# Patient Record
Sex: FEMALE | Race: WHITE | NOT HISPANIC OR LATINO | Employment: OTHER | URBAN - NONMETROPOLITAN AREA
[De-identification: names, ages, dates, MRNs, and addresses within clinical notes are randomized per-mention and may not be internally consistent; named-entity substitution may affect disease eponyms.]

---

## 2017-02-17 ENCOUNTER — LAB (OUTPATIENT)
Dept: LAB | Facility: OTHER | Age: 67
End: 2017-02-17

## 2017-02-17 ENCOUNTER — TRANSCRIBE ORDERS (OUTPATIENT)
Dept: LAB | Facility: OTHER | Age: 67
End: 2017-02-17

## 2017-02-17 DIAGNOSIS — R60.9 PERIPHERAL EDEMA: ICD-10-CM

## 2017-02-17 DIAGNOSIS — E11.9 DIABETES MELLITUS WITHOUT COMPLICATION (HCC): ICD-10-CM

## 2017-02-17 DIAGNOSIS — I10 ESSENTIAL HYPERTENSION, MALIGNANT: ICD-10-CM

## 2017-02-17 DIAGNOSIS — I10 ESSENTIAL HYPERTENSION, MALIGNANT: Primary | ICD-10-CM

## 2017-02-17 DIAGNOSIS — Z00.00 GENERAL MEDICAL EXAM: ICD-10-CM

## 2017-02-17 DIAGNOSIS — Z00.00 GENERAL MEDICAL EXAM: Primary | ICD-10-CM

## 2017-02-17 LAB
25(OH)D3 SERPL-MCNC: 17 NG/ML (ref 30–100)
ALBUMIN SERPL-MCNC: 4 G/DL (ref 3.2–5.5)
ALBUMIN/GLOB SERPL: 1 G/DL (ref 1–3)
ALP SERPL-CCNC: 79 U/L (ref 15–121)
ALT SERPL W P-5'-P-CCNC: 11 U/L (ref 10–60)
ANION GAP SERPL CALCULATED.3IONS-SCNC: 10 MMOL/L (ref 5–15)
AST SERPL-CCNC: 23 U/L (ref 10–60)
BILIRUB SERPL-MCNC: 1.3 MG/DL (ref 0.2–1)
BUN BLD-MCNC: 43 MG/DL (ref 8–25)
BUN/CREAT SERPL: 25.3 (ref 7–25)
CALCIUM SPEC-SCNC: 9.4 MG/DL (ref 8.4–10.8)
CHLORIDE SERPL-SCNC: 94 MMOL/L (ref 100–112)
CO2 SERPL-SCNC: 30 MMOL/L (ref 20–32)
CREAT BLD-MCNC: 1.7 MG/DL (ref 0.4–1.3)
FERRITIN SERPL-MCNC: 59.5 NG/ML (ref 11.1–264)
FOLATE SERPL-MCNC: 9.39 NG/ML (ref 2.76–21)
GFR SERPL CREATININE-BSD FRML MDRD: 30 ML/MIN/1.73 (ref 45–104)
GLOBULIN UR ELPH-MCNC: 3.9 GM/DL (ref 2.5–4.6)
GLUCOSE BLD-MCNC: 158 MG/DL (ref 70–100)
HCT VFR BLD AUTO: 32.2 % (ref 35–45)
HGB BLD-MCNC: 10.6 G/DL (ref 12–15.5)
IRON 24H UR-MRATE: 71 MCG/DL (ref 37–170)
IRON SATN MFR SERPL: 18 % (ref 15–50)
MAGNESIUM SERPL-MCNC: 2.1 MG/DL (ref 1.8–2.5)
POTASSIUM BLD-SCNC: 4.8 MMOL/L (ref 3.4–5.4)
PROT SERPL-MCNC: 7.9 G/DL (ref 6.7–8.2)
SODIUM BLD-SCNC: 134 MMOL/L (ref 134–146)
TIBC SERPL-MCNC: 394 MCG/DL (ref 265–497)
VIT B12 BLD-MCNC: 314 PG/ML (ref 239–931)

## 2017-02-17 PROCEDURE — 84156 ASSAY OF PROTEIN URINE: CPT | Performed by: INTERNAL MEDICINE

## 2017-02-17 PROCEDURE — 85014 HEMATOCRIT: CPT | Performed by: INTERNAL MEDICINE

## 2017-02-17 PROCEDURE — 83540 ASSAY OF IRON: CPT | Performed by: INTERNAL MEDICINE

## 2017-02-17 PROCEDURE — 82570 ASSAY OF URINE CREATININE: CPT | Performed by: INTERNAL MEDICINE

## 2017-02-17 PROCEDURE — 83735 ASSAY OF MAGNESIUM: CPT | Performed by: INTERNAL MEDICINE

## 2017-02-17 PROCEDURE — 83970 ASSAY OF PARATHORMONE: CPT | Performed by: INTERNAL MEDICINE

## 2017-02-17 PROCEDURE — 82607 VITAMIN B-12: CPT | Performed by: INTERNAL MEDICINE

## 2017-02-17 PROCEDURE — 36415 COLL VENOUS BLD VENIPUNCTURE: CPT | Performed by: INTERNAL MEDICINE

## 2017-02-17 PROCEDURE — 83550 IRON BINDING TEST: CPT | Performed by: INTERNAL MEDICINE

## 2017-02-17 PROCEDURE — 82306 VITAMIN D 25 HYDROXY: CPT | Performed by: INTERNAL MEDICINE

## 2017-02-17 PROCEDURE — 82728 ASSAY OF FERRITIN: CPT | Performed by: INTERNAL MEDICINE

## 2017-02-17 PROCEDURE — 85018 HEMOGLOBIN: CPT | Performed by: INTERNAL MEDICINE

## 2017-02-17 PROCEDURE — 80053 COMPREHEN METABOLIC PANEL: CPT | Performed by: INTERNAL MEDICINE

## 2017-02-17 PROCEDURE — 82746 ASSAY OF FOLIC ACID SERUM: CPT | Performed by: INTERNAL MEDICINE

## 2017-02-19 LAB
CREAT 24H UR-MCNC: 38.1 MG/DL
PROT UR-MCNC: 40 MG/DL
PROT/CREAT UR: 1050 MG/G CREAT (ref 0–200)

## 2017-02-20 ENCOUNTER — OFFICE VISIT (OUTPATIENT)
Dept: FAMILY MEDICINE CLINIC | Facility: CLINIC | Age: 67
End: 2017-02-20

## 2017-02-20 VITALS
WEIGHT: 253.2 LBS | HEART RATE: 51 BPM | HEIGHT: 64 IN | OXYGEN SATURATION: 96 % | DIASTOLIC BLOOD PRESSURE: 74 MMHG | SYSTOLIC BLOOD PRESSURE: 128 MMHG | BODY MASS INDEX: 43.23 KG/M2

## 2017-02-20 DIAGNOSIS — E11.9 TYPE 2 DIABETES MELLITUS WITHOUT COMPLICATION, WITHOUT LONG-TERM CURRENT USE OF INSULIN (HCC): Primary | ICD-10-CM

## 2017-02-20 DIAGNOSIS — I10 ESSENTIAL HYPERTENSION: Chronic | ICD-10-CM

## 2017-02-20 DIAGNOSIS — R06.00 DYSPNEA, UNSPECIFIED TYPE: ICD-10-CM

## 2017-02-20 DIAGNOSIS — J44.9 CHRONIC OBSTRUCTIVE PULMONARY DISEASE, UNSPECIFIED COPD TYPE (HCC): Chronic | ICD-10-CM

## 2017-02-20 DIAGNOSIS — E78.5 HYPERLIPIDEMIA, UNSPECIFIED HYPERLIPIDEMIA TYPE: Chronic | ICD-10-CM

## 2017-02-20 LAB — PTH-INTACT SERPL-MCNC: 70 PG/ML (ref 15–65)

## 2017-02-20 PROCEDURE — 99213 OFFICE O/P EST LOW 20 MIN: CPT | Performed by: INTERNAL MEDICINE

## 2017-02-20 RX ORDER — CLOPIDOGREL BISULFATE 75 MG/1
75 TABLET ORAL DAILY
Qty: 90 TABLET | Refills: 1 | Status: SHIPPED | OUTPATIENT
Start: 2017-02-20 | End: 2017-05-09 | Stop reason: SDUPTHER

## 2017-02-20 RX ORDER — TORSEMIDE 10 MG/1
20 TABLET ORAL 3 TIMES DAILY
Qty: 90 TABLET | Refills: 5 | Status: SHIPPED | OUTPATIENT
Start: 2017-02-20 | End: 2017-05-09 | Stop reason: SDUPTHER

## 2017-02-20 RX ORDER — LISINOPRIL 10 MG/1
10 TABLET ORAL DAILY
Qty: 90 TABLET | Refills: 1 | Status: SHIPPED | OUTPATIENT
Start: 2017-02-20 | End: 2017-02-27 | Stop reason: DRUGHIGH

## 2017-02-20 RX ORDER — NITROGLYCERIN 400 UG/1
1 SPRAY ORAL
Qty: 4.9 G | Refills: 5 | Status: SHIPPED | OUTPATIENT
Start: 2017-02-20 | End: 2017-07-07 | Stop reason: SDUPTHER

## 2017-02-20 RX ORDER — SPIRONOLACTONE 25 MG/1
12.5 TABLET ORAL DAILY
Qty: 90 TABLET | Refills: 1 | Status: SHIPPED | OUTPATIENT
Start: 2017-02-20 | End: 2017-05-09 | Stop reason: SDUPTHER

## 2017-02-20 RX ORDER — ISOSORBIDE MONONITRATE 30 MG/1
30 TABLET, EXTENDED RELEASE ORAL DAILY
Qty: 90 TABLET | Refills: 1 | Status: SHIPPED | OUTPATIENT
Start: 2017-02-20 | End: 2017-05-09 | Stop reason: SDUPTHER

## 2017-02-20 RX ORDER — ERGOCALCIFEROL 1.25 MG/1
50000 CAPSULE ORAL WEEKLY
Qty: 4 CAPSULE | Refills: 6 | Status: SHIPPED | OUTPATIENT
Start: 2017-02-20 | End: 2017-05-09 | Stop reason: SDUPTHER

## 2017-02-20 RX ORDER — MAGNESIUM OXIDE 400 MG/1
400 TABLET ORAL DAILY
Qty: 180 TABLET | Refills: 1 | Status: SHIPPED | OUTPATIENT
Start: 2017-02-20 | End: 2017-05-09 | Stop reason: SDUPTHER

## 2017-02-20 RX ORDER — RANITIDINE 300 MG/1
300 TABLET ORAL NIGHTLY
Qty: 90 TABLET | Refills: 1 | Status: SHIPPED | OUTPATIENT
Start: 2017-02-20 | End: 2017-05-09 | Stop reason: SDUPTHER

## 2017-02-20 RX ORDER — ROSUVASTATIN CALCIUM 10 MG/1
10 TABLET, COATED ORAL DAILY
Qty: 90 TABLET | Refills: 1 | Status: SHIPPED | OUTPATIENT
Start: 2017-02-20 | End: 2017-05-09 | Stop reason: SDUPTHER

## 2017-02-20 RX ORDER — ZOLPIDEM TARTRATE 10 MG/1
10 TABLET ORAL NIGHTLY PRN
Qty: 30 TABLET | Refills: 5 | Status: SHIPPED | OUTPATIENT
Start: 2017-02-20 | End: 2017-08-25 | Stop reason: SDUPTHER

## 2017-02-20 RX ORDER — AMLODIPINE BESYLATE 5 MG/1
5 TABLET ORAL DAILY
Qty: 90 TABLET | Refills: 1 | Status: SHIPPED | OUTPATIENT
Start: 2017-02-20 | End: 2017-05-09 | Stop reason: SINTOL

## 2017-02-20 RX ORDER — ATENOLOL 25 MG/1
25 TABLET ORAL DAILY
Qty: 90 TABLET | Refills: 1 | Status: SHIPPED | OUTPATIENT
Start: 2017-02-20 | End: 2017-05-09 | Stop reason: SDUPTHER

## 2017-02-27 RX ORDER — LISINOPRIL 40 MG/1
40 TABLET ORAL DAILY
Qty: 90 TABLET | Refills: 1 | Status: SHIPPED | OUTPATIENT
Start: 2017-02-27 | End: 2017-05-09 | Stop reason: SDUPTHER

## 2017-03-30 ENCOUNTER — LAB (OUTPATIENT)
Dept: LAB | Facility: OTHER | Age: 67
End: 2017-03-30

## 2017-03-30 DIAGNOSIS — E11.9 TYPE 2 DIABETES MELLITUS WITHOUT COMPLICATION, WITHOUT LONG-TERM CURRENT USE OF INSULIN (HCC): ICD-10-CM

## 2017-03-30 LAB
ANION GAP SERPL CALCULATED.3IONS-SCNC: 9 MMOL/L (ref 5–15)
BUN BLD-MCNC: 45 MG/DL (ref 8–25)
BUN/CREAT SERPL: 28.1 (ref 7–25)
CALCIUM SPEC-SCNC: 9.4 MG/DL (ref 8.4–10.8)
CHLORIDE SERPL-SCNC: 96 MMOL/L (ref 100–112)
CO2 SERPL-SCNC: 29 MMOL/L (ref 20–32)
CREAT BLD-MCNC: 1.6 MG/DL (ref 0.4–1.3)
GFR SERPL CREATININE-BSD FRML MDRD: 32 ML/MIN/1.73 (ref 45–104)
GLUCOSE BLD-MCNC: 132 MG/DL (ref 70–100)
POTASSIUM BLD-SCNC: 4.8 MMOL/L (ref 3.4–5.4)
SODIUM BLD-SCNC: 134 MMOL/L (ref 134–146)

## 2017-03-30 PROCEDURE — 80048 BASIC METABOLIC PNL TOTAL CA: CPT | Performed by: INTERNAL MEDICINE

## 2017-03-30 PROCEDURE — 83036 HEMOGLOBIN GLYCOSYLATED A1C: CPT | Performed by: INTERNAL MEDICINE

## 2017-03-30 PROCEDURE — 36415 COLL VENOUS BLD VENIPUNCTURE: CPT | Performed by: INTERNAL MEDICINE

## 2017-03-31 LAB — HBA1C MFR BLD: 5.84 % (ref 4–5.6)

## 2017-05-09 ENCOUNTER — OFFICE VISIT (OUTPATIENT)
Dept: FAMILY MEDICINE CLINIC | Facility: CLINIC | Age: 67
End: 2017-05-09

## 2017-05-09 VITALS
HEIGHT: 64 IN | DIASTOLIC BLOOD PRESSURE: 54 MMHG | HEART RATE: 58 BPM | WEIGHT: 255.8 LBS | OXYGEN SATURATION: 95 % | SYSTOLIC BLOOD PRESSURE: 122 MMHG | BODY MASS INDEX: 43.67 KG/M2

## 2017-05-09 DIAGNOSIS — R06.02 SOB (SHORTNESS OF BREATH): ICD-10-CM

## 2017-05-09 DIAGNOSIS — M15.9 OSTEOARTHRITIS OF MULTIPLE JOINTS, UNSPECIFIED OSTEOARTHRITIS TYPE: Chronic | ICD-10-CM

## 2017-05-09 DIAGNOSIS — I10 ESSENTIAL HYPERTENSION: ICD-10-CM

## 2017-05-09 DIAGNOSIS — N19 RENAL FAILURE: Chronic | ICD-10-CM

## 2017-05-09 DIAGNOSIS — E11.9 TYPE 2 DIABETES MELLITUS WITHOUT COMPLICATION, WITHOUT LONG-TERM CURRENT USE OF INSULIN (HCC): Chronic | ICD-10-CM

## 2017-05-09 DIAGNOSIS — J44.9 CHRONIC OBSTRUCTIVE PULMONARY DISEASE, UNSPECIFIED COPD TYPE (HCC): Primary | Chronic | ICD-10-CM

## 2017-05-09 DIAGNOSIS — R60.9 EDEMA, UNSPECIFIED TYPE: ICD-10-CM

## 2017-05-09 DIAGNOSIS — I50.9 CONGESTIVE HEART FAILURE, UNSPECIFIED CONGESTIVE HEART FAILURE CHRONICITY, UNSPECIFIED CONGESTIVE HEART FAILURE TYPE: Chronic | ICD-10-CM

## 2017-05-09 PROCEDURE — 99214 OFFICE O/P EST MOD 30 MIN: CPT | Performed by: INTERNAL MEDICINE

## 2017-05-09 RX ORDER — TORSEMIDE 10 MG/1
20 TABLET ORAL 3 TIMES DAILY
Qty: 270 TABLET | Refills: 1 | Status: SHIPPED | OUTPATIENT
Start: 2017-05-09 | End: 2017-09-15

## 2017-05-09 RX ORDER — ROSUVASTATIN CALCIUM 10 MG/1
10 TABLET, COATED ORAL DAILY
Qty: 90 TABLET | Refills: 1 | Status: SHIPPED | OUTPATIENT
Start: 2017-05-09 | End: 2017-09-05 | Stop reason: SDUPTHER

## 2017-05-09 RX ORDER — SPIRONOLACTONE 25 MG/1
12.5 TABLET ORAL DAILY
Qty: 90 TABLET | Refills: 1 | Status: ON HOLD | OUTPATIENT
Start: 2017-05-09 | End: 2019-01-01

## 2017-05-09 RX ORDER — RANITIDINE 300 MG/1
300 TABLET ORAL NIGHTLY
Qty: 90 TABLET | Refills: 1 | Status: SHIPPED | OUTPATIENT
Start: 2017-05-09 | End: 2017-12-09 | Stop reason: SDUPTHER

## 2017-05-09 RX ORDER — LISINOPRIL 40 MG/1
40 TABLET ORAL DAILY
Qty: 90 TABLET | Refills: 1 | Status: SHIPPED | OUTPATIENT
Start: 2017-05-09 | End: 2017-09-05 | Stop reason: SDUPTHER

## 2017-05-09 RX ORDER — ERGOCALCIFEROL 1.25 MG/1
50000 CAPSULE ORAL WEEKLY
Qty: 4 CAPSULE | Refills: 6 | Status: SHIPPED | OUTPATIENT
Start: 2017-05-09

## 2017-05-09 RX ORDER — OXYCODONE AND ACETAMINOPHEN 10; 325 MG/1; MG/1
1 TABLET ORAL EVERY 4 HOURS PRN
Status: ON HOLD | COMMUNITY
End: 2019-01-01

## 2017-05-09 RX ORDER — CLOPIDOGREL BISULFATE 75 MG/1
75 TABLET ORAL DAILY
Qty: 90 TABLET | Refills: 1 | Status: SHIPPED | OUTPATIENT
Start: 2017-05-09 | End: 2017-09-05 | Stop reason: SDUPTHER

## 2017-05-09 RX ORDER — ISOSORBIDE MONONITRATE 30 MG/1
30 TABLET, EXTENDED RELEASE ORAL DAILY
Qty: 90 TABLET | Refills: 1 | Status: SHIPPED | OUTPATIENT
Start: 2017-05-09 | End: 2017-09-05 | Stop reason: SDUPTHER

## 2017-05-09 RX ORDER — MAGNESIUM OXIDE 400 MG/1
400 TABLET ORAL DAILY
Qty: 90 TABLET | Refills: 1 | Status: ON HOLD | OUTPATIENT
Start: 2017-05-09 | End: 2019-01-01

## 2017-05-09 RX ORDER — ATENOLOL 25 MG/1
25 TABLET ORAL DAILY
Qty: 90 TABLET | Refills: 1 | Status: SHIPPED | OUTPATIENT
Start: 2017-05-09 | End: 2017-09-15

## 2017-05-17 ENCOUNTER — LAB (OUTPATIENT)
Dept: LAB | Facility: OTHER | Age: 67
End: 2017-05-17

## 2017-05-17 ENCOUNTER — TRANSCRIBE ORDERS (OUTPATIENT)
Dept: LAB | Facility: OTHER | Age: 67
End: 2017-05-17

## 2017-05-17 DIAGNOSIS — R60.9 EDEMA, UNSPECIFIED TYPE: ICD-10-CM

## 2017-05-17 DIAGNOSIS — N18.30 CHRONIC RENAL DISEASE, STAGE III (HCC): Primary | ICD-10-CM

## 2017-05-17 DIAGNOSIS — I10 ESSENTIAL HYPERTENSION, BENIGN: ICD-10-CM

## 2017-05-17 DIAGNOSIS — E11.9 DIABETES MELLITUS WITHOUT COMPLICATION (HCC): ICD-10-CM

## 2017-05-17 DIAGNOSIS — E11.9 TYPE 2 DIABETES MELLITUS WITHOUT COMPLICATION, WITHOUT LONG-TERM CURRENT USE OF INSULIN (HCC): Chronic | ICD-10-CM

## 2017-05-17 DIAGNOSIS — N18.30 CHRONIC RENAL DISEASE, STAGE III (HCC): ICD-10-CM

## 2017-05-17 DIAGNOSIS — I10 ESSENTIAL HYPERTENSION: ICD-10-CM

## 2017-05-17 LAB
ALBUMIN SERPL-MCNC: 3.8 G/DL (ref 3.2–5.5)
ALBUMIN/GLOB SERPL: 1.2 G/DL (ref 1–3)
ALP SERPL-CCNC: 87 U/L (ref 15–121)
ALT SERPL W P-5'-P-CCNC: 14 U/L (ref 10–60)
ANION GAP SERPL CALCULATED.3IONS-SCNC: 7 MMOL/L (ref 5–15)
AST SERPL-CCNC: 25 U/L (ref 10–60)
BASOPHILS # BLD AUTO: 0.04 10*3/MM3 (ref 0–0.2)
BASOPHILS NFR BLD AUTO: 0.5 % (ref 0–2)
BILIRUB SERPL-MCNC: 0.7 MG/DL (ref 0.2–1)
BUN BLD-MCNC: 40 MG/DL (ref 8–25)
BUN/CREAT SERPL: 28.6 (ref 7–25)
CALCIUM SPEC-SCNC: 9 MG/DL (ref 8.4–10.8)
CHLORIDE SERPL-SCNC: 95 MMOL/L (ref 100–112)
CHOLEST SERPL-MCNC: 118 MG/DL (ref 150–200)
CO2 SERPL-SCNC: 29 MMOL/L (ref 20–32)
CREAT BLD-MCNC: 1.4 MG/DL (ref 0.4–1.3)
DEPRECATED RDW RBC AUTO: 43 FL (ref 36.4–46.3)
EOSINOPHIL # BLD AUTO: 0.17 10*3/MM3 (ref 0–0.7)
EOSINOPHIL NFR BLD AUTO: 2.3 % (ref 0–7)
ERYTHROCYTE [DISTWIDTH] IN BLOOD BY AUTOMATED COUNT: 14.1 % (ref 11.5–14.5)
GFR SERPL CREATININE-BSD FRML MDRD: 37 ML/MIN/1.73 (ref 45–104)
GLOBULIN UR ELPH-MCNC: 3.3 GM/DL (ref 2.5–4.6)
GLUCOSE BLD-MCNC: 116 MG/DL (ref 70–100)
HCT VFR BLD AUTO: 31.1 % (ref 35–45)
HDLC SERPL-MCNC: 32 MG/DL (ref 35–100)
HGB BLD-MCNC: 10.5 G/DL (ref 12–15.5)
LDLC SERPL CALC-MCNC: 71 MG/DL
LDLC/HDLC SERPL: 2.23 {RATIO}
LYMPHOCYTES # BLD AUTO: 0.59 10*3/MM3 (ref 0.6–4.2)
LYMPHOCYTES NFR BLD AUTO: 7.9 % (ref 10–50)
MCH RBC QN AUTO: 29.6 PG (ref 26.5–34)
MCHC RBC AUTO-ENTMCNC: 33.8 G/DL (ref 31.4–36)
MCV RBC AUTO: 87.6 FL (ref 80–98)
MONOCYTES # BLD AUTO: 0.48 10*3/MM3 (ref 0–0.9)
MONOCYTES NFR BLD AUTO: 6.4 % (ref 0–12)
NEUTROPHILS # BLD AUTO: 6.18 10*3/MM3 (ref 2–8.6)
NEUTROPHILS NFR BLD AUTO: 82.9 % (ref 37–80)
PLATELET # BLD AUTO: 180 10*3/MM3 (ref 150–450)
PMV BLD AUTO: 9.7 FL (ref 8–12)
POTASSIUM BLD-SCNC: 4.9 MMOL/L (ref 3.4–5.4)
PROT SERPL-MCNC: 7.1 G/DL (ref 6.7–8.2)
RBC # BLD AUTO: 3.55 10*6/MM3 (ref 3.77–5.16)
SODIUM BLD-SCNC: 131 MMOL/L (ref 134–146)
TRIGL SERPL-MCNC: 73 MG/DL (ref 35–160)
VLDLC SERPL-MCNC: 14.6 MG/DL
WBC NRBC COR # BLD: 7.46 10*3/MM3 (ref 3.2–9.8)

## 2017-05-17 PROCEDURE — 36415 COLL VENOUS BLD VENIPUNCTURE: CPT | Performed by: INTERNAL MEDICINE

## 2017-05-17 PROCEDURE — 80053 COMPREHEN METABOLIC PANEL: CPT | Performed by: INTERNAL MEDICINE

## 2017-05-17 PROCEDURE — 83036 HEMOGLOBIN GLYCOSYLATED A1C: CPT | Performed by: INTERNAL MEDICINE

## 2017-05-17 PROCEDURE — 84443 ASSAY THYROID STIM HORMONE: CPT | Performed by: INTERNAL MEDICINE

## 2017-05-17 PROCEDURE — 84436 ASSAY OF TOTAL THYROXINE: CPT | Performed by: INTERNAL MEDICINE

## 2017-05-17 PROCEDURE — 85025 COMPLETE CBC W/AUTO DIFF WBC: CPT | Performed by: INTERNAL MEDICINE

## 2017-05-17 PROCEDURE — 80061 LIPID PANEL: CPT | Performed by: INTERNAL MEDICINE

## 2017-05-17 PROCEDURE — 82043 UR ALBUMIN QUANTITATIVE: CPT | Performed by: INTERNAL MEDICINE

## 2017-05-17 PROCEDURE — 84156 ASSAY OF PROTEIN URINE: CPT | Performed by: INTERNAL MEDICINE

## 2017-05-18 LAB
ALBUMIN UR-MCNC: 19 MG/L
HBA1C MFR BLD: 6.13 % (ref 4–5.6)
PROT UR-MCNC: 80.4 MG/DL
T4 SERPL-MCNC: 9.49 MCG/DL (ref 5.5–11)
TSH SERPL DL<=0.05 MIU/L-ACNC: 3.55 MIU/ML (ref 0.46–4.68)

## 2017-07-07 ENCOUNTER — LAB (OUTPATIENT)
Dept: LAB | Facility: OTHER | Age: 67
End: 2017-07-07

## 2017-07-07 ENCOUNTER — OFFICE VISIT (OUTPATIENT)
Dept: FAMILY MEDICINE CLINIC | Facility: CLINIC | Age: 67
End: 2017-07-07

## 2017-07-07 VITALS
HEIGHT: 64 IN | DIASTOLIC BLOOD PRESSURE: 74 MMHG | OXYGEN SATURATION: 100 % | BODY MASS INDEX: 43.54 KG/M2 | HEART RATE: 54 BPM | SYSTOLIC BLOOD PRESSURE: 130 MMHG | WEIGHT: 255 LBS

## 2017-07-07 DIAGNOSIS — Z79.899 LONG TERM USE OF DRUG: ICD-10-CM

## 2017-07-07 DIAGNOSIS — R53.83 FATIGUE, UNSPECIFIED TYPE: ICD-10-CM

## 2017-07-07 DIAGNOSIS — N19 RENAL FAILURE: Chronic | ICD-10-CM

## 2017-07-07 DIAGNOSIS — E11.9 TYPE 2 DIABETES MELLITUS WITHOUT COMPLICATION, WITHOUT LONG-TERM CURRENT USE OF INSULIN (HCC): Chronic | ICD-10-CM

## 2017-07-07 DIAGNOSIS — I50.9 CONGESTIVE HEART FAILURE, UNSPECIFIED CONGESTIVE HEART FAILURE CHRONICITY, UNSPECIFIED CONGESTIVE HEART FAILURE TYPE: Chronic | ICD-10-CM

## 2017-07-07 DIAGNOSIS — J44.9 CHRONIC OBSTRUCTIVE PULMONARY DISEASE, UNSPECIFIED COPD TYPE (HCC): Chronic | ICD-10-CM

## 2017-07-07 DIAGNOSIS — F32.A DEPRESSION, UNSPECIFIED DEPRESSION TYPE: Primary | Chronic | ICD-10-CM

## 2017-07-07 DIAGNOSIS — M15.9 OSTEOARTHRITIS OF MULTIPLE JOINTS, UNSPECIFIED OSTEOARTHRITIS TYPE: Chronic | ICD-10-CM

## 2017-07-07 PROCEDURE — G0480 DRUG TEST DEF 1-7 CLASSES: HCPCS | Performed by: INTERNAL MEDICINE

## 2017-07-07 PROCEDURE — 86618 LYME DISEASE ANTIBODY: CPT | Performed by: INTERNAL MEDICINE

## 2017-07-07 PROCEDURE — 36415 COLL VENOUS BLD VENIPUNCTURE: CPT | Performed by: INTERNAL MEDICINE

## 2017-07-07 PROCEDURE — 99214 OFFICE O/P EST MOD 30 MIN: CPT | Performed by: INTERNAL MEDICINE

## 2017-07-07 RX ORDER — NITROGLYCERIN 400 UG/1
1 SPRAY ORAL
Qty: 4.9 G | Refills: 5 | Status: SHIPPED | OUTPATIENT
Start: 2017-07-07

## 2017-07-07 NOTE — PROGRESS NOTES
Subjective   Nicolasa Rojas is a 67 y.o. female.     Chief Complaint   Patient presents with   • Follow-up     wants colonoscopy and upper scope        History of Present Illness     Pt in f/u on depression, COPD, DM, DJD, CHF, renal failure, and insomnia.    Pt says she shouldn't hurt from her feet all the way up.  She says she wants a colonoscopy done by Dr. Cox and says she wants a blood test to get to Lyme's and everything,.  She says there has to be a reason for all her pain.  Pt says she went to a lung specialist and was told that her lungs wasn't very bad.  She states when she gets up and tries to walk that is when her oxygen goes down.  She says there has to be something wrong with her.     The following portions of the patient's history were reviewed and updated as appropriate: allergies, current medications, past family history, past medical history, past social history, past surgical history and problem list.    Review of Systems   Constitutional: Positive for fatigue. Negative for activity change, appetite change and unexpected weight change.   HENT: Negative for ear pain, facial swelling and hearing loss.    Respiratory: Positive for shortness of breath. Negative for cough, chest tightness and wheezing.    Cardiovascular: Negative for chest pain, palpitations and leg swelling.   Gastrointestinal: Negative for abdominal pain.   Genitourinary: Negative for difficulty urinating, dysuria, flank pain, frequency and urgency.   Musculoskeletal: Positive for arthralgias.   Skin: Negative for color change and rash.   Allergic/Immunologic: Negative for environmental allergies and food allergies.   Neurological: Negative for dizziness, syncope, weakness, numbness and headaches.   Hematological: Negative for adenopathy.   Psychiatric/Behavioral: Positive for sleep disturbance. Negative for confusion, decreased concentration, dysphoric mood and suicidal ideas. The patient is nervous/anxious.    All other  systems reviewed and are negative.      Objective   Physical Exam   Constitutional: She is oriented to person, place, and time. Vital signs are normal. She appears well-developed and well-nourished.   HENT:   Head: Normocephalic.   Right Ear: External ear normal.   Left Ear: External ear normal.   Nose: Nose normal.   Mouth/Throat: Oropharynx is clear and moist.   Eyes: Conjunctivae and EOM are normal. Pupils are equal, round, and reactive to light.   Neck: Normal range of motion. Neck supple. No JVD present. No thyromegaly present.   Cardiovascular: Normal rate, regular rhythm, normal heart sounds and intact distal pulses.    No murmur heard.  Pulmonary/Chest: Effort normal and breath sounds normal. No respiratory distress. She has no wheezes. She has no rales. She exhibits no tenderness.   Abdominal: Soft. Bowel sounds are normal. She exhibits no distension and no mass. There is no tenderness. There is no rebound and no guarding. No hernia.   Musculoskeletal: Normal range of motion. She exhibits tenderness. She exhibits no edema or deformity.   Lymphadenopathy:     She has no cervical adenopathy.   Neurological: She is alert and oriented to person, place, and time. No cranial nerve deficit. Coordination normal.   Skin: Skin is warm and dry. No rash noted. No pallor.   Psychiatric: Her behavior is normal. Judgment and thought content normal. Her mood appears anxious. She does not exhibit a depressed mood. She expresses no homicidal and no suicidal ideation. She expresses no suicidal plans and no homicidal plans.   Nursing note and vitals reviewed.      Assessment/Plan   Nicolasa was seen today for follow-up.    Diagnoses and all orders for this visit:    Depression, unspecified depression type    Chronic obstructive pulmonary disease, unspecified COPD type    Type 2 diabetes mellitus without complication, without long-term current use of insulin    Osteoarthritis of multiple joints, unspecified osteoarthritis  type    Congestive heart failure, unspecified congestive heart failure chronicity, unspecified congestive heart failure type    Renal failure    Fatigue, unspecified type  Comments:  Ongoing        Schedule Dr. Cox for Colonoscopy and EDG    Pt wants lyme's disease blood work due to her fatigue    UDS/Ambien level if we prescribe this medication.    Scribed for Dr. Montes by [facility] 07/07/17

## 2017-07-10 DIAGNOSIS — Z12.11 SCREENING FOR COLON CANCER: Primary | ICD-10-CM

## 2017-07-10 LAB — B BURGDOR IGG+IGM SER-ACNC: <0.91 ISR (ref 0–0.9)

## 2017-07-15 LAB — ZOLPIDEM: 74.8 NG/ML

## 2017-07-31 ENCOUNTER — CONSULT (OUTPATIENT)
Dept: SURGERY | Facility: CLINIC | Age: 67
End: 2017-07-31

## 2017-07-31 VITALS
DIASTOLIC BLOOD PRESSURE: 70 MMHG | WEIGHT: 273 LBS | HEIGHT: 64 IN | BODY MASS INDEX: 46.61 KG/M2 | SYSTOLIC BLOOD PRESSURE: 112 MMHG

## 2017-07-31 DIAGNOSIS — R14.0 ABDOMINAL BLOATING: Primary | ICD-10-CM

## 2017-07-31 PROCEDURE — 99203 OFFICE O/P NEW LOW 30 MIN: CPT | Performed by: SURGERY

## 2017-08-02 ENCOUNTER — TELEPHONE (OUTPATIENT)
Dept: SURGERY | Facility: CLINIC | Age: 67
End: 2017-08-02

## 2017-08-02 RX ORDER — METOCLOPRAMIDE 5 MG/1
5 TABLET ORAL 4 TIMES DAILY
Qty: 360 TABLET | Refills: 3 | Status: SHIPPED | OUTPATIENT
Start: 2017-08-02 | End: 2018-01-19

## 2017-08-02 NOTE — TELEPHONE ENCOUNTER
PT WAS SEEN IN ENDOSCOPY 7-31-17   PT THOUGHT DR COLLINS WAS GOING TO CALL IN [facility] TO [facility] FOR HER   [facility] PH # [phone]

## 2017-08-09 ENCOUNTER — LAB (OUTPATIENT)
Dept: LAB | Facility: OTHER | Age: 67
End: 2017-08-09

## 2017-08-09 DIAGNOSIS — N19 RENAL FAILURE: Chronic | ICD-10-CM

## 2017-08-09 LAB
ANION GAP SERPL CALCULATED.3IONS-SCNC: 11 MMOL/L (ref 5–15)
BUN BLD-MCNC: 38 MG/DL (ref 8–25)
BUN/CREAT SERPL: 23.8 (ref 7–25)
CALCIUM SPEC-SCNC: 8.9 MG/DL (ref 8.4–10.8)
CHLORIDE SERPL-SCNC: 94 MMOL/L (ref 100–112)
CO2 SERPL-SCNC: 29 MMOL/L (ref 20–32)
CREAT BLD-MCNC: 1.6 MG/DL (ref 0.4–1.3)
GFR SERPL CREATININE-BSD FRML MDRD: 32 ML/MIN/1.73 (ref 45–104)
GLUCOSE BLD-MCNC: 133 MG/DL (ref 70–100)
POTASSIUM BLD-SCNC: 4.7 MMOL/L (ref 3.4–5.4)
SODIUM BLD-SCNC: 134 MMOL/L (ref 134–146)

## 2017-08-09 PROCEDURE — 36415 COLL VENOUS BLD VENIPUNCTURE: CPT | Performed by: INTERNAL MEDICINE

## 2017-08-09 PROCEDURE — 80048 BASIC METABOLIC PNL TOTAL CA: CPT | Performed by: INTERNAL MEDICINE

## 2017-08-25 RX ORDER — ZOLPIDEM TARTRATE 10 MG/1
10 TABLET ORAL NIGHTLY PRN
Qty: 30 TABLET | Refills: 5 | Status: SHIPPED | OUTPATIENT
Start: 2017-08-25 | End: 2017-09-29 | Stop reason: SDUPTHER

## 2017-08-29 ENCOUNTER — TRANSCRIBE ORDERS (OUTPATIENT)
Dept: LAB | Facility: OTHER | Age: 67
End: 2017-08-29

## 2017-08-29 DIAGNOSIS — N18.30 CHRONIC RENAL DISEASE, STAGE III (HCC): Primary | ICD-10-CM

## 2017-08-29 DIAGNOSIS — E11.9 DIABETES MELLITUS, STABLE (HCC): ICD-10-CM

## 2017-08-29 DIAGNOSIS — I10 ESSENTIAL HYPERTENSION, BENIGN: ICD-10-CM

## 2017-08-29 DIAGNOSIS — R60.9 EDEMA, UNSPECIFIED TYPE: ICD-10-CM

## 2017-08-30 NOTE — PROGRESS NOTES
CHIEF COMPLAINT:    Abdominal bloating.  Possible need for endoscopies    HISTORY OF PRESENT ILLNESS:    Nicolasa Rojas is a 67 y.o. female who is referred for abdominal bloating and possible need for upper and lower endoscopies.  She discussed this with her primary care physician.  She has pain at multiple points in her body including her abdomen.  She noted a few dark stools over a month ago.  But hasn't had no blood in her stool and no other dark stools.  She has regular bowel movements.    She notices abdominal bloating and feeling full quickly during mealtime.  On further discussion, she has recently been diagnosed with gastroparesis.    She had upper and lower endoscopies in 2012. Both were normal per her recollection.   She is currently on 2 liters nasal cannula oxygen.    Past Medical History:   Diagnosis Date   • Acute bronchitis    • Acute congestive heart failure     resolving   • Acute kidney failure, unspecified    • Acute kidney failure, unspecified    • Acute posthemorrhagic anemia    • Asthenia    • Chest pain    • Chronic gastritis    • Chronic pharyngitis    • Chronic renal impairment    • Congenital renal failure    • Congestive heart failure    • Contact dermatitis due to poison ivy    • Coronary arteriosclerosis    • D-dimer, elevated     D-dimer above reference range    • Degenerative joint disease involving multiple joints     back   • Depressive disorder    • Dysphagia    • Dyspnea    • Edema of lower extremity    • Encounter for immunization    • Encounter for long-term (current) use of medications    • Epigastric pain    • Esophagitis    • Essential hypertension    • Gastroesophageal reflux disease    • Gastroparesis     Gastroparesis syndrome    • Generalized abdominal pain    • H/O bone density study 01/09/2015   • H/O mammogram 01/15/2015   • H/O screening mammography 11/12/2013   • Headache    • Hyperlipidemia    • Hypokalemia    • Hypomagnesemia    • Injury of tendon of rotator  cuff     Injury of tendon of the rotator cuff of shoulder      • Insomnia    • Insomnia, unspecified    • Knee pain    • Nausea and vomiting    • Neck pain    • Need for immunization against influenza    • Need for prophylactic vaccination and inoculation against influenza    • Need for prophylactic vaccination and inoculation against viral disease    • 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   • Orthopnea    • Osteoarthritis    • Pain of breast     right   • Shoulder pain    • Sleep disorder    • Stricture of esophagus    • Symptomatic menopausal or female climacteric states    • Type 2 diabetes mellitus        Past Surgical History:   Procedure Laterality Date   • CHOLECYSTECTOMY     • COLONOSCOPY  03/29/2012    Diverticulosis. Performed at [facility].   • ENDOSCOPY  02/09/2015    ESOPHAGEAL STRICTURE PRESENT, GASTRITIS FOUND IN THE STOMACH, NORMAL DUODENUM   • ENDOSCOPY W/ PEG TUBE PLACEMENT  12/19/2012    Esophagitis seen. Biopsy taken. Esophageal stricture present. Dilatation performed. Gastritis in stomach. Biopsy taken. Food was in stomach. Normal duodenum.   • HERNIA REPAIR     • HYSTERECTOMY     • NECK SURGERY           Current Outpatient Prescriptions:   •  aspirin 81 MG chewable tablet, Chew 81 mg Daily., Disp: , Rfl:   •  atenolol (TENORMIN) 25 MG tablet, Take 1 tablet by mouth Daily., Disp: 90 tablet, Rfl: 1  •  clopidogrel (PLAVIX) 75 MG tablet, Take 1 tablet by mouth Daily., Disp: 90 tablet, Rfl: 1  •  glucose blood test strip, Test 9 times daily with use of insulin pump as directed. DX: E11.9, Disp: 900 each, Rfl: 3  •  Insulin Lispro 100 UNIT/ML solution cartridge, Inject 150 Units under the skin Daily. DX: E11, Disp: 5 cartridge, Rfl: 11  •  isosorbide mononitrate (IMDUR) 30 MG 24 hr tablet, Take 1 tablet by mouth Daily., Disp: 90 tablet, Rfl: 1  •  lidocaine (LIDODERM) 5 %, Place 3 patches on the skin Daily. Remove & Discard patch within 12 hours or as directed by  MD, Disp: , Rfl:   •  lisinopril (PRINIVIL,ZESTRIL) 40 MG tablet, Take 1 tablet by mouth Daily., Disp: 90 tablet, Rfl: 1  •  magnesium oxide (MAG-OX) 400 MG tablet, Take 1 tablet by mouth Daily., Disp: 90 tablet, Rfl: 1  •  nitroglycerin (NITROLINGUAL) 0.4 MG/SPRAY spray, Place 1 spray under the tongue Every 5 (Five) Minutes As Needed for Chest Pain., Disp: 4.9 g, Rfl: 5  •  oxyCODONE-acetaminophen (PERCOCET)  MG per tablet, Take 1 tablet by mouth Every 4 (Four) Hours As Needed for Moderate Pain (4-6)., Disp: , Rfl:   •  raNITIdine (ZANTAC) 300 MG tablet, Take 1 tablet by mouth Every Night., Disp: 90 tablet, Rfl: 1  •  rosuvastatin (CRESTOR) 10 MG tablet, Take 1 tablet by mouth Daily., Disp: 90 tablet, Rfl: 1  •  sertraline (ZOLOFT) 50 MG tablet, Take 1 tablet by mouth Daily., Disp: 90 tablet, Rfl: 1  •  spironolactone (ALDACTONE) 25 MG tablet, Take 0.5 tablets by mouth Daily., Disp: 90 tablet, Rfl: 1  •  torsemide (DEMADEX) 10 MG tablet, Take 2 tablets by mouth 3 (Three) Times a Day., Disp: 270 tablet, Rfl: 1  •  vitamin D (ERGOCALCIFEROL) 62650 UNITS capsule capsule, Take 1 capsule by mouth 1 (One) Time Per Week., Disp: 4 capsule, Rfl: 6  •  metoclopramide (REGLAN) 5 MG tablet, Take 1 tablet by mouth 4 (Four) Times a Day., Disp: 360 tablet, Rfl: 3  •  zolpidem (AMBIEN) 10 MG tablet, Take 1 tablet by mouth At Night As Needed for Sleep., Disp: 30 tablet, Rfl: 5    Allergies   Allergen Reactions   • Iodides      Iodine and iodide containing products     • Iodine      Iodine and iodide containing products   • Other      steroids   • Stadol [Butorphanol]        Family History   Problem Relation Age of Onset   • Cancer Mother    • Diabetes Mother    • Stroke Father    • Heart disease Sister    • Colon cancer Other    • Hypertension Other        Social History     Social History   • Marital status:      Spouse name: N/A   • Number of children: N/A   • Years of education: N/A     Occupational History   • Not  "on file.     Social History Main Topics   • Smoking status: Former Smoker   • Smokeless tobacco: Never Used   • Alcohol use No   • Drug use: Not on file   • Sexual activity: Not on file     Other Topics Concern   • Not on file     Social History Narrative       Review of Systems   Constitutional: Negative for appetite change, chills, fever and unexpected weight change.        Weight gain   HENT: Positive for trouble swallowing. Negative for hearing loss and nosebleeds.    Eyes: Negative for visual disturbance.   Respiratory: Positive for cough, shortness of breath and wheezing. Negative for apnea, choking, chest tightness and stridor.    Cardiovascular: Positive for leg swelling. Negative for chest pain and palpitations.   Gastrointestinal: Positive for nausea. Negative for abdominal distention, abdominal pain, blood in stool, constipation, diarrhea and vomiting.   Endocrine: Positive for cold intolerance and heat intolerance. Negative for polydipsia, polyphagia and polyuria.   Genitourinary: Positive for difficulty urinating. Negative for dysuria, frequency, hematuria and urgency.   Musculoskeletal: Positive for arthralgias, back pain and myalgias. Negative for neck pain.   Skin: Negative for color change, pallor and rash.   Allergic/Immunologic: Negative for immunocompromised state.   Neurological: Negative for dizziness, seizures, syncope, light-headedness, numbness and headaches.   Hematological: Negative for adenopathy.   Psychiatric/Behavioral: Negative for suicidal ideas. The patient is not nervous/anxious.        Objective     /70  Ht 64\" (162.6 cm)  Wt 273 lb (124 kg)  BMI 46.86 kg/m2    Physical Exam   Constitutional: She is oriented to person, place, and time. She appears well-nourished. She is cooperative. No distress.   Obese, in wheelchair, on oxygen   HENT:   Head: Normocephalic and atraumatic.   Eyes: Conjunctivae and EOM are normal. Pupils are equal, round, and reactive to light. Right eye " exhibits no discharge. Left eye exhibits no discharge.   Neck: Normal range of motion. Neck supple. No JVD present. No tracheal deviation present. No thyromegaly present.   Cardiovascular: Normal rate, regular rhythm and normal heart sounds.  Exam reveals no gallop and no friction rub.    No murmur heard.  Pulmonary/Chest: Effort normal and breath sounds normal. No respiratory distress. She has no wheezes. She has no rales. She exhibits no tenderness.   Abdominal: Soft. She exhibits no distension and no mass. There is tenderness. There is no rebound and no guarding. No hernia.   Musculoskeletal: Normal range of motion. She exhibits no edema, tenderness or deformity.   Neurological: She is alert and oriented to person, place, and time. No cranial nerve deficit.   Skin: Skin is warm and dry. No rash noted. She is not diaphoretic. No erythema. No pallor.   Psychiatric: She has a normal mood and affect. Her behavior is normal. Judgment and thought content normal.       DIAGNOSTIC DATA:  Gastric emptying study from 2013 shows significant gastroparesis with a half emptying time of 131 minutes    Barium esophagram from 2013 was normal    ASSESSMENT:    Gastroparesis    PLAN:    Currently, I don't believe that she needs upper and lower endoscopies as per her recollection her most recent endoscopies were normal.  Based on our discussion today she does seem to have fairly significant gastroparesis which was seen on study in 2013.  Currently the patient's on no medications for gastroparesis.  We will try her on Reglan to see if there is improvement in her symptoms.  I'll see her back in a few weeks to recheck.          This document has been electronically signed by Felipe Cox MD on August 30, 2017 3:01 PM

## 2017-08-31 ENCOUNTER — TRANSCRIBE ORDERS (OUTPATIENT)
Dept: GENERAL RADIOLOGY | Facility: CLINIC | Age: 67
End: 2017-08-31

## 2017-08-31 ENCOUNTER — LAB (OUTPATIENT)
Dept: LAB | Facility: OTHER | Age: 67
End: 2017-08-31

## 2017-08-31 DIAGNOSIS — N18.30 CHRONIC KIDNEY DISEASE, STAGE III (MODERATE) (HCC): ICD-10-CM

## 2017-08-31 DIAGNOSIS — E11.9 DIABETES MELLITUS WITHOUT COMPLICATION (HCC): ICD-10-CM

## 2017-08-31 DIAGNOSIS — N18.30 CHRONIC KIDNEY DISEASE, STAGE III (MODERATE) (HCC): Primary | ICD-10-CM

## 2017-08-31 DIAGNOSIS — I10 ESSENTIAL HYPERTENSION, MALIGNANT: ICD-10-CM

## 2017-08-31 DIAGNOSIS — R60.9 EDEMA, UNSPECIFIED TYPE: ICD-10-CM

## 2017-08-31 DIAGNOSIS — E61.1 LOW IRON: Primary | ICD-10-CM

## 2017-08-31 DIAGNOSIS — E61.1 LOW IRON: ICD-10-CM

## 2017-08-31 LAB
ALBUMIN SERPL-MCNC: 4.2 G/DL (ref 3.2–5.5)
ANION GAP SERPL CALCULATED.3IONS-SCNC: 13 MMOL/L (ref 5–15)
BUN BLD-MCNC: 76 MG/DL (ref 8–25)
BUN/CREAT SERPL: 33 (ref 7–25)
CALCIUM SPEC-SCNC: 9.3 MG/DL (ref 8.4–10.8)
CHLORIDE SERPL-SCNC: 86 MMOL/L (ref 100–112)
CO2 SERPL-SCNC: 30 MMOL/L (ref 20–32)
CREAT BLD-MCNC: 2.3 MG/DL (ref 0.4–1.3)
GFR SERPL CREATININE-BSD FRML MDRD: 21 ML/MIN/1.73 (ref 45–104)
GLUCOSE BLD-MCNC: 160 MG/DL (ref 70–100)
PHOSPHATE SERPL-MCNC: 4.5 MG/DL (ref 2.5–4.6)
POTASSIUM BLD-SCNC: 4.6 MMOL/L (ref 3.4–5.4)
SODIUM BLD-SCNC: 129 MMOL/L (ref 134–146)

## 2017-08-31 PROCEDURE — 84156 ASSAY OF PROTEIN URINE: CPT | Performed by: INTERNAL MEDICINE

## 2017-08-31 PROCEDURE — 80069 RENAL FUNCTION PANEL: CPT | Performed by: INTERNAL MEDICINE

## 2017-08-31 PROCEDURE — 83540 ASSAY OF IRON: CPT | Performed by: INTERNAL MEDICINE

## 2017-08-31 PROCEDURE — 82570 ASSAY OF URINE CREATININE: CPT | Performed by: INTERNAL MEDICINE

## 2017-08-31 PROCEDURE — 36415 COLL VENOUS BLD VENIPUNCTURE: CPT | Performed by: INTERNAL MEDICINE

## 2017-09-01 LAB
CREAT UR-MCNC: 54.6 MG/DL
IRON 24H UR-MRATE: 77 MCG/DL (ref 37–170)
PROT UR-MCNC: 26 MG/DL
PROT/CREAT UR: 476.2 MG/G CREA (ref 0–200)

## 2017-09-05 RX ORDER — ISOSORBIDE MONONITRATE 30 MG/1
30 TABLET, EXTENDED RELEASE ORAL DAILY
Qty: 90 TABLET | Refills: 1 | Status: SHIPPED | OUTPATIENT
Start: 2017-09-05 | End: 2017-12-02 | Stop reason: SDUPTHER

## 2017-09-05 RX ORDER — LISINOPRIL 40 MG/1
40 TABLET ORAL DAILY
Qty: 90 TABLET | Refills: 1 | Status: SHIPPED | OUTPATIENT
Start: 2017-09-05 | End: 2017-12-02 | Stop reason: SDUPTHER

## 2017-09-05 RX ORDER — ROSUVASTATIN CALCIUM 10 MG/1
10 TABLET, COATED ORAL DAILY
Qty: 90 TABLET | Refills: 1 | Status: SHIPPED | OUTPATIENT
Start: 2017-09-05 | End: 2017-12-09 | Stop reason: SDUPTHER

## 2017-09-05 RX ORDER — CLOPIDOGREL BISULFATE 75 MG/1
75 TABLET ORAL DAILY
Qty: 90 TABLET | Refills: 1 | Status: SHIPPED | OUTPATIENT
Start: 2017-09-05 | End: 2017-12-02 | Stop reason: SDUPTHER

## 2017-09-15 ENCOUNTER — OFFICE VISIT (OUTPATIENT)
Dept: FAMILY MEDICINE CLINIC | Facility: CLINIC | Age: 67
End: 2017-09-15

## 2017-09-15 ENCOUNTER — LAB (OUTPATIENT)
Dept: LAB | Facility: OTHER | Age: 67
End: 2017-09-15

## 2017-09-15 VITALS
SYSTOLIC BLOOD PRESSURE: 148 MMHG | HEIGHT: 64 IN | HEART RATE: 69 BPM | DIASTOLIC BLOOD PRESSURE: 70 MMHG | OXYGEN SATURATION: 99 % | WEIGHT: 273 LBS | BODY MASS INDEX: 46.61 KG/M2

## 2017-09-15 DIAGNOSIS — K31.84 GASTROPARESIS: Chronic | ICD-10-CM

## 2017-09-15 DIAGNOSIS — N19 RENAL FAILURE: Chronic | ICD-10-CM

## 2017-09-15 DIAGNOSIS — F32.A DEPRESSION, UNSPECIFIED DEPRESSION TYPE: Chronic | ICD-10-CM

## 2017-09-15 DIAGNOSIS — E11.9 TYPE 2 DIABETES MELLITUS WITHOUT COMPLICATION, WITHOUT LONG-TERM CURRENT USE OF INSULIN (HCC): Chronic | ICD-10-CM

## 2017-09-15 DIAGNOSIS — G47.00 INSOMNIA, UNSPECIFIED TYPE: Chronic | ICD-10-CM

## 2017-09-15 DIAGNOSIS — I50.9 CONGESTIVE HEART FAILURE, UNSPECIFIED CONGESTIVE HEART FAILURE CHRONICITY, UNSPECIFIED CONGESTIVE HEART FAILURE TYPE: Primary | Chronic | ICD-10-CM

## 2017-09-15 DIAGNOSIS — I25.10 CAD IN NATIVE ARTERY: Chronic | ICD-10-CM

## 2017-09-15 DIAGNOSIS — R60.9 EDEMA, UNSPECIFIED TYPE: Chronic | ICD-10-CM

## 2017-09-15 DIAGNOSIS — I10 ESSENTIAL HYPERTENSION: Chronic | ICD-10-CM

## 2017-09-15 DIAGNOSIS — E78.5 HYPERLIPIDEMIA, UNSPECIFIED HYPERLIPIDEMIA TYPE: Chronic | ICD-10-CM

## 2017-09-15 DIAGNOSIS — I10 ESSENTIAL HYPERTENSION: ICD-10-CM

## 2017-09-15 LAB
ANION GAP SERPL CALCULATED.3IONS-SCNC: 12 MMOL/L (ref 5–15)
BASOPHILS # BLD AUTO: 0.04 10*3/MM3 (ref 0–0.2)
BASOPHILS NFR BLD AUTO: 0.5 % (ref 0–2)
BUN BLD-MCNC: 38 MG/DL (ref 8–25)
BUN/CREAT SERPL: 25.3 (ref 7–25)
CALCIUM SPEC-SCNC: 9.3 MG/DL (ref 8.4–10.8)
CHLORIDE SERPL-SCNC: 85 MMOL/L (ref 100–112)
CO2 SERPL-SCNC: 29 MMOL/L (ref 20–32)
CREAT BLD-MCNC: 1.5 MG/DL (ref 0.4–1.3)
DEPRECATED RDW RBC AUTO: 44.4 FL (ref 36.4–46.3)
EOSINOPHIL # BLD AUTO: 0.16 10*3/MM3 (ref 0–0.7)
EOSINOPHIL NFR BLD AUTO: 2 % (ref 0–7)
ERYTHROCYTE [DISTWIDTH] IN BLOOD BY AUTOMATED COUNT: 14.7 % (ref 11.5–14.5)
GFR SERPL CREATININE-BSD FRML MDRD: 35 ML/MIN/1.73 (ref 45–104)
GLUCOSE BLD-MCNC: 154 MG/DL (ref 70–100)
HCT VFR BLD AUTO: 32.3 % (ref 35–45)
HGB BLD-MCNC: 11.1 G/DL (ref 12–15.5)
LYMPHOCYTES # BLD AUTO: 0.68 10*3/MM3 (ref 0.6–4.2)
LYMPHOCYTES NFR BLD AUTO: 8.3 % (ref 10–50)
MAGNESIUM SERPL-MCNC: 2.1 MG/DL (ref 1.8–2.5)
MCH RBC QN AUTO: 29.4 PG (ref 26.5–34)
MCHC RBC AUTO-ENTMCNC: 34.4 G/DL (ref 31.4–36)
MCV RBC AUTO: 85.7 FL (ref 80–98)
MONOCYTES # BLD AUTO: 0.69 10*3/MM3 (ref 0–0.9)
MONOCYTES NFR BLD AUTO: 8.4 % (ref 0–12)
NEUTROPHILS # BLD AUTO: 6.62 10*3/MM3 (ref 2–8.6)
NEUTROPHILS NFR BLD AUTO: 80.8 % (ref 37–80)
PLATELET # BLD AUTO: 208 10*3/MM3 (ref 150–450)
PMV BLD AUTO: 9.5 FL (ref 8–12)
POTASSIUM BLD-SCNC: 4.7 MMOL/L (ref 3.4–5.4)
RBC # BLD AUTO: 3.77 10*6/MM3 (ref 3.77–5.16)
SODIUM BLD-SCNC: 126 MMOL/L (ref 134–146)
WBC NRBC COR # BLD: 8.19 10*3/MM3 (ref 3.2–9.8)

## 2017-09-15 PROCEDURE — 80048 BASIC METABOLIC PNL TOTAL CA: CPT | Performed by: INTERNAL MEDICINE

## 2017-09-15 PROCEDURE — 85025 COMPLETE CBC W/AUTO DIFF WBC: CPT | Performed by: INTERNAL MEDICINE

## 2017-09-15 PROCEDURE — 36415 COLL VENOUS BLD VENIPUNCTURE: CPT | Performed by: INTERNAL MEDICINE

## 2017-09-15 PROCEDURE — 99214 OFFICE O/P EST MOD 30 MIN: CPT | Performed by: INTERNAL MEDICINE

## 2017-09-15 PROCEDURE — 83735 ASSAY OF MAGNESIUM: CPT | Performed by: INTERNAL MEDICINE

## 2017-09-15 RX ORDER — HYDRALAZINE HYDROCHLORIDE 50 MG/1
50 TABLET, FILM COATED ORAL 2 TIMES DAILY
Qty: 60 TABLET | Refills: 5 | Status: SHIPPED | OUTPATIENT
Start: 2017-09-15 | End: 2018-01-01 | Stop reason: SDUPTHER

## 2017-09-15 RX ORDER — AMLODIPINE BESYLATE 10 MG/1
10 TABLET ORAL DAILY
Qty: 30 TABLET | Refills: 5 | Status: SHIPPED | OUTPATIENT
Start: 2017-09-15 | End: 2019-01-01 | Stop reason: HOSPADM

## 2017-09-15 RX ORDER — HYDRALAZINE HYDROCHLORIDE 50 MG/1
50 TABLET, FILM COATED ORAL 2 TIMES DAILY
COMMUNITY
Start: 2017-08-25 | End: 2017-09-15 | Stop reason: SDUPTHER

## 2017-09-15 RX ORDER — AMLODIPINE BESYLATE 10 MG/1
TABLET ORAL
COMMUNITY
Start: 2017-08-25 | End: 2017-09-15 | Stop reason: SDUPTHER

## 2017-09-15 RX ORDER — BUMETANIDE 2 MG/1
2 TABLET ORAL DAILY
Qty: 30 TABLET | Refills: 5 | Status: SHIPPED | OUTPATIENT
Start: 2017-09-15 | End: 2018-01-02 | Stop reason: SDUPTHER

## 2017-09-15 RX ORDER — BUMETANIDE 2 MG/1
2 TABLET ORAL DAILY
COMMUNITY
Start: 2017-08-25 | End: 2017-09-15 | Stop reason: SDUPTHER

## 2017-09-15 NOTE — PROGRESS NOTES
Subjective   Nicolasa Rojas is a 67 y.o. female.     Chief Complaint   Patient presents with   • Follow-up     eliceo f/u        History of Present Illness     Pt in f/u from hospital for CHF, DM, renal failure, and CAD.     Pt says she was in the hospital for 5 days for fluid.  She says they got 9 liters off of her.  She denies a lot of salt intake.  Pts  says that her fluid pills were doubled and tripled in the hospital, and then when she went home she was decreased to one a day. The fluid pills being increased in the hospital, hurt her kidneys again.      She went into the hospital on Sunday and patient says she saw her cardiologist one time and then never saw him again.  She states she doesn't like how her cardiologist has done her.  She was told that she needed a new cardiologist closer to home and needed to be monitored more often.    Pt says she has been having some neck pains and wonders if it is weakness from being in the hospital or if it is something like low electrolytes or something like that.   She says she was taken off of a medication due to it was lowing her electrolytes.      Pt is tearful in the office because she says the stress is getting to her because she has been so sick for the last year or two.  She says she has been treated badly by the [facility] and them not wanting to call her Cardiologist to     The following portions of the patient's history were reviewed and updated as appropriate: allergies, current medications, past family history, past medical history, past social history, past surgical history and problem list.    Review of Systems   Constitutional: Positive for fatigue. Negative for activity change, appetite change and unexpected weight change.   HENT: Negative for ear pain, facial swelling and hearing loss.    Respiratory: Negative for cough, chest tightness, shortness of breath and wheezing.    Cardiovascular: Negative for chest pain, palpitations and leg  swelling.   Gastrointestinal: Negative for abdominal pain.   Genitourinary: Negative for difficulty urinating, dysuria, flank pain, frequency and urgency.   Musculoskeletal: Positive for arthralgias and neck pain.   Skin: Negative for color change and rash.   Allergic/Immunologic: Negative for environmental allergies and food allergies.   Neurological: Positive for weakness. Negative for dizziness, syncope, numbness and headaches.   Hematological: Negative for adenopathy.   Psychiatric/Behavioral: Positive for dysphoric mood. Negative for confusion, decreased concentration, sleep disturbance and suicidal ideas. The patient is nervous/anxious.    All other systems reviewed and are negative.      Objective   Physical Exam   Constitutional: She is oriented to person, place, and time. Vital signs are normal. She appears well-developed and well-nourished.   HENT:   Head: Normocephalic.   Right Ear: External ear normal.   Left Ear: External ear normal.   Nose: Nose normal.   Mouth/Throat: Oropharynx is clear and moist.   Eyes: Conjunctivae and EOM are normal. Pupils are equal, round, and reactive to light.   Neck: Normal range of motion. Neck supple. No JVD present. No thyromegaly present.   Cardiovascular: Normal rate, regular rhythm, normal heart sounds and intact distal pulses.    No murmur heard.  Pulmonary/Chest: Effort normal and breath sounds normal. No respiratory distress. She has no wheezes. She has no rales. She exhibits no tenderness.   Abdominal: Soft. Bowel sounds are normal. She exhibits no distension and no mass. There is no tenderness. There is no rebound and no guarding. No hernia.   Musculoskeletal: Normal range of motion. She exhibits tenderness. She exhibits no edema or deformity.   Lymphadenopathy:     She has no cervical adenopathy.   Neurological: She is alert and oriented to person, place, and time. No cranial nerve deficit. Coordination normal.   Skin: Skin is warm and dry. No rash noted. No  pallor.   Psychiatric: Her behavior is normal. Judgment and thought content normal. Her mood appears anxious. She exhibits a depressed mood. She expresses no homicidal and no suicidal ideation. She expresses no suicidal plans and no homicidal plans.   Nursing note and vitals reviewed.      Assessment/Plan   Nicolasa was seen today for follow-up.    Diagnoses and all orders for this visit:    Congestive heart failure, unspecified congestive heart failure chronicity, unspecified congestive heart failure type  -     Ambulatory Referral to Cardiology    Essential hypertension    Type 2 diabetes mellitus without complication, without long-term current use of insulin    Edema, unspecified type    Gastroparesis    Depression, unspecified depression type    Insomnia, unspecified type    Renal failure    Hyperlipidemia, unspecified hyperlipidemia type    CAD in native artery    Other orders  -     bumetanide (BUMEX) 2 MG tablet; Take 1 tablet by mouth Daily.  -     hydrALAZINE (APRESOLINE) 50 MG tablet; Take 1 tablet by mouth 2 (Two) Times a Day.  -     amLODIPine (NORVASC) 10 MG tablet; Take 1 tablet by mouth Daily.        Refill medications if due     BMP Mag on the way out    BMP Mag  in 1 week    CBC BMP Mag in 2 weeks     Schedule Cardiology with Yan[facility] for CHF    Patient sees [place] [facility]     Scribed for Dr. Montes by [facility]rington [facility] 09/15/17

## 2017-09-29 ENCOUNTER — OFFICE VISIT (OUTPATIENT)
Dept: FAMILY MEDICINE CLINIC | Facility: CLINIC | Age: 67
End: 2017-09-29

## 2017-09-29 ENCOUNTER — TRANSCRIBE ORDERS (OUTPATIENT)
Dept: GENERAL RADIOLOGY | Facility: CLINIC | Age: 67
End: 2017-09-29

## 2017-09-29 ENCOUNTER — LAB (OUTPATIENT)
Dept: LAB | Facility: OTHER | Age: 67
End: 2017-09-29

## 2017-09-29 VITALS
DIASTOLIC BLOOD PRESSURE: 78 MMHG | HEIGHT: 64 IN | WEIGHT: 273 LBS | BODY MASS INDEX: 46.61 KG/M2 | HEART RATE: 71 BPM | OXYGEN SATURATION: 98 % | SYSTOLIC BLOOD PRESSURE: 150 MMHG

## 2017-09-29 DIAGNOSIS — Z12.31 SCREENING MAMMOGRAM, ENCOUNTER FOR: ICD-10-CM

## 2017-09-29 DIAGNOSIS — I25.10 CAD IN NATIVE ARTERY: Chronic | ICD-10-CM

## 2017-09-29 DIAGNOSIS — E11.9 DIABETES MELLITUS WITHOUT COMPLICATION (HCC): ICD-10-CM

## 2017-09-29 DIAGNOSIS — N18.30 CHRONIC KIDNEY DISEASE, STAGE III (MODERATE) (HCC): Primary | ICD-10-CM

## 2017-09-29 DIAGNOSIS — N19 RENAL FAILURE: Chronic | ICD-10-CM

## 2017-09-29 DIAGNOSIS — R60.9 EDEMA, PITTING: ICD-10-CM

## 2017-09-29 DIAGNOSIS — F32.A DEPRESSION, UNSPECIFIED DEPRESSION TYPE: Chronic | ICD-10-CM

## 2017-09-29 DIAGNOSIS — N18.30 CHRONIC KIDNEY DISEASE, STAGE III (MODERATE) (HCC): ICD-10-CM

## 2017-09-29 DIAGNOSIS — I10 ESSENTIAL HYPERTENSION, MALIGNANT: ICD-10-CM

## 2017-09-29 DIAGNOSIS — G47.00 INSOMNIA, UNSPECIFIED TYPE: Chronic | ICD-10-CM

## 2017-09-29 DIAGNOSIS — I50.9 CONGESTIVE HEART FAILURE, UNSPECIFIED CONGESTIVE HEART FAILURE CHRONICITY, UNSPECIFIED CONGESTIVE HEART FAILURE TYPE: Primary | Chronic | ICD-10-CM

## 2017-09-29 DIAGNOSIS — K31.84 GASTROPARESIS: Chronic | ICD-10-CM

## 2017-09-29 DIAGNOSIS — R60.9 EDEMA, UNSPECIFIED TYPE: ICD-10-CM

## 2017-09-29 DIAGNOSIS — E11.9 TYPE 2 DIABETES MELLITUS WITHOUT COMPLICATION, WITHOUT LONG-TERM CURRENT USE OF INSULIN (HCC): Chronic | ICD-10-CM

## 2017-09-29 DIAGNOSIS — I10 ESSENTIAL HYPERTENSION: Chronic | ICD-10-CM

## 2017-09-29 DIAGNOSIS — E78.5 HYPERLIPIDEMIA, UNSPECIFIED HYPERLIPIDEMIA TYPE: Chronic | ICD-10-CM

## 2017-09-29 LAB
ANION GAP SERPL CALCULATED.3IONS-SCNC: 12 MMOL/L (ref 5–15)
BUN BLD-MCNC: 35 MG/DL (ref 8–25)
BUN/CREAT SERPL: 23.3 (ref 7–25)
CALCIUM SPEC-SCNC: 9.2 MG/DL (ref 8.4–10.8)
CHLORIDE SERPL-SCNC: 93 MMOL/L (ref 100–112)
CO2 SERPL-SCNC: 30 MMOL/L (ref 20–32)
CREAT BLD-MCNC: 1.5 MG/DL (ref 0.4–1.3)
GFR SERPL CREATININE-BSD FRML MDRD: 35 ML/MIN/1.73 (ref 45–104)
GLUCOSE BLD-MCNC: 136 MG/DL (ref 70–100)
MAGNESIUM SERPL-MCNC: 2.1 MG/DL (ref 1.8–2.5)
POTASSIUM BLD-SCNC: 4.8 MMOL/L (ref 3.4–5.4)
SODIUM BLD-SCNC: 135 MMOL/L (ref 134–146)

## 2017-09-29 PROCEDURE — 90662 IIV NO PRSV INCREASED AG IM: CPT | Performed by: INTERNAL MEDICINE

## 2017-09-29 PROCEDURE — 99214 OFFICE O/P EST MOD 30 MIN: CPT | Performed by: INTERNAL MEDICINE

## 2017-09-29 PROCEDURE — 83735 ASSAY OF MAGNESIUM: CPT | Performed by: INTERNAL MEDICINE

## 2017-09-29 PROCEDURE — G0008 ADMIN INFLUENZA VIRUS VAC: HCPCS | Performed by: INTERNAL MEDICINE

## 2017-09-29 PROCEDURE — 80048 BASIC METABOLIC PNL TOTAL CA: CPT | Performed by: INTERNAL MEDICINE

## 2017-09-29 PROCEDURE — 36415 COLL VENOUS BLD VENIPUNCTURE: CPT | Performed by: INTERNAL MEDICINE

## 2017-09-29 RX ORDER — OMEPRAZOLE 40 MG/1
40 CAPSULE, DELAYED RELEASE ORAL DAILY
Qty: 30 CAPSULE | Refills: 5 | Status: SHIPPED | OUTPATIENT
Start: 2017-09-29 | End: 2018-01-01 | Stop reason: SDUPTHER

## 2017-09-29 RX ORDER — ZOLPIDEM TARTRATE 10 MG/1
10 TABLET ORAL NIGHTLY PRN
Qty: 30 TABLET | Refills: 5 | Status: SHIPPED | OUTPATIENT
Start: 2017-09-29 | End: 2018-01-01 | Stop reason: SDUPTHER

## 2017-12-05 RX ORDER — LISINOPRIL 40 MG/1
TABLET ORAL
Qty: 90 TABLET | Refills: 1 | Status: SHIPPED | OUTPATIENT
Start: 2017-12-05 | End: 2018-03-05 | Stop reason: SDUPTHER

## 2017-12-05 RX ORDER — CLOPIDOGREL BISULFATE 75 MG/1
TABLET ORAL
Qty: 90 TABLET | Refills: 1 | Status: SHIPPED | OUTPATIENT
Start: 2017-12-05 | End: 2018-03-05 | Stop reason: SDUPTHER

## 2017-12-05 RX ORDER — ISOSORBIDE MONONITRATE 30 MG/1
TABLET, EXTENDED RELEASE ORAL
Qty: 90 TABLET | Refills: 1 | Status: SHIPPED | OUTPATIENT
Start: 2017-12-05 | End: 2018-03-05 | Stop reason: SDUPTHER

## 2017-12-11 RX ORDER — RANITIDINE 300 MG/1
TABLET ORAL
Qty: 90 TABLET | Refills: 1 | Status: SHIPPED | OUTPATIENT
Start: 2017-12-11

## 2017-12-11 RX ORDER — ROSUVASTATIN CALCIUM 10 MG/1
TABLET, COATED ORAL
Qty: 90 TABLET | Refills: 1 | Status: SHIPPED | OUTPATIENT
Start: 2017-12-11 | End: 2018-03-05 | Stop reason: SDUPTHER

## 2017-12-29 ENCOUNTER — LAB (OUTPATIENT)
Dept: LAB | Facility: OTHER | Age: 67
End: 2017-12-29

## 2017-12-29 ENCOUNTER — TRANSCRIBE ORDERS (OUTPATIENT)
Dept: GENERAL RADIOLOGY | Facility: CLINIC | Age: 67
End: 2017-12-29

## 2017-12-29 DIAGNOSIS — R60.9 PERIPHERAL EDEMA: ICD-10-CM

## 2017-12-29 DIAGNOSIS — E11.9 DIABETES MELLITUS WITHOUT COMPLICATION (HCC): ICD-10-CM

## 2017-12-29 DIAGNOSIS — N18.30 CHRONIC KIDNEY DISEASE, STAGE III (MODERATE) (HCC): Primary | ICD-10-CM

## 2017-12-29 DIAGNOSIS — N18.30 CHRONIC KIDNEY DISEASE, STAGE III (MODERATE) (HCC): ICD-10-CM

## 2017-12-29 DIAGNOSIS — I10 ESSENTIAL HYPERTENSION, MALIGNANT: ICD-10-CM

## 2017-12-29 LAB
ALBUMIN SERPL-MCNC: 3.8 G/DL (ref 3.2–5.5)
ANION GAP SERPL CALCULATED.3IONS-SCNC: 8 MMOL/L (ref 5–15)
BUN BLD-MCNC: 36 MG/DL (ref 8–25)
BUN/CREAT SERPL: 22.5 (ref 7–25)
CALCIUM SPEC-SCNC: 8.7 MG/DL (ref 8.4–10.8)
CHLORIDE SERPL-SCNC: 97 MMOL/L (ref 100–112)
CO2 SERPL-SCNC: 31 MMOL/L (ref 20–32)
CREAT BLD-MCNC: 1.6 MG/DL (ref 0.4–1.3)
GFR SERPL CREATININE-BSD FRML MDRD: 32 ML/MIN/1.73 (ref 45–104)
GLUCOSE BLD-MCNC: 277 MG/DL (ref 70–100)
HCT VFR BLD AUTO: 30.3 % (ref 35–45)
HGB BLD-MCNC: 9.9 G/DL (ref 12–15.5)
PHOSPHATE SERPL-MCNC: 3.8 MG/DL (ref 2.5–4.6)
POTASSIUM BLD-SCNC: 4.8 MMOL/L (ref 3.4–5.4)
SODIUM BLD-SCNC: 136 MMOL/L (ref 134–146)

## 2017-12-29 PROCEDURE — 85018 HEMOGLOBIN: CPT | Performed by: INTERNAL MEDICINE

## 2017-12-29 PROCEDURE — 85014 HEMATOCRIT: CPT | Performed by: INTERNAL MEDICINE

## 2017-12-29 PROCEDURE — 80069 RENAL FUNCTION PANEL: CPT | Performed by: INTERNAL MEDICINE

## 2017-12-29 PROCEDURE — 36415 COLL VENOUS BLD VENIPUNCTURE: CPT | Performed by: INTERNAL MEDICINE

## 2018-01-01 ENCOUNTER — TRANSCRIBE ORDERS (OUTPATIENT)
Dept: LAB | Facility: OTHER | Age: 68
End: 2018-01-01

## 2018-01-01 ENCOUNTER — EPISODE CHANGES (OUTPATIENT)
Dept: CASE MANAGEMENT | Facility: OTHER | Age: 68
End: 2018-01-01

## 2018-01-01 ENCOUNTER — PATIENT OUTREACH (OUTPATIENT)
Dept: CASE MANAGEMENT | Facility: OTHER | Age: 68
End: 2018-01-01

## 2018-01-01 ENCOUNTER — TRANSCRIBE ORDERS (OUTPATIENT)
Dept: GENERAL RADIOLOGY | Facility: CLINIC | Age: 68
End: 2018-01-01

## 2018-01-01 ENCOUNTER — DOCUMENTATION (OUTPATIENT)
Dept: CARDIAC REHAB | Facility: HOSPITAL | Age: 68
End: 2018-01-01

## 2018-01-01 ENCOUNTER — TELEPHONE (OUTPATIENT)
Dept: FAMILY MEDICINE CLINIC | Facility: CLINIC | Age: 68
End: 2018-01-01

## 2018-01-01 ENCOUNTER — LAB REQUISITION (OUTPATIENT)
Dept: LAB | Facility: HOSPITAL | Age: 68
End: 2018-01-01

## 2018-01-01 ENCOUNTER — LAB (OUTPATIENT)
Dept: LAB | Facility: OTHER | Age: 68
End: 2018-01-01

## 2018-01-01 ENCOUNTER — TRANSCRIBE ORDERS (OUTPATIENT)
Dept: CT IMAGING | Facility: CLINIC | Age: 68
End: 2018-01-01

## 2018-01-01 DIAGNOSIS — R80.9 PROTEINURIA, UNSPECIFIED TYPE: ICD-10-CM

## 2018-01-01 DIAGNOSIS — I10 ESSENTIAL HYPERTENSION, MALIGNANT: ICD-10-CM

## 2018-01-01 DIAGNOSIS — R06.02 SOB (SHORTNESS OF BREATH): Primary | ICD-10-CM

## 2018-01-01 DIAGNOSIS — R60.9 PERIPHERAL EDEMA: ICD-10-CM

## 2018-01-01 DIAGNOSIS — R60.9 EDEMA, UNSPECIFIED TYPE: ICD-10-CM

## 2018-01-01 DIAGNOSIS — E11.9 DIABETES MELLITUS WITHOUT COMPLICATION (HCC): ICD-10-CM

## 2018-01-01 DIAGNOSIS — N18.30 CHRONIC KIDNEY DISEASE, STAGE III (MODERATE) (HCC): ICD-10-CM

## 2018-01-01 DIAGNOSIS — N18.30 CHRONIC KIDNEY DISEASE, STAGE III (MODERATE) (HCC): Primary | ICD-10-CM

## 2018-01-01 DIAGNOSIS — E11.51 TYPE 2 DIABETES MELLITUS WITH DIABETIC PERIPHERAL ANGIOPATHY WITHOUT GANGRENE (HCC): ICD-10-CM

## 2018-01-01 LAB
25(OH)D3 SERPL-MCNC: 37.9 NG/ML (ref 30–100)
ALBUMIN SERPL-MCNC: 3.9 G/DL (ref 3.5–5)
ALBUMIN/GLOB SERPL: 1.1 G/DL (ref 1.1–1.8)
ALP SERPL-CCNC: 70 U/L (ref 38–126)
ALT SERPL W P-5'-P-CCNC: 18 U/L
ANION GAP SERPL CALCULATED.3IONS-SCNC: 8 MMOL/L (ref 5–15)
AST SERPL-CCNC: 35 U/L (ref 14–36)
BILIRUB SERPL-MCNC: 0.9 MG/DL (ref 0.2–1.3)
BUN BLD-MCNC: 35 MG/DL (ref 7–17)
BUN/CREAT SERPL: 23.2 (ref 7–25)
CALCIUM SPEC-SCNC: 9.3 MG/DL (ref 8.4–10.2)
CHLORIDE SERPL-SCNC: 103 MMOL/L (ref 98–107)
CO2 SERPL-SCNC: 29 MMOL/L (ref 22–30)
CREAT BLD-MCNC: 1.51 MG/DL (ref 0.52–1.04)
GFR SERPL CREATININE-BSD FRML MDRD: 34 ML/MIN/1.73 (ref 45–104)
GLOBULIN UR ELPH-MCNC: 3.7 GM/DL (ref 2.3–3.5)
GLUCOSE BLD-MCNC: 63 MG/DL (ref 74–99)
HBA1C MFR BLD: 4 % (ref 4–5.6)
HCT VFR BLD AUTO: 32.6 % (ref 35–45)
HGB BLD-MCNC: 10.6 G/DL (ref 12–15.5)
MAGNESIUM SERPL-MCNC: 2.1 MG/DL (ref 1.6–2.3)
PHOSPHATE SERPL-MCNC: 4.1 MG/DL (ref 2.5–4.5)
POTASSIUM BLD-SCNC: 4 MMOL/L (ref 3.4–5)
PROT SERPL-MCNC: 7.6 G/DL (ref 6.3–8.2)
SODIUM BLD-SCNC: 140 MMOL/L (ref 137–145)

## 2018-01-01 PROCEDURE — 84100 ASSAY OF PHOSPHORUS: CPT | Performed by: INTERNAL MEDICINE

## 2018-01-01 PROCEDURE — 80053 COMPREHEN METABOLIC PANEL: CPT | Performed by: INTERNAL MEDICINE

## 2018-01-01 PROCEDURE — 83036 HEMOGLOBIN GLYCOSYLATED A1C: CPT | Performed by: INTERNAL MEDICINE

## 2018-01-01 PROCEDURE — 36415 COLL VENOUS BLD VENIPUNCTURE: CPT | Performed by: INTERNAL MEDICINE

## 2018-01-01 PROCEDURE — 83735 ASSAY OF MAGNESIUM: CPT | Performed by: INTERNAL MEDICINE

## 2018-01-01 PROCEDURE — 85018 HEMOGLOBIN: CPT | Performed by: INTERNAL MEDICINE

## 2018-01-01 PROCEDURE — 85014 HEMATOCRIT: CPT | Performed by: INTERNAL MEDICINE

## 2018-01-01 PROCEDURE — 82306 VITAMIN D 25 HYDROXY: CPT | Performed by: INTERNAL MEDICINE

## 2018-01-01 RX ORDER — HYDRALAZINE HYDROCHLORIDE 50 MG/1
50 TABLET, FILM COATED ORAL 2 TIMES DAILY
Qty: 180 TABLET | Refills: 1 | Status: SHIPPED | OUTPATIENT
Start: 2018-01-01 | End: 2019-01-01

## 2018-01-01 RX ORDER — BUMETANIDE 2 MG/1
2 TABLET ORAL DAILY
Qty: 90 TABLET | Refills: 1 | Status: SHIPPED | OUTPATIENT
Start: 2018-01-01 | End: 2019-01-01 | Stop reason: HOSPADM

## 2018-01-01 RX ORDER — OMEPRAZOLE 40 MG/1
40 CAPSULE, DELAYED RELEASE ORAL DAILY
Qty: 90 CAPSULE | Refills: 1 | Status: ON HOLD | OUTPATIENT
Start: 2018-01-01 | End: 2019-01-01

## 2018-01-01 RX ORDER — HYDRALAZINE HYDROCHLORIDE 50 MG/1
50 TABLET, FILM COATED ORAL
Qty: 180 TABLET | Refills: 1 | OUTPATIENT
Start: 2018-01-01

## 2018-01-01 RX ORDER — ZOLPIDEM TARTRATE 10 MG/1
10 TABLET ORAL NIGHTLY PRN
Qty: 30 TABLET | Refills: 5 | Status: SHIPPED | OUTPATIENT
Start: 2018-01-01

## 2018-01-02 RX ORDER — BUMETANIDE 2 MG/1
TABLET ORAL
Qty: 90 TABLET | Refills: 1 | Status: SHIPPED | OUTPATIENT
Start: 2018-01-02 | End: 2018-01-01 | Stop reason: SDUPTHER

## 2018-01-04 ENCOUNTER — OFFICE VISIT (OUTPATIENT)
Dept: FAMILY MEDICINE CLINIC | Facility: CLINIC | Age: 68
End: 2018-01-04

## 2018-01-04 VITALS
WEIGHT: 251 LBS | HEIGHT: 64 IN | HEART RATE: 61 BPM | DIASTOLIC BLOOD PRESSURE: 68 MMHG | OXYGEN SATURATION: 100 % | BODY MASS INDEX: 42.85 KG/M2 | SYSTOLIC BLOOD PRESSURE: 134 MMHG

## 2018-01-04 DIAGNOSIS — E11.9 TYPE 2 DIABETES MELLITUS WITHOUT COMPLICATION, WITHOUT LONG-TERM CURRENT USE OF INSULIN (HCC): Primary | Chronic | ICD-10-CM

## 2018-01-04 DIAGNOSIS — J40 BRONCHITIS: ICD-10-CM

## 2018-01-04 DIAGNOSIS — Z20.828 EXPOSURE TO THE FLU: ICD-10-CM

## 2018-01-04 DIAGNOSIS — I10 ESSENTIAL HYPERTENSION: ICD-10-CM

## 2018-01-04 DIAGNOSIS — N18.9 ACUTE RENAL FAILURE SUPERIMPOSED ON CHRONIC KIDNEY DISEASE, UNSPECIFIED CKD STAGE, UNSPECIFIED ACUTE RENAL FAILURE TYPE (HCC): Chronic | ICD-10-CM

## 2018-01-04 DIAGNOSIS — N17.9 ACUTE RENAL FAILURE SUPERIMPOSED ON CHRONIC KIDNEY DISEASE, UNSPECIFIED CKD STAGE, UNSPECIFIED ACUTE RENAL FAILURE TYPE (HCC): Chronic | ICD-10-CM

## 2018-01-04 PROCEDURE — 99214 OFFICE O/P EST MOD 30 MIN: CPT | Performed by: INTERNAL MEDICINE

## 2018-01-04 RX ORDER — AZITHROMYCIN 250 MG/1
TABLET, FILM COATED ORAL
Qty: 6 TABLET | Refills: 0 | Status: SHIPPED | OUTPATIENT
Start: 2018-01-04 | End: 2018-01-19

## 2018-01-04 NOTE — PROGRESS NOTES
Subjective   Nicolasa Rojas is a 67 y.o. female.     Chief Complaint   Patient presents with   • Follow-up     urgent care f/u        History of Present Illness     Pt in f/u from urgent care for flu exposure.    Pt was experiencing fever, chills, and coughing and just feeling bad when she went to the urgent care.  She had been around her  that was having sinus problems and says she was exposed to the flu from her daughter having it in the household.  She was treated for the Flu and says today she is much better.  She is still coughing but denies any fever or chills at this time.      The following portions of the patient's history were reviewed and updated as appropriate: allergies, current medications, past family history, past medical history, past social history, past surgical history and problem list.    Review of Systems   Constitutional: Positive for fatigue. Negative for activity change, appetite change, chills, fever and unexpected weight change.   HENT: Negative for ear pain, facial swelling and hearing loss.    Eyes: Negative for discharge and visual disturbance.   Respiratory: Positive for cough. Negative for chest tightness, shortness of breath and wheezing.    Cardiovascular: Negative for chest pain, palpitations and leg swelling.   Gastrointestinal: Negative for abdominal pain.   Genitourinary: Negative for difficulty urinating, dysuria, flank pain, frequency, hematuria and urgency.   Musculoskeletal: Negative for joint swelling and myalgias.   Skin: Negative for color change and rash.   Allergic/Immunologic: Negative for food allergies.   Neurological: Negative for dizziness, syncope, weakness, numbness and headaches.   Hematological: Negative for adenopathy.   Psychiatric/Behavioral: Negative for confusion, decreased concentration, dysphoric mood, hallucinations, self-injury, sleep disturbance and suicidal ideas. The patient is not nervous/anxious.    All other systems reviewed and are  negative.      Objective   Physical Exam   Constitutional: She is oriented to person, place, and time. Vital signs are normal. She appears well-developed and well-nourished.   HENT:   Head: Normocephalic.   Right Ear: External ear normal.   Left Ear: External ear normal.   Nose: Nose normal.   Mouth/Throat: Oropharynx is clear and moist.   Eyes: Conjunctivae and EOM are normal. Pupils are equal, round, and reactive to light.   Neck: Normal range of motion. Neck supple. No JVD present. No thyromegaly present.   Cardiovascular: Normal rate, regular rhythm, normal heart sounds and intact distal pulses.    No murmur heard.  Pulmonary/Chest: Effort normal and breath sounds normal. No respiratory distress. She has no wheezes. She has no rales. She exhibits no tenderness.   Abdominal: Soft. Bowel sounds are normal. She exhibits no distension and no mass. There is no tenderness. There is no rebound and no guarding. No hernia.   Musculoskeletal: Normal range of motion. She exhibits no edema or deformity.   Lymphadenopathy:     She has no cervical adenopathy.   Neurological: She is alert and oriented to person, place, and time. No cranial nerve deficit. Coordination normal.   Skin: Skin is warm and dry. No rash noted. No pallor.   Psychiatric: She has a normal mood and affect. Her behavior is normal. Judgment and thought content normal. Her mood appears not anxious. She is not agitated and not aggressive. Thought content is not paranoid and not delusional. Cognition and memory are normal. She does not exhibit a depressed mood. She expresses no homicidal and no suicidal ideation. She expresses no suicidal plans and no homicidal plans.   Nursing note and vitals reviewed.      Assessment/Plan   Nicolasa was seen today for follow-up.    Diagnoses and all orders for this visit:    Type 2 diabetes mellitus without complication, without long-term current use of insulin    Acute renal failure superimposed on chronic kidney disease,  unspecified CKD stage, unspecified acute renal failure type    Exposure to the flu  Comments:  Resolved    Bronchitis  Comments:  New/Ongoing        DM labs due: CBC CMP Lipids Thyroids A1C Urine Protein and Micro Albumin  RTC 2 weeks after the labs to review them  (For male patient get PSA if over the age of 50 and not over age of 80.)     It is recommended to this patient that they get a Diabetic eye exam and a Diabetic foot exam every year.      Add Zpak for bronchitis       Scribed for Dr. Montes by Darlene LundScpablo 01/04/18  Physician voiced contents of this note to Scribe in order for this note to be finished.

## 2018-01-19 ENCOUNTER — OFFICE VISIT (OUTPATIENT)
Dept: FAMILY MEDICINE CLINIC | Facility: CLINIC | Age: 68
End: 2018-01-19

## 2018-01-19 VITALS
OXYGEN SATURATION: 99 % | DIASTOLIC BLOOD PRESSURE: 82 MMHG | HEIGHT: 64 IN | WEIGHT: 251 LBS | BODY MASS INDEX: 42.85 KG/M2 | SYSTOLIC BLOOD PRESSURE: 142 MMHG | HEART RATE: 70 BPM

## 2018-01-19 DIAGNOSIS — J45.41 MODERATE PERSISTENT ASTHMA WITH ACUTE EXACERBATION: Primary | ICD-10-CM

## 2018-01-19 PROCEDURE — 96372 THER/PROPH/DIAG INJ SC/IM: CPT | Performed by: INTERNAL MEDICINE

## 2018-01-19 PROCEDURE — 99213 OFFICE O/P EST LOW 20 MIN: CPT | Performed by: INTERNAL MEDICINE

## 2018-01-19 RX ORDER — ALBUTEROL SULFATE 0.63 MG/3ML
1 SOLUTION RESPIRATORY (INHALATION) EVERY 6 HOURS PRN
Qty: 520 ML | Refills: 12 | Status: ON HOLD | OUTPATIENT
Start: 2018-01-19 | End: 2019-01-01

## 2018-01-19 RX ORDER — TRIAMCINOLONE ACETONIDE 40 MG/ML
40 INJECTION, SUSPENSION INTRA-ARTICULAR; INTRAMUSCULAR ONCE
Status: COMPLETED | OUTPATIENT
Start: 2018-01-19 | End: 2018-01-19

## 2018-01-19 RX ORDER — BLOOD SUGAR DIAGNOSTIC
STRIP MISCELLANEOUS
Qty: 50 EACH | Refills: 1 | Status: SHIPPED | OUTPATIENT
Start: 2018-01-19

## 2018-01-19 RX ORDER — BENZONATATE 200 MG/1
200 CAPSULE ORAL 3 TIMES DAILY PRN
Qty: 30 CAPSULE | Refills: 1 | Status: ON HOLD | OUTPATIENT
Start: 2018-01-19 | End: 2019-01-01

## 2018-01-19 RX ADMIN — TRIAMCINOLONE ACETONIDE 40 MG: 40 INJECTION, SUSPENSION INTRA-ARTICULAR; INTRAMUSCULAR at 16:04

## 2018-01-30 DIAGNOSIS — J45.41 MODERATE PERSISTENT ASTHMA WITH ACUTE EXACERBATION IN ADULT: Primary | ICD-10-CM

## 2018-01-30 DIAGNOSIS — N18.9 CHRONIC KIDNEY DISEASE, UNSPECIFIED CKD STAGE: ICD-10-CM

## 2018-01-31 ENCOUNTER — LAB (OUTPATIENT)
Dept: LAB | Facility: OTHER | Age: 68
End: 2018-01-31

## 2018-01-31 DIAGNOSIS — N18.30 CHRONIC RENAL DISEASE, STAGE III (HCC): ICD-10-CM

## 2018-01-31 DIAGNOSIS — R60.9 EDEMA, UNSPECIFIED TYPE: ICD-10-CM

## 2018-01-31 DIAGNOSIS — I10 ESSENTIAL HYPERTENSION: ICD-10-CM

## 2018-01-31 DIAGNOSIS — N18.9 CHRONIC KIDNEY DISEASE, UNSPECIFIED CKD STAGE: ICD-10-CM

## 2018-01-31 DIAGNOSIS — E11.9 DIABETES MELLITUS, STABLE (HCC): ICD-10-CM

## 2018-01-31 DIAGNOSIS — E11.9 TYPE 2 DIABETES MELLITUS WITHOUT COMPLICATION, WITHOUT LONG-TERM CURRENT USE OF INSULIN (HCC): ICD-10-CM

## 2018-01-31 DIAGNOSIS — I10 ESSENTIAL HYPERTENSION, BENIGN: ICD-10-CM

## 2018-01-31 LAB
ALBUMIN SERPL-MCNC: 4 G/DL (ref 3.2–5.5)
ALBUMIN UR-MCNC: 11 MG/L
ALBUMIN/GLOB SERPL: 1 G/DL (ref 1–3)
ALP SERPL-CCNC: 75 U/L (ref 15–121)
ALT SERPL W P-5'-P-CCNC: 16 U/L (ref 10–60)
ANION GAP SERPL CALCULATED.3IONS-SCNC: 10 MMOL/L (ref 5–15)
AST SERPL-CCNC: 27 U/L (ref 10–60)
BASOPHILS # BLD AUTO: 0.03 10*3/MM3 (ref 0–0.2)
BASOPHILS NFR BLD AUTO: 0.4 % (ref 0–2)
BILIRUB CONJ SERPL-MCNC: 0.1 MG/DL (ref 0–0.1)
BILIRUB SERPL-MCNC: 0.7 MG/DL (ref 0.2–1)
BNP SERPL-MCNC: 184 PG/ML (ref 0–100)
BUN BLD-MCNC: 61 MG/DL (ref 8–25)
BUN/CREAT SERPL: 38.1 (ref 7–25)
CALCIUM SPEC-SCNC: 8.8 MG/DL (ref 8.4–10.8)
CHLORIDE SERPL-SCNC: 93 MMOL/L (ref 100–112)
CHOLEST SERPL-MCNC: 135 MG/DL (ref 150–200)
CO2 SERPL-SCNC: 30 MMOL/L (ref 20–32)
CREAT BLD-MCNC: 1.6 MG/DL (ref 0.4–1.3)
CREAT UR-MCNC: 40.1 MG/DL
DEPRECATED RDW RBC AUTO: 43.5 FL (ref 36.4–46.3)
EOSINOPHIL # BLD AUTO: 0.13 10*3/MM3 (ref 0–0.7)
EOSINOPHIL NFR BLD AUTO: 1.7 % (ref 0–7)
ERYTHROCYTE [DISTWIDTH] IN BLOOD BY AUTOMATED COUNT: 13.6 % (ref 11.5–14.5)
GFR SERPL CREATININE-BSD FRML MDRD: 32 ML/MIN/1.73 (ref 45–104)
GLOBULIN UR ELPH-MCNC: 3.9 GM/DL (ref 2.5–4.6)
GLUCOSE BLD-MCNC: 127 MG/DL (ref 70–100)
HCT VFR BLD AUTO: 32.9 % (ref 35–45)
HDLC SERPL-MCNC: 46 MG/DL (ref 35–100)
HGB BLD-MCNC: 10.9 G/DL (ref 12–15.5)
LDLC SERPL CALC-MCNC: 75 MG/DL
LDLC/HDLC SERPL: 1.62 {RATIO}
LYMPHOCYTES # BLD AUTO: 0.77 10*3/MM3 (ref 0.6–4.2)
LYMPHOCYTES NFR BLD AUTO: 10.3 % (ref 10–50)
MCH RBC QN AUTO: 29.7 PG (ref 26.5–34)
MCHC RBC AUTO-ENTMCNC: 33.1 G/DL (ref 31.4–36)
MCV RBC AUTO: 89.6 FL (ref 80–98)
MONOCYTES # BLD AUTO: 0.67 10*3/MM3 (ref 0–0.9)
MONOCYTES NFR BLD AUTO: 9 % (ref 0–12)
NEUTROPHILS # BLD AUTO: 5.86 10*3/MM3 (ref 2–8.6)
NEUTROPHILS NFR BLD AUTO: 78.6 % (ref 37–80)
PHOSPHATE SERPL-MCNC: 4.9 MG/DL (ref 2.5–4.6)
PLATELET # BLD AUTO: 196 10*3/MM3 (ref 150–450)
PMV BLD AUTO: 9.5 FL (ref 8–12)
POTASSIUM BLD-SCNC: 4.4 MMOL/L (ref 3.4–5.4)
PROT SERPL-MCNC: 7.9 G/DL (ref 6.7–8.2)
PROT UR-MCNC: 27.8 MG/DL
PROT/CREAT UR: 693.3 MG/G CREA (ref 0–200)
RBC # BLD AUTO: 3.67 10*6/MM3 (ref 3.77–5.16)
SODIUM BLD-SCNC: 133 MMOL/L (ref 134–146)
TRIGL SERPL-MCNC: 72 MG/DL (ref 35–160)
VLDLC SERPL-MCNC: 14.4 MG/DL
WBC NRBC COR # BLD: 7.46 10*3/MM3 (ref 3.2–9.8)

## 2018-01-31 PROCEDURE — 84156 ASSAY OF PROTEIN URINE: CPT | Performed by: INTERNAL MEDICINE

## 2018-01-31 PROCEDURE — 84443 ASSAY THYROID STIM HORMONE: CPT | Performed by: INTERNAL MEDICINE

## 2018-01-31 PROCEDURE — 85025 COMPLETE CBC W/AUTO DIFF WBC: CPT | Performed by: INTERNAL MEDICINE

## 2018-01-31 PROCEDURE — 82043 UR ALBUMIN QUANTITATIVE: CPT | Performed by: INTERNAL MEDICINE

## 2018-01-31 PROCEDURE — 36415 COLL VENOUS BLD VENIPUNCTURE: CPT | Performed by: INTERNAL MEDICINE

## 2018-01-31 PROCEDURE — 84100 ASSAY OF PHOSPHORUS: CPT | Performed by: INTERNAL MEDICINE

## 2018-01-31 PROCEDURE — 82248 BILIRUBIN DIRECT: CPT | Performed by: INTERNAL MEDICINE

## 2018-01-31 PROCEDURE — 80061 LIPID PANEL: CPT | Performed by: INTERNAL MEDICINE

## 2018-01-31 PROCEDURE — 80053 COMPREHEN METABOLIC PANEL: CPT | Performed by: INTERNAL MEDICINE

## 2018-01-31 PROCEDURE — 82570 ASSAY OF URINE CREATININE: CPT | Performed by: INTERNAL MEDICINE

## 2018-01-31 PROCEDURE — 83880 ASSAY OF NATRIURETIC PEPTIDE: CPT | Performed by: INTERNAL MEDICINE

## 2018-01-31 PROCEDURE — 84436 ASSAY OF TOTAL THYROXINE: CPT | Performed by: INTERNAL MEDICINE

## 2018-01-31 PROCEDURE — 83036 HEMOGLOBIN GLYCOSYLATED A1C: CPT | Performed by: INTERNAL MEDICINE

## 2018-02-01 LAB
HBA1C MFR BLD: 6.4 % (ref 4–5.6)
T4 SERPL-MCNC: 8.48 MCG/DL (ref 5.5–11)
TSH SERPL DL<=0.05 MIU/L-ACNC: 2.74 MIU/ML (ref 0.46–4.68)

## 2018-03-05 ENCOUNTER — TELEPHONE (OUTPATIENT)
Dept: FAMILY MEDICINE CLINIC | Facility: CLINIC | Age: 68
End: 2018-03-05

## 2018-03-05 RX ORDER — ISOSORBIDE MONONITRATE 30 MG/1
30 TABLET, EXTENDED RELEASE ORAL DAILY
Qty: 90 TABLET | Refills: 1 | Status: SHIPPED | OUTPATIENT
Start: 2018-03-05 | End: 2019-01-01 | Stop reason: HOSPADM

## 2018-03-05 RX ORDER — ROSUVASTATIN CALCIUM 10 MG/1
10 TABLET, COATED ORAL DAILY
Qty: 90 TABLET | Refills: 1 | Status: ON HOLD | OUTPATIENT
Start: 2018-03-05 | End: 2019-01-01

## 2018-03-05 RX ORDER — LISINOPRIL 40 MG/1
40 TABLET ORAL DAILY
Qty: 90 TABLET | Refills: 1 | Status: ON HOLD | OUTPATIENT
Start: 2018-03-05 | End: 2019-01-01

## 2018-03-05 RX ORDER — CLOPIDOGREL BISULFATE 75 MG/1
75 TABLET ORAL DAILY
Qty: 90 TABLET | Refills: 1 | Status: SHIPPED | OUTPATIENT
Start: 2018-03-05

## 2018-03-05 NOTE — TELEPHONE ENCOUNTER
REFILLS ON SERTRALINE HCL, ISOSORBIDE MONONITRATE, LISINOPRIL, CLOPIDOGREL AND ROSUVASTATIN SENT TO [facility].

## 2018-08-24 ENCOUNTER — APPOINTMENT (OUTPATIENT)
Dept: GENERAL RADIOLOGY | Age: 68
End: 2018-08-24
Payer: MEDICARE

## 2018-08-24 ENCOUNTER — HOSPITAL ENCOUNTER (EMERGENCY)
Age: 68
Discharge: ANOTHER HOSPITAL | End: 2018-08-25
Attending: EMERGENCY MEDICINE
Payer: MEDICARE

## 2018-08-24 VITALS
OXYGEN SATURATION: 95 % | RESPIRATION RATE: 15 BRPM | WEIGHT: 242 LBS | DIASTOLIC BLOOD PRESSURE: 50 MMHG | BODY MASS INDEX: 41.32 KG/M2 | TEMPERATURE: 98.7 F | HEIGHT: 64 IN | SYSTOLIC BLOOD PRESSURE: 154 MMHG | HEART RATE: 72 BPM

## 2018-08-24 DIAGNOSIS — E11.69 TYPE 2 DIABETES MELLITUS WITH OTHER SPECIFIED COMPLICATION, WITH LONG-TERM CURRENT USE OF INSULIN (HCC): ICD-10-CM

## 2018-08-24 DIAGNOSIS — Z79.4 TYPE 2 DIABETES MELLITUS WITH OTHER SPECIFIED COMPLICATION, WITH LONG-TERM CURRENT USE OF INSULIN (HCC): ICD-10-CM

## 2018-08-24 DIAGNOSIS — I24.9 ACUTE CORONARY SYNDROME (HCC): Primary | ICD-10-CM

## 2018-08-24 DIAGNOSIS — N28.9 RENAL INSUFFICIENCY: ICD-10-CM

## 2018-08-24 LAB
ALBUMIN SERPL-MCNC: 4.1 G/DL (ref 3.5–5.2)
ALP BLD-CCNC: 78 U/L (ref 35–104)
ALT SERPL-CCNC: 17 U/L (ref 5–33)
ANION GAP SERPL CALCULATED.3IONS-SCNC: 14 MMOL/L (ref 7–19)
APTT: 27.2 SEC (ref 26–36.2)
AST SERPL-CCNC: 28 U/L (ref 5–32)
BASOPHILS ABSOLUTE: 0 K/UL (ref 0–0.2)
BASOPHILS RELATIVE PERCENT: 0.3 % (ref 0–1)
BILIRUB SERPL-MCNC: 0.6 MG/DL (ref 0.2–1.2)
BUN BLDV-MCNC: 55 MG/DL (ref 8–23)
CALCIUM SERPL-MCNC: 9.6 MG/DL (ref 8.8–10.2)
CHLORIDE BLD-SCNC: 95 MMOL/L (ref 98–111)
CO2: 28 MMOL/L (ref 22–29)
CREAT SERPL-MCNC: 1.4 MG/DL (ref 0.5–0.9)
EOSINOPHILS ABSOLUTE: 0.2 K/UL (ref 0–0.6)
EOSINOPHILS RELATIVE PERCENT: 1.7 % (ref 0–5)
GFR NON-AFRICAN AMERICAN: 37
GLUCOSE BLD-MCNC: 206 MG/DL (ref 74–109)
HCT VFR BLD CALC: 33.1 % (ref 37–47)
HEMOGLOBIN: 10.8 G/DL (ref 12–16)
INR BLD: 0.99 (ref 0.88–1.18)
LYMPHOCYTES ABSOLUTE: 0.6 K/UL (ref 1.1–4.5)
LYMPHOCYTES RELATIVE PERCENT: 6.5 % (ref 20–40)
MCH RBC QN AUTO: 30.4 PG (ref 27–31)
MCHC RBC AUTO-ENTMCNC: 32.6 G/DL (ref 33–37)
MCV RBC AUTO: 93.2 FL (ref 81–99)
MONOCYTES ABSOLUTE: 0.5 K/UL (ref 0–0.9)
MONOCYTES RELATIVE PERCENT: 5.6 % (ref 0–10)
NEUTROPHILS ABSOLUTE: 8.1 K/UL (ref 1.5–7.5)
NEUTROPHILS RELATIVE PERCENT: 84.3 % (ref 50–65)
PDW BLD-RTO: 13.1 % (ref 11.5–14.5)
PERFORMED ON: ABNORMAL
PLATELET # BLD: 161 K/UL (ref 130–400)
PMV BLD AUTO: 9.8 FL (ref 9.4–12.3)
POC TROPONIN I: 0.25 NG/ML (ref 0–0.08)
POTASSIUM SERPL-SCNC: 4.7 MMOL/L (ref 3.5–5)
PROTHROMBIN TIME: 13 SEC (ref 12–14.6)
RBC # BLD: 3.55 M/UL (ref 4.2–5.4)
SODIUM BLD-SCNC: 137 MMOL/L (ref 136–145)
TOTAL PROTEIN: 7.8 G/DL (ref 6.6–8.7)
TROPONIN: 0.21 NG/ML (ref 0–0.03)
TROPONIN: 0.29 NG/ML (ref 0–0.03)
WBC # BLD: 9.7 K/UL (ref 4.8–10.8)

## 2018-08-24 PROCEDURE — 99285 EMERGENCY DEPT VISIT HI MDM: CPT | Performed by: EMERGENCY MEDICINE

## 2018-08-24 PROCEDURE — 85610 PROTHROMBIN TIME: CPT

## 2018-08-24 PROCEDURE — 85730 THROMBOPLASTIN TIME PARTIAL: CPT

## 2018-08-24 PROCEDURE — 80053 COMPREHEN METABOLIC PANEL: CPT

## 2018-08-24 PROCEDURE — 93005 ELECTROCARDIOGRAM TRACING: CPT

## 2018-08-24 PROCEDURE — 71045 X-RAY EXAM CHEST 1 VIEW: CPT

## 2018-08-24 PROCEDURE — 84484 ASSAY OF TROPONIN QUANT: CPT

## 2018-08-24 PROCEDURE — 99285 EMERGENCY DEPT VISIT HI MDM: CPT

## 2018-08-24 PROCEDURE — 6370000000 HC RX 637 (ALT 250 FOR IP): Performed by: EMERGENCY MEDICINE

## 2018-08-24 PROCEDURE — 36415 COLL VENOUS BLD VENIPUNCTURE: CPT

## 2018-08-24 PROCEDURE — 85025 COMPLETE CBC W/AUTO DIFF WBC: CPT

## 2018-08-24 RX ORDER — CLOPIDOGREL BISULFATE 75 MG/1
75 TABLET ORAL DAILY
COMMUNITY

## 2018-08-24 RX ORDER — OXYCODONE HCL 10 MG/1
10 TABLET, FILM COATED, EXTENDED RELEASE ORAL 4 TIMES DAILY
COMMUNITY

## 2018-08-24 RX ORDER — ASPIRIN 325 MG
325 TABLET ORAL ONCE
Status: COMPLETED | OUTPATIENT
Start: 2018-08-24 | End: 2018-08-24

## 2018-08-24 RX ORDER — NITROGLYCERIN 0.4 MG/1
0.4 TABLET SUBLINGUAL EVERY 5 MIN PRN
Status: DISCONTINUED | OUTPATIENT
Start: 2018-08-24 | End: 2018-08-25 | Stop reason: HOSPADM

## 2018-08-24 RX ADMIN — LIDOCAINE HYDROCHLORIDE: 20 SOLUTION ORAL; TOPICAL at 21:08

## 2018-08-24 RX ADMIN — ASPIRIN 325 MG ORAL TABLET 325 MG: 325 PILL ORAL at 23:57

## 2018-08-24 RX ADMIN — NITROGLYCERIN 0.5 INCH: 20 OINTMENT TOPICAL at 23:59

## 2018-08-24 ASSESSMENT — ENCOUNTER SYMPTOMS
CONSTIPATION: 0
SINUS PRESSURE: 0
BLOOD IN STOOL: 0
SORE THROAT: 0
SHORTNESS OF BREATH: 0
EYE DISCHARGE: 0
NAUSEA: 0
APNEA: 0
FACIAL SWELLING: 0
VOICE CHANGE: 0
DIARRHEA: 0
CHOKING: 0

## 2018-08-24 ASSESSMENT — PAIN DESCRIPTION - LOCATION: LOCATION: HIP;BACK

## 2018-08-25 PROCEDURE — 96375 TX/PRO/DX INJ NEW DRUG ADDON: CPT

## 2018-08-25 PROCEDURE — 96374 THER/PROPH/DIAG INJ IV PUSH: CPT

## 2018-08-25 PROCEDURE — 6360000002 HC RX W HCPCS: Performed by: EMERGENCY MEDICINE

## 2018-08-25 RX ORDER — SPIRONOLACTONE 25 MG/1
25 TABLET ORAL DAILY
COMMUNITY

## 2018-08-25 RX ORDER — MORPHINE SULFATE 1 MG/ML
2 INJECTION, SOLUTION EPIDURAL; INTRATHECAL; INTRAVENOUS
Status: DISCONTINUED | OUTPATIENT
Start: 2018-08-25 | End: 2018-08-25 | Stop reason: HOSPADM

## 2018-08-25 RX ORDER — ONDANSETRON 2 MG/ML
4 INJECTION INTRAMUSCULAR; INTRAVENOUS EVERY 30 MIN PRN
Status: DISCONTINUED | OUTPATIENT
Start: 2018-08-25 | End: 2018-08-25 | Stop reason: HOSPADM

## 2018-08-25 RX ORDER — BUMETANIDE 2 MG/1
2 TABLET ORAL DAILY
COMMUNITY

## 2018-08-25 RX ORDER — ISOSORBIDE MONONITRATE 30 MG/1
30 TABLET, EXTENDED RELEASE ORAL DAILY
COMMUNITY

## 2018-08-25 RX ORDER — RANITIDINE 150 MG/1
300 TABLET ORAL NIGHTLY
COMMUNITY

## 2018-08-25 RX ORDER — PINDOLOL 10 MG/1
10 TABLET ORAL 3 TIMES DAILY
COMMUNITY

## 2018-08-25 RX ORDER — ZOLPIDEM TARTRATE 10 MG/1
TABLET ORAL NIGHTLY PRN
COMMUNITY

## 2018-08-25 RX ORDER — HYDRALAZINE HYDROCHLORIDE 50 MG/1
50 TABLET, FILM COATED ORAL 2 TIMES DAILY
COMMUNITY

## 2018-08-25 RX ORDER — OMEPRAZOLE 40 MG/1
40 CAPSULE, DELAYED RELEASE ORAL DAILY
COMMUNITY

## 2018-08-25 RX ORDER — NITROGLYCERIN 0.4 MG/1
0.4 TABLET SUBLINGUAL EVERY 5 MIN PRN
COMMUNITY

## 2018-08-25 RX ORDER — ROSUVASTATIN CALCIUM 10 MG/1
10 TABLET, COATED ORAL DAILY
COMMUNITY

## 2018-08-25 RX ORDER — LISINOPRIL 40 MG/1
40 TABLET ORAL DAILY
COMMUNITY

## 2018-08-25 RX ADMIN — Medication 2 MG: at 00:22

## 2018-08-25 RX ADMIN — ONDANSETRON 4 MG: 2 INJECTION INTRAMUSCULAR; INTRAVENOUS at 00:21

## 2018-08-25 ASSESSMENT — PAIN DESCRIPTION - LOCATION: LOCATION: CHEST

## 2018-08-25 ASSESSMENT — PAIN SCALES - GENERAL
PAINLEVEL_OUTOF10: 1
PAINLEVEL_OUTOF10: 10

## 2018-08-25 NOTE — ED PROVIDER NOTES
Negative for leg swelling. States nitroglycerin did not help what it normally does. Gastrointestinal: Negative for blood in stool, constipation, diarrhea and nausea. Patient tells me they wanted to put a pacemaker in her stomach. I asked her if she had gastroparesis she wasn't sure   Genitourinary: Negative for dysuria and enuresis. Musculoskeletal: Negative for joint swelling. Skin: Negative for rash and wound. Neurological: Negative for seizures and syncope. Psychiatric/Behavioral: Negative for behavioral problems, hallucinations and suicidal ideas. All other systems reviewed and are negative. A complete review of systems was performed and is negative except as noted above in the HPI. PAST MEDICAL HISTORY     Past Medical History:   Diagnosis Date    [facility] cancer ([facility]     Chronic kidney failure, stage 3 (moderate)     [facility]     COPD (chronic obstructive pulmonary disease) (HCC)     Diabetes (HCC)     GERD (gastroesophageal reflux disease)     MI (myocardial infarction) ([facility]     X 4          SURGICAL HISTORY       Past Surgical History:   Procedure Laterality Date    BREAST BIOPSY      CHOLECYSTECTOMY      EYE SURGERY      Left Eye Retina wrinkle      HERNIA REPAIR      NECK SURGERY      PALATE SURGERY      X 29    SINUS SURGERY           CURRENT MEDICATIONS       Previous Medications    ASPIRIN 81 MG TABLET    Take 81 mg by mouth daily    CLOPIDOGREL (PLAVIX) 75 MG TABLET    Take 75 mg by mouth daily    OXYCODONE (OXYCONTIN) 10 MG EXTENDED RELEASE TABLET    Take 10 mg by mouth 4 times daily. .       ALLERGIES     Iv [iodides] and Stadol [butorphanol]    FAMILY HISTORY     No family history on file.        SOCIAL HISTORY       Social History     Social History    Marital status:      Spouse name: N/A    Number of children: N/A    Years of education: N/A     Social History Main Topics    Smoking status: Former Smoker    Smokeless tobacco: Not on file    Alcohol use No    Drug use: No    Sexual activity: Not on file     Other Topics Concern    Not on file     Social History Narrative    No narrative on file       SCREENINGS    [place] Coma Scale  Eye Opening: Spontaneous  Best Verbal Response: Oriented  Best Motor Response: Obeys commands  [place] Coma Scale Score: 15        PHYSICAL EXAM    (up to 7 for level 4, 8 or more for level 5)     ED Triage Vitals [08/24/18 2020]   BP Temp Temp Source Pulse Resp SpO2 Height Weight   (!) 136/108 99.5 °F (37.5 °C) Oral 83 14 94 % 5' 4\" (1.626 m) 242 lb (109.8 kg)       Physical Exam   Constitutional: She is oriented to person, place, and time. She appears well-developed and well-nourished. No distress. HENT:   Head: Normocephalic and atraumatic. Right Ear: External ear normal.   Left Ear: External ear normal.   Nose: Nose normal.   Mouth/Throat: Oropharynx is clear and moist.   Eyes: Pupils are equal, round, and reactive to light. EOM are normal. No scleral icterus. The left eye has a subconjunctival hemorrhage in the pupil is dilated from her surgery this week   Neck: Normal range of motion. Neck supple. No JVD present. Cardiovascular: Normal rate, regular rhythm, normal heart sounds and intact distal pulses. No murmur heard. Pulmonary/Chest: Effort normal and breath sounds normal. No respiratory distress. Abdominal: Soft. Bowel sounds are normal. She exhibits no distension. There is no rebound. Musculoskeletal: Normal range of motion. She exhibits no edema. Neurological: She is alert and oriented to person, place, and time. No cranial nerve deficit. Skin: Skin is warm and dry. She is not diaphoretic. Psychiatric: She has a normal mood and affect. Her behavior is normal. Thought content normal.   Nursing note and vitals reviewed.       DIAGNOSTIC RESULTS     EKG: All EKG's are interpreted by the Emergency Department Physician who either signs or Co-signs this chart in the absence of a cardiologist.    Left bundle-branch block rate 87 MT interval  265 . No old EKG to compare. RADIOLOGY:   Non-plain film images such as CT, Ultrasound and MRI are read by the radiologist. Plain radiographic images are visualized and preliminarily interpreted by the emergency physician with the below findings:    I reviewed the images and results. Interpretation per the Radiologist below, if available at the time of this note:    XR CHEST PORTABLE   Final Result   1. No acute disease.    Signed by Dr Lexis Dougherty on 8/24/2018 8:52 PM            ED BEDSIDE ULTRASOUND:   Performed by ED Physician - none    LABS:  Labs Reviewed   COMPREHENSIVE METABOLIC PANEL - Abnormal; Notable for the following:        Result Value    Chloride 95 (*)     Glucose 206 (*)     BUN 55 (*)     CREATININE 1.4 (*)     GFR Non- 37 (*)     All other components within normal limits   CBC WITH AUTO DIFFERENTIAL - Abnormal; Notable for the following:     RBC 3.55 (*)     Hemoglobin 10.8 (*)     Hematocrit 33.1 (*)     MCHC 32.6 (*)     Neutrophils % 84.3 (*)     Lymphocytes % 6.5 (*)     Neutrophils # 8.1 (*)     Lymphocytes # 0.6 (*)     All other components within normal limits   TROPONIN - Abnormal; Notable for the following:     Troponin 0.21 (*)     All other components within normal limits    Narrative:     CALL  [facility] tel. ,  Chemistry results called to and read back by DELMY STARKS RN/ER, 08/24/2018  21:32, by SHANTELLE   TROPONIN - Abnormal; Notable for the following:     Troponin 0.29 (*)     All other components within normal limits    Narrative:     CALL  [facility] tel. ,  Chemistry results called to and read back by DELMY HAMEED RN/ER, 08/24/2018  22:30, by SHANTELLE   POCT VENOUS - Abnormal; Notable for the following:     POC Troponin I 0.25 (*)     All other components within normal limits   PROTIME-INR   APTT   TROPONIN   POCT TROPONIN   POCT TROPONIN       All other labs were

## 2018-08-29 LAB
EKG P AXIS: -31 DEGREES
EKG P AXIS: 56 DEGREES
EKG P-R INTERVAL: 224 MS
EKG P-R INTERVAL: 228 MS
EKG Q-T INTERVAL: 376 MS
EKG Q-T INTERVAL: 416 MS
EKG QRS DURATION: 134 MS
EKG QRS DURATION: 136 MS
EKG QTC CALCULATION (BAZETT): 423 MS
EKG QTC CALCULATION (BAZETT): 444 MS
EKG T AXIS: 122 DEGREES
EKG T AXIS: 131 DEGREES

## 2018-09-19 NOTE — TELEPHONE ENCOUNTER
----- Message from Martha Dubon sent at 9/19/2018 10:47 AM CDT -----  Tried to call once, will you call when you get a chance. Thank you.   ----- Message -----  From: Shireen Ross MD  Sent: 9/19/2018   7:54 AM  To: Martha ZACARIAS Callaway    Can see the patient but cannot write for any controlled medications  She can schedule with our  at the next available establish care appointment    ----- Message -----  From: CallawayMartha paulino  Sent: 9/18/2018   2:20 PM  To: Shireen Ross MD    Patient want's to Establish Care- Prev seen Simon. Her  came up here wanting to get her scheduled. She was going to see Hope but she needs an MD has several health issues- DM, recent Heart attack. Is only on one controlled- Ambien, and some other maintenance meds. She will be out of insulin soon.

## 2018-09-19 NOTE — TELEPHONE ENCOUNTER
Called patient and she said that she has an appointment on 09/26/18 with another provider and if it does not work out she will call back and schedule with Dr. Ross.

## 2018-10-10 NOTE — OUTREACH NOTE
Care Management Plan 10/10/2018   Lifestyle Goals Medication management   Lifestyle Goals Patient stated she is taking all of her medications as prescribed   Barriers Disease education   Self Management Use oxygen   Self Management Patient reports she uses her oxygen, and it  does help with breathijng difficulties   Annual Wellness Visit:  Patient Will Schedule   Annual Wellness Visit:  Now sees Dr. Muniz per patient and spouse   Specific Disease Process Teaching COPD   Advanced Directives: Patient Has   Medication Adherence Medications understood     The main concerns and/or symptoms the patient would like to address are: Patient stated she has some knots on her stomach area, will discuss with PCP. Encouraged her to make appointment with PCP soon to do so.    Education/instruction provided by Care Coordinator: Discussion of checking blood pressure at home, stated they check every day. Discussion of notifying PCP of elevations as necessary, patient verbalized understanding.    Follow Up Outreach Due: November    Cheryl Almanzar RN

## 2019-01-01 ENCOUNTER — OFFICE VISIT (OUTPATIENT)
Dept: CARDIOLOGY | Facility: CLINIC | Age: 69
End: 2019-01-01

## 2019-01-01 ENCOUNTER — APPOINTMENT (OUTPATIENT)
Dept: CARDIOLOGY | Facility: HOSPITAL | Age: 69
End: 2019-01-01

## 2019-01-01 ENCOUNTER — APPOINTMENT (OUTPATIENT)
Dept: CARDIOLOGY | Facility: HOSPITAL | Age: 69
End: 2019-01-01
Attending: INTERNAL MEDICINE

## 2019-01-01 ENCOUNTER — READMISSION MANAGEMENT (OUTPATIENT)
Dept: CALL CENTER | Facility: HOSPITAL | Age: 69
End: 2019-01-01

## 2019-01-01 ENCOUNTER — TRANSCRIBE ORDERS (OUTPATIENT)
Dept: GENERAL RADIOLOGY | Facility: CLINIC | Age: 69
End: 2019-01-01

## 2019-01-01 ENCOUNTER — APPOINTMENT (OUTPATIENT)
Dept: GENERAL RADIOLOGY | Facility: HOSPITAL | Age: 69
End: 2019-01-01

## 2019-01-01 ENCOUNTER — TRANSCRIBE ORDERS (OUTPATIENT)
Dept: ULTRASOUND IMAGING | Facility: CLINIC | Age: 69
End: 2019-01-01

## 2019-01-01 ENCOUNTER — HOSPITAL ENCOUNTER (INPATIENT)
Facility: HOSPITAL | Age: 69
LOS: 6 days | Discharge: HOME HEALTH | End: 2019-02-12
Attending: HOSPITALIST | Admitting: HOSPITALIST

## 2019-01-01 ENCOUNTER — LAB REQUISITION (OUTPATIENT)
Dept: LAB | Facility: HOSPITAL | Age: 69
End: 2019-01-01

## 2019-01-01 ENCOUNTER — LAB (OUTPATIENT)
Dept: LAB | Facility: OTHER | Age: 69
End: 2019-01-01

## 2019-01-01 ENCOUNTER — HOSPITAL ENCOUNTER (INPATIENT)
Facility: HOSPITAL | Age: 69
LOS: 2 days | Discharge: ANOTHER HOSPITAL | End: 2019-03-12
Attending: INTERNAL MEDICINE | Admitting: INTERNAL MEDICINE

## 2019-01-01 ENCOUNTER — APPOINTMENT (OUTPATIENT)
Dept: CT IMAGING | Facility: HOSPITAL | Age: 69
End: 2019-01-01

## 2019-01-01 ENCOUNTER — HOSPITAL ENCOUNTER (INPATIENT)
Facility: HOSPITAL | Age: 69
LOS: 7 days | Discharge: HOME HEALTH | End: 2019-01-16
Attending: EMERGENCY MEDICINE | Admitting: INTERNAL MEDICINE

## 2019-01-01 ENCOUNTER — APPOINTMENT (OUTPATIENT)
Dept: PULMONOLOGY | Facility: HOSPITAL | Age: 69
End: 2019-01-01
Attending: INTERNAL MEDICINE

## 2019-01-01 ENCOUNTER — APPOINTMENT (OUTPATIENT)
Dept: NUCLEAR MEDICINE | Facility: HOSPITAL | Age: 69
End: 2019-01-01

## 2019-01-01 ENCOUNTER — APPOINTMENT (OUTPATIENT)
Dept: ULTRASOUND IMAGING | Facility: HOSPITAL | Age: 69
End: 2019-01-01

## 2019-01-01 ENCOUNTER — HOSPITAL ENCOUNTER (OUTPATIENT)
Facility: HOSPITAL | Age: 69
Setting detail: OBSERVATION
Discharge: HOME | End: 2019-01-07
Attending: FAMILY MEDICINE | Admitting: INTERNAL MEDICINE

## 2019-01-01 VITALS
WEIGHT: 241.6 LBS | BODY MASS INDEX: 41.25 KG/M2 | HEART RATE: 79 BPM | HEIGHT: 64 IN | TEMPERATURE: 96.8 F | RESPIRATION RATE: 20 BRPM | DIASTOLIC BLOOD PRESSURE: 58 MMHG | SYSTOLIC BLOOD PRESSURE: 142 MMHG | OXYGEN SATURATION: 94 %

## 2019-01-01 VITALS
RESPIRATION RATE: 18 BRPM | WEIGHT: 219.06 LBS | DIASTOLIC BLOOD PRESSURE: 65 MMHG | OXYGEN SATURATION: 96 % | HEART RATE: 94 BPM | HEIGHT: 63 IN | SYSTOLIC BLOOD PRESSURE: 136 MMHG | TEMPERATURE: 97.8 F | BODY MASS INDEX: 38.81 KG/M2

## 2019-01-01 VITALS
HEART RATE: 59 BPM | RESPIRATION RATE: 18 BRPM | SYSTOLIC BLOOD PRESSURE: 130 MMHG | WEIGHT: 229.4 LBS | TEMPERATURE: 97.6 F | OXYGEN SATURATION: 98 % | HEIGHT: 64 IN | DIASTOLIC BLOOD PRESSURE: 54 MMHG | BODY MASS INDEX: 39.16 KG/M2

## 2019-01-01 VITALS
SYSTOLIC BLOOD PRESSURE: 130 MMHG | HEIGHT: 64 IN | BODY MASS INDEX: 39.27 KG/M2 | WEIGHT: 230 LBS | DIASTOLIC BLOOD PRESSURE: 70 MMHG

## 2019-01-01 VITALS
HEART RATE: 64 BPM | WEIGHT: 231.7 LBS | HEIGHT: 65 IN | DIASTOLIC BLOOD PRESSURE: 66 MMHG | TEMPERATURE: 98.4 F | BODY MASS INDEX: 38.6 KG/M2 | SYSTOLIC BLOOD PRESSURE: 136 MMHG | OXYGEN SATURATION: 94 % | RESPIRATION RATE: 16 BRPM

## 2019-01-01 DIAGNOSIS — R80.9 PROTEINURIA, UNSPECIFIED TYPE: ICD-10-CM

## 2019-01-01 DIAGNOSIS — R77.8 ELEVATED TROPONIN: ICD-10-CM

## 2019-01-01 DIAGNOSIS — E11.8 TYPE 2 DIABETES MELLITUS WITH COMPLICATION, UNSPECIFIED WHETHER LONG TERM INSULIN USE: ICD-10-CM

## 2019-01-01 DIAGNOSIS — E11.9 DIABETES MELLITUS WITHOUT COMPLICATION (HCC): Primary | ICD-10-CM

## 2019-01-01 DIAGNOSIS — I50.33 ACUTE ON CHRONIC DIASTOLIC CONGESTIVE HEART FAILURE (HCC): ICD-10-CM

## 2019-01-01 DIAGNOSIS — I25.119 CORONARY ARTERY DISEASE INVOLVING NATIVE CORONARY ARTERY OF NATIVE HEART WITH ANGINA PECTORIS (HCC): Chronic | ICD-10-CM

## 2019-01-01 DIAGNOSIS — N18.30 CHRONIC KIDNEY DISEASE, STAGE III (MODERATE) (HCC): ICD-10-CM

## 2019-01-01 DIAGNOSIS — I50.9 ACUTE ON CHRONIC HEART FAILURE, UNSPECIFIED HEART FAILURE TYPE (HCC): Primary | ICD-10-CM

## 2019-01-01 DIAGNOSIS — E87.1 HYPONATREMIA: Primary | ICD-10-CM

## 2019-01-01 DIAGNOSIS — R60.9 PERIPHERAL EDEMA: ICD-10-CM

## 2019-01-01 DIAGNOSIS — Z74.09 IMPAIRED PHYSICAL MOBILITY: ICD-10-CM

## 2019-01-01 DIAGNOSIS — D64.9 ANEMIA, UNSPECIFIED TYPE: ICD-10-CM

## 2019-01-01 DIAGNOSIS — I50.33 ACUTE ON CHRONIC DIASTOLIC CHF (CONGESTIVE HEART FAILURE) (HCC): ICD-10-CM

## 2019-01-01 DIAGNOSIS — I50.33 ACUTE ON CHRONIC DIASTOLIC CHF (CONGESTIVE HEART FAILURE) (HCC): Primary | ICD-10-CM

## 2019-01-01 DIAGNOSIS — Z78.9 IMPAIRED MOBILITY AND ACTIVITIES OF DAILY LIVING: ICD-10-CM

## 2019-01-01 DIAGNOSIS — I10 HYPERTENSION, ESSENTIAL: ICD-10-CM

## 2019-01-01 DIAGNOSIS — I21.4 NSTEMI (NON-ST ELEVATED MYOCARDIAL INFARCTION) (HCC): Primary | ICD-10-CM

## 2019-01-01 DIAGNOSIS — Z74.09 IMPAIRED MOBILITY AND ADLS: ICD-10-CM

## 2019-01-01 DIAGNOSIS — N18.30 CHRONIC KIDNEY DISEASE, STAGE III (MODERATE) (HCC): Primary | ICD-10-CM

## 2019-01-01 DIAGNOSIS — E87.1 HYPONATREMIA: ICD-10-CM

## 2019-01-01 DIAGNOSIS — I13.0 HYPERTENSIVE HEART AND CHRONIC KIDNEY DISEASE WITH HEART FAILURE AND STAGE 1 THROUGH STAGE 4 CHRONIC KIDNEY DISEASE, OR CHRONIC KIDNEY DISEASE (HCC): ICD-10-CM

## 2019-01-01 DIAGNOSIS — J41.8 MIXED SIMPLE AND MUCOPURULENT CHRONIC BRONCHITIS (HCC): Primary | ICD-10-CM

## 2019-01-01 DIAGNOSIS — R06.02 SHORTNESS OF BREATH: ICD-10-CM

## 2019-01-01 DIAGNOSIS — N39.0 ACUTE LOWER UTI: ICD-10-CM

## 2019-01-01 DIAGNOSIS — I25.10 ATHEROSCLEROTIC HEART DISEASE OF NATIVE CORONARY ARTERY WITHOUT ANGINA PECTORIS: ICD-10-CM

## 2019-01-01 DIAGNOSIS — E11.22 TYPE 2 DIABETES MELLITUS WITH DIABETIC CHRONIC KIDNEY DISEASE (HCC): ICD-10-CM

## 2019-01-01 DIAGNOSIS — E11.9 DIABETES MELLITUS WITHOUT COMPLICATION (HCC): ICD-10-CM

## 2019-01-01 DIAGNOSIS — Z78.9 IMPAIRED MOBILITY AND ADLS: ICD-10-CM

## 2019-01-01 DIAGNOSIS — Z74.09 IMPAIRED MOBILITY AND ACTIVITIES OF DAILY LIVING: ICD-10-CM

## 2019-01-01 DIAGNOSIS — N18.30 CHRONIC RENAL IMPAIRMENT, STAGE 3 (MODERATE) (HCC): Chronic | ICD-10-CM

## 2019-01-01 DIAGNOSIS — E87.6 HYPOKALEMIA: ICD-10-CM

## 2019-01-01 LAB
25(OH)D3 SERPL-MCNC: 36.7 NG/ML (ref 30–100)
ABO + RH BLD: NORMAL
ABO GROUP BLD: NORMAL
ALBUMIN SERPL-MCNC: 3.1 G/DL (ref 3.4–4.8)
ALBUMIN SERPL-MCNC: 3.5 G/DL (ref 3.4–4.8)
ALBUMIN SERPL-MCNC: 3.5 G/DL (ref 3.4–4.8)
ALBUMIN SERPL-MCNC: 3.6 G/DL (ref 3.4–4.8)
ALBUMIN SERPL-MCNC: 3.7 G/DL (ref 3.4–4.8)
ALBUMIN SERPL-MCNC: 3.8 G/DL (ref 3.4–4.8)
ALBUMIN SERPL-MCNC: 3.8 G/DL (ref 3.5–5)
ALBUMIN/GLOB SERPL: 1 G/DL (ref 1.1–1.8)
ALBUMIN/GLOB SERPL: 1 G/DL (ref 1.1–1.8)
ALBUMIN/GLOB SERPL: 1.1 G/DL (ref 1.1–1.8)
ALBUMIN/GLOB SERPL: 1.3 G/DL (ref 1.1–1.8)
ALP SERPL-CCNC: 115 U/L (ref 38–126)
ALP SERPL-CCNC: 81 U/L (ref 38–126)
ALP SERPL-CCNC: 84 U/L (ref 38–126)
ALP SERPL-CCNC: 86 U/L (ref 38–126)
ALP SERPL-CCNC: 87 U/L (ref 38–126)
ALP SERPL-CCNC: 87 U/L (ref 38–126)
ALP SERPL-CCNC: 91 U/L (ref 38–126)
ALP SERPL-CCNC: 97 U/L (ref 38–126)
ALT SERPL W P-5'-P-CCNC: 16 U/L (ref 9–52)
ALT SERPL W P-5'-P-CCNC: 17 U/L (ref 9–52)
ALT SERPL W P-5'-P-CCNC: 18 U/L (ref 9–52)
ALT SERPL W P-5'-P-CCNC: 18 U/L (ref 9–52)
ALT SERPL W P-5'-P-CCNC: 19 U/L (ref 9–52)
ALT SERPL W P-5'-P-CCNC: 23 U/L (ref 9–52)
ALT SERPL W P-5'-P-CCNC: 35 U/L
ALT SERPL W P-5'-P-CCNC: 42 U/L (ref 9–52)
ANION GAP SERPL CALCULATED.3IONS-SCNC: 10 MMOL/L (ref 5–15)
ANION GAP SERPL CALCULATED.3IONS-SCNC: 11 MMOL/L (ref 5–15)
ANION GAP SERPL CALCULATED.3IONS-SCNC: 12 MMOL/L (ref 5–15)
ANION GAP SERPL CALCULATED.3IONS-SCNC: 13 MMOL/L (ref 5–15)
ANION GAP SERPL CALCULATED.3IONS-SCNC: 15 MMOL/L (ref 5–15)
ANION GAP SERPL CALCULATED.3IONS-SCNC: 3 MMOL/L (ref 5–15)
ANION GAP SERPL CALCULATED.3IONS-SCNC: 6 MMOL/L (ref 5–15)
ANION GAP SERPL CALCULATED.3IONS-SCNC: 7 MMOL/L (ref 5–15)
ANION GAP SERPL CALCULATED.3IONS-SCNC: 7 MMOL/L (ref 5–15)
ANION GAP SERPL CALCULATED.3IONS-SCNC: 8 MMOL/L (ref 5–15)
ANION GAP SERPL CALCULATED.3IONS-SCNC: 9 MMOL/L (ref 5–15)
ANISOCYTOSIS BLD QL: ABNORMAL
APTT PPP: 31.1 SECONDS (ref 20–40.3)
AST SERPL-CCNC: 31 U/L (ref 14–36)
AST SERPL-CCNC: 32 U/L (ref 14–36)
AST SERPL-CCNC: 36 U/L (ref 14–36)
AST SERPL-CCNC: 43 U/L (ref 14–36)
AST SERPL-CCNC: 45 U/L (ref 14–36)
AST SERPL-CCNC: 47 U/L (ref 14–36)
AST SERPL-CCNC: 70 U/L (ref 14–36)
AST SERPL-CCNC: 81 U/L (ref 14–36)
B PERT DNA SPEC QL NAA+PROBE: NOT DETECTED
BACTERIA UR QL AUTO: ABNORMAL /HPF
BACTERIA UR QL AUTO: ABNORMAL /HPF
BASOPHILS # BLD AUTO: 0.01 10*3/MM3 (ref 0–0.2)
BASOPHILS # BLD AUTO: 0.02 10*3/MM3 (ref 0–0.2)
BASOPHILS # BLD AUTO: 0.03 10*3/MM3 (ref 0–0.2)
BASOPHILS # BLD AUTO: 0.03 10*3/MM3 (ref 0–0.2)
BASOPHILS # BLD AUTO: 0.04 10*3/MM3 (ref 0–0.2)
BASOPHILS # BLD AUTO: 0.06 10*3/MM3 (ref 0–0.2)
BASOPHILS # BLD AUTO: ABNORMAL 10*3/MM3 (ref 0–0.2)
BASOPHILS # BLD MANUAL: 0.04 10*3/MM3 (ref 0–0.2)
BASOPHILS # BLD MANUAL: 0.05 10*3/MM3 (ref 0–0.2)
BASOPHILS NFR BLD AUTO: 0.1 % (ref 0–2)
BASOPHILS NFR BLD AUTO: 0.2 % (ref 0–2)
BASOPHILS NFR BLD AUTO: 0.3 % (ref 0–1.5)
BASOPHILS NFR BLD AUTO: 0.3 % (ref 0–2)
BASOPHILS NFR BLD AUTO: 0.4 % (ref 0–1.5)
BASOPHILS NFR BLD AUTO: 0.4 % (ref 0–1.5)
BASOPHILS NFR BLD AUTO: 0.4 % (ref 0–2)
BASOPHILS NFR BLD AUTO: 0.5 % (ref 0–2)
BASOPHILS NFR BLD AUTO: 0.6 % (ref 0–1.5)
BASOPHILS NFR BLD AUTO: 0.6 % (ref 0–2)
BASOPHILS NFR BLD AUTO: 0.9 % (ref 0–2)
BASOPHILS NFR BLD AUTO: 1 % (ref 0–1.5)
BASOPHILS NFR BLD AUTO: 1 % (ref 0–2)
BASOPHILS NFR BLD AUTO: 1 % (ref 0–2)
BASOPHILS NFR BLD AUTO: ABNORMAL % (ref 0–2)
BH BB BLOOD EXPIRATION DATE: NORMAL
BH BB BLOOD TYPE BARCODE: 5100
BH BB DISPENSE STATUS: NORMAL
BH BB PRODUCT CODE: NORMAL
BH BB UNIT NUMBER: NORMAL
BH CV ECHO MEAS - ACS: 1.4 CM
BH CV ECHO MEAS - ACS: 1.8 CM
BH CV ECHO MEAS - AO ISTHMUS: 2 CM
BH CV ECHO MEAS - AO MAX PG (FULL): -0.02 MMHG
BH CV ECHO MEAS - AO MAX PG (FULL): 5.1 MMHG
BH CV ECHO MEAS - AO MAX PG: 6.8 MMHG
BH CV ECHO MEAS - AO MAX PG: 8.9 MMHG
BH CV ECHO MEAS - AO MEAN PG (FULL): 2.5 MMHG
BH CV ECHO MEAS - AO MEAN PG: 4.5 MMHG
BH CV ECHO MEAS - AO ROOT AREA (BSA CORRECTED): 1.1
BH CV ECHO MEAS - AO ROOT AREA (BSA CORRECTED): 1.3
BH CV ECHO MEAS - AO ROOT AREA: 4.5 CM^2
BH CV ECHO MEAS - AO ROOT AREA: 5.7 CM^2
BH CV ECHO MEAS - AO ROOT DIAM: 2.4 CM
BH CV ECHO MEAS - AO ROOT DIAM: 2.7 CM
BH CV ECHO MEAS - AO V2 MAX: 130.9 CM/SEC
BH CV ECHO MEAS - AO V2 MAX: 149 CM/SEC
BH CV ECHO MEAS - AO V2 MEAN: 102 CM/SEC
BH CV ECHO MEAS - AO V2 VTI: 26.6 CM
BH CV ECHO MEAS - ASC AORTA: 3.3 CM
BH CV ECHO MEAS - AVA(I,A): 1.9 CM^2
BH CV ECHO MEAS - AVA(I,D): 1.9 CM^2
BH CV ECHO MEAS - AVA(V,A): 1.7 CM^2
BH CV ECHO MEAS - AVA(V,A): 3.1 CM^2
BH CV ECHO MEAS - AVA(V,D): 1.7 CM^2
BH CV ECHO MEAS - AVA(V,D): 3.1 CM^2
BH CV ECHO MEAS - BSA(HAYCOCK): 2.2 M^2
BH CV ECHO MEAS - BSA(HAYCOCK): 2.3 M^2
BH CV ECHO MEAS - BSA: 2 M^2
BH CV ECHO MEAS - BSA: 2.1 M^2
BH CV ECHO MEAS - BZI_BMI: 39.5 KILOGRAMS/M^2
BH CV ECHO MEAS - BZI_BMI: 41 KILOGRAMS/M^2
BH CV ECHO MEAS - BZI_METRIC_HEIGHT: 160 CM
BH CV ECHO MEAS - BZI_METRIC_HEIGHT: 163 CM
BH CV ECHO MEAS - BZI_METRIC_WEIGHT: 101 KG
BH CV ECHO MEAS - BZI_METRIC_WEIGHT: 109 KG
BH CV ECHO MEAS - EDV(CUBED): 117.2 ML
BH CV ECHO MEAS - EDV(CUBED): 126.1 ML
BH CV ECHO MEAS - EDV(TEICH): 112.5 ML
BH CV ECHO MEAS - EDV(TEICH): 119.1 ML
BH CV ECHO MEAS - EF(CUBED): 66.8 %
BH CV ECHO MEAS - EF(CUBED): 78.4 %
BH CV ECHO MEAS - EF(TEICH): 58.2 %
BH CV ECHO MEAS - EF(TEICH): 70.4 %
BH CV ECHO MEAS - ESV(CUBED): 27.2 ML
BH CV ECHO MEAS - ESV(CUBED): 38.9 ML
BH CV ECHO MEAS - ESV(TEICH): 35.2 ML
BH CV ECHO MEAS - ESV(TEICH): 47.1 ML
BH CV ECHO MEAS - FS: 30.7 %
BH CV ECHO MEAS - FS: 40 %
BH CV ECHO MEAS - IVS/LVPW: 0.97
BH CV ECHO MEAS - IVS/LVPW: 1
BH CV ECHO MEAS - IVSD: 1.1 CM
BH CV ECHO MEAS - IVSD: 1.5 CM
BH CV ECHO MEAS - LA DIMENSION: 4.6 CM
BH CV ECHO MEAS - LA DIMENSION: 4.7 CM
BH CV ECHO MEAS - LA/AO: 1.7
BH CV ECHO MEAS - LA/AO: 1.9
BH CV ECHO MEAS - LV MASS(C)D: 213.1 GRAMS
BH CV ECHO MEAS - LV MASS(C)D: 314.2 GRAMS
BH CV ECHO MEAS - LV MASS(C)DI: 100.6 GRAMS/M^2
BH CV ECHO MEAS - LV MASS(C)DI: 155.2 GRAMS/M^2
BH CV ECHO MEAS - LV MAX PG: 3.8 MMHG
BH CV ECHO MEAS - LV MAX PG: 6.9 MMHG
BH CV ECHO MEAS - LV MEAN PG: 1.9 MMHG
BH CV ECHO MEAS - LV V1 MAX: 131 CM/SEC
BH CV ECHO MEAS - LV V1 MAX: 97.8 CM/SEC
BH CV ECHO MEAS - LV V1 MEAN: 65.1 CM/SEC
BH CV ECHO MEAS - LV V1 VTI: 20.2 CM
BH CV ECHO MEAS - LVIDD: 4.9 CM
BH CV ECHO MEAS - LVIDD: 5 CM
BH CV ECHO MEAS - LVIDS: 3 CM
BH CV ECHO MEAS - LVIDS: 3.4 CM
BH CV ECHO MEAS - LVOT AREA: 2.5 CM^2
BH CV ECHO MEAS - LVOT AREA: 3.1 CM^2
BH CV ECHO MEAS - LVOT DIAM: 1.8 CM
BH CV ECHO MEAS - LVOT DIAM: 2 CM
BH CV ECHO MEAS - LVPWD: 1.2 CM
BH CV ECHO MEAS - LVPWD: 1.4 CM
BH CV ECHO MEAS - MR MAX PG: 99.3 MMHG
BH CV ECHO MEAS - MR MAX VEL: 498.3 CM/SEC
BH CV ECHO MEAS - MV A MAX VEL: 85.1 CM/SEC
BH CV ECHO MEAS - MV A MAX VEL: 91.3 CM/SEC
BH CV ECHO MEAS - MV E MAX VEL: 100 CM/SEC
BH CV ECHO MEAS - MV E MAX VEL: 138.5 CM/SEC
BH CV ECHO MEAS - MV E/A: 1.1
BH CV ECHO MEAS - MV E/A: 1.6
BH CV ECHO MEAS - MV MAX PG: 6.3 MMHG
BH CV ECHO MEAS - MV MAX PG: 8.9 MMHG
BH CV ECHO MEAS - MV MEAN PG: 2.5 MMHG
BH CV ECHO MEAS - MV MEAN PG: 3 MMHG
BH CV ECHO MEAS - MV P1/2T MAX VEL: 122 CM/SEC
BH CV ECHO MEAS - MV V2 MAX: 125 CM/SEC
BH CV ECHO MEAS - MV V2 MAX: 149 CM/SEC
BH CV ECHO MEAS - MV V2 MEAN: 71.9 CM/SEC
BH CV ECHO MEAS - MV V2 MEAN: 88.6 CM/SEC
BH CV ECHO MEAS - MV V2 VTI: 37.1 CM
BH CV ECHO MEAS - MV V2 VTI: 38.8 CM
BH CV ECHO MEAS - MVA P1/2T LCG: 1.8 CM^2
BH CV ECHO MEAS - MVA(VTI): 1.3 CM^2
BH CV ECHO MEAS - PA MAX PG: 6.8 MMHG
BH CV ECHO MEAS - PA MAX PG: 9.7 MMHG
BH CV ECHO MEAS - PA V2 MAX: 130.3 CM/SEC
BH CV ECHO MEAS - PA V2 MAX: 155.7 CM/SEC
BH CV ECHO MEAS - PI END-D VEL: 109.9 CM/SEC
BH CV ECHO MEAS - PI END-D VEL: 158.6 CM/SEC
BH CV ECHO MEAS - RAP SYSTOLE: 10 MMHG
BH CV ECHO MEAS - RVDD: 2.3 CM
BH CV ECHO MEAS - RVDD: 3 CM
BH CV ECHO MEAS - RVSP: 71.5 MMHG
BH CV ECHO MEAS - SI(AO): 74.8 ML/M^2
BH CV ECHO MEAS - SI(CUBED): 36.9 ML/M^2
BH CV ECHO MEAS - SI(CUBED): 48.9 ML/M^2
BH CV ECHO MEAS - SI(LVOT): 25.3 ML/M^2
BH CV ECHO MEAS - SI(TEICH): 30.9 ML/M^2
BH CV ECHO MEAS - SI(TEICH): 41.4 ML/M^2
BH CV ECHO MEAS - SV(AO): 151.5 ML
BH CV ECHO MEAS - SV(CUBED): 78.3 ML
BH CV ECHO MEAS - SV(CUBED): 98.9 ML
BH CV ECHO MEAS - SV(LVOT): 51.3 ML
BH CV ECHO MEAS - SV(TEICH): 65.4 ML
BH CV ECHO MEAS - SV(TEICH): 83.8 ML
BH CV ECHO MEAS - TR MAX VEL: 392 CM/SEC
BH CV ECHO MEAS - TR MAX VEL: 410.9 CM/SEC
BILIRUB SERPL-MCNC: 0.8 MG/DL (ref 0.2–1.3)
BILIRUB SERPL-MCNC: 0.9 MG/DL (ref 0.2–1.3)
BILIRUB SERPL-MCNC: 1.2 MG/DL (ref 0.2–1.3)
BILIRUB SERPL-MCNC: 1.4 MG/DL (ref 0.2–1.3)
BILIRUB SERPL-MCNC: 1.5 MG/DL (ref 0.2–1.3)
BILIRUB SERPL-MCNC: 1.6 MG/DL (ref 0.2–1.3)
BILIRUB SERPL-MCNC: 1.8 MG/DL (ref 0.2–1.3)
BILIRUB SERPL-MCNC: 2.1 MG/DL (ref 0.2–1.3)
BILIRUB UR QL STRIP: ABNORMAL
BILIRUB UR QL STRIP: NEGATIVE
BLD GP AB SCN SERPL QL: NEGATIVE
BUN BLD-MCNC: 33 MG/DL (ref 7–21)
BUN BLD-MCNC: 33 MG/DL (ref 7–21)
BUN BLD-MCNC: 39 MG/DL (ref 7–21)
BUN BLD-MCNC: 41 MG/DL (ref 7–21)
BUN BLD-MCNC: 44 MG/DL (ref 7–17)
BUN BLD-MCNC: 45 MG/DL (ref 7–21)
BUN BLD-MCNC: 46 MG/DL (ref 7–21)
BUN BLD-MCNC: 47 MG/DL (ref 7–21)
BUN BLD-MCNC: 48 MG/DL (ref 7–21)
BUN BLD-MCNC: 48 MG/DL (ref 7–21)
BUN BLD-MCNC: 49 MG/DL (ref 7–21)
BUN BLD-MCNC: 49 MG/DL (ref 7–21)
BUN BLD-MCNC: 51 MG/DL (ref 7–21)
BUN BLD-MCNC: 51 MG/DL (ref 7–21)
BUN BLD-MCNC: 53 MG/DL (ref 7–21)
BUN BLD-MCNC: 53 MG/DL (ref 7–21)
BUN BLD-MCNC: 54 MG/DL (ref 7–21)
BUN BLD-MCNC: 55 MG/DL (ref 7–21)
BUN BLD-MCNC: 57 MG/DL (ref 7–21)
BUN BLD-MCNC: 57 MG/DL (ref 7–21)
BUN BLD-MCNC: 58 MG/DL (ref 7–21)
BUN BLD-MCNC: 60 MG/DL (ref 7–21)
BUN BLD-MCNC: 62 MG/DL (ref 7–21)
BUN BLD-MCNC: 68 MG/DL (ref 7–17)
BUN BLD-MCNC: 74 MG/DL (ref 7–21)
BUN BLD-MCNC: 80 MG/DL (ref 7–21)
BUN BLD-MCNC: 83 MG/DL (ref 7–21)
BUN BLD-MCNC: 84 MG/DL (ref 7–21)
BUN BLD-MCNC: 85 MG/DL (ref 7–21)
BUN BLD-MCNC: 86 MG/DL (ref 7–21)
BUN BLD-MCNC: 91 MG/DL (ref 7–21)
BUN/CREAT SERPL: 23.1 (ref 7–25)
BUN/CREAT SERPL: 23.1 (ref 7–25)
BUN/CREAT SERPL: 25.7 (ref 7–25)
BUN/CREAT SERPL: 26.1 (ref 7–25)
BUN/CREAT SERPL: 27.7 (ref 7–25)
BUN/CREAT SERPL: 27.8 (ref 7–25)
BUN/CREAT SERPL: 28 (ref 7–25)
BUN/CREAT SERPL: 28.5 (ref 7–25)
BUN/CREAT SERPL: 29.1 (ref 7–25)
BUN/CREAT SERPL: 31.2 (ref 7–25)
BUN/CREAT SERPL: 31.4 (ref 7–25)
BUN/CREAT SERPL: 32.6 (ref 7–25)
BUN/CREAT SERPL: 32.7 (ref 7–25)
BUN/CREAT SERPL: 33.8 (ref 7–25)
BUN/CREAT SERPL: 35.4 (ref 7–25)
BUN/CREAT SERPL: 35.5 (ref 7–25)
BUN/CREAT SERPL: 36.6 (ref 7–25)
BUN/CREAT SERPL: 38.4 (ref 7–25)
BUN/CREAT SERPL: 38.6 (ref 7–25)
BUN/CREAT SERPL: 38.8 (ref 7–25)
BUN/CREAT SERPL: 38.8 (ref 7–25)
BUN/CREAT SERPL: 39.2 (ref 7–25)
BUN/CREAT SERPL: 39.3 (ref 7–25)
BUN/CREAT SERPL: 39.7 (ref 7–25)
BUN/CREAT SERPL: 39.8 (ref 7–25)
BUN/CREAT SERPL: 42.3 (ref 7–25)
BUN/CREAT SERPL: 42.4 (ref 7–25)
BUN/CREAT SERPL: 45 (ref 7–25)
BUN/CREAT SERPL: 45.7 (ref 7–25)
BUN/CREAT SERPL: 46 (ref 7–25)
BUN/CREAT SERPL: 53.3 (ref 7–25)
C PNEUM DNA NPH QL NAA+NON-PROBE: NOT DETECTED
CALCIUM SPEC-SCNC: 8.2 MG/DL (ref 8.4–10.2)
CALCIUM SPEC-SCNC: 8.3 MG/DL (ref 8.4–10.2)
CALCIUM SPEC-SCNC: 8.3 MG/DL (ref 8.4–10.2)
CALCIUM SPEC-SCNC: 8.4 MG/DL (ref 8.4–10.2)
CALCIUM SPEC-SCNC: 8.5 MG/DL (ref 8.4–10.2)
CALCIUM SPEC-SCNC: 8.6 MG/DL (ref 8.4–10.2)
CALCIUM SPEC-SCNC: 8.7 MG/DL (ref 8.4–10.2)
CALCIUM SPEC-SCNC: 8.9 MG/DL (ref 8.4–10.2)
CALCIUM SPEC-SCNC: 9 MG/DL (ref 8.4–10.2)
CALCIUM SPEC-SCNC: 9 MG/DL (ref 8.4–10.2)
CALCIUM SPEC-SCNC: 9.1 MG/DL (ref 8.4–10.2)
CALCIUM SPEC-SCNC: 9.3 MG/DL (ref 8.4–10.2)
CALCIUM SPEC-SCNC: 9.5 MG/DL (ref 8.4–10.2)
CHLORIDE 24H UR-SCNC: <20 MMOL/L
CHLORIDE SERPL-SCNC: 100 MMOL/L (ref 95–110)
CHLORIDE SERPL-SCNC: 100 MMOL/L (ref 95–110)
CHLORIDE SERPL-SCNC: 101 MMOL/L (ref 95–110)
CHLORIDE SERPL-SCNC: 101 MMOL/L (ref 95–110)
CHLORIDE SERPL-SCNC: 102 MMOL/L (ref 95–110)
CHLORIDE SERPL-SCNC: 76 MMOL/L (ref 95–110)
CHLORIDE SERPL-SCNC: 77 MMOL/L (ref 95–110)
CHLORIDE SERPL-SCNC: 78 MMOL/L (ref 95–110)
CHLORIDE SERPL-SCNC: 81 MMOL/L (ref 95–110)
CHLORIDE SERPL-SCNC: 81 MMOL/L (ref 95–110)
CHLORIDE SERPL-SCNC: 83 MMOL/L (ref 95–110)
CHLORIDE SERPL-SCNC: 84 MMOL/L (ref 95–110)
CHLORIDE SERPL-SCNC: 86 MMOL/L (ref 95–110)
CHLORIDE SERPL-SCNC: 86 MMOL/L (ref 95–110)
CHLORIDE SERPL-SCNC: 88 MMOL/L (ref 95–110)
CHLORIDE SERPL-SCNC: 88 MMOL/L (ref 95–110)
CHLORIDE SERPL-SCNC: 89 MMOL/L (ref 95–110)
CHLORIDE SERPL-SCNC: 91 MMOL/L (ref 95–110)
CHLORIDE SERPL-SCNC: 92 MMOL/L (ref 95–110)
CHLORIDE SERPL-SCNC: 92 MMOL/L (ref 95–110)
CHLORIDE SERPL-SCNC: 93 MMOL/L (ref 98–107)
CHLORIDE SERPL-SCNC: 94 MMOL/L (ref 95–110)
CHLORIDE SERPL-SCNC: 95 MMOL/L (ref 95–110)
CHLORIDE SERPL-SCNC: 95 MMOL/L (ref 95–110)
CHLORIDE SERPL-SCNC: 96 MMOL/L (ref 95–110)
CHLORIDE SERPL-SCNC: 96 MMOL/L (ref 95–110)
CHLORIDE SERPL-SCNC: 96 MMOL/L (ref 98–107)
CHLORIDE SERPL-SCNC: 97 MMOL/L (ref 95–110)
CHLORIDE SERPL-SCNC: 98 MMOL/L (ref 95–110)
CHLORIDE SERPL-SCNC: 99 MMOL/L (ref 95–110)
CHLORIDE SERPL-SCNC: 99 MMOL/L (ref 95–110)
CLARITY UR: CLEAR
CLARITY UR: CLEAR
CO2 SERPL-SCNC: 26 MMOL/L (ref 22–31)
CO2 SERPL-SCNC: 27 MMOL/L (ref 22–31)
CO2 SERPL-SCNC: 28 MMOL/L (ref 22–31)
CO2 SERPL-SCNC: 29 MMOL/L (ref 22–31)
CO2 SERPL-SCNC: 30 MMOL/L (ref 22–30)
CO2 SERPL-SCNC: 30 MMOL/L (ref 22–31)
CO2 SERPL-SCNC: 31 MMOL/L (ref 22–30)
CO2 SERPL-SCNC: 32 MMOL/L (ref 22–31)
CO2 SERPL-SCNC: 33 MMOL/L (ref 22–31)
COLOR UR: ABNORMAL
COLOR UR: YELLOW
CORTIS SERPL-MCNC: 50.9 MCG/DL (ref 4.46–22.7)
CREAT BLD-MCNC: 1.13 MG/DL (ref 0.5–1)
CREAT BLD-MCNC: 1.21 MG/DL (ref 0.5–1)
CREAT BLD-MCNC: 1.3 MG/DL (ref 0.5–1)
CREAT BLD-MCNC: 1.3 MG/DL (ref 0.5–1)
CREAT BLD-MCNC: 1.38 MG/DL (ref 0.5–1)
CREAT BLD-MCNC: 1.38 MG/DL (ref 0.5–1)
CREAT BLD-MCNC: 1.42 MG/DL (ref 0.5–1)
CREAT BLD-MCNC: 1.43 MG/DL (ref 0.5–1)
CREAT BLD-MCNC: 1.43 MG/DL (ref 0.5–1)
CREAT BLD-MCNC: 1.44 MG/DL (ref 0.5–1)
CREAT BLD-MCNC: 1.45 MG/DL (ref 0.5–1)
CREAT BLD-MCNC: 1.47 MG/DL (ref 0.5–1)
CREAT BLD-MCNC: 1.47 MG/DL (ref 0.5–1)
CREAT BLD-MCNC: 1.5 MG/DL (ref 0.5–1)
CREAT BLD-MCNC: 1.51 MG/DL (ref 0.52–1.04)
CREAT BLD-MCNC: 1.52 MG/DL (ref 0.5–1)
CREAT BLD-MCNC: 1.56 MG/DL (ref 0.5–1)
CREAT BLD-MCNC: 1.56 MG/DL (ref 0.5–1)
CREAT BLD-MCNC: 1.61 MG/DL (ref 0.5–1)
CREAT BLD-MCNC: 1.64 MG/DL (ref 0.5–1)
CREAT BLD-MCNC: 1.76 MG/DL (ref 0.5–1)
CREAT BLD-MCNC: 1.84 MG/DL (ref 0.5–1)
CREAT BLD-MCNC: 1.84 MG/DL (ref 0.5–1)
CREAT BLD-MCNC: 1.89 MG/DL (ref 0.5–1)
CREAT BLD-MCNC: 1.93 MG/DL (ref 0.5–1)
CREAT BLD-MCNC: 1.98 MG/DL (ref 0.5–1)
CREAT BLD-MCNC: 1.99 MG/DL (ref 0.5–1)
CREAT BLD-MCNC: 2.05 MG/DL (ref 0.5–1)
CREAT BLD-MCNC: 2.11 MG/DL (ref 0.5–1)
CREAT BLD-MCNC: 2.18 MG/DL (ref 0.52–1.04)
CREAT BLD-MCNC: 2.23 MG/DL (ref 0.5–1)
CREAT UR-MCNC: 79.6 MG/DL
D-DIMER, QUANTITATIVE (MAD,POW, STR): 1025 NG/ML (FEU) (ref 0–470)
DEPRECATED RDW RBC AUTO: 42.2 FL (ref 36.4–46.3)
DEPRECATED RDW RBC AUTO: 43.7 FL (ref 36.4–46.3)
DEPRECATED RDW RBC AUTO: 44.9 FL (ref 36.4–46.3)
DEPRECATED RDW RBC AUTO: 45.2 FL (ref 37–54)
DEPRECATED RDW RBC AUTO: 45.4 FL (ref 36.4–46.3)
DEPRECATED RDW RBC AUTO: 46 FL (ref 36.4–46.3)
DEPRECATED RDW RBC AUTO: 48 FL (ref 36.4–46.3)
DEPRECATED RDW RBC AUTO: 51.2 FL (ref 37–54)
DEPRECATED RDW RBC AUTO: 52 FL (ref 37–54)
DEPRECATED RDW RBC AUTO: 54.4 FL (ref 37–54)
DEPRECATED RDW RBC AUTO: 55.4 FL (ref 36.4–46.3)
DEPRECATED RDW RBC AUTO: 55.7 FL (ref 36.4–46.3)
DEPRECATED RDW RBC AUTO: 56.9 FL (ref 36.4–46.3)
DEPRECATED RDW RBC AUTO: 57.6 FL (ref 36.4–46.3)
DEPRECATED RDW RBC AUTO: 58.1 FL (ref 36.4–46.3)
DEPRECATED RDW RBC AUTO: 58.5 FL (ref 36.4–46.3)
DEPRECATED RDW RBC AUTO: 58.6 FL (ref 36.4–46.3)
DEPRECATED RDW RBC AUTO: 59.7 FL (ref 37–54)
DEPRECATED RDW RBC AUTO: 60.9 FL (ref 36.4–46.3)
DEPRECATED RDW RBC AUTO: 61.9 FL (ref 36.4–46.3)
DEPRECATED RDW RBC AUTO: 62.1 FL (ref 36.4–46.3)
DEPRECATED RDW RBC AUTO: 63 FL (ref 36.4–46.3)
DEPRECATED RDW RBC AUTO: 64 FL (ref 36.4–46.3)
DEPRECATED RDW RBC AUTO: 64.3 FL (ref 36.4–46.3)
DEPRECATED RDW RBC AUTO: 68 FL (ref 36.4–46.3)
DEPRECATED RDW RBC AUTO: 68.6 FL (ref 36.4–46.3)
EOSINOPHIL # BLD AUTO: 0.02 10*3/MM3 (ref 0–0.4)
EOSINOPHIL # BLD AUTO: 0.02 10*3/MM3 (ref 0–0.4)
EOSINOPHIL # BLD AUTO: 0.04 10*3/MM3 (ref 0–0.7)
EOSINOPHIL # BLD AUTO: 0.05 10*3/MM3 (ref 0–0.7)
EOSINOPHIL # BLD AUTO: 0.06 10*3/MM3 (ref 0–0.7)
EOSINOPHIL # BLD AUTO: 0.07 10*3/MM3 (ref 0–0.4)
EOSINOPHIL # BLD AUTO: 0.07 10*3/MM3 (ref 0–0.4)
EOSINOPHIL # BLD AUTO: 0.07 10*3/MM3 (ref 0–0.7)
EOSINOPHIL # BLD AUTO: 0.08 10*3/MM3 (ref 0–0.7)
EOSINOPHIL # BLD AUTO: 0.08 10*3/MM3 (ref 0–0.7)
EOSINOPHIL # BLD AUTO: 0.09 10*3/MM3 (ref 0–0.7)
EOSINOPHIL # BLD AUTO: 0.11 10*3/MM3 (ref 0–0.7)
EOSINOPHIL # BLD AUTO: 0.12 10*3/MM3 (ref 0–0.7)
EOSINOPHIL # BLD AUTO: 0.12 10*3/MM3 (ref 0–0.7)
EOSINOPHIL # BLD AUTO: 0.13 10*3/MM3 (ref 0–0.7)
EOSINOPHIL # BLD AUTO: 0.13 10*3/MM3 (ref 0–0.7)
EOSINOPHIL # BLD AUTO: 0.15 10*3/MM3 (ref 0–0.7)
EOSINOPHIL # BLD AUTO: 0.16 10*3/MM3 (ref 0–0.7)
EOSINOPHIL # BLD AUTO: 0.19 10*3/MM3 (ref 0–0.4)
EOSINOPHIL # BLD AUTO: 0.26 10*3/MM3 (ref 0–0.7)
EOSINOPHIL # BLD AUTO: 0.29 10*3/MM3 (ref 0–0.7)
EOSINOPHIL # BLD AUTO: ABNORMAL 10*3/MM3 (ref 0–0.7)
EOSINOPHIL # BLD AUTO: ABNORMAL 10*3/MM3 (ref 0–0.7)
EOSINOPHIL # BLD MANUAL: 0.04 10*3/MM3 (ref 0–0.7)
EOSINOPHIL # BLD MANUAL: 0.21 10*3/MM3 (ref 0–0.7)
EOSINOPHIL NFR BLD AUTO: 0.2 % (ref 0.3–6.2)
EOSINOPHIL NFR BLD AUTO: 0.3 % (ref 0.3–6.2)
EOSINOPHIL NFR BLD AUTO: 0.6 % (ref 0–7)
EOSINOPHIL NFR BLD AUTO: 0.9 % (ref 0–7)
EOSINOPHIL NFR BLD AUTO: 1 % (ref 0.3–6.2)
EOSINOPHIL NFR BLD AUTO: 1.2 % (ref 0–7)
EOSINOPHIL NFR BLD AUTO: 1.4 % (ref 0.3–6.2)
EOSINOPHIL NFR BLD AUTO: 1.5 % (ref 0–7)
EOSINOPHIL NFR BLD AUTO: 1.6 % (ref 0–7)
EOSINOPHIL NFR BLD AUTO: 1.7 % (ref 0–7)
EOSINOPHIL NFR BLD AUTO: 2 % (ref 0–7)
EOSINOPHIL NFR BLD AUTO: 2 % (ref 0–7)
EOSINOPHIL NFR BLD AUTO: 2.2 % (ref 0–7)
EOSINOPHIL NFR BLD AUTO: 2.2 % (ref 0–7)
EOSINOPHIL NFR BLD AUTO: 2.6 % (ref 0–7)
EOSINOPHIL NFR BLD AUTO: 2.6 % (ref 0–7)
EOSINOPHIL NFR BLD AUTO: 2.9 % (ref 0–7)
EOSINOPHIL NFR BLD AUTO: 3.1 % (ref 0–7)
EOSINOPHIL NFR BLD AUTO: 3.6 % (ref 0.3–6.2)
EOSINOPHIL NFR BLD AUTO: 3.6 % (ref 0–7)
EOSINOPHIL NFR BLD AUTO: 3.8 % (ref 0–7)
EOSINOPHIL NFR BLD AUTO: 4.1 % (ref 0–7)
EOSINOPHIL NFR BLD AUTO: 4.6 % (ref 0–7)
EOSINOPHIL NFR BLD AUTO: ABNORMAL % (ref 0–7)
EOSINOPHIL NFR BLD AUTO: ABNORMAL % (ref 0–7)
EOSINOPHIL NFR BLD MANUAL: 1 % (ref 0–7)
EOSINOPHIL NFR BLD MANUAL: 4 % (ref 0–7)
ERYTHROCYTE [DISTWIDTH] IN BLOOD BY AUTOMATED COUNT: 12.9 % (ref 11.5–14.5)
ERYTHROCYTE [DISTWIDTH] IN BLOOD BY AUTOMATED COUNT: 13.2 % (ref 11.5–14.5)
ERYTHROCYTE [DISTWIDTH] IN BLOOD BY AUTOMATED COUNT: 13.2 % (ref 12.3–15.4)
ERYTHROCYTE [DISTWIDTH] IN BLOOD BY AUTOMATED COUNT: 13.4 % (ref 11.5–14.5)
ERYTHROCYTE [DISTWIDTH] IN BLOOD BY AUTOMATED COUNT: 13.4 % (ref 11.5–14.5)
ERYTHROCYTE [DISTWIDTH] IN BLOOD BY AUTOMATED COUNT: 13.5 % (ref 11.5–14.5)
ERYTHROCYTE [DISTWIDTH] IN BLOOD BY AUTOMATED COUNT: 14 % (ref 11.5–14.5)
ERYTHROCYTE [DISTWIDTH] IN BLOOD BY AUTOMATED COUNT: 15.6 % (ref 12.3–15.4)
ERYTHROCYTE [DISTWIDTH] IN BLOOD BY AUTOMATED COUNT: 15.6 % (ref 12.3–15.4)
ERYTHROCYTE [DISTWIDTH] IN BLOOD BY AUTOMATED COUNT: 15.8 % (ref 12.3–15.4)
ERYTHROCYTE [DISTWIDTH] IN BLOOD BY AUTOMATED COUNT: 16 % (ref 11.5–14.5)
ERYTHROCYTE [DISTWIDTH] IN BLOOD BY AUTOMATED COUNT: 16.1 % (ref 11.5–14.5)
ERYTHROCYTE [DISTWIDTH] IN BLOOD BY AUTOMATED COUNT: 16.2 % (ref 11.5–14.5)
ERYTHROCYTE [DISTWIDTH] IN BLOOD BY AUTOMATED COUNT: 16.3 % (ref 11.5–14.5)
ERYTHROCYTE [DISTWIDTH] IN BLOOD BY AUTOMATED COUNT: 16.4 % (ref 11.5–14.5)
ERYTHROCYTE [DISTWIDTH] IN BLOOD BY AUTOMATED COUNT: 16.6 % (ref 11.5–14.5)
ERYTHROCYTE [DISTWIDTH] IN BLOOD BY AUTOMATED COUNT: 16.8 % (ref 11.5–14.5)
ERYTHROCYTE [DISTWIDTH] IN BLOOD BY AUTOMATED COUNT: 16.8 % (ref 12.3–15.4)
ERYTHROCYTE [DISTWIDTH] IN BLOOD BY AUTOMATED COUNT: 16.9 % (ref 11.5–14.5)
ERYTHROCYTE [DISTWIDTH] IN BLOOD BY AUTOMATED COUNT: 17.6 % (ref 11.5–14.5)
ERYTHROCYTE [DISTWIDTH] IN BLOOD BY AUTOMATED COUNT: 17.6 % (ref 11.5–14.5)
ERYTHROCYTE [DISTWIDTH] IN BLOOD BY AUTOMATED COUNT: 17.7 % (ref 11.5–14.5)
ERYTHROCYTE [DISTWIDTH] IN BLOOD BY AUTOMATED COUNT: 17.8 % (ref 11.5–14.5)
ERYTHROCYTE [DISTWIDTH] IN BLOOD BY AUTOMATED COUNT: 17.8 % (ref 11.5–14.5)
ERYTHROCYTE [DISTWIDTH] IN BLOOD BY AUTOMATED COUNT: 18.2 % (ref 11.5–14.5)
ERYTHROCYTE [DISTWIDTH] IN BLOOD BY AUTOMATED COUNT: 18.2 % (ref 11.5–14.5)
FLUAV H1 2009 PAND RNA NPH QL NAA+PROBE: NOT DETECTED
FLUAV H1 HA GENE NPH QL NAA+PROBE: NOT DETECTED
FLUAV H3 RNA NPH QL NAA+PROBE: NOT DETECTED
FLUAV SUBTYP SPEC NAA+PROBE: NOT DETECTED
FLUBV RNA ISLT QL NAA+PROBE: NOT DETECTED
FOLATE SERPL-MCNC: >20 NG/ML (ref 2.76–21)
GFR SERPL CREATININE-BSD FRML MDRD: 22 ML/MIN/1.73 (ref 45–104)
GFR SERPL CREATININE-BSD FRML MDRD: 22 ML/MIN/1.73 (ref 45–104)
GFR SERPL CREATININE-BSD FRML MDRD: 23 ML/MIN/1.73 (ref 45–104)
GFR SERPL CREATININE-BSD FRML MDRD: 24 ML/MIN/1.73 (ref 45–104)
GFR SERPL CREATININE-BSD FRML MDRD: 25 ML/MIN/1.73 (ref 45–104)
GFR SERPL CREATININE-BSD FRML MDRD: 25 ML/MIN/1.73 (ref 45–104)
GFR SERPL CREATININE-BSD FRML MDRD: 26 ML/MIN/1.73 (ref 45–104)
GFR SERPL CREATININE-BSD FRML MDRD: 26 ML/MIN/1.73 (ref 45–104)
GFR SERPL CREATININE-BSD FRML MDRD: 27 ML/MIN/1.73 (ref 45–104)
GFR SERPL CREATININE-BSD FRML MDRD: 27 ML/MIN/1.73 (ref 45–104)
GFR SERPL CREATININE-BSD FRML MDRD: 29 ML/MIN/1.73 (ref 45–104)
GFR SERPL CREATININE-BSD FRML MDRD: 31 ML/MIN/1.73 (ref 45–104)
GFR SERPL CREATININE-BSD FRML MDRD: 32 ML/MIN/1.73 (ref 45–104)
GFR SERPL CREATININE-BSD FRML MDRD: 33 ML/MIN/1.73 (ref 45–104)
GFR SERPL CREATININE-BSD FRML MDRD: 33 ML/MIN/1.73 (ref 45–104)
GFR SERPL CREATININE-BSD FRML MDRD: 34 ML/MIN/1.73 (ref 45–104)
GFR SERPL CREATININE-BSD FRML MDRD: 35 ML/MIN/1.73 (ref 45–104)
GFR SERPL CREATININE-BSD FRML MDRD: 35 ML/MIN/1.73 (ref 45–104)
GFR SERPL CREATININE-BSD FRML MDRD: 36 ML/MIN/1.73 (ref 45–104)
GFR SERPL CREATININE-BSD FRML MDRD: 37 ML/MIN/1.73 (ref 45–104)
GFR SERPL CREATININE-BSD FRML MDRD: 38 ML/MIN/1.73 (ref 45–104)
GFR SERPL CREATININE-BSD FRML MDRD: 38 ML/MIN/1.73 (ref 45–104)
GFR SERPL CREATININE-BSD FRML MDRD: 41 ML/MIN/1.73 (ref 45–104)
GFR SERPL CREATININE-BSD FRML MDRD: 41 ML/MIN/1.73 (ref 45–104)
GFR SERPL CREATININE-BSD FRML MDRD: 44 ML/MIN/1.73 (ref 45–104)
GFR SERPL CREATININE-BSD FRML MDRD: 48 ML/MIN/1.73 (ref 45–104)
GLOBULIN UR ELPH-MCNC: 2.7 GM/DL (ref 2.3–3.5)
GLOBULIN UR ELPH-MCNC: 2.8 GM/DL (ref 2.3–3.5)
GLOBULIN UR ELPH-MCNC: 3.2 GM/DL (ref 2.3–3.5)
GLOBULIN UR ELPH-MCNC: 3.3 GM/DL (ref 2.3–3.5)
GLOBULIN UR ELPH-MCNC: 3.3 GM/DL (ref 2.3–3.5)
GLOBULIN UR ELPH-MCNC: 3.5 GM/DL (ref 2.3–3.5)
GLOBULIN UR ELPH-MCNC: 3.6 GM/DL (ref 2.3–3.5)
GLOBULIN UR ELPH-MCNC: 3.6 GM/DL (ref 2.3–3.5)
GLUCOSE BLD-MCNC: 103 MG/DL (ref 60–100)
GLUCOSE BLD-MCNC: 104 MG/DL (ref 60–100)
GLUCOSE BLD-MCNC: 106 MG/DL (ref 60–100)
GLUCOSE BLD-MCNC: 106 MG/DL (ref 60–100)
GLUCOSE BLD-MCNC: 107 MG/DL (ref 60–100)
GLUCOSE BLD-MCNC: 107 MG/DL (ref 74–99)
GLUCOSE BLD-MCNC: 114 MG/DL (ref 60–100)
GLUCOSE BLD-MCNC: 117 MG/DL (ref 60–100)
GLUCOSE BLD-MCNC: 122 MG/DL (ref 60–100)
GLUCOSE BLD-MCNC: 126 MG/DL (ref 60–100)
GLUCOSE BLD-MCNC: 127 MG/DL (ref 60–100)
GLUCOSE BLD-MCNC: 129 MG/DL (ref 60–100)
GLUCOSE BLD-MCNC: 133 MG/DL (ref 60–100)
GLUCOSE BLD-MCNC: 136 MG/DL (ref 60–100)
GLUCOSE BLD-MCNC: 142 MG/DL (ref 60–100)
GLUCOSE BLD-MCNC: 164 MG/DL (ref 60–100)
GLUCOSE BLD-MCNC: 200 MG/DL (ref 60–100)
GLUCOSE BLD-MCNC: 200 MG/DL (ref 74–99)
GLUCOSE BLD-MCNC: 62 MG/DL (ref 60–100)
GLUCOSE BLD-MCNC: 68 MG/DL (ref 60–100)
GLUCOSE BLD-MCNC: 76 MG/DL (ref 60–100)
GLUCOSE BLD-MCNC: 77 MG/DL (ref 60–100)
GLUCOSE BLD-MCNC: 78 MG/DL (ref 60–100)
GLUCOSE BLD-MCNC: 84 MG/DL (ref 60–100)
GLUCOSE BLD-MCNC: 85 MG/DL (ref 60–100)
GLUCOSE BLD-MCNC: 88 MG/DL (ref 60–100)
GLUCOSE BLD-MCNC: 89 MG/DL (ref 60–100)
GLUCOSE BLD-MCNC: 89 MG/DL (ref 60–100)
GLUCOSE BLD-MCNC: 91 MG/DL (ref 60–100)
GLUCOSE BLD-MCNC: 95 MG/DL (ref 60–100)
GLUCOSE BLD-MCNC: 99 MG/DL (ref 60–100)
GLUCOSE BLDC GLUCOMTR-MCNC: 100 MG/DL (ref 70–130)
GLUCOSE BLDC GLUCOMTR-MCNC: 101 MG/DL (ref 70–130)
GLUCOSE BLDC GLUCOMTR-MCNC: 105 MG/DL (ref 70–130)
GLUCOSE BLDC GLUCOMTR-MCNC: 110 MG/DL (ref 70–130)
GLUCOSE BLDC GLUCOMTR-MCNC: 111 MG/DL (ref 70–130)
GLUCOSE BLDC GLUCOMTR-MCNC: 111 MG/DL (ref 70–130)
GLUCOSE BLDC GLUCOMTR-MCNC: 112 MG/DL (ref 70–130)
GLUCOSE BLDC GLUCOMTR-MCNC: 114 MG/DL (ref 70–130)
GLUCOSE BLDC GLUCOMTR-MCNC: 115 MG/DL (ref 70–130)
GLUCOSE BLDC GLUCOMTR-MCNC: 117 MG/DL (ref 70–130)
GLUCOSE BLDC GLUCOMTR-MCNC: 117 MG/DL (ref 70–130)
GLUCOSE BLDC GLUCOMTR-MCNC: 119 MG/DL (ref 70–130)
GLUCOSE BLDC GLUCOMTR-MCNC: 122 MG/DL (ref 70–130)
GLUCOSE BLDC GLUCOMTR-MCNC: 126 MG/DL (ref 70–130)
GLUCOSE BLDC GLUCOMTR-MCNC: 131 MG/DL (ref 70–130)
GLUCOSE BLDC GLUCOMTR-MCNC: 133 MG/DL (ref 70–130)
GLUCOSE BLDC GLUCOMTR-MCNC: 134 MG/DL (ref 70–130)
GLUCOSE BLDC GLUCOMTR-MCNC: 135 MG/DL (ref 70–130)
GLUCOSE BLDC GLUCOMTR-MCNC: 139 MG/DL (ref 70–130)
GLUCOSE BLDC GLUCOMTR-MCNC: 140 MG/DL (ref 70–130)
GLUCOSE BLDC GLUCOMTR-MCNC: 144 MG/DL (ref 70–130)
GLUCOSE BLDC GLUCOMTR-MCNC: 147 MG/DL (ref 70–130)
GLUCOSE BLDC GLUCOMTR-MCNC: 149 MG/DL (ref 70–130)
GLUCOSE BLDC GLUCOMTR-MCNC: 152 MG/DL (ref 70–130)
GLUCOSE BLDC GLUCOMTR-MCNC: 154 MG/DL (ref 70–130)
GLUCOSE BLDC GLUCOMTR-MCNC: 154 MG/DL (ref 70–130)
GLUCOSE BLDC GLUCOMTR-MCNC: 155 MG/DL (ref 70–130)
GLUCOSE BLDC GLUCOMTR-MCNC: 159 MG/DL (ref 70–130)
GLUCOSE BLDC GLUCOMTR-MCNC: 166 MG/DL (ref 70–130)
GLUCOSE BLDC GLUCOMTR-MCNC: 169 MG/DL (ref 70–130)
GLUCOSE BLDC GLUCOMTR-MCNC: 169 MG/DL (ref 70–130)
GLUCOSE BLDC GLUCOMTR-MCNC: 170 MG/DL (ref 70–130)
GLUCOSE BLDC GLUCOMTR-MCNC: 170 MG/DL (ref 70–130)
GLUCOSE BLDC GLUCOMTR-MCNC: 171 MG/DL (ref 70–130)
GLUCOSE BLDC GLUCOMTR-MCNC: 173 MG/DL (ref 70–130)
GLUCOSE BLDC GLUCOMTR-MCNC: 174 MG/DL (ref 70–130)
GLUCOSE BLDC GLUCOMTR-MCNC: 175 MG/DL (ref 70–130)
GLUCOSE BLDC GLUCOMTR-MCNC: 180 MG/DL (ref 70–130)
GLUCOSE BLDC GLUCOMTR-MCNC: 189 MG/DL (ref 70–130)
GLUCOSE BLDC GLUCOMTR-MCNC: 197 MG/DL (ref 70–130)
GLUCOSE BLDC GLUCOMTR-MCNC: 200 MG/DL (ref 70–130)
GLUCOSE BLDC GLUCOMTR-MCNC: 201 MG/DL (ref 70–130)
GLUCOSE BLDC GLUCOMTR-MCNC: 228 MG/DL (ref 70–130)
GLUCOSE BLDC GLUCOMTR-MCNC: 232 MG/DL (ref 70–130)
GLUCOSE BLDC GLUCOMTR-MCNC: 233 MG/DL (ref 70–130)
GLUCOSE BLDC GLUCOMTR-MCNC: 245 MG/DL (ref 70–130)
GLUCOSE BLDC GLUCOMTR-MCNC: 249 MG/DL (ref 70–130)
GLUCOSE BLDC GLUCOMTR-MCNC: 270 MG/DL (ref 70–130)
GLUCOSE BLDC GLUCOMTR-MCNC: 56 MG/DL (ref 70–130)
GLUCOSE BLDC GLUCOMTR-MCNC: 60 MG/DL (ref 70–130)
GLUCOSE BLDC GLUCOMTR-MCNC: 63 MG/DL (ref 70–130)
GLUCOSE BLDC GLUCOMTR-MCNC: 63 MG/DL (ref 70–130)
GLUCOSE BLDC GLUCOMTR-MCNC: 66 MG/DL (ref 70–130)
GLUCOSE BLDC GLUCOMTR-MCNC: 67 MG/DL (ref 70–130)
GLUCOSE BLDC GLUCOMTR-MCNC: 68 MG/DL (ref 70–130)
GLUCOSE BLDC GLUCOMTR-MCNC: 79 MG/DL (ref 70–130)
GLUCOSE BLDC GLUCOMTR-MCNC: 90 MG/DL (ref 70–130)
GLUCOSE BLDC GLUCOMTR-MCNC: 92 MG/DL (ref 70–130)
GLUCOSE BLDC GLUCOMTR-MCNC: 95 MG/DL (ref 70–130)
GLUCOSE BLDC GLUCOMTR-MCNC: 98 MG/DL (ref 70–130)
GLUCOSE UR STRIP-MCNC: NEGATIVE MG/DL
GLUCOSE UR STRIP-MCNC: NEGATIVE MG/DL
HADV DNA SPEC NAA+PROBE: NOT DETECTED
HAV IGM SERPL QL IA: NEGATIVE
HBA1C MFR BLD: 4.5 % (ref 4–5.6)
HBV CORE IGM SERPL QL IA: NEGATIVE
HBV SURFACE AG SERPL QL IA: NEGATIVE
HCOV 229E RNA SPEC QL NAA+PROBE: NOT DETECTED
HCOV HKU1 RNA SPEC QL NAA+PROBE: NOT DETECTED
HCOV NL63 RNA SPEC QL NAA+PROBE: NOT DETECTED
HCOV OC43 RNA SPEC QL NAA+PROBE: NOT DETECTED
HCT VFR BLD AUTO: 22.3 % (ref 35–45)
HCT VFR BLD AUTO: 22.5 % (ref 34–46.6)
HCT VFR BLD AUTO: 22.5 % (ref 35–45)
HCT VFR BLD AUTO: 22.8 % (ref 35–45)
HCT VFR BLD AUTO: 23.4 % (ref 35–45)
HCT VFR BLD AUTO: 23.7 % (ref 35–45)
HCT VFR BLD AUTO: 24.2 % (ref 35–45)
HCT VFR BLD AUTO: 24.5 % (ref 35–45)
HCT VFR BLD AUTO: 24.5 % (ref 35–45)
HCT VFR BLD AUTO: 24.6 % (ref 35–45)
HCT VFR BLD AUTO: 24.7 % (ref 35–45)
HCT VFR BLD AUTO: 24.8 % (ref 35–45)
HCT VFR BLD AUTO: 25.1 % (ref 35–45)
HCT VFR BLD AUTO: 25.3 % (ref 35–45)
HCT VFR BLD AUTO: 25.3 % (ref 35–45)
HCT VFR BLD AUTO: 25.5 % (ref 35–45)
HCT VFR BLD AUTO: 26.4 % (ref 35–45)
HCT VFR BLD AUTO: 26.4 % (ref 35–45)
HCT VFR BLD AUTO: 26.8 % (ref 35–45)
HCT VFR BLD AUTO: 27.5 % (ref 35–45)
HCT VFR BLD AUTO: 27.9 % (ref 34–46.6)
HCT VFR BLD AUTO: 28.2 % (ref 35–45)
HCT VFR BLD AUTO: 28.5 % (ref 34–46.6)
HCT VFR BLD AUTO: 29.5 % (ref 34–46.6)
HCT VFR BLD AUTO: 29.8 % (ref 34–46.6)
HCT VFR BLD AUTO: 30.5 % (ref 34–46.6)
HCV AB SER DONR QL: NEGATIVE
HEMOCCULT STL QL: NEGATIVE
HGB BLD-MCNC: 10 G/DL (ref 12–15.9)
HGB BLD-MCNC: 10.1 G/DL (ref 12–15.9)
HGB BLD-MCNC: 10.3 G/DL (ref 12–15.9)
HGB BLD-MCNC: 7.3 G/DL (ref 12–15.5)
HGB BLD-MCNC: 7.6 G/DL (ref 12–15.5)
HGB BLD-MCNC: 7.6 G/DL (ref 12–15.9)
HGB BLD-MCNC: 7.7 G/DL (ref 12–15.5)
HGB BLD-MCNC: 7.9 G/DL (ref 12–15.5)
HGB BLD-MCNC: 8 G/DL (ref 12–15.5)
HGB BLD-MCNC: 8 G/DL (ref 12–15.5)
HGB BLD-MCNC: 8.2 G/DL (ref 12–15.5)
HGB BLD-MCNC: 8.3 G/DL (ref 12–15.5)
HGB BLD-MCNC: 8.4 G/DL (ref 12–15.5)
HGB BLD-MCNC: 8.4 G/DL (ref 12–15.5)
HGB BLD-MCNC: 8.6 G/DL (ref 12–15.5)
HGB BLD-MCNC: 8.6 G/DL (ref 12–15.5)
HGB BLD-MCNC: 8.7 G/DL (ref 12–15.5)
HGB BLD-MCNC: 9 G/DL (ref 12–15.5)
HGB BLD-MCNC: 9.1 G/DL (ref 12–15.5)
HGB BLD-MCNC: 9.2 G/DL (ref 12–15.5)
HGB BLD-MCNC: 9.3 G/DL (ref 12–15.9)
HGB BLD-MCNC: 9.7 G/DL (ref 12–15.9)
HGB UR QL STRIP.AUTO: ABNORMAL
HGB UR QL STRIP.AUTO: ABNORMAL
HMPV RNA NPH QL NAA+NON-PROBE: NOT DETECTED
HOLD SPECIMEN: NORMAL
HPIV1 RNA SPEC QL NAA+PROBE: NOT DETECTED
HPIV2 RNA SPEC QL NAA+PROBE: NOT DETECTED
HPIV3 RNA NPH QL NAA+PROBE: NOT DETECTED
HPIV4 P GENE NPH QL NAA+PROBE: NOT DETECTED
HYALINE CASTS UR QL AUTO: ABNORMAL /LPF
HYALINE CASTS UR QL AUTO: ABNORMAL /LPF
HYPOCHROMIA BLD QL: ABNORMAL
IMM GRANULOCYTES # BLD AUTO: 0.01 10*3/MM3 (ref 0–0.02)
IMM GRANULOCYTES # BLD AUTO: 0.01 10*3/MM3 (ref 0–0.02)
IMM GRANULOCYTES # BLD AUTO: 0.02 10*3/MM3 (ref 0–0.02)
IMM GRANULOCYTES # BLD AUTO: 0.03 10*3/MM3 (ref 0–0.02)
IMM GRANULOCYTES # BLD AUTO: 0.03 10*3/MM3 (ref 0–0.05)
IMM GRANULOCYTES # BLD AUTO: 0.04 10*3/MM3 (ref 0–0.02)
IMM GRANULOCYTES # BLD AUTO: 0.05 10*3/MM3 (ref 0–0.02)
IMM GRANULOCYTES # BLD AUTO: 0.05 10*3/MM3 (ref 0–0.02)
IMM GRANULOCYTES # BLD AUTO: 0.06 10*3/MM3 (ref 0–0.02)
IMM GRANULOCYTES # BLD AUTO: 0.06 10*3/MM3 (ref 0–0.05)
IMM GRANULOCYTES # BLD AUTO: 0.06 10*3/MM3 (ref 0–0.05)
IMM GRANULOCYTES # BLD AUTO: 0.07 10*3/MM3 (ref 0–0.05)
IMM GRANULOCYTES # BLD AUTO: 0.08 10*3/MM3 (ref 0–0.02)
IMM GRANULOCYTES # BLD AUTO: 0.1 10*3/MM3 (ref 0–0.05)
IMM GRANULOCYTES # BLD AUTO: ABNORMAL 10*3/MM3 (ref 0–0.02)
IMM GRANULOCYTES NFR BLD AUTO: 0.2 % (ref 0–0.5)
IMM GRANULOCYTES NFR BLD AUTO: 0.3 % (ref 0–0.5)
IMM GRANULOCYTES NFR BLD AUTO: 0.4 % (ref 0–0.5)
IMM GRANULOCYTES NFR BLD AUTO: 0.5 % (ref 0–0.5)
IMM GRANULOCYTES NFR BLD AUTO: 0.6 % (ref 0–0.5)
IMM GRANULOCYTES NFR BLD AUTO: 0.7 % (ref 0–0.5)
IMM GRANULOCYTES NFR BLD AUTO: 0.8 % (ref 0–0.5)
IMM GRANULOCYTES NFR BLD AUTO: 0.9 % (ref 0–0.5)
IMM GRANULOCYTES NFR BLD AUTO: 1.1 % (ref 0–0.5)
IMM GRANULOCYTES NFR BLD AUTO: 1.2 % (ref 0–0.5)
IMM GRANULOCYTES NFR BLD AUTO: ABNORMAL % (ref 0–0.5)
INR PPP: 1.12 (ref 0.8–1.2)
IRON 24H UR-MRATE: 59 MCG/DL (ref 37–170)
IRON SATN MFR SERPL: 15 % (ref 15–50)
KETONES UR QL STRIP: NEGATIVE
KETONES UR QL STRIP: NEGATIVE
LEUKOCYTE ESTERASE UR QL STRIP.AUTO: ABNORMAL
LEUKOCYTE ESTERASE UR QL STRIP.AUTO: NEGATIVE
LV EF 2D ECHO EST: 57 %
LV EF 2D ECHO EST: 57 %
LYMPHOCYTES # BLD AUTO: 0.42 10*3/MM3 (ref 0.7–3.1)
LYMPHOCYTES # BLD AUTO: 0.43 10*3/MM3 (ref 0.6–4.2)
LYMPHOCYTES # BLD AUTO: 0.44 10*3/MM3 (ref 0.6–4.2)
LYMPHOCYTES # BLD AUTO: 0.46 10*3/MM3 (ref 0.6–4.2)
LYMPHOCYTES # BLD AUTO: 0.47 10*3/MM3 (ref 0.6–4.2)
LYMPHOCYTES # BLD AUTO: 0.49 10*3/MM3 (ref 0.6–4.2)
LYMPHOCYTES # BLD AUTO: 0.51 10*3/MM3 (ref 0.7–3.1)
LYMPHOCYTES # BLD AUTO: 0.53 10*3/MM3 (ref 0.7–3.1)
LYMPHOCYTES # BLD AUTO: 0.54 10*3/MM3 (ref 0.6–4.2)
LYMPHOCYTES # BLD AUTO: 0.54 10*3/MM3 (ref 0.7–3.1)
LYMPHOCYTES # BLD AUTO: 0.57 10*3/MM3 (ref 0.6–4.2)
LYMPHOCYTES # BLD AUTO: 0.57 10*3/MM3 (ref 0.6–4.2)
LYMPHOCYTES # BLD AUTO: 0.58 10*3/MM3 (ref 0.6–4.2)
LYMPHOCYTES # BLD AUTO: 0.59 10*3/MM3 (ref 0.6–4.2)
LYMPHOCYTES # BLD AUTO: 0.64 10*3/MM3 (ref 0.6–4.2)
LYMPHOCYTES # BLD AUTO: 0.65 10*3/MM3 (ref 0.6–4.2)
LYMPHOCYTES # BLD AUTO: 0.7 10*3/MM3 (ref 0.6–4.2)
LYMPHOCYTES # BLD AUTO: 0.7 10*3/MM3 (ref 0.6–4.2)
LYMPHOCYTES # BLD AUTO: 0.71 10*3/MM3 (ref 0.6–4.2)
LYMPHOCYTES # BLD AUTO: 0.71 10*3/MM3 (ref 0.6–4.2)
LYMPHOCYTES # BLD AUTO: 0.78 10*3/MM3 (ref 0.7–3.1)
LYMPHOCYTES # BLD AUTO: ABNORMAL 10*3/MM3 (ref 0.6–4.2)
LYMPHOCYTES # BLD AUTO: ABNORMAL 10*3/MM3 (ref 0.6–4.2)
LYMPHOCYTES # BLD MANUAL: 0.43 10*3/MM3 (ref 0.6–4.2)
LYMPHOCYTES # BLD MANUAL: 0.48 10*3/MM3 (ref 0.6–4.2)
LYMPHOCYTES # BLD MANUAL: 0.57 10*3/MM3 (ref 0.6–4.2)
LYMPHOCYTES NFR BLD AUTO: 10.3 % (ref 19.6–45.3)
LYMPHOCYTES NFR BLD AUTO: 10.6 % (ref 10–50)
LYMPHOCYTES NFR BLD AUTO: 10.7 % (ref 10–50)
LYMPHOCYTES NFR BLD AUTO: 10.8 % (ref 19.6–45.3)
LYMPHOCYTES NFR BLD AUTO: 11.3 % (ref 10–50)
LYMPHOCYTES NFR BLD AUTO: 11.5 % (ref 10–50)
LYMPHOCYTES NFR BLD AUTO: 11.6 % (ref 10–50)
LYMPHOCYTES NFR BLD AUTO: 12.4 % (ref 19.6–45.3)
LYMPHOCYTES NFR BLD AUTO: 12.6 % (ref 10–50)
LYMPHOCYTES NFR BLD AUTO: 12.9 % (ref 10–50)
LYMPHOCYTES NFR BLD AUTO: 13.7 % (ref 10–50)
LYMPHOCYTES NFR BLD AUTO: 14 % (ref 10–50)
LYMPHOCYTES NFR BLD AUTO: 16.1 % (ref 10–50)
LYMPHOCYTES NFR BLD AUTO: 19.4 % (ref 10–50)
LYMPHOCYTES NFR BLD AUTO: 20.3 % (ref 10–50)
LYMPHOCYTES NFR BLD AUTO: 4.6 % (ref 19.6–45.3)
LYMPHOCYTES NFR BLD AUTO: 6.5 % (ref 10–50)
LYMPHOCYTES NFR BLD AUTO: 6.7 % (ref 10–50)
LYMPHOCYTES NFR BLD AUTO: 7 % (ref 10–50)
LYMPHOCYTES NFR BLD AUTO: 7.2 % (ref 19.6–45.3)
LYMPHOCYTES NFR BLD AUTO: 7.8 % (ref 10–50)
LYMPHOCYTES NFR BLD AUTO: 8.5 % (ref 10–50)
LYMPHOCYTES NFR BLD AUTO: 9.5 % (ref 10–50)
LYMPHOCYTES NFR BLD AUTO: ABNORMAL % (ref 10–50)
LYMPHOCYTES NFR BLD AUTO: ABNORMAL % (ref 10–50)
LYMPHOCYTES NFR BLD MANUAL: 10 % (ref 10–50)
LYMPHOCYTES NFR BLD MANUAL: 10 % (ref 10–50)
LYMPHOCYTES NFR BLD MANUAL: 11 % (ref 10–50)
LYMPHOCYTES NFR BLD MANUAL: 4 % (ref 0–12)
LYMPHOCYTES NFR BLD MANUAL: 4 % (ref 0–12)
LYMPHOCYTES NFR BLD MANUAL: 5 % (ref 0–12)
Lab: NORMAL
M PNEUMO IGG SER IA-ACNC: NOT DETECTED
MAGNESIUM SERPL-MCNC: 1.6 MG/DL (ref 1.6–2.3)
MAGNESIUM SERPL-MCNC: 1.7 MG/DL (ref 1.6–2.3)
MAGNESIUM SERPL-MCNC: 1.9 MG/DL (ref 1.6–2.3)
MAGNESIUM SERPL-MCNC: 2 MG/DL (ref 1.6–2.3)
MAGNESIUM SERPL-MCNC: 2.2 MG/DL (ref 1.6–2.3)
MAXIMAL PREDICTED HEART RATE: 151 BPM
MAXIMAL PREDICTED HEART RATE: 151 BPM
MCH RBC QN AUTO: 31.4 PG (ref 26.5–34)
MCH RBC QN AUTO: 31.4 PG (ref 26.6–33)
MCH RBC QN AUTO: 31.5 PG (ref 26.5–34)
MCH RBC QN AUTO: 31.8 PG (ref 26.5–34)
MCH RBC QN AUTO: 31.8 PG (ref 26.6–33)
MCH RBC QN AUTO: 32 PG (ref 26.5–34)
MCH RBC QN AUTO: 32.1 PG (ref 26.5–34)
MCH RBC QN AUTO: 32.1 PG (ref 26.6–33)
MCH RBC QN AUTO: 32.2 PG (ref 26.5–34)
MCH RBC QN AUTO: 32.3 PG (ref 26.5–34)
MCH RBC QN AUTO: 32.3 PG (ref 26.5–34)
MCH RBC QN AUTO: 32.3 PG (ref 26.6–33)
MCH RBC QN AUTO: 32.4 PG (ref 26.5–34)
MCH RBC QN AUTO: 32.8 PG (ref 26.5–34)
MCH RBC QN AUTO: 32.9 PG (ref 26.5–34)
MCH RBC QN AUTO: 32.9 PG (ref 26.6–33)
MCH RBC QN AUTO: 33.5 PG (ref 26.5–34)
MCH RBC QN AUTO: 33.6 PG (ref 26.5–34)
MCHC RBC AUTO-ENTMCNC: 31.1 G/DL (ref 31.4–36)
MCHC RBC AUTO-ENTMCNC: 31.2 G/DL (ref 31.4–36)
MCHC RBC AUTO-ENTMCNC: 31.5 G/DL (ref 31.5–35.7)
MCHC RBC AUTO-ENTMCNC: 32.3 G/DL (ref 31.4–36)
MCHC RBC AUTO-ENTMCNC: 32.3 G/DL (ref 31.4–36)
MCHC RBC AUTO-ENTMCNC: 32.4 G/DL (ref 31.4–36)
MCHC RBC AUTO-ENTMCNC: 32.6 G/DL (ref 31.4–36)
MCHC RBC AUTO-ENTMCNC: 32.7 G/DL (ref 31.4–36)
MCHC RBC AUTO-ENTMCNC: 32.7 G/DL (ref 31.4–36)
MCHC RBC AUTO-ENTMCNC: 32.8 G/DL (ref 31.4–36)
MCHC RBC AUTO-ENTMCNC: 32.9 G/DL (ref 31.4–36)
MCHC RBC AUTO-ENTMCNC: 33.1 G/DL (ref 31.4–36)
MCHC RBC AUTO-ENTMCNC: 33.2 G/DL (ref 31.4–36)
MCHC RBC AUTO-ENTMCNC: 33.3 G/DL (ref 31.4–36)
MCHC RBC AUTO-ENTMCNC: 33.8 G/DL (ref 31.5–35.7)
MCHC RBC AUTO-ENTMCNC: 33.8 G/DL (ref 31.5–35.7)
MCHC RBC AUTO-ENTMCNC: 33.9 G/DL (ref 31.4–36)
MCHC RBC AUTO-ENTMCNC: 34.3 G/DL (ref 31.4–36)
MCHC RBC AUTO-ENTMCNC: 34.7 G/DL (ref 31.4–36)
MCHC RBC AUTO-ENTMCNC: 35.4 G/DL (ref 31.4–36)
MCHC RBC AUTO-ENTMCNC: 35.4 G/DL (ref 31.5–35.7)
MCHC RBC AUTO-ENTMCNC: 35.8 G/DL (ref 31.5–35.7)
MCHC RBC AUTO-ENTMCNC: 36.3 G/DL (ref 31.4–36)
MCHC RBC AUTO-ENTMCNC: 37.7 G/DL (ref 31.4–36)
MCV RBC AUTO: 100.4 FL (ref 80–98)
MCV RBC AUTO: 100.4 FL (ref 80–98)
MCV RBC AUTO: 103.5 FL (ref 80–98)
MCV RBC AUTO: 103.7 FL (ref 80–98)
MCV RBC AUTO: 89.2 FL (ref 80–98)
MCV RBC AUTO: 90.5 FL (ref 79–97)
MCV RBC AUTO: 91.4 FL (ref 80–98)
MCV RBC AUTO: 91.8 FL (ref 79–97)
MCV RBC AUTO: 92.2 FL (ref 80–98)
MCV RBC AUTO: 93.4 FL (ref 80–98)
MCV RBC AUTO: 94.1 FL (ref 79–97)
MCV RBC AUTO: 94.4 FL (ref 80–98)
MCV RBC AUTO: 94.6 FL (ref 80–98)
MCV RBC AUTO: 95.7 FL (ref 79–97)
MCV RBC AUTO: 95.7 FL (ref 80–98)
MCV RBC AUTO: 95.7 FL (ref 80–98)
MCV RBC AUTO: 96.4 FL (ref 80–98)
MCV RBC AUTO: 96.5 FL (ref 80–98)
MCV RBC AUTO: 97.2 FL (ref 80–98)
MCV RBC AUTO: 97.7 FL (ref 80–98)
MCV RBC AUTO: 98.3 FL (ref 80–98)
MCV RBC AUTO: 98.9 FL (ref 80–98)
MCV RBC AUTO: 99.1 FL (ref 80–98)
MCV RBC AUTO: 99.3 FL (ref 80–98)
MCV RBC AUTO: 99.6 FL (ref 80–98)
MCV RBC AUTO: 99.7 FL (ref 79–97)
MONOCYTES # BLD AUTO: 0.17 10*3/MM3 (ref 0–0.9)
MONOCYTES # BLD AUTO: 0.19 10*3/MM3 (ref 0–0.9)
MONOCYTES # BLD AUTO: 0.26 10*3/MM3 (ref 0–0.9)
MONOCYTES # BLD AUTO: 0.26 10*3/MM3 (ref 0–0.9)
MONOCYTES # BLD AUTO: 0.29 10*3/MM3 (ref 0–0.9)
MONOCYTES # BLD AUTO: 0.3 10*3/MM3 (ref 0–0.9)
MONOCYTES # BLD AUTO: 0.31 10*3/MM3 (ref 0–0.9)
MONOCYTES # BLD AUTO: 0.31 10*3/MM3 (ref 0–0.9)
MONOCYTES # BLD AUTO: 0.33 10*3/MM3 (ref 0–0.9)
MONOCYTES # BLD AUTO: 0.33 10*3/MM3 (ref 0–0.9)
MONOCYTES # BLD AUTO: 0.34 10*3/MM3 (ref 0–0.9)
MONOCYTES # BLD AUTO: 0.35 10*3/MM3 (ref 0–0.9)
MONOCYTES # BLD AUTO: 0.35 10*3/MM3 (ref 0–0.9)
MONOCYTES # BLD AUTO: 0.36 10*3/MM3 (ref 0.1–0.9)
MONOCYTES # BLD AUTO: 0.42 10*3/MM3 (ref 0–0.9)
MONOCYTES # BLD AUTO: 0.43 10*3/MM3 (ref 0–0.9)
MONOCYTES # BLD AUTO: 0.52 10*3/MM3 (ref 0–0.9)
MONOCYTES # BLD AUTO: 0.52 10*3/MM3 (ref 0–0.9)
MONOCYTES # BLD AUTO: 0.55 10*3/MM3 (ref 0–0.9)
MONOCYTES # BLD AUTO: 0.61 10*3/MM3 (ref 0–0.9)
MONOCYTES # BLD AUTO: 0.62 10*3/MM3 (ref 0.1–0.9)
MONOCYTES # BLD AUTO: 0.62 10*3/MM3 (ref 0–0.9)
MONOCYTES # BLD AUTO: 0.63 10*3/MM3 (ref 0.1–0.9)
MONOCYTES # BLD AUTO: 0.64 10*3/MM3 (ref 0–0.9)
MONOCYTES # BLD AUTO: 0.69 10*3/MM3 (ref 0.1–0.9)
MONOCYTES # BLD AUTO: 0.73 10*3/MM3 (ref 0.1–0.9)
MONOCYTES # BLD AUTO: ABNORMAL 10*3/MM3 (ref 0–0.9)
MONOCYTES # BLD AUTO: ABNORMAL 10*3/MM3 (ref 0–0.9)
MONOCYTES NFR BLD AUTO: 10 % (ref 5–12)
MONOCYTES NFR BLD AUTO: 12.8 % (ref 0–12)
MONOCYTES NFR BLD AUTO: 13.9 % (ref 5–12)
MONOCYTES NFR BLD AUTO: 5.1 % (ref 0–12)
MONOCYTES NFR BLD AUTO: 6 % (ref 0–12)
MONOCYTES NFR BLD AUTO: 6.8 % (ref 0–12)
MONOCYTES NFR BLD AUTO: 6.8 % (ref 5–12)
MONOCYTES NFR BLD AUTO: 7.2 % (ref 0–12)
MONOCYTES NFR BLD AUTO: 7.3 % (ref 0–12)
MONOCYTES NFR BLD AUTO: 7.3 % (ref 5–12)
MONOCYTES NFR BLD AUTO: 7.5 % (ref 0–12)
MONOCYTES NFR BLD AUTO: 7.6 % (ref 0–12)
MONOCYTES NFR BLD AUTO: 7.7 % (ref 0–12)
MONOCYTES NFR BLD AUTO: 7.8 % (ref 0–12)
MONOCYTES NFR BLD AUTO: 8 % (ref 0–12)
MONOCYTES NFR BLD AUTO: 8.5 % (ref 0–12)
MONOCYTES NFR BLD AUTO: 8.5 % (ref 0–12)
MONOCYTES NFR BLD AUTO: 8.7 % (ref 0–12)
MONOCYTES NFR BLD AUTO: 8.7 % (ref 0–12)
MONOCYTES NFR BLD AUTO: 9.3 % (ref 0–12)
MONOCYTES NFR BLD AUTO: 9.7 % (ref 0–12)
MONOCYTES NFR BLD AUTO: 9.8 % (ref 0–12)
MONOCYTES NFR BLD AUTO: 9.8 % (ref 5–12)
MONOCYTES NFR BLD AUTO: ABNORMAL % (ref 0–12)
MONOCYTES NFR BLD AUTO: ABNORMAL % (ref 0–12)
MUCOUS THREADS URNS QL MICRO: ABNORMAL /HPF
NEUTROPHILS # BLD AUTO: 2.27 10*3/MM3 (ref 2–8.6)
NEUTROPHILS # BLD AUTO: 2.54 10*3/MM3 (ref 2–8.6)
NEUTROPHILS # BLD AUTO: 3.02 10*3/MM3 (ref 2–8.6)
NEUTROPHILS # BLD AUTO: 3.09 10*3/MM3 (ref 2–8.6)
NEUTROPHILS # BLD AUTO: 3.12 10*3/MM3 (ref 2–8.6)
NEUTROPHILS # BLD AUTO: 3.15 10*3/MM3 (ref 2–8.6)
NEUTROPHILS # BLD AUTO: 3.16 10*3/MM3 (ref 2–8.6)
NEUTROPHILS # BLD AUTO: 3.31 10*3/MM3 (ref 2–8.6)
NEUTROPHILS # BLD AUTO: 3.58 10*3/MM3 (ref 2–8.6)
NEUTROPHILS # BLD AUTO: 3.63 10*3/MM3 (ref 2–8.6)
NEUTROPHILS # BLD AUTO: 3.69 10*3/MM3 (ref 1.4–7)
NEUTROPHILS # BLD AUTO: 3.79 10*3/MM3 (ref 2–8.6)
NEUTROPHILS # BLD AUTO: 3.91 10*3/MM3 (ref 1.4–7)
NEUTROPHILS # BLD AUTO: 4.05 10*3/MM3 (ref 2–8.6)
NEUTROPHILS # BLD AUTO: 4.13 10*3/MM3 (ref 2–8.6)
NEUTROPHILS # BLD AUTO: 4.25 10*3/MM3 (ref 2–8.6)
NEUTROPHILS # BLD AUTO: 4.26 10*3/MM3 (ref 2–8.6)
NEUTROPHILS # BLD AUTO: 4.61 10*3/MM3 (ref 2–8.6)
NEUTROPHILS # BLD AUTO: 4.63 10*3/MM3 (ref 2–8.6)
NEUTROPHILS # BLD AUTO: 4.74 10*3/MM3 (ref 1.4–7)
NEUTROPHILS # BLD AUTO: 4.93 10*3/MM3 (ref 2–8.6)
NEUTROPHILS # BLD AUTO: 5.4 10*3/MM3 (ref 2–8.6)
NEUTROPHILS # BLD AUTO: 5.69 10*3/MM3 (ref 1.4–7)
NEUTROPHILS # BLD AUTO: 5.9 10*3/MM3 (ref 2–8.6)
NEUTROPHILS # BLD AUTO: 6.24 10*3/MM3 (ref 2–8.6)
NEUTROPHILS # BLD AUTO: 7.96 10*3/MM3 (ref 1.4–7)
NEUTROPHILS # BLD AUTO: ABNORMAL 10*3/MM3 (ref 2–8.6)
NEUTROPHILS # BLD AUTO: ABNORMAL 10*3/MM3 (ref 2–8.6)
NEUTROPHILS NFR BLD AUTO: 65.7 % (ref 37–80)
NEUTROPHILS NFR BLD AUTO: 70 % (ref 37–80)
NEUTROPHILS NFR BLD AUTO: 70.3 % (ref 37–80)
NEUTROPHILS NFR BLD AUTO: 70.5 % (ref 42.7–76)
NEUTROPHILS NFR BLD AUTO: 71.6 % (ref 37–80)
NEUTROPHILS NFR BLD AUTO: 73.3 % (ref 37–80)
NEUTROPHILS NFR BLD AUTO: 74.7 % (ref 37–80)
NEUTROPHILS NFR BLD AUTO: 75.2 % (ref 42.7–76)
NEUTROPHILS NFR BLD AUTO: 76 % (ref 37–80)
NEUTROPHILS NFR BLD AUTO: 76.2 % (ref 37–80)
NEUTROPHILS NFR BLD AUTO: 77 % (ref 37–80)
NEUTROPHILS NFR BLD AUTO: 77.3 % (ref 37–80)
NEUTROPHILS NFR BLD AUTO: 77.9 % (ref 37–80)
NEUTROPHILS NFR BLD AUTO: 78.1 % (ref 37–80)
NEUTROPHILS NFR BLD AUTO: 78.9 % (ref 37–80)
NEUTROPHILS NFR BLD AUTO: 79.5 % (ref 42.7–76)
NEUTROPHILS NFR BLD AUTO: 80.3 % (ref 37–80)
NEUTROPHILS NFR BLD AUTO: 80.8 % (ref 37–80)
NEUTROPHILS NFR BLD AUTO: 80.8 % (ref 42.7–76)
NEUTROPHILS NFR BLD AUTO: 82.3 % (ref 37–80)
NEUTROPHILS NFR BLD AUTO: 84.2 % (ref 37–80)
NEUTROPHILS NFR BLD AUTO: 86.3 % (ref 37–80)
NEUTROPHILS NFR BLD AUTO: 86.9 % (ref 42.7–76)
NEUTROPHILS NFR BLD AUTO: ABNORMAL % (ref 37–80)
NEUTROPHILS NFR BLD AUTO: ABNORMAL % (ref 37–80)
NEUTROPHILS NFR BLD MANUAL: 79 % (ref 37–80)
NEUTROPHILS NFR BLD MANUAL: 80 % (ref 37–80)
NEUTROPHILS NFR BLD MANUAL: 81 % (ref 37–80)
NEUTS BAND NFR BLD MANUAL: 4 % (ref 0–5)
NEUTS BAND NFR BLD MANUAL: 5 % (ref 0–5)
NEUTS HYPERSEG # BLD: ABNORMAL 10*3/UL
NITRITE UR QL STRIP: NEGATIVE
NITRITE UR QL STRIP: NEGATIVE
NRBC BLD AUTO-RTO: 0 /100 WBC (ref 0–0)
NT-PROBNP SERPL-MCNC: 8060 PG/ML (ref 0–900)
NT-PROBNP SERPL-MCNC: ABNORMAL PG/ML (ref 0–900)
NT-PROBNP SERPL-MCNC: ABNORMAL PG/ML (ref 0–900)
OSMOLALITY UR: 270 MOSM/KG (ref 38–1400)
OSMOLALITY UR: 320 MOSM/KG (ref 38–1400)
OSMOLALITY UR: 325 MOSM/KG (ref 38–1400)
PH UR STRIP.AUTO: 6 [PH] (ref 5–9)
PH UR STRIP.AUTO: <=5 [PH] (ref 5–9)
PHOSPHATE SERPL-MCNC: 4.1 MG/DL (ref 2.4–4.4)
PLATELET # BLD AUTO: 105 10*3/MM3 (ref 150–450)
PLATELET # BLD AUTO: 108 10*3/MM3 (ref 150–450)
PLATELET # BLD AUTO: 109 10*3/MM3 (ref 150–450)
PLATELET # BLD AUTO: 111 10*3/MM3 (ref 150–450)
PLATELET # BLD AUTO: 116 10*3/MM3 (ref 150–450)
PLATELET # BLD AUTO: 117 10*3/MM3 (ref 150–450)
PLATELET # BLD AUTO: 118 10*3/MM3 (ref 150–450)
PLATELET # BLD AUTO: 127 10*3/MM3 (ref 150–450)
PLATELET # BLD AUTO: 130 10*3/MM3 (ref 150–450)
PLATELET # BLD AUTO: 130 10*3/MM3 (ref 150–450)
PLATELET # BLD AUTO: 137 10*3/MM3 (ref 150–450)
PLATELET # BLD AUTO: 146 10*3/MM3 (ref 140–450)
PLATELET # BLD AUTO: 149 10*3/MM3 (ref 150–450)
PLATELET # BLD AUTO: 149 10*3/MM3 (ref 150–450)
PLATELET # BLD AUTO: 157 10*3/MM3 (ref 140–450)
PLATELET # BLD AUTO: 161 10*3/MM3 (ref 150–450)
PLATELET # BLD AUTO: 162 10*3/MM3 (ref 140–450)
PLATELET # BLD AUTO: 166 10*3/MM3 (ref 140–450)
PLATELET # BLD AUTO: 170 10*3/MM3 (ref 140–450)
PLATELET # BLD AUTO: 175 10*3/MM3 (ref 150–450)
PLATELET # BLD AUTO: 201 10*3/MM3 (ref 150–450)
PLATELET # BLD AUTO: 210 10*3/MM3 (ref 150–450)
PLATELET # BLD AUTO: 96 10*3/MM3 (ref 150–450)
PMV BLD AUTO: 10 FL (ref 8–12)
PMV BLD AUTO: 10 FL (ref 8–12)
PMV BLD AUTO: 10.2 FL (ref 8–12)
PMV BLD AUTO: 10.3 FL (ref 6–12)
PMV BLD AUTO: 10.4 FL (ref 6–12)
PMV BLD AUTO: 10.7 FL (ref 6–12)
PMV BLD AUTO: 10.7 FL (ref 6–12)
PMV BLD AUTO: 8.8 FL (ref 8–12)
PMV BLD AUTO: 8.9 FL (ref 8–12)
PMV BLD AUTO: 9 FL (ref 8–12)
PMV BLD AUTO: 9.2 FL (ref 8–12)
PMV BLD AUTO: 9.4 FL (ref 8–12)
PMV BLD AUTO: 9.4 FL (ref 8–12)
PMV BLD AUTO: 9.5 FL (ref 8–12)
PMV BLD AUTO: 9.6 FL (ref 8–12)
PMV BLD AUTO: 9.6 FL (ref 8–12)
PMV BLD AUTO: 9.7 FL (ref 8–12)
PMV BLD AUTO: 9.7 FL (ref 8–12)
PMV BLD AUTO: 9.9 FL (ref 6–12)
PMV BLD AUTO: 9.9 FL (ref 8–12)
POLYCHROMASIA BLD QL SMEAR: ABNORMAL
POLYCHROMASIA BLD QL SMEAR: ABNORMAL
POTASSIUM BLD-SCNC: 2.7 MMOL/L (ref 3.5–5.1)
POTASSIUM BLD-SCNC: 2.8 MMOL/L (ref 3.5–5.1)
POTASSIUM BLD-SCNC: 2.9 MMOL/L (ref 3.5–5.1)
POTASSIUM BLD-SCNC: 3 MMOL/L (ref 3.5–5.1)
POTASSIUM BLD-SCNC: 3.2 MMOL/L (ref 3.5–5.1)
POTASSIUM BLD-SCNC: 3.2 MMOL/L (ref 3.5–5.1)
POTASSIUM BLD-SCNC: 3.3 MMOL/L (ref 3.5–5.1)
POTASSIUM BLD-SCNC: 3.5 MMOL/L (ref 3.5–5.1)
POTASSIUM BLD-SCNC: 3.5 MMOL/L (ref 3.5–5.1)
POTASSIUM BLD-SCNC: 3.6 MMOL/L (ref 3.5–5.1)
POTASSIUM BLD-SCNC: 3.7 MMOL/L (ref 3.5–5.1)
POTASSIUM BLD-SCNC: 3.8 MMOL/L (ref 3.5–5.1)
POTASSIUM BLD-SCNC: 3.9 MMOL/L (ref 3.5–5.1)
POTASSIUM BLD-SCNC: 4 MMOL/L (ref 3.5–5.1)
POTASSIUM BLD-SCNC: 4 MMOL/L (ref 3.5–5.1)
POTASSIUM BLD-SCNC: 4.1 MMOL/L (ref 3.5–5.1)
POTASSIUM BLD-SCNC: 4.1 MMOL/L (ref 3.5–5.1)
POTASSIUM BLD-SCNC: 4.2 MMOL/L (ref 3.5–5.1)
POTASSIUM BLD-SCNC: 4.3 MMOL/L (ref 3.4–5)
POTASSIUM BLD-SCNC: 4.3 MMOL/L (ref 3.5–5.1)
POTASSIUM BLD-SCNC: 4.3 MMOL/L (ref 3.5–5.1)
POTASSIUM BLD-SCNC: 4.4 MMOL/L (ref 3.5–5.1)
POTASSIUM BLD-SCNC: 4.6 MMOL/L (ref 3.5–5.1)
POTASSIUM BLD-SCNC: 5.5 MMOL/L (ref 3.4–5)
PROT SERPL-MCNC: 5.8 G/DL (ref 6.3–8.6)
PROT SERPL-MCNC: 6.3 G/DL (ref 6.3–8.6)
PROT SERPL-MCNC: 6.5 G/DL (ref 6.3–8.6)
PROT SERPL-MCNC: 6.8 G/DL (ref 6.3–8.6)
PROT SERPL-MCNC: 6.9 G/DL (ref 6.3–8.6)
PROT SERPL-MCNC: 7.1 G/DL (ref 6.3–8.6)
PROT SERPL-MCNC: 7.3 G/DL (ref 6.3–8.2)
PROT SERPL-MCNC: 7.4 G/DL (ref 6.3–8.6)
PROT UR QL STRIP: ABNORMAL
PROT UR QL STRIP: ABNORMAL
PROT UR-MCNC: 757.8 MG/DL
PROTHROMBIN TIME: 14.2 SECONDS (ref 11.1–15.3)
PTH-INTACT SERPL-MCNC: 92.8 PG/ML (ref 10–65)
RBC # BLD AUTO: 2.27 10*6/MM3 (ref 3.77–5.16)
RBC # BLD AUTO: 2.35 10*6/MM3 (ref 3.77–5.28)
RBC # BLD AUTO: 2.38 10*6/MM3 (ref 3.77–5.16)
RBC # BLD AUTO: 2.41 10*6/MM3 (ref 3.77–5.16)
RBC # BLD AUTO: 2.44 10*6/MM3 (ref 3.77–5.16)
RBC # BLD AUTO: 2.44 10*6/MM3 (ref 3.77–5.16)
RBC # BLD AUTO: 2.49 10*6/MM3 (ref 3.77–5.16)
RBC # BLD AUTO: 2.5 10*6/MM3 (ref 3.77–5.16)
RBC # BLD AUTO: 2.52 10*6/MM3 (ref 3.77–5.16)
RBC # BLD AUTO: 2.53 10*6/MM3 (ref 3.77–5.16)
RBC # BLD AUTO: 2.55 10*6/MM3 (ref 3.77–5.16)
RBC # BLD AUTO: 2.55 10*6/MM3 (ref 3.77–5.16)
RBC # BLD AUTO: 2.56 10*6/MM3 (ref 3.77–5.16)
RBC # BLD AUTO: 2.57 10*6/MM3 (ref 3.77–5.16)
RBC # BLD AUTO: 2.57 10*6/MM3 (ref 3.77–5.16)
RBC # BLD AUTO: 2.58 10*6/MM3 (ref 3.77–5.16)
RBC # BLD AUTO: 2.59 10*6/MM3 (ref 3.77–5.16)
RBC # BLD AUTO: 2.69 10*6/MM3 (ref 3.77–5.16)
RBC # BLD AUTO: 2.74 10*6/MM3 (ref 3.77–5.16)
RBC # BLD AUTO: 2.78 10*6/MM3 (ref 3.77–5.16)
RBC # BLD AUTO: 2.84 10*6/MM3 (ref 3.77–5.16)
RBC # BLD AUTO: 2.84 10*6/MM3 (ref 3.77–5.16)
RBC # BLD AUTO: 2.96 10*6/MM3 (ref 3.77–5.28)
RBC # BLD AUTO: 3.04 10*6/MM3 (ref 3.77–5.28)
RBC # BLD AUTO: 3.15 10*6/MM3 (ref 3.77–5.28)
RBC # BLD AUTO: 3.24 10*6/MM3 (ref 3.77–5.28)
RBC # UR: ABNORMAL /HPF
RBC # UR: ABNORMAL /HPF
REF LAB TEST METHOD: ABNORMAL
REF LAB TEST METHOD: ABNORMAL
RH BLD: POSITIVE
RHINOVIRUS RNA SPEC NAA+PROBE: NOT DETECTED
RSV RNA NPH QL NAA+NON-PROBE: NOT DETECTED
SMALL PLATELETS BLD QL SMEAR: ABNORMAL
SMALL PLATELETS BLD QL SMEAR: ABNORMAL
SMALL PLATELETS BLD QL SMEAR: ADEQUATE
SODIUM BLD-SCNC: 116 MMOL/L (ref 137–145)
SODIUM BLD-SCNC: 116 MMOL/L (ref 137–145)
SODIUM BLD-SCNC: 117 MMOL/L (ref 137–145)
SODIUM BLD-SCNC: 118 MMOL/L (ref 137–145)
SODIUM BLD-SCNC: 119 MMOL/L (ref 137–145)
SODIUM BLD-SCNC: 119 MMOL/L (ref 137–145)
SODIUM BLD-SCNC: 121 MMOL/L (ref 137–145)
SODIUM BLD-SCNC: 121 MMOL/L (ref 137–145)
SODIUM BLD-SCNC: 122 MMOL/L (ref 137–145)
SODIUM BLD-SCNC: 123 MMOL/L (ref 137–145)
SODIUM BLD-SCNC: 125 MMOL/L (ref 137–145)
SODIUM BLD-SCNC: 127 MMOL/L (ref 137–145)
SODIUM BLD-SCNC: 127 MMOL/L (ref 137–145)
SODIUM BLD-SCNC: 129 MMOL/L (ref 137–145)
SODIUM BLD-SCNC: 130 MMOL/L (ref 137–145)
SODIUM BLD-SCNC: 132 MMOL/L (ref 137–145)
SODIUM BLD-SCNC: 132 MMOL/L (ref 137–145)
SODIUM BLD-SCNC: 133 MMOL/L (ref 137–145)
SODIUM BLD-SCNC: 133 MMOL/L (ref 137–145)
SODIUM BLD-SCNC: 134 MMOL/L (ref 137–145)
SODIUM BLD-SCNC: 135 MMOL/L (ref 137–145)
SODIUM BLD-SCNC: 136 MMOL/L (ref 137–145)
SODIUM BLD-SCNC: 137 MMOL/L (ref 137–145)
SODIUM BLD-SCNC: 138 MMOL/L (ref 137–145)
SODIUM BLD-SCNC: 138 MMOL/L (ref 137–145)
SODIUM BLD-SCNC: 139 MMOL/L (ref 137–145)
SODIUM UR-SCNC: 27 MMOL/L (ref 30–90)
SODIUM UR-SCNC: <5 MMOL/L (ref 30–90)
SP GR UR STRIP: 1.01 (ref 1–1.03)
SP GR UR STRIP: 1.02 (ref 1–1.03)
SQUAMOUS #/AREA URNS HPF: ABNORMAL /HPF
SQUAMOUS #/AREA URNS HPF: ABNORMAL /HPF
STRESS TARGET HR: 128 BPM
STRESS TARGET HR: 128 BPM
T&S EXPIRATION DATE: NORMAL
TARGETS BLD QL SMEAR: ABNORMAL
TIBC SERPL-MCNC: 389 MCG/DL (ref 265–497)
TROPONIN I SERPL-MCNC: 0.02 NG/ML
TROPONIN I SERPL-MCNC: 0.03 NG/ML
TROPONIN I SERPL-MCNC: 0.07 NG/ML
TROPONIN I SERPL-MCNC: 0.12 NG/ML
TROPONIN I SERPL-MCNC: 0.31 NG/ML
TROPONIN I SERPL-MCNC: 0.52 NG/ML
TROPONIN I SERPL-MCNC: 1.19 NG/ML
TROPONIN I SERPL-MCNC: 1.69 NG/ML
TROPONIN I SERPL-MCNC: 2.2 NG/ML
TROPONIN I SERPL-MCNC: 2.23 NG/ML
TROPONIN I SERPL-MCNC: 3.07 NG/ML
TROPONIN I SERPL-MCNC: 3.26 NG/ML
TROPONIN I SERPL-MCNC: 4.12 NG/ML
TROPONIN I SERPL-MCNC: 6.56 NG/ML
TSH SERPL DL<=0.05 MIU/L-ACNC: 6.69 MIU/ML (ref 0.46–4.68)
UNIT  ABO: NORMAL
UNIT  RH: NORMAL
URATE SERPL-MCNC: 10.7 MG/DL (ref 2.5–8.5)
UROBILINOGEN UR QL STRIP: ABNORMAL
UROBILINOGEN UR QL STRIP: ABNORMAL
UUN 24H UR-MCNC: 380 MG/DL
VIT B12 BLD-MCNC: 460 PG/ML (ref 239–931)
WBC MORPH BLD: NORMAL
WBC MORPH BLD: NORMAL
WBC NRBC COR # BLD: 3.45 10*3/MM3 (ref 3.2–9.8)
WBC NRBC COR # BLD: 3.61 10*3/MM3 (ref 3.2–9.8)
WBC NRBC COR # BLD: 4.01 10*3/MM3 (ref 3.2–9.8)
WBC NRBC COR # BLD: 4.09 10*3/MM3 (ref 3.2–9.8)
WBC NRBC COR # BLD: 4.14 10*3/MM3 (ref 3.2–9.8)
WBC NRBC COR # BLD: 4.26 10*3/MM3 (ref 3.2–9.8)
WBC NRBC COR # BLD: 4.28 10*3/MM3 (ref 3.2–9.8)
WBC NRBC COR # BLD: 4.31 10*3/MM3 (ref 3.2–9.8)
WBC NRBC COR # BLD: 4.41 10*3/MM3 (ref 3.2–9.8)
WBC NRBC COR # BLD: 4.52 10*3/MM3 (ref 3.2–9.8)
WBC NRBC COR # BLD: 4.77 10*3/MM3 (ref 3.2–9.8)
WBC NRBC COR # BLD: 4.92 10*3/MM3 (ref 3.4–10.8)
WBC NRBC COR # BLD: 4.97 10*3/MM3 (ref 3.2–9.8)
WBC NRBC COR # BLD: 5.16 10*3/MM3 (ref 3.2–9.8)
WBC NRBC COR # BLD: 5.24 10*3/MM3 (ref 3.4–10.8)
WBC NRBC COR # BLD: 5.38 10*3/MM3 (ref 3.2–9.8)
WBC NRBC COR # BLD: 5.5 10*3/MM3 (ref 3.2–9.8)
WBC NRBC COR # BLD: 5.6 10*3/MM3 (ref 3.2–9.8)
WBC NRBC COR # BLD: 6.29 10*3/MM3 (ref 3.2–9.8)
WBC NRBC COR # BLD: 6.3 10*3/MM3 (ref 3.4–10.8)
WBC NRBC COR # BLD: 6.31 10*3/MM3 (ref 3.2–9.8)
WBC NRBC COR # BLD: 6.69 10*3/MM3 (ref 3.2–9.8)
WBC NRBC COR # BLD: 6.84 10*3/MM3 (ref 3.2–9.8)
WBC NRBC COR # BLD: 7.04 10*3/MM3 (ref 3.4–10.8)
WBC NRBC COR # BLD: 7.57 10*3/MM3 (ref 3.2–9.8)
WBC NRBC COR # BLD: 9.16 10*3/MM3 (ref 3.4–10.8)
WBC UR QL AUTO: ABNORMAL /HPF
WBC UR QL AUTO: ABNORMAL /HPF
WHOLE BLOOD HOLD SPECIMEN: NORMAL

## 2019-01-01 PROCEDURE — 82962 GLUCOSE BLOOD TEST: CPT

## 2019-01-01 PROCEDURE — 80053 COMPREHEN METABOLIC PANEL: CPT | Performed by: INTERNAL MEDICINE

## 2019-01-01 PROCEDURE — A9540 TC99M MAA: HCPCS | Performed by: FAMILY MEDICINE

## 2019-01-01 PROCEDURE — 93010 ELECTROCARDIOGRAM REPORT: CPT | Performed by: INTERNAL MEDICINE

## 2019-01-01 PROCEDURE — 83970 ASSAY OF PARATHORMONE: CPT | Performed by: INTERNAL MEDICINE

## 2019-01-01 PROCEDURE — P9016 RBC LEUKOCYTES REDUCED: HCPCS

## 2019-01-01 PROCEDURE — 94760 N-INVAS EAR/PLS OXIMETRY 1: CPT

## 2019-01-01 PROCEDURE — 94799 UNLISTED PULMONARY SVC/PX: CPT

## 2019-01-01 PROCEDURE — 94727 GAS DIL/WSHOT DETER LNG VOL: CPT | Performed by: INTERNAL MEDICINE

## 2019-01-01 PROCEDURE — 99232 SBSQ HOSP IP/OBS MODERATE 35: CPT | Performed by: INTERNAL MEDICINE

## 2019-01-01 PROCEDURE — 82746 ASSAY OF FOLIC ACID SERUM: CPT | Performed by: INTERNAL MEDICINE

## 2019-01-01 PROCEDURE — 97530 THERAPEUTIC ACTIVITIES: CPT

## 2019-01-01 PROCEDURE — 25010000002 ENOXAPARIN PER 10 MG: Performed by: INTERNAL MEDICINE

## 2019-01-01 PROCEDURE — 86923 COMPATIBILITY TEST ELECTRIC: CPT

## 2019-01-01 PROCEDURE — 84484 ASSAY OF TROPONIN QUANT: CPT | Performed by: HOSPITALIST

## 2019-01-01 PROCEDURE — 83540 ASSAY OF IRON: CPT | Performed by: INTERNAL MEDICINE

## 2019-01-01 PROCEDURE — 97116 GAIT TRAINING THERAPY: CPT

## 2019-01-01 PROCEDURE — 97110 THERAPEUTIC EXERCISES: CPT

## 2019-01-01 PROCEDURE — 99213 OFFICE O/P EST LOW 20 MIN: CPT | Performed by: INTERNAL MEDICINE

## 2019-01-01 PROCEDURE — 85018 HEMOGLOBIN: CPT | Performed by: INTERNAL MEDICINE

## 2019-01-01 PROCEDURE — 85025 COMPLETE CBC W/AUTO DIFF WBC: CPT | Performed by: HOSPITALIST

## 2019-01-01 PROCEDURE — 93005 ELECTROCARDIOGRAM TRACING: CPT | Performed by: HOSPITALIST

## 2019-01-01 PROCEDURE — 83735 ASSAY OF MAGNESIUM: CPT | Performed by: NURSE PRACTITIONER

## 2019-01-01 PROCEDURE — 97535 SELF CARE MNGMENT TRAINING: CPT

## 2019-01-01 PROCEDURE — 36415 COLL VENOUS BLD VENIPUNCTURE: CPT | Performed by: FAMILY MEDICINE

## 2019-01-01 PROCEDURE — 84295 ASSAY OF SERUM SODIUM: CPT | Performed by: INTERNAL MEDICINE

## 2019-01-01 PROCEDURE — 94010 BREATHING CAPACITY TEST: CPT

## 2019-01-01 PROCEDURE — 83880 ASSAY OF NATRIURETIC PEPTIDE: CPT | Performed by: FAMILY MEDICINE

## 2019-01-01 PROCEDURE — 85014 HEMATOCRIT: CPT | Performed by: INTERNAL MEDICINE

## 2019-01-01 PROCEDURE — 84132 ASSAY OF SERUM POTASSIUM: CPT | Performed by: INTERNAL MEDICINE

## 2019-01-01 PROCEDURE — 25010000002 FUROSEMIDE PER 20 MG: Performed by: EMERGENCY MEDICINE

## 2019-01-01 PROCEDURE — 85379 FIBRIN DEGRADATION QUANT: CPT | Performed by: FAMILY MEDICINE

## 2019-01-01 PROCEDURE — 84132 ASSAY OF SERUM POTASSIUM: CPT | Performed by: NURSE PRACTITIONER

## 2019-01-01 PROCEDURE — 80053 COMPREHEN METABOLIC PANEL: CPT | Performed by: FAMILY MEDICINE

## 2019-01-01 PROCEDURE — 99233 SBSQ HOSP IP/OBS HIGH 50: CPT | Performed by: NURSE PRACTITIONER

## 2019-01-01 PROCEDURE — 80048 BASIC METABOLIC PNL TOTAL CA: CPT | Performed by: FAMILY MEDICINE

## 2019-01-01 PROCEDURE — 83735 ASSAY OF MAGNESIUM: CPT | Performed by: FAMILY MEDICINE

## 2019-01-01 PROCEDURE — 25010000002 METOCLOPRAMIDE PER 10 MG: Performed by: INTERNAL MEDICINE

## 2019-01-01 PROCEDURE — 86850 RBC ANTIBODY SCREEN: CPT | Performed by: INTERNAL MEDICINE

## 2019-01-01 PROCEDURE — 85025 COMPLETE CBC W/AUTO DIFF WBC: CPT | Performed by: FAMILY MEDICINE

## 2019-01-01 PROCEDURE — G0378 HOSPITAL OBSERVATION PER HR: HCPCS

## 2019-01-01 PROCEDURE — 84300 ASSAY OF URINE SODIUM: CPT | Performed by: INTERNAL MEDICINE

## 2019-01-01 PROCEDURE — 99223 1ST HOSP IP/OBS HIGH 75: CPT | Performed by: INTERNAL MEDICINE

## 2019-01-01 PROCEDURE — 25010000002 CEFTRIAXONE PER 250 MG: Performed by: NURSE PRACTITIONER

## 2019-01-01 PROCEDURE — 85025 COMPLETE CBC W/AUTO DIFF WBC: CPT | Performed by: INTERNAL MEDICINE

## 2019-01-01 PROCEDURE — 25010000002 PROMETHAZINE PER 50 MG: Performed by: INTERNAL MEDICINE

## 2019-01-01 PROCEDURE — 82607 VITAMIN B-12: CPT | Performed by: INTERNAL MEDICINE

## 2019-01-01 PROCEDURE — 93005 ELECTROCARDIOGRAM TRACING: CPT | Performed by: INTERNAL MEDICINE

## 2019-01-01 PROCEDURE — 25010000002 HEPARIN (PORCINE) PER 1000 UNITS: Performed by: INTERNAL MEDICINE

## 2019-01-01 PROCEDURE — 71045 X-RAY EXAM CHEST 1 VIEW: CPT

## 2019-01-01 PROCEDURE — 85025 COMPLETE CBC W/AUTO DIFF WBC: CPT | Performed by: NURSE PRACTITIONER

## 2019-01-01 PROCEDURE — 93306 TTE W/DOPPLER COMPLETE: CPT | Performed by: INTERNAL MEDICINE

## 2019-01-01 PROCEDURE — 83880 ASSAY OF NATRIURETIC PEPTIDE: CPT | Performed by: NURSE PRACTITIONER

## 2019-01-01 PROCEDURE — 25010000002 MORPHINE PER 10 MG: Performed by: INTERNAL MEDICINE

## 2019-01-01 PROCEDURE — 84484 ASSAY OF TROPONIN QUANT: CPT | Performed by: INTERNAL MEDICINE

## 2019-01-01 PROCEDURE — 84484 ASSAY OF TROPONIN QUANT: CPT | Performed by: FAMILY MEDICINE

## 2019-01-01 PROCEDURE — 25010000002 HEPARIN (PORCINE) PER 1000 UNITS: Performed by: FAMILY MEDICINE

## 2019-01-01 PROCEDURE — 93306 TTE W/DOPPLER COMPLETE: CPT

## 2019-01-01 PROCEDURE — 25010000002 FUROSEMIDE PER 20 MG: Performed by: NURSE PRACTITIONER

## 2019-01-01 PROCEDURE — 83935 ASSAY OF URINE OSMOLALITY: CPT | Performed by: INTERNAL MEDICINE

## 2019-01-01 PROCEDURE — 93970 EXTREMITY STUDY: CPT

## 2019-01-01 PROCEDURE — 84484 ASSAY OF TROPONIN QUANT: CPT | Performed by: NURSE PRACTITIONER

## 2019-01-01 PROCEDURE — 96376 TX/PRO/DX INJ SAME DRUG ADON: CPT

## 2019-01-01 PROCEDURE — 0 TECHNETIUM ALBUMIN AGGREGATED: Performed by: FAMILY MEDICINE

## 2019-01-01 PROCEDURE — 96366 THER/PROPH/DIAG IV INF ADDON: CPT

## 2019-01-01 PROCEDURE — 36415 COLL VENOUS BLD VENIPUNCTURE: CPT | Performed by: NURSE PRACTITIONER

## 2019-01-01 PROCEDURE — 85007 BL SMEAR W/DIFF WBC COUNT: CPT | Performed by: HOSPITALIST

## 2019-01-01 PROCEDURE — 36415 COLL VENOUS BLD VENIPUNCTURE: CPT | Performed by: INTERNAL MEDICINE

## 2019-01-01 PROCEDURE — 93005 ELECTROCARDIOGRAM TRACING: CPT | Performed by: FAMILY MEDICINE

## 2019-01-01 PROCEDURE — 83550 IRON BINDING TEST: CPT | Performed by: INTERNAL MEDICINE

## 2019-01-01 PROCEDURE — 83735 ASSAY OF MAGNESIUM: CPT | Performed by: INTERNAL MEDICINE

## 2019-01-01 PROCEDURE — 99225 PR SBSQ OBSERVATION CARE/DAY 25 MINUTES: CPT | Performed by: INTERNAL MEDICINE

## 2019-01-01 PROCEDURE — 94727 GAS DIL/WSHOT DETER LNG VOL: CPT

## 2019-01-01 PROCEDURE — 85730 THROMBOPLASTIN TIME PARTIAL: CPT | Performed by: INTERNAL MEDICINE

## 2019-01-01 PROCEDURE — 85025 COMPLETE CBC W/AUTO DIFF WBC: CPT | Performed by: EMERGENCY MEDICINE

## 2019-01-01 PROCEDURE — 93005 ELECTROCARDIOGRAM TRACING: CPT | Performed by: NURSE PRACTITIONER

## 2019-01-01 PROCEDURE — 99285 EMERGENCY DEPT VISIT HI MDM: CPT

## 2019-01-01 PROCEDURE — 87486 CHLMYD PNEUM DNA AMP PROBE: CPT | Performed by: INTERNAL MEDICINE

## 2019-01-01 PROCEDURE — 97162 PT EVAL MOD COMPLEX 30 MIN: CPT | Performed by: PHYSICAL THERAPIST

## 2019-01-01 PROCEDURE — 86900 BLOOD TYPING SEROLOGIC ABO: CPT | Performed by: INTERNAL MEDICINE

## 2019-01-01 PROCEDURE — 25010000002 POTASSIUM PER 2 MEQ: Performed by: INTERNAL MEDICINE

## 2019-01-01 PROCEDURE — 96365 THER/PROPH/DIAG IV INF INIT: CPT

## 2019-01-01 PROCEDURE — 83036 HEMOGLOBIN GLYCOSYLATED A1C: CPT | Performed by: INTERNAL MEDICINE

## 2019-01-01 PROCEDURE — 94640 AIRWAY INHALATION TREATMENT: CPT

## 2019-01-01 PROCEDURE — 85007 BL SMEAR W/DIFF WBC COUNT: CPT | Performed by: FAMILY MEDICINE

## 2019-01-01 PROCEDURE — 84295 ASSAY OF SERUM SODIUM: CPT | Performed by: NURSE PRACTITIONER

## 2019-01-01 PROCEDURE — 84443 ASSAY THYROID STIM HORMONE: CPT | Performed by: INTERNAL MEDICINE

## 2019-01-01 PROCEDURE — 80048 BASIC METABOLIC PNL TOTAL CA: CPT | Performed by: HOSPITALIST

## 2019-01-01 PROCEDURE — 25010000003 POTASSIUM CHLORIDE PER 2 MEQ: Performed by: INTERNAL MEDICINE

## 2019-01-01 PROCEDURE — 80069 RENAL FUNCTION PANEL: CPT | Performed by: INTERNAL MEDICINE

## 2019-01-01 PROCEDURE — 87798 DETECT AGENT NOS DNA AMP: CPT | Performed by: INTERNAL MEDICINE

## 2019-01-01 PROCEDURE — 85007 BL SMEAR W/DIFF WBC COUNT: CPT | Performed by: INTERNAL MEDICINE

## 2019-01-01 PROCEDURE — 97162 PT EVAL MOD COMPLEX 30 MIN: CPT

## 2019-01-01 PROCEDURE — 80048 BASIC METABOLIC PNL TOTAL CA: CPT | Performed by: NURSE PRACTITIONER

## 2019-01-01 PROCEDURE — 96368 THER/DIAG CONCURRENT INF: CPT

## 2019-01-01 PROCEDURE — 25010000002 HYDRALAZINE PER 20 MG: Performed by: EMERGENCY MEDICINE

## 2019-01-01 PROCEDURE — 84550 ASSAY OF BLOOD/URIC ACID: CPT | Performed by: NURSE PRACTITIONER

## 2019-01-01 PROCEDURE — 96375 TX/PRO/DX INJ NEW DRUG ADDON: CPT

## 2019-01-01 PROCEDURE — 82533 TOTAL CORTISOL: CPT | Performed by: INTERNAL MEDICINE

## 2019-01-01 PROCEDURE — 80048 BASIC METABOLIC PNL TOTAL CA: CPT | Performed by: INTERNAL MEDICINE

## 2019-01-01 PROCEDURE — 25010000002 HEPARIN (PORCINE) PER 1000 UNITS: Performed by: HOSPITALIST

## 2019-01-01 PROCEDURE — 83935 ASSAY OF URINE OSMOLALITY: CPT | Performed by: NURSE PRACTITIONER

## 2019-01-01 PROCEDURE — 83880 ASSAY OF NATRIURETIC PEPTIDE: CPT | Performed by: EMERGENCY MEDICINE

## 2019-01-01 PROCEDURE — 80053 COMPREHEN METABOLIC PANEL: CPT | Performed by: NURSE PRACTITIONER

## 2019-01-01 PROCEDURE — 85610 PROTHROMBIN TIME: CPT | Performed by: INTERNAL MEDICINE

## 2019-01-01 PROCEDURE — 99233 SBSQ HOSP IP/OBS HIGH 50: CPT | Performed by: INTERNAL MEDICINE

## 2019-01-01 PROCEDURE — 80074 ACUTE HEPATITIS PANEL: CPT | Performed by: FAMILY MEDICINE

## 2019-01-01 PROCEDURE — 84484 ASSAY OF TROPONIN QUANT: CPT | Performed by: EMERGENCY MEDICINE

## 2019-01-01 PROCEDURE — 81001 URINALYSIS AUTO W/SCOPE: CPT | Performed by: FAMILY MEDICINE

## 2019-01-01 PROCEDURE — 97166 OT EVAL MOD COMPLEX 45 MIN: CPT

## 2019-01-01 PROCEDURE — 94729 DIFFUSING CAPACITY: CPT | Performed by: INTERNAL MEDICINE

## 2019-01-01 PROCEDURE — 84300 ASSAY OF URINE SODIUM: CPT | Performed by: NURSE PRACTITIONER

## 2019-01-01 PROCEDURE — 99232 SBSQ HOSP IP/OBS MODERATE 35: CPT | Performed by: NURSE PRACTITIONER

## 2019-01-01 PROCEDURE — 84156 ASSAY OF PROTEIN URINE: CPT | Performed by: INTERNAL MEDICINE

## 2019-01-01 PROCEDURE — 86901 BLOOD TYPING SEROLOGIC RH(D): CPT | Performed by: INTERNAL MEDICINE

## 2019-01-01 PROCEDURE — 99284 EMERGENCY DEPT VISIT MOD MDM: CPT

## 2019-01-01 PROCEDURE — 82272 OCCULT BLD FECES 1-3 TESTS: CPT | Performed by: NURSE PRACTITIONER

## 2019-01-01 PROCEDURE — 93005 ELECTROCARDIOGRAM TRACING: CPT | Performed by: EMERGENCY MEDICINE

## 2019-01-01 PROCEDURE — 87581 M.PNEUMON DNA AMP PROBE: CPT | Performed by: INTERNAL MEDICINE

## 2019-01-01 PROCEDURE — 25010000002 CEFTRIAXONE PER 250 MG: Performed by: FAMILY MEDICINE

## 2019-01-01 PROCEDURE — 81001 URINALYSIS AUTO W/SCOPE: CPT | Performed by: NURSE PRACTITIONER

## 2019-01-01 PROCEDURE — 36430 TRANSFUSION BLD/BLD COMPNT: CPT

## 2019-01-01 PROCEDURE — 86900 BLOOD TYPING SEROLOGIC ABO: CPT

## 2019-01-01 PROCEDURE — 82570 ASSAY OF URINE CREATININE: CPT | Performed by: INTERNAL MEDICINE

## 2019-01-01 PROCEDURE — 25010000003 POTASSIUM CHLORIDE 10 MEQ/100ML SOLUTION: Performed by: FAMILY MEDICINE

## 2019-01-01 PROCEDURE — 87631 RESP VIRUS 3-5 TARGETS: CPT | Performed by: INTERNAL MEDICINE

## 2019-01-01 PROCEDURE — 76705 ECHO EXAM OF ABDOMEN: CPT

## 2019-01-01 PROCEDURE — 84540 ASSAY OF URINE/UREA-N: CPT | Performed by: INTERNAL MEDICINE

## 2019-01-01 PROCEDURE — 82436 ASSAY OF URINE CHLORIDE: CPT | Performed by: INTERNAL MEDICINE

## 2019-01-01 PROCEDURE — 63510000001 EPOETIN ALFA PER 1000 UNITS: Performed by: INTERNAL MEDICINE

## 2019-01-01 PROCEDURE — 94010 BREATHING CAPACITY TEST: CPT | Performed by: INTERNAL MEDICINE

## 2019-01-01 PROCEDURE — 82306 VITAMIN D 25 HYDROXY: CPT | Performed by: INTERNAL MEDICINE

## 2019-01-01 PROCEDURE — 78580 LUNG PERFUSION IMAGING: CPT

## 2019-01-01 PROCEDURE — 74176 CT ABD & PELVIS W/O CONTRAST: CPT

## 2019-01-01 PROCEDURE — 94729 DIFFUSING CAPACITY: CPT

## 2019-01-01 RX ORDER — SODIUM CHLORIDE 0.9 % (FLUSH) 0.9 %
10 SYRINGE (ML) INJECTION AS NEEDED
Status: DISCONTINUED | OUTPATIENT
Start: 2019-01-01 | End: 2019-01-01 | Stop reason: HOSPADM

## 2019-01-01 RX ORDER — RANOLAZINE 500 MG/1
500 TABLET, EXTENDED RELEASE ORAL EVERY 12 HOURS SCHEDULED
Status: DISCONTINUED | OUTPATIENT
Start: 2019-01-01 | End: 2019-01-01 | Stop reason: HOSPADM

## 2019-01-01 RX ORDER — BUMETANIDE 1 MG/1
2 TABLET ORAL DAILY
Status: DISCONTINUED | OUTPATIENT
Start: 2019-01-01 | End: 2019-01-01

## 2019-01-01 RX ORDER — HEPARIN SODIUM 10000 [USP'U]/100ML
12 INJECTION, SOLUTION INTRAVENOUS
Status: DISCONTINUED | OUTPATIENT
Start: 2019-01-01 | End: 2019-01-01

## 2019-01-01 RX ORDER — HEPARIN SODIUM 5000 [USP'U]/ML
5000 INJECTION, SOLUTION INTRAVENOUS; SUBCUTANEOUS EVERY 12 HOURS SCHEDULED
Status: DISCONTINUED | OUTPATIENT
Start: 2019-01-01 | End: 2019-01-01 | Stop reason: HOSPADM

## 2019-01-01 RX ORDER — DEXTROSE MONOHYDRATE 25 G/50ML
INJECTION, SOLUTION INTRAVENOUS
Status: COMPLETED
Start: 2019-01-01 | End: 2019-01-01

## 2019-01-01 RX ORDER — CLOPIDOGREL BISULFATE 75 MG/1
75 TABLET ORAL DAILY
Status: DISCONTINUED | OUTPATIENT
Start: 2019-01-01 | End: 2019-01-01 | Stop reason: HOSPADM

## 2019-01-01 RX ORDER — PROMETHAZINE HYDROCHLORIDE 25 MG/ML
12.5 INJECTION, SOLUTION INTRAMUSCULAR; INTRAVENOUS EVERY 6 HOURS PRN
Status: DISCONTINUED | OUTPATIENT
Start: 2019-01-01 | End: 2019-01-01

## 2019-01-01 RX ORDER — SPIRONOLACTONE 25 MG/1
25 TABLET ORAL DAILY
Qty: 30 TABLET | Refills: 1 | Status: SHIPPED | OUTPATIENT
Start: 2019-01-01 | End: 2019-01-01 | Stop reason: HOSPADM

## 2019-01-01 RX ORDER — POTASSIUM CHLORIDE 1.5 G/1.77G
40 POWDER, FOR SOLUTION ORAL AS NEEDED
Status: DISCONTINUED | OUTPATIENT
Start: 2019-01-01 | End: 2019-01-01 | Stop reason: HOSPADM

## 2019-01-01 RX ORDER — HYDROCODONE BITARTRATE AND ACETAMINOPHEN 10; 325 MG/1; MG/1
1 TABLET ORAL EVERY 8 HOURS PRN
Status: DISCONTINUED | OUTPATIENT
Start: 2019-01-01 | End: 2019-01-01

## 2019-01-01 RX ORDER — OMEPRAZOLE 40 MG/1
40 CAPSULE, DELAYED RELEASE ORAL DAILY
COMMUNITY
Start: 2018-01-01 | End: 2019-01-01 | Stop reason: HOSPADM

## 2019-01-01 RX ORDER — FUROSEMIDE 10 MG/ML
40 INJECTION INTRAMUSCULAR; INTRAVENOUS ONCE
Status: COMPLETED | OUTPATIENT
Start: 2019-01-01 | End: 2019-01-01

## 2019-01-01 RX ORDER — SODIUM CHLORIDE 0.9 % (FLUSH) 0.9 %
3 SYRINGE (ML) INJECTION EVERY 12 HOURS SCHEDULED
Status: DISCONTINUED | OUTPATIENT
Start: 2019-01-01 | End: 2019-01-01

## 2019-01-01 RX ORDER — FAMOTIDINE 10 MG/ML
20 INJECTION, SOLUTION INTRAVENOUS DAILY
Status: DISCONTINUED | OUTPATIENT
Start: 2019-01-01 | End: 2019-01-01

## 2019-01-01 RX ORDER — HEPARIN SODIUM 5000 [USP'U]/ML
40 INJECTION, SOLUTION INTRAVENOUS; SUBCUTANEOUS AS NEEDED
Status: DISCONTINUED | OUTPATIENT
Start: 2019-01-01 | End: 2019-01-01

## 2019-01-01 RX ORDER — AMLODIPINE BESYLATE 10 MG/1
10 TABLET ORAL DAILY
Status: DISCONTINUED | OUTPATIENT
Start: 2019-01-01 | End: 2019-01-01 | Stop reason: HOSPADM

## 2019-01-01 RX ORDER — HEPARIN SODIUM 5000 [USP'U]/ML
5000 INJECTION, SOLUTION INTRAVENOUS; SUBCUTANEOUS EVERY 12 HOURS SCHEDULED
Status: DISCONTINUED | OUTPATIENT
Start: 2019-01-01 | End: 2019-01-01

## 2019-01-01 RX ORDER — ROSUVASTATIN CALCIUM 20 MG/1
20 TABLET, COATED ORAL DAILY
Status: DISCONTINUED | OUTPATIENT
Start: 2019-01-01 | End: 2019-01-01 | Stop reason: HOSPADM

## 2019-01-01 RX ORDER — ZOLPIDEM TARTRATE 5 MG/1
5 TABLET ORAL NIGHTLY
Status: DISCONTINUED | OUTPATIENT
Start: 2019-01-01 | End: 2019-01-01 | Stop reason: HOSPADM

## 2019-01-01 RX ORDER — LISINOPRIL 10 MG/1
10 TABLET ORAL
Status: DISCONTINUED | OUTPATIENT
Start: 2019-01-01 | End: 2019-01-01 | Stop reason: HOSPADM

## 2019-01-01 RX ORDER — SODIUM CHLORIDE 0.9 % (FLUSH) 0.9 %
3 SYRINGE (ML) INJECTION EVERY 12 HOURS SCHEDULED
Status: DISCONTINUED | OUTPATIENT
Start: 2019-01-01 | End: 2019-01-01 | Stop reason: HOSPADM

## 2019-01-01 RX ORDER — HYDROCODONE BITARTRATE AND ACETAMINOPHEN 10; 325 MG/1; MG/1
1 TABLET ORAL EVERY 6 HOURS PRN
Status: DISCONTINUED | OUTPATIENT
Start: 2019-01-01 | End: 2019-01-01 | Stop reason: HOSPADM

## 2019-01-01 RX ORDER — POTASSIUM CHLORIDE 750 MG/1
40 CAPSULE, EXTENDED RELEASE ORAL AS NEEDED
Status: DISCONTINUED | OUTPATIENT
Start: 2019-01-01 | End: 2019-01-01 | Stop reason: HOSPADM

## 2019-01-01 RX ORDER — ECHINACEA PURPUREA EXTRACT 125 MG
1 TABLET ORAL AS NEEDED
Status: DISCONTINUED | OUTPATIENT
Start: 2019-01-01 | End: 2019-01-01 | Stop reason: HOSPADM

## 2019-01-01 RX ORDER — NITROGLYCERIN 0.4 MG/1
0.4 TABLET SUBLINGUAL
Status: DISCONTINUED | OUTPATIENT
Start: 2019-01-01 | End: 2019-01-01 | Stop reason: HOSPADM

## 2019-01-01 RX ORDER — LISINOPRIL 10 MG/1
10 TABLET ORAL
Qty: 30 TABLET | Refills: 1 | Status: SHIPPED | OUTPATIENT
Start: 2019-01-01 | End: 2019-01-01 | Stop reason: HOSPADM

## 2019-01-01 RX ORDER — BUMETANIDE 1 MG/1
2 TABLET ORAL ONCE
Status: COMPLETED | OUTPATIENT
Start: 2019-01-01 | End: 2019-01-01

## 2019-01-01 RX ORDER — ASPIRIN 81 MG/1
324 TABLET, CHEWABLE ORAL ONCE
Status: COMPLETED | OUTPATIENT
Start: 2019-01-01 | End: 2019-01-01

## 2019-01-01 RX ORDER — ATORVASTATIN CALCIUM 80 MG/1
80 TABLET, FILM COATED ORAL NIGHTLY
COMMUNITY
End: 2019-01-01

## 2019-01-01 RX ORDER — BUMETANIDE 1 MG/1
1 TABLET ORAL DAILY
Status: DISCONTINUED | OUTPATIENT
Start: 2019-01-01 | End: 2019-01-01

## 2019-01-01 RX ORDER — CARVEDILOL 6.25 MG/1
6.25 TABLET ORAL EVERY 12 HOURS SCHEDULED
Status: DISCONTINUED | OUTPATIENT
Start: 2019-01-01 | End: 2019-01-01 | Stop reason: HOSPADM

## 2019-01-01 RX ORDER — LISINOPRIL 40 MG/1
40 TABLET ORAL DAILY
Status: DISCONTINUED | OUTPATIENT
Start: 2019-01-01 | End: 2019-01-01

## 2019-01-01 RX ORDER — BUMETANIDE 0.25 MG/ML
2 INJECTION INTRAMUSCULAR; INTRAVENOUS 2 TIMES DAILY
Status: DISCONTINUED | OUTPATIENT
Start: 2019-01-01 | End: 2019-01-01 | Stop reason: HOSPADM

## 2019-01-01 RX ORDER — HYDRALAZINE HYDROCHLORIDE 50 MG/1
50 TABLET, FILM COATED ORAL 2 TIMES DAILY
Status: DISCONTINUED | OUTPATIENT
Start: 2019-01-01 | End: 2019-01-01

## 2019-01-01 RX ORDER — FERROUS SULFATE TAB EC 324 MG (65 MG FE EQUIVALENT) 324 (65 FE) MG
324 TABLET DELAYED RESPONSE ORAL 2 TIMES DAILY WITH MEALS
Status: DISCONTINUED | OUTPATIENT
Start: 2019-01-01 | End: 2019-01-01 | Stop reason: HOSPADM

## 2019-01-01 RX ORDER — FUROSEMIDE 10 MG/ML
20 INJECTION INTRAMUSCULAR; INTRAVENOUS EVERY 8 HOURS
Status: DISCONTINUED | OUTPATIENT
Start: 2019-01-01 | End: 2019-01-01 | Stop reason: HOSPADM

## 2019-01-01 RX ORDER — HEPARIN SODIUM 5000 [USP'U]/ML
80 INJECTION, SOLUTION INTRAVENOUS; SUBCUTANEOUS AS NEEDED
Status: DISCONTINUED | OUTPATIENT
Start: 2019-01-01 | End: 2019-01-01

## 2019-01-01 RX ORDER — AMLODIPINE BESYLATE 10 MG/1
10 TABLET ORAL DAILY
Status: DISCONTINUED | OUTPATIENT
Start: 2019-01-01 | End: 2019-01-01

## 2019-01-01 RX ORDER — METOLAZONE 2.5 MG/1
2.5 TABLET ORAL EVERY OTHER DAY
Status: DISCONTINUED | OUTPATIENT
Start: 2019-01-01 | End: 2019-01-01 | Stop reason: HOSPADM

## 2019-01-01 RX ORDER — CARVEDILOL 3.12 MG/1
3.12 TABLET ORAL 2 TIMES DAILY WITH MEALS
Status: DISCONTINUED | OUTPATIENT
Start: 2019-01-01 | End: 2019-01-01 | Stop reason: HOSPADM

## 2019-01-01 RX ORDER — POTASSIUM CHLORIDE 7.45 MG/ML
10 INJECTION INTRAVENOUS ONCE
Status: COMPLETED | OUTPATIENT
Start: 2019-01-01 | End: 2019-01-01

## 2019-01-01 RX ORDER — IPRATROPIUM BROMIDE AND ALBUTEROL SULFATE 2.5; .5 MG/3ML; MG/3ML
3 SOLUTION RESPIRATORY (INHALATION)
Status: DISCONTINUED | OUTPATIENT
Start: 2019-01-01 | End: 2019-01-01 | Stop reason: HOSPADM

## 2019-01-01 RX ORDER — ZOLPIDEM TARTRATE 5 MG/1
10 TABLET ORAL NIGHTLY PRN
Status: DISCONTINUED | OUTPATIENT
Start: 2019-01-01 | End: 2019-01-01 | Stop reason: HOSPADM

## 2019-01-01 RX ORDER — ZOLPIDEM TARTRATE 5 MG/1
5 TABLET ORAL NIGHTLY PRN
Status: DISCONTINUED | OUTPATIENT
Start: 2019-01-01 | End: 2019-01-01 | Stop reason: SDUPTHER

## 2019-01-01 RX ORDER — CARVEDILOL 6.25 MG/1
6.25 TABLET ORAL 2 TIMES DAILY WITH MEALS
Qty: 30 TABLET | Refills: 0
Start: 2019-01-01

## 2019-01-01 RX ORDER — HEPARIN SODIUM 5000 [USP'U]/ML
5000 INJECTION, SOLUTION INTRAVENOUS; SUBCUTANEOUS EVERY 8 HOURS SCHEDULED
Status: DISCONTINUED | OUTPATIENT
Start: 2019-01-01 | End: 2019-01-01

## 2019-01-01 RX ORDER — CARVEDILOL 3.12 MG/1
3.12 TABLET ORAL 2 TIMES DAILY WITH MEALS
Status: DISCONTINUED | OUTPATIENT
Start: 2019-01-01 | End: 2019-01-01

## 2019-01-01 RX ORDER — SODIUM CHLORIDE 0.9 % (FLUSH) 0.9 %
10 SYRINGE (ML) INJECTION AS NEEDED
Status: DISCONTINUED | OUTPATIENT
Start: 2019-01-01 | End: 2019-01-01

## 2019-01-01 RX ORDER — CARVEDILOL 12.5 MG/1
12.5 TABLET ORAL 2 TIMES DAILY WITH MEALS
Status: DISCONTINUED | OUTPATIENT
Start: 2019-01-01 | End: 2019-01-01

## 2019-01-01 RX ORDER — ASPIRIN 81 MG/1
324 TABLET, CHEWABLE ORAL ONCE
Status: DISCONTINUED | OUTPATIENT
Start: 2019-01-01 | End: 2019-01-01 | Stop reason: HOSPADM

## 2019-01-01 RX ORDER — NICOTINE POLACRILEX 4 MG
15 LOZENGE BUCCAL
Status: DISCONTINUED | OUTPATIENT
Start: 2019-01-01 | End: 2019-01-01 | Stop reason: HOSPADM

## 2019-01-01 RX ORDER — ATORVASTATIN CALCIUM 40 MG/1
40 TABLET, FILM COATED ORAL NIGHTLY
Status: DISCONTINUED | OUTPATIENT
Start: 2019-01-01 | End: 2019-01-01 | Stop reason: HOSPADM

## 2019-01-01 RX ORDER — PANTOPRAZOLE SODIUM 40 MG/1
40 TABLET, DELAYED RELEASE ORAL EVERY MORNING
Status: DISCONTINUED | OUTPATIENT
Start: 2019-01-01 | End: 2019-01-01 | Stop reason: HOSPADM

## 2019-01-01 RX ORDER — PANTOPRAZOLE SODIUM 40 MG/1
40 TABLET, DELAYED RELEASE ORAL
Status: DISCONTINUED | OUTPATIENT
Start: 2019-01-01 | End: 2019-01-01 | Stop reason: HOSPADM

## 2019-01-01 RX ORDER — METOCLOPRAMIDE HYDROCHLORIDE 5 MG/ML
5 INJECTION INTRAMUSCULAR; INTRAVENOUS
Status: DISCONTINUED | OUTPATIENT
Start: 2019-01-01 | End: 2019-01-01 | Stop reason: HOSPADM

## 2019-01-01 RX ORDER — LISINOPRIL 10 MG/1
10 TABLET ORAL DAILY
COMMUNITY
End: 2019-01-01 | Stop reason: HOSPADM

## 2019-01-01 RX ORDER — HYDROCODONE BITARTRATE AND ACETAMINOPHEN 10; 325 MG/1; MG/1
1 TABLET ORAL 4 TIMES DAILY
Status: DISCONTINUED | OUTPATIENT
Start: 2019-01-01 | End: 2019-01-01

## 2019-01-01 RX ORDER — ASPIRIN 81 MG/1
81 TABLET ORAL DAILY
Status: DISCONTINUED | OUTPATIENT
Start: 2019-01-01 | End: 2019-01-01 | Stop reason: HOSPADM

## 2019-01-01 RX ORDER — BUMETANIDE 1 MG/1
2 TABLET ORAL 2 TIMES DAILY
Status: DISCONTINUED | OUTPATIENT
Start: 2019-01-01 | End: 2019-01-01

## 2019-01-01 RX ORDER — METOLAZONE 2.5 MG/1
2.5 TABLET ORAL AS NEEDED
COMMUNITY
End: 2019-01-01 | Stop reason: HOSPADM

## 2019-01-01 RX ORDER — IPRATROPIUM BROMIDE AND ALBUTEROL SULFATE 2.5; .5 MG/3ML; MG/3ML
3 SOLUTION RESPIRATORY (INHALATION) ONCE
Status: COMPLETED | OUTPATIENT
Start: 2019-01-01 | End: 2019-01-01

## 2019-01-01 RX ORDER — DEXTROSE MONOHYDRATE 25 G/50ML
25 INJECTION, SOLUTION INTRAVENOUS
Status: DISCONTINUED | OUTPATIENT
Start: 2019-01-01 | End: 2019-01-01 | Stop reason: HOSPADM

## 2019-01-01 RX ORDER — SODIUM CHLORIDE 9 MG/ML
INJECTION, SOLUTION INTRAVENOUS
Status: COMPLETED
Start: 2019-01-01 | End: 2019-01-01

## 2019-01-01 RX ORDER — ASPIRIN 81 MG/1
81 TABLET, CHEWABLE ORAL DAILY
Status: DISCONTINUED | OUTPATIENT
Start: 2019-01-01 | End: 2019-01-01 | Stop reason: HOSPADM

## 2019-01-01 RX ORDER — BUMETANIDE 1 MG/1
1 TABLET ORAL DAILY
Qty: 30 TABLET | Refills: 0 | Status: ON HOLD | OUTPATIENT
Start: 2019-01-01 | End: 2019-01-01

## 2019-01-01 RX ORDER — SODIUM CHLORIDE 0.9 % (FLUSH) 0.9 %
3-10 SYRINGE (ML) INJECTION AS NEEDED
Status: DISCONTINUED | OUTPATIENT
Start: 2019-01-01 | End: 2019-01-01 | Stop reason: HOSPADM

## 2019-01-01 RX ORDER — CALCIUM CARBONATE 200(500)MG
2 TABLET,CHEWABLE ORAL 2 TIMES DAILY PRN
Status: DISCONTINUED | OUTPATIENT
Start: 2019-01-01 | End: 2019-01-01 | Stop reason: HOSPADM

## 2019-01-01 RX ORDER — CARVEDILOL 6.25 MG/1
6.25 TABLET ORAL 2 TIMES DAILY WITH MEALS
Status: DISCONTINUED | OUTPATIENT
Start: 2019-01-01 | End: 2019-01-01

## 2019-01-01 RX ORDER — ISOSORBIDE MONONITRATE 30 MG/1
30 TABLET, EXTENDED RELEASE ORAL DAILY
Status: DISCONTINUED | OUTPATIENT
Start: 2019-01-01 | End: 2019-01-01

## 2019-01-01 RX ORDER — HYDROCODONE BITARTRATE AND ACETAMINOPHEN 10; 325 MG/1; MG/1
1 TABLET ORAL ONCE
Status: COMPLETED | OUTPATIENT
Start: 2019-01-01 | End: 2019-01-01

## 2019-01-01 RX ORDER — SENNA AND DOCUSATE SODIUM 50; 8.6 MG/1; MG/1
2 TABLET, FILM COATED ORAL NIGHTLY
Status: DISCONTINUED | OUTPATIENT
Start: 2019-01-01 | End: 2019-01-01 | Stop reason: HOSPADM

## 2019-01-01 RX ORDER — ISOSORBIDE MONONITRATE 60 MG/1
60 TABLET, EXTENDED RELEASE ORAL 2 TIMES DAILY
Status: DISCONTINUED | OUTPATIENT
Start: 2019-01-01 | End: 2019-01-01 | Stop reason: HOSPADM

## 2019-01-01 RX ORDER — HYDROCODONE BITARTRATE AND ACETAMINOPHEN 10; 325 MG/1; MG/1
1 TABLET ORAL 4 TIMES DAILY
COMMUNITY

## 2019-01-01 RX ORDER — POTASSIUM CHLORIDE 7.45 MG/ML
10 INJECTION INTRAVENOUS
Status: DISCONTINUED | OUTPATIENT
Start: 2019-01-01 | End: 2019-01-01 | Stop reason: HOSPADM

## 2019-01-01 RX ORDER — CARVEDILOL 6.25 MG/1
6.25 TABLET ORAL 2 TIMES DAILY WITH MEALS
Status: DISCONTINUED | OUTPATIENT
Start: 2019-01-01 | End: 2019-01-01 | Stop reason: HOSPADM

## 2019-01-01 RX ORDER — ACETAMINOPHEN 325 MG/1
650 TABLET ORAL EVERY 4 HOURS PRN
Status: DISCONTINUED | OUTPATIENT
Start: 2019-01-01 | End: 2019-01-01 | Stop reason: HOSPADM

## 2019-01-01 RX ORDER — HYDRALAZINE HYDROCHLORIDE 50 MG/1
50 TABLET, FILM COATED ORAL 3 TIMES DAILY
Status: DISCONTINUED | OUTPATIENT
Start: 2019-01-01 | End: 2019-01-01 | Stop reason: HOSPADM

## 2019-01-01 RX ORDER — BUMETANIDE 0.25 MG/ML
2 INJECTION INTRAMUSCULAR; INTRAVENOUS 2 TIMES DAILY
Status: DISCONTINUED | OUTPATIENT
Start: 2019-01-01 | End: 2019-01-01

## 2019-01-01 RX ORDER — METOLAZONE 2.5 MG/1
2.5 TABLET ORAL EVERY OTHER DAY
Qty: 77 TABLET | Refills: 0 | Status: SHIPPED | OUTPATIENT
Start: 2019-01-01 | End: 2019-01-01

## 2019-01-01 RX ORDER — SODIUM CHLORIDE 0.9 % (FLUSH) 0.9 %
10 SYRINGE (ML) INJECTION EVERY 12 HOURS SCHEDULED
Status: DISCONTINUED | OUTPATIENT
Start: 2019-01-01 | End: 2019-01-01

## 2019-01-01 RX ORDER — FAMOTIDINE 40 MG/1
40 TABLET, FILM COATED ORAL DAILY
Status: DISCONTINUED | OUTPATIENT
Start: 2019-01-01 | End: 2019-01-01 | Stop reason: HOSPADM

## 2019-01-01 RX ORDER — HYDRALAZINE HYDROCHLORIDE 50 MG/1
50 TABLET, FILM COATED ORAL 2 TIMES DAILY
Status: DISCONTINUED | OUTPATIENT
Start: 2019-01-01 | End: 2019-01-01 | Stop reason: HOSPADM

## 2019-01-01 RX ORDER — HYDRALAZINE HYDROCHLORIDE 20 MG/ML
10 INJECTION INTRAMUSCULAR; INTRAVENOUS ONCE
Status: COMPLETED | OUTPATIENT
Start: 2019-01-01 | End: 2019-01-01

## 2019-01-01 RX ORDER — HYDROCODONE BITARTRATE AND ACETAMINOPHEN 10; 325 MG/1; MG/1
1 TABLET ORAL 4 TIMES DAILY
Status: DISCONTINUED | OUTPATIENT
Start: 2019-01-01 | End: 2019-01-01 | Stop reason: HOSPADM

## 2019-01-01 RX ORDER — ECHINACEA PURPUREA EXTRACT 125 MG
2 TABLET ORAL AS NEEDED
Status: DISCONTINUED | OUTPATIENT
Start: 2019-01-01 | End: 2019-01-01 | Stop reason: HOSPADM

## 2019-01-01 RX ORDER — AMLODIPINE BESYLATE 5 MG/1
5 TABLET ORAL DAILY
Status: DISCONTINUED | OUTPATIENT
Start: 2019-01-01 | End: 2019-01-01

## 2019-01-01 RX ORDER — OXYCODONE HYDROCHLORIDE AND ACETAMINOPHEN 5; 325 MG/1; MG/1
2 TABLET ORAL ONCE
Status: COMPLETED | OUTPATIENT
Start: 2019-01-01 | End: 2019-01-01

## 2019-01-01 RX ORDER — SENNA AND DOCUSATE SODIUM 50; 8.6 MG/1; MG/1
2 TABLET, FILM COATED ORAL 2 TIMES DAILY PRN
Status: DISCONTINUED | OUTPATIENT
Start: 2019-01-01 | End: 2019-01-01 | Stop reason: HOSPADM

## 2019-01-01 RX ORDER — PROMETHAZINE HYDROCHLORIDE 12.5 MG/1
12.5 TABLET ORAL EVERY 6 HOURS PRN
Status: DISCONTINUED | OUTPATIENT
Start: 2019-01-01 | End: 2019-01-01 | Stop reason: HOSPADM

## 2019-01-01 RX ORDER — METOLAZONE 2.5 MG/1
2.5 TABLET ORAL ONCE
Status: COMPLETED | OUTPATIENT
Start: 2019-01-01 | End: 2019-01-01

## 2019-01-01 RX ORDER — ONDANSETRON 4 MG/1
4 TABLET, ORALLY DISINTEGRATING ORAL EVERY 6 HOURS PRN
Status: DISCONTINUED | OUTPATIENT
Start: 2019-01-01 | End: 2019-01-01 | Stop reason: HOSPADM

## 2019-01-01 RX ORDER — ECHINACEA PURPUREA EXTRACT 125 MG
1 TABLET ORAL AS NEEDED
Qty: 15 ML | Refills: 0
Start: 2019-01-01

## 2019-01-01 RX ORDER — MORPHINE SULFATE 2 MG/ML
2 INJECTION, SOLUTION INTRAMUSCULAR; INTRAVENOUS
Status: DISCONTINUED | OUTPATIENT
Start: 2019-01-01 | End: 2019-01-01

## 2019-01-01 RX ORDER — PROMETHAZINE HYDROCHLORIDE 25 MG/ML
12.5 INJECTION, SOLUTION INTRAMUSCULAR; INTRAVENOUS EVERY 6 HOURS PRN
Status: DISCONTINUED | OUTPATIENT
Start: 2019-01-01 | End: 2019-01-01 | Stop reason: HOSPADM

## 2019-01-01 RX ORDER — SPIRONOLACTONE 25 MG/1
25 TABLET ORAL DAILY
Status: DISCONTINUED | OUTPATIENT
Start: 2019-01-01 | End: 2019-01-01 | Stop reason: HOSPADM

## 2019-01-01 RX ORDER — POTASSIUM CHLORIDE 750 MG/1
40 CAPSULE, EXTENDED RELEASE ORAL ONCE
Status: COMPLETED | OUTPATIENT
Start: 2019-01-01 | End: 2019-01-01

## 2019-01-01 RX ORDER — ONDANSETRON 4 MG/1
4 TABLET, FILM COATED ORAL EVERY 6 HOURS PRN
Status: DISCONTINUED | OUTPATIENT
Start: 2019-01-01 | End: 2019-01-01 | Stop reason: HOSPADM

## 2019-01-01 RX ORDER — FAMOTIDINE 20 MG/1
20 TABLET, FILM COATED ORAL DAILY
Status: DISCONTINUED | OUTPATIENT
Start: 2019-01-01 | End: 2019-01-01 | Stop reason: HOSPADM

## 2019-01-01 RX ORDER — ASPIRIN 325 MG
325 TABLET ORAL ONCE
Status: COMPLETED | OUTPATIENT
Start: 2019-01-01 | End: 2019-01-01

## 2019-01-01 RX ORDER — HYDROCODONE BITARTRATE AND ACETAMINOPHEN 10; 325 MG/1; MG/1
1 TABLET ORAL EVERY 6 HOURS PRN
Status: DISCONTINUED | OUTPATIENT
Start: 2019-01-01 | End: 2019-01-01

## 2019-01-01 RX ORDER — RANOLAZINE 500 MG/1
500 TABLET, EXTENDED RELEASE ORAL EVERY 12 HOURS SCHEDULED
Qty: 60 TABLET | Refills: 0 | Status: SHIPPED | OUTPATIENT
Start: 2019-01-01

## 2019-01-01 RX ORDER — OXYCODONE AND ACETAMINOPHEN 10; 325 MG/1; MG/1
1 TABLET ORAL EVERY 4 HOURS PRN
Status: DISCONTINUED | OUTPATIENT
Start: 2019-01-01 | End: 2019-01-01

## 2019-01-01 RX ORDER — BUMETANIDE 1 MG/1
2 TABLET ORAL 2 TIMES DAILY
Status: DISCONTINUED | OUTPATIENT
Start: 2019-01-01 | End: 2019-01-01 | Stop reason: HOSPADM

## 2019-01-01 RX ORDER — POTASSIUM CHLORIDE 750 MG/1
20 CAPSULE, EXTENDED RELEASE ORAL DAILY
Status: DISCONTINUED | OUTPATIENT
Start: 2019-01-01 | End: 2019-01-01 | Stop reason: HOSPADM

## 2019-01-01 RX ORDER — POTASSIUM CHLORIDE 29.8 MG/ML
20 INJECTION INTRAVENOUS
Status: COMPLETED | OUTPATIENT
Start: 2019-01-01 | End: 2019-01-01

## 2019-01-01 RX ORDER — ASPIRIN 81 MG/1
81 TABLET, CHEWABLE ORAL DAILY
Status: DISCONTINUED | OUTPATIENT
Start: 2019-01-01 | End: 2019-01-01 | Stop reason: SDUPTHER

## 2019-01-01 RX ORDER — ATORVASTATIN CALCIUM 40 MG/1
80 TABLET, FILM COATED ORAL NIGHTLY
Status: DISCONTINUED | OUTPATIENT
Start: 2019-01-01 | End: 2019-01-01

## 2019-01-01 RX ORDER — FERROUS SULFATE TAB EC 324 MG (65 MG FE EQUIVALENT) 324 (65 FE) MG
324 TABLET DELAYED RESPONSE ORAL 2 TIMES DAILY WITH MEALS
Qty: 30 TABLET | Refills: 1 | Status: SHIPPED | OUTPATIENT
Start: 2019-01-01 | End: 2019-01-01 | Stop reason: HOSPADM

## 2019-01-01 RX ORDER — CARVEDILOL 3.12 MG/1
6.25 TABLET ORAL 2 TIMES DAILY WITH MEALS
COMMUNITY
End: 2019-01-01 | Stop reason: HOSPADM

## 2019-01-01 RX ORDER — BUMETANIDE 1 MG/1
1 TABLET ORAL DAILY
Status: DISCONTINUED | OUTPATIENT
Start: 2019-01-01 | End: 2019-01-01 | Stop reason: HOSPADM

## 2019-01-01 RX ORDER — ISOSORBIDE MONONITRATE 60 MG/1
60 TABLET, EXTENDED RELEASE ORAL 2 TIMES DAILY
Qty: 30 TABLET | Refills: 1 | Status: SHIPPED | OUTPATIENT
Start: 2019-01-01

## 2019-01-01 RX ORDER — FUROSEMIDE 10 MG/ML
20 INJECTION INTRAMUSCULAR; INTRAVENOUS EVERY 12 HOURS
Status: DISCONTINUED | OUTPATIENT
Start: 2019-01-01 | End: 2019-01-01

## 2019-01-01 RX ORDER — POLYETHYLENE GLYCOL 3350 17 G/17G
17 POWDER, FOR SOLUTION ORAL DAILY
Status: DISCONTINUED | OUTPATIENT
Start: 2019-01-01 | End: 2019-01-01 | Stop reason: HOSPADM

## 2019-01-01 RX ORDER — POTASSIUM CHLORIDE 750 MG/1
20 CAPSULE, EXTENDED RELEASE ORAL 2 TIMES DAILY
Qty: 60 CAPSULE | Refills: 3 | Status: SHIPPED | OUTPATIENT
Start: 2019-01-01 | End: 2019-01-01 | Stop reason: HOSPADM

## 2019-01-01 RX ORDER — BUMETANIDE 0.25 MG/ML
0.5 INJECTION INTRAMUSCULAR; INTRAVENOUS ONCE
Status: COMPLETED | OUTPATIENT
Start: 2019-01-01 | End: 2019-01-01

## 2019-01-01 RX ORDER — SODIUM CHLORIDE 9 MG/ML
75 INJECTION, SOLUTION INTRAVENOUS CONTINUOUS
Status: DISCONTINUED | OUTPATIENT
Start: 2019-01-01 | End: 2019-01-01

## 2019-01-01 RX ORDER — NITROGLYCERIN 0.4 MG/1
TABLET SUBLINGUAL
Status: COMPLETED
Start: 2019-01-01 | End: 2019-01-01

## 2019-01-01 RX ORDER — FAMOTIDINE 40 MG/1
40 TABLET, FILM COATED ORAL DAILY
Status: DISCONTINUED | OUTPATIENT
Start: 2019-01-01 | End: 2019-01-01 | Stop reason: SDUPTHER

## 2019-01-01 RX ORDER — ROSUVASTATIN CALCIUM 10 MG/1
10 TABLET, COATED ORAL DAILY
Status: DISCONTINUED | OUTPATIENT
Start: 2019-01-01 | End: 2019-01-01 | Stop reason: HOSPADM

## 2019-01-01 RX ORDER — CALCIUM CARBONATE 200(500)MG
2 TABLET,CHEWABLE ORAL 2 TIMES DAILY PRN
Qty: 30 TABLET | Refills: 0
Start: 2019-01-01

## 2019-01-01 RX ORDER — FERROUS SULFATE 325(65) MG
1 TABLET ORAL DAILY
Refills: 1 | COMMUNITY
Start: 2019-01-01 | End: 2019-01-01 | Stop reason: HOSPADM

## 2019-01-01 RX ORDER — CARVEDILOL 3.12 MG/1
3.12 TABLET ORAL 2 TIMES DAILY WITH MEALS
Qty: 60 TABLET | Refills: 0 | Status: SHIPPED | OUTPATIENT
Start: 2019-01-01 | End: 2019-01-01 | Stop reason: HOSPADM

## 2019-01-01 RX ORDER — ONDANSETRON 2 MG/ML
4 INJECTION INTRAMUSCULAR; INTRAVENOUS EVERY 6 HOURS PRN
Status: DISCONTINUED | OUTPATIENT
Start: 2019-01-01 | End: 2019-01-01 | Stop reason: HOSPADM

## 2019-01-01 RX ORDER — ZOLPIDEM TARTRATE 5 MG/1
5 TABLET ORAL NIGHTLY PRN
Status: DISCONTINUED | OUTPATIENT
Start: 2019-01-01 | End: 2019-01-01

## 2019-01-01 RX ORDER — HYDRALAZINE HYDROCHLORIDE 50 MG/1
50 TABLET, FILM COATED ORAL 3 TIMES DAILY
COMMUNITY

## 2019-01-01 RX ORDER — SPIRONOLACTONE 25 MG/1
25 TABLET ORAL DAILY
Status: DISCONTINUED | OUTPATIENT
Start: 2019-01-01 | End: 2019-01-01

## 2019-01-01 RX ORDER — SODIUM CHLORIDE 9 MG/ML
100 INJECTION, SOLUTION INTRAVENOUS CONTINUOUS
Status: DISCONTINUED | OUTPATIENT
Start: 2019-01-01 | End: 2019-01-01

## 2019-01-01 RX ORDER — ROSUVASTATIN CALCIUM 20 MG/1
20 TABLET, COATED ORAL DAILY
Qty: 90 TABLET | Refills: 3 | Status: SHIPPED | OUTPATIENT
Start: 2019-01-01

## 2019-01-01 RX ORDER — BUMETANIDE 0.25 MG/ML
2 INJECTION INTRAMUSCULAR; INTRAVENOUS 2 TIMES DAILY
Qty: 8 ML | Refills: 0
Start: 2019-01-01

## 2019-01-01 RX ORDER — SODIUM CHLORIDE 0.9 % (FLUSH) 0.9 %
3-10 SYRINGE (ML) INJECTION AS NEEDED
Status: DISCONTINUED | OUTPATIENT
Start: 2019-01-01 | End: 2019-01-01

## 2019-01-01 RX ORDER — HYDROCODONE BITARTRATE AND ACETAMINOPHEN 10; 325 MG/1; MG/1
1 TABLET ORAL EVERY 6 HOURS
Status: DISCONTINUED | OUTPATIENT
Start: 2019-01-01 | End: 2019-01-01 | Stop reason: HOSPADM

## 2019-01-01 RX ADMIN — SERTRALINE HYDROCHLORIDE 50 MG: 50 TABLET ORAL at 20:15

## 2019-01-01 RX ADMIN — BUMETANIDE 2 MG: 0.25 INJECTION INTRAMUSCULAR; INTRAVENOUS at 17:22

## 2019-01-01 RX ADMIN — SODIUM CHLORIDE, PRESERVATIVE FREE 3 ML: 5 INJECTION INTRAVENOUS at 00:34

## 2019-01-01 RX ADMIN — BUMETANIDE 2 MG: 0.25 INJECTION INTRAMUSCULAR; INTRAVENOUS at 17:14

## 2019-01-01 RX ADMIN — BUMETANIDE 1 MG: 1 TABLET ORAL at 12:24

## 2019-01-01 RX ADMIN — HYDRALAZINE HYDROCHLORIDE 75 MG: 50 TABLET ORAL at 09:42

## 2019-01-01 RX ADMIN — ATORVASTATIN CALCIUM 40 MG: 40 TABLET, FILM COATED ORAL at 20:50

## 2019-01-01 RX ADMIN — ATORVASTATIN CALCIUM 40 MG: 40 TABLET, FILM COATED ORAL at 21:32

## 2019-01-01 RX ADMIN — CEFTRIAXONE SODIUM 1 G: 1 INJECTION, POWDER, FOR SOLUTION INTRAMUSCULAR; INTRAVENOUS at 16:07

## 2019-01-01 RX ADMIN — ROSUVASTATIN CALCIUM 10 MG: 10 TABLET, FILM COATED ORAL at 08:27

## 2019-01-01 RX ADMIN — RANOLAZINE 500 MG: 500 TABLET, FILM COATED, EXTENDED RELEASE ORAL at 08:21

## 2019-01-01 RX ADMIN — Medication: at 21:12

## 2019-01-01 RX ADMIN — HEPARIN SODIUM 5000 UNITS: 5000 INJECTION INTRAVENOUS; SUBCUTANEOUS at 21:07

## 2019-01-01 RX ADMIN — HYDRALAZINE HYDROCHLORIDE 50 MG: 50 TABLET ORAL at 20:27

## 2019-01-01 RX ADMIN — ASPIRIN 81 MG CHEWABLE TABLET 81 MG: 81 TABLET CHEWABLE at 08:42

## 2019-01-01 RX ADMIN — FAMOTIDINE 40 MG: 40 TABLET ORAL at 08:27

## 2019-01-01 RX ADMIN — METOLAZONE 2.5 MG: 2.5 TABLET ORAL at 17:25

## 2019-01-01 RX ADMIN — SERTRALINE HYDROCHLORIDE 50 MG: 50 TABLET ORAL at 08:32

## 2019-01-01 RX ADMIN — SODIUM CHLORIDE, PRESERVATIVE FREE 3 ML: 5 INJECTION INTRAVENOUS at 21:31

## 2019-01-01 RX ADMIN — HYDROCODONE BITARTRATE AND ACETAMINOPHEN 1 TABLET: 10; 325 TABLET ORAL at 03:06

## 2019-01-01 RX ADMIN — CARVEDILOL 3.12 MG: 3.12 TABLET, FILM COATED ORAL at 17:44

## 2019-01-01 RX ADMIN — BUMETANIDE 2 MG: 0.25 INJECTION INTRAMUSCULAR; INTRAVENOUS at 17:25

## 2019-01-01 RX ADMIN — POTASSIUM CHLORIDE 40 MEQ: 750 CAPSULE, EXTENDED RELEASE ORAL at 16:06

## 2019-01-01 RX ADMIN — ISOSORBIDE MONONITRATE 60 MG: 60 TABLET, EXTENDED RELEASE ORAL at 09:02

## 2019-01-01 RX ADMIN — POTASSIUM CHLORIDE 10 MEQ: 7.46 INJECTION, SOLUTION INTRAVENOUS at 16:32

## 2019-01-01 RX ADMIN — POLYETHYLENE GLYCOL 3350 17 G: 17 POWDER, FOR SOLUTION ORAL at 08:56

## 2019-01-01 RX ADMIN — SERTRALINE HYDROCHLORIDE 50 MG: 50 TABLET ORAL at 09:01

## 2019-01-01 RX ADMIN — Medication 1 DOSE: at 14:00

## 2019-01-01 RX ADMIN — ZOLPIDEM TARTRATE 5 MG: 5 TABLET ORAL at 21:29

## 2019-01-01 RX ADMIN — PANTOPRAZOLE SODIUM 40 MG: 40 TABLET, DELAYED RELEASE ORAL at 05:33

## 2019-01-01 RX ADMIN — NITROGLYCERIN: 0.4 TABLET SUBLINGUAL at 09:45

## 2019-01-01 RX ADMIN — Medication: at 20:32

## 2019-01-01 RX ADMIN — SODIUM CHLORIDE, PRESERVATIVE FREE 3 ML: 5 INJECTION INTRAVENOUS at 21:03

## 2019-01-01 RX ADMIN — POTASSIUM CHLORIDE 40 MEQ: 750 CAPSULE, EXTENDED RELEASE ORAL at 12:43

## 2019-01-01 RX ADMIN — HYDROCODONE BITARTRATE AND ACETAMINOPHEN 1 TABLET: 10; 325 TABLET ORAL at 11:10

## 2019-01-01 RX ADMIN — ISOSORBIDE MONONITRATE 60 MG: 60 TABLET, EXTENDED RELEASE ORAL at 10:13

## 2019-01-01 RX ADMIN — ZOLPIDEM TARTRATE 5 MG: 5 TABLET ORAL at 20:50

## 2019-01-01 RX ADMIN — ZOLPIDEM TARTRATE 10 MG: 5 TABLET ORAL at 20:20

## 2019-01-01 RX ADMIN — CARVEDILOL 6.25 MG: 6.25 TABLET, FILM COATED ORAL at 08:59

## 2019-01-01 RX ADMIN — LISINOPRIL 10 MG: 10 TABLET ORAL at 13:24

## 2019-01-01 RX ADMIN — BUMETANIDE 1 MG: 1 TABLET ORAL at 08:21

## 2019-01-01 RX ADMIN — SODIUM CHLORIDE, PRESERVATIVE FREE 10 ML: 5 INJECTION INTRAVENOUS at 17:18

## 2019-01-01 RX ADMIN — FERROUS SULFATE TAB EC 324 MG (65 MG FE EQUIVALENT) 324 MG: 324 (65 FE) TABLET DELAYED RESPONSE at 09:24

## 2019-01-01 RX ADMIN — POTASSIUM CHLORIDE 20 MEQ: 400 INJECTION, SOLUTION INTRAVENOUS at 21:01

## 2019-01-01 RX ADMIN — NITROGLYCERIN 0.4 MG: 0.4 TABLET, ORALLY DISINTEGRATING SUBLINGUAL at 17:43

## 2019-01-01 RX ADMIN — POTASSIUM CHLORIDE 40 MEQ: 750 CAPSULE, EXTENDED RELEASE ORAL at 20:21

## 2019-01-01 RX ADMIN — LISINOPRIL 10 MG: 10 TABLET ORAL at 08:45

## 2019-01-01 RX ADMIN — BUMETANIDE 2 MG: 1 TABLET ORAL at 09:47

## 2019-01-01 RX ADMIN — SODIUM CHLORIDE, PRESERVATIVE FREE 10 ML: 5 INJECTION INTRAVENOUS at 20:34

## 2019-01-01 RX ADMIN — EPOETIN ALFA 6000 UNITS: 10000 SOLUTION INTRAVENOUS; SUBCUTANEOUS at 16:22

## 2019-01-01 RX ADMIN — ISOSORBIDE MONONITRATE 60 MG: 60 TABLET, EXTENDED RELEASE ORAL at 08:41

## 2019-01-01 RX ADMIN — POLYETHYLENE GLYCOL 3350 17 G: 17 POWDER, FOR SOLUTION ORAL at 08:21

## 2019-01-01 RX ADMIN — FUROSEMIDE 20 MG: 10 INJECTION, SOLUTION INTRAMUSCULAR; INTRAVENOUS at 11:24

## 2019-01-01 RX ADMIN — SODIUM CHLORIDE, PRESERVATIVE FREE 3 ML: 5 INJECTION INTRAVENOUS at 10:17

## 2019-01-01 RX ADMIN — ISOSORBIDE MONONITRATE 60 MG: 60 TABLET, EXTENDED RELEASE ORAL at 20:52

## 2019-01-01 RX ADMIN — AMLODIPINE BESYLATE 10 MG: 10 TABLET ORAL at 08:27

## 2019-01-01 RX ADMIN — SPIRONOLACTONE 25 MG: 25 TABLET ORAL at 09:31

## 2019-01-01 RX ADMIN — CARVEDILOL 6.25 MG: 6.25 TABLET, FILM COATED ORAL at 10:08

## 2019-01-01 RX ADMIN — FUROSEMIDE 40 MG: 10 INJECTION, SOLUTION INTRAMUSCULAR; INTRAVENOUS at 21:03

## 2019-01-01 RX ADMIN — ENOXAPARIN SODIUM 40 MG: 40 INJECTION SUBCUTANEOUS at 00:20

## 2019-01-01 RX ADMIN — HYDRALAZINE HYDROCHLORIDE 75 MG: 50 TABLET ORAL at 10:05

## 2019-01-01 RX ADMIN — HYDRALAZINE HYDROCHLORIDE 50 MG: 50 TABLET ORAL at 09:01

## 2019-01-01 RX ADMIN — FUROSEMIDE 20 MG: 10 INJECTION, SOLUTION INTRAMUSCULAR; INTRAVENOUS at 17:19

## 2019-01-01 RX ADMIN — ZOLPIDEM TARTRATE 10 MG: 5 TABLET ORAL at 21:13

## 2019-01-01 RX ADMIN — CARVEDILOL 3.12 MG: 3.12 TABLET, FILM COATED ORAL at 17:39

## 2019-01-01 RX ADMIN — HYDROCODONE BITARTRATE AND ACETAMINOPHEN 1 TABLET: 10; 325 TABLET ORAL at 20:31

## 2019-01-01 RX ADMIN — SODIUM CHLORIDE, PRESERVATIVE FREE 3 ML: 5 INJECTION INTRAVENOUS at 21:13

## 2019-01-01 RX ADMIN — HYDRALAZINE HYDROCHLORIDE 50 MG: 50 TABLET ORAL at 20:31

## 2019-01-01 RX ADMIN — FAMOTIDINE 40 MG: 40 TABLET ORAL at 08:59

## 2019-01-01 RX ADMIN — HYDROCODONE BITARTRATE AND ACETAMINOPHEN 1 TABLET: 10; 325 TABLET ORAL at 17:41

## 2019-01-01 RX ADMIN — ZOLPIDEM TARTRATE 10 MG: 5 TABLET ORAL at 21:10

## 2019-01-01 RX ADMIN — ASPIRIN 81 MG CHEWABLE TABLET 81 MG: 81 TABLET CHEWABLE at 08:45

## 2019-01-01 RX ADMIN — HYDROCODONE BITARTRATE AND ACETAMINOPHEN 1 TABLET: 10; 325 TABLET ORAL at 06:02

## 2019-01-01 RX ADMIN — ZOLPIDEM TARTRATE 5 MG: 5 TABLET ORAL at 21:09

## 2019-01-01 RX ADMIN — FAMOTIDINE 40 MG: 40 TABLET ORAL at 08:31

## 2019-01-01 RX ADMIN — CARVEDILOL 6.25 MG: 6.25 TABLET, FILM COATED ORAL at 10:06

## 2019-01-01 RX ADMIN — HYDROCODONE BITARTRATE AND ACETAMINOPHEN 1 TABLET: 10; 325 TABLET ORAL at 02:48

## 2019-01-01 RX ADMIN — HYDRALAZINE HYDROCHLORIDE 50 MG: 50 TABLET ORAL at 08:41

## 2019-01-01 RX ADMIN — SODIUM CHLORIDE, PRESERVATIVE FREE 3 ML: 5 INJECTION INTRAVENOUS at 08:26

## 2019-01-01 RX ADMIN — ATORVASTATIN CALCIUM 80 MG: 40 TABLET, FILM COATED ORAL at 21:07

## 2019-01-01 RX ADMIN — CARVEDILOL 6.25 MG: 6.25 TABLET, FILM COATED ORAL at 18:08

## 2019-01-01 RX ADMIN — ISOSORBIDE MONONITRATE 60 MG: 60 TABLET, EXTENDED RELEASE ORAL at 08:24

## 2019-01-01 RX ADMIN — LISINOPRIL 10 MG: 10 TABLET ORAL at 09:31

## 2019-01-01 RX ADMIN — BUMETANIDE 2 MG: 0.25 INJECTION INTRAMUSCULAR; INTRAVENOUS at 17:31

## 2019-01-01 RX ADMIN — HYDROCODONE BITARTRATE AND ACETAMINOPHEN 1 TABLET: 10; 325 TABLET ORAL at 09:38

## 2019-01-01 RX ADMIN — SERTRALINE HYDROCHLORIDE 50 MG: 50 TABLET ORAL at 08:56

## 2019-01-01 RX ADMIN — ROSUVASTATIN CALCIUM 20 MG: 20 TABLET, FILM COATED ORAL at 08:56

## 2019-01-01 RX ADMIN — SPIRONOLACTONE 25 MG: 25 TABLET ORAL at 08:21

## 2019-01-01 RX ADMIN — ASPIRIN 81 MG CHEWABLE TABLET 81 MG: 81 TABLET CHEWABLE at 10:07

## 2019-01-01 RX ADMIN — HYDROCODONE BITARTRATE AND ACETAMINOPHEN 1 TABLET: 10; 325 TABLET ORAL at 06:14

## 2019-01-01 RX ADMIN — CARVEDILOL 6.25 MG: 6.25 TABLET, FILM COATED ORAL at 20:51

## 2019-01-01 RX ADMIN — FERROUS SULFATE TAB EC 324 MG (65 MG FE EQUIVALENT) 324 MG: 324 (65 FE) TABLET DELAYED RESPONSE at 17:14

## 2019-01-01 RX ADMIN — ISOSORBIDE MONONITRATE 60 MG: 60 TABLET, EXTENDED RELEASE ORAL at 21:19

## 2019-01-01 RX ADMIN — SODIUM CHLORIDE, PRESERVATIVE FREE 3 ML: 5 INJECTION INTRAVENOUS at 08:52

## 2019-01-01 RX ADMIN — HYDROCODONE BITARTRATE AND ACETAMINOPHEN 1 TABLET: 10; 325 TABLET ORAL at 18:16

## 2019-01-01 RX ADMIN — SERTRALINE HYDROCHLORIDE 50 MG: 50 TABLET ORAL at 08:24

## 2019-01-01 RX ADMIN — METOCLOPRAMIDE 5 MG: 5 INJECTION, SOLUTION INTRAMUSCULAR; INTRAVENOUS at 09:00

## 2019-01-01 RX ADMIN — FUROSEMIDE 20 MG: 10 INJECTION, SOLUTION INTRAMUSCULAR; INTRAVENOUS at 02:48

## 2019-01-01 RX ADMIN — ENOXAPARIN SODIUM 40 MG: 40 INJECTION SUBCUTANEOUS at 23:04

## 2019-01-01 RX ADMIN — POLYETHYLENE GLYCOL 3350 17 G: 17 POWDER, FOR SOLUTION ORAL at 10:09

## 2019-01-01 RX ADMIN — DOCUSATE SODIUM 1 ENEMA: 283 LIQUID RECTAL at 10:30

## 2019-01-01 RX ADMIN — SODIUM CHLORIDE, PRESERVATIVE FREE 3 ML: 5 INJECTION INTRAVENOUS at 21:42

## 2019-01-01 RX ADMIN — ISOSORBIDE MONONITRATE 90 MG: 60 TABLET, EXTENDED RELEASE ORAL at 20:51

## 2019-01-01 RX ADMIN — HYDRALAZINE HYDROCHLORIDE 50 MG: 50 TABLET ORAL at 17:01

## 2019-01-01 RX ADMIN — ROSUVASTATIN CALCIUM 20 MG: 20 TABLET, FILM COATED ORAL at 08:41

## 2019-01-01 RX ADMIN — HYDROCODONE BITARTRATE AND ACETAMINOPHEN 1 TABLET: 10; 325 TABLET ORAL at 11:02

## 2019-01-01 RX ADMIN — SODIUM CHLORIDE, PRESERVATIVE FREE 10 ML: 5 INJECTION INTRAVENOUS at 16:10

## 2019-01-01 RX ADMIN — CARVEDILOL 6.25 MG: 6.25 TABLET, FILM COATED ORAL at 08:32

## 2019-01-01 RX ADMIN — LISINOPRIL 10 MG: 10 TABLET ORAL at 08:32

## 2019-01-01 RX ADMIN — SODIUM CHLORIDE, PRESERVATIVE FREE 3 ML: 5 INJECTION INTRAVENOUS at 21:36

## 2019-01-01 RX ADMIN — ASPIRIN 81 MG CHEWABLE TABLET 81 MG: 81 TABLET CHEWABLE at 08:58

## 2019-01-01 RX ADMIN — ASPIRIN 81 MG CHEWABLE TABLET 81 MG: 81 TABLET CHEWABLE at 09:02

## 2019-01-01 RX ADMIN — LISINOPRIL 10 MG: 10 TABLET ORAL at 09:12

## 2019-01-01 RX ADMIN — HYDROCODONE BITARTRATE AND ACETAMINOPHEN 1 TABLET: 10; 325 TABLET ORAL at 08:25

## 2019-01-01 RX ADMIN — ISOSORBIDE MONONITRATE 90 MG: 60 TABLET, EXTENDED RELEASE ORAL at 21:51

## 2019-01-01 RX ADMIN — SODIUM CHLORIDE, PRESERVATIVE FREE 3 ML: 5 INJECTION INTRAVENOUS at 21:10

## 2019-01-01 RX ADMIN — Medication 15 MG: at 20:27

## 2019-01-01 RX ADMIN — SODIUM CHLORIDE, PRESERVATIVE FREE 3 ML: 5 INJECTION INTRAVENOUS at 20:27

## 2019-01-01 RX ADMIN — HYDROCODONE BITARTRATE AND ACETAMINOPHEN 1 TABLET: 10; 325 TABLET ORAL at 11:44

## 2019-01-01 RX ADMIN — SPIRONOLACTONE 25 MG: 25 TABLET ORAL at 08:40

## 2019-01-01 RX ADMIN — ISOSORBIDE MONONITRATE 60 MG: 60 TABLET, EXTENDED RELEASE ORAL at 20:26

## 2019-01-01 RX ADMIN — HYDROCODONE BITARTRATE AND ACETAMINOPHEN 1 TABLET: 10; 325 TABLET ORAL at 17:22

## 2019-01-01 RX ADMIN — ZOLPIDEM TARTRATE 5 MG: 5 TABLET ORAL at 21:32

## 2019-01-01 RX ADMIN — ATORVASTATIN CALCIUM 40 MG: 40 TABLET, FILM COATED ORAL at 21:09

## 2019-01-01 RX ADMIN — SPIRONOLACTONE 25 MG: 25 TABLET ORAL at 10:13

## 2019-01-01 RX ADMIN — FAMOTIDINE 40 MG: 40 TABLET ORAL at 08:32

## 2019-01-01 RX ADMIN — ENOXAPARIN SODIUM 40 MG: 40 INJECTION SUBCUTANEOUS at 23:02

## 2019-01-01 RX ADMIN — HYDROCODONE BITARTRATE AND ACETAMINOPHEN 1 TABLET: 10; 325 TABLET ORAL at 17:06

## 2019-01-01 RX ADMIN — CLOPIDOGREL BISULFATE 75 MG: 75 TABLET, FILM COATED ORAL at 08:56

## 2019-01-01 RX ADMIN — SODIUM CHLORIDE, PRESERVATIVE FREE 10 ML: 5 INJECTION INTRAVENOUS at 10:20

## 2019-01-01 RX ADMIN — ISOSORBIDE MONONITRATE 60 MG: 60 TABLET, EXTENDED RELEASE ORAL at 20:30

## 2019-01-01 RX ADMIN — FAMOTIDINE 40 MG: 40 TABLET ORAL at 10:12

## 2019-01-01 RX ADMIN — HYDROCODONE BITARTRATE AND ACETAMINOPHEN 1 TABLET: 10; 325 TABLET ORAL at 01:28

## 2019-01-01 RX ADMIN — SODIUM CHLORIDE, PRESERVATIVE FREE 3 ML: 5 INJECTION INTRAVENOUS at 08:33

## 2019-01-01 RX ADMIN — SERTRALINE HYDROCHLORIDE 50 MG: 50 TABLET ORAL at 09:42

## 2019-01-01 RX ADMIN — HYDROCODONE BITARTRATE AND ACETAMINOPHEN 1 TABLET: 10; 325 TABLET ORAL at 17:03

## 2019-01-01 RX ADMIN — ZOLPIDEM TARTRATE 10 MG: 5 TABLET ORAL at 20:51

## 2019-01-01 RX ADMIN — ATORVASTATIN CALCIUM 40 MG: 40 TABLET, FILM COATED ORAL at 20:15

## 2019-01-01 RX ADMIN — CARVEDILOL 6.25 MG: 6.25 TABLET, FILM COATED ORAL at 20:20

## 2019-01-01 RX ADMIN — CLOPIDOGREL BISULFATE 75 MG: 75 TABLET, FILM COATED ORAL at 08:21

## 2019-01-01 RX ADMIN — ASPIRIN 81 MG: 81 TABLET, COATED ORAL at 08:25

## 2019-01-01 RX ADMIN — SODIUM CHLORIDE, PRESERVATIVE FREE 3 ML: 5 INJECTION INTRAVENOUS at 21:32

## 2019-01-01 RX ADMIN — ISOSORBIDE MONONITRATE 90 MG: 60 TABLET, EXTENDED RELEASE ORAL at 20:20

## 2019-01-01 RX ADMIN — METOCLOPRAMIDE 5 MG: 5 INJECTION, SOLUTION INTRAMUSCULAR; INTRAVENOUS at 16:35

## 2019-01-01 RX ADMIN — ISOSORBIDE MONONITRATE 90 MG: 60 TABLET, EXTENDED RELEASE ORAL at 09:43

## 2019-01-01 RX ADMIN — HYDROCODONE BITARTRATE AND ACETAMINOPHEN 1 TABLET: 10; 325 TABLET ORAL at 06:06

## 2019-01-01 RX ADMIN — HYDROCODONE BITARTRATE AND ACETAMINOPHEN 1 TABLET: 10; 325 TABLET ORAL at 23:02

## 2019-01-01 RX ADMIN — HYDRALAZINE HYDROCHLORIDE 75 MG: 50 TABLET ORAL at 16:07

## 2019-01-01 RX ADMIN — HYDRALAZINE HYDROCHLORIDE 50 MG: 50 TABLET ORAL at 21:35

## 2019-01-01 RX ADMIN — BUMETANIDE 2 MG: 1 TABLET ORAL at 17:51

## 2019-01-01 RX ADMIN — BUMETANIDE 2 MG: 1 TABLET ORAL at 17:05

## 2019-01-01 RX ADMIN — NITROGLYCERIN 0.4 MG: 0.4 TABLET, ORALLY DISINTEGRATING SUBLINGUAL at 20:45

## 2019-01-01 RX ADMIN — BUMETANIDE 2 MG: 0.25 INJECTION INTRAMUSCULAR; INTRAVENOUS at 17:01

## 2019-01-01 RX ADMIN — IPRATROPIUM BROMIDE AND ALBUTEROL SULFATE 3 ML: 2.5; .5 SOLUTION RESPIRATORY (INHALATION) at 19:40

## 2019-01-01 RX ADMIN — ROSUVASTATIN CALCIUM 20 MG: 20 TABLET, FILM COATED ORAL at 09:19

## 2019-01-01 RX ADMIN — HYDROCODONE BITARTRATE AND ACETAMINOPHEN 1 TABLET: 10; 325 TABLET ORAL at 23:52

## 2019-01-01 RX ADMIN — RANOLAZINE 500 MG: 500 TABLET, FILM COATED, EXTENDED RELEASE ORAL at 20:32

## 2019-01-01 RX ADMIN — SODIUM CHLORIDE, PRESERVATIVE FREE 10 ML: 5 INJECTION INTRAVENOUS at 09:27

## 2019-01-01 RX ADMIN — POTASSIUM CHLORIDE 40 MEQ: 750 CAPSULE, EXTENDED RELEASE ORAL at 10:06

## 2019-01-01 RX ADMIN — ISOSORBIDE MONONITRATE 60 MG: 60 TABLET, EXTENDED RELEASE ORAL at 21:42

## 2019-01-01 RX ADMIN — ISOSORBIDE MONONITRATE 60 MG: 60 TABLET, EXTENDED RELEASE ORAL at 08:45

## 2019-01-01 RX ADMIN — FERROUS SULFATE TAB EC 324 MG (65 MG FE EQUIVALENT) 324 MG: 324 (65 FE) TABLET DELAYED RESPONSE at 10:12

## 2019-01-01 RX ADMIN — CEFTRIAXONE SODIUM 1 G: 1 INJECTION, POWDER, FOR SOLUTION INTRAMUSCULAR; INTRAVENOUS at 16:48

## 2019-01-01 RX ADMIN — DEXTROSE MONOHYDRATE: 25 INJECTION, SOLUTION INTRAVENOUS at 01:27

## 2019-01-01 RX ADMIN — SODIUM CHLORIDE, PRESERVATIVE FREE 3 ML: 5 INJECTION INTRAVENOUS at 21:04

## 2019-01-01 RX ADMIN — ISOSORBIDE MONONITRATE 90 MG: 60 TABLET, EXTENDED RELEASE ORAL at 10:04

## 2019-01-01 RX ADMIN — CLOPIDOGREL BISULFATE 75 MG: 75 TABLET ORAL at 08:32

## 2019-01-01 RX ADMIN — LISINOPRIL 10 MG: 10 TABLET ORAL at 08:31

## 2019-01-01 RX ADMIN — POLYETHYLENE GLYCOL 3350 17 G: 17 POWDER, FOR SOLUTION ORAL at 08:46

## 2019-01-01 RX ADMIN — SPIRONOLACTONE 25 MG: 25 TABLET ORAL at 08:32

## 2019-01-01 RX ADMIN — ISOSORBIDE MONONITRATE 60 MG: 60 TABLET, EXTENDED RELEASE ORAL at 10:08

## 2019-01-01 RX ADMIN — HYDROCODONE BITARTRATE AND ACETAMINOPHEN 1 TABLET: 10; 325 TABLET ORAL at 17:14

## 2019-01-01 RX ADMIN — FAMOTIDINE 20 MG: 10 INJECTION INTRAVENOUS at 08:23

## 2019-01-01 RX ADMIN — RANOLAZINE 500 MG: 500 TABLET, FILM COATED, EXTENDED RELEASE ORAL at 08:25

## 2019-01-01 RX ADMIN — CARVEDILOL 3.12 MG: 3.12 TABLET, FILM COATED ORAL at 18:33

## 2019-01-01 RX ADMIN — ISOSORBIDE MONONITRATE 60 MG: 60 TABLET, EXTENDED RELEASE ORAL at 08:56

## 2019-01-01 RX ADMIN — FUROSEMIDE 20 MG: 10 INJECTION, SOLUTION INTRAMUSCULAR; INTRAVENOUS at 09:43

## 2019-01-01 RX ADMIN — PANTOPRAZOLE SODIUM 40 MG: 40 TABLET, DELAYED RELEASE ORAL at 05:22

## 2019-01-01 RX ADMIN — ONDANSETRON 4 MG: 4 TABLET, ORALLY DISINTEGRATING ORAL at 15:45

## 2019-01-01 RX ADMIN — SODIUM CHLORIDE, PRESERVATIVE FREE 10 ML: 5 INJECTION INTRAVENOUS at 08:42

## 2019-01-01 RX ADMIN — ATORVASTATIN CALCIUM 40 MG: 40 TABLET, FILM COATED ORAL at 21:30

## 2019-01-01 RX ADMIN — FERROUS SULFATE TAB EC 324 MG (65 MG FE EQUIVALENT) 324 MG: 324 (65 FE) TABLET DELAYED RESPONSE at 17:25

## 2019-01-01 RX ADMIN — LISINOPRIL 10 MG: 10 TABLET ORAL at 08:51

## 2019-01-01 RX ADMIN — ASPIRIN 81 MG CHEWABLE TABLET 81 MG: 81 TABLET CHEWABLE at 09:11

## 2019-01-01 RX ADMIN — HYDRALAZINE HYDROCHLORIDE 10 MG: 20 INJECTION INTRAMUSCULAR; INTRAVENOUS at 21:38

## 2019-01-01 RX ADMIN — ASPIRIN 325 MG: 325 TABLET, COATED ORAL at 20:59

## 2019-01-01 RX ADMIN — CARVEDILOL 6.25 MG: 6.25 TABLET, FILM COATED ORAL at 09:42

## 2019-01-01 RX ADMIN — HYDROCODONE BITARTRATE AND ACETAMINOPHEN 1 TABLET: 10; 325 TABLET ORAL at 11:54

## 2019-01-01 RX ADMIN — HYDRALAZINE HYDROCHLORIDE 50 MG: 50 TABLET ORAL at 09:24

## 2019-01-01 RX ADMIN — METOCLOPRAMIDE 5 MG: 5 INJECTION, SOLUTION INTRAMUSCULAR; INTRAVENOUS at 06:49

## 2019-01-01 RX ADMIN — ZOLPIDEM TARTRATE 5 MG: 5 TABLET ORAL at 20:15

## 2019-01-01 RX ADMIN — SPIRONOLACTONE 25 MG: 25 TABLET ORAL at 09:01

## 2019-01-01 RX ADMIN — FERROUS SULFATE TAB EC 324 MG (65 MG FE EQUIVALENT) 324 MG: 324 (65 FE) TABLET DELAYED RESPONSE at 18:01

## 2019-01-01 RX ADMIN — SODIUM CHLORIDE, PRESERVATIVE FREE 3 ML: 5 INJECTION INTRAVENOUS at 20:51

## 2019-01-01 RX ADMIN — ZOLPIDEM TARTRATE 10 MG: 5 TABLET ORAL at 21:23

## 2019-01-01 RX ADMIN — BUMETANIDE 2 MG: 1 TABLET ORAL at 06:02

## 2019-01-01 RX ADMIN — HYDROCODONE BITARTRATE AND ACETAMINOPHEN 1 TABLET: 10; 325 TABLET ORAL at 20:21

## 2019-01-01 RX ADMIN — FAMOTIDINE 40 MG: 40 TABLET ORAL at 10:05

## 2019-01-01 RX ADMIN — SODIUM CHLORIDE, PRESERVATIVE FREE 3 ML: 5 INJECTION INTRAVENOUS at 09:31

## 2019-01-01 RX ADMIN — HYDROCODONE BITARTRATE AND ACETAMINOPHEN 1 TABLET: 10; 325 TABLET ORAL at 13:40

## 2019-01-01 RX ADMIN — HYDROCODONE BITARTRATE AND ACETAMINOPHEN 1 TABLET: 10; 325 TABLET ORAL at 19:55

## 2019-01-01 RX ADMIN — ASPIRIN 81 MG CHEWABLE TABLET 81 MG: 81 TABLET CHEWABLE at 08:27

## 2019-01-01 RX ADMIN — CARVEDILOL 6.25 MG: 6.25 TABLET, FILM COATED ORAL at 17:14

## 2019-01-01 RX ADMIN — HYDROCODONE BITARTRATE AND ACETAMINOPHEN 1 TABLET: 10; 325 TABLET ORAL at 17:52

## 2019-01-01 RX ADMIN — POLYETHYLENE GLYCOL 3350 17 G: 17 POWDER, FOR SOLUTION ORAL at 09:42

## 2019-01-01 RX ADMIN — POTASSIUM CHLORIDE 40 MEQ: 750 CAPSULE, EXTENDED RELEASE ORAL at 17:51

## 2019-01-01 RX ADMIN — NITROGLYCERIN 0.4 MG: 0.4 TABLET SUBLINGUAL at 17:43

## 2019-01-01 RX ADMIN — HYDROCODONE BITARTRATE AND ACETAMINOPHEN 1 TABLET: 10; 325 TABLET ORAL at 18:10

## 2019-01-01 RX ADMIN — CLOPIDOGREL BISULFATE 75 MG: 75 TABLET ORAL at 08:51

## 2019-01-01 RX ADMIN — SERTRALINE HYDROCHLORIDE 50 MG: 50 TABLET ORAL at 08:27

## 2019-01-01 RX ADMIN — SODIUM CHLORIDE, PRESERVATIVE FREE 3 ML: 5 INJECTION INTRAVENOUS at 20:15

## 2019-01-01 RX ADMIN — FERROUS SULFATE TAB EC 324 MG (65 MG FE EQUIVALENT) 324 MG: 324 (65 FE) TABLET DELAYED RESPONSE at 09:01

## 2019-01-01 RX ADMIN — SODIUM CHLORIDE, PRESERVATIVE FREE 3 ML: 5 INJECTION INTRAVENOUS at 10:09

## 2019-01-01 RX ADMIN — ISOSORBIDE MONONITRATE 60 MG: 60 TABLET, EXTENDED RELEASE ORAL at 20:50

## 2019-01-01 RX ADMIN — POTASSIUM CHLORIDE 20 MEQ: 400 INJECTION, SOLUTION INTRAVENOUS at 16:36

## 2019-01-01 RX ADMIN — METOLAZONE 2.5 MG: 2.5 TABLET ORAL at 17:14

## 2019-01-01 RX ADMIN — SODIUM CHLORIDE, PRESERVATIVE FREE 3 ML: 5 INJECTION INTRAVENOUS at 09:19

## 2019-01-01 RX ADMIN — POLYETHYLENE GLYCOL 3350 17 G: 17 POWDER, FOR SOLUTION ORAL at 12:23

## 2019-01-01 RX ADMIN — CARVEDILOL 6.25 MG: 6.25 TABLET, FILM COATED ORAL at 17:06

## 2019-01-01 RX ADMIN — FAMOTIDINE 20 MG: 10 INJECTION INTRAVENOUS at 09:03

## 2019-01-01 RX ADMIN — ISOSORBIDE MONONITRATE 60 MG: 60 TABLET, EXTENDED RELEASE ORAL at 08:32

## 2019-01-01 RX ADMIN — ISOSORBIDE MONONITRATE 60 MG: 60 TABLET, EXTENDED RELEASE ORAL at 21:34

## 2019-01-01 RX ADMIN — RANOLAZINE 500 MG: 500 TABLET, FILM COATED, EXTENDED RELEASE ORAL at 21:13

## 2019-01-01 RX ADMIN — RANOLAZINE 500 MG: 500 TABLET, FILM COATED, EXTENDED RELEASE ORAL at 10:12

## 2019-01-01 RX ADMIN — BUMETANIDE 2 MG: 0.25 INJECTION INTRAMUSCULAR; INTRAVENOUS at 06:49

## 2019-01-01 RX ADMIN — CLOPIDOGREL BISULFATE 75 MG: 75 TABLET ORAL at 16:34

## 2019-01-01 RX ADMIN — METOCLOPRAMIDE 5 MG: 5 INJECTION, SOLUTION INTRAMUSCULAR; INTRAVENOUS at 20:52

## 2019-01-01 RX ADMIN — SODIUM CHLORIDE, PRESERVATIVE FREE 10 ML: 5 INJECTION INTRAVENOUS at 09:41

## 2019-01-01 RX ADMIN — POLYETHYLENE GLYCOL 3350 17 G: 17 POWDER, FOR SOLUTION ORAL at 16:31

## 2019-01-01 RX ADMIN — HEPARIN SODIUM 5000 UNITS: 5000 INJECTION INTRAVENOUS; SUBCUTANEOUS at 08:32

## 2019-01-01 RX ADMIN — CARVEDILOL 3.12 MG: 3.12 TABLET, FILM COATED ORAL at 09:02

## 2019-01-01 RX ADMIN — FERROUS SULFATE TAB EC 324 MG (65 MG FE EQUIVALENT) 324 MG: 324 (65 FE) TABLET DELAYED RESPONSE at 17:16

## 2019-01-01 RX ADMIN — ISOSORBIDE MONONITRATE 60 MG: 60 TABLET, EXTENDED RELEASE ORAL at 09:24

## 2019-01-01 RX ADMIN — HYDROCODONE BITARTRATE AND ACETAMINOPHEN 1 TABLET: 10; 325 TABLET ORAL at 10:24

## 2019-01-01 RX ADMIN — FERROUS SULFATE TAB EC 324 MG (65 MG FE EQUIVALENT) 324 MG: 324 (65 FE) TABLET DELAYED RESPONSE at 17:44

## 2019-01-01 RX ADMIN — MORPHINE SULFATE 2 MG: 2 INJECTION, SOLUTION INTRAMUSCULAR; INTRAVENOUS at 11:42

## 2019-01-01 RX ADMIN — CLOPIDOGREL BISULFATE 75 MG: 75 TABLET ORAL at 08:27

## 2019-01-01 RX ADMIN — CLOPIDOGREL BISULFATE 75 MG: 75 TABLET ORAL at 08:38

## 2019-01-01 RX ADMIN — POLYETHYLENE GLYCOL 3350 17 G: 17 POWDER, FOR SOLUTION ORAL at 08:58

## 2019-01-01 RX ADMIN — SODIUM CHLORIDE, PRESERVATIVE FREE 3 ML: 5 INJECTION INTRAVENOUS at 09:15

## 2019-01-01 RX ADMIN — CLOPIDOGREL BISULFATE 75 MG: 75 TABLET ORAL at 10:05

## 2019-01-01 RX ADMIN — Medication 15 MG: at 11:57

## 2019-01-01 RX ADMIN — CARVEDILOL 6.25 MG: 6.25 TABLET, FILM COATED ORAL at 21:11

## 2019-01-01 RX ADMIN — POTASSIUM CHLORIDE 40 MEQ: 750 CAPSULE, EXTENDED RELEASE ORAL at 10:49

## 2019-01-01 RX ADMIN — METOCLOPRAMIDE 5 MG: 5 INJECTION, SOLUTION INTRAMUSCULAR; INTRAVENOUS at 21:36

## 2019-01-01 RX ADMIN — HYDRALAZINE HYDROCHLORIDE 50 MG: 50 TABLET ORAL at 08:25

## 2019-01-01 RX ADMIN — NITROGLYCERIN 0.4 MG: 0.4 TABLET, ORALLY DISINTEGRATING SUBLINGUAL at 20:58

## 2019-01-01 RX ADMIN — HYDROCODONE BITARTRATE AND ACETAMINOPHEN 1 TABLET: 10; 325 TABLET ORAL at 09:02

## 2019-01-01 RX ADMIN — ZOLPIDEM TARTRATE 5 MG: 5 TABLET ORAL at 21:42

## 2019-01-01 RX ADMIN — AMLODIPINE BESYLATE 10 MG: 10 TABLET ORAL at 08:38

## 2019-01-01 RX ADMIN — ASPIRIN 81 MG CHEWABLE TABLET 81 MG: 81 TABLET CHEWABLE at 08:56

## 2019-01-01 RX ADMIN — ZOLPIDEM TARTRATE 10 MG: 5 TABLET ORAL at 20:32

## 2019-01-01 RX ADMIN — RANOLAZINE 500 MG: 500 TABLET, FILM COATED, EXTENDED RELEASE ORAL at 20:26

## 2019-01-01 RX ADMIN — ASPIRIN 81 MG CHEWABLE TABLET 81 MG: 81 TABLET CHEWABLE at 09:47

## 2019-01-01 RX ADMIN — BUMETANIDE 2 MG: 0.25 INJECTION INTRAMUSCULAR; INTRAVENOUS at 18:10

## 2019-01-01 RX ADMIN — SODIUM CHLORIDE, PRESERVATIVE FREE 3 ML: 5 INJECTION INTRAVENOUS at 11:24

## 2019-01-01 RX ADMIN — ONDANSETRON 4 MG: 4 TABLET, ORALLY DISINTEGRATING ORAL at 01:01

## 2019-01-01 RX ADMIN — HYDRALAZINE HYDROCHLORIDE 50 MG: 50 TABLET ORAL at 15:50

## 2019-01-01 RX ADMIN — HYDROCODONE BITARTRATE AND ACETAMINOPHEN 1 TABLET: 10; 325 TABLET ORAL at 10:42

## 2019-01-01 RX ADMIN — SERTRALINE HYDROCHLORIDE 50 MG: 50 TABLET ORAL at 16:34

## 2019-01-01 RX ADMIN — ROSUVASTATIN CALCIUM 20 MG: 20 TABLET, FILM COATED ORAL at 17:03

## 2019-01-01 RX ADMIN — ATORVASTATIN CALCIUM 80 MG: 40 TABLET, FILM COATED ORAL at 00:33

## 2019-01-01 RX ADMIN — FAMOTIDINE 20 MG: 10 INJECTION INTRAVENOUS at 16:35

## 2019-01-01 RX ADMIN — FUROSEMIDE 20 MG: 10 INJECTION, SOLUTION INTRAMUSCULAR; INTRAVENOUS at 01:54

## 2019-01-01 RX ADMIN — HYDRALAZINE HYDROCHLORIDE 75 MG: 50 TABLET ORAL at 08:27

## 2019-01-01 RX ADMIN — HYDRALAZINE HYDROCHLORIDE 50 MG: 50 TABLET ORAL at 21:10

## 2019-01-01 RX ADMIN — SPIRONOLACTONE 25 MG: 25 TABLET ORAL at 17:03

## 2019-01-01 RX ADMIN — SERTRALINE HYDROCHLORIDE 50 MG: 50 TABLET ORAL at 09:02

## 2019-01-01 RX ADMIN — FUROSEMIDE 20 MG: 10 INJECTION, SOLUTION INTRAMUSCULAR; INTRAVENOUS at 10:06

## 2019-01-01 RX ADMIN — CARVEDILOL 3.12 MG: 3.12 TABLET, FILM COATED ORAL at 10:13

## 2019-01-01 RX ADMIN — RANOLAZINE 500 MG: 500 TABLET, FILM COATED, EXTENDED RELEASE ORAL at 21:19

## 2019-01-01 RX ADMIN — ISOSORBIDE MONONITRATE 90 MG: 60 TABLET, EXTENDED RELEASE ORAL at 08:27

## 2019-01-01 RX ADMIN — ASPIRIN 81 MG CHEWABLE TABLET 81 MG: 81 TABLET CHEWABLE at 08:51

## 2019-01-01 RX ADMIN — METOCLOPRAMIDE 5 MG: 5 INJECTION, SOLUTION INTRAMUSCULAR; INTRAVENOUS at 17:01

## 2019-01-01 RX ADMIN — FERROUS SULFATE TAB EC 324 MG (65 MG FE EQUIVALENT) 324 MG: 324 (65 FE) TABLET DELAYED RESPONSE at 08:21

## 2019-01-01 RX ADMIN — HYDRALAZINE HYDROCHLORIDE 50 MG: 50 TABLET ORAL at 21:19

## 2019-01-01 RX ADMIN — SODIUM CHLORIDE, PRESERVATIVE FREE 3 ML: 5 INJECTION INTRAVENOUS at 08:59

## 2019-01-01 RX ADMIN — CARVEDILOL 12.5 MG: 12.5 TABLET, FILM COATED ORAL at 17:51

## 2019-01-01 RX ADMIN — CARVEDILOL 6.25 MG: 6.25 TABLET, FILM COATED ORAL at 17:56

## 2019-01-01 RX ADMIN — FUROSEMIDE 20 MG: 10 INJECTION, SOLUTION INTRAMUSCULAR; INTRAVENOUS at 21:03

## 2019-01-01 RX ADMIN — POTASSIUM CHLORIDE 40 MEQ: 750 CAPSULE, EXTENDED RELEASE ORAL at 04:06

## 2019-01-01 RX ADMIN — SODIUM CHLORIDE, PRESERVATIVE FREE 3 ML: 5 INJECTION INTRAVENOUS at 21:02

## 2019-01-01 RX ADMIN — CARVEDILOL 12.5 MG: 12.5 TABLET, FILM COATED ORAL at 15:04

## 2019-01-01 RX ADMIN — POLYETHYLENE GLYCOL 3350 17 G: 17 POWDER, FOR SOLUTION ORAL at 08:31

## 2019-01-01 RX ADMIN — FERROUS SULFATE TAB EC 324 MG (65 MG FE EQUIVALENT) 324 MG: 324 (65 FE) TABLET DELAYED RESPONSE at 16:29

## 2019-01-01 RX ADMIN — MORPHINE SULFATE 2 MG: 2 INJECTION, SOLUTION INTRAMUSCULAR; INTRAVENOUS at 22:00

## 2019-01-01 RX ADMIN — CARVEDILOL 6.25 MG: 6.25 TABLET, FILM COATED ORAL at 17:25

## 2019-01-01 RX ADMIN — CARVEDILOL 6.25 MG: 6.25 TABLET, FILM COATED ORAL at 08:37

## 2019-01-01 RX ADMIN — ENOXAPARIN SODIUM 40 MG: 40 INJECTION SUBCUTANEOUS at 23:52

## 2019-01-01 RX ADMIN — HYDROCODONE BITARTRATE AND ACETAMINOPHEN 1 TABLET: 10; 325 TABLET ORAL at 09:42

## 2019-01-01 RX ADMIN — CLOPIDOGREL BISULFATE 75 MG: 75 TABLET ORAL at 08:45

## 2019-01-01 RX ADMIN — AMLODIPINE BESYLATE 10 MG: 10 TABLET ORAL at 10:26

## 2019-01-01 RX ADMIN — SPIRONOLACTONE 25 MG: 25 TABLET ORAL at 08:59

## 2019-01-01 RX ADMIN — ROSUVASTATIN CALCIUM 20 MG: 20 TABLET, FILM COATED ORAL at 09:24

## 2019-01-01 RX ADMIN — BUMETANIDE 0.5 MG: 0.25 INJECTION INTRAMUSCULAR; INTRAVENOUS at 00:35

## 2019-01-01 RX ADMIN — ASPIRIN 81 MG CHEWABLE TABLET 81 MG: 81 TABLET CHEWABLE at 10:04

## 2019-01-01 RX ADMIN — HEPARIN SODIUM 8100 UNITS: 5000 INJECTION INTRAVENOUS; SUBCUTANEOUS at 16:17

## 2019-01-01 RX ADMIN — ATORVASTATIN CALCIUM 40 MG: 40 TABLET, FILM COATED ORAL at 21:42

## 2019-01-01 RX ADMIN — CLOPIDOGREL BISULFATE 75 MG: 75 TABLET, FILM COATED ORAL at 09:02

## 2019-01-01 RX ADMIN — HYDROCODONE BITARTRATE AND ACETAMINOPHEN 1 TABLET: 10; 325 TABLET ORAL at 12:12

## 2019-01-01 RX ADMIN — HYDRALAZINE HYDROCHLORIDE 50 MG: 50 TABLET ORAL at 21:12

## 2019-01-01 RX ADMIN — AMLODIPINE BESYLATE 10 MG: 10 TABLET ORAL at 10:05

## 2019-01-01 RX ADMIN — RANOLAZINE 500 MG: 500 TABLET, FILM COATED, EXTENDED RELEASE ORAL at 21:35

## 2019-01-01 RX ADMIN — SPIRONOLACTONE 25 MG: 25 TABLET ORAL at 09:12

## 2019-01-01 RX ADMIN — SODIUM CHLORIDE, PRESERVATIVE FREE 10 ML: 5 INJECTION INTRAVENOUS at 12:04

## 2019-01-01 RX ADMIN — ASPIRIN 81 MG CHEWABLE TABLET 81 MG: 81 TABLET CHEWABLE at 08:31

## 2019-01-01 RX ADMIN — CLOPIDOGREL BISULFATE 75 MG: 75 TABLET ORAL at 09:42

## 2019-01-01 RX ADMIN — HYDROCODONE BITARTRATE AND ACETAMINOPHEN 1 TABLET: 10; 325 TABLET ORAL at 21:12

## 2019-01-01 RX ADMIN — POLYETHYLENE GLYCOL 3350 17 G: 17 POWDER, FOR SOLUTION ORAL at 08:23

## 2019-01-01 RX ADMIN — HYDROCODONE BITARTRATE AND ACETAMINOPHEN 1 TABLET: 10; 325 TABLET ORAL at 06:29

## 2019-01-01 RX ADMIN — ZOLPIDEM TARTRATE 5 MG: 5 TABLET ORAL at 21:03

## 2019-01-01 RX ADMIN — HYDROCODONE BITARTRATE AND ACETAMINOPHEN 1 TABLET: 10; 325 TABLET ORAL at 12:56

## 2019-01-01 RX ADMIN — RANOLAZINE 500 MG: 500 TABLET, FILM COATED, EXTENDED RELEASE ORAL at 12:03

## 2019-01-01 RX ADMIN — ZOLPIDEM TARTRATE 5 MG: 5 TABLET ORAL at 20:41

## 2019-01-01 RX ADMIN — CLOPIDOGREL BISULFATE 75 MG: 75 TABLET, FILM COATED ORAL at 08:40

## 2019-01-01 RX ADMIN — POTASSIUM CHLORIDE 40 MEQ: 750 CAPSULE, EXTENDED RELEASE ORAL at 13:28

## 2019-01-01 RX ADMIN — SODIUM CHLORIDE, PRESERVATIVE FREE 10 ML: 5 INJECTION INTRAVENOUS at 21:28

## 2019-01-01 RX ADMIN — CARVEDILOL 3.12 MG: 3.12 TABLET, FILM COATED ORAL at 18:01

## 2019-01-01 RX ADMIN — HYDRALAZINE HYDROCHLORIDE 50 MG: 50 TABLET ORAL at 20:32

## 2019-01-01 RX ADMIN — HYDRALAZINE HYDROCHLORIDE 50 MG: 50 TABLET ORAL at 08:56

## 2019-01-01 RX ADMIN — AMLODIPINE BESYLATE 5 MG: 5 TABLET ORAL at 09:31

## 2019-01-01 RX ADMIN — BUMETANIDE 1 MG: 1 TABLET ORAL at 17:03

## 2019-01-01 RX ADMIN — FERROUS SULFATE TAB EC 324 MG (65 MG FE EQUIVALENT) 324 MG: 324 (65 FE) TABLET DELAYED RESPONSE at 08:40

## 2019-01-01 RX ADMIN — PROMETHAZINE HYDROCHLORIDE 12.5 MG: 25 INJECTION INTRAMUSCULAR; INTRAVENOUS at 12:04

## 2019-01-01 RX ADMIN — PANTOPRAZOLE SODIUM 40 MG: 40 TABLET, DELAYED RELEASE ORAL at 13:40

## 2019-01-01 RX ADMIN — AMLODIPINE BESYLATE 5 MG: 5 TABLET ORAL at 11:44

## 2019-01-01 RX ADMIN — ISOSORBIDE MONONITRATE 60 MG: 60 TABLET, EXTENDED RELEASE ORAL at 09:12

## 2019-01-01 RX ADMIN — CLOPIDOGREL BISULFATE 75 MG: 75 TABLET ORAL at 08:25

## 2019-01-01 RX ADMIN — LISINOPRIL 10 MG: 10 TABLET ORAL at 10:08

## 2019-01-01 RX ADMIN — CLOPIDOGREL BISULFATE 75 MG: 75 TABLET, FILM COATED ORAL at 10:13

## 2019-01-01 RX ADMIN — SODIUM CHLORIDE: 900 INJECTION, SOLUTION INTRAVENOUS at 14:45

## 2019-01-01 RX ADMIN — ASPIRIN 81 MG CHEWABLE TABLET 81 MG: 81 TABLET CHEWABLE at 08:38

## 2019-01-01 RX ADMIN — ROSUVASTATIN CALCIUM 20 MG: 20 TABLET, FILM COATED ORAL at 10:12

## 2019-01-01 RX ADMIN — SODIUM CHLORIDE, PRESERVATIVE FREE 10 ML: 5 INJECTION INTRAVENOUS at 21:14

## 2019-01-01 RX ADMIN — METOCLOPRAMIDE 5 MG: 5 INJECTION, SOLUTION INTRAMUSCULAR; INTRAVENOUS at 12:04

## 2019-01-01 RX ADMIN — BUMETANIDE 1 MG: 1 TABLET ORAL at 09:17

## 2019-01-01 RX ADMIN — SPIRONOLACTONE 25 MG: 25 TABLET ORAL at 08:51

## 2019-01-01 RX ADMIN — Medication 15 MG: at 12:03

## 2019-01-01 RX ADMIN — FERROUS SULFATE TAB EC 324 MG (65 MG FE EQUIVALENT) 324 MG: 324 (65 FE) TABLET DELAYED RESPONSE at 17:39

## 2019-01-01 RX ADMIN — SODIUM CHLORIDE, PRESERVATIVE FREE 3 ML: 5 INJECTION INTRAVENOUS at 08:50

## 2019-01-01 RX ADMIN — LISINOPRIL 10 MG: 10 TABLET ORAL at 08:59

## 2019-01-01 RX ADMIN — BUMETANIDE 2 MG: 1 TABLET ORAL at 08:45

## 2019-01-01 RX ADMIN — HYDRALAZINE HYDROCHLORIDE 75 MG: 50 TABLET ORAL at 21:03

## 2019-01-01 RX ADMIN — ISOSORBIDE MONONITRATE 60 MG: 60 TABLET, EXTENDED RELEASE ORAL at 21:12

## 2019-01-01 RX ADMIN — BUMETANIDE 2 MG: 0.25 INJECTION INTRAMUSCULAR; INTRAVENOUS at 06:43

## 2019-01-01 RX ADMIN — HYDROCODONE BITARTRATE AND ACETAMINOPHEN 1 TABLET: 10; 325 TABLET ORAL at 10:57

## 2019-01-01 RX ADMIN — ISOSORBIDE MONONITRATE 90 MG: 60 TABLET, EXTENDED RELEASE ORAL at 00:33

## 2019-01-01 RX ADMIN — ASPIRIN 81 MG CHEWABLE TABLET 81 MG: 81 TABLET CHEWABLE at 08:21

## 2019-01-01 RX ADMIN — HYDROCODONE BITARTRATE AND ACETAMINOPHEN 1 TABLET: 10; 325 TABLET ORAL at 14:01

## 2019-01-01 RX ADMIN — RANOLAZINE 500 MG: 500 TABLET, FILM COATED, EXTENDED RELEASE ORAL at 09:02

## 2019-01-01 RX ADMIN — HYDROCODONE BITARTRATE AND ACETAMINOPHEN 1 TABLET: 10; 325 TABLET ORAL at 11:51

## 2019-01-01 RX ADMIN — RANOLAZINE 500 MG: 500 TABLET, FILM COATED, EXTENDED RELEASE ORAL at 09:19

## 2019-01-01 RX ADMIN — CARVEDILOL 3.12 MG: 3.12 TABLET, FILM COATED ORAL at 08:21

## 2019-01-01 RX ADMIN — HEPARIN SODIUM 5000 UNITS: 5000 INJECTION INTRAVENOUS; SUBCUTANEOUS at 06:41

## 2019-01-01 RX ADMIN — HYDRALAZINE HYDROCHLORIDE 50 MG: 50 TABLET ORAL at 08:21

## 2019-01-01 RX ADMIN — FAMOTIDINE 40 MG: 40 TABLET ORAL at 09:14

## 2019-01-01 RX ADMIN — POTASSIUM CHLORIDE 40 MEQ: 750 CAPSULE, EXTENDED RELEASE ORAL at 11:51

## 2019-01-01 RX ADMIN — ASPIRIN 81 MG: 81 TABLET, COATED ORAL at 09:01

## 2019-01-01 RX ADMIN — ASPIRIN 81 MG CHEWABLE TABLET 81 MG: 81 TABLET CHEWABLE at 09:24

## 2019-01-01 RX ADMIN — HYDROCODONE BITARTRATE AND ACETAMINOPHEN 1 TABLET: 10; 325 TABLET ORAL at 05:44

## 2019-01-01 RX ADMIN — HYDROCODONE BITARTRATE AND ACETAMINOPHEN 1 TABLET: 10; 325 TABLET ORAL at 23:31

## 2019-01-01 RX ADMIN — CARVEDILOL 6.25 MG: 6.25 TABLET, FILM COATED ORAL at 08:25

## 2019-01-01 RX ADMIN — PROMETHAZINE HYDROCHLORIDE 12.5 MG: 25 INJECTION INTRAMUSCULAR; INTRAVENOUS at 18:18

## 2019-01-01 RX ADMIN — CLOPIDOGREL BISULFATE 75 MG: 75 TABLET ORAL at 09:12

## 2019-01-01 RX ADMIN — HYDROCODONE BITARTRATE AND ACETAMINOPHEN 1 TABLET: 10; 325 TABLET ORAL at 06:05

## 2019-01-01 RX ADMIN — EPOETIN ALFA 10000 UNITS: 10000 SOLUTION INTRAVENOUS; SUBCUTANEOUS at 10:42

## 2019-01-01 RX ADMIN — HYDROCODONE BITARTRATE AND ACETAMINOPHEN 1 TABLET: 10; 325 TABLET ORAL at 17:43

## 2019-01-01 RX ADMIN — HYDRALAZINE HYDROCHLORIDE 75 MG: 50 TABLET ORAL at 20:20

## 2019-01-01 RX ADMIN — FUROSEMIDE 20 MG: 10 INJECTION, SOLUTION INTRAMUSCULAR; INTRAVENOUS at 17:37

## 2019-01-01 RX ADMIN — HYDROCODONE BITARTRATE AND ACETAMINOPHEN 1 TABLET: 10; 325 TABLET ORAL at 21:10

## 2019-01-01 RX ADMIN — SERTRALINE HYDROCHLORIDE 50 MG: 50 TABLET ORAL at 08:21

## 2019-01-01 RX ADMIN — FAMOTIDINE 40 MG: 40 TABLET ORAL at 09:02

## 2019-01-01 RX ADMIN — SERTRALINE HYDROCHLORIDE 50 MG: 50 TABLET ORAL at 08:41

## 2019-01-01 RX ADMIN — ATORVASTATIN CALCIUM 40 MG: 40 TABLET, FILM COATED ORAL at 20:41

## 2019-01-01 RX ADMIN — HYDROCODONE BITARTRATE AND ACETAMINOPHEN 1 TABLET: 10; 325 TABLET ORAL at 13:28

## 2019-01-01 RX ADMIN — POLYETHYLENE GLYCOL 3350 17 G: 17 POWDER, FOR SOLUTION ORAL at 08:51

## 2019-01-01 RX ADMIN — ZOLPIDEM TARTRATE 10 MG: 5 TABLET ORAL at 21:38

## 2019-01-01 RX ADMIN — HYDRALAZINE HYDROCHLORIDE 10 MG: 20 INJECTION INTRAMUSCULAR; INTRAVENOUS at 22:08

## 2019-01-01 RX ADMIN — CLOPIDOGREL BISULFATE 75 MG: 75 TABLET ORAL at 10:08

## 2019-01-01 RX ADMIN — Medication 15 MG: at 11:17

## 2019-01-01 RX ADMIN — HYDROCODONE BITARTRATE AND ACETAMINOPHEN 1 TABLET: 10; 325 TABLET ORAL at 17:33

## 2019-01-01 RX ADMIN — HYDROCODONE BITARTRATE AND ACETAMINOPHEN 1 TABLET: 10; 325 TABLET ORAL at 05:56

## 2019-01-01 RX ADMIN — HYDROCODONE BITARTRATE AND ACETAMINOPHEN 1 TABLET: 10; 325 TABLET ORAL at 21:35

## 2019-01-01 RX ADMIN — HYDROCODONE BITARTRATE AND ACETAMINOPHEN 1 TABLET: 10; 325 TABLET ORAL at 23:12

## 2019-01-01 RX ADMIN — CARVEDILOL 6.25 MG: 6.25 TABLET, FILM COATED ORAL at 18:10

## 2019-01-01 RX ADMIN — HYDROCODONE BITARTRATE AND ACETAMINOPHEN 1 TABLET: 10; 325 TABLET ORAL at 04:53

## 2019-01-01 RX ADMIN — SODIUM CHLORIDE, PRESERVATIVE FREE 10 ML: 5 INJECTION INTRAVENOUS at 20:27

## 2019-01-01 RX ADMIN — ISOSORBIDE MONONITRATE 60 MG: 60 TABLET, EXTENDED RELEASE ORAL at 09:18

## 2019-01-01 RX ADMIN — ENOXAPARIN SODIUM 40 MG: 40 INJECTION SUBCUTANEOUS at 23:56

## 2019-01-01 RX ADMIN — ZOLPIDEM TARTRATE 10 MG: 5 TABLET ORAL at 20:52

## 2019-01-01 RX ADMIN — SODIUM CHLORIDE, PRESERVATIVE FREE 10 ML: 5 INJECTION INTRAVENOUS at 08:56

## 2019-01-01 RX ADMIN — SPIRONOLACTONE 25 MG: 25 TABLET ORAL at 08:55

## 2019-01-01 RX ADMIN — BUMETANIDE 2 MG: 0.25 INJECTION INTRAMUSCULAR; INTRAVENOUS at 06:28

## 2019-01-01 RX ADMIN — NITROGLYCERIN 0.4 MG: 0.4 TABLET, ORALLY DISINTEGRATING SUBLINGUAL at 20:51

## 2019-01-01 RX ADMIN — SPIRONOLACTONE 25 MG: 25 TABLET ORAL at 10:08

## 2019-01-01 RX ADMIN — FAMOTIDINE 40 MG: 40 TABLET ORAL at 09:42

## 2019-01-01 RX ADMIN — HYDROCODONE BITARTRATE AND ACETAMINOPHEN 1 TABLET: 10; 325 TABLET ORAL at 21:38

## 2019-01-01 RX ADMIN — CLOPIDOGREL BISULFATE 75 MG: 75 TABLET ORAL at 09:00

## 2019-01-01 RX ADMIN — ZOLPIDEM TARTRATE 5 MG: 5 TABLET ORAL at 01:23

## 2019-01-01 RX ADMIN — DEXTROSE MONOHYDRATE: 500 INJECTION PARENTERAL at 01:27

## 2019-01-01 RX ADMIN — FERROUS SULFATE TAB EC 324 MG (65 MG FE EQUIVALENT) 324 MG: 324 (65 FE) TABLET DELAYED RESPONSE at 08:32

## 2019-01-01 RX ADMIN — SODIUM CHLORIDE: 9 INJECTION, SOLUTION INTRAVENOUS at 14:45

## 2019-01-01 RX ADMIN — CARVEDILOL 6.25 MG: 6.25 TABLET, FILM COATED ORAL at 17:22

## 2019-01-01 RX ADMIN — FAMOTIDINE 40 MG: 40 TABLET ORAL at 10:08

## 2019-01-01 RX ADMIN — HYDROCODONE BITARTRATE AND ACETAMINOPHEN 1 TABLET: 10; 325 TABLET ORAL at 11:35

## 2019-01-01 RX ADMIN — ISOSORBIDE MONONITRATE 60 MG: 60 TABLET, EXTENDED RELEASE ORAL at 20:32

## 2019-01-01 RX ADMIN — HYDROCODONE BITARTRATE AND ACETAMINOPHEN 1 TABLET: 10; 325 TABLET ORAL at 23:56

## 2019-01-01 RX ADMIN — HYDROCODONE BITARTRATE AND ACETAMINOPHEN 1 TABLET: 10; 325 TABLET ORAL at 06:43

## 2019-01-01 RX ADMIN — HYDRALAZINE HYDROCHLORIDE 75 MG: 50 TABLET ORAL at 17:15

## 2019-01-01 RX ADMIN — SERTRALINE HYDROCHLORIDE 50 MG: 50 TABLET ORAL at 10:05

## 2019-01-01 RX ADMIN — POLYETHYLENE GLYCOL 3350 17 G: 17 POWDER, FOR SOLUTION ORAL at 09:11

## 2019-01-01 RX ADMIN — PANTOPRAZOLE SODIUM 40 MG: 40 TABLET, DELAYED RELEASE ORAL at 05:35

## 2019-01-01 RX ADMIN — FERROUS SULFATE TAB EC 324 MG (65 MG FE EQUIVALENT) 324 MG: 324 (65 FE) TABLET DELAYED RESPONSE at 08:51

## 2019-01-01 RX ADMIN — HYDRALAZINE HYDROCHLORIDE 50 MG: 50 TABLET ORAL at 09:02

## 2019-01-01 RX ADMIN — Medication 15 MG: at 11:42

## 2019-01-01 RX ADMIN — ISOSORBIDE MONONITRATE 90 MG: 60 TABLET, EXTENDED RELEASE ORAL at 08:37

## 2019-01-01 RX ADMIN — POTASSIUM CHLORIDE 40 MEQ: 750 CAPSULE, EXTENDED RELEASE ORAL at 01:00

## 2019-01-01 RX ADMIN — ASPIRIN 81 MG CHEWABLE TABLET 81 MG: 81 TABLET CHEWABLE at 08:32

## 2019-01-01 RX ADMIN — ENOXAPARIN SODIUM 40 MG: 40 INJECTION SUBCUTANEOUS at 23:12

## 2019-01-01 RX ADMIN — ROSUVASTATIN CALCIUM 20 MG: 20 TABLET, FILM COATED ORAL at 09:02

## 2019-01-01 RX ADMIN — FAMOTIDINE 40 MG: 40 TABLET ORAL at 08:45

## 2019-01-01 RX ADMIN — FAMOTIDINE 40 MG: 40 TABLET ORAL at 08:51

## 2019-01-01 RX ADMIN — FAMOTIDINE 40 MG: 40 TABLET ORAL at 08:56

## 2019-01-01 RX ADMIN — SODIUM CHLORIDE, PRESERVATIVE FREE 3 ML: 5 INJECTION INTRAVENOUS at 20:44

## 2019-01-01 RX ADMIN — HYDROCODONE BITARTRATE AND ACETAMINOPHEN 1 TABLET: 10; 325 TABLET ORAL at 19:47

## 2019-01-01 RX ADMIN — CARVEDILOL 6.25 MG: 6.25 TABLET, FILM COATED ORAL at 08:51

## 2019-01-01 RX ADMIN — CEFTRIAXONE SODIUM 1 G: 1 INJECTION, POWDER, FOR SOLUTION INTRAMUSCULAR; INTRAVENOUS at 17:17

## 2019-01-01 RX ADMIN — RANOLAZINE 500 MG: 500 TABLET, FILM COATED, EXTENDED RELEASE ORAL at 08:40

## 2019-01-01 RX ADMIN — HYDRALAZINE HYDROCHLORIDE 75 MG: 50 TABLET ORAL at 20:51

## 2019-01-01 RX ADMIN — ASPIRIN 81 MG CHEWABLE TABLET 81 MG: 81 TABLET CHEWABLE at 10:12

## 2019-01-01 RX ADMIN — BUMETANIDE 2 MG: 0.25 INJECTION INTRAMUSCULAR; INTRAVENOUS at 06:14

## 2019-01-01 RX ADMIN — FERROUS SULFATE TAB EC 324 MG (65 MG FE EQUIVALENT) 324 MG: 324 (65 FE) TABLET DELAYED RESPONSE at 08:56

## 2019-01-01 RX ADMIN — SPIRONOLACTONE 25 MG: 25 TABLET ORAL at 09:25

## 2019-01-01 RX ADMIN — CARVEDILOL 6.25 MG: 6.25 TABLET, FILM COATED ORAL at 17:33

## 2019-01-01 RX ADMIN — ROSUVASTATIN CALCIUM 20 MG: 20 TABLET, FILM COATED ORAL at 08:25

## 2019-01-01 RX ADMIN — ISOSORBIDE MONONITRATE 60 MG: 60 TABLET, EXTENDED RELEASE ORAL at 21:29

## 2019-01-01 RX ADMIN — FAMOTIDINE 40 MG: 40 TABLET ORAL at 09:24

## 2019-01-01 RX ADMIN — ISOSORBIDE MONONITRATE 60 MG: 60 TABLET, EXTENDED RELEASE ORAL at 21:09

## 2019-01-01 RX ADMIN — ISOSORBIDE MONONITRATE 60 MG: 60 TABLET, EXTENDED RELEASE ORAL at 08:21

## 2019-01-01 RX ADMIN — HYDROCODONE BITARTRATE AND ACETAMINOPHEN 1 TABLET: 10; 325 TABLET ORAL at 20:52

## 2019-01-01 RX ADMIN — SODIUM CHLORIDE, PRESERVATIVE FREE 3 ML: 5 INJECTION INTRAVENOUS at 08:42

## 2019-01-01 RX ADMIN — SPIRONOLACTONE 25 MG: 25 TABLET ORAL at 08:45

## 2019-01-01 RX ADMIN — SERTRALINE HYDROCHLORIDE 50 MG: 50 TABLET ORAL at 09:25

## 2019-01-01 RX ADMIN — ISOSORBIDE MONONITRATE 60 MG: 60 TABLET, EXTENDED RELEASE ORAL at 21:10

## 2019-01-01 RX ADMIN — CARVEDILOL 3.12 MG: 3.12 TABLET, FILM COATED ORAL at 08:56

## 2019-01-01 RX ADMIN — OXYCODONE HYDROCHLORIDE AND ACETAMINOPHEN 2 TABLET: 5; 325 TABLET ORAL at 16:54

## 2019-01-01 RX ADMIN — FAMOTIDINE 40 MG: 40 TABLET ORAL at 08:37

## 2019-01-01 RX ADMIN — HYDROCODONE BITARTRATE AND ACETAMINOPHEN 1 TABLET: 10; 325 TABLET ORAL at 00:20

## 2019-01-01 RX ADMIN — PANTOPRAZOLE SODIUM 40 MG: 40 TABLET, DELAYED RELEASE ORAL at 06:36

## 2019-01-01 RX ADMIN — RANOLAZINE 500 MG: 500 TABLET, FILM COATED, EXTENDED RELEASE ORAL at 20:30

## 2019-01-01 RX ADMIN — CARVEDILOL 6.25 MG: 6.25 TABLET, FILM COATED ORAL at 09:01

## 2019-01-01 RX ADMIN — POTASSIUM CHLORIDE 40 MEQ: 750 CAPSULE, EXTENDED RELEASE ORAL at 13:24

## 2019-01-01 RX ADMIN — BUMETANIDE 2 MG: 0.25 INJECTION INTRAMUSCULAR; INTRAVENOUS at 08:51

## 2019-01-01 RX ADMIN — SPIRONOLACTONE 25 MG: 25 TABLET ORAL at 08:24

## 2019-01-01 RX ADMIN — POTASSIUM CHLORIDE 40 MEQ: 750 CAPSULE, EXTENDED RELEASE ORAL at 16:32

## 2019-01-01 RX ADMIN — HYDROCODONE BITARTRATE AND ACETAMINOPHEN 1 TABLET: 10; 325 TABLET ORAL at 23:04

## 2019-01-01 RX ADMIN — CARVEDILOL 6.25 MG: 6.25 TABLET, FILM COATED ORAL at 09:14

## 2019-01-01 RX ADMIN — BUMETANIDE 2 MG: 1 TABLET ORAL at 08:37

## 2019-01-01 RX ADMIN — BUMETANIDE 2 MG: 0.25 INJECTION INTRAMUSCULAR; INTRAVENOUS at 15:50

## 2019-01-01 RX ADMIN — ASPIRIN 81 MG CHEWABLE TABLET 324 MG: 81 TABLET CHEWABLE at 14:55

## 2019-01-01 RX ADMIN — FAMOTIDINE 40 MG: 40 TABLET ORAL at 08:41

## 2019-01-01 RX ADMIN — ZOLPIDEM TARTRATE 10 MG: 5 TABLET ORAL at 21:39

## 2019-01-01 RX ADMIN — HEPARIN SODIUM 12 UNITS/KG/HR: 10000 INJECTION, SOLUTION INTRAVENOUS at 16:18

## 2019-01-01 RX ADMIN — ASPIRIN 81 MG CHEWABLE TABLET 81 MG: 81 TABLET CHEWABLE at 10:26

## 2019-01-01 RX ADMIN — FERROUS SULFATE TAB EC 324 MG (65 MG FE EQUIVALENT) 324 MG: 324 (65 FE) TABLET DELAYED RESPONSE at 21:09

## 2019-01-01 RX ADMIN — CARVEDILOL 6.25 MG: 6.25 TABLET, FILM COATED ORAL at 21:03

## 2019-01-01 RX ADMIN — Medication 15 MG: at 11:59

## 2019-01-01 RX ADMIN — CARVEDILOL 3.12 MG: 3.12 TABLET, FILM COATED ORAL at 16:29

## 2019-01-01 RX ADMIN — RANOLAZINE 500 MG: 500 TABLET, FILM COATED, EXTENDED RELEASE ORAL at 20:51

## 2019-01-01 RX ADMIN — METOCLOPRAMIDE 5 MG: 5 INJECTION, SOLUTION INTRAMUSCULAR; INTRAVENOUS at 11:51

## 2019-01-01 RX ADMIN — ROSUVASTATIN CALCIUM 20 MG: 20 TABLET, FILM COATED ORAL at 08:21

## 2019-01-01 RX ADMIN — SODIUM CHLORIDE, PRESERVATIVE FREE 10 ML: 5 INJECTION INTRAVENOUS at 17:37

## 2019-01-01 RX ADMIN — POTASSIUM CHLORIDE 40 MEQ: 750 CAPSULE, EXTENDED RELEASE ORAL at 05:05

## 2019-01-01 RX ADMIN — RANOLAZINE 500 MG: 500 TABLET, FILM COATED, EXTENDED RELEASE ORAL at 08:56

## 2019-01-01 RX ADMIN — ASPIRIN 81 MG 324 MG: 81 TABLET ORAL at 12:42

## 2019-01-01 RX ADMIN — CLOPIDOGREL BISULFATE 75 MG: 75 TABLET ORAL at 08:59

## 2019-01-01 RX ADMIN — FAMOTIDINE 40 MG: 40 TABLET ORAL at 09:31

## 2019-01-01 RX ADMIN — CLOPIDOGREL BISULFATE 75 MG: 75 TABLET, FILM COATED ORAL at 09:24

## 2019-01-01 RX ADMIN — SENNOSIDES AND DOCUSATE SODIUM 2 TABLET: 8.6; 5 TABLET ORAL at 20:32

## 2019-01-01 RX ADMIN — POTASSIUM CHLORIDE 40 MEQ: 750 CAPSULE, EXTENDED RELEASE ORAL at 18:18

## 2019-01-01 RX ADMIN — BUMETANIDE 1 MG: 1 TABLET ORAL at 08:56

## 2019-01-01 RX ADMIN — SERTRALINE HYDROCHLORIDE 50 MG: 50 TABLET ORAL at 10:13

## 2019-01-01 RX ADMIN — ISOSORBIDE MONONITRATE 60 MG: 60 TABLET, EXTENDED RELEASE ORAL at 08:59

## 2019-01-01 RX ADMIN — ROSUVASTATIN CALCIUM 10 MG: 10 TABLET, FILM COATED ORAL at 09:42

## 2019-01-01 RX ADMIN — HYDROCODONE BITARTRATE AND ACETAMINOPHEN 1 TABLET: 10; 325 TABLET ORAL at 00:09

## 2019-01-01 RX ADMIN — ISOSORBIDE MONONITRATE 90 MG: 60 TABLET, EXTENDED RELEASE ORAL at 21:03

## 2019-01-01 RX ADMIN — FERROUS SULFATE TAB EC 324 MG (65 MG FE EQUIVALENT) 324 MG: 324 (65 FE) TABLET DELAYED RESPONSE at 18:33

## 2019-01-01 RX ADMIN — ISOSORBIDE MONONITRATE 60 MG: 60 TABLET, EXTENDED RELEASE ORAL at 08:51

## 2019-01-01 RX ADMIN — CARVEDILOL 6.25 MG: 6.25 TABLET, FILM COATED ORAL at 08:27

## 2019-01-01 RX ADMIN — HEPARIN SODIUM 5000 UNITS: 5000 INJECTION INTRAVENOUS; SUBCUTANEOUS at 00:33

## 2019-01-01 RX ADMIN — ENOXAPARIN SODIUM 40 MG: 40 INJECTION SUBCUTANEOUS at 23:31

## 2019-01-01 RX ADMIN — HYDRALAZINE HYDROCHLORIDE 50 MG: 50 TABLET ORAL at 10:13

## 2019-01-01 RX ADMIN — ROSUVASTATIN CALCIUM 10 MG: 10 TABLET, FILM COATED ORAL at 10:05

## 2019-01-01 RX ADMIN — ISOSORBIDE MONONITRATE 60 MG: 60 TABLET, EXTENDED RELEASE ORAL at 21:32

## 2019-01-01 RX ADMIN — ISOSORBIDE MONONITRATE 60 MG: 60 TABLET, EXTENDED RELEASE ORAL at 20:15

## 2019-01-01 RX ADMIN — HYDROCODONE BITARTRATE AND ACETAMINOPHEN 1 TABLET: 10; 325 TABLET ORAL at 14:45

## 2019-01-01 RX ADMIN — HYDROCODONE BITARTRATE AND ACETAMINOPHEN 1 TABLET: 10; 325 TABLET ORAL at 07:24

## 2019-01-01 RX ADMIN — METOLAZONE 2.5 MG: 2.5 TABLET ORAL at 10:49

## 2019-01-01 RX ADMIN — HYDROCODONE BITARTRATE AND ACETAMINOPHEN 1 TABLET: 10; 325 TABLET ORAL at 14:27

## 2019-01-01 RX ADMIN — SODIUM CHLORIDE, PRESERVATIVE FREE 10 ML: 5 INJECTION INTRAVENOUS at 22:00

## 2019-01-01 RX ADMIN — BUMETANIDE 2 MG: 0.25 INJECTION INTRAMUSCULAR; INTRAVENOUS at 06:06

## 2019-01-01 RX ADMIN — POTASSIUM CHLORIDE 40 MEQ: 750 CAPSULE, EXTENDED RELEASE ORAL at 17:15

## 2019-01-01 RX ADMIN — ISOSORBIDE MONONITRATE 60 MG: 60 TABLET, EXTENDED RELEASE ORAL at 09:31

## 2019-01-01 RX ADMIN — SPIRONOLACTONE 25 MG: 25 TABLET ORAL at 11:44

## 2019-01-01 RX ADMIN — ISOSORBIDE MONONITRATE 60 MG: 60 TABLET, EXTENDED RELEASE ORAL at 20:41

## 2019-01-01 RX ADMIN — CARVEDILOL 6.25 MG: 6.25 TABLET, FILM COATED ORAL at 08:45

## 2019-01-01 RX ADMIN — HYDRALAZINE HYDROCHLORIDE 50 MG: 50 TABLET ORAL at 20:52

## 2019-01-01 RX ADMIN — CLOPIDOGREL BISULFATE 75 MG: 75 TABLET ORAL at 09:31

## 2019-01-01 RX ADMIN — HYDROCODONE BITARTRATE AND ACETAMINOPHEN 1 TABLET: 10; 325 TABLET ORAL at 17:25

## 2019-01-04 PROBLEM — E87.6 HYPOKALEMIA: Status: ACTIVE | Noted: 2019-01-01

## 2019-01-04 PROBLEM — I50.33 ACUTE ON CHRONIC DIASTOLIC CHF (CONGESTIVE HEART FAILURE) (HCC): Status: ACTIVE | Noted: 2019-01-01

## 2019-01-04 PROBLEM — E87.1 HYPONATREMIA: Status: ACTIVE | Noted: 2019-01-01

## 2019-01-04 NOTE — ED PROVIDER NOTES
Subjective     History provided by:  Patient   used: No    patient is because of abnormal lab. Home health called and said that hemoglobin is 8.3, potassium 2.9 and sodium 127. Patient history of CAD with CHF. Patient just had stent placed in [place] 2 months ago. Patient complain that the sob is getting worse. Patient use home oxygen 3L [place].    Review of Systems   All other systems reviewed and are negative.      Past Medical History:   Diagnosis Date   • Acute bronchitis    • Acute congestive heart failure (CMS/HCC)     resolving   • Acute kidney failure, unspecified (CMS/HCC)    • Acute kidney failure, unspecified (CMS/HCC)    • Acute posthemorrhagic anemia    • Asthenia    • Chest pain    • Chronic gastritis    • Chronic pharyngitis    • Chronic renal impairment    • Congenital renal failure    • Congestive heart failure (CMS/HCC)    • Contact dermatitis due to poison ivy    • Coronary arteriosclerosis    • D-dimer, elevated     D-dimer above reference range    • Degenerative joint disease involving multiple joints     back   • Depressive disorder    • Dysphagia    • Dyspnea    • Edema of lower extremity    • Encounter for immunization    • Encounter for long-term (current) use of medications    • Epigastric pain    • Esophagitis    • Essential hypertension    • Gastroesophageal reflux disease    • Gastroparesis     Gastroparesis syndrome    • Generalized abdominal pain    • H/O bone density study 01/09/2015   • H/O mammogram 01/15/2015   • H/O screening mammography 11/12/2013   • Headache    • Hyperlipidemia    • Hypokalemia    • Hypomagnesemia    • Injury of tendon of rotator cuff     Injury of tendon of the rotator cuff of shoulder      • Insomnia    • Insomnia, unspecified    • Knee pain    • Nausea and vomiting    • Neck pain    • Need for immunization against influenza    • Need for prophylactic vaccination and inoculation against influenza    • Need for prophylactic vaccination and  "inoculation against viral disease    • 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 Performed at [facility].   • ENDOSCOPY  02/09/2015    ESOPHAGEAL STRICTURE PRESENT, GASTRITIS FOUND IN THE STOMACH, NORMAL DUODENUM   • ENDOSCOPY W/ PEG TUBE PLACEMENT  12/19/2012    Esophagitis seen. Biopsy taken. Esophageal stricture present. Dilatation performed. Gastritis in stomach. Biopsy taken. Food was in stomach. Normal duodenum.   • HERNIA REPAIR     • HYSTERECTOMY     • NECK SURGERY         Family History   Problem Relation Age of Onset   • Cancer Mother    • Diabetes Mother    • Stroke Father    • Heart disease Sister    • Colon cancer Other    • Hypertension Other    • Breast cancer Maternal Aunt        Social History     Socioeconomic History   • Marital status:      Spouse name: Not on file   • Number of children: Not on file   • Years of education: Not on file   • Highest education level: Not on file   Tobacco Use   • Smoking status: Former Smoker   • Smokeless tobacco: Never Used   Substance and Sexual Activity   • Alcohol use: No   • Drug use: No       BP (!) 203/88 (BP Location: Left arm, Patient Position: Lying)   Pulse 66   Temp 97 °F (36.1 °C) (Oral)   Resp 18   Ht 162.6 cm (64\")   Wt 114 kg (251 lb)   SpO2 97%   BMI 43.08 kg/m²     Objective   Physical Exam   Constitutional: She is oriented to " person, place, and time. She appears well-developed and well-nourished.   HENT:   Head: Normocephalic.   Right Ear: External ear normal.   Left Ear: External ear normal.   Nose: Nose normal.   Mouth/Throat: Oropharynx is clear and moist.   Neck: Normal range of motion. Neck supple.   Cardiovascular: Normal rate, regular rhythm, normal heart sounds and intact distal pulses.   Pulmonary/Chest: Effort normal and breath sounds normal.   Abdominal: Soft. Bowel sounds are normal.   Musculoskeletal: Normal range of motion. She exhibits edema.   Mild edema.   Neurological: She is alert and oriented to person, place, and time.   Skin: Skin is warm. Capillary refill takes less than 2 seconds.   Psychiatric: She has a normal mood and affect. Her behavior is normal. Judgment and thought content normal.   Nursing note and vitals reviewed.      Procedures           ED Course      Labs Reviewed   COMPREHENSIVE METABOLIC PANEL - Abnormal; Notable for the following components:       Result Value    Glucose 200 (*)     BUN 49 (*)     Creatinine 1.56 (*)     Sodium 123 (*)     Potassium 2.7 (*)     Chloride 83 (*)     AST (SGOT) 47 (*)     Total Bilirubin 1.8 (*)     eGFR Non African Amer 33 (*)     BUN/Creatinine Ratio 31.4 (*)     All other components within normal limits   BNP (IN-HOUSE) - Abnormal; Notable for the following components:    proBNP 8,060.0 (*)     All other components within normal limits   CBC WITH AUTO DIFFERENTIAL - Abnormal; Notable for the following components:    RBC 2.74 (*)     Hemoglobin 8.6 (*)     Hematocrit 26.4 (*)     RDW 16.6 (*)     RDW-SD 58.5 (*)     Platelets 127 (*)     Neutrophil % 84.2 (*)     Lymphocyte % 7.8 (*)     Immature Grans % 0.7 (*)     Lymphocytes, Absolute 0.43 (*)     Immature Grans, Absolute 0.04 (*)     All other components within normal limits   URINALYSIS W/ MICROSCOPIC IF INDICATED (NO CULTURE) - Abnormal; Notable for the following components:    Blood, UA Small (1+) (*)      Protein,  mg/dL (2+) (*)     Leuk Esterase, UA Trace (*)     All other components within normal limits   URINALYSIS, MICROSCOPIC ONLY - Abnormal; Notable for the following components:    RBC, UA 21-30 (*)     WBC, UA 31-50 (*)     Bacteria, UA 2+ (*)     Squamous Epithelial Cells, UA 3-5 (*)     All other components within normal limits   TROPONIN (IN-HOUSE) - Normal   MAGNESIUM - Normal   RAINBOW DRAW    Narrative:     The following orders were created for panel order [facility] Draw.  Procedure                               Abnormality         Status                     ---------                               -----------         ------                     Light Blue Top[[id number]]                                   Final result               Green Top (Gel)[[id number]]                                  Final result               Lavender Top[[id number]]                                     Final result               Gold Top - SST[[id number]]                                   Final result                 Please view results for these tests on the individual orders.   TROPONIN (IN-HOUSE)   CBC AND DIFFERENTIAL    Narrative:     The following orders were created for panel order CBC & Differential.  Procedure                               Abnormality         Status                     ---------                               -----------         ------                     CBC Auto Differential[[id number]]        Abnormal            Final result                 Please view results for these tests on the individual orders.   LIGHT BLUE TOP   GREEN TOP   LAVENDER TOP   GOLD TOP - SST       XR Chest 1 View   Final Result   CONCLUSION:          1. No evidence of an active cardiopulmonary process.                                                                    Electronically signed by:  UZIEL Umana MD  1/4/2019 3:49 PM   CST Workstation: [id number]          Result discussed with patient and family. Spoke to Dr. Tuan ARRIOLA  Who accepted patient.        [facility]      Final diagnoses:   Hyponatremia   Hypokalemia   Anemia, unspecified type   Acute lower UTI            Gabo Mcguire MD  01/04/19 [id number]

## 2019-01-05 NOTE — PROGRESS NOTES
[facility] Medicine Services  INPATIENT PROGRESS NOTE    Length of Stay: 0  Date of Admission: 1/4/2019  Primary Care Physician: Henry Muniz MD    Subjective   Chief Complaint: No complaints    HPI:      1/5/2018:  Patient states that her breathing is about the same today.  We'll increase Lasix to 20 mg every 8 hours.  Sodium has improved to 127 with fluid restrictions.  Potassium replacement protocol in place.    H&P:  This is a 68-year-old  female with past medical history of 3-vessel CAD, CKD, CHF, hypertension, COPD (3 liters O2 at home) and DM 2 (insulin pump) that presented to [facility] on 1/4/2019 after receiving a phone call from a home health nurse about her recent lab work which showed hyponatremia and hypokalemia.  Patient states she had 3 vessel PCI approximately 1 month ago at [facility].  BNP on admission was 8,060.  No complaints of chest pain.  The patient was also noted to have a urinary tract infection.    Review of Systems   Constitutional: Negative for activity change and fatigue.   HENT: Negative for ear pain and sore throat.    Eyes: Negative for pain and discharge.   Respiratory: Negative for cough and shortness of breath.    Cardiovascular: Negative for chest pain and palpitations.   Gastrointestinal: Negative for abdominal pain and nausea.   Endocrine: Negative for cold intolerance and heat intolerance.   Genitourinary: Negative for difficulty urinating and dysuria.   Musculoskeletal: Negative for arthralgias and gait problem.   Skin: Negative for color change and rash.   Neurological: Negative for dizziness and weakness.   Psychiatric/Behavioral: Negative for agitation and confusion.        Objective    Temp:  [96.6 °F (35.9 °C)-98.4 °F (36.9 °C)] 97.4 °F (36.3 °C)  Heart Rate:  [54-84] 84  Resp:  [18-20] 18  BP: (123-203)/(58-91) 150/74    Physical Exam   Constitutional: She is oriented to person, place, and time. She appears  well-developed and well-nourished.   HENT:   Head: Normocephalic and atraumatic.   Eyes: EOM are normal. Pupils are equal, round, and reactive to light.   Neck: Normal range of motion. Neck supple.   Cardiovascular: Normal rate and regular rhythm.   Pulmonary/Chest: Effort normal and breath sounds normal.   Abdominal: Soft. Bowel sounds are normal.   Musculoskeletal: Normal range of motion.   Neurological: She is alert and oriented to person, place, and time.   Skin: Skin is warm and dry.   Psychiatric: She has a normal mood and affect. Her behavior is normal.       Results Review:  I have reviewed the labs, radiology results, and diagnostic studies.    Laboratory Data:   Results from last 7 days   Lab Units  01/05/19   1406  01/05/19   0120  01/04/19   1451 01/03/19   1657   SODIUM mmol/L   --   127*  123*  127*   POTASSIUM mmol/L  3.3*  2.8*  2.7*  2.9*   CHLORIDE mmol/L   --   86*  83*  86*   CO2 mmol/L   --   32.0*  29.0  29.0   BUN mg/dL   --   48*  49*  49*   CREATININE mg/dL   --   1.47*  1.56*  1.38*   GLUCOSE mg/dL   --   136*  200*  142*   CALCIUM mg/dL   --   8.4  8.5  8.3*   BILIRUBIN mg/dL   --    --   1.8*  1.6*   ALK PHOS U/L   --    --   97  84   ALT (SGPT) U/L   --    --   18  23   AST (SGOT) U/L   --    --   47*  32   ANION GAP mmol/L   --   9.0  11.0  12.0     Estimated Creatinine Clearance: 43.8 mL/min (A) (by C-G formula based on SCr of 1.47 mg/dL (H)).  Results from last 7 days   Lab Units  01/05/19   0120  01/04/19   1451   MAGNESIUM mg/dL  2.0  1.9         Results from last 7 days   Lab Units  01/05/19   0529  01/04/19   1451  01/03/19   1657   WBC 10*3/mm3  4.01  5.50  4.77   HEMOGLOBIN g/dL  7.6*  8.6*  8.3*   HEMATOCRIT %  22.8*  26.4*  24.5*   PLATELETS 10*3/mm3  118*  127*  130*           Culture Data:   No results found for: BLOODCX  No results found for: URINECX  No results found for: RESPCX  No results found for: WOUNDCX  No results found for: STOOLCX  No components found for:  [facility]    Radiology Data:   Imaging Results (last 24 hours)     Procedure Component Value Units Date/Time    XR Chest 1 View [[id number]] Collected:  01/04/19 1519     Updated:  01/04/19 1550    Narrative:         EXAM:         Radiograph(s), Chest   VIEWS:   Frontal  ; 1       DATE/TIME:  1/4/2019 3:45 PM CST                INDICATION:   Chest pain protocol  chest pain protocol    COMPARISON:  CXR: 12/14/18            FINDINGS:             - lines/tubes:    none     - cardiac:         size within normal limits         - mediastinum: contour within normal limits         - lungs:         no focal air space process, pulmonary  interstitial edema, nodule(s)/mass             - pleura:         no evidence of  fluid                  - osseous:         unremarkable for age                  - misc.:         Impression:       CONCLUSION:        1. No evidence of an active cardiopulmonary process.                                                              Electronically signed by:  UZIEL Umana MD  1/4/2019 3:49 PM  CST Workstation: [id number]          I have reviewed the patient's current medications.     Assessment/Plan     Active Hospital Problems    Diagnosis Date Noted   • Hyponatremia [E87.1] 01/04/2019   • Acute on chronic diastolic CHF (congestive heart failure) (CMS/[facility]) [I50.33] 01/04/2019   • Hypokalemia [E87.6] 01/04/2019       Plan:    1.  Acute on chronic diastolic congestive heart failure:  IV Lasix, fluid and sodium restrictions.    2.  Hyponatremia:  Likely secondary to CHF.    3.  Hypokalemia:  Potassium protocol in place.  4.  Chronic kidney disease, stage III:  The patient follows with Dr. Dugan.  Will monitor creatinine.    5.  COPD:  Duonebs.    6.  Chronic pain:  Continue home Percocet.    7.  Insomnia:  Ambien PRN.   8.  Urinary tract infection:  Culture pending.  Rocephin.  9.  Diabetes mellitus, type II:  Patient to use her own insulin pump.   10.  Anemia, chronic:  7.6 today.  Occult stool  ordered.  No signs or symptoms of bleeding per patient.                Discharge Planning: I expect patient to be discharged to home in 1-2 days.      This document has been electronically signed by DOMINGA Mistry on January 5, 2019 2:56 PM

## 2019-01-05 NOTE — PLAN OF CARE
Problem: Patient Care Overview  Goal: Plan of Care Review  Outcome: Ongoing (interventions implemented as appropriate)   01/05/19 0522   Coping/Psychosocial   Plan of Care Reviewed With patient   Plan of Care Review   Progress no change   OTHER   Outcome Summary Potassium redraw during the night, ordered oral potassium and replacement started. C/o of pain prn medication given. Family at bedside. VSS. Will continue to monitor       Problem: Fall Risk (Adult)  Goal: Identify Related Risk Factors and Signs and Symptoms  Outcome: Outcome(s) achieved Date Met: 01/05/19    Goal: Absence of Fall  Outcome: Ongoing (interventions implemented as appropriate)      Problem: Skin Injury Risk (Adult)  Goal: Identify Related Risk Factors and Signs and Symptoms  Outcome: Outcome(s) achieved Date Met: 01/05/19    Goal: Skin Health and Integrity  Outcome: Ongoing (interventions implemented as appropriate)      Problem: Pain, Acute (Adult)  Goal: Identify Related Risk Factors and Signs and Symptoms  Outcome: Outcome(s) achieved Date Met: 01/05/19    Goal: Acceptable Pain Control/Comfort Level  Outcome: Ongoing (interventions implemented as appropriate)

## 2019-01-05 NOTE — H&P
[facility] Medicine Admission      Date of Admission: 1/4/2019      Primary Care Physician: Henry Muniz MD      Chief Complaint: Weakness    HPI: This is a 68-year-old  female with past medical history of 3-vessel CAD, CKD, CHF, hypertension, COPD (3 liters O2 at home) and DM 2 (insulin pump) that presented to [facility] on 1/4/2019 after receiving a phone call from a home health nurse about her recent lab work which showed hyponatremia and hypokalemia.  Patient states she had 3 vessel PCI approximately 1 month ago at [facility].  BNP on admission was 8,060.  No complaints of chest pain.  The patient was also noted to have a urinary tract infection.    Concurrent Medical History:  has a past medical history of Acute bronchitis, Acute congestive heart failure (CMS/HCC), Acute kidney failure, unspecified (CMS/HCC), Acute kidney failure, unspecified (CMS/HCC), Acute posthemorrhagic anemia, Asthenia, Chest pain, Chronic gastritis, Chronic pharyngitis, Chronic renal impairment, Congenital renal failure, Congestive heart failure (CMS/HCC), Contact dermatitis due to poison ivy, Coronary arteriosclerosis, D-dimer, elevated, Degenerative joint disease involving multiple joints, Depressive disorder, Dysphagia, Dyspnea, Edema of lower extremity, Encounter for immunization, Encounter for long-term (current) use of medications, Epigastric pain, Esophagitis, Essential hypertension, Gastroesophageal reflux disease, Gastroparesis, Generalized abdominal pain, H/O bone density study (01/09/2015), H/O mammogram (01/15/2015), H/O screening mammography (11/12/2013), Headache, Hyperlipidemia, Hypokalemia, Hypomagnesemia, Injury of tendon of rotator cuff, Insomnia, Insomnia, unspecified, Knee pain, Nausea and vomiting, Neck pain, Need for immunization against influenza, Need for prophylactic vaccination and inoculation against influenza, Need for prophylactic vaccination and  inoculation against viral disease, Neoplasm of uncertain behavior of breast, Orthopnea, Osteoarthritis, Pain of breast, Shoulder pain, Sleep disorder, Stricture of esophagus, Symptomatic menopausal or female climacteric states, and Type 2 diabetes mellitus (CMS/HCC).    Past Surgical History:  has a past surgical history that includes Hysterectomy; Hernia repair; Cholecystectomy; Colonoscopy (03/29/2012); Esophagogastroduodenoscopy (02/09/2015); Esophagogastroduodenoscopy w/ PEG (12/19/2012); Neck surgery; and Breast biopsy (Bilateral).    Family History: family history includes Breast cancer in her maternal aunt; Cancer in her mother; Colon cancer in her other; Diabetes in her mother; Heart disease in her sister; Hypertension in her other; Stroke in her father.    Social History:  reports that she has quit smoking. she has never used smokeless tobacco. She reports that she does not drink alcohol or use drugs.    Allergies:   Allergies   Allergen Reactions   • Iodides      Iodine and iodide containing products     • Iodine      Iodine and iodide containing products   • Other      steroids   • Stadol [Butorphanol]        Medications:   Prior to Admission medications    Medication Sig Start Date End Date Taking? Authorizing Provider   albuterol (ACCUNEB) 0.63 MG/3ML nebulizer solution Take 3 mL by nebulization Every 6 (Six) Hours As Needed for Wheezing. 1/19/18   Salvador Montes MD   amLODIPine (NORVASC) 10 MG tablet Take 1 tablet by mouth Daily. 9/15/17   Salvador Montes MD   aspirin 81 MG chewable tablet Chew 81 mg Daily.    Provider, MD Jacinta   benzonatate (TESSALON) 200 MG capsule Take 1 capsule by mouth 3 (Three) Times a Day As Needed for Cough. 1/19/18   Salvaodr Montes MD   bumetanide (BUMEX) 2 MG tablet Take 1 tablet by mouth Daily. 3/29/18   Salvador Montes MD   clopidogrel (PLAVIX) 75 MG tablet Take 1 tablet by mouth Daily. 3/5/18   Salvador Montes MD   glucose blood test strip Needs to  "see pcp for refills 5/2/18   Miley Toledo APRN   HUMALOG 100 UNIT/ML injection INJECT 150 UNITS SUB-Q DAILY PER INSULIN PUMP 2/21/18   Salvador Montes MD   hydrALAZINE (APRESOLINE) 50 MG tablet Take 1 tablet by mouth 2 (Two) Times a Day. 3/27/18   Salvador Montes MD   Insulin Lispro 100 UNIT/ML solution cartridge Inject 150 Units under the skin Daily. DX: E11 5/30/17   Salvador Montes MD   Insulin Syringe-Needle U-100 (GLOBAL INSULIN SYRINGES) 30G X 5/16\" 0.3 ML misc SQ injections as needed when insulin pump is non functioning 1/19/18   Salvador Montes MD   isosorbide mononitrate (IMDUR) 30 MG 24 hr tablet Take 1 tablet by mouth Daily. 3/5/18   Salvador Montes MD   lisinopril (PRINIVIL,ZESTRIL) 40 MG tablet Take 1 tablet by mouth Daily. 3/5/18   Salvador Montes MD   magnesium oxide (MAG-OX) 400 MG tablet Take 1 tablet by mouth Daily. 5/9/17   Salvador Montes MD   nitroglycerin (NITROLINGUAL) 0.4 MG/SPRAY spray Place 1 spray under the tongue Every 5 (Five) Minutes As Needed for Chest Pain. 7/7/17   Salvador Montes MD   omeprazole (priLOSEC) 40 MG capsule Take 1 capsule by mouth Daily. 4/10/18   Salvador Montes MD   oxyCODONE-acetaminophen (PERCOCET)  MG per tablet Take 1 tablet by mouth Every 4 (Four) Hours As Needed for Moderate Pain (4-6).    ProviderJaicnta MD   raNITIdine (ZANTAC) 300 MG tablet TAKE 1 TABLET BY MOUTH EVERY NIGHT. 12/11/17   Salvador Montes MD   rosuvastatin (CRESTOR) 10 MG tablet Take 1 tablet by mouth Daily. 3/5/18   Salvador Montes MD   sertraline (ZOLOFT) 50 MG tablet Take 1 tablet by mouth Daily. 3/5/18   Salvador Montes MD   spironolactone (ALDACTONE) 25 MG tablet Take 0.5 tablets by mouth Daily. 5/9/17   Salvador Montes MD   vitamin D (ERGOCALCIFEROL) 09660 UNITS capsule capsule Take 1 capsule by mouth 1 (One) Time Per Week. 5/9/17   Salvador Montes MD   zolpidem (AMBIEN) 10 MG tablet Take 1 tablet by mouth At Night As Needed for " Sleep. 3/29/18   Salvador Montes MD       Review of Systems:  Review of Systems   Constitutional: Positive for fatigue. Negative for activity change.   HENT: Negative for ear pain and sore throat.    Eyes: Negative for pain and discharge.   Respiratory: Negative for cough and shortness of breath.    Cardiovascular: Negative for chest pain and palpitations.   Gastrointestinal: Negative for abdominal pain and nausea.   Endocrine: Negative for cold intolerance and heat intolerance.   Genitourinary: Negative for difficulty urinating and dysuria.   Musculoskeletal: Negative for arthralgias and gait problem.   Skin: Negative for color change and rash.   Neurological: Negative for dizziness and weakness.   Psychiatric/Behavioral: Negative for agitation and confusion.      Otherwise complete ROS is negative except as mentioned above.    Physical Exam:   Temp:  [97 °F (36.1 °C)] 97 °F (36.1 °C)  Heart Rate:  [60-69] 60  Resp:  [18-19] 18  BP: (177-203)/(75-91) 177/75  Physical Exam   Constitutional: She is oriented to person, place, and time. She appears well-developed and well-nourished.   HENT:   Head: Normocephalic and atraumatic.   Eyes: EOM are normal. Pupils are equal, round, and reactive to light.   Neck: Normal range of motion. Neck supple.   Cardiovascular: Normal rate and regular rhythm.   Pulmonary/Chest: Effort normal and breath sounds normal.   Abdominal: Soft. Bowel sounds are normal.   Musculoskeletal: Normal range of motion.   Neurological: She is alert and oriented to person, place, and time.   Skin: Skin is warm and dry.   Psychiatric: She has a normal mood and affect. Her behavior is normal.         Results Reviewed:  I have personally reviewed current lab, radiology, and data and agree with results.  Lab Results (last 24 hours)     Procedure Component Value Units Date/Time    Troponin [[id number]]  (Normal) Collected:  01/04/19 1744    Specimen:  Blood Updated:  01/04/19 1844     Troponin I 0.027 ng/mL      [place] Draw [[id number]] Collected:  01/04/19 1451    Specimen:  Blood Updated:  01/04/19 1600    Narrative:       The following orders were created for panel order [place] Draw.  Procedure                               Abnormality         Status                     ---------                               -----------         ------                     Light Blue Top[[id number]]                                   Final result               Green Top (Gel)[[id number]]                                  Final result               Lavender Top[[id number]]                                     Final result               Gold Top - SST[[id number]]                                   Final result                 Please view results for these tests on the individual orders.    Light Blue Top [[id number]] Collected:  01/04/19 1451    Specimen:  Blood Updated:  01/04/19 1600     Extra Tube hold for add-on     Comment: Auto resulted       Green Top (Gel) [[id number]] Collected:  01/04/19 1451    Specimen:  Blood Updated:  01/04/19 1600     Extra Tube Hold for add-ons.     Comment: Auto resulted.       Lavender Top [[id number]] Collected:  01/04/19 1451    Specimen:  Blood Updated:  01/04/19 1600     Extra Tube hold for add-on     Comment: Auto resulted       Gold Top - SST [[id number]] Collected:  01/04/19 1451    Specimen:  Blood Updated:  01/04/19 1600     Extra Tube Hold for add-ons.     Comment: Auto resulted.       Urinalysis, Microscopic Only - Urine, Clean Catch [[id number]]  (Abnormal) Collected:  01/04/19 1520    Specimen:  Urine, Clean Catch Updated:  01/04/19 1550     RBC, UA 21-30 /HPF      WBC, UA 31-50 /HPF      Bacteria, UA 2+ /HPF      Squamous Epithelial Cells, UA 3-5 /HPF      Hyaline Casts, UA None Seen /LPF      Methodology Manual Light Microscopy    Troponin [[id number]]  (Normal) Collected:  01/04/19 1451    Specimen:  Blood Updated:  01/04/19 1529     Troponin I 0.021 ng/mL     BNP [[id number]]  (Abnormal) Collected:   01/04/19 1451    Specimen:  Blood Updated:  01/04/19 1529     proBNP 8,060.0 pg/mL     Urinalysis With Microscopic If Indicated (No Culture) - Urine, Clean Catch [[id number]]  (Abnormal) Collected:  01/04/19 1520    Specimen:  Urine, Clean Catch Updated:  01/04/19 1526     Color, UA Yellow     Appearance, UA Clear     pH, UA 6.0     Specific Gravity, UA 1.015     Glucose, UA Negative     Ketones, UA Negative     Bilirubin, UA Negative     Blood, UA Small (1+)     Protein,  mg/dL (2+)     Leuk Esterase, UA Trace     Nitrite, UA Negative     Urobilinogen, UA 0.2 E.U./dL    Comprehensive Metabolic Panel [[id number]]  (Abnormal) Collected:  01/04/19 1451    Specimen:  Blood Updated:  01/04/19 1524     Glucose 200 mg/dL      BUN 49 mg/dL      Creatinine 1.56 mg/dL      Sodium 123 mmol/L      Potassium 2.7 mmol/L      Chloride 83 mmol/L      CO2 29.0 mmol/L      Calcium 8.5 mg/dL      Total Protein 6.5 g/dL      Albumin 3.70 g/dL      ALT (SGPT) 18 U/L      AST (SGOT) 47 U/L      Alkaline Phosphatase 97 U/L      Total Bilirubin 1.8 mg/dL      eGFR Non African Amer 33 mL/min/1.73      Globulin 2.8 gm/dL      A/G Ratio 1.3 g/dL      BUN/Creatinine Ratio 31.4     Anion Gap 11.0 mmol/L     Magnesium [[id number]]  (Normal) Collected:  01/04/19 1451    Specimen:  Blood Updated:  01/04/19 1518     Magnesium 1.9 mg/dL     CBC & Differential [[id number]] Collected:  01/04/19 1451    Specimen:  Blood Updated:  01/04/19 1459    Narrative:       The following orders were created for panel order CBC & Differential.  Procedure                               Abnormality         Status                     ---------                               -----------         ------                     CBC Auto Differential[[id number]]        Abnormal            Final result                 Please view results for these tests on the individual orders.    CBC Auto Differential [[id number]]  (Abnormal) Collected:  01/04/19 1451    Specimen:  Blood  Updated:  01/04/19 1459     WBC 5.50 10*3/mm3      RBC 2.74 10*6/mm3      Hemoglobin 8.6 g/dL      Hematocrit 26.4 %      MCV 96.4 fL      MCH 31.4 pg      MCHC 32.6 g/dL      RDW 16.6 %      RDW-SD 58.5 fl      MPV 9.0 fL      Platelets 127 10*3/mm3      Neutrophil % 84.2 %      Lymphocyte % 7.8 %      Monocyte % 6.0 %      Eosinophil % 0.9 %      Basophil % 0.4 %      Immature Grans % 0.7 %      Neutrophils, Absolute 4.63 10*3/mm3      Lymphocytes, Absolute 0.43 10*3/mm3      Monocytes, Absolute 0.33 10*3/mm3      Eosinophils, Absolute 0.05 10*3/mm3      Basophils, Absolute 0.02 10*3/mm3      Immature Grans, Absolute 0.04 10*3/mm3         Imaging Results (last 24 hours)     Procedure Component Value Units Date/Time    XR Chest 1 View [[id number]] Collected:  01/04/19 1519     Updated:  01/04/19 1550    Narrative:         EXAM:         Radiograph(s), Chest   VIEWS:   Frontal  ; 1       DATE/TIME:  1/4/2019 3:45 PM CST                INDICATION:   Chest pain protocol  chest pain protocol    COMPARISON:  CXR: 12/14/18            FINDINGS:             - lines/tubes:    none     - cardiac:         size within normal limits         - mediastinum: contour within normal limits         - lungs:         no focal air space process, pulmonary  interstitial edema, nodule(s)/mass             - pleura:         no evidence of  fluid                  - osseous:         unremarkable for age                  - misc.:         Impression:       CONCLUSION:        1. No evidence of an active cardiopulmonary process.                                                              Electronically signed by:  UZIEL Umana MD  1/4/2019 3:49 PM  CST Workstation: [id number]            Assessment:        Active Hospital Problems    Diagnosis Date Noted   • Hyponatremia [E87.1] 01/04/2019   • Acute on chronic diastolic CHF (congestive heart failure) (CMS/[facility]) [I50.33] 01/04/2019   • Hypokalemia [E87.6] 01/04/2019       Plan:  1.  Acute on  chronic diastolic congestive heart failure:  IV Lasix, fluid and sodium restrictions.    2.  Hyponatremia:  Likely secondary to CHF.    3.  Hypokalemia:  Potassium protocol in place.  4.  Chronic kidney disease, stage III:  The patient follows with Dr. Dugan.  Will monitor creatinine.    5.  COPD:  Duonebs.    6.  Chronic pain:  Continue home Percocet.    7.  Insomnia:  Ambien PRN.   8.  Urinary tract infection:  Culture pending.  Rocephin.    I discussed the patients findings and my recommendations with: Patient      This document has been electronically signed by DOIMNGA Mistry on January 4, 2019 6:56 PM

## 2019-01-06 NOTE — PLAN OF CARE
Problem: Patient Care Overview  Goal: Plan of Care Review  Outcome: Ongoing (interventions implemented as appropriate)   01/06/19 0043   Coping/Psychosocial   Plan of Care Reviewed With patient   Plan of Care Review   Progress no change   OTHER   Outcome Summary Continuning to replete potassium. VSS. No new complaints.

## 2019-01-06 NOTE — PLAN OF CARE
Problem: Patient Care Overview  Goal: Plan of Care Review  Outcome: Ongoing (interventions implemented as appropriate)   01/05/19 2396   Coping/Psychosocial   Plan of Care Reviewed With patient   Plan of Care Review   Progress improving   OTHER   Outcome Summary Replacing potassium orally, next dose due at 2000. Increased to 3.3 so far. Pain meds given prn.      Goal: Individualization and Mutuality  Outcome: Ongoing (interventions implemented as appropriate)    Goal: Discharge Needs Assessment  Outcome: Ongoing (interventions implemented as appropriate)    Goal: Interprofessional Rounds/Family Conf  Outcome: Ongoing (interventions implemented as appropriate)      Problem: Fall Risk (Adult)  Goal: Absence of Fall  Outcome: Ongoing (interventions implemented as appropriate)      Problem: Skin Injury Risk (Adult)  Goal: Skin Health and Integrity  Outcome: Ongoing (interventions implemented as appropriate)      Problem: Pain, Acute (Adult)  Goal: Acceptable Pain Control/Comfort Level  Outcome: Ongoing (interventions implemented as appropriate)      Problem: Urinary Tract Infection (Adult)  Goal: Signs and Symptoms of Listed Potential Problems Will be Absent, Minimized or Managed (Urinary Tract Infection)  Outcome: Ongoing (interventions implemented as appropriate)

## 2019-01-06 NOTE — PROGRESS NOTES
[facility] Medicine Services  INPATIENT PROGRESS NOTE    Length of Stay: 0  Date of Admission: 1/4/2019  Primary Care Physician: Henry Muniz MD    Subjective   Chief Complaint: No complaints    HPI:      1/6/2018:  Sodium continues to improve.  Hemoglobin has also improved to 8.2.    1/5/2018:  Patient states that her breathing is about the same today.  We'll increase Lasix to 20 mg every 8 hours.  Sodium has improved to 127 with fluid restrictions.  Potassium replacement protocol in place.    H&P:  This is a 68-year-old  female with past medical history of 3-vessel CAD, CKD, CHF, hypertension, COPD (3 liters O2 at home) and DM 2 (insulin pump) that presented to [facility] on 1/4/2019 after receiving a phone call from a home health nurse about her recent lab work which showed hyponatremia and hypokalemia.  Patient states she had 3 vessel PCI approximately 1 month ago at [facility].  BNP on admission was 8,060.  No complaints of chest pain.  The patient was also noted to have a urinary tract infection.    Review of Systems   Constitutional: Negative for activity change and fatigue.   HENT: Negative for ear pain and sore throat.    Eyes: Negative for pain and discharge.   Respiratory: Negative for cough and shortness of breath.    Cardiovascular: Negative for chest pain and palpitations.   Gastrointestinal: Negative for abdominal pain and nausea.   Endocrine: Negative for cold intolerance and heat intolerance.   Genitourinary: Negative for difficulty urinating and dysuria.   Musculoskeletal: Negative for arthralgias and gait problem.   Skin: Negative for color change and rash.   Neurological: Negative for dizziness and weakness.   Psychiatric/Behavioral: Negative for agitation and confusion.        Objective    Temp:  [96.5 °F (35.8 °C)-98 °F (36.7 °C)] 97 °F (36.1 °C)  Heart Rate:  [51-84] 67  Resp:  [18] 18  BP: (131-158)/(70-82) 149/70    Physical Exam    Constitutional: She is oriented to person, place, and time. She appears well-developed and well-nourished.   HENT:   Head: Normocephalic and atraumatic.   Eyes: EOM are normal. Pupils are equal, round, and reactive to light.   Neck: Normal range of motion. Neck supple.   Cardiovascular: Normal rate and regular rhythm.   Pulmonary/Chest: Effort normal and breath sounds normal.   Abdominal: Soft. Bowel sounds are normal.   Musculoskeletal: Normal range of motion.   Neurological: She is alert and oriented to person, place, and time.   Skin: Skin is warm and dry.   Psychiatric: She has a normal mood and affect. Her behavior is normal.       Results Review:  I have reviewed the labs, radiology results, and diagnostic studies.    Laboratory Data:   Results from last 7 days   Lab Units  01/06/19   0643  01/06/19   0041  01/05/19   1406  01/05/19   0120  01/04/19   1451  01/03/19   1657   SODIUM mmol/L  132*   --    --   127*  123*  127*   POTASSIUM mmol/L  3.5  3.7  3.3*  2.8*  2.7*  2.9*   CHLORIDE mmol/L  92*   --    --   86*  83*  86*   CO2 mmol/L  28.0   --    --   32.0*  29.0  29.0   BUN mg/dL  53*   --    --   48*  49*  49*   CREATININE mg/dL  1.38*   --    --   1.47*  1.56*  1.38*   GLUCOSE mg/dL  103*   --    --   136*  200*  142*   CALCIUM mg/dL  8.6   --    --   8.4  8.5  8.3*   BILIRUBIN mg/dL   --    --    --    --   1.8*  1.6*   ALK PHOS U/L   --    --    --    --   97  84   ALT (SGPT) U/L   --    --    --    --   18  23   AST (SGOT) U/L   --    --    --    --   47*  32   ANION GAP mmol/L  12.0   --    --   9.0  11.0  12.0     Estimated Creatinine Clearance: 47.1 mL/min (A) (by C-G formula based on SCr of 1.38 mg/dL (H)).  Results from last 7 days   Lab Units  01/05/19   0120  01/04/19   1451   MAGNESIUM mg/dL  2.0  1.9         Results from last 7 days   Lab Units  01/06/19   0643  01/05/19   0529  01/04/19   1451  01/03/19   1657   WBC 10*3/mm3  4.52  4.01  5.50  4.77   HEMOGLOBIN g/dL  8.2*  7.6*  8.6*   8.3*   HEMATOCRIT %  24.6*  22.8*  26.4*  24.5*   PLATELETS 10*3/mm3  116*  118*  127*  130*           Culture Data:   No results found for: BLOODCX  No results found for: URINECX  No results found for: RESPCX  No results found for: WOUNDCX  No results found for: STOOLCX  No components found for: BODYFLD    Radiology Data:   Imaging Results (last 24 hours)     ** No results found for the last 24 hours. **          I have reviewed the patient's current medications.     Assessment/Plan     Active Hospital Problems    Diagnosis Date Noted   • Hyponatremia [E87.1] 01/04/2019   • Acute on chronic diastolic CHF (congestive heart failure) (CMS/[facility]) [I50.33] 01/04/2019   • Hypokalemia [E87.6] 01/04/2019       Plan:    1.  Acute on chronic diastolic congestive heart failure:  IV Lasix, fluid and sodium restrictions.    2.  Hyponatremia:  Likely secondary to CHF.    3.  Hypokalemia:  Potassium protocol in place.  4.  Chronic kidney disease, stage III:  The patient follows with Dr. Dugan.  Will monitor creatinine.    5.  COPD:  Duonebs.    6.  Chronic pain:  Continue home Percocet.    7.  Insomnia:  Ambien PRN.   8.  Urinary tract infection:  Culture pending.  Rocephin.  9.  Diabetes mellitus, type II:  Patient to use her own insulin pump.   10.  Anemia, chronic:  8.2 today.  Occult stool ordered.  No signs or symptoms of bleeding per patient.            Discharge Planning: I expect patient to be discharged to home in 1-2 days.      This document has been electronically signed by DOMINGA Mistry on January 6, 2019 12:30 PM

## 2019-01-06 NOTE — PLAN OF CARE
Problem: Patient Care Overview  Goal: Plan of Care Review  Outcome: Ongoing (interventions implemented as appropriate)   01/06/19 1505   Coping/Psychosocial   Plan of Care Reviewed With patient   Plan of Care Review   Progress improving   OTHER   Outcome Summary standing wt done. Pt potassium level is improving. potassium po given today x2 doses per protocol. will continue to monitor.      Goal: Individualization and Mutuality  Outcome: Ongoing (interventions implemented as appropriate)    Goal: Discharge Needs Assessment  Outcome: Ongoing (interventions implemented as appropriate)    Goal: Interprofessional Rounds/Family Conf  Outcome: Ongoing (interventions implemented as appropriate)      Problem: Fall Risk (Adult)  Goal: Absence of Fall  Outcome: Ongoing (interventions implemented as appropriate)      Problem: Skin Injury Risk (Adult)  Goal: Skin Health and Integrity  Outcome: Ongoing (interventions implemented as appropriate)      Problem: Pain, Acute (Adult)  Goal: Acceptable Pain Control/Comfort Level  Outcome: Ongoing (interventions implemented as appropriate)      Problem: Urinary Tract Infection (Adult)  Goal: Signs and Symptoms of Listed Potential Problems Will be Absent, Minimized or Managed (Urinary Tract Infection)  Outcome: Ongoing (interventions implemented as appropriate)

## 2019-01-07 PROBLEM — I50.9 ACUTE ON CHRONIC HEART FAILURE (HCC): Status: ACTIVE | Noted: 2019-01-01

## 2019-01-07 PROBLEM — N18.30 CHRONIC RENAL IMPAIRMENT, STAGE 3 (MODERATE) (HCC): Chronic | Status: ACTIVE | Noted: 2019-01-01

## 2019-01-07 PROBLEM — N18.9 CHRONIC RENAL IMPAIRMENT: Chronic | Status: ACTIVE | Noted: 2019-01-01

## 2019-01-07 PROBLEM — I25.10 CAD (CORONARY ARTERY DISEASE): Chronic | Status: ACTIVE | Noted: 2019-01-01

## 2019-01-07 PROBLEM — R06.00 DYSPNEA: Status: ACTIVE | Noted: 2019-01-01

## 2019-01-07 PROBLEM — N18.30 CHRONIC RENAL IMPAIRMENT, STAGE 3 (MODERATE) (HCC): Status: ACTIVE | Noted: 2019-01-01

## 2019-01-07 NOTE — DISCHARGE SUMMARY
[facility] Medicine Services  DISCHARGE SUMMARY       Date of Admission: 1/4/2019  Date of Discharge:  1/7/2019  Primary Care Physician: Henry Muniz MD    Presenting Problem/History of Present Illness:  Hypokalemia [E87.6]  Hyponatremia [E87.1]  Acute lower UTI [N39.0]  Anemia, unspecified type [D64.9]     Final Discharge Diagnoses:  Active Hospital Problems    Diagnosis   • Hyponatremia   • Acute on chronic diastolic CHF (congestive heart failure) (CMS/[facility])   • Hypokalemia       Consults:   Consults     No orders found from 12/6/2018 to 1/5/2019.                    Pertinent Test Results:   Lab Results (last 24 hours)     Procedure Component Value Units Date/Time    POC Glucose Once [[id number]]  (Abnormal) Collected:  01/07/19 1032    Specimen:  Blood Updated:  01/07/19 1049     Glucose 166 mg/dL      Comment: RN NotifiedOperator: [id number] Electric Imp CRYSTALMeter ID: [id number]       POC Glucose Once [[id number]]  (Abnormal) Collected:  01/07/19 0734    Specimen:  Blood Updated:  01/07/19 0747     Glucose 140 mg/dL      Comment: RN NotifiedOperator: [id number] Electric Imp CRYSTALMeter ID: [id number]       Basic Metabolic Panel [[id number]]  (Abnormal) Collected:  01/07/19 0518    Specimen:  Blood Updated:  01/07/19 0628     Glucose 133 mg/dL      BUN 48 mg/dL      Creatinine 1.42 mg/dL      Sodium 132 mmol/L      Potassium 3.6 mmol/L      Chloride 95 mmol/L      CO2 30.0 mmol/L      Calcium 8.6 mg/dL      eGFR Non African Amer 37 mL/min/1.73      BUN/Creatinine Ratio 33.8     Anion Gap 7.0 mmol/L     CBC & Differential [[id number]] Collected:  01/07/19 0518    Specimen:  Blood Updated:  01/07/19 0611    Narrative:       The following orders were created for panel order CBC & Differential.  Procedure                               Abnormality         Status                     ---------                               -----------         ------                     CBC Auto  Differential[[id number]]        Abnormal            Final result                 Please view results for these tests on the individual orders.    CBC Auto Differential [[id number]]  (Abnormal) Collected:  01/07/19 0518    Specimen:  Blood Updated:  01/07/19 0611     WBC 4.28 10*3/mm3      RBC 2.38 10*6/mm3      Hemoglobin 7.7 g/dL      Hematocrit 23.4 %      MCV 98.3 fL      MCH 32.4 pg      MCHC 32.9 g/dL      RDW 16.4 %      RDW-SD 58.1 fl      MPV 9.7 fL      Platelets 118 10*3/mm3      Neutrophil % 77.3 %      Lymphocyte % 12.6 %      Monocyte % 6.8 %      Eosinophil % 2.6 %      Basophil % 0.2 %      Immature Grans % 0.5 %      Neutrophils, Absolute 3.31 10*3/mm3      Lymphocytes, Absolute 0.54 10*3/mm3      Monocytes, Absolute 0.29 10*3/mm3      Eosinophils, Absolute 0.11 10*3/mm3      Basophils, Absolute 0.01 10*3/mm3      Immature Grans, Absolute 0.02 10*3/mm3     Potassium [[id number]]  (Normal) Collected:  01/06/19 2058    Specimen:  Blood Updated:  01/06/19 2113     Potassium 3.8 mmol/L     POC Glucose Once [[id number]]  (Abnormal) Collected:  01/06/19 1940    Specimen:  Blood Updated:  01/06/19 2005     Glucose 180 mg/dL      Comment: RN NotifiedOperator: [id number] MARY Castbetsy ID: [id number]       POC Glucose Once [[id number]]  (Normal) Collected:  01/06/19 1641    Specimen:  Blood Updated:  01/06/19 1729     Glucose 126 mg/dL      Comment: RN NotifiedOperator: [id number] ZEV Burk ID: [id number]           Imaging Results (last 24 hours)     ** No results found for the last 24 hours. **          Chief Complaint on Day of Discharge: No complaints    Hospital Course:    This is a 68-year-old  female with past medical history of 3-vessel CAD, chronic anemia, CKD, CHF, hypertension, COPD (3 liters O2 at home) and DM 2 (insulin pump) that presented to [facility] on 1/4/2019 after receiving a phone call from a home health nurse about her recent lab work which showed  "hyponatremia and hypokalemia.  The patient also has chronic anemia with hemoglobin remaining stable between 7.5-8.5.  No shortness of air or hypotension noted.  Occult stool was negative.   Patient states she had 3 vessel PCI approximately 1 month ago at [place].  BNP on admission was 8,060. The patient  No complaints of chest pain or shortness of air.  The patient was also noted to have a urinary tract infection.  The patient was given 3 days of IV Rocephin for her urinary tract infection.  She was given IV Lasix and her sodium went from 123 to 132.  Standing weight dropped from 246 to 241 lbs.  The patient was evaluated by Rosa ORR with the CHF clinic and the patient will follow-up in their office within one week.  Patient is counseled to continue dietary sodium restrictions of no greater than 2000 mg daily and fluid restrictions of no greater than 2 L daily.  Patient is recommended to follow up with her primary care physician within one week.  She was also given a prescription for oral potassium.     Condition on Discharge:  Stable    Physical Exam on Discharge:  /58 (BP Location: Left arm, Patient Position: Lying)   Pulse 79   Temp 96.8 °F (36 °C) (Oral) Comment: Discharge vitals.  Resp 20   Ht 162.6 cm (64\")   Wt 110 kg (241 lb 9.6 oz)   SpO2 94%   BMI 41.47 kg/m²   Physical Exam   Constitutional: She is oriented to person, place, and time. She appears well-developed and well-nourished.   HENT:   Head: Normocephalic and atraumatic.   Eyes: EOM are normal. Pupils are equal, round, and reactive to light.   Neck: Normal range of motion. Neck supple.   Cardiovascular: Normal rate and regular rhythm.   Pulmonary/Chest: Effort normal and breath sounds normal.   Abdominal: Soft. Bowel sounds are normal.   Musculoskeletal: Normal range of motion.   Neurological: She is alert and oriented to person, place, and time.   Skin: Skin is warm and dry.   Psychiatric: She has a normal mood and affect. " Her behavior is normal.     Discharge Disposition:  Home or Self Care    Discharge Medications:     Discharge Medications      New Medications      Instructions Start Date   potassium chloride 10 MEQ CR capsule  Commonly known as:  MICRO-K  Notes to patient:  01/07/2019 at 2PM   20 mEq, Oral, 2 Times Daily         Changes to Medications      Instructions Start Date   hydrALAZINE 50 MG tablet  Commonly known as:  APRESOLINE  What changed:    · how much to take  · when to take this  Notes to patient:  01/07/2019 at 9PM   50 mg, Oral, 2 Times Daily      isosorbide mononitrate 30 MG 24 hr tablet  Commonly known as:  IMDUR  What changed:    · how much to take  · when to take this  Notes to patient:  01/08/2019 at 9AM   30 mg, Oral, Daily      vitamin D 18638 units capsule capsule  Commonly known as:  ERGOCALCIFEROL  What changed:  additional instructions  Notes to patient:  Take one week from last dose.   50,000 Units, Oral, Weekly         Continue These Medications      Instructions Start Date   amLODIPine 10 MG tablet  Commonly known as:  NORVASC  Notes to patient:  01/08/2019 at 9AM   10 mg, Oral, Daily      aspirin 81 MG chewable tablet  Notes to patient:  01/08/2019 at 9AM   81 mg, Oral, Daily      atorvastatin 80 MG tablet  Commonly known as:  LIPITOR  Notes to patient:  01/07/2019 at 9PM   80 mg, Oral, Nightly      bumetanide 2 MG tablet  Commonly known as:  BUMEX  Notes to patient:  01/08/2019 at 9AM   2 mg, Oral, Daily      carvedilol 3.125 MG tablet  Commonly known as:  COREG  Notes to patient:  01/07/2019 at 9PM   6.25 mg, Oral, 2 Times Daily With Meals      clopidogrel 75 MG tablet  Commonly known as:  PLAVIX  Notes to patient:  01/08/2019 at 9AM   75 mg, Oral, Daily      glucose blood test strip   Needs to see pcp for refills      HYDROcodone-acetaminophen  MG per tablet  Commonly known as:  NORCO  Notes to patient:  01/07/2018 at 4PM   1 tablet, Oral, 4 Times Daily      Insulin Lispro 100 UNIT/ML  "solution cartridge   150 Units, Subcutaneous, Daily, DX: E11      HUMALOG 100 UNIT/ML injection  Generic drug:  insulin lispro  Notes to patient:  Per pump   INJECT 150 UNITS SUB-Q DAILY PER INSULIN PUMP      Insulin Syringe-Needle U-100 30G X 5/16\" 0.3 ML misc  Commonly known as:  GLOBAL INSULIN SYRINGES   SQ injections as needed when insulin pump is non functioning      nitroglycerin 0.4 MG/SPRAY spray  Commonly known as:  NITROLINGUAL  Notes to patient:  As needed   1 spray, Sublingual, Every 5 Minutes PRN      raNITIdine 300 MG tablet  Commonly known as:  ZANTAC  Notes to patient:  01/07/2019 at 9PM   TAKE 1 TABLET BY MOUTH EVERY NIGHT.      sertraline 50 MG tablet  Commonly known as:  ZOLOFT  Notes to patient:  01/08/2019 at 9AM   50 mg, Oral, Daily      zolpidem 10 MG tablet  Commonly known as:  AMBIEN  Notes to patient:  As needed.   10 mg, Oral, Nightly PRN             Discharge Diet:   Diet Instructions     Diet: Consistent Carbohydrate, Cardiac, Specialty Diet; Thin Liquids, No Restrictions; Low Sodium      Discharge Diet:   Consistent Carbohydrate  Cardiac  Specialty Diet       Fluid Consistency:  Thin Liquids, No Restrictions    Specialty Diets:  Low Sodium    Fluid Restriction per day:  1500 mL Fluid          Activity at Discharge:   Activity Instructions     Activity as Tolerated            Discharge Care Plan/Instructions: As above.    Follow-up Appointments:   Future Appointments   Date Time Provider Department Center   1/10/2019 11:00 AM Rosa Mulligan APRN [facility] None   1/18/2019 10:15 AM Yaniv Lobo MD [facility] CD [facility] None   2/12/2019  9:00 AM Bryon Samano MD [facility] None       Test Results Pending at Discharge:    Order Current Status    Extra Tubes Collected (01/06/19 2106)    Gold Top - SST Collected (01/06/19 2106)            This document has been electronically signed by DOMINGA Mistry on January 7, 2019 1:32 PM        Time: Greater than 30 " minutes.

## 2019-01-07 NOTE — CONSULTS
Please refer to Treatment Plan documentation          This document has been electronically signed by DOMINGA Hennign on January 7, 2019 10:47 AM

## 2019-01-07 NOTE — CONSULTS
"Adult Nutrition  Assessment    Patient Name:  Nicolasa Rojas  YOB: 1950  MRN: [id number]  Admit Date:  1/4/2019    Assessment Date:  1/7/2019    Comments:  Pt presents at BMI 41 and 200% IBW which is compatible w/morbid obesity. Pt admitted r/t CHF. Pt's  very involved in diet planning and knowledgeable of high Na foods---voices frustration about being hospitalized again. Upon questioning,  reports almost no sodium intake at all and, if his report is accurate, very few calories. Despite this, no report of any weight changes. RD encouraged  to offer 3 meals per day and liberalizing Na intake slightly. Supplement if needed. RD will monitor.      Reason for Assessment     Row Name 01/07/19 1055          Reason for Assessment    Reason For Assessment  per organizational policy     Diagnosis  cardiac disease;fluid status     Identified At Risk by Screening Criteria  BMI;need for education         Nutrition/Diet History     Row Name 01/07/19 1056          Nutrition/Diet History    Typical Food/Fluid Intake  Pt's  says he monitors pt's fluid and sodium intake very closely. He voices frustration over being admitted to hospital again since he's been watching so closely.  Says \"she's getting alot more food here than what i give her at home\".          Anthropometrics     Row Name 01/07/19 0615          Anthropometrics    Weight  110 kg (241 lb 9.6 oz)         Labs/Tests/Procedures/Meds     Row Name 01/07/19 1058          Labs/Procedures/Meds    Lab Results Reviewed  reviewed, pertinent     Lab Results Comments  Na 132, Gl 133-166, BUN 48, Crea 1.34        Diagnostic Tests/Procedures    Diagnostic Test/Procedure Reviewed  reviewed        Medications    Pertinent Medications Reviewed  reviewed         Physical Findings     Row Name 01/07/19 1059          Physical Findings    Overall Physical Appearance  obese         Estimated/Assessed Needs     Row Name 01/07/19 1059          " Calculation Measurements    Weight Used For Calculations  54.4 kg (120 lb)        Estimated/Assessed Needs    Additional Documentation  Protein Requirements (Group);Fluid Requirements (Group);Calorie Requirements (Group)        Calorie Requirements    Estimated Calorie Requirement (kcal/day)  1500        KCAL/KG    14 Kcal/Kg (kcal)  762.05     15 Kcal/Kg (kcal)  816.48     18 Kcal/Kg (kcal)  979.78     20 Kcal/Kg (kcal)  1088.64     25 Kcal/Kg (kcal)  1360.8     30 Kcal/Kg (kcal)  1632.96     35 Kcal/Kg (kcal)  1905.12     40 Kcal/Kg (kcal)  2177.28     45 Kcal/Kg (kcal)  2449.44     50 Kcal/Kg (kcal)  2721.6        [place]-St. Jeor Equation    RMR ([place]-St. Jeor Equation)  1054.32        Protein Requirements    Est Protein Requirement Amount (gms/kg)  0.8 gm protein     Estimated Protein Requirements (gms/day)  43.55        Fluid Requirements    Estimated Fluid Requirements (mL/day)  1500     RDA Method (mL)  1500     [place] Method (over 20 kg)  2588.64         Nutrition Prescription Ordered     Row Name 01/07/19 1100          Nutrition Prescription PO    Current PO Diet  Regular     Fluid Consistency  Thin     Common Modifiers  Cardiac;Consistent Carbohydrate;Fluid Restriction     Fluid Restriction mL per Day  Other (comment) 2000         Evaluation of Received Nutrient/Fluid Intake     Row Name 01/07/19 1100 01/07/19 1059       Calculation Measurements    Weight Used For Calculations  --  54.4 kg (120 lb)       PO Evaluation    Number of Meals  1  --    % PO Intake  75  --        Evaluation of Prescribed Nutrient/Fluid Intake     Row Name 01/07/19 1059          Calculation Measurements    Weight Used For Calculations  54.4 kg (120 lb)             Electronically signed by:  Alexandra Rossi RD  01/07/19 11:03 AM

## 2019-01-07 NOTE — PLAN OF CARE
Problem: Patient Care Overview  Goal: Plan of Care Review  Outcome: Ongoing (interventions implemented as appropriate)   01/07/19 0129   Coping/Psychosocial   Plan of Care Reviewed With patient   Plan of Care Review   Progress no change   OTHER   Outcome Summary potassium replaced this day.     Goal: Individualization and Mutuality  Outcome: Ongoing (interventions implemented as appropriate)

## 2019-01-07 NOTE — TREATMENT PLAN
Indication: HFpEF, CKD  Attached Cardiologist: To establish with Dr Yaniv Lobo - 01/18/2019 @ 10:15 AM  PCP: Dr Danyel Muniz  Nephrology: Dr Dugan - 01/10/2019 @ 1:00 PM     The patient is a 68 year old female with history of CAD S/P PCI with cardiology care in the past provided by Dr Amador at [facility] in [place].  Additional CMH: hypertension, hyperlipidemia, DM type 2, COPD (3L per NC @ home), PAD S/P  Right CEA 08/29/2012, severe MELCHOR 2013 requiring transient HD that then resolved now with CKD Stage 3-4    Cardiac History:  Last TTE: 12/19/2018 LVEF @ 55%/grade II diastolic dysfunction with inferior hypokinesis,    LEXI to ostial RCA 8/2018,     11/19/2018  The [facility] showed  LM: Diffuse 20% disease. Pressure ventricularization with catheter engagement.   LAD: Ostial 95% stenosis and mid 80-90% stenoses.   Very small D2 has a proximal 100% .   LCX: Proximal 80% stenosis    OM1 100% in-stent .   RCA: Dominant, the proximal stents are widely patent.     Cardiac surgery was consult for evaluation for surgical intervention who recommended proceeding with a high risk PCI due to morbid obesity, deconditioned state and extensive  medical history.     She underwent the high risk PCI on 11/19 with drug-eluting stents placed in the proximal LCx, mid-LAD and left main extending into the proximal mid-LAD. She had the IABP removed on 11/20 without complications.     Additional [facility] information:  Left femoral arterial access 5 Setswana sheath  Right radial arterial access with a 6 Setswana sheath    Stent LAD with a 2.25 x 15 drug-eluting stent and 2.5 x 26 drug-eluting stent extending back into the left main across the circumflex.    Stent circumflex with 2.5 x 22 drug-eluting stent.   Dilated the left main and origin LAD with a 3 mm NC balloon.    Kissing balloons in the left main into the LAD and into the circumflex with 2.5 x 15 mm balloons in both vessels.    Excellent final result. However, the distal LAD and  distal circumflex are severely diffusely diseased.    About 110 cc contrast use.    IP CHF Navigator:   Pleasant discussion with the patient and her   re:  HF Program.  This discussion was inclusive of:  Introduction of myself, HF team members and the CHF outpatient program;  patient's personal disease process, an overview of heart failure, expected course, considerations, risk factors and exacerbation prevention; medications as they relate to heart failure;   Recommended daily weight monitoring; dietary sodium restrictions of no greater than 2000 mg daily; fluid restrictions of no greater than 2 liters daily;   Discussed patient action plan for heart failure  Discussed warning signs requiring additional medical attention for heart failure.     Following the discussion, the patient does wish to enroll at this time, therefore she may be scheduled to see us in the outpatient HF Clinic within 3-5 days/weeks of hospital discharge.     I would like to thank DOMINGA Kiran  for this HF consultation.           This document has been electronically signed by DOMINGA Henning on January 7, 2019 10:47 AM    -

## 2019-01-07 NOTE — SIGNIFICANT NOTE
The patient's spouse called me at 1648 and states the patient couldn't breathe.  I spoke with Nicolasa and she states she is short of air.  She states she has taken 2 Bumex tablets (total of 4 mg) since she has been home.  The  checked her oxygen with a home pulse oximeter and her oxygen was noted to be 96%.  The patient wears home oxygen at 3 liters.

## 2019-01-07 NOTE — PLAN OF CARE
Problem: Patient Care Overview  Goal: Plan of Care Review  Outcome: Ongoing (interventions implemented as appropriate)   01/07/19 9860   Coping/Psychosocial   Plan of Care Reviewed With patient   Plan of Care Review   Progress no change   OTHER   Outcome Summary Pt reports back pain; medicated. K+ 3.6 ; will continue to monitor.     Goal: Individualization and Mutuality  Outcome: Ongoing (interventions implemented as appropriate)      Problem: Fall Risk (Adult)  Goal: Absence of Fall  Outcome: Ongoing (interventions implemented as appropriate)      Problem: Skin Injury Risk (Adult)  Goal: Skin Health and Integrity  Outcome: Ongoing (interventions implemented as appropriate)      Problem: Pain, Acute (Adult)  Goal: Acceptable Pain Control/Comfort Level  Outcome: Ongoing (interventions implemented as appropriate)

## 2019-01-08 NOTE — CONSULTS
[facility]  [address] [place] [address]. [id number]   - [phone]  F  [phone]     Consultation         PATIENT  DEMOGRAPHICS   PATIENT NAME: Nicolasa Rojas                      PHYSICIAN: Joon Dugan MD  : 1950  MRN: [id number]    Subjective   SUBJECTIVE   Referring Provider: Dr Barron  Reason for Consultation: ckd 3  History of present illness:      Ms Rojas is a 68-year-old female with a past medical history of chronic kidney disease stage III baseline creatinine close to 1.6 - 1.7.  She has history of contrast-induced nephropathy after she had non-ST elevation MI back in 2018.  She has multiple stents placed at that time in [place].  She also has a history of chronic congestive heart failure and is on Bumex 2 mg by mouth daily.  She has increased it to 2 mg twice a day but still has the worsening edema and shortness of air.  Patient also has vitamin D deficiency, diabetes mellitus type 2, anemia with iron deficiency.  She is not on Aldactone and lisinopril due to acute kidney injury back in 2018.      She now came in with progressively worsening dyspnea along with orthopnea.  Patient was just recently discharged and came back on the same day.  She thinks Bumex is not helping her.  She also has a severe sciatica pain on the right lower extremity.    Past Medical History:   Diagnosis Date   • Acute bronchitis    • Acute congestive heart failure (CMS/HCC)     resolving   • Acute kidney failure, unspecified (CMS/HCC)    • Acute kidney failure, unspecified (CMS/HCC)    • Acute posthemorrhagic anemia    • Asthenia    • Chest pain    • Chronic gastritis    • Chronic pharyngitis    • Chronic renal impairment    • Congenital renal failure    • Congestive heart failure (CMS/HCC)    • Contact dermatitis due to poison ivy    • COPD (chronic obstructive pulmonary disease) (CMS/HCC)    • Coronary arteriosclerosis    • D-dimer, elevated     D-dimer above  reference range    • Degenerative joint disease involving multiple joints     back   • Depressive disorder    • Dysphagia    • Dyspnea    • Edema of lower extremity    • Encounter for immunization    • Encounter for long-term (current) use of medications    • Epigastric pain    • Esophagitis    • Essential hypertension    • Gastroesophageal reflux disease    • Gastroparesis     Gastroparesis syndrome    • Generalized abdominal pain    • H/O bone density study 01/09/2015   • H/O mammogram 01/15/2015   • H/O screening mammography 11/12/2013   • Headache    • History of transfusion    • Hyperlipidemia    • Hypokalemia    • Hypomagnesemia    • Injury of tendon of rotator cuff     Injury of tendon of the rotator cuff of shoulder      • Insomnia    • Insomnia, unspecified    • Knee pain    • Nausea and vomiting    • Neck pain    • Need for immunization against influenza    • Need for prophylactic vaccination and inoculation against influenza    • Need for prophylactic vaccination and inoculation against viral disease    • 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 Performed at [facility].   • ENDOSCOPY  02/09/2015    ESOPHAGEAL STRICTURE PRESENT, GASTRITIS FOUND IN THE STOMACH, NORMAL DUODENUM   • ENDOSCOPY W/ PEG TUBE PLACEMENT  12/19/2012    Esophagitis seen. Biopsy taken. Esophageal stricture present. Dilatation performed. Gastritis in stomach. Biopsy taken. Food was in stomach. Normal duodenum.   • HERNIA REPAIR     • HYSTERECTOMY      partial   • NECK SURGERY       Family History   Problem Relation Age of Onset  "  • Cancer Mother    • Diabetes Mother    • Stroke Father    • Heart disease Sister    • Colon cancer Other    • Hypertension Other    • Breast cancer Maternal Aunt      Social History     Tobacco Use   • Smoking status: Former Smoker   • Smokeless tobacco: Never Used   • Tobacco comment: quit 30 years ago    Substance Use Topics   • Alcohol use: No   • Drug use: No     Allergies:  Iodides; Iodine; Other; and Stadol [butorphanol]     REVIEW OF SYSTEMS    Review of Systems   Constitutional: Negative for chills and fever.   Respiratory: Positive for shortness of breath. Negative for chest tightness.    Cardiovascular: Positive for leg swelling. Negative for chest pain.   Gastrointestinal: Negative for abdominal pain, diarrhea and nausea.   Genitourinary: Negative for dysuria, flank pain and hematuria.   Neurological: Negative for dizziness, syncope and weakness.       Objective   OBJECTIVE   Vital Signs  Temp:  [97.8 °F (36.6 °C)-98.6 °F (37 °C)] 97.8 °F (36.6 °C)  Heart Rate:  [72-98] 80  Resp:  [18-24] 18  BP: (129-229)/(55-91) 171/89    Flowsheet Rows      First Filed Value   Admission Height  162.6 cm (64\") Documented at 01/07/2019 1800   Admission Weight  113 kg (248 lb 8 oz) Documented at 01/07/2019 1800           No intake/output data recorded.    PHYSICAL EXAM    Physical Exam   Constitutional: She is oriented to person, place, and time. She appears well-developed.   HENT:   Head: Normocephalic.   Eyes: Pupils are equal, round, and reactive to light.   Cardiovascular: Normal rate, regular rhythm and normal heart sounds.   Pulmonary/Chest: Effort normal and breath sounds normal.   Abdominal: Soft. Bowel sounds are normal.   Musculoskeletal: She exhibits edema.   Neurological: She is alert and oriented to person, place, and time.       RESULTS   Results Review:    Results from last 7 days   Lab Units  01/08/19   0728  01/07/19   1954  01/07/19   0518   01/04/19   1451  01/03/19   1657   SODIUM mmol/L  134*  136* "  132*   < >  123*  127*   POTASSIUM mmol/L  3.2*  3.8  3.6   < >  2.7*  2.9*   CHLORIDE mmol/L  95  96  95   < >  83*  86*   CO2 mmol/L  30.0  28.0  30.0   < >  29.0  29.0   BUN mg/dL  45*  47*  48*   < >  49*  49*   CREATININE mg/dL  1.13*  1.21*  1.42*   < >  1.56*  1.38*   CALCIUM mg/dL  8.6  8.9  8.6   < >  8.5  8.3*   BILIRUBIN mg/dL   --   2.1*   --    --   1.8*  1.6*   ALK PHOS U/L   --   115   --    --   97  84   ALT (SGPT) U/L   --   18   --    --   18  23   AST (SGOT) U/L   --   43*   --    --   47*  32   GLUCOSE mg/dL  117*  164*  133*   < >  200*  142*    < > = values in this interval not displayed.       Estimated Creatinine Clearance: 56.7 mL/min (A) (by C-G formula based on SCr of 1.13 mg/dL (H)).    Results from last 7 days   Lab Units  01/07/19   0518  01/05/19   0120  01/04/19   1451   MAGNESIUM mg/dL  2.0  2.0  1.9             Results from last 7 days   Lab Units  01/08/19   0728  01/07/19   1954  01/07/19   0518  01/06/19   0643  01/05/19   0529   WBC 10*3/mm3  4.97  6.84  4.28  4.52  4.01   HEMOGLOBIN g/dL  8.2*  9.1*  7.7*  8.2*  7.6*   PLATELETS 10*3/mm3  130*  137*  118*  116*  118*              MEDICATIONS      amLODIPine 10 mg Oral Daily   aspirin 81 mg Oral Daily   atorvastatin 80 mg Oral Nightly   bumetanide 2 mg Oral BID   carvedilol 12.5 mg Oral BID With Meals   clopidogrel 75 mg Oral Daily   famotidine 40 mg Oral Daily   heparin (porcine) 5,000 Units Subcutaneous Q12H   HYDROcodone-acetaminophen 1 tablet Oral Q6H   isosorbide mononitrate 90 mg Oral BID   lisinopril 10 mg Oral Q24H   sodium chloride 3 mL Intravenous Q12H       insulin      Medications Prior to Admission   Medication Sig Dispense Refill Last Dose   • amLODIPine (NORVASC) 10 MG tablet Take 1 tablet by mouth Daily. 30 tablet 5 1/4/2019 at Unknown time   • aspirin 81 MG chewable tablet Chew 81 mg Daily.   1/4/2019 at Unknown time   • atorvastatin (LIPITOR) 80 MG tablet Take 80 mg by mouth Every Night.   1/3/2019 at Unknown  "time   • bumetanide (BUMEX) 2 MG tablet Take 1 tablet by mouth Daily. 90 tablet 1 1/4/2019 at Unknown time   • carvedilol (COREG) 3.125 MG tablet Take 6.25 mg by mouth 2 (Two) Times a Day With Meals.   1/4/2019 at Unknown time   • clopidogrel (PLAVIX) 75 MG tablet Take 1 tablet by mouth Daily. 90 tablet 1 1/4/2019 at Unknown time   • glucose blood test strip Needs to see pcp for refills 900 each 0    • HUMALOG 100 UNIT/ML injection INJECT 150 UNITS SUB-Q DAILY PER INSULIN PUMP 40 mL 11    • hydrALAZINE (APRESOLINE) 50 MG tablet Take 1 tablet by mouth 2 (Two) Times a Day. (Patient taking differently: Take 75 mg by mouth 3 (Three) Times a Day.) 180 tablet 1 1/4/2019 at Unknown time   • HYDROcodone-acetaminophen (NORCO)  MG per tablet Take 1 tablet by mouth 4 (Four) Times a Day.   1/4/2019 at Unknown time   • Insulin Lispro 100 UNIT/ML solution cartridge Inject 150 Units under the skin Daily. DX: E11 5 cartridge 11 Taking   • Insulin Syringe-Needle U-100 (GLOBAL INSULIN SYRINGES) 30G X 5/16\" 0.3 ML misc SQ injections as needed when insulin pump is non functioning 50 each 1    • isosorbide mononitrate (IMDUR) 30 MG 24 hr tablet Take 1 tablet by mouth Daily. (Patient taking differently: Take 90 mg by mouth 2 (Two) Times a Day.) 90 tablet 1 1/4/2019 at Unknown time   • nitroglycerin (NITROLINGUAL) 0.4 MG/SPRAY spray Place 1 spray under the tongue Every 5 (Five) Minutes As Needed for Chest Pain. 4.9 g 5 More than a month at Unknown time   • potassium chloride (MICRO-K) 10 MEQ CR capsule Take 2 capsules by mouth 2 (Two) Times a Day. 60 capsule 3    • raNITIdine (ZANTAC) 300 MG tablet TAKE 1 TABLET BY MOUTH EVERY NIGHT. 90 tablet 1 1/3/2019 at Unknown time   • sertraline (ZOLOFT) 50 MG tablet Take 1 tablet by mouth Daily. 90 tablet 1 1/4/2019 at Unknown time   • vitamin D (ERGOCALCIFEROL) 84541 UNITS capsule capsule Take 1 capsule by mouth 1 (One) Time Per Week. (Patient taking differently: Take 50,000 Units by mouth 1 " (One) Time Per Week. Friday) 4 capsule 6 1/4/2019 at Unknown time   • zolpidem (AMBIEN) 10 MG tablet Take 1 tablet by mouth At Night As Needed for Sleep. 30 tablet 5 1/4/2019 at Unknown time     Assessment/Plan   ASSESSMENT / PLAN      Dyspnea    Acute on chronic diastolic CHF (congestive heart failure) (CMS/[facility])    Chronic renal impairment, stage 3 (moderate) (CMS/[facility])    CAD (coronary artery disease)    1.chronic kidney disease stage III baseline creatinine close to 1.6-1.7.  This has significantly improved and it is down to 1.2.  She has history of contrast-induced nephropathy back in august 2018 after she has left heart catheterization.  She has multiple stent placed for non-ST elevation MI back in August 2018    She was not on Aldactone and lisinopril due to acute kidney injury.  At this point her renal function is much more stable and I agree with the Aldactone at 25 mg daily and lisinopril at 10 mg daily.  This will help with her blood pressure.  I will keep the Bumex and increase it to 2 mg twice a day and watch her renal function.  She was on hydralazine in the outpatient setting    2.acute on chronic diastolic heart failure.  Plan as above with the diuretics daily weights and I and O's with the fluid restriction    3.history of coronary artery disease and prior non-ST elevation MI with stenting back in August 2018 to LAD and circumflex    4.hypokalemia plan for potassium sparing diuretic    Thank you for the referral             I discussed the patients findings and my recommendations with patient, family and consulting provider         This document has been electronically signed by Joon Dugan MD on January 8, 2019 12:52 PM

## 2019-01-08 NOTE — CONSULTS
Adult Nutrition  Assessment    Patient Name:  Nicolasa Rojas  YOB: 1950  MRN: [id number]  Admit Date:  1/7/2019    Assessment Date:  1/8/2019    Comments:  Pt  indicates pt appetite is good but then indicates pt doesn't eat much.  Indicates pt usually has grits, strawberries, milk for breakfast.  Pt will then often have a supplement as a meal replacement for 1 meal and a sandwich (routinely from [employer]) for another meal.  Intake 75% - 1x.  Blood glucose is elevated. Pt uses an insulin pump to manage her blood sugars.  K+ is low.  PRN KCl prescribed.  Occasional nausea reported.  Pepcid prescribed.  Pt with dx Heart Failure.  Pt  watches pt sodium intake at home and was receptive to diet ed.  Mild Sodium Restricted Diet info used to provide ed regarding Low Sodium Diet and diet copy given.  BMI 41.  Pt not appropriate for Wt Loss Diet ed due to reported limited intake at home.    Reason for Assessment     Row Name 01/08/19 1314          Reason for Assessment    Reason For Assessment  per organizational policy Low Sodium Wt Loss Diet ed     Diagnosis  cardiac disease;other (see comments) dx Heart Failure, dx Morbid Obesity     Identified At Risk by Screening Criteria  BMI;need for education           Anthropometrics     Row Name 01/08/19 0639          Anthropometrics    Weight  109 kg (240 lb 3.2 oz)         Labs/Tests/Procedures/Meds     Row Name 01/08/19 1315          Labs/Procedures/Meds    Lab Results Reviewed  reviewed, pertinent        Medications    Pertinent Medications Reviewed  reviewed, pertinent           Estimated/Assessed Needs     Row Name 01/08/19 1313          Calculation Measurements    Weight Used For Calculations  68.1 kg (150 lb 2.1 oz)        Estimated/Assessed Needs    Additional Documentation  Calorie Requirements (Group);Fluid Requirements (Group);Kemper-St. Jeor Equation (Group);Protein Requirements (Group)        Calorie Requirements    Estimated Calorie  Requirement (kcal/day)  1548     Estimated Calorie Need Method  [place]-St Jeor        KCAL/KG    14 Kcal/Kg (kcal)  953.4     15 Kcal/Kg (kcal)  1021.5     18 Kcal/Kg (kcal)  1225.8     20 Kcal/Kg (kcal)  1362     25 Kcal/Kg (kcal)  1702.5     30 Kcal/Kg (kcal)  2043     35 Kcal/Kg (kcal)  2383.5     40 Kcal/Kg (kcal)  2724     45 Kcal/Kg (kcal)  3064.5     50 Kcal/Kg (kcal)  3405        [place]-St. Jeor Equation    RMR (Fairfield-St. Jeor Equation)  1191        Protein Requirements    Est Protein Requirement Amount (gms/kg)  1.2 gm protein     Estimated Protein Requirements (gms/day)  81.72        Fluid Requirements    Estimated Fluid Requirements (mL/day)  1704     RDA Method (mL)  1704     Sanam-Segar Method (over 20 kg)  2862         Nutrition Prescription Ordered     Row Name 01/08/19 1318          Nutrition Prescription PO    Current PO Diet  Regular     Common Modifiers  Cardiac;Consistent Carbohydrate;Fluid Restriction;Low Sodium     Fluid Restriction mL per Tray  250 mL     Fluid Restriction mL per Day  1500 mL     Low Sodium Details  2,000 mg Sodium         Evaluation of Received Nutrient/Fluid Intake     Row Name 01/08/19 1318 01/08/19 1315       Calculation Measurements    Weight Used For Calculations  --  68.1 kg (150 lb 2.1 oz)       PO Evaluation    Number of Meals  1  --    % PO Intake  75  --        Evaluation of Prescribed Nutrient/Fluid Intake     Row Name 01/08/19 1315          Calculation Measurements    Weight Used For Calculations  68.1 kg (150 lb 2.1 oz)             Electronically signed by:  Shraddha Clemente RD  01/08/19 1:19 PM

## 2019-01-08 NOTE — OUTREACH NOTE
Prep Survey      Responses   Facility patient discharged from?  [facility]   Is patient eligible?  No   What are the reasons patient is not eligible?  Readmitted   Does the patient have one of the following disease processes/diagnoses(primary or secondary)?  CHF   Prep survey completed?  Yes          Dahiana Resendez RN

## 2019-01-08 NOTE — H&P
HISTORY AND PHYSICAL  NAME: Nicolasa Rojas  : 1950  MRN: [id number]    DATE OF ADMISSION: 19    DATE & TIME SEEN: 19 8:57 PM    PCP: Henry Muniz MD    CODE STATUS:   Code Status and Medical Interventions:   Ordered at: 19 7903     Level Of Support Discussed With:    Patient     Code Status:    CPR     Medical Interventions (Level of Support Prior to Arrest):    Full       CHIEF COMPLAINT Dyspnea    HPI:  Nicolasa Rojas is a 68 y.o. female with medical history significant for hypertension, hyperlipidemia, heart failure with preserved ejection fraction, CAD, chronic pain, and diabetes mellitus presents with worsening dyspnea.    Patient currently oxygen dependent on 3 L/m at home presented to the emergency department after being discharged from our facility earlier today.  Patient states that she became more short of air at home after feeling some better prior to discharge as such presented back to the emergency department.  Patient is unsure of her dry weight however she weighed 241 this morning and currently weighs 244 after administration of IV Lasix in the emergency department.  Patient states that she did not have much to eat or drink after discharge.  Patient does endorse dyspnea on exertion.  Patient denies any fever, chills, nausea, vomiting, chest pain, or diaphoresis.     CONCURRENT MEDICAL HISTORY:  Past Medical History:   Diagnosis Date   • Acute bronchitis    • Acute congestive heart failure (CMS/HCC)     resolving   • Acute kidney failure, unspecified (CMS/HCC)    • Acute kidney failure, unspecified (CMS/HCC)    • Acute posthemorrhagic anemia    • Asthenia    • Chest pain    • Chronic gastritis    • Chronic pharyngitis    • Chronic renal impairment    • Congenital renal failure    • Congestive heart failure (CMS/HCC)    • Contact dermatitis due to poison ivy    • COPD (chronic obstructive pulmonary disease) (CMS/HCC)    • Coronary arteriosclerosis    • D-dimer,  elevated     D-dimer above reference range    • Degenerative joint disease involving multiple joints     back   • Depressive disorder    • Dysphagia    • Dyspnea    • Edema of lower extremity    • Encounter for immunization    • Encounter for long-term (current) use of medications    • Epigastric pain    • Esophagitis    • Essential hypertension    • Gastroesophageal reflux disease    • Gastroparesis     Gastroparesis syndrome    • Generalized abdominal pain    • H/O bone density study 01/09/2015   • H/O mammogram 01/15/2015   • H/O screening mammography 11/12/2013   • Headache    • History of transfusion    • Hyperlipidemia    • Hypokalemia    • Hypomagnesemia    • Injury of tendon of rotator cuff     Injury of tendon of the rotator cuff of shoulder      • Insomnia    • Insomnia, unspecified    • Knee pain    • Nausea and vomiting    • Neck pain    • Need for immunization against influenza    • Need for prophylactic vaccination and inoculation against influenza    • Need for prophylactic vaccination and inoculation against viral disease    • 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 Performed at [facility].   • ENDOSCOPY  02/09/2015    ESOPHAGEAL STRICTURE PRESENT, GASTRITIS FOUND IN THE STOMACH, NORMAL DUODENUM   • ENDOSCOPY W/ PEG TUBE PLACEMENT  12/19/2012    Esophagitis seen. Biopsy taken. Esophageal stricture present. Dilatation performed. Gastritis in stomach. Biopsy taken. Food was in stomach. Normal duodenum.   • HERNIA REPAIR     • HYSTERECTOMY      partial   • NECK SURGERY          FAMILY HISTORY:  Family History   Problem Relation Age of Onset   • Cancer Mother    • Diabetes Mother    • Stroke Father    • Heart disease Sister    • Colon cancer Other    • Hypertension Other    • Breast cancer Maternal Aunt         SOCIAL HISTORY:  Social History     Socioeconomic History   • Marital status:      Spouse name: Not on file   • Number of children: Not on file   • Years of education: Not on file   • Highest education level: Not on file   Social Needs   • Financial resource strain: Not on file   • Food insecurity - worry: Not on file   • Food insecurity - inability: Not on file   • Transportation needs - medical: Not on file   • Transportation needs - non-medical: Not on file   Occupational History   • Not on file   Tobacco Use   • Smoking status: Former Smoker   • Smokeless tobacco: Never Used   • Tobacco comment: quit 30 years ago    Substance and Sexual Activity   • Alcohol use: No   • Drug use: No   • Sexual activity: Defer   Other Topics Concern   • Not on file   Social History Narrative    Pt denies ever having a feeding tube, denies PEG tube placement.         HOME MEDICATIONS:  Prior to Admission medications    Medication Sig Start Date End Date Taking? Authorizing Provider   amLODIPine (NORVASC) 10 MG tablet Take 1 tablet by mouth Daily. 9/15/17   Salvador Montes MD   aspirin 81 MG chewable tablet Chew 81 mg Daily.    Provider, MD Jacinta   atorvastatin (LIPITOR) 80 MG tablet Take 80 mg by mouth Every Night.    Provider, MD Jacinta   bumetanide (BUMEX) 2 MG tablet Take 1 tablet by mouth Daily. 3/29/18   Salvador Montes MD   carvedilol (COREG) 3.125 MG tablet Take 6.25 mg by mouth 2 (Two) Times a Day With Meals.    Provider, MD Jacinta   clopidogrel (PLAVIX) 75 MG tablet Take 1 tablet by mouth Daily. 3/5/18   Salvador Montes MD   glucose blood test strip Needs to see pcp for refills 5/2/18   Miley Toledo APRN   HUMALOG 100 UNIT/ML injection INJECT 150  "UNITS SUB-Q DAILY PER INSULIN PUMP 2/21/18   Salvador Montes MD   hydrALAZINE (APRESOLINE) 50 MG tablet Take 1 tablet by mouth 2 (Two) Times a Day.  Patient taking differently: Take 75 mg by mouth 3 (Three) Times a Day. 3/27/18   Salvador Montes MD   HYDROcodone-acetaminophen (NORCO)  MG per tablet Take 1 tablet by mouth 4 (Four) Times a Day.    Provider, MD Jacinta   Insulin Lispro 100 UNIT/ML solution cartridge Inject 150 Units under the skin Daily. DX: E11 5/30/17   Salvador Montes MD   Insulin Syringe-Needle U-100 (GLOBAL INSULIN SYRINGES) 30G X 5/16\" 0.3 ML misc SQ injections as needed when insulin pump is non functioning 1/19/18   Salvador Montes MD   isosorbide mononitrate (IMDUR) 30 MG 24 hr tablet Take 1 tablet by mouth Daily.  Patient taking differently: Take 90 mg by mouth 2 (Two) Times a Day. 3/5/18   Salvador Montes MD   nitroglycerin (NITROLINGUAL) 0.4 MG/SPRAY spray Place 1 spray under the tongue Every 5 (Five) Minutes As Needed for Chest Pain. 7/7/17   Salvador Montes MD   potassium chloride (MICRO-K) 10 MEQ CR capsule Take 2 capsules by mouth 2 (Two) Times a Day. 1/7/19   Felisa Rocha APRN   raNITIdine (ZANTAC) 300 MG tablet TAKE 1 TABLET BY MOUTH EVERY NIGHT. 12/11/17   Salvador Montes MD   sertraline (ZOLOFT) 50 MG tablet Take 1 tablet by mouth Daily. 3/5/18   Salvador Montes MD   vitamin D (ERGOCALCIFEROL) 97607 UNITS capsule capsule Take 1 capsule by mouth 1 (One) Time Per Week.  Patient taking differently: Take 50,000 Units by mouth 1 (One) Time Per Week. Friday 5/9/17   Salvador Montes MD   zolpidem (AMBIEN) 10 MG tablet Take 1 tablet by mouth At Night As Needed for Sleep. 3/29/18   Salvador Montes MD       ALLERGIES:  Iodides; Iodine; Other; and Stadol [butorphanol]    REVIEW OF SYSTEMS  Review of Systems   Constitutional: Positive for activity change and fatigue. Negative for appetite change and fever.   HENT: Negative for ear pain and sore throat. "    Eyes: Negative for pain and visual disturbance.   Respiratory: Positive for shortness of breath. Negative for cough.    Cardiovascular: Negative for chest pain and palpitations.   Gastrointestinal: Negative for abdominal pain and nausea.   Endocrine: Negative for cold intolerance and heat intolerance.   Genitourinary: Negative for difficulty urinating and dysuria.   Musculoskeletal: Positive for arthralgias, back pain and gait problem.   Skin: Negative for color change and rash.   Neurological: Positive for weakness. Negative for dizziness and headaches.   Hematological: Negative for adenopathy. Does not bruise/bleed easily.   Psychiatric/Behavioral: Negative for agitation, confusion and sleep disturbance.       PHYSICAL EXAM:  Temp:  [96.2 °F (35.7 °C)-98.6 °F (37 °C)] 98.6 °F (37 °C)  Heart Rate:  [59-86] 84  Resp:  [18-24] 24  BP: (142-195)/(58-88) 184/79  Body mass index is 42.65 kg/m².     Physical Exam   Constitutional: She is oriented to person, place, and time. She appears well-developed and well-nourished. No distress.   HENT:   Head: Normocephalic and atraumatic.   Right Ear: External ear normal.   Left Ear: External ear normal.   Nose: Nose normal.   Nasal cannula in place.    Eyes: Conjunctivae and EOM are normal. Pupils are equal, round, and reactive to light.   Neck: Neck supple. No thyromegaly present.   Cardiovascular: Normal rate, regular rhythm, normal heart sounds and intact distal pulses.   Pulmonary/Chest: Effort normal and breath sounds normal. No respiratory distress. She has no wheezes. She has no rales. She exhibits no tenderness.   Patient diminished bibasilar but clear. No rales, rhonchi, or wheezes appreciated on exam.   Abdominal: Soft. Bowel sounds are normal. She exhibits no distension and no mass. There is no tenderness. There is no rebound and no guarding.   Musculoskeletal: She exhibits edema. She exhibits no tenderness.   Neurological: She is alert and oriented to person, place,  and time.   Skin: Skin is warm and dry. No rash noted. She is not diaphoretic. No erythema. No pallor.   Psychiatric: She has a normal mood and affect. Her behavior is normal.   Nursing note and vitals reviewed.      DIAGNOSTIC DATA:   Lab Results (last 24 hours)     Procedure Component Value Units Date/Time    Troponin [[id number]]  (Abnormal) Collected:  01/07/19 1954    Specimen:  Blood Updated:  01/07/19 2031     Troponin I 0.065 ng/mL     BNP [[id number]]  (Abnormal) Collected:  01/07/19 1954    Specimen:  Blood Updated:  01/07/19 2030     proBNP 14,100.0 pg/mL     CBC & Differential [[id number]] Collected:  01/07/19 1954    Specimen:  Blood Updated:  01/07/19 2025    Narrative:       The following orders were created for panel order CBC & Differential.  Procedure                               Abnormality         Status                     ---------                               -----------         ------                     CBC Auto Differential[[id number]]        Abnormal            Final result                 Please view results for these tests on the individual orders.    CBC Auto Differential [[id number]]  (Abnormal) Collected:  01/07/19 1954    Specimen:  Blood Updated:  01/07/19 2025     WBC 6.84 10*3/mm3      RBC 2.84 10*6/mm3      Hemoglobin 9.1 g/dL      Hematocrit 28.2 %      MCV 99.3 fL      MCH 32.0 pg      MCHC 32.3 g/dL      RDW 16.9 %      RDW-SD 60.9 fl      MPV 9.2 fL      Platelets 137 10*3/mm3      Neutrophil % 86.3 %      Lymphocyte % 6.7 %      Monocyte % 5.1 %      Eosinophil % 0.6 %      Basophil % 0.1 %      Immature Grans % 1.2 %      Neutrophils, Absolute 5.90 10*3/mm3      Lymphocytes, Absolute 0.46 10*3/mm3      Monocytes, Absolute 0.35 10*3/mm3      Eosinophils, Absolute 0.04 10*3/mm3      Basophils, Absolute 0.01 10*3/mm3      Immature Grans, Absolute 0.08 10*3/mm3     Comprehensive Metabolic Panel [[id number]]  (Abnormal) Collected:  01/07/19 1954    Specimen:  Blood Updated:   01/07/19 2019     Glucose 164 mg/dL      BUN 47 mg/dL      Creatinine 1.21 mg/dL      Sodium 136 mmol/L      Potassium 3.8 mmol/L      Chloride 96 mmol/L      CO2 28.0 mmol/L      Calcium 8.9 mg/dL      Total Protein 7.4 g/dL      Albumin 3.80 g/dL      ALT (SGPT) 18 U/L      AST (SGOT) 43 U/L      Alkaline Phosphatase 115 U/L      Total Bilirubin 2.1 mg/dL      eGFR Non African Amer 44 mL/min/1.73      Globulin 3.6 gm/dL      A/G Ratio 1.1 g/dL      BUN/Creatinine Ratio 38.8     Anion Gap 12.0 mmol/L     Lavender Top [[id number]] Collected:  01/07/19 1954    Specimen:  Blood Updated:  01/07/19 2002    Light Blue Top [[id number]] Collected:  01/07/19 1954    Specimen:  Blood Updated:  01/07/19 2002    Gold Top - SST [[id number]] Collected:  01/07/19 1954    Specimen:  Blood Updated:  01/07/19 2002    [place] Draw [[id number]] Collected:  01/07/19 1954    Specimen:  Blood Updated:  01/07/19 2002    Narrative:       The following orders were created for panel order [place] Draw.  Procedure                               Abnormality         Status                     ---------                               -----------         ------                     Light Blue Top[[id number]]                                   In process                 Green Top (Gel)[[id number]]                                  In process                 Lavender Top[[id number]]                                     In process                 Gold Top - SST[[id number]]                                   In process                   Please view results for these tests on the individual orders.    Green Top (Gel) [[id number]] Collected:  01/07/19 1954    Specimen:  Blood Updated:  01/07/19 2002        Estimated Creatinine Clearance: 54 mL/min (A) (by C-G formula based on SCr of 1.21 mg/dL (H)).     Imaging Results (last 24 hours)     Procedure Component Value Units Date/Time    XR Chest 1 View [[id number]] Collected:  01/07/19 1935     Updated:  01/07/19 1954     Narrative:       PROCEDURE: XR CHEST 1 VIEW    HISTORY: dyspnea    COMPARISON: 1/4/2019     TECHNIQUE:     Single projection of the chest was done.    FINDINGS:    There is chronic bilateral vascular congestion, suspicious for  underlying changes of mild volume overload .    There are no discrete airspace infiltrates, pneumothoraces or  pleural effusions.    The cardiomediastinal silhouette is stable.      Impression:         There is chronic bilateral vascular congestion, suspicious for  underlying changes of mild volume overload .    Electronically signed by:  Sincere Long MD  1/7/2019 7:53 PM [facility]  Workstation: [employer] reviewed the patient's new clinical results.    ASSESSMENT AND PLAN: This is a 68 y.o. female with:    Active Hospital Problems    Diagnosis Date Noted   • **Dyspnea [R06.00] 01/07/2019   • Chronic renal impairment, stage 3 (moderate) (CMS/HCC) [N18.3] 01/07/2019   • CAD (coronary artery disease) [I25.10] 01/07/2019   • Acute on chronic diastolic CHF (congestive heart failure) (CMS/[facility]) [I50.33] 01/04/2019       #. Dyspnea. Elevated BNP. Oxygen saturation acceptable on baseline nasal cannula. Weight this morning prior to discharge was 241 on standing scale. Patient to get re weighed on standing scale. Lasix given in ED. CXR reviewed. Chronic changes related to volume. Patient appears to be 20 pounds above dry weight. Plan to diurese. Clinically low suspicion for PE, but in the differential. Will D-dimer secondary to low Well's score. If elevated will likely need VQ scan.  #. Acute on chronic HFpEF. Lasix. 1500 cc FR. 2gram Na diet. Dry weight appears to be near 227 per [facility] discharge summary on 12/21/2018.  #. DM. Insulin pump.  #. COPD. Doesn't appear in exacerbation. Nasal cannula at 3 lpm.   #. Chronic hypoxic respiratory failure. Nasal cannula at 3 lpm. Monitor.  #. HTN. Norvasc. Coreg. Hydralazine  #. CAD. S/p multivessel PCI. ASA. Statin. Plavix. Imdur. Minimally  elevated troponin. Trend enzymes. EKG reviewed. No chest pain.  Results from last 7 days   Lab Units  01/07/19   1954  01/04/19   1744  01/04/19   1451   TROPONIN I ng/mL  0.065*  0.027  0.021   #. Chronic pain. eKASPER reviewed. # below. Norco per eKASPER.      Will monitor patient's hospital course and adjust treatment as hospital course dictates.    DVT prophylaxis: SCDs/Heparin  Code status is   Code Status and Medical Interventions:   Ordered at: 01/07/19 2114     Level Of Support Discussed With:    Patient     Code Status:    CPR     Medical Interventions (Level of Support Prior to Arrest):    Full      [facility] # [id number], reviewed and consistent with patient reported medications.      I discussed the patients findings and my recommendations with patient and nursing staff.           This document has been electronically signed by Sage Blanchard MD on January 7, 2019 8:57 PM

## 2019-01-08 NOTE — ED PROVIDER NOTES
"Subjective   68-year-old white female presents to the emergency department with chief complaint of shortness of breath.  Patient was discharged from [facility] this afternoon after treatment for CHF.  She relates this evening she still feels short of breath.  She states \"it never got better.\"  Patient denies fever sweats or chills.  She denies chest pain.  She denies a cough.  She denies abdominal pain.  She's been nauseated but denies vomiting.  She has edema in both her legs.  Patient relates she has chronic back pain and chronic pain in her bilateral hips which is unchanged.  Patient is on home oxygen 3 L nasal cannula.            Review of Systems   Constitutional: Negative for chills, diaphoresis and fever.   Respiratory: Positive for shortness of breath. Negative for cough.    Cardiovascular: Positive for leg swelling. Negative for chest pain.   Gastrointestinal: Positive for nausea. Negative for abdominal pain and vomiting.   Genitourinary: Negative for dysuria.   Musculoskeletal: Positive for back pain.   Neurological: Negative for syncope, weakness and headaches.   All other systems reviewed and are negative.      Past Medical History:   Diagnosis Date   • Acute bronchitis    • Acute congestive heart failure (CMS/HCC)     resolving   • Acute kidney failure, unspecified (CMS/HCC)    • Acute kidney failure, unspecified (CMS/HCC)    • Acute posthemorrhagic anemia    • Asthenia    • Chest pain    • Chronic gastritis    • Chronic pharyngitis    • Chronic renal impairment    • Congenital renal failure    • Congestive heart failure (CMS/HCC)    • Contact dermatitis due to poison ivy    • COPD (chronic obstructive pulmonary disease) (CMS/HCC)    • Coronary arteriosclerosis    • D-dimer, elevated     D-dimer above reference range    • Degenerative joint disease involving multiple joints     back   • Depressive disorder    • Dysphagia    • Dyspnea    • Edema of lower extremity    • Encounter for " "immunization    • Encounter for long-term (current) use of medications    • Epigastric pain    • Esophagitis    • Essential hypertension    • Gastroesophageal reflux disease    • Gastroparesis     Gastroparesis syndrome    • Generalized abdominal pain    • H/O bone density study 01/09/2015   • H/O mammogram 01/15/2015   • H/O screening mammography 11/12/2013   • Headache    • History of transfusion    • Hyperlipidemia    • Hypokalemia    • Hypomagnesemia    • Injury of tendon of rotator cuff     Injury of tendon of the rotator cuff of shoulder      • Insomnia    • Insomnia, unspecified    • Knee pain    • Nausea and vomiting    • Neck pain    • Need for immunization against influenza    • Need for prophylactic vaccination and inoculation against influenza    • Need for prophylactic vaccination and inoculation against viral disease    • 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   • Orthopnea    • Osteoarthritis    • Pain of breast     right   • Shoulder pain    • Sleep disorder    • Stricture of esophagus    • Symptomatic menopausal or female climacteric states    • Type 2 diabetes mellitus (CMS/HCC)        Allergies   Allergen Reactions   • Iodides Anaphylaxis     Iodine and iodide containing products  \"knocks my kidneys out\"    • Iodine      Iodine and iodide containing products   • Other Unknown (See Comments)     Steroids \"makes my tongue feel like it swells\"   • Stadol [Butorphanol] Unknown (See Comments)     \"rises my BP\"       Past Surgical History:   Procedure Laterality Date   • BREAST BIOPSY Bilateral    • CHOLECYSTECTOMY     • COLONOSCOPY  03/29/2012    Diverticulosis. Performed at [facility].   • ENDOSCOPY  02/09/2015    ESOPHAGEAL STRICTURE PRESENT, GASTRITIS FOUND IN THE STOMACH, NORMAL DUODENUM   • ENDOSCOPY W/ PEG TUBE PLACEMENT  12/19/2012    Esophagitis seen. Biopsy taken. Esophageal stricture present. Dilatation performed. Gastritis in stomach. Biopsy taken. Food was " in stomach. Normal duodenum.   • HERNIA REPAIR     • HYSTERECTOMY      partial   • NECK SURGERY         Family History   Problem Relation Age of Onset   • Cancer Mother    • Diabetes Mother    • Stroke Father    • Heart disease Sister    • Colon cancer Other    • Hypertension Other    • Breast cancer Maternal Aunt        Social History     Socioeconomic History   • Marital status:      Spouse name: Not on file   • Number of children: Not on file   • Years of education: Not on file   • Highest education level: Not on file   Tobacco Use   • Smoking status: Former Smoker   • Smokeless tobacco: Never Used   • Tobacco comment: quit 30 years ago    Substance and Sexual Activity   • Alcohol use: No   • Drug use: No   • Sexual activity: Defer   Social History Narrative    Pt denies ever having a feeding tube, denies PEG tube placement.             Objective   Physical Exam   Constitutional: She is oriented to person, place, and time. She appears well-developed and well-nourished. No distress.   HENT:   Head: Normocephalic and atraumatic.   Right Ear: External ear normal.   Left Ear: External ear normal.   Nose: Nose normal.   Mouth/Throat: Oropharynx is clear and moist.   Eyes: Conjunctivae and EOM are normal. Pupils are equal, round, and reactive to light.   Neck: Normal range of motion. Neck supple.   Cardiovascular: Normal rate, regular rhythm, normal heart sounds and intact distal pulses.   Pulmonary/Chest: Effort normal. No respiratory distress.   Decreased breath sounds at bases bilaterally   Abdominal: Soft. Bowel sounds are normal. She exhibits no distension and no mass. There is no tenderness. There is no guarding.   Musculoskeletal: Normal range of motion. She exhibits edema. She exhibits no tenderness.   Neurological: She is alert and oriented to person, place, and time. No cranial nerve deficit or sensory deficit. She exhibits normal muscle tone.   Skin: Skin is warm and dry. She is not diaphoretic.    Psychiatric: She has a normal mood and affect. Her behavior is normal.   Nursing note and vitals reviewed.      ECG 12 Lead    Date/Time: 1/7/2019 7:33 PM  Performed by: Tyrone Quinonez MD  Authorized by: Tyrone Quinonez MD   Interpreted by physician  Clinical impression: abnormal ECG  Comments: Normal sinus rhythm rate 84 with first-degree AV block.  LBBB.  This EKG appears similar to EKG from January 4, 2019.                 ED Course  ED Course as of Jan 08 0009 Mon Jan 07, 2019 2040 Patient is alert and resting comfortably in no acute distress.  I reviewed the results of her evaluation with her and explained the need to admit her to the hospital.  I will page the hospitalist  []   2050 discussed with Dr. Blanchard and he agrees to admit to telemetry  []   2126 BP: (!) 211/90 [DR]      ED Course User Index  [DR] Tyrone Quinonez MD      Labs Reviewed   COMPREHENSIVE METABOLIC PANEL - Abnormal; Notable for the following components:       Result Value    Glucose 164 (*)     BUN 47 (*)     Creatinine 1.21 (*)     Sodium 136 (*)     AST (SGOT) 43 (*)     Total Bilirubin 2.1 (*)     eGFR Non African Amer 44 (*)     Globulin 3.6 (*)     BUN/Creatinine Ratio 38.8 (*)     All other components within normal limits   BNP (IN-HOUSE) - Abnormal; Notable for the following components:    proBNP 14,100.0 (*)     All other components within normal limits   TROPONIN (IN-HOUSE) - Abnormal; Notable for the following components:    Troponin I 0.065 (*)     All other components within normal limits   CBC WITH AUTO DIFFERENTIAL - Abnormal; Notable for the following components:    RBC 2.84 (*)     Hemoglobin 9.1 (*)     Hematocrit 28.2 (*)     MCV 99.3 (*)     RDW 16.9 (*)     RDW-SD 60.9 (*)     Platelets 137 (*)     Neutrophil % 86.3 (*)     Lymphocyte % 6.7 (*)     Immature Grans % 1.2 (*)     Lymphocytes, Absolute 0.46 (*)     Immature Grans, Absolute 0.08 (*)     All other components within normal limits   POCT GLUCOSE FINGERSTICK  - Abnormal; Notable for the following components:    Glucose 155 (*)     All other components within normal limits   RAINBOW DRAW    Narrative:     The following orders were created for panel order [place] Draw.  Procedure                               Abnormality         Status                     ---------                               -----------         ------                     Light Blue Top[[id number]]                                   Final result               Green Top (Gel)[[id number]]                                  Final result               Lavender Top[[id number]]                                     Final result               Gold Top - SST[[id number]]                                   Final result                 Please view results for these tests on the individual orders.   TROPONIN (IN-HOUSE)   D-DIMER, QUANTITATIVE   BASIC METABOLIC PANEL   CBC WITH AUTO DIFFERENTIAL   CBC AND DIFFERENTIAL    Narrative:     The following orders were created for panel order CBC & Differential.  Procedure                               Abnormality         Status                     ---------                               -----------         ------                     CBC Auto Differential[[id number]]        Abnormal            Final result                 Please view results for these tests on the individual orders.   LIGHT BLUE TOP   GREEN TOP   LAVENDER TOP   GOLD TOP - SST   CBC AND DIFFERENTIAL    Narrative:     The following orders were created for panel order CBC & Differential.  Procedure                               Abnormality         Status                     ---------                               -----------         ------                     CBC Auto Differential[[id number]]                                                         Please view results for these tests on the individual orders.     Xr Chest 2 View    Result Date: 12/14/2018  Narrative: TWO VIEW CHEST HISTORY: Shortness of breath Frontal and  lateral films of the chest were obtained. COMPARISON: January 31, 2018 Chronic obstructive pulmonary disease. No acute infiltrate. Bilateral linear scarring. Stable cardiomegaly. The heart is not enlarged. The pulmonary vasculature is not increased. No pleural effusion. No pneumothorax. No acute osseous abnormality. Degenerative changes are present in the thoracic spine. Internal fixation plate and screw device lower cervical spine.     Impression: CONCLUSION: Chronic obstructive pulmonary disease. No acute infiltrate. Bilateral linear scarring. Stable cardiomegaly. [id number] Electronically signed by:  Karl Harris MD  12/14/2018 2:56 PM CST Workstation: [id number]    Xr Chest 1 View    Result Date: 1/7/2019  Narrative: PROCEDURE: XR CHEST 1 VIEW HISTORY: dyspnea COMPARISON: 1/4/2019 TECHNIQUE: Single projection of the chest was done. FINDINGS: There is chronic bilateral vascular congestion, suspicious for underlying changes of mild volume overload . There are no discrete airspace infiltrates, pneumothoraces or pleural effusions. The cardiomediastinal silhouette is stable.     Impression: There is chronic bilateral vascular congestion, suspicious for underlying changes of mild volume overload . Electronically signed by:  Sincere Long MD  1/7/2019 7:53 PM CST Workstation: [id number]    Xr Chest 1 View    Result Date: 1/4/2019  Narrative: EXAM:         Radiograph(s), Chest VIEWS:   Frontal  ; 1     DATE/TIME:  1/4/2019 3:45 PM CST            INDICATION:   Chest pain protocol chest pain protocol  COMPARISON:  CXR: 12/14/18        FINDINGS:         - lines/tubes:    none   - cardiac:         size within normal limits       - mediastinum: contour within normal limits       - lungs:         no focal air space process, pulmonary interstitial edema, nodule(s)/mass           - pleura:         no evidence of  fluid                - osseous:         unremarkable for age                - misc.:        Impression: CONCLUSION:     1. No evidence of an active cardiopulmonary process.                                                  Electronically signed by:  UZIEL Umana MD  1/4/2019 3:49 PM CST Workstation: [id number]              [facility]      Final diagnoses:   Acute on chronic heart failure, unspecified heart failure type (CMS/HCC)   Elevated troponin            Tyrone Quinonez MD  01/07/19 2051       Tyrone Quinonez MD  01/07/19 2053       Tyrone Quinonez MD  01/08/19 0009

## 2019-01-08 NOTE — PLAN OF CARE
Problem: Fall Risk (Adult)  Goal: Absence of Fall  Outcome: Ongoing (interventions implemented as appropriate)      Problem: Patient Care Overview  Goal: Plan of Care Review  Outcome: Ongoing (interventions implemented as appropriate)   01/08/19 0406   Coping/Psychosocial   Plan of Care Reviewed With patient   Plan of Care Review   Progress no change   OTHER   Outcome Summary new admit 3W, inital assessment        Problem: Fluid Volume Excess (Adult)  Goal: Identify Related Risk Factors and Signs and Symptoms  Outcome: Ongoing (interventions implemented as appropriate)    Goal: Optimal Fluid Balance  Outcome: Ongoing (interventions implemented as appropriate)      Problem: Diabetes, Type 2 (Adult)  Goal: Signs and Symptoms of Listed Potential Problems Will be Absent, Minimized or Managed (Diabetes, Type 2)  Outcome: Ongoing (interventions implemented as appropriate)

## 2019-01-08 NOTE — ED NOTES
Pt refusing to get back in bed, sitting up at bedside, family aware to monitor related to pt falling.  Pt and family verbalized understanding.     Susanna Olivarez RN  01/07/19 8818

## 2019-01-08 NOTE — PROGRESS NOTES
[facility] Medicine Services  INPATIENT PROGRESS NOTE    Length of Stay: 0  Date of Admission: 1/7/2019  Primary Care Physician: Henry Muniz MD    Subjective   Chief Complaint: Shortness of air.  HPI: Patient is seen for follow-up.  She has history of coronary artery disease status post recent high risk PCI (at [facility]), O2 dependent COPD, chronic kidney disease, diabetes mellitus, congestive heart failure, morbid obesity, hypertension, chronic anemia, chronic pain syndrome and was readmitted (same day after discharge) for CHF decompensation/possible non-STEMI.  She remains on her baseline supplemental oxygen of 3 L nasal prongs but still complains of shortness of air though less in severity.    Review of Systems   Constitutional: Positive for activity change, fatigue and unexpected weight change. Negative for appetite change, chills, diaphoresis and fever.   HENT: Negative for trouble swallowing and voice change.    Eyes: Negative for photophobia and visual disturbance.   Respiratory: Positive for shortness of breath. Negative for cough, choking, chest tightness, wheezing and stridor.    Cardiovascular: Positive for leg swelling. Negative for chest pain and palpitations.   Gastrointestinal: Negative for abdominal distention, abdominal pain, blood in stool, constipation, diarrhea, nausea and vomiting.   Endocrine: Negative for cold intolerance, heat intolerance, polydipsia, polyphagia and polyuria.   Genitourinary: Negative for decreased urine volume, difficulty urinating, dysuria, enuresis, flank pain, frequency, hematuria and urgency.   Musculoskeletal: Positive for arthralgias and gait problem. Negative for myalgias, neck pain and neck stiffness.   Skin: Negative for pallor, rash and wound.   Neurological: Negative for dizziness, tremors, seizures, syncope, facial asymmetry, speech difficulty, weakness, light-headedness, numbness and headaches.    Hematological: Does not bruise/bleed easily.   Psychiatric/Behavioral: Negative for agitation, behavioral problems and confusion.       Objective    Temp:  [96.8 °F (36 °C)-98.6 °F (37 °C)] 97.8 °F (36.6 °C)  Heart Rate:  [72-98] 80  Resp:  [18-24] 18  BP: (129-229)/(55-91) 171/89    Physical Exam   Constitutional: She is oriented to person, place, and time. She appears well-developed and well-nourished. She is cooperative. No distress.   Patient is morbidly obese and has a BMI of 41.38   HENT:   Head: Normocephalic and atraumatic.   Right Ear: External ear normal.   Left Ear: External ear normal.   Nose: Nose normal.   Mouth/Throat: Oropharynx is clear and moist.   Eyes: Conjunctivae and EOM are normal. Pupils are equal, round, and reactive to light.   Neck: Normal range of motion. Neck supple. No JVD present. No thyromegaly present.   Cardiovascular: Normal rate, regular rhythm, normal heart sounds and intact distal pulses. Exam reveals no gallop and no friction rub.   No murmur heard.  Pulmonary/Chest: Effort normal. No stridor. No respiratory distress. She has decreased breath sounds. She has no wheezes. She has rhonchi. She has no rales. She exhibits no tenderness.   Abdominal: Soft. Bowel sounds are normal. She exhibits no distension and no mass. There is no tenderness. There is no rebound and no guarding. No hernia.   Musculoskeletal: Normal range of motion. She exhibits edema. She exhibits no tenderness or deformity.   Neurological: She is alert and oriented to person, place, and time. She has normal reflexes. No cranial nerve deficit or sensory deficit. She exhibits normal muscle tone.   Skin: Skin is warm and dry. No rash noted. She is not diaphoretic. No erythema. No pallor.   Psychiatric: She has a normal mood and affect. Her behavior is normal. Judgment and thought content normal.   Nursing note and vitals reviewed.        Medication Review:    Current Facility-Administered Medications:   •   amLODIPine (NORVASC) tablet 10 mg, 10 mg, Oral, Daily, Sage Blanchard MD, 10 mg at 01/08/19 0838  •  aspirin chewable tablet 81 mg, 81 mg, Oral, Daily, Sage Blanchard MD, 81 mg at 01/08/19 0838  •  atorvastatin (LIPITOR) tablet 80 mg, 80 mg, Oral, Nightly, Sage Blanchard MD, 80 mg at 01/08/19 0033  •  bumetanide (BUMEX) tablet 2 mg, 2 mg, Oral, BID, Joon Dugan MD  •  carvedilol (COREG) tablet 6.25 mg, 6.25 mg, Oral, BID With Meals, Sage Blanchard MD, 6.25 mg at 01/08/19 0837  •  clopidogrel (PLAVIX) tablet 75 mg, 75 mg, Oral, Daily, Sage Blanchard MD, 75 mg at 01/08/19 0838  •  famotidine (PEPCID) tablet 40 mg, 40 mg, Oral, Daily, Sage Blanchard MD, 40 mg at 01/08/19 0837  •  heparin (porcine) 5000 UNIT/ML injection 5,000 Units, 5,000 Units, Subcutaneous, Q12H, Catracho Jarrell MD  •  HYDROcodone-acetaminophen (NORCO)  MG per tablet 1 tablet, 1 tablet, Oral, Q6H, Joon Dugan MD  •  insulin patient supplied pump, , Subcutaneous, Continuous, Sage Blanchard MD  •  isosorbide mononitrate (IMDUR) 24 hr tablet 90 mg, 90 mg, Oral, BID, Sage Blanchard MD, 90 mg at 01/08/19 0837  •  lisinopril (PRINIVIL,ZESTRIL) tablet 10 mg, 10 mg, Oral, Q24H, Joon Dugan MD  •  potassium chloride (MICRO-K) CR capsule 40 mEq, 40 mEq, Oral, PRN **OR** potassium chloride (KLOR-CON) packet 40 mEq, 40 mEq, Oral, PRN **OR** potassium chloride 10 mEq in 100 mL IVPB, 10 mEq, Intravenous, Q1H PRN, Catracho Jarrell MD  •  [COMPLETED] Insert peripheral IV, , , Once **AND** sodium chloride 0.9 % flush 10 mL, 10 mL, Intravenous, PRN, Tyrone Quinonez MD  •  sodium chloride 0.9 % flush 3 mL, 3 mL, Intravenous, Q12H, Sage Blanchard MD, 3 mL at 01/08/19 0842  •  sodium chloride 0.9 % flush 3-10 mL, 3-10 mL, Intravenous, PRN, Sage Blanchard MD  •  zolpidem (AMBIEN) tablet 5 mg, 5 mg, Oral, Nightly PRN, Sage Blanchard MD, 5 mg at 01/08/19 0123    Results Review:  I have reviewed the labs, radiology results, and diagnostic  studies.    Laboratory Data:   Results from last 7 days   Lab Units  01/08/19 0728 01/07/19 1954 01/07/19   0518   01/04/19   1451  01/03/19   1657   SODIUM mmol/L  134*  136*  132*   < >  123*  127*   POTASSIUM mmol/L  3.2*  3.8  3.6   < >  2.7*  2.9*   CHLORIDE mmol/L  95  96  95   < >  83*  86*   CO2 mmol/L  30.0  28.0  30.0   < >  29.0  29.0   BUN mg/dL  45*  47*  48*   < >  49*  49*   CREATININE mg/dL  1.13*  1.21*  1.42*   < >  1.56*  1.38*   GLUCOSE mg/dL  117*  164*  133*   < >  200*  142*   CALCIUM mg/dL  8.6  8.9  8.6   < >  8.5  8.3*   BILIRUBIN mg/dL   --   2.1*   --    --   1.8*  1.6*   ALK PHOS U/L   --   115   --    --   97  84   ALT (SGPT) U/L   --   18   --    --   18  23   AST (SGOT) U/L   --   43*   --    --   47*  32   ANION GAP mmol/L  9.0  12.0  7.0   < >  11.0  12.0    < > = values in this interval not displayed.     Estimated Creatinine Clearance: 56.7 mL/min (A) (by C-G formula based on SCr of 1.13 mg/dL (H)).  Results from last 7 days   Lab Units  01/07/19 0518 01/05/19   0120  01/04/19   1451   MAGNESIUM mg/dL  2.0  2.0  1.9         Results from last 7 days   Lab Units  01/08/19 0728 01/07/19 1954 01/07/19 0518  01/06/19   0643  01/05/19   0529   WBC 10*3/mm3  4.97  6.84  4.28  4.52  4.01   HEMOGLOBIN g/dL  8.2*  9.1*  7.7*  8.2*  7.6*   HEMATOCRIT %  24.6*  28.2*  23.4*  24.6*  22.8*   PLATELETS 10*3/mm3  130*  137*  118*  116*  118*           Culture Data:   No results found for: BLOODCX  No results found for: URINECX  No results found for: RESPCX  No results found for: WOUNDCX  No results found for: STOOLCX  No components found for: BODYFLD    Radiology Data:   Imaging Results (last 24 hours)     Procedure Component Value Units Date/Time    US Liver [[id number]] Updated:  01/08/19 0813    XR Chest 1 View [[id number]] Collected:  01/07/19 1935     Updated:  01/07/19 1954    Narrative:       PROCEDURE: XR CHEST 1 VIEW    HISTORY: dyspnea    COMPARISON: 1/4/2019      TECHNIQUE:     Single projection of the chest was done.    FINDINGS:    There is chronic bilateral vascular congestion, suspicious for  underlying changes of mild volume overload .    There are no discrete airspace infiltrates, pneumothoraces or  pleural effusions.    The cardiomediastinal silhouette is stable.      Impression:         There is chronic bilateral vascular congestion, suspicious for  underlying changes of mild volume overload .    Electronically signed by:  Sincere Long MD  1/7/2019 7:53 PM CST  Workstation: [id number]          I have reviewed the patient's current medications.     Assessment/Plan     Hospital Problem List:  Principal Problem:  - Acute on chronic diastolic CHF (congestive heart failure): Continue diuretics, strict I's and O's, daily weights and fluid restriction.  Heart failure Navigator is following.  - History of coronary artery disease with Possible non-STEMI: Continue guidelines directed medical therapy, check echocardiogram and consult cardiologist.  - Hypokalemia (likely diuretic induced): Replete potassium and monitor levels.   - Chronic renal impairment, stage 3: Creatinine is stable and currently at baseline.  Continue to monitor.  Nephrologist has been consulted.  - Elevated d-dimer is likely reactive.  Check VQ scan and duplex ultrasound both lower extremities to rule out thrombi embolism.  - Hypertension: Increase Coreg to 12.5 mg twice a day, continue other medications and make adjustments as needed for optimal blood pressure control.  - Oxygen dependent COPD: Patient is currently not in exacerbation.  Continue supplemental oxygen and bronchodilators.  - Diabetes mellitus: Stable.  Continue insulin pump with Accu-Cheks.  - Chronic anemia: Hemoglobin is stable.  Continue to monitor.  - Chronic pain syndrome/chronic insomnia: Continue home medications.  - Chronic thrombocytopenia: Stable.  Continue to monitor.  - Continue GI and DVT prophylaxis.           Discharge  Planning: In progress.    Catracho Jarrell MD   01/08/19   12:05 PM

## 2019-01-08 NOTE — PLAN OF CARE
Problem: Patient Care Overview  Goal: Plan of Care Review  Outcome: Ongoing (interventions implemented as appropriate)  Mild Sodium Restricted Diet info used to provide ed regarding Low Sodium Diet and diet copy given.  Pt not appropriate for Wt Loss Diet ed due to reported poor intake at home.   01/08/19 1334   Coping/Psychosocial   Plan of Care Reviewed With spouse;patient   Plan of Care Review   Progress no change   OTHER   Outcome Summary initial assessment

## 2019-01-08 NOTE — PLAN OF CARE
Problem: Fall Risk (Adult)  Goal: Identify Related Risk Factors and Signs and Symptoms  Outcome: Ongoing (interventions implemented as appropriate)    Goal: Absence of Fall  Outcome: Ongoing (interventions implemented as appropriate)      Problem: Patient Care Overview  Goal: Plan of Care Review  Outcome: Ongoing (interventions implemented as appropriate)    Goal: Individualization and Mutuality  Outcome: Ongoing (interventions implemented as appropriate)    Goal: Discharge Needs Assessment  Outcome: Ongoing (interventions implemented as appropriate)    Goal: Interprofessional Rounds/Family Conf  Outcome: Ongoing (interventions implemented as appropriate)      Problem: Fluid Volume Excess (Adult)  Goal: Identify Related Risk Factors and Signs and Symptoms  Outcome: Ongoing (interventions implemented as appropriate)    Goal: Optimal Fluid Balance  Outcome: Ongoing (interventions implemented as appropriate)      Problem: Diabetes, Type 2 (Adult)  Goal: Signs and Symptoms of Listed Potential Problems Will be Absent, Minimized or Managed (Diabetes, Type 2)  Outcome: Ongoing (interventions implemented as appropriate)

## 2019-01-08 NOTE — CONSULTS
"  [facility]  INPATIENT CONSULT NOTE  Nicolasa Rojas  68 y.o. female    Reason for the consult: Shortness of breath      Chief complaint: Edema with shortness of breath and chronic pain      History of present Illness: 68-year-old lady with history of hypertension and hyperlipidemia diastolic heart failure with CAD with recent stent placement to LAD and circumflex at [facility] in November 2018 with long-standing diabetes on insulin pump came with complaining of significant shortness of breath with weight gain and with orthopnea and PND.  She regularly follows with Dr. Amador from [facility] and had the PCI at [facility] recently.  She had previous PCI in 2013 and developed acute renal failure requiring hemodialysis for a short term.  She regularly sees Dr. Dugan Regarding her chronic kidney disease stage III.  She does have significant swelling on the legs and also having a lot of back pain for which she takes chronic pain medicines from the pain clinic.  Her dry weight is around 232-235  Pounds.            Allergies:   Allergies   Allergen Reactions   • Iodides Anaphylaxis     Iodine and iodide containing products  \"knocks my kidneys out\"    • Iodine      Iodine and iodide containing products   • Other Unknown (See Comments)     Steroids \"makes my tongue feel like it swells\"   • Stadol [Butorphanol] Unknown (See Comments)     \"rises my BP\"         Past Medical History:   Diagnosis Date   • Acute bronchitis    • Acute congestive heart failure (CMS/HCC)     resolving   • Acute kidney failure, unspecified (CMS/HCC)    • Acute kidney failure, unspecified (CMS/HCC)    • Acute posthemorrhagic anemia    • Asthenia    • Chest pain    • Chronic gastritis    • Chronic pharyngitis    • Chronic renal impairment    • Congenital renal failure    • Congestive heart failure (CMS/HCC)    • Contact dermatitis due to poison ivy    • COPD (chronic obstructive pulmonary disease) (CMS/HCC)    • " Coronary arteriosclerosis    • D-dimer, elevated     D-dimer above reference range    • Degenerative joint disease involving multiple joints     back   • Depressive disorder    • Dysphagia    • Dyspnea    • Edema of lower extremity    • Encounter for immunization    • Encounter for long-term (current) use of medications    • Epigastric pain    • Esophagitis    • Essential hypertension    • Gastroesophageal reflux disease    • Gastroparesis     Gastroparesis syndrome    • Generalized abdominal pain    • H/O bone density study 01/09/2015   • H/O mammogram 01/15/2015   • H/O screening mammography 11/12/2013   • Headache    • History of transfusion    • Hyperlipidemia    • Hypokalemia    • Hypomagnesemia    • Injury of tendon of rotator cuff     Injury of tendon of the rotator cuff of shoulder      • Insomnia    • Insomnia, unspecified    • Knee pain    • Nausea and vomiting    • Neck pain    • Need for immunization against influenza    • Need for prophylactic vaccination and inoculation against influenza    • Need for prophylactic vaccination and inoculation against viral disease    • 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 Performed at [facility].   • ENDOSCOPY  02/09/2015    ESOPHAGEAL STRICTURE PRESENT, GASTRITIS FOUND IN THE STOMACH, NORMAL DUODENUM   • ENDOSCOPY W/ PEG TUBE PLACEMENT  12/19/2012    Esophagitis seen. Biopsy taken. Esophageal stricture present. Dilatation performed. Gastritis in stomach. Biopsy taken. Food was in stomach. Normal duodenum.   • HERNIA REPAIR     • HYSTERECTOMY      partial    • NECK SURGERY           Family History   Problem Relation Age of Onset   • Cancer Mother    • Diabetes Mother    • Stroke Father    • Heart disease Sister    • Colon cancer Other    • Hypertension Other    • Breast cancer Maternal Aunt          Social History     Socioeconomic History   • Marital status:      Spouse name: Not on file   • Number of children: Not on file   • Years of education: Not on file   • Highest education level: Not on file   Social Needs   • Financial resource strain: Not on file   • Food insecurity - worry: Not on file   • Food insecurity - inability: Not on file   • Transportation needs - medical: Not on file   • Transportation needs - non-medical: Not on file   Occupational History   • Not on file   Tobacco Use   • Smoking status: Former Smoker   • Smokeless tobacco: Never Used   • Tobacco comment: quit 30 years ago    Substance and Sexual Activity   • Alcohol use: No   • Drug use: No   • Sexual activity: Defer   Other Topics Concern   • Not on file   Social History Narrative    Pt denies ever having a feeding tube, denies PEG tube placement.           Current Facility-Administered Medications   Medication Dose Route Frequency Provider Last Rate Last Dose   • amLODIPine (NORVASC) tablet 10 mg  10 mg Oral Daily Sage Blanchard MD   10 mg at 01/08/19 0838   • aspirin chewable tablet 81 mg  81 mg Oral Daily Sage Blanchard MD   81 mg at 01/08/19 0838   • atorvastatin (LIPITOR) tablet 80 mg  80 mg Oral Nightly Sage Blanchard MD   80 mg at 01/08/19 0033   • bumetanide (BUMEX) tablet 2 mg  2 mg Oral BID Joon Dugan MD       • carvedilol (COREG) tablet 12.5 mg  12.5 mg Oral BID With Meals Catracho Jarrell MD       • clopidogrel (PLAVIX) tablet 75 mg  75 mg Oral Daily Sage Blanchard MD   75 mg at 01/08/19 0838   • famotidine (PEPCID) tablet 40 mg  40 mg Oral Daily Sage Blanchard MD   40 mg at 01/08/19 0837   • heparin (porcine) 5000 UNIT/ML injection 5,000 Units  5,000 Units  Subcutaneous Q12H Catracho Jarrell MD       • HYDROcodone-acetaminophen (NORCO)  MG per tablet 1 tablet  1 tablet Oral Q6H Joon Dugan MD       • insulin patient supplied pump   Subcutaneous Continuous Sage Blanchard MD       • isosorbide mononitrate (IMDUR) 24 hr tablet 90 mg  90 mg Oral BID Sage Blanchard MD   90 mg at 01/08/19 0837   • lisinopril (PRINIVIL,ZESTRIL) tablet 10 mg  10 mg Oral Q24H Joon Dugan MD       • potassium chloride (MICRO-K) CR capsule 40 mEq  40 mEq Oral PRN Catracho Jarrell MD        Or   • potassium chloride (KLOR-CON) packet 40 mEq  40 mEq Oral PRN Catracho Jarrell MD        Or   • potassium chloride 10 mEq in 100 mL IVPB  10 mEq Intravenous Q1H PRN Catracho Jarrell MD       • sodium chloride 0.9 % flush 10 mL  10 mL Intravenous PRN Tyrone Quinonez MD       • sodium chloride 0.9 % flush 3 mL  3 mL Intravenous Q12H Sage Blanchard MD   3 mL at 01/08/19 0842   • sodium chloride 0.9 % flush 3-10 mL  3-10 mL Intravenous PRN Sage Blanchard MD       • zolpidem (AMBIEN) tablet 5 mg  5 mg Oral Nightly PRN Sage Blanchard MD   5 mg at 01/08/19 0123         Review of Systems:     Constitution:  Denies any fatigue, fever or chills.    HENT:  Denies any headache, hearing impairment.    Eyes:  Denies any blurring of vision, or photophobia.     Cardiovascular:  As per history of present illness.     Respiratory:  Shortness of breath with orthopnea, has home O2 2 L/m and also has CPAP at night for sleep apnea     Endocrine:  history of hyperlipidemia, diabetes on insulin pump.       Musculoskeletal: Significant back pain with sciatica on chronic pain medication through pain clinic    Gastrointestinal:  Has symptoms of nausea and constipation with gastroparesis    Genitourinary:  No dysuria or hematuria.    Neurological:  No history of seizure disorder, previous history of stroke, no memory problems.    Psychiatric/Behavioral: Patient has history of depression and  "anxiety    Hematological:  No history of easy bruising.            OBJECTIVE:    /89 (BP Location: Left arm, Patient Position: Lying)   Pulse 80   Temp 97.8 °F (36.6 °C) (Temporal)   Resp 18   Ht 162.6 cm (64\")   Wt 109 kg (240 lb 3.2 oz)   SpO2 93%   BMI 41.23 kg/m²       Physical Exam:     Constitutional:  Well developed and nourished, in no acute distress .  Is oriented to person, place, and time.     Skin:  Warm and dry.     Head:  Normocephalic and atraumatic.     Eyes:  Pupils are equal, round,     Neck:  Neck supple. No bruit in the carotids.     Cardiovascular:  [place] in the fifth intercostal space, regular rate, and rhythm.  S1 greater than S2,    Pulmonary/Chest:  Air entry is equal on both sides.  No wheezing or crackles.      Abdominal:  Soft.  Obese, surgical scar with incisional hernia    Musculoskeletal:  No kyphoscoliosis.    Neurological:  Is alert and oriented to person, place, and time.  Cranial nerves are intact.   Has sensory deficit from diabetic neuropathy on both legs and hands  Extremities:  No edema, no radial femoral delay.    Psychiatric:  The patient has a lot of stress with pain and crying        Lab Results (last 24 hours)     Procedure Component Value Units Date/Time    CBC & Differential [[id number]] Collected:  01/07/19 1954    Specimen:  Blood Updated:  01/07/19 2025    Narrative:       The following orders were created for panel order CBC & Differential.  Procedure                               Abnormality         Status                     ---------                               -----------         ------                     CBC Auto Differential[[id number]]        Abnormal            Final result                 Please view results for these tests on the individual orders.    Comprehensive Metabolic Panel [[id number]]  (Abnormal) Collected:  01/07/19 1954    Specimen:  Blood Updated:  01/07/19 2019     Glucose 164 mg/dL      BUN 47 mg/dL      Creatinine 1.21 mg/dL      " Sodium 136 mmol/L      Potassium 3.8 mmol/L      Chloride 96 mmol/L      CO2 28.0 mmol/L      Calcium 8.9 mg/dL      Total Protein 7.4 g/dL      Albumin 3.80 g/dL      ALT (SGPT) 18 U/L      AST (SGOT) 43 U/L      Alkaline Phosphatase 115 U/L      Total Bilirubin 2.1 mg/dL      eGFR Non African Amer 44 mL/min/1.73      Globulin 3.6 gm/dL      A/G Ratio 1.1 g/dL      BUN/Creatinine Ratio 38.8     Anion Gap 12.0 mmol/L     BNP [[id number]]  (Abnormal) Collected:  01/07/19 1954    Specimen:  Blood Updated:  01/07/19 2030     proBNP 14,100.0 pg/mL     Troponin [[id number]]  (Abnormal) Collected:  01/07/19 1954    Specimen:  Blood Updated:  01/07/19 2031     Troponin I 0.065 ng/mL     CBC Auto Differential [[id number]]  (Abnormal) Collected:  01/07/19 1954    Specimen:  Blood Updated:  01/07/19 2025     WBC 6.84 10*3/mm3      RBC 2.84 10*6/mm3      Hemoglobin 9.1 g/dL      Hematocrit 28.2 %      MCV 99.3 fL      MCH 32.0 pg      MCHC 32.3 g/dL      RDW 16.9 %      RDW-SD 60.9 fl      MPV 9.2 fL      Platelets 137 10*3/mm3      Neutrophil % 86.3 %      Lymphocyte % 6.7 %      Monocyte % 5.1 %      Eosinophil % 0.6 %      Basophil % 0.1 %      Immature Grans % 1.2 %      Neutrophils, Absolute 5.90 10*3/mm3      Lymphocytes, Absolute 0.46 10*3/mm3      Monocytes, Absolute 0.35 10*3/mm3      Eosinophils, Absolute 0.04 10*3/mm3      Basophils, Absolute 0.01 10*3/mm3      Immature Grans, Absolute 0.08 10*3/mm3     Hepatitis Panel, Acute [[id number]] Collected:  01/07/19 1954    Specimen:  Blood Updated:  01/08/19 0251    POC Glucose Once [[id number]]  (Abnormal) Collected:  01/07/19 2250    Specimen:  Blood Updated:  01/07/19 2302     Glucose 155 mg/dL      Comment: RN NotifiedOperator: [id number] ANUSHA Kooter ID: [id number]       Troponin [[id number]]  (Abnormal) Collected:  01/08/19 0022    Specimen:  Blood Updated:  01/08/19 0132     Troponin I 0.119 ng/mL     D-dimer, Quantitative [[id number]]  (Abnormal) Collected:   01/08/19 0022    Specimen:  Blood Updated:  01/08/19 0055     D-Dimer, Quantitative 1,025 ng/mL (FEU)     Narrative:       Dimer values <500 ng/ml FEU are FDA approved as aid in diagnosis of deep venous thrombosis and pulmonary embolism.  This test should not be used in an exclusion strategy with pretest probability alone.    A recent guideline regarding diagnosis for pulmonary thromboembolism recommends an adjusted exclusion criterion of age x 10 ng/ml FEU for patients >50 years of age ([employer] Intern Med 2015; 163: 701-711).    POC Glucose Once [[id number]]  (Abnormal) Collected:  01/08/19 0615    Specimen:  Blood Updated:  01/08/19 0646     Glucose 144 mg/dL      Comment: RN NotifiedOperator: [id number] [facility] ID: [id number]       Basic Metabolic Panel [[id number]]  (Abnormal) Collected:  01/08/19 0728    Specimen:  Blood Updated:  01/08/19 0821     Glucose 117 mg/dL      BUN 45 mg/dL      Creatinine 1.13 mg/dL      Sodium 134 mmol/L      Potassium 3.2 mmol/L      Chloride 95 mmol/L      CO2 30.0 mmol/L      Calcium 8.6 mg/dL      eGFR Non African Amer 48 mL/min/1.73      BUN/Creatinine Ratio 39.8     Anion Gap 9.0 mmol/L     CBC & Differential [[id number]] Collected:  01/08/19 0728    Specimen:  Blood Updated:  01/08/19 0813    Narrative:       The following orders were created for panel order CBC & Differential.  Procedure                               Abnormality         Status                     ---------                               -----------         ------                     CBC Auto Differential[[id number]]        Abnormal            Final result                 Please view results for these tests on the individual orders.    CBC Auto Differential [[id number]]  (Abnormal) Collected:  01/08/19 0728    Specimen:  Blood Updated:  01/08/19 0813     WBC 4.97 10*3/mm3      RBC 2.53 10*6/mm3      Hemoglobin 8.2 g/dL      Hematocrit 24.6 %      MCV 97.2 fL      MCH 32.4 pg      MCHC 33.3 g/dL      RDW  16.3 %      RDW-SD 57.6 fl      MPV 9.5 fL      Platelets 130 10*3/mm3      Neutrophil % 76.2 %      Lymphocyte % 12.9 %      Monocyte % 8.5 %      Eosinophil % 1.2 %      Basophil % 0.4 %      Immature Grans % 0.8 %      Neutrophils, Absolute 3.79 10*3/mm3      Lymphocytes, Absolute 0.64 10*3/mm3      Monocytes, Absolute 0.42 10*3/mm3      Eosinophils, Absolute 0.06 10*3/mm3      Basophils, Absolute 0.02 10*3/mm3      Immature Grans, Absolute 0.04 10*3/mm3     Troponin [[id number]]  (Abnormal) Collected:  01/08/19 0728    Specimen:  Blood Updated:  01/08/19 0840     Troponin I 1.190 ng/mL             A/P:  Shortness of breath-multifactorial from diastolic heart failure with fluid overload, anemia, obstructive sleep apnea, obesity with a BMI of 40, COPD, sedentary lifestyle due to chronic back pain.  Will work with nephrology about gentle diuresis, adding Spirinolactone and controlling her pressure.  Will repeat another echo to reassess her ejection fraction to make sure it has not gone down recently from the echo from [place] couple weeks ago      CAD-with recent stent placement to LAD and left circumflex artery currently on aspirin and Plavix will continue that.    COPD with obstructive sleep apnea-on home oxygen and CPAP at night.    Obesity with a BMI greater than 40-patient has difficulty losing weight as she cannot exercise due to her back problems    Anemia-may need to consider IV iron infusion if she has difficulty absorbing iron to improve her hemoglobin and hematocrit.    Chronic back pain with sciatica-currently on chronic pain therapy followed by  pain clinic            This document has been electronically signed by Yaniv Lobo MD on January 8, 2019 12:21 PM           EMR Dragon/Transcription disclaimer:   Some of this note may be an electronic transcription/translation of spoken language to printed text. The electronic translation of spoken language may permit erroneous, or at times,  nonsensical words or phrases to be inadvertently transcribed; Although I have reviewed the note for such errors, some may still exist.

## 2019-01-09 NOTE — PROGRESS NOTES
[facility] Medicine Services  INPATIENT PROGRESS NOTE    Length of Stay: 0  Date of Admission: 1/7/2019  Primary Care Physician: Henry Muniz MD    Subjective   Chief Complaint: No new complaints.    HPI: Patient is seen for follow-up.  She has history of coronary artery disease status post recent high risk PCI (at [facility]), O2 dependent COPD, CIERRA, chronic kidney disease, diabetes mellitus, congestive heart failure, morbid obesity, hypertension, chronic anemia, chronic pain syndrome and was readmitted (same day after discharge) for CHF decompensation/possible non-STEMI.  She remains on her baseline supplemental oxygen of 3 L nasal prongs but still complains of shortness of air especially on exertion (though less in severity).    Review of Systems   Constitutional: Positive for activity change and fatigue. Negative for appetite change, chills, diaphoresis, fever and unexpected weight change.   HENT: Negative for trouble swallowing and voice change.    Eyes: Negative for photophobia and visual disturbance.   Respiratory: Positive for shortness of breath. Negative for cough, choking, chest tightness, wheezing and stridor.    Cardiovascular: Positive for leg swelling. Negative for chest pain and palpitations.   Gastrointestinal: Negative for abdominal distention, abdominal pain, blood in stool, constipation, diarrhea, nausea and vomiting.   Endocrine: Negative for cold intolerance, heat intolerance, polydipsia, polyphagia and polyuria.   Genitourinary: Negative for decreased urine volume, difficulty urinating, dysuria, enuresis, flank pain, frequency, hematuria and urgency.   Musculoskeletal: Positive for arthralgias and gait problem. Negative for myalgias, neck pain and neck stiffness.   Skin: Negative for pallor, rash and wound.   Neurological: Negative for dizziness, tremors, seizures, syncope, facial asymmetry, speech difficulty, weakness, light-headedness,  numbness and headaches.   Hematological: Does not bruise/bleed easily.   Psychiatric/Behavioral: Negative for agitation, behavioral problems and confusion.       Objective    Temp:  [96.3 °F (35.7 °C)-98.4 °F (36.9 °C)] 96.3 °F (35.7 °C)  Heart Rate:  [49-80] 51  Resp:  [18] 18  BP: (116-171)/(54-89) 123/54    Physical Exam   Constitutional: She is oriented to person, place, and time. She appears well-developed and well-nourished. She is cooperative. No distress.   Patient is morbidly obese and has a BMI of 41.38   HENT:   Head: Normocephalic and atraumatic.   Right Ear: External ear normal.   Left Ear: External ear normal.   Nose: Nose normal.   Mouth/Throat: Oropharynx is clear and moist.   Eyes: Conjunctivae and EOM are normal. Pupils are equal, round, and reactive to light.   Neck: Normal range of motion. Neck supple. No JVD present. No thyromegaly present.   Cardiovascular: Normal rate, regular rhythm, normal heart sounds and intact distal pulses. Exam reveals no gallop and no friction rub.   No murmur heard.  Pulmonary/Chest: Effort normal. No stridor. No respiratory distress. She has decreased breath sounds. She has no wheezes. She has rhonchi. She has no rales. She exhibits no tenderness.   Abdominal: Soft. Bowel sounds are normal. She exhibits no distension and no mass. There is no tenderness. There is no rebound and no guarding. No hernia.   Musculoskeletal: Normal range of motion. She exhibits edema. She exhibits no tenderness or deformity.   Neurological: She is alert and oriented to person, place, and time. She has normal reflexes. No cranial nerve deficit or sensory deficit. She exhibits normal muscle tone.   Skin: Skin is warm and dry. No rash noted. She is not diaphoretic. No erythema. No pallor.   Psychiatric: She has a normal mood and affect. Her behavior is normal. Judgment and thought content normal.   Nursing note and vitals reviewed.        Medication Review:    Current Facility-Administered  Medications:   •  amLODIPine (NORVASC) tablet 5 mg, 5 mg, Oral, Daily, Yaniv Lobo MD  •  aspirin chewable tablet 81 mg, 81 mg, Oral, Daily, Sage Blanchard MD, 81 mg at 01/09/19 0832  •  atorvastatin (LIPITOR) tablet 40 mg, 40 mg, Oral, Nightly, Yaniv Lobo MD  •  bumetanide (BUMEX) tablet 2 mg, 2 mg, Oral, BID, Joon Dugan MD, 2 mg at 01/09/19 0602  •  carvedilol (COREG) tablet 12.5 mg, 12.5 mg, Oral, BID With Meals, Catracho Jarrell MD, 12.5 mg at 01/08/19 1751  •  clopidogrel (PLAVIX) tablet 75 mg, 75 mg, Oral, Daily, Sage Blanchard MD, 75 mg at 01/09/19 0832  •  famotidine (PEPCID) tablet 40 mg, 40 mg, Oral, Daily, Sage Blanchard MD, 40 mg at 01/09/19 0831  •  heparin (porcine) 5000 UNIT/ML injection 5,000 Units, 5,000 Units, Subcutaneous, Q12H, Catracho Jarrell MD, 5,000 Units at 01/09/19 0832  •  HYDROcodone-acetaminophen (NORCO)  MG per tablet 1 tablet, 1 tablet, Oral, Q6H, Joon Dugan MD, 1 tablet at 01/09/19 0602  •  insulin patient supplied pump, , Subcutaneous, Continuous, Sage Blanchard MD  •  isosorbide mononitrate (IMDUR) 24 hr tablet 60 mg, 60 mg, Oral, BID, Yaniv Lobo MD, 60 mg at 01/09/19 0832  •  lisinopril (PRINIVIL,ZESTRIL) tablet 10 mg, 10 mg, Oral, Q24H, Joon Dugan MD, 10 mg at 01/09/19 0832  •  potassium chloride (MICRO-K) CR capsule 40 mEq, 40 mEq, Oral, PRN, 40 mEq at 01/09/19 0505 **OR** potassium chloride (KLOR-CON) packet 40 mEq, 40 mEq, Oral, PRN **OR** potassium chloride 10 mEq in 100 mL IVPB, 10 mEq, Intravenous, Q1H PRN, Catracho Jarrell MD  •  [COMPLETED] Insert peripheral IV, , , Once **AND** sodium chloride 0.9 % flush 10 mL, 10 mL, Intravenous, PRN, Tyrone Quinonez MD  •  sodium chloride 0.9 % flush 3 mL, 3 mL, Intravenous, Q12H, Sage Blanchard MD, 3 mL at 01/09/19 0833  •  sodium chloride 0.9 % flush 3-10 mL, 3-10 mL, Intravenous, PRN, Sage Blanchard MD  •  spironolactone (ALDACTONE) tablet 25 mg, 25 mg, Oral, Daily,  Yaniv Lobo MD  •  zolpidem (AMBIEN) tablet 5 mg, 5 mg, Oral, Nightly PRN, Sage Blanchard MD, 5 mg at 01/08/19 2109    Results Review:  I have reviewed the labs, radiology results, and diagnostic studies.    Laboratory Data:   Results from last 7 days   Lab Units  01/09/19 0658  01/08/19 2036 01/08/19 0728 01/07/19 1954 01/04/19   1451  01/03/19   1657   SODIUM mmol/L  137   --   134*  136*   < >  123*  127*   POTASSIUM mmol/L  4.2  3.5  3.2*  3.8   < >  2.7*  2.9*   CHLORIDE mmol/L  101   --   95  96   < >  83*  86*   CO2 mmol/L  28.0   --   30.0  28.0   < >  29.0  29.0   BUN mg/dL  51*   --   45*  47*   < >  49*  49*   CREATININE mg/dL  1.84*   --   1.13*  1.21*   < >  1.56*  1.38*   GLUCOSE mg/dL  76   --   117*  164*   < >  200*  142*   CALCIUM mg/dL  8.3*   --   8.6  8.9   < >  8.5  8.3*   BILIRUBIN mg/dL   --    --    --   2.1*   --   1.8*  1.6*   ALK PHOS U/L   --    --    --   115   --   97  84   ALT (SGPT) U/L   --    --    --   18   --   18  23   AST (SGOT) U/L   --    --    --   43*   --   47*  32   ANION GAP mmol/L  8.0   --   9.0  12.0   < >  11.0  12.0    < > = values in this interval not displayed.     Estimated Creatinine Clearance: 34.8 mL/min (A) (by C-G formula based on SCr of 1.84 mg/dL (H)).  Results from last 7 days   Lab Units  01/08/19 2036 01/07/19   0518  01/05/19   0120   MAGNESIUM mg/dL  1.7  2.0  2.0         Results from last 7 days   Lab Units  01/09/19 0749  01/08/19 0728 01/07/19 1954 01/07/19   0518  01/06/19   0643   WBC 10*3/mm3  4.31  4.97  6.84  4.28  4.52   HEMOGLOBIN g/dL  7.3*  8.2*  9.1*  7.7*  8.2*   HEMATOCRIT %  22.5*  24.6*  28.2*  23.4*  24.6*   PLATELETS 10*3/mm3  96*  130*  137*  118*  116*           Culture Data:   No results found for: BLOODCX  No results found for: URINECX  No results found for: RESPCX  No results found for: WOUNDCX  No results found for: STOOLCX  No components found for: BODYFLD    Radiology Data:   Imaging Results  (last 24 hours)     Procedure Component Value Units Date/Time    US Liver [[id number]] Collected:  01/08/19 0751     Updated:  01/08/19 1956    Narrative:       NAME: MS. CRISTHIAN PANCHAL  PROCEDURE: US LIVER  ORDER DATE: 1/8/2019 7:51 AM CST  ACCESSION NUMBER: [id number]      Clinical History: Elevated LFTs, I50.9 Heart failure, unspecified  R74.8 Abnormal levels of other serum enzymes    Indication: Same as above    Comparison: None .    Technique: Gray scale evaluation of the right upper quadrant of  the abdomen was done ultrasonographically along with limited  color Doppler evaluation    Findings:     The gallbladder is surgically absent    The common duct is normal in transverse diameter and measures 6.4  mm. There is no intraductal common duct calculus.    There is fatty metamorphosis of the liver. There are no focal  liver lesions. There is no intrahepatic biliary dilatation.    The main portal vein is of normal caliber and shows normal  hepatopedal blood flow. The hepatic veins are patent.    The pancreas tail is obscured by overlying bowel gas . The  pancreatic head and body are unremarkable    The right kidney measures 11.2  cm in length. There is no  hydronephrosis or nephrolithiasis in the right kidney.    There is no documented ascitic  fluid in the evaluated right  upper quadrant of the abdomen.    The visualized portion of the abdominal aorta and the inferior  vena cava are unremarkable.    There is no  documented right-sided pleural effusion.      Impression:       Impression:     There is mild fatty metamorphosis of the liver, without any focal  liver lesions seen     Location of Interpretation: Teleradiology    Electronically signed by:  Sincere Long MD  1/8/2019 7:54 PM CST  Workstation: [id number]    US Venous Doppler Lower Extremity Bilateral (duplex) [[id number]] Collected:  01/08/19 1526     Updated:  01/08/19 1744    Narrative:       PROCEDURE: US VENOUS DOPPLER LOWER EXTREMITY  BILATERAL (DUPLEX)    CLINICAL HISTORY and INDICATION: Bilateral leg pain    COMPARISON:     None.    TECHNIQUE:     Gray scale imaging with duplex interrogation of the bilateral  lower extremity venous system was performed and multiple static  images were obtained.    FINDINGS:     Utilizing compression and augmentation, there is no deep venous  thrombus in the bilateral common femoral, superficial femoral or  popliteal veins.     The bilateral profunda femoris, posterior tibial and deep  peroneal veins are patent and compressible.  .    The bilateral greater saphenous vein at the saphenofemoral  junction is patent and compressible.    There is no visualization of any subcutaneous fluid collections  in either lower extremity.    There is no visualization of any fluid collections in the right  and left popliteal fossa.    There is no evidence of reactive or pathological lymphadenopathy  in the evaluated bilateral lower extremities.        Impression:         No deep venous thrombosis of the right and the left lower  extremity.     Location of Interpretation:     [id number]    Electronically signed by:  Sincere Long MD  1/8/2019 5:43 PM [facility]  Workstation: [id number]    NM Lung Scan Perfusion Particulate [[id number]] Updated:  01/08/19 2448          I have reviewed the patient's current medications.     Assessment/Plan     Hospital Problem List:  Principal Problem:  - Acute on chronic diastolic CHF (congestive heart failure): Continue diuretics, strict I's and O's, daily weights and fluid restriction.  Heart failure Navigator is following.  Patient's weight is 240 pounds today and her dry weight is between 2:30 and 235.  Echocardiogram done January 8, 2019 showed:  · Left ventricular wall thickness is consistent with mild concentric hypertrophy.  · Estimated EF = 57%.  · Left ventricular systolic function is normal.  · Left ventricular diastolic dysfunction (grade I a) consistent with impaired relaxation.  · Right  ventricular cavity is mildly dilated.  · Left atrial cavity size is mildly dilated.  · Mild aortic valve regurgitation is present.  · Moderate mitral valve regurgitation is present  · Moderate tricuspid valve regurgitation is present.  · The tricuspid valve is grossly normal. Moderate tricuspid valve regurgitation is present. Estimated right ventricular systolic pressure from tricuspid regurgitation is markedly elevated (>55 mmHg). Moderate to severe pulmonary hypertension is present.  · Mild pulmonic valve regurgitation is present.     - History of coronary artery disease with Possible non-STEMI: Continue guidelines directed medical therapy.  Result of echocardiogram is as dictated above.  - Hypokalemia (likely diuretic induced): Resolved.   - Chronic renal impairment, stage 3: Creatinine has increased to 1.8 for today following ongoing diuretic therapy.  Continue to monitor.  Nephrologist is following.    - Elevated d-dimer is likely reactive.  Check VQ scan has been done and the result is pending.  Duplex ultrasound both lower extremities is negative for DVT.    - Hypertension: Stable. Continue current medications and make adjustments as needed for optimal blood pressure control.  - Oxygen dependent COPD: Patient is currently not in exacerbation.  Continue supplemental oxygen and bronchodilators.  - Diabetes mellitus: Stable.  Continue insulin pump with Accu-Cheks.  - Chronic anemia: Hemoglobin is down to 7.3 today.  I am profile is unremarkable and stool for Hemoccult is pending.  We'll transfuse 1 unit of packed blood cells as patient remains symptomatic.  Risks and benefits of transfusion have been discussed with patient and the .  Continue to monitor.  - Obstructive sleep apnea: Continue CPAP.  - Chronic pain syndrome/chronic insomnia: Continue home medications.  - Chronic thrombocytopenia: Platelet count is down to 96,000 today.  Her baseline seem to be 110 to 130K.  Continue to monitor.  - Continue  GI and DVT prophylaxis.           Discharge Planning: In progress.    Catracho Jarrell MD   01/09/19   10:04 AM

## 2019-01-09 NOTE — PROGRESS NOTES
Discharge Planning Assessment  [facility]     Patient Name: Nicolasa Rojas  MRN: [id number]  Today's Date: 1/9/2019    Admit Date: 1/7/2019    Discharge Needs Assessment     Row Name 01/09/19 1425       Living Environment    Lives With  spouse    Name(s) of Who Lives With Patient  Felipe    Current Living Arrangements  home/apartment/condo    Primary Care Provided by  spouse/significant other    Provides Primary Care For  no one, unable/limited ability to care for self    Family Caregiver if Needed  spouse    Family Caregiver Names  Felipe Rojas    Quality of Family Relationships  other (see comments) Spouse is very supportive and helpful. They have a daughter but state she does not offer much help.    Able to Return to Prior Arrangements  yes       Resource/Environmental Concerns    Resource/Environmental Concerns  none    Transportation Concerns  car, none       Transition Planning    Patient/Family Anticipates Transition to  home with family;home with help/services    Patient/Family Anticipated Services at Transition  home health care    Transportation Anticipated  family or friend will provide       Discharge Needs Assessment    Readmission Within the Last 30 Days  previous discharge plan unsuccessful    Concerns to be Addressed  denies needs/concerns at this time    Equipment Currently Used at Home  bipap/cpap;commode;glucometer;oxygen;ramp;rollator;walker, standard;wheelchair;wheelchair, motorized    Anticipated Changes Related to Illness  none    Equipment Needed After Discharge  none    Outpatient/Agency/Support Group Needs  homecare agency    Discharge Facility/Level of Care Needs  home with [facility]    Offered/Gave Vendor List  no    Patient's Choice of Community Agency(s)  Patient is active with [facility] [place].    Current Discharge Risk  chronically ill;dependent with mobility/activities of daily living        Discharge Plan     Row Name 01/09/19 1429       Plan    Plan  Home with [facility] [place].     Patient/Family in Agreement with Plan  yes    Plan Comments  Demographics and pharmacy, [facility], verified. Patient reports she has prescription coverage. DME from [facility]. Active with [facility], called and spoke with them, patient was getting PT/OT and skilled nursing. Left sticky note for physician to order at discharge.        Destination      No service coordination in this encounter.      Durable Medical Equipment      No service coordination in this encounter.      Dialysis/Infusion      No service coordination in this encounter.      Home Medical Care      No service coordination in this encounter.      Community Resources      No service coordination in this encounter.        Expected Discharge Date and Time     Expected Discharge Date Expected Discharge Time    Jan 11, 2019         Demographic Summary     Row Name 01/09/19 1422       General Information    Admission Type  inpatient    Arrived From  home    Required Notices Provided  Important Message from Medicare    Referral Source  high risk screening    Preferred Language  English       Contact Information    Contact Information Obtained for          Functional Status     Row Name 01/09/19 1423       Functional Status    Usual Activity Tolerance  poor    Current Activity Tolerance  poor    Functional Status Comments  SOA with exertion       Functional Status, IADL    Medications  assistive person    Meal Preparation  completely dependent    Housekeeping  completely dependent    Laundry  completely dependent    Shopping  completely dependent    IADL Comments  Spouse does IADL's.       Mental Status    General Appearance WDL  WDL       Mental Status Summary    Recent Changes in Mental Status/Cognitive Functioning  no changes       Employment/    Employment Status  disabled        Psychosocial    No documentation.       Abuse/Neglect    No documentation.       Legal    No documentation.       Substance Abuse    No  documentation.       Patient Forms    No documentation.           Queenie Marshall

## 2019-01-09 NOTE — CONSULTS
CARDIOVASCULAR CONSULTATION   Jaya Byrd M.D., Ph.D., [facility]                Referring Provider: Dr. Lobo, interventional cardiology  Reason for Consultation: Pulmonary hypertension  Chief complaint shortness of breath and edema    Subjective .     History of present illness:  Nicolasa Rojas is a 68 y.o. female with former smoking, COPD (unknown GOLD), home oxygen 3 L/m around the clock, CIERRA on CPAP (unknown last titration and unknown sleep physician), severe obesity, HFpEF, PAH, multi-valvular heart disease (tricuspid regurgitation, pulmonic insufficiency, aortic insufficiency and mitral regurgitation), CKD (Dr. Dugan) and known MV ASCAD (normally seen at [facility], but transitioning care to Dr. Lobo) who had recent NSTEMI in 11/2018 and was seen at [facility].  She was found to have ostial LAD stenosis and was initially placed with an  IABP and transferred to the CCU.  She did undergo CT surgery evaluation who actually recommended proceeding with high-risk PCI given her obesity, deconditioning and extensive medical comorbidities.  On November 19 she underwent high-risk PCI with LEXI to the proximal left circumflex, mid LAD and left main which actually extends into the proximal mid-LAD.  That was complicated by contrast-induced nephropathy.  She actually became and uric and required several episodes of hemodialysis.  She then had recovery of her renal function to baseline.  She was transitioned to Bumex 4 mg a day with additional dosing in the afternoon for symptoms or weight gain greater than 3 pounds in 24 hours.  When she was discharged her creatinine was 2.26 with a reportedly baseline of 1.2.  An ACE inhibitor was intentionally withheld at that time given her renal function.  She actually was admitted earlier this month for hyponatremia.  She then presented to our emergency department on January 7 with worsening dyspnea and lower extremity edema.  She was also found to have worsening  renal function.  She was administered IV Lasix in the emergency department and admitted to the hospitalist service.  Dr. Lobo believe that she likely had multifactorial dyspnea with volume overload complicated by her CKD.  Nephrology was consulted.  She's now on lisinopril and Aldactone.  She initially had improved renal function with Bumex.  Yesterday, nephrology changed her Bumex to twice a day dosing.  She has slight worsening of her function overnight.  Overall, she denies any resting, exertional or nocturnal angina.  She is compliant with home oxygen.  Of note, she does not use any inhalation therapies for COPD.  She is unable to recall the name of her pulmonologist.  She has been diagnosed with CIERRA, as noted above.  She doesn't recall the last time her machine was checked were titrated.  She tells me that she sees someone in [place], [place].  She also underwent a VQ scan, but only had perfusion images.  She declined ventilation images secondary to reported claustrophobia.  She denies a history of DVT.  She does admit to be very sedentary because of chronic back pain.  She had a repeat 2-D echocardiogram here which showed stable left ventricular ejection fraction.  It also demonstrated multi-valvular heart disease as aforementioned, as well as elevated PA pressures.  She tells me this is the first time she's been told that she's had elevated PA pressures.    Review of Systems   Cardiovascular: Positive for dyspnea on exertion and leg swelling. Negative for chest pain, claudication, cyanosis, irregular heartbeat, near-syncope, orthopnea, palpitations, paroxysmal nocturnal dyspnea and syncope.   Respiratory: Positive for cough, shortness of breath, sleep disturbances due to breathing and snoring. Negative for hemoptysis, sputum production and wheezing.        History  Past Medical History:   Diagnosis Date   • Acute bronchitis    • Acute congestive heart failure (CMS/HCC)     resolving   • Acute kidney  failure, unspecified (CMS/HCC)    • Acute kidney failure, unspecified (CMS/HCC)    • Acute posthemorrhagic anemia    • Asthenia    • Chest pain    • Chronic gastritis    • Chronic pharyngitis    • Chronic renal impairment    • Congenital renal failure    • Congestive heart failure (CMS/HCC)    • Contact dermatitis due to poison ivy    • COPD (chronic obstructive pulmonary disease) (CMS/HCC)    • Coronary arteriosclerosis    • D-dimer, elevated     D-dimer above reference range    • Degenerative joint disease involving multiple joints     back   • Depressive disorder    • Dysphagia    • Dyspnea    • Edema of lower extremity    • Encounter for immunization    • Encounter for long-term (current) use of medications    • Epigastric pain    • Esophagitis    • Essential hypertension    • Gastroesophageal reflux disease    • Gastroparesis     Gastroparesis syndrome    • Generalized abdominal pain    • H/O bone density study 01/09/2015   • H/O mammogram 01/15/2015   • H/O screening mammography 11/12/2013   • Headache    • History of transfusion    • Hyperlipidemia    • Hypokalemia    • Hypomagnesemia    • Injury of tendon of rotator cuff     Injury of tendon of the rotator cuff of shoulder      • Insomnia    • Insomnia, unspecified    • Knee pain    • Nausea and vomiting    • Neck pain    • Need for immunization against influenza    • Need for prophylactic vaccination and inoculation against influenza    • Need for prophylactic vaccination and inoculation against viral disease    • 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 Performed at  [facility].   • ENDOSCOPY  02/09/2015    ESOPHAGEAL STRICTURE PRESENT, GASTRITIS FOUND IN THE STOMACH, NORMAL DUODENUM   • ENDOSCOPY W/ PEG TUBE PLACEMENT  12/19/2012    Esophagitis seen. Biopsy taken. Esophageal stricture present. Dilatation performed. Gastritis in stomach. Biopsy taken. Food was in stomach. Normal duodenum.   • HERNIA REPAIR     • HYSTERECTOMY      partial   • NECK SURGERY     ,   Family History   Problem Relation Age of Onset   • Cancer Mother    • Diabetes Mother    • Stroke Father    • Heart disease Sister    • Colon cancer Other    • Hypertension Other    • Breast cancer Maternal Aunt    ,   Social History     Tobacco Use   • Smoking status: Former Smoker   • Smokeless tobacco: Never Used   • Tobacco comment: quit 30 years ago    Substance Use Topics   • Alcohol use: No   • Drug use: No   ,   Medications Prior to Admission   Medication Sig Dispense Refill Last Dose   • amLODIPine (NORVASC) 10 MG tablet Take 1 tablet by mouth Daily. 30 tablet 5 1/4/2019 at Unknown time   • aspirin 81 MG chewable tablet Chew 81 mg Daily.   1/4/2019 at Unknown time   • atorvastatin (LIPITOR) 80 MG tablet Take 80 mg by mouth Every Night.   1/3/2019 at Unknown time   • bumetanide (BUMEX) 2 MG tablet Take 1 tablet by mouth Daily. 90 tablet 1 1/4/2019 at Unknown time   • carvedilol (COREG) 3.125 MG tablet Take 6.25 mg by mouth 2 (Two) Times a Day With Meals.   1/4/2019 at Unknown time   • clopidogrel (PLAVIX) 75 MG tablet Take 1 tablet by mouth Daily. 90 tablet 1 1/4/2019 at Unknown time   • glucose blood test strip Needs to see pcp for refills 900 each 0    • HUMALOG 100 UNIT/ML injection INJECT 150 UNITS SUB-Q DAILY PER INSULIN PUMP 40 mL 11    • hydrALAZINE (APRESOLINE) 50 MG tablet Take 1 tablet by mouth 2 (Two) Times a Day. (Patient taking differently: Take 75 mg by mouth 3 (Three) Times a Day.) 180 tablet 1 1/4/2019 at Unknown time   • HYDROcodone-acetaminophen (NORCO)  MG per tablet  "Take 1 tablet by mouth 4 (Four) Times a Day.   1/4/2019 at Unknown time   • Insulin Lispro 100 UNIT/ML solution cartridge Inject 150 Units under the skin Daily. DX: E11 5 cartridge 11 Taking   • Insulin Syringe-Needle U-100 (GLOBAL INSULIN SYRINGES) 30G X 5/16\" 0.3 ML misc SQ injections as needed when insulin pump is non functioning 50 each 1    • isosorbide mononitrate (IMDUR) 30 MG 24 hr tablet Take 1 tablet by mouth Daily. (Patient taking differently: Take 90 mg by mouth 2 (Two) Times a Day.) 90 tablet 1 1/4/2019 at Unknown time   • nitroglycerin (NITROLINGUAL) 0.4 MG/SPRAY spray Place 1 spray under the tongue Every 5 (Five) Minutes As Needed for Chest Pain. 4.9 g 5 More than a month at Unknown time   • potassium chloride (MICRO-K) 10 MEQ CR capsule Take 2 capsules by mouth 2 (Two) Times a Day. 60 capsule 3    • raNITIdine (ZANTAC) 300 MG tablet TAKE 1 TABLET BY MOUTH EVERY NIGHT. 90 tablet 1 1/3/2019 at Unknown time   • sertraline (ZOLOFT) 50 MG tablet Take 1 tablet by mouth Daily. 90 tablet 1 1/4/2019 at Unknown time   • vitamin D (ERGOCALCIFEROL) 75965 UNITS capsule capsule Take 1 capsule by mouth 1 (One) Time Per Week. (Patient taking differently: Take 50,000 Units by mouth 1 (One) Time Per Week. Friday) 4 capsule 6 1/4/2019 at Unknown time   • zolpidem (AMBIEN) 10 MG tablet Take 1 tablet by mouth At Night As Needed for Sleep. 30 tablet 5 1/4/2019 at Unknown time    and Allergies:  Iodides; Iodine; Other; and Stadol [butorphanol]    Objective   Body mass index is 41.2 kg/m².         Anticoagulants (From admission, onward)    Start     Dose/Rate Route Frequency Ordered Stop    01/10/19 0000  enoxaparin (LOVENOX) syringe 40 mg      40 mg Subcutaneous Every 24 Hours 01/09/19 1017                Vital Sign Min/Max for last 24 hours  Temp  Min: 96.3 °F (35.7 °C)  Max: 98.4 °F (36.9 °C)   BP  Min: 116/56  Max: 160/72   Pulse  Min: 49  Max: 78   Resp  Min: 18  Max: 18   SpO2  Min: 91 %  Max: 98 %   No Data Recorded "   Weight  Min: 109 kg (240 lb)  Max: 109 kg (240 lb)       Physical Exam:  Vitals:    01/09/19 0757   BP:    Pulse: 51   Resp:    Temp:    SpO2:      Body mass index is 41.2 kg/m².   Pulse Ox: Normal  on NC  General: alert, appears stated age and cooperative     Body Habitus: morbidly obese    HEENT: Head: Normocephalic, no lesions, without obvious abnormality. No arcus senilis, xanthelasma or xanthomas.    JVP: Volume/Pulsation: elevated jugular venous pressure to 14 cm vertical height above mid-right atrium with significant V waves  Carotid Exam: no bruit normal pulsation bilaterally   Carotid Volume: normal    Subclavian Bruit: absent  Vertebral Bruit: absent  Respirations: no increased work of breathing   Chest:  Normal    Pulmonary:Normal     Precordium: Normal impulses. P2 is not palpable.   [facility]:  normal size and placement Palpable S4: absent.  Heart rate: normal    Heart Rhythm: regular     Heart Sounds: S1: normal intensity  S2: increased P2  S3: absent   S4: absent  Opening Snap: absent  A2-OS:  absent.   Pericardial rub: absent    Ejection click: None      Murmurs:  absent   Extremity: moves all extremities equally.   LE Skin: Trace LE edema.   Pulses: 2+ and symmetric    DATA REVIEWED:       ---------------------------------------------------  TTE/RAUL:  Results for orders placed during the hospital encounter of 01/07/19   Adult Transthoracic Echo Complete W/ Cont if Necessary Per Protocol    Narrative · Left ventricular wall thickness is consistent with mild concentric   hypertrophy.  · Estimated EF = 57%.  · Left ventricular systolic function is normal.  · Left ventricular diastolic dysfunction (grade I a) consistent with   impaired relaxation.  · Right ventricular cavity is mildly dilated.  · Left atrial cavity size is mildly dilated.  · Mild aortic valve regurgitation is present.  · Moderate mitral valve regurgitation is present  · Moderate tricuspid valve regurgitation is present.  · The tricuspid  valve is grossly normal. Moderate tricuspid valve   regurgitation is present. Estimated right ventricular systolic pressure   from tricuspid regurgitation is markedly elevated (>55 mmHg). Moderate to   severe pulmonary hypertension is present.  · Mild pulmonic valve regurgitation is present.              Imaging Results (most recent)     Procedure Component Value Units Date/Time    NM Lung Scan Perfusion Particulate [[id number]] Collected:  01/08/19 1353     Updated:  01/09/19 1113    Narrative:       PROCEDURE: NM LUNG SCAN PERFUSION PARTICULATE    CLINICAL HISTORY:     Chest pain, acute, PE suspected, intermed prob, negative D-dimer,  I50.9 Heart failure, unspecified R74.8 Abnormal levels of other  serum enzymes    INDICATION: Same as above    COMPARISON:     Chest x-ray, 1/7/2019. Nuclear medicine ventilation perfusion  scan done on 8/4/2014    TECHNIQUE:     Nuclear medicine perfusion scan was obtained following  intravenous administration of 6.1 mCi technetium 99m-MAA. Planar  images were obtained in the anterior projection for ventilation  with multi-planar imaging for perfusion.    The patient was unable to and delayed due to claustrophobia    FINDINGS:     The ventilation scan was not done    The perfusion scan shows near normal bilateral lung perfusion .      Impression:         Very low probability for a pulmonary embolus.    Electronically signed by:  Sincere Long MD  1/9/2019 11:12 AM CST  Workstation: [employer] [facility] [[id number]] Collected:  01/08/19 0751     Updated:  01/08/19 1956    Narrative:       NAME: MS. CRISTHIAN PANCHAL  PROCEDURE: US LIVER  ORDER DATE: 1/8/2019 7:51 AM CST  ACCESSION NUMBER: [id number]      Clinical History: Elevated LFTs, I50.9 Heart failure, unspecified  R74.8 Abnormal levels of other serum enzymes    Indication: Same as above    Comparison: None .    Technique: Gray scale evaluation of the right upper quadrant of  the abdomen was done ultrasonographically  along with limited  color Doppler evaluation    Findings:     The gallbladder is surgically absent    The common duct is normal in transverse diameter and measures 6.4  mm. There is no intraductal common duct calculus.    There is fatty metamorphosis of the liver. There are no focal  liver lesions. There is no intrahepatic biliary dilatation.    The main portal vein is of normal caliber and shows normal  hepatopedal blood flow. The hepatic veins are patent.    The pancreas tail is obscured by overlying bowel gas . The  pancreatic head and body are unremarkable    The right kidney measures 11.2  cm in length. There is no  hydronephrosis or nephrolithiasis in the right kidney.    There is no documented ascitic  fluid in the evaluated right  upper quadrant of the abdomen.    The visualized portion of the abdominal aorta and the inferior  vena cava are unremarkable.    There is no  documented right-sided pleural effusion.      Impression:       Impression:     There is mild fatty metamorphosis of the liver, without any focal  liver lesions seen     Location of Interpretation: Teleradiology    Electronically signed by:  Sincere Long MD  1/8/2019 7:54 PM CST  Workstation: [id number]-    US Venous Doppler Lower Extremity Bilateral (duplex) [[id number]] Collected:  01/08/19 1526     Updated:  01/08/19 1744    Narrative:       PROCEDURE: US VENOUS DOPPLER LOWER EXTREMITY BILATERAL (DUPLEX)    CLINICAL HISTORY and INDICATION: Bilateral leg pain    COMPARISON:     None.    TECHNIQUE:     Gray scale imaging with duplex interrogation of the bilateral  lower extremity venous system was performed and multiple static  images were obtained.    FINDINGS:     Utilizing compression and augmentation, there is no deep venous  thrombus in the bilateral common femoral, superficial femoral or  popliteal veins.     The bilateral profunda femoris, posterior tibial and deep  peroneal veins are patent and compressible.  .    The bilateral  greater saphenous vein at the saphenofemoral  junction is patent and compressible.    There is no visualization of any subcutaneous fluid collections  in either lower extremity.    There is no visualization of any fluid collections in the right  and left popliteal fossa.    There is no evidence of reactive or pathological lymphadenopathy  in the evaluated bilateral lower extremities.        Impression:         No deep venous thrombosis of the right and the left lower  extremity.     Location of Interpretation:     [id number]    Electronically signed by:  Sincere Long MD  1/8/2019 5:43 PM CST  Workstation: [id number]-    XR Chest 1 View [[id number]] Collected:  01/07/19 1935     Updated:  01/07/19 1954    Narrative:       PROCEDURE: XR CHEST 1 VIEW    HISTORY: dyspnea    COMPARISON: 1/4/2019     TECHNIQUE:     Single projection of the chest was done.    FINDINGS:    There is chronic bilateral vascular congestion, suspicious for  underlying changes of mild volume overload .    There are no discrete airspace infiltrates, pneumothoraces or  pleural effusions.    The cardiomediastinal silhouette is stable.      Impression:         There is chronic bilateral vascular congestion, suspicious for  underlying changes of mild volume overload .    Electronically signed by:  Sincere Long MD  1/7/2019 7:53 PM CST  Workstation: [id number]-          --------------------------------------------------------------------------------------------------  LABS:   Lab Results   Component Value Date    GLUCOSE 76 01/09/2019    BUN 51 (H) 01/09/2019    CREATININE 1.84 (H) 01/09/2019    EGFRIFNONA 27 (L) 01/09/2019    BCR 27.7 (H) 01/09/2019    K 4.2 01/09/2019    CO2 28.0 01/09/2019    CALCIUM 8.3 (L) 01/09/2019    ALBUMIN 3.80 01/07/2019    AST 43 (H) 01/07/2019    ALT 18 01/07/2019       Lab Results   Component Value Date    GLUCOSE 76 01/09/2019    CALCIUM 8.3 (L) 01/09/2019     01/09/2019    K 4.2 01/09/2019    CO2 28.0  01/09/2019     01/09/2019    BUN 51 (H) 01/09/2019    CREATININE 1.84 (H) 01/09/2019    EGFRIFNONA 27 (L) 01/09/2019    BCR 27.7 (H) 01/09/2019    ANIONGAP 8.0 01/09/2019       Lab Results   Component Value Date    WBC 4.31 01/09/2019    HGB 7.3 (L) 01/09/2019    HCT 22.5 (L) 01/09/2019    MCV 99.1 (H) 01/09/2019    PLT 96 (L) 01/09/2019       Lab Results   Component Value Date    CHOL 135 (L) 01/31/2018    CHLPL 120 (L) 03/14/2016    TRIG 72 01/31/2018    HDL 46 01/31/2018    LDL 75 01/31/2018       Lab Results   Component Value Date    TSH 2.740 01/31/2018    L1TDVDE 8.48 01/31/2018       Lab Results   Component Value Date    CKTOTAL 69.0 01/11/2016    CKMB 1.2 08/04/2014    TROPONINI 1.190 (C) 01/08/2019       Lab Results   Component Value Date    HGBA1C 4.0 12/28/2018     Lab Results   Component Value Date    DDIMER 523 (H) 08/04/2014     Lab Results   Component Value Date    ALT 18 01/07/2019     Lab Results   Component Value Date    HGBA1C 4.0 12/28/2018    HGBA1C 6.4 (H) 01/31/2018    HGBA1C 6.13 (H) 05/17/2017     Lab Results   Component Value Date    MICROALBUR 11.0 01/31/2018    CREATININE 1.84 (H) 01/09/2019     Lab Results   Component Value Date    IRON 59 01/09/2019    TIBC 389 01/09/2019    FERRITIN 59.50 02/17/2017     Lab Results   Component Value Date    INR 1.0 08/04/2014    PROTIME 13.0 08/04/2014         Assessment/Plan      1. PAH/PVH/HFpEF. WHO Group 2.1/2.2/3.1/3.3/3.4; FC: Class 3.   NYHA stage C.  RV status: Adapted with normal right ventricular ejection fraction.  She is in a mildly hypervolemic, but perfused state.  She had mild worsening of her renal function overnight with exposure to twice a day Bumex.  I discussed with Nicolasa Rojas and her  that she likely has multifactorial PH.  She very clearly has HFpEF with multi-valvular disease.  She also has a history of CIERRA on CPAP and questionable COPD.  She does use home oxygen.  We do not have PFTs in our system.  Her  "chest x-ray has not markedly abnormal with COPD.  She is unable to tolerate a CT scan because it would require angiography.  I discussed with her and her  about optimizing her treatments for COPD.  She also needs to make sure that her CPAP has been checked and that she undergoes regular titration studies.  Unfortunately, she does not know the name of her pulmonologist for sleep physician.  Also, she would markedly benefit from weight reduction.  I offered her an outpatient consultation with Dr. Harris and Dr. Teran.  She states that she would like to continue seeing her on sleep physician, but she will consider seeing Dr. Harris.  She refuses any COPD-specific therapy at this point because she states that it \"makes my blood pressure high.\"  At this point, her PA systolic pressure is in-proportion to her known disease.  I see no evidence of CpC-PAH or indications for RHC at this point.  I would like to get her to a more euvolemic state and then repeat an outpatient PAH-protocol TTE for further evaluation.  -No current indication for PAH-specific medications  -Will change Bumex to once a day given her worsening renal failure  -Agree with ACE-I and Aldactone  -Will attempt PFT  -Will arrange close outpatient follow-up with myself and Dr. Harris    Thank you so much for allowing me to participate and consult on Nicolasa Rojas.      Disposition: Will follow          This document has been electronically signed by Jaya Byrd MD PhD on January 9, 2019 12:01 PM          "

## 2019-01-09 NOTE — PLAN OF CARE
Problem: Fall Risk (Adult)  Goal: Identify Related Risk Factors and Signs and Symptoms  Outcome: Ongoing (interventions implemented as appropriate)    Goal: Absence of Fall  Outcome: Ongoing (interventions implemented as appropriate)      Problem: Patient Care Overview  Goal: Plan of Care Review  Outcome: Ongoing (interventions implemented as appropriate)   01/09/19 1539   Coping/Psychosocial   Plan of Care Reviewed With patient;family   Plan of Care Review   Progress no change   OTHER   Outcome Summary pt hgb low, pt being transfused 1 unit PRBC; will monitor     Goal: Individualization and Mutuality  Outcome: Ongoing (interventions implemented as appropriate)    Goal: Discharge Needs Assessment  Outcome: Ongoing (interventions implemented as appropriate)    Goal: Interprofessional Rounds/Family Conf  Outcome: Ongoing (interventions implemented as appropriate)      Problem: Fluid Volume Excess (Adult)  Goal: Identify Related Risk Factors and Signs and Symptoms  Outcome: Ongoing (interventions implemented as appropriate)    Goal: Optimal Fluid Balance  Outcome: Ongoing (interventions implemented as appropriate)      Problem: Diabetes, Type 2 (Adult)  Goal: Signs and Symptoms of Listed Potential Problems Will be Absent, Minimized or Managed (Diabetes, Type 2)  Outcome: Ongoing (interventions implemented as appropriate)

## 2019-01-09 NOTE — PLAN OF CARE
Problem: Fall Risk (Adult)  Goal: Identify Related Risk Factors and Signs and Symptoms  Outcome: Ongoing (interventions implemented as appropriate)    Goal: Absence of Fall  Outcome: Ongoing (interventions implemented as appropriate)      Problem: Patient Care Overview  Goal: Plan of Care Review  Outcome: Ongoing (interventions implemented as appropriate)   01/09/19 0356   Coping/Psychosocial   Plan of Care Reviewed With patient   Plan of Care Review   Progress no change   OTHER   Outcome Summary pt. rested well this night      Goal: Individualization and Mutuality  Outcome: Ongoing (interventions implemented as appropriate)    Goal: Discharge Needs Assessment  Outcome: Ongoing (interventions implemented as appropriate)    Goal: Interprofessional Rounds/Family Conf  Outcome: Ongoing (interventions implemented as appropriate)      Problem: Fluid Volume Excess (Adult)  Goal: Identify Related Risk Factors and Signs and Symptoms  Outcome: Ongoing (interventions implemented as appropriate)    Goal: Optimal Fluid Balance  Outcome: Ongoing (interventions implemented as appropriate)      Problem: Diabetes, Type 2 (Adult)  Goal: Signs and Symptoms of Listed Potential Problems Will be Absent, Minimized or Managed (Diabetes, Type 2)  Outcome: Ongoing (interventions implemented as appropriate)

## 2019-01-09 NOTE — PROGRESS NOTES
[facility] Cardiology  INPATIENT PROGRESS NOTE    Name: Nicolasa Rojas  Age/Sex: 68 y.o. female  :  1950        PCP: Henry Muniz MD    Vital Signs  Temp:  [96.3 °F (35.7 °C)-98.4 °F (36.9 °C)] 96.3 °F (35.7 °C)  Heart Rate:  [49-80] 51  Resp:  [18] 18  BP: (116-171)/(54-89) 123/54  Body mass index is 41.2 kg/m².      Subjective  she was aggressively diuresed and was postictal on fluids to 1500 cc a day, but her creatinine went to 1.8 from 1.1.  Her swelling on the leg is better.  Decreased the dose of amlodipine and Imdur as it Has exacerbated the swelling and added  Aldactone daily, will let nephrology decide about the diuretics.  Echo showed RV pressures greater than 55 will consult Jaya Byrd for possible right heart catheterization to see if any benefit with medications to decrease the permanent pressures even though she has multiple comorbidities of obesity, sleep apnea and chronic pain.  Patient has possible depression also with the chronic pain of the back.      Patient Active Problem List   Diagnosis   • Hyponatremia   • Acute on chronic diastolic CHF (congestive heart failure) (CMS/HCC)   • Hypokalemia   • Acute on chronic heart failure (CMS/HCC)   • Dyspnea   • Chronic renal impairment, stage 3 (moderate) (CMS/HCC)   • CAD (coronary artery disease)       Past Medical History:   Diagnosis Date   • Acute bronchitis    • Acute congestive heart failure (CMS/HCC)     resolving   • Acute kidney failure, unspecified (CMS/HCC)    • Acute kidney failure, unspecified (CMS/HCC)    • Acute posthemorrhagic anemia    • Asthenia    • Chest pain    • Chronic gastritis    • Chronic pharyngitis    • Chronic renal impairment    • Congenital renal failure    • Congestive heart failure (CMS/HCC)    • Contact dermatitis due to poison ivy    • COPD (chronic obstructive pulmonary disease) (CMS/HCC)    • Coronary arteriosclerosis    • D-dimer, elevated     D-dimer above reference range    •  Degenerative joint disease involving multiple joints     back   • Depressive disorder    • Dysphagia    • Dyspnea    • Edema of lower extremity    • Encounter for immunization    • Encounter for long-term (current) use of medications    • Epigastric pain    • Esophagitis    • Essential hypertension    • Gastroesophageal reflux disease    • Gastroparesis     Gastroparesis syndrome    • Generalized abdominal pain    • H/O bone density study 01/09/2015   • H/O mammogram 01/15/2015   • H/O screening mammography 11/12/2013   • Headache    • History of transfusion    • Hyperlipidemia    • Hypokalemia    • Hypomagnesemia    • Injury of tendon of rotator cuff     Injury of tendon of the rotator cuff of shoulder      • Insomnia    • Insomnia, unspecified    • Knee pain    • Nausea and vomiting    • Neck pain    • Need for immunization against influenza    • Need for prophylactic vaccination and inoculation against influenza    • Need for prophylactic vaccination and inoculation against viral disease    • 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   • Orthopnea    • Osteoarthritis    • Pain of breast     right   • Shoulder pain    • Sleep disorder    • Stricture of esophagus    • Symptomatic menopausal or female climacteric states    • Type 2 diabetes mellitus (CMS/HCC)        Current Facility-Administered Medications   Medication Dose Route Frequency Provider Last Rate Last Dose   • amLODIPine (NORVASC) tablet 5 mg  5 mg Oral Daily Yaniv Lobo MD       • aspirin chewable tablet 81 mg  81 mg Oral Daily Sage Blanchard MD   81 mg at 01/09/19 0832   • atorvastatin (LIPITOR) tablet 40 mg  40 mg Oral Nightly Yaniv Lobo MD       • bumetanide (BUMEX) tablet 2 mg  2 mg Oral BID Joon Dugan MD   2 mg at 01/09/19 0602   • carvedilol (COREG) tablet 12.5 mg  12.5 mg Oral BID With Meals Catracho Jarrell MD   12.5 mg at 01/08/19 1751   • clopidogrel (PLAVIX) tablet 75 mg  75 mg Oral Daily Santo  MD Sage   75 mg at 01/09/19 0832   • famotidine (PEPCID) tablet 40 mg  40 mg Oral Daily Sage Blanchard MD   40 mg at 01/09/19 0831   • heparin (porcine) 5000 UNIT/ML injection 5,000 Units  5,000 Units Subcutaneous Q12H Catracho Jarrell MD   5,000 Units at 01/09/19 0832   • HYDROcodone-acetaminophen (NORCO)  MG per tablet 1 tablet  1 tablet Oral Q6H Joon Dugan MD   1 tablet at 01/09/19 0602   • insulin patient supplied pump   Subcutaneous Continuous Sage Blanchard MD       • isosorbide mononitrate (IMDUR) 24 hr tablet 60 mg  60 mg Oral BID Yaniv Lobo MD   60 mg at 01/09/19 0832   • lisinopril (PRINIVIL,ZESTRIL) tablet 10 mg  10 mg Oral Q24H Joon Dugan MD   10 mg at 01/09/19 0832   • potassium chloride (MICRO-K) CR capsule 40 mEq  40 mEq Oral PRN Catracho Jarrell MD   40 mEq at 01/09/19 0505    Or   • potassium chloride (KLOR-CON) packet 40 mEq  40 mEq Oral PRN Catracho Jarrell MD        Or   • potassium chloride 10 mEq in 100 mL IVPB  10 mEq Intravenous Q1H PRN Catracho Jarrell MD       • sodium chloride 0.9 % flush 10 mL  10 mL Intravenous PRN Tyrone Quinonez MD       • sodium chloride 0.9 % flush 3 mL  3 mL Intravenous Q12H Sage Blanchard MD   3 mL at 01/09/19 0833   • sodium chloride 0.9 % flush 3-10 mL  3-10 mL Intravenous PRN Sage Blanchard MD       • spironolactone (ALDACTONE) tablet 25 mg  25 mg Oral Daily Yaniv Lobo MD       • zolpidem (AMBIEN) tablet 5 mg  5 mg Oral Nightly PRN Sage Blanchard MD   5 mg at 01/08/19 6365       Past Surgical History:   Procedure Laterality Date   • BREAST BIOPSY Bilateral    • CHOLECYSTECTOMY     • COLONOSCOPY  03/29/2012    Diverticulosis. Performed at [facility].   • ENDOSCOPY  02/09/2015    ESOPHAGEAL STRICTURE PRESENT, GASTRITIS FOUND IN THE STOMACH, NORMAL DUODENUM   • ENDOSCOPY W/ PEG TUBE PLACEMENT  12/19/2012    Esophagitis seen. Biopsy taken. Esophageal stricture present. Dilatation performed. Gastritis  in stomach. Biopsy taken. Food was in stomach. Normal duodenum.   • HERNIA REPAIR     • HYSTERECTOMY      partial   • NECK SURGERY         Social History     Socioeconomic History   • Marital status:      Spouse name: Not on file   • Number of children: Not on file   • Years of education: Not on file   • Highest education level: Not on file   Social Needs   • Financial resource strain: Not on file   • Food insecurity - worry: Not on file   • Food insecurity - inability: Not on file   • Transportation needs - medical: Not on file   • Transportation needs - non-medical: Not on file   Occupational History   • Not on file   Tobacco Use   • Smoking status: Former Smoker   • Smokeless tobacco: Never Used   • Tobacco comment: quit 30 years ago    Substance and Sexual Activity   • Alcohol use: No   • Drug use: No   • Sexual activity: Defer   Other Topics Concern   • Not on file   Social History Narrative    Pt denies ever having a feeding tube, denies PEG tube placement.         O/E    Neck: Supple.  No JVD,   Chest: Air entry equal, normal respiration.  No rhonchi or creps.  Cardiovascular system:  Regular rate and rhythm,   Abdomen: Soft, no tenderness, obese with the surgical scar  CNS: Alert, oriented to place and time.  No motor or sensory deficit.  Cranial nerves intact.  Musculoskeletal: No deformity of the back or spine.  Extremities:  Trace to 1+ bilateral edema.  Pulses equal on both sides.      Lab Results (last 24 hours)     Procedure Component Value Units Date/Time    POC Glucose Once [[id number]]  (Abnormal) Collected:  01/08/19 1549    Specimen:  Blood Updated:  01/08/19 1601     Glucose 201 mg/dL      Comment: RN NotifiedOperator: [id number] DAKOTAH TIFFANYMeter ID: [id number]       POC Glucose Once [[id number]]  (Abnormal) Collected:  01/08/19 1939    Specimen:  Blood Updated:  01/08/19 2111     Glucose 154 mg/dL      Comment: RN NotifiedOperator: [id number] WagonerITZ SHAWNAMeter ID: [id number]        Potassium [[id number]]  (Normal) Collected:  01/08/19 2036    Specimen:  Blood Updated:  01/08/19 2055     Potassium 3.5 mmol/L     Magnesium [[id number]]  (Normal) Collected:  01/08/19 2036    Specimen:  Blood Updated:  01/08/19 2258     Magnesium 1.7 mg/dL     POC Glucose Once [[id number]]  (Normal) Collected:  01/09/19 0632    Specimen:  Blood Updated:  01/09/19 0649     Glucose 92 mg/dL      Comment: : [id number] ROSA ELENA Johnston ID: [id number]       Basic Metabolic Panel [[id number]]  (Abnormal) Collected:  01/09/19 0658    Specimen:  Blood Updated:  01/09/19 0741     Glucose 76 mg/dL      BUN 51 mg/dL      Creatinine 1.84 mg/dL      Sodium 137 mmol/L      Potassium 4.2 mmol/L      Chloride 101 mmol/L      CO2 28.0 mmol/L      Calcium 8.3 mg/dL      eGFR Non African Amer 27 mL/min/1.73      BUN/Creatinine Ratio 27.7     Anion Gap 8.0 mmol/L     CBC & Differential [[id number]] Collected:  01/09/19 0749    Specimen:  Blood Updated:  01/09/19 0757    Narrative:       The following orders were created for panel order CBC & Differential.  Procedure                               Abnormality         Status                     ---------                               -----------         ------                     CBC Auto Differential[[id number]]        Abnormal            Final result                 Please view results for these tests on the individual orders.    CBC Auto Differential [[id number]]  (Abnormal) Collected:  01/09/19 0749    Specimen:  Blood Updated:  01/09/19 0757     WBC 4.31 10*3/mm3      RBC 2.27 10*6/mm3      Hemoglobin 7.3 g/dL      Hematocrit 22.5 %      MCV 99.1 fL      MCH 32.2 pg      MCHC 32.4 g/dL      RDW 16.8 %      RDW-SD 58.6 fl      MPV 9.4 fL      Platelets 96 10*3/mm3      Neutrophil % 70.0 %      Lymphocyte % 13.7 %      Monocyte % 12.8 %      Eosinophil % 2.6 %      Basophil % 0.2 %      Immature Grans % 0.7 %      Neutrophils, Absolute 3.02 10*3/mm3      Lymphocytes, Absolute  0.59 10*3/mm3      Monocytes, Absolute 0.55 10*3/mm3      Eosinophils, Absolute 0.11 10*3/mm3      Basophils, Absolute 0.01 10*3/mm3      Immature Grans, Absolute 0.03 10*3/mm3     Iron Profile [[id number]]  (Normal) Collected:  01/09/19 0832    Specimen:  Blood Updated:  01/09/19 0933     Iron 59 mcg/dL      TIBC 389 mcg/dL      Iron Saturation 15 %     Folate [[id number]] Collected:  01/09/19 0832    Specimen:  Blood Updated:  01/09/19 0847    Vitamin B12 [[id number]] Collected:  01/09/19 0832    Specimen:  Blood Updated:  01/09/19 0847            A/P    Shortness of breath multifactorial-anemia, obesity, preserved LV function with diastolic heart failure, pulmonary hypertension and sleep apnea.    CAD status post recent stent placement at [place] with preserved LV function, we'll continue Plavix and aspirin.    CKD  stage III- creatinine went up due to aggressive diuresis.    Anemia- hemoglobin of 7.3, will need possible PRBC transfusion to keep her hemoglobin and hematocrit greater than 9 as she has CAD    Pulmonary hypertension will consult Jaya Byrd for possible right heart catheterization          This document has been electronically signed by Yaniv Lobo MD on January 9, 2019 9:55 AM         EMR Dragon/Transcription disclaimer:   Some of this note may be an electronic transcription/translation of spoken language to printed text. The electronic translation of spoken language may permit erroneous, or at times, nonsensical words or phrases to be inadvertently transcribed; Although I have reviewed the note for such errors, some may still exist.

## 2019-01-09 NOTE — SIGNIFICANT NOTE
01/09/19 1506   Rehab Time/Intention   Evaluation Not Performed patient unavailable for evaluation  (PT attempted to see pt x2 in pm. Patient in pulmonary lab. PT will recheck at another time.)

## 2019-01-09 NOTE — THERAPY EVALUATION
Acute Care - Occupational Therapy Initial Evaluation  [facility]     Patient Name: Nicolasa Rojas  : 1950  MRN: [id number]  Today's Date: 2019  Onset of Illness/Injury or Date of Surgery: 19  Date of Referral to OT: 19  Referring Physician: MEET Jarrell    Admit Date: 2019       ICD-10-CM ICD-9-CM   1. Acute on chronic heart failure, unspecified heart failure type (CMS/HCC) I50.9 428.0   2. Elevated troponin R74.8 790.6   3. Impaired mobility and activities of daily living Z74.09 799.89     Patient Active Problem List   Diagnosis   • Hyponatremia   • Acute on chronic diastolic CHF (congestive heart failure) (CMS/HCC)   • Hypokalemia   • Acute on chronic heart failure (CMS/HCC)   • Dyspnea   • Chronic renal impairment, stage 3 (moderate) (CMS/HCC)   • CAD (coronary artery disease)     Past Medical History:   Diagnosis Date   • Acute bronchitis    • Acute congestive heart failure (CMS/HCC)     resolving   • Acute kidney failure, unspecified (CMS/HCC)    • Acute kidney failure, unspecified (CMS/HCC)    • Acute posthemorrhagic anemia    • Asthenia    • Chest pain    • Chronic gastritis    • Chronic pharyngitis    • Chronic renal impairment    • Congenital renal failure    • Congestive heart failure (CMS/HCC)    • Contact dermatitis due to poison ivy    • COPD (chronic obstructive pulmonary disease) (CMS/HCC)    • Coronary arteriosclerosis    • D-dimer, elevated     D-dimer above reference range    • Degenerative joint disease involving multiple joints     back   • Depressive disorder    • Dysphagia    • Dyspnea    • Edema of lower extremity    • Encounter for immunization    • Encounter for long-term (current) use of medications    • Epigastric pain    • Esophagitis    • Essential hypertension    • Gastroesophageal reflux disease    • Gastroparesis     Gastroparesis syndrome    • Generalized abdominal pain    • H/O bone density study 2015   • H/O mammogram 01/15/2015   • H/O  screening mammography 11/12/2013   • Headache    • History of transfusion    • Hyperlipidemia    • Hypokalemia    • Hypomagnesemia    • Injury of tendon of rotator cuff     Injury of tendon of the rotator cuff of shoulder      • Insomnia    • Insomnia, unspecified    • Knee pain    • Nausea and vomiting    • Neck pain    • Need for immunization against influenza    • Need for prophylactic vaccination and inoculation against influenza    • Need for prophylactic vaccination and inoculation against viral disease    • 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 Performed at [facility].   • ENDOSCOPY  02/09/2015    ESOPHAGEAL STRICTURE PRESENT, GASTRITIS FOUND IN THE STOMACH, NORMAL DUODENUM   • ENDOSCOPY W/ PEG TUBE PLACEMENT  12/19/2012    Esophagitis seen. Biopsy taken. Esophageal stricture present. Dilatation performed. Gastritis in stomach. Biopsy taken. Food was in stomach. Normal duodenum.   • HERNIA REPAIR     • HYSTERECTOMY      partial   • NECK SURGERY            OT ASSESSMENT FLOWSHEET (last 72 hours)      Occupational Therapy Evaluation     Row Name 01/09/19 1105 01/09/19 0832                OT Evaluation Time/Intention    Subjective Information  complains of;pain;weakness;fatigue;dizziness  -  --       Document Type  evaluation  -  --       Mode of Treatment  occupational therapy  -  --       Comment  Bed eval only due to low hemoglobin  -  --          General Information    Patient Profile Reviewed?  yes  -  --       Onset of Illness/Injury or Date of Surgery  01/07/19  -  --       Referring  Physician  MEET Jarrell  -  --       Patient Observations  alert;cooperative;agree to therapy  -  --       Patient/Family Observations   present   -  --       General Observations of Patient  pt supine in bed, telemetry, IV, O2 via NC  -  --       Prior Level of Function  min assist:;dressing;bathing;independent:;all household mobility;dependent:;cooking;cleaning;driving;shopping  -  --       Equipment Currently Used at Home  oxygen;rollator;wheelchair;commode, bedside;raised toilet rollator & cane PRN, walk-in tub; w/c comm. mobility   -  --       Risks Reviewed  patient and family:;LOB;nausea/vomiting;dizziness;increased discomfort;change in vital signs  -  --       Benefits Reviewed  patient and family:;improve function;increase independence;increase strength;increase balance  -  --          Relationship/Environment    Lives With  spouse  -  --          Resource/Environmental Concerns    Current Living Arrangements  home/apartment/condo  -  --          Cognitive Assessment/Intervention- PT/OT    Orientation Status (Cognition)  oriented x 4  -  --          Safety Issues, Functional Mobility    Comment, Safety Issues/Impairments (Mobility)  Bed eval only   -  --          Bed Mobility Assessment/Treatment    Bed Mobility Assessment/Treatment  rolling left;rolling right;scooting/bridging  -  --  -       Rolling Left [place] (Bed Mobility)  conditional independence  -  --       Rolling Right [place] (Bed Mobility)  conditional independence  -  --       Scooting/Bridging [place] (Bed Mobility)  minimum assist (75% patient effort)  -  --          Functional Mobility    Functional Mobility- Comment  bed eval only   -  --          BADL Safety/Performance    Impairments, BADL Safety/Performance  endurance/activity tolerance;shortness of breath  -  --          General ROM    GENERAL ROM COMMENTS  BUE AROM WFL  -  --          MMT (Manual Muscle Testing)    General  MMT Comments  BUE 4/5 grossly throughout  -  --          Sensory Assessment/Intervention    Sensory General Assessment  no sensation deficits identified  -  --          Positioning and Restraints    Pre-Treatment Position  in bed  -  --       Post Treatment Position  bed  -  --       In Bed  supine;call light within reach;encouraged to call for assist;exit alarm on;patient within staff view;with family/caregiver  -  --          Pain Assessment    Additional Documentation  Pain Scale: Numbers Pre/Post-Treatment (Group)  -  --          Pain Scale: Numbers Pre/Post-Treatment    Pain Scale: Numbers, Pretreatment  10/10  -  --       Pain Scale: Numbers, Post-Treatment  10/10  -  --       Pain Location  back  -  --       Pre/Post Treatment Pain Comment  no apparent distress  -  --          Plan of Care Review    Plan of Care Reviewed With  patient;family  -  --          Clinical Impression (OT)    Date of Referral to OT  01/09/19  -  --       OT Diagnosis  impaired mobility and ADL  -  --       Criteria for Skilled Therapeutic Interventions Met (OT Eval)  yes;treatment indicated  -  --       Rehab Potential (OT Eval)  good, to achieve stated therapy goals  -  --       Therapy Frequency (OT Eval)  -- 5-7 days/wk  -  --       Predicted Duration of Therapy Intervention (Therapy Eval)  Until all goals met or d/c from facility   -  --       Care Plan Review (OT)  evaluation/treatment results reviewed;care plan/treatment goals reviewed;patient/other agree to care plan  -  --       Care Plan Review, Other Participant (OT Eval)  spouse  -  --       Anticipated Discharge Disposition (OT)  -- to be determined   -  --          Vital Signs    Pre Systolic BP Rehab  126  -  --       Pre Treatment Diastolic BP  63  -  --       Pretreatment Heart Rate (beats/min)  63  -  --       Pre SpO2 (%)  96  -  --       O2 Delivery Pre Treatment  supplemental O2  -  --       Pre Patient Position   Supine  -  --          Planned OT Interventions    Planned Therapy Interventions (OT Eval)  activity tolerance training;BADL retraining;occupation/activity based interventions;patient/caregiver education/training;strengthening exercise  -  --          OT Goals    Transfer Goal Selection (OT)  transfer, OT goal 1  -  --       Bathing Goal Selection (OT)  bathing, OT goal 1  -  --       Dressing Goal Selection (OT)  dressing, OT goal 1  -  --       Toileting Goal Selection (OT)  toileting, OT goal 1  -  --       Activity Tolerance Goal Selection (OT)  activity tolerance, OT goal 1  -  --       Additional Documentation  Activity Tolerance Goal Selection (OT) (Row)  -  --          Transfer Goal 1 (OT)    Activity/Assistive Device (Transfer Goal 1, OT)  sit-to-stand/stand-to-sit;bed-to-chair/chair-to-bed;toilet  -  --       [place] Level/Cues Needed (Transfer Goal 1, OT)  conditional independence  -  --       Time Frame (Transfer Goal 1, OT)  long term goal (LTG);by discharge  -  --       Progress/Outcome (Transfer Goal 1, OT)  goal not met  -  --          Bathing Goal 1 (OT)    Activity/Assistive Device (Bathing Goal 1, OT)  bathing skills, all  -  --       [place] Level/Cues Needed (Bathing Goal 1, OT)  minimum assist (75% or more patient effort)  -  --       Time Frame (Bathing Goal 1, OT)  long term goal (LTG);by discharge  -  --       Progress/Outcomes (Bathing Goal 1, OT)  goal not met  -  --          Dressing Goal 1 (OT)    Activity/Assistive Device (Dressing Goal 1, OT)  dressing skills, all  -  --       [place]/Cues Needed (Dressing Goal 1, OT)  minimum assist (75% or more patient effort)  -  --       Time Frame (Dressing Goal 1, OT)  long term goal (LTG);by discharge  -  --       Progress/Outcome (Dressing Goal 1, OT)  goal not met  -  --          Toileting Goal 1 (OT)    Activity/Device (Toileting Goal 1, OT)  toileting skills, all  -  --        [place] Level/Cues Needed (Toileting Goal 1, OT)  conditional independence  -  --       Time Frame (Toileting Goal 1, OT)  long term goal (LTG);by discharge  -  --       Progress/Outcome (Toileting Goal 1, OT)  goal not met  -  --           Activity Tolerance Goal 1 (OT)    Activity Level (Endurance Goal 1, OT)  10 min activity;O2 sat >/ equal to 88%  -  --       Time Frame (Activity Tolerance Goal 1, OT)  long term goal (LTG);by discharge  -  --          Living Environment    Home Accessibility  -- ramp with bilateral rail   -  --         User Key  (r) = Recorded By, (t) = Taken By, (c) = Cosigned By    Initials Name Effective Dates     Dmitry Yuan 10/12/18 -          Occupational Therapy Education     Title: PT OT SLP Therapies (In Progress)     Topic: Occupational Therapy (In Progress)     Point: Precautions (Done)     Description: Instruct learner(s) on prescribed precautions during self-care and functional transfers.    Learning Progress Summary           Patient Acceptance, E, VU,NR by  at 1/9/2019 11:36 AM    Comment:  OT role, OT POC   Family Acceptance, E, VU,NR by  at 1/9/2019 11:36 AM    Comment:  OT role, OT POC                               User Key     Initials Effective Dates Name Provider Type Discipline     10/12/18 -  Dmitry Yuan Occupational Therapist OT                  OT Recommendation and Plan  Outcome Summary/Treatment Plan (OT)  Anticipated Discharge Disposition (OT): (to be determined )  Planned Therapy Interventions (OT Eval): activity tolerance training, BADL retraining, occupation/activity based interventions, patient/caregiver education/training, strengthening exercise  Therapy Frequency (OT Eval): (5-7 days/wk)  Plan of Care Review  Plan of Care Reviewed With: patient, spouse  Plan of Care Reviewed With: patient, spouse  Outcome Summary: OT eval completed this date. Bed eval only as pt's hemoglobin is 7.3; pt scheduled to receive blood this  afternoon. Pt reports SOB with minimal activity. Pt mod I with rolling in bed using bed rails. Pt required min A for scooting up towards HOB. Pt presents with decreased activity tolerance affecting her independence in ADLs. Pt would benefit from skilled OT services in order to address these deficits. D/C recommendation to be determined.     Outcome Measures     Row Name 01/09/19 1105             How much help from another is currently needed...    Putting on and taking off regular lower body clothing?  3  -MH      Bathing (including washing, rinsing, and drying)  3  -MH      Toileting (which includes using toilet bed pan or urinal)  3  -MH      Putting on and taking off regular upper body clothing  3  -MH      Taking care of personal grooming (such as brushing teeth)  4  -MH      Eating meals  4  -      Score  20  -         Functional Assessment    Outcome Measure Options  AM-PAC 6 Clicks Daily Activity (OT)  -        User Key  (r) = Recorded By, (t) = Taken By, (c) = Cosigned By    Initials Name Provider Type     Dmitry Yuan Occupational Therapist          Time Calculation:   Time Calculation- OT     Row Name 01/09/19 1139             Time Calculation- OT    OT Start Time  1105  -      OT Stop Time  1120  -      OT Time Calculation (min)  15 min  -      OT Received On  01/09/19  -      OT Goal Re-Cert Due Date  01/22/19  -        User Key  (r) = Recorded By, (t) = Taken By, (c) = Cosigned By    Initials Name Provider Type    Dmitry Davalos Occupational Therapist        Therapy Suggested Charges     Code   Minutes Charges    None           Therapy Charges for Today     Code Description Service Date Service Provider Modifiers Qty    [id number]  OT EVAL MOD COMPLEXITY 1 1/9/2019 Dmitry Yuan GO 1               Dmitry Yuan  1/9/2019

## 2019-01-09 NOTE — PROGRESS NOTES
"[facility]  [address] [place] [address]. [id number]  T - [phone]  F - [phone]     Progress Note          PATIENT  DEMOGRAPHICS   PATIENT NAME: Nicolasa Rojas                      PHYSICIAN: Ernie Ferro MD  : 1950  MRN: [id number]   LOS: 0 days    Patient Care Team:  Henry Muniz MD as PCP - General (Internal Medicine)  Subjective   SUBJECTIVE   Pt still has sob at this time, appreciate card input.          Objective   OBJECTIVE   Vital Signs  Temp:  [96.3 °F (35.7 °C)-98.4 °F (36.9 °C)] 96.3 °F (35.7 °C)  Heart Rate:  [49-78] 51  Resp:  [18] 18  BP: (116-160)/(54-72) 123/54    Flowsheet Rows      First Filed Value   Admission Height  162.6 cm (64\") Documented at 2019 1800   Admission Weight  113 kg (248 lb 8 oz) Documented at 2019 1800           I/O last 3 completed shifts:  In: 480 [P.O.:480]  Out: 1150 [Urine:1150]    PHYSICAL EXAM    Physical Exam   Constitutional: She is oriented to person, place, and time. She appears well-developed.   HENT:   Head: Normocephalic and atraumatic.   Eyes: Pupils are equal, round, and reactive to light.   Neck: Normal range of motion.   Cardiovascular: Normal rate, regular rhythm and normal heart sounds.   Pulmonary/Chest: She has wheezes. She has rales.   Abdominal: Soft. Bowel sounds are normal.   Musculoskeletal: She exhibits edema.   Neurological: She is alert and oriented to person, place, and time.   Skin: Skin is warm.   Psychiatric: She has a normal mood and affect. Her behavior is normal.       RESULTS   Results Review:    Results from last 7 days   Lab Units  19   0658  196  19   0728  19   1954   19   1451  19   1657   SODIUM mmol/L  137   --   134*  136*   < >  123*  127*   POTASSIUM mmol/L  4.2  3.5  3.2*  3.8   < >  2.7*  2.9*   CHLORIDE mmol/L  101   --   95  96   < >  83*  86*   CO2 mmol/L  28.0   --   30.0  28.0   < >  29.0  29.0   BUN mg/dL  51*   --   " 45*  47*   < >  49*  49*   CREATININE mg/dL  1.84*   --   1.13*  1.21*   < >  1.56*  1.38*   CALCIUM mg/dL  8.3*   --   8.6  8.9   < >  8.5  8.3*   BILIRUBIN mg/dL   --    --    --   2.1*   --   1.8*  1.6*   ALK PHOS U/L   --    --    --   115   --   97  84   ALT (SGPT) U/L   --    --    --   18   --   18  23   AST (SGOT) U/L   --    --    --   43*   --   47*  32   GLUCOSE mg/dL  76   --   117*  164*   < >  200*  142*    < > = values in this interval not displayed.       Estimated Creatinine Clearance: 34.8 mL/min (A) (by C-G formula based on SCr of 1.84 mg/dL (H)).    Results from last 7 days   Lab Units  01/08/19 2036  01/07/19 0518  01/05/19   0120   MAGNESIUM mg/dL  1.7  2.0  2.0             Results from last 7 days   Lab Units  01/09/19   0749  01/08/19   0728  01/07/19   1954  01/07/19   0518  01/06/19   0643   WBC 10*3/mm3  4.31  4.97  6.84  4.28  4.52   HEMOGLOBIN g/dL  7.3*  8.2*  9.1*  7.7*  8.2*   PLATELETS 10*3/mm3  96*  130*  137*  118*  116*               Imaging Results (last 24 hours)     Procedure Component Value Units Date/Time    NM Lung Scan Perfusion Particulate [[id number]] Collected:  01/08/19 1353     Updated:  01/09/19 1113    Narrative:       PROCEDURE: NM LUNG SCAN PERFUSION PARTICULATE    CLINICAL HISTORY:     Chest pain, acute, PE suspected, intermed prob, negative D-dimer,  I50.9 Heart failure, unspecified R74.8 Abnormal levels of other  serum enzymes    INDICATION: Same as above    COMPARISON:     Chest x-ray, 1/7/2019. Nuclear medicine ventilation perfusion  scan done on 8/4/2014    TECHNIQUE:     Nuclear medicine perfusion scan was obtained following  intravenous administration of 6.1 mCi technetium 99m-MAA. Planar  images were obtained in the anterior projection for ventilation  with multi-planar imaging for perfusion.    The patient was unable to and delayed due to claustrophobia    FINDINGS:     The ventilation scan was not done    The perfusion scan shows near normal bilateral  lung perfusion .      Impression:         Very low probability for a pulmonary embolus.    Electronically signed by:  Sincere Long MD  1/9/2019 11:12 AM CST  Workstation: [facility] [[id number]] Collected:  01/08/19 0751     Updated:  01/08/19 1956    Narrative:       NAME: MS. CRISTHIAN PANCHAL  PROCEDURE: US LIVER  ORDER DATE: 1/8/2019 7:51 AM CST  ACCESSION NUMBER: [id number]      Clinical History: Elevated LFTs, I50.9 Heart failure, unspecified  R74.8 Abnormal levels of other serum enzymes    Indication: Same as above    Comparison: None .    Technique: Gray scale evaluation of the right upper quadrant of  the abdomen was done ultrasonographically along with limited  color Doppler evaluation    Findings:     The gallbladder is surgically absent    The common duct is normal in transverse diameter and measures 6.4  mm. There is no intraductal common duct calculus.    There is fatty metamorphosis of the liver. There are no focal  liver lesions. There is no intrahepatic biliary dilatation.    The main portal vein is of normal caliber and shows normal  hepatopedal blood flow. The hepatic veins are patent.    The pancreas tail is obscured by overlying bowel gas . The  pancreatic head and body are unremarkable    The right kidney measures 11.2  cm in length. There is no  hydronephrosis or nephrolithiasis in the right kidney.    There is no documented ascitic  fluid in the evaluated right  upper quadrant of the abdomen.    The visualized portion of the abdominal aorta and the inferior  vena cava are unremarkable.    There is no  documented right-sided pleural effusion.      Impression:       Impression:     There is mild fatty metamorphosis of the liver, without any focal  liver lesions seen     Location of Interpretation: Teleradiology    Electronically signed by:  Sincere Long MD  1/8/2019 7:54 PM CST  Workstation: [id number]    US Venous Doppler Lower Extremity Bilateral (duplex) [[id number]]  Collected:  01/08/19 1526     Updated:  01/08/19 1744    Narrative:       PROCEDURE: US VENOUS DOPPLER LOWER EXTREMITY BILATERAL (DUPLEX)    CLINICAL HISTORY and INDICATION: Bilateral leg pain    COMPARISON:     None.    TECHNIQUE:     Gray scale imaging with duplex interrogation of the bilateral  lower extremity venous system was performed and multiple static  images were obtained.    FINDINGS:     Utilizing compression and augmentation, there is no deep venous  thrombus in the bilateral common femoral, superficial femoral or  popliteal veins.     The bilateral profunda femoris, posterior tibial and deep  peroneal veins are patent and compressible.  .    The bilateral greater saphenous vein at the saphenofemoral  junction is patent and compressible.    There is no visualization of any subcutaneous fluid collections  in either lower extremity.    There is no visualization of any fluid collections in the right  and left popliteal fossa.    There is no evidence of reactive or pathological lymphadenopathy  in the evaluated bilateral lower extremities.        Impression:         No deep venous thrombosis of the right and the left lower  extremity.     Location of Interpretation:     [id number]    Electronically signed by:  Sincere Long MD  1/8/2019 5:43 PM CST  Workstation: [id number]           MEDICATIONS      amLODIPine 5 mg Oral Daily   aspirin 81 mg Oral Daily   atorvastatin 40 mg Oral Nightly   bumetanide 2 mg Oral BID   carvedilol 12.5 mg Oral BID With Meals   clopidogrel 75 mg Oral Daily   [START ON 1/10/2019] enoxaparin 40 mg Subcutaneous Q24H   famotidine 40 mg Oral Daily   HYDROcodone-acetaminophen 1 tablet Oral Q6H   isosorbide mononitrate 60 mg Oral BID   lisinopril 10 mg Oral Q24H   sodium chloride 3 mL Intravenous Q12H   spironolactone 25 mg Oral Daily       insulin        Assessment/Plan   ASSESSMENT / PLAN      Dyspnea    Acute on chronic diastolic CHF (congestive heart failure) (CMS/[facility])    Acute  on chronic heart failure (CMS/[facility])    Chronic renal impairment, stage 3 (moderate) (CMS/[facility])    CAD (coronary artery disease)       1.chronic kidney disease stage III baseline creatinine close to 1.6-1.7.  now worsenied back up-- pt has made 1.1 litres of urine--  She has history of contrast-induced nephropathy back in august 2018 after she has left heart catheterization.  She has multiple stent placed for non-ST elevation MI back in August 2018   Card has decreased dos eof ccb at this time, decrease bumex to 1 mg daily, continue lisinopril and aldactone--repeat labs in evening--may need to reduce dose of bumex     2.acute on chronic diastolic heart failure.  Plan as above with the diuretics daily weights and I and O's with the fluid restriction     3.history of coronary artery disease and prior non-ST elevation MI with stenting back in August 2018 to LAD and circumflex     4.hypokalemia improved    Plan--continue current meds, decrease bumex to 1 mg daily--get urine lytes at this time, repeat labs in evening--continue other meds, discussed with pt and family--repeat bmp am, shall follow output.             This document has been electronically signed by Ernie Ferro MD on January 9, 2019 11:26 AM

## 2019-01-09 NOTE — PLAN OF CARE
Problem: Patient Care Overview  Goal: Plan of Care Review  Outcome: Ongoing (interventions implemented as appropriate)   01/09/19 7048   Coping/Psychosocial   Plan of Care Reviewed With patient;spouse   OTHER   Outcome Summary OT eval completed this date. Bed eval only as pt's hemoglobin is 7.3; pt scheduled to receive blood this afternoon. Pt reports SOB with minimal activity. Pt mod I with rolling in bed using bed rails. Pt required min A for scooting up towards HOB. Pt presents with decreased activity tolerance affecting her independence in ADLs. Pt would benefit from skilled OT services in order to address these deficits. D/C recommendation to be determined.

## 2019-01-10 NOTE — THERAPY EVALUATION
Acute Care - Physical Therapy Initial Evaluation  [facility]     Patient Name: Nicolasa Rojas  : 1950  MRN: [id number]  Today's Date: 2019   Onset of Illness/Injury or Date of Surgery: 19  Date of Referral to PT: 19  Referring Physician: Dr. Catracho Jarrell      Admit Date: 2019    Visit Dx:     ICD-10-CM ICD-9-CM   1. Acute on chronic heart failure, unspecified heart failure type (CMS/HCC) I50.9 428.0   2. Elevated troponin R74.8 790.6   3. Impaired mobility and activities of daily living Z74.09 799.89   4. Impaired physical mobility Z74.09 781.99     Patient Active Problem List   Diagnosis   • Hyponatremia   • Acute on chronic diastolic CHF (congestive heart failure) (CMS/HCC)   • Hypokalemia   • Acute on chronic heart failure (CMS/HCC)   • Dyspnea   • Chronic renal impairment, stage 3 (moderate) (CMS/HCC)   • CAD (coronary artery disease)     Past Medical History:   Diagnosis Date   • Acute bronchitis    • Acute congestive heart failure (CMS/HCC)     resolving   • Acute kidney failure, unspecified (CMS/HCC)    • Acute kidney failure, unspecified (CMS/HCC)    • Acute posthemorrhagic anemia    • Asthenia    • Chest pain    • Chronic gastritis    • Chronic pharyngitis    • Chronic renal impairment    • Congenital renal failure    • Congestive heart failure (CMS/HCC)    • Contact dermatitis due to poison ivy    • COPD (chronic obstructive pulmonary disease) (CMS/HCC)    • Coronary arteriosclerosis    • D-dimer, elevated     D-dimer above reference range    • Degenerative joint disease involving multiple joints     back   • Depressive disorder    • Dysphagia    • Dyspnea    • Edema of lower extremity    • Encounter for immunization    • Encounter for long-term (current) use of medications    • Epigastric pain    • Esophagitis    • Essential hypertension    • Gastroesophageal reflux disease    • Gastroparesis     Gastroparesis syndrome    • Generalized abdominal pain    • H/O bone  density study 01/09/2015   • H/O mammogram 01/15/2015   • H/O screening mammography 11/12/2013   • Headache    • History of transfusion    • Hyperlipidemia    • Hypokalemia    • Hypomagnesemia    • Injury of tendon of rotator cuff     Injury of tendon of the rotator cuff of shoulder      • Insomnia    • Insomnia, unspecified    • Knee pain    • Nausea and vomiting    • Neck pain    • Need for immunization against influenza    • Need for prophylactic vaccination and inoculation against influenza    • Need for prophylactic vaccination and inoculation against viral disease    • 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   • ENDOSCOPY  02/09/2015    ESOPHAGEAL STRICTURE PRESENT, GASTRITIS FOUND IN THE STOMACH, NORMAL DUODENUM   • ENDOSCOPY W/ PEG TUBE PLACEMENT  12/19/2012    Esophagitis seen. Biopsy taken. Esophageal stricture present. Dilatation performed. Gastritis in stomach. Biopsy taken. Food was in stomach. Normal duodenum.   • HERNIA REPAIR     • HYSTERECTOMY      partial   • NECK SURGERY          PT ASSESSMENT (last 12 hours)      Physical Therapy Evaluation     Row Name 01/09/19 2285          PT Evaluation Time/Intention    Subjective Information  complains of;pain;weakness;fatigue  -KRISSY     Document Type  evaluation  -KRISSY     Mode of Treatment  individual therapy;physical therapy  -KRISSY     Patient Effort  adequate  -KRISSY     Row Name 01/09/19 0088          General Information    Patient Profile Reviewed?  yes  -KRISSY     Onset of Illness/Injury or Date of Surgery  01/07/19  -KRISSY      Referring Physician  Dr. Catracho Jarrell  -     Patient Observations  alert;cooperative;agree to therapy  -     Patient/Family Observations  , dtr present  -     General Observations of Patient  Pt supine;noted IV, O2 per nasal cannula, telemetry;pt getting blood  -KRISSY     Prior Level of Function  min assist:;dressing;bathing;independent:;all household mobility;dependent:;cooking;cleaning;driving;shopping  -KRISSY     Equipment Currently Used at Home  oxygen;rollator;wheelchair;commode, bedside;raised toilet w/c community mobility;walk-in tub  -     Pertinent History of Current Functional Problem  Pt admitted to [facility] with dyspnea with acute on chronic CHF  -KRISSY     Existing Precautions/Restrictions  fall;oxygen therapy device and L/min  -KRISSY     Risks Reviewed  patient and family:;dizziness;LOB;nausea/vomiting;increased discomfort;change in vital signs  -KRISSY     Benefits Reviewed  patient and family:;improve function;increase independence;increase strength;increase balance;decrease pain;decrease risk of DVT;improve skin integrity;increase knowledge  -     Barriers to Rehab  medically complex  -     Row Name 01/09/19 1655          Relationship/Environment    Lives With  spouse  -     Row Name 01/09/19 1655          Resource/Environmental Concerns    Current Living Arrangements  home/apartment/condo 1 level house  -     Row Name 01/09/19 1655          Cognitive Assessment/Intervention- PT/OT    Orientation Status (Cognition)  oriented x 4  -     Follows Commands (Cognition)  follows one step commands;over 90% accuracy  -     Row Name 01/09/19 1655          Bed Mobility Assessment/Treatment    Bed Mobility Assessment/Treatment  rolling right  -     Rolling Left [place] (Bed Mobility)  conditional independence  -     Comment (Bed Mobility)  deferred EOB as pt getting blood at time of evaluation  -     Row Name 01/09/19 1655          Transfer Assessment/Treatment    Comment (Transfers)   "deferred  -[facility] Name 01/09/19 1655          Gait/Stairs Assessment/Training    Comment (Gait/Stairs)  deferred  -[facility] Name 01/09/19 1655          General ROM    GENERAL ROM COMMENTS  AROM WFL BUE except endrange R shoulder due to \"rotator cufff injury\"; AROM WFL BLE's  -[facility] Name 01/09/19 1655          MMT (Manual Muscle Testing)    General MMT Comments  Please refer to OT evaluation for BUE strength detail; RLE: 3+/5 ankle/foot, knee, hip;LLE: 3+/5 lynn/foot, knee, hip;soreness with resistance of ankle/foot due to recent fall/edemal  -[facility] Name 01/09/19 1655          Sensory Assessment/Intervention    Sensory General Assessment  light touch sensation deficits identified  -[facility] Name 01/09/19 1655          Vision Assessment/Intervention    Visual Impairment/Limitations  corrective lenses for reading  -[facility] Name 01/09/19 1655          Light Touch Sensation Assessment    Left Upper Extremity: Light Touch Sensation Assessment  intact  -     Right Upper Extremity: Light Touch Sensation Assessment  intact  -     Left Lower Extremity: Light Touch Sensation Assessment  mild impairment, 75% or more correct responses  -     Comment, Left Lower Extremity: Light Touch Sensation Assessment  Pt reports some decreased sensation due to edema  -     Right Lower Extremity: Light Touch Sensation Assessment  intact  -[facility] Name 01/09/19 1655          Pain Assessment    Additional Documentation  Pain Scale: Numbers Pre/Post-Treatment (Group)  -[facility] Name 01/09/19 1655          Pain Scale: Numbers Pre/Post-Treatment    Pain Scale: Numbers, Pretreatment  10/10  -     Pain Scale: Numbers, Post-Treatment  10/10  -     Pain Location  back hips  -     Pre/Post Treatment Pain Comment  Pt reports had pain med earlier  -     Pain Intervention(s)  Repositioned  -[facility] Name 01/09/19 1655          Edema Assessment & Management    Location (Edema)  lower extremity, left;lower " extremity, right LLE>RLE, min to moderate;pitting L  -KRISSY     Additional Documentation  Location (Edema) (Row)  -     Row Name 01/09/19 3041          Plan of Care Review    Plan of Care Reviewed With  patient;family  -     Row Name 01/09/19 1659          Physical Therapy Clinical Impression    Date of Referral to PT  01/09/19  -     PT Diagnosis (PT Clinical Impression)  impaired physical mobility  -KRISSY     Prognosis (PT Clinical Impression)  good  -KRISSY     Patient/Family Goals Statement (PT Clinical Impression)  be stronger;able to assist with care  -KRISSY     Criteria for Skilled Interventions Met (PT Clinical Impression)  yes;treatment indicated  -     Pathology/Pathophysiology Noted (Describe Specifically for Each System)  musculoskeletal;cardiovascular;pulmonary  -     Impairments Found (describe specific impairments)  aerobic capacity/endurance;gait, locomotion, and balance  -     Functional Limitations in Following Categories (Describe Specific Limitations)  self-care;home management;community/leisure  -     Disability: Inability to Perform Actions/Activities of Required Roles (describe specific disability)  community/leisure  -     Rehab Potential (PT Clinical Summary)  good, to achieve stated therapy goals  -     Predicted Duration of Therapy (PT)  until discharge or goals met  -     Care Plan Review (PT)  evaluation/treatment results reviewed;care plan/treatment goals reviewed;risks/benefits reviewed;current/potential barriers reviewed;patient/other agree to care plan  -     Care Plan Review, Other Participant (PT Clinical Impression)  spouse;daughter  -     Row Name 01/09/19 1534          Vital Signs    Post Systolic BP Rehab  110  -KRISSY     Post Treatment Diastolic BP  54  -KRISSY     Pretreatment Heart Rate (beats/min)  57  -KRISSY     Posttreatment Heart Rate (beats/min)  60  -KRISSY     Pre SpO2 (%)  96  -KRISSY     O2 Delivery Pre Treatment  supplemental O2  -KRISSY     Post SpO2 (%)  97  -KRISSY     O2  Delivery Post Treatment  supplemental O2  -KRISSY     Pre Patient Position  Side Lying  -KRISSY     Post Patient Position  Side Lying  -KRISSY     Row Name 01/09/19 3822          Physical Therapy Goals    Bed Mobility Goal Selection (PT)  bed mobility, PT goal 1  -KRISSY     Transfer Goal Selection (PT)  transfer, PT goal 1  -KRISSY     Gait Training Goal Selection (PT)  gait training, PT goal 1  -KRISSY     Strength Goal Selection (PT)  strength, PT goal 1  -KRISSY     Additional Documentation  Strength Goal Selection (PT) (Row)  -     Row Name 01/09/19 6435          Bed Mobility Goal 1 (PT)    Activity/Assistive Device (Bed Mobility Goal 1, PT)  rolling to left;rolling to right;sit to supine;supine to sit  -KRISSY     [place] Level/Cues Needed (Bed Mobility Goal 1, PT)  independent  -KRISSY     Time Frame (Bed Mobility Goal 1, PT)  by discharge  -KRISSY     Progress/Outcomes (Bed Mobility Goal 1, PT)  goal not met  -     Row Name 01/09/19 7148          Transfer Goal 1 (PT)    Activity/Assistive Device (Transfer Goal 1, PT)  sit-to-stand/stand-to-sit;bed-to-chair/chair-to-bed  -KRISSY     [place] Level/Cues Needed (Transfer Goal 1, PT)  conditional independence  -KRISSY     Time Frame (Transfer Goal 1, PT)  by discharge  -KRISSY     Progress/Outcome (Transfer Goal 1, PT)  goal not met  -     Row Name 01/09/19 1651          Gait Training Goal 1 (PT)    Activity/Assistive Device (Gait Training Goal 1, PT)  gait (walking locomotion);assistive device use  -KRISSY     [place] Level (Gait Training Goal 1, PT)  standby assist;conditional independence  -KRISSY     Distance (Gait Goal 1, PT)  50ftx2  -KRISSY     Time Frame (Gait Training Goal 1, PT)  by discharge  -KRISSY     Progress/Outcome (Gait Training Goal 1, PT)  goal not met  -     Row Name 01/09/19 2411          Strength Goal 1 (PT)    Strength Goal 1 (PT)  Pt will tolerate 2 sets of 15 reps of AROM exercises OOB in chair with VSS  -KRISSY     Time Frame (Strength Goal 1, PT)  by discharge  -KRISSY      Progress/Outcome (Strength Goal 1, PT)  goal not met  -KRISSY     Row Name 01/09/19 1655          Positioning and Restraints    Pre-Treatment Position  in bed  -KRISSY     Post Treatment Position  bed  -KRISSY     In Bed  side lying right;call light within reach;encouraged to call for assist;exit alarm on;with family/caregiver  -KRISSY     Row Name 01/09/19 1650          Living Environment    Home Accessibility  wheelchair accessible ramp with B rails  -       User Key  (r) = Recorded By, (t) = Taken By, (c) = Cosigned By    Initials Name Provider Type    Evelyn Manuel, PT Physical Therapist          PT Recommendation and Plan  Anticipated Discharge Disposition (PT): home with 24/7 care, home with home health  Planned Therapy Interventions (PT Eval): bed mobility training, gait training, home exercise program, patient/family education, strengthening, transfer training  Therapy Frequency (PT Clinical Impression): other (see comments)(5-14 times per week)  Outcome Summary/Treatment Plan (PT)  Anticipated Discharge Disposition (PT): home with 24/7 care, home with home health  Plan of Care Reviewed With: patient, spouse, daughter  Outcome Summary: PT evaluation completed. Limited to bed evaluation as patient was getting blood at the time. AROM grossly WFL; strength BLE's grossly 3+/5 with soreness LLE from edema and recent fall. Function limited by decrased strength and tolerance for functional mobility and activities. Pt will benefit from PT to gently regain lost function as pt improves mediically. Anticipate 24/7 home care with continued therapy services at discharge.  Outcome Measures     Row Name 01/09/19 1655 01/09/19 1103          How much help from another person do you currently need...    Turning from your back to your side while in flat bed without using bedrails?  3  -KRISSY  --     Moving from lying on back to sitting on the side of a flat bed without bedrails?  2  -KRISSY  --     Moving to and from a bed to a chair  (including a wheelchair)?  2  -KRISSY  --     Standing up from a chair using your arms (e.g., wheelchair, bedside chair)?  2  -KRISSY  --     Climbing 3-5 steps with a railing?  2  -KRISSY  --     To walk in hospital room?  2  -KRISSY  --     AM-PAC 6 Clicks Score  13  -  --        How much help from another is currently needed...    Putting on and taking off regular lower body clothing?  --  3  -     Bathing (including washing, rinsing, and drying)  --  3  -MH     Toileting (which includes using toilet bed pan or urinal)  --  3  -MH     Putting on and taking off regular upper body clothing  --  3  -     Taking care of personal grooming (such as brushing teeth)  --  4  -     Eating meals  --  4  -     Score  --  20  -        Functional Assessment    Outcome Measure Options  AM-PAC 6 Clicks Basic Mobility (PT)  -  AM-PAC 6 Clicks Daily Activity (OT)  -       User Key  (r) = Recorded By, (t) = Taken By, (c) = Cosigned By    Initials Name Provider Type    Evelyn Manuel, PT Physical Therapist     Dmitry Yuan Occupational Therapist         Time Calculation:   PT Charges     Row Name 01/09/19 1831             Time Calculation    Start Time  1655  -      Stop Time  1716  -      Time Calculation (min)  21 min  -      PT Received On  01/09/19  -      PT Goal Re-Cert Due Date  01/22/19  -         Time Calculation- PT    Total Timed Code Minutes- PT  0 minute(s)  -        User Key  (r) = Recorded By, (t) = Taken By, (c) = Cosigned By    Initials Name Provider Type    Evelyn Manuel PT Physical Therapist        Therapy Suggested Charges     Code   Minutes Charges    None           Therapy Charges for Today     Code Description Service Date Service Provider Modifiers Qty    [id number] HC PT EVAL MOD COMPLEXITY 1 1/9/2019 Evelyn Keene, PT GP 1          PT G-Codes  Outcome Measure Options: AM-PAC 6 Clicks Basic Mobility (PT)  AM-PAC 6 Clicks Score: 13  Score: 20      Evelyn Keene  PT  1/9/2019

## 2019-01-10 NOTE — PLAN OF CARE
Problem: Fall Risk (Adult)  Goal: Absence of Fall  Outcome: Ongoing (interventions implemented as appropriate)      Problem: Patient Care Overview  Goal: Plan of Care Review  Outcome: Ongoing (interventions implemented as appropriate)   01/10/19 1620   Coping/Psychosocial   Plan of Care Reviewed With patient   Plan of Care Review   Progress no change       Problem: Fluid Volume Excess (Adult)  Goal: Identify Related Risk Factors and Signs and Symptoms  Outcome: Ongoing (interventions implemented as appropriate)      Problem: Diabetes, Type 2 (Adult)  Goal: Signs and Symptoms of Listed Potential Problems Will be Absent, Minimized or Managed (Diabetes, Type 2)  Outcome: Ongoing (interventions implemented as appropriate)

## 2019-01-10 NOTE — THERAPY TREATMENT NOTE
Acute Care - Physical Therapy Treatment Note  [facility]     Patient Name: Nicolasa Rojas  : 1950  MRN: [id number]  Today's Date: 1/10/2019  Onset of Illness/Injury or Date of Surgery: 19  Date of Referral to PT: 19  Referring Physician: Dr. Catracho Jarrell    Admit Date: 2019    Visit Dx:    ICD-10-CM ICD-9-CM   1. Acute on chronic heart failure, unspecified heart failure type (CMS/HCC) I50.9 428.0   2. Elevated troponin R74.8 790.6   3. Impaired mobility and activities of daily living Z74.09 799.89   4. Impaired physical mobility Z74.09 781.99     Patient Active Problem List   Diagnosis   • Hyponatremia   • Acute on chronic diastolic CHF (congestive heart failure) (CMS/HCC)   • Hypokalemia   • Acute on chronic heart failure (CMS/HCC)   • Dyspnea   • Chronic renal impairment, stage 3 (moderate) (CMS/HCC)   • CAD (coronary artery disease)       Therapy Treatment    Rehabilitation Treatment Summary     Row Name 01/10/19 1330 01/10/19 1000          Treatment Time/Intention    Discipline  physical therapy assistant  -LN  occupational therapy assistant  -     Document Type  therapy note (daily note)  -LN  therapy note (daily note)  -     Subjective Information  complains of;fatigue;pain  -LN  complains of;fatigue  -     Mode of Treatment  physical therapy  -LN  individual therapy;occupational therapy  -     Therapy Frequency (PT Clinical Impression)  other (see comments) 5-14 times per week  -LN  --     Therapy Frequency (OT Eval)  --  other (see comments) 5-7 days a wk   -     Patient Effort  adequate  -LN  good  -     Existing Precautions/Restrictions  fall;oxygen therapy device and L/min  -LN  --     Recorded by [LN] Elinor Kennedy PTA 01/10/19 1422 [JH] Maira Murphy COTA/L 01/10/19 1407     Row Name 01/10/19 1330 01/10/19 1000          Vital Signs    Pre Systolic BP Rehab  150  -LN  --     Pre Treatment Diastolic BP  70  -LN  --     Post Systolic BP Rehab  148  -LN   --     Post Treatment Diastolic BP  78  -LN  --     Pretreatment Heart Rate (beats/min)  55  -LN  --     Intratreatment Heart Rate (beats/min)  69  -LN  --     Posttreatment Heart Rate (beats/min)  75  -LN  --     Pre SpO2 (%)  98  -LN  96  -JH     O2 Delivery Pre Treatment  supplemental O2  -LN  supplemental O2  -JH     Intra SpO2 (%)  93  -LN  --     Post SpO2 (%)  95  -LN  94  -JH     O2 Delivery Post Treatment  --  supplemental O2  -JH     Pre Patient Position  Side Lying  -LN  --     Intra Patient Position  Standing  -LN  --     Post Patient Position  Sitting  -LN  --     Recorded by [LN] Elinor Kennedy, PTA 01/10/19 1422 [JH] Maira Murphy COTA/L 01/10/19 1407     Row Name 01/10/19 1330 01/10/19 1000          Cognitive Assessment/Intervention- PT/OT    Orientation Status (Cognition)  oriented x 4  -LN  oriented x 3  -JH     Follows Commands (Cognition)  follows one step commands;over 90% accuracy  -LN  WFL  -JH     Recorded by [LN] Elinor Kennedy, KOURTNEY 01/10/19 1422 [JH] Maira Murphy COTA/L 01/10/19 1407     Row Name 01/10/19 1330 01/10/19 1000          Bed Mobility Assessment/Treatment    Bed Mobility Assessment/Treatment  rolling right  -LN  bed mobility (all) activities;rolling right;scooting/bridging;supine-sit;sit-supine;supine-sit-supine  -JH     [place] Level (Bed Mobility)  --  conditional independence  -JH     Rolling Left [place] (Bed Mobility)  --  -LN  --     Supine-Sit [place] (Bed Mobility)  conditional independence  -LN  --     Sit-Supine [place] (Bed Mobility)  not tested  -LN  --     Recorded by [LN] Elinor Kennedy, PTA 01/10/19 1422 [JH] Maira Murphy COTA/L 01/10/19 1407     Row Name 01/10/19 1330             Sit-Stand Transfer    Sit-Stand [place] (Transfers)  contact guard  -LN      Assistive Device (Sit-Stand Transfers)  -- none  -LN      Recorded by [LN] Elinor Kennedy, PTA 01/10/19 1422      Row Name 01/10/19 1330             Stand-Sit Transfer    Stand-Sit  [place] (Transfers)  contact guard  -LN      Assistive Device (Stand-Sit Transfers)  -- none  -LN      Recorded by [LN] Elinor Kennedy, PTA 01/10/19 1422      Row Name 01/10/19 1330             Toilet Transfer    Type (Toilet Transfer)  sit-stand;stand-sit  -LN      [place] Level (Toilet Transfer)  contact guard;minimum assist (75% patient effort) 1 lob requiring assist to correct  -LN      Assistive Device (Toilet Transfer)  commode;grab bars/safety frame  -LN      Recorded by [LN] Elinor Kennedy, PTA 01/10/19 1444      Row Name 01/10/19 1330             Gait/Stairs Assessment/Training    [place] Level (Gait)  contact guard  -LN      Assistive Device (Gait)  walker, front-wheeled  -LN      Distance in Feet (Gait)  126  -LN      Comment (Gait/Stairs)  pt stated she felt like she was seeing it rain on the way back to room-resolved after sitting eob x 5'-nsg aware  -LN2      Recorded by [LN] Elinor Kennedy, [facility] [place] 01/10/19 1422  [LN2] Elinor Kennedy, [facility] [place] 01/10/19 1443      Row Name 01/10/19 1330             Lower Extremity Seated Therapeutic Exercise    Performed, Seated Lower Extremity (Therapeutic Exercise)  hip flexion/extension;hip abduction/adduction;ankle dorsiflexion/plantarflexion;LAQ (long arc quad), knee extension  -LN      Exercise Type, Seated Lower Extremity (Therapeutic Exercise)  AROM (active range of motion)  -LN      Sets/Reps Detail, Seated Lower Extremity (Therapeutic Exercise)  1/10-15  -LN      Comment, Seated Lower Extremity (Therapeutic Exercise)  v.c's to stay on task  -LN      Recorded by [LN] Elinor Kennedy, PTA 01/10/19 1443      Row Name 01/10/19 1000             Therapeutic Exercise    Upper Extremity Range of Motion (Therapeutic Exercise)  shoulder flexion/extension, bilateral;shoulder horizontal abduction/adduction, bilateral;elbow flexion/extension, bilateral;forearm supination/pronation, bilateral  -JH      Hand (Therapeutic Exercise)  finger flexion/extension, bilateral  -JH       Weight/Resistance (Therapeutic Exercise)  1 pound  -      Exercise Type (Therapeutic Exercise)  AROM (active range of motion)  -      Position (Therapeutic Exercise)  seated  -      Sets/Reps (Therapeutic Exercise)  2/10-12  -      Equipment (Therapeutic Exercise)  free weight, barbell  -      Expected Outcome (Therapeutic Exercise)  improve functional tolerance, self-care activity;improve functional tolerance, household activity  -      Recorded by [JH] Maira Murphy COTA/L 01/10/19 1407      Row Name 01/10/19 1330 01/10/19 1000          Positioning and Restraints    Pre-Treatment Position  --  in bed  -     Post Treatment Position  bed  -LN  bed  -JH     In Bed  sitting EOB;call light within reach;encouraged to call for assist;with family/caregiver  -  sitting EOB;call light within reach;encouraged to call for assist;with family/caregiver  -     Recorded by [LN] Elinor Kennedy, \[facility]\"" 01/10/19 1443 [JH] Maira Murphy COTA/L 01/10/19 1407     Row Name 01/10/19 1330 01/10/19 1000          Pain Scale: Numbers Pre/Post-Treatment    Pain Scale: Numbers, Pretreatment  9/10  -LN  0/10 - no pain  -     Pain Scale: Numbers, Post-Treatment  10/10  -LN2  0/10 - no pain  -     Pain Location  back hips  -LN2  --     Pain Intervention(s)  -- meds not due  -LN  --     Recorded by [LN] Elinor Kennedy, \[facility]\"" 01/10/19 1443  [LN2] Elnior Kennedy, \[facility]\"" 01/10/19 1422 [JH] Maira Murphy COTA/L 01/10/19 1407     Row Name 01/10/19 1330             Sensory Assessment/Intervention    Sensory General Assessment  --  -LN      Recorded by [LN] Elinor Kennedy, \[facility]\"" 01/10/19 1443      Row Name 01/10/19 1330             Vision Assessment/Intervention    Visual Impairment/Limitations  --  -LN      Recorded by [LN] Elinor Kennedy, \[facility]\"" 01/10/19 1443      Row Name 01/10/19 1330             Light Touch Sensation Assessment    Left Upper Extremity: Light Touch Sensation Assessment  --  -LN      Right Upper Extremity: Light Touch  Sensation Assessment  --  -LN      Left Lower Extremity: Light Touch Sensation Assessment  --  -LN      Right Lower Extremity: Light Touch Sensation Assessment  --  -LN      Recorded by [LN] Elinor Kennedy S, PTA 01/10/19 1443      Row Name 01/10/19 1330             Edema Assessment & Management    Location (Edema)  --  -LN      Recorded by [LN] Elinor Kennedy S, PTA 01/10/19 1443      Row Name 01/10/19 1000             Outcome Summary/Treatment Plan (OT)    Daily Summary of Progress (OT)  progress toward functional goals is good  -      Plan for Continued Treatment (OT)  Continue per POC  -      Anticipated Discharge Disposition (OT)  home with home health;home with 24/7 care  -      Recorded by [] Maira Murphy COTA/L 01/10/19 1407      Row Name 01/10/19 1330             Outcome Summary/Treatment Plan (PT)    Plan for Continued Treatment (PT)  cont  -LN      Anticipated Discharge Disposition (PT)  home with 24/7 care;home with home health  -LN2      Recorded by [LN] Elinor Kennedy, PTA 01/10/19 1443  [LN2] Elinor Kennedy S, PTA 01/10/19 1422        User Key  (r) = Recorded By, (t) = Taken By, (c) = Cosigned By    Initials Name Effective Dates Discipline    LN Elinor Kennedy, PTA 03/07/18 -  PT     Maira Murphy HOOPER/L 03/07/18 -  OT               Rehab Goal Summary     Row Name 01/10/19 1330 01/10/19 1000          Physical Therapy Goals    Bed Mobility Goal Selection (PT)  bed mobility, PT goal 1  -LN  --     Transfer Goal Selection (PT)  transfer, PT goal 1  -LN  --     Gait Training Goal Selection (PT)  gait training, PT goal 1  -LN  --     Strength Goal Selection (PT)  strength, PT goal 1  -LN  --        Bed Mobility Goal 1 (PT)    Activity/Assistive Device (Bed Mobility Goal 1, PT)  rolling to left;rolling to right;sit to supine;supine to sit  -LN  --     [place] Level/Cues Needed (Bed Mobility Goal 1, PT)  independent  -LN  --     Time Frame (Bed Mobility Goal 1, PT)  by discharge  -  --      Progress/Outcomes (Bed Mobility Goal 1, PT)  goal not met  -LN  --        Transfer Goal 1 (PT)    Activity/Assistive Device (Transfer Goal 1, PT)  sit-to-stand/stand-to-sit;bed-to-chair/chair-to-bed  -LN  --     [place] Level/Cues Needed (Transfer Goal 1, PT)  conditional independence  -LN  --     Time Frame (Transfer Goal 1, PT)  by discharge  -LN  --     Progress/Outcome (Transfer Goal 1, PT)  goal not met  -LN  --        Gait Training Goal 1 (PT)    Activity/Assistive Device (Gait Training Goal 1, PT)  gait (walking locomotion);assistive device use  -LN  --     [place] Level (Gait Training Goal 1, PT)  standby assist;conditional independence  -LN  --     Distance (Gait Goal 1, PT)  50ftx2  -LN  --     Time Frame (Gait Training Goal 1, PT)  by discharge  -LN  --     Progress/Outcome (Gait Training Goal 1, PT)  goal not met  -LN  --        Strength Goal 1 (PT)    Strength Goal 1 (PT)  Pt will tolerate 2 sets of 15 reps of AROM exercises OOB in chair with VSS  -LN  --     Time Frame (Strength Goal 1, PT)  by discharge  -LN  --     Progress/Outcome (Strength Goal 1, PT)  goal not met  -LN  --        Occupational Therapy Goals    Transfer Goal Selection (OT)  --  transfer, OT goal 1  -     Bathing Goal Selection (OT)  --  bathing, OT goal 1  -     Dressing Goal Selection (OT)  --  dressing, OT goal 1  -     Toileting Goal Selection (OT)  --  toileting, OT goal 1  -     Activity Tolerance Goal Selection (OT)  --  activity tolerance, OT goal 1  -        Transfer Goal 1 (OT)    Activity/Assistive Device (Transfer Goal 1, OT)  --  sit-to-stand/stand-to-sit;bed-to-chair/chair-to-bed;toilet  -     Story Level/Cues Needed (Transfer Goal 1, OT)  --  conditional independence  -     Time Frame (Transfer Goal 1, OT)  --  long term goal (LTG);by discharge  -     Progress/Outcome (Transfer Goal 1, OT)  --  goal not met  -        Bathing Goal 1 (OT)    Activity/Assistive Device (Bathing Goal 1,  OT)  --  bathing skills, all  -JH     [place] Level/Cues Needed (Bathing Goal 1, OT)  --  minimum assist (75% or more patient effort)  -JH     Time Frame (Bathing Goal 1, OT)  --  long term goal (LTG);by discharge  -JH     Progress/Outcomes (Bathing Goal 1, OT)  --  goal not met  -JH        Dressing Goal 1 (OT)    Activity/Assistive Device (Dressing Goal 1, OT)  --  dressing skills, all  -JH     [place]/Cues Needed (Dressing Goal 1, OT)  --  minimum assist (75% or more patient effort)  -JH     Time Frame (Dressing Goal 1, OT)  --  long term goal (LTG);by discharge  -     Progress/Outcome (Dressing Goal 1, OT)  --  goal not met  -        Toileting Goal 1 (OT)    Activity/Device (Toileting Goal 1, OT)  --  toileting skills, all  -JH     [place] Level/Cues Needed (Toileting Goal 1, OT)  --  conditional independence  -JH     Time Frame (Toileting Goal 1, OT)  --  long term goal (LTG);by discharge  -     Progress/Outcome (Toileting Goal 1, OT)  --  goal not met  -         Activity Tolerance Goal 1 (OT)    Activity Level (Endurance Goal 1, OT)  --  10 min activity;O2 sat >/ equal to 88%  -       User Key  (r) = Recorded By, (t) = Taken By, (c) = Cosigned By    Initials Name Provider Type Discipline    Elinor Loomis, PTA Physical Therapy Assistant PT    Maira Parmar COTA/L Occupational Therapy Assistant OT          Physical Therapy Education     Title: PT OT SLP Therapies (In Progress)     Topic: Physical Therapy (Done)     Point: Mobility training (Done)     Learning Progress Summary           Patient Acceptance, E,TB, VU,NR by KARINA at 1/10/2019  2:45 PM   Family Acceptance, E,TB, VU,NR by KARINA at 1/10/2019  2:45 PM                   Point: Home exercise program (Done)     Learning Progress Summary           Patient Acceptance, E,TB, VU,NR by LN at 1/10/2019  2:45 PM   Family Acceptance, E,TB, VU,NR by LN at 1/10/2019  2:45 PM                   Point: Body mechanics (Done)     Learning  Progress Summary           Patient Acceptance, E,TB, VU,NR by LN at 1/10/2019  2:45 PM   Family Acceptance, E,TB, VU,NR by LN at 1/10/2019  2:45 PM                   Point: Precautions (Done)     Learning Progress Summary           Patient Acceptance, E,TB, VU,NR by LN at 1/10/2019  2:45 PM   Family Acceptance, E,TB, VU,NR by LN at 1/10/2019  2:45 PM                               User Key     Initials Effective Dates Name Provider Type Discipline    LN 03/07/18 -  Elinor Kennedy, PTA Physical Therapy Assistant PT                PT Recommendation and Plan  Anticipated Discharge Disposition (PT): home with 24/7 care, home with home health  Therapy Frequency (PT Clinical Impression): other (see comments)(5-14 times per week)  Outcome Summary/Treatment Plan (PT)  Plan for Continued Treatment (PT): cont  Anticipated Discharge Disposition (PT): home with 24/7 care, home with home health  Plan of Care Reviewed With: spouse, patient  Progress: improving  Outcome Summary: sup-sit cond ind,sit-stand-sit cga of 1,amb 126' with rw and min of 1-no goals met-if d/c would benefit from  pt and 24/7 assist  Outcome Measures     Row Name 01/10/19 1330 01/10/19 1000 01/09/19 1655       How much help from another person do you currently need...    Turning from your back to your side while in flat bed without using bedrails?  4  -LN  --  3  -KRISSY    Moving from lying on back to sitting on the side of a flat bed without bedrails?  4  -LN  --  2  -KRISSY    Moving to and from a bed to a chair (including a wheelchair)?  3  -LN  --  2  -KRISSY    Standing up from a chair using your arms (e.g., wheelchair, bedside chair)?  3  -LN  --  2  -KRISSY    Climbing 3-5 steps with a railing?  3  -LN  --  2  -KRISSY    To walk in hospital room?  3  -LN  --  2  -KRISSY    AM-PAC 6 Clicks Score  20  -LN  --  13  -KRISSY       How much help from another is currently needed...    Putting on and taking off regular lower body clothing?  --  2  -JH  --    Bathing (including washing,  rinsing, and drying)  --  3  -JH  --    Toileting (which includes using toilet bed pan or urinal)  --  3  -JH  --    Putting on and taking off regular upper body clothing  --  3  -JH  --    Taking care of personal grooming (such as brushing teeth)  --  4  -JH  --    Eating meals  --  4  -JH  --    Score  --  19  -JH  --       Functional Assessment    Outcome Measure Options  AM-PAC 6 Clicks Basic Mobility (PT)  -  --  -[facility] 6 Clicks Basic Mobility (PT)  -    Row Name 01/09/19 1105             How much help from another is currently needed...    Putting on and taking off regular lower body clothing?  3  -      Bathing (including washing, rinsing, and drying)  3  -MH      Toileting (which includes using toilet bed pan or urinal)  3  -MH      Putting on and taking off regular upper body clothing  3  -      Taking care of personal grooming (such as brushing teeth)  4  -      Eating meals  4  -      Score  20  -         Functional Assessment    Outcome Measure Options  AM-PAC 6 Clicks Daily Activity (OT)  -        User Key  (r) = Recorded By, (t) = Taken By, (c) = Cosigned By    Initials Name Provider Type    Evelyn Manuel, PT Physical Therapist    Elinor Loomis PTA Physical Therapy Assistant     Maira Murphy COTA/L Occupational Therapy Assistant     Dmitry Yuan Occupational Therapist         Time Calculation:   PT Charges     Row Name 01/10/19 1330             Time Calculation    Start Time  1330  -LN      Stop Time  1410  -LN      Time Calculation (min)  40 min  -LN      PT Received On  01/10/19  -LN         Time Calculation- PT    Total Timed Code Minutes- PT  40 minute(s)  -LN        User Key  (r) = Recorded By, (t) = Taken By, (c) = Cosigned By    Initials Name Provider Type    Elinor Loomis, KOURTNEY Physical Therapy Assistant        Therapy Suggested Charges     Code   Minutes Charges    None           Therapy Charges for Today     Code Description Service Date Service  Provider Modifiers Qty    05364003016 HC GAIT TRAINING EA 15 MIN 1/10/2019 Elinor Kennedy, PTA GP 1    11630640508 HC PT THERAPEUTIC ACT EA 15 MIN 1/10/2019 Elinro Kennedy, PTA GP 1    64843491159 HC PT THER PROC EA 15 MIN 1/10/2019 Elinor Kennedy, PTA GP 1          PT G-Codes  Outcome Measure Options: AM-PAC 6 Clicks Basic Mobility (PT)  AM-PAC 6 Clicks Score: 20  Score: 19    Elinor Kennedy PTA  1/10/2019

## 2019-01-10 NOTE — PLAN OF CARE
Problem: Patient Care Overview  Goal: Plan of Care Review  Outcome: Ongoing (interventions implemented as appropriate)   01/10/19 0356 01/10/19 0830 01/10/19 1000   Coping/Psychosocial   Plan of Care Reviewed With --  patient;daughter --    Plan of Care Review   Progress --  --  no change   OTHER   Outcome Summary pt. rested well this night  --  --

## 2019-01-10 NOTE — PLAN OF CARE
Problem: Fall Risk (Adult)  Goal: Identify Related Risk Factors and Signs and Symptoms  Outcome: Ongoing (interventions implemented as appropriate)    Goal: Absence of Fall  Outcome: Ongoing (interventions implemented as appropriate)      Problem: Patient Care Overview  Goal: Plan of Care Review  Outcome: Ongoing (interventions implemented as appropriate)   01/10/19 0356   Coping/Psychosocial   Plan of Care Reviewed With patient   Plan of Care Review   Progress no change   OTHER   Outcome Summary pt. rested well this night      Goal: Individualization and Mutuality  Outcome: Ongoing (interventions implemented as appropriate)    Goal: Discharge Needs Assessment  Outcome: Ongoing (interventions implemented as appropriate)    Goal: Interprofessional Rounds/Family Conf  Outcome: Ongoing (interventions implemented as appropriate)      Problem: Fluid Volume Excess (Adult)  Goal: Identify Related Risk Factors and Signs and Symptoms  Outcome: Ongoing (interventions implemented as appropriate)    Goal: Optimal Fluid Balance  Outcome: Ongoing (interventions implemented as appropriate)      Problem: Diabetes, Type 2 (Adult)  Goal: Signs and Symptoms of Listed Potential Problems Will be Absent, Minimized or Managed (Diabetes, Type 2)  Outcome: Ongoing (interventions implemented as appropriate)

## 2019-01-10 NOTE — PLAN OF CARE
Problem: Patient Care Overview  Goal: Plan of Care Review   01/09/19 7409   Coping/Psychosocial   Plan of Care Reviewed With patient;spouse;daughter   OTHER   Outcome Summary PT evaluation completed. Limited to bed evaluation as patient was getting blood at the time. AROM grossly WFL; strength BLE's grossly 3+/5 with soreness LLE from edema and recent fall. Function limited by decrased strength and tolerance for functional mobility and activities. Pt will benefit from PT to gently regain lost function as pt improves mediically. Anticipate 24/7 home care with continued therapy services at discharge.

## 2019-01-10 NOTE — PROGRESS NOTES
"  Cardiovascular Daily Inpatient Progress Note  Jaya Byrd M.D., Ph.D., [facility]      Subjective     Interval History:   States that dyspnea has worsened.  Complaining of decreased UOP.     Review of Systems   Cardiovascular: Positive for dyspnea on exertion and leg swelling. Negative for chest pain and claudication.   Respiratory: Positive for shortness of breath. Negative for cough and hemoptysis.        Objective     Vital Sign Min/Max for last 24 hours  Temp  Min: 96.8 °F (36 °C)  Max: 98.2 °F (36.8 °C)   BP  Min: 112/58  Max: 153/74   Pulse  Min: 45  Max: 67   Resp  Min: 17  Max: 18   SpO2  Min: 93 %  Max: 99 %   No Data Recorded   Weight  Min: 110 kg (243 lb)  Max: 110 kg (243 lb)     Flowsheet Rows      First Filed Value   Admission Height  162.6 cm (64\") Documented at 01/07/2019 1800   Admission Weight  113 kg (248 lb 8 oz) Documented at 01/07/2019 1800            01/08/19  0639 01/09/19  0500 01/10/19  0454   Weight: 109 kg (240 lb 3.2 oz) 109 kg (240 lb) 110 kg (243 lb)     Body mass index is 41.71 kg/m².  Physical Exam:  Physical Exam:  Vitals:    01/10/19 0732   BP:    Pulse: (!) 49   Resp:    Temp:    SpO2:      Body mass index is 41.71 kg/m².   Pulse Ox: Normal        on NC  General: alert, appears stated age and cooperative     Body Habitus: morbidly obese    HEENT: Head: Normocephalic, no lesions, without obvious abnormality. No arcus senilis, xanthelasma or xanthomas.    JVP: Volume/Pulsation:       elevated jugular venous pressure to 11 cm vertical height above mid-right atrium with significant V waves  [place]:  normal size and placement    Palpable S4: absent.  Heart rate: Bradycardic    Heart Rhythm: regular                                     Heart Sounds: S1: normal intensity  S2: increased P2  S3: absent                               S4: absent  Opening Snap: absent          A2-OS:  absent.   Pericardial rub: absent                       Ejection click: None                                 "        Murmurs:  absent   Extremity: moves all extremities equally.   LE Skin: Trace LE edema.            DATA REVIEWED:       ---------------------------------------------------  TTE/RAUL:  Results for orders placed during the hospital encounter of 01/07/19   Adult Transthoracic Echo Complete W/ Cont if Necessary Per Protocol    Narrative · Left ventricular wall thickness is consistent with mild concentric   hypertrophy.  · Estimated EF = 57%.  · Left ventricular systolic function is normal.  · Left ventricular diastolic dysfunction (grade I a) consistent with   impaired relaxation.  · Right ventricular cavity is mildly dilated.  · Left atrial cavity size is mildly dilated.  · Mild aortic valve regurgitation is present.  · Moderate mitral valve regurgitation is present  · Moderate tricuspid valve regurgitation is present.  · The tricuspid valve is grossly normal. Moderate tricuspid valve   regurgitation is present. Estimated right ventricular systolic pressure   from tricuspid regurgitation is markedly elevated (>55 mmHg). Moderate to   severe pulmonary hypertension is present.  · Mild pulmonic valve regurgitation is present.          --------------------------------------------------------------------------------------------------  LABS:   Lab Results   Component Value Date    GLUCOSE 78 01/10/2019    BUN 60 (H) 01/10/2019    CREATININE 1.84 (H) 01/10/2019    EGFRIFNONA 27 (L) 01/10/2019    BCR 32.6 (H) 01/10/2019    K 4.1 01/10/2019    CO2 27.0 01/10/2019    CALCIUM 8.7 01/10/2019    ALBUMIN 3.80 01/07/2019    AST 43 (H) 01/07/2019    ALT 18 01/07/2019       Lab Results   Component Value Date    WBC 5.60 01/10/2019    HGB 9.0 (L) 01/10/2019    HCT 27.5 (L) 01/10/2019    MCV 98.9 (H) 01/10/2019     (L) 01/10/2019       Lab Results   Component Value Date    CHOL 135 (L) 01/31/2018    CHLPL 120 (L) 03/14/2016    TRIG 72 01/31/2018    HDL 46 01/31/2018    LDL 75 01/31/2018       Lab Results   Component Value  Date    TSH 2.740 01/31/2018    U8ZLTLJ 8.48 01/31/2018       Lab Results   Component Value Date    CKTOTAL 69.0 01/11/2016    CKMB 1.2 08/04/2014    TROPONINI 1.190 (C) 01/08/2019       Lab Results   Component Value Date    HGBA1C 4.0 12/28/2018     Lab Results   Component Value Date    DDIMER 523 (H) 08/04/2014     Lab Results   Component Value Date    ALT 18 01/07/2019     Lab Results   Component Value Date    HGBA1C 4.0 12/28/2018    HGBA1C 6.4 (H) 01/31/2018    HGBA1C 6.13 (H) 05/17/2017     Lab Results   Component Value Date    MICROALBUR 11.0 01/31/2018    CREATININE 1.84 (H) 01/10/2019     Lab Results   Component Value Date    IRON 59 01/09/2019    TIBC 389 01/09/2019    FERRITIN 59.50 02/17/2017     Lab Results   Component Value Date    INR 1.0 08/04/2014    PROTIME 13.0 08/04/2014     Lab Results (last 24 hours)     Procedure Component Value Units Date/Time    Basic Metabolic Panel [[id number]]  (Abnormal) Collected:  01/10/19 0529    Specimen:  Blood Updated:  01/10/19 0616     Glucose 78 mg/dL      BUN 60 mg/dL      Creatinine 1.84 mg/dL      Sodium 137 mmol/L      Potassium 4.1 mmol/L      Chloride 102 mmol/L      CO2 27.0 mmol/L      Calcium 8.7 mg/dL      eGFR Non African Amer 27 mL/min/1.73      BUN/Creatinine Ratio 32.6     Anion Gap 8.0 mmol/L     CBC & Differential [[id number]] Collected:  01/10/19 0529    Specimen:  Blood Updated:  01/10/19 0602    Narrative:       The following orders were created for panel order CBC & Differential.  Procedure                               Abnormality         Status                     ---------                               -----------         ------                     CBC Auto Differential[[id number]]        Abnormal            Final result                 Please view results for these tests on the individual orders.    CBC Auto Differential [[id number]]  (Abnormal) Collected:  01/10/19 0529    Specimen:  Blood Updated:  01/10/19 0602     WBC 5.60 10*3/mm3       RBC 2.78 10*6/mm3      Hemoglobin 9.0 g/dL      Hematocrit 27.5 %      MCV 98.9 fL      MCH 32.4 pg      MCHC 32.7 g/dL      RDW 17.8 %      RDW-SD 63.0 fl      MPV 9.9 fL      Platelets 117 10*3/mm3      Neutrophil % 76.0 %      Lymphocyte % 11.6 %      Monocyte % 9.3 %      Eosinophil % 2.0 %      Basophil % 0.4 %      Immature Grans % 0.7 %      Neutrophils, Absolute 4.26 10*3/mm3      Lymphocytes, Absolute 0.65 10*3/mm3      Monocytes, Absolute 0.52 10*3/mm3      Eosinophils, Absolute 0.11 10*3/mm3      Basophils, Absolute 0.02 10*3/mm3      Immature Grans, Absolute 0.04 10*3/mm3     Hemoglobin & Hematocrit, Blood [[id number]]  (Abnormal) Collected:  01/09/19 2023    Specimen:  Blood Updated:  01/09/19 2034     Hemoglobin 8.3 g/dL      Hematocrit 25.5 %     Creatinine, Urine, Random - Urine, Clean Catch [[id number]] Collected:  01/09/19 1809    Specimen:  Urine, Clean Catch Updated:  01/09/19 1933     Creatinine, Urine 79.6 mg/dL     Urea Nitrogen, Urine - Urine, Clean Catch [[id number]] Collected:  01/09/19 1809    Specimen:  Urine, Clean Catch Updated:  01/09/19 1933     Urea Nitrogen, Urine 380 mg/dL     Sodium, Urine, Random - Urine, Clean Catch [[id number]]  (Abnormal) Collected:  01/09/19 1809    Specimen:  Urine, Clean Catch Updated:  01/09/19 1837     Sodium, Urine 27 mmol/L     Basic Metabolic Panel [[id number]]  (Abnormal) Collected:  01/09/19 1547    Specimen:  Blood Updated:  01/09/19 1628     Glucose 107 mg/dL      BUN 54 mg/dL      Creatinine 1.93 mg/dL      Sodium 133 mmol/L      Potassium 4.4 mmol/L      Chloride 97 mmol/L      CO2 28.0 mmol/L      Calcium 8.5 mg/dL      eGFR Non African Amer 26 mL/min/1.73      BUN/Creatinine Ratio 28.0     Anion Gap 8.0 mmol/L     Folate [[id number]]  (Normal) Collected:  01/09/19 0832    Specimen:  Blood Updated:  01/09/19 1025     Folate >20.00 ng/mL     Vitamin B12 [[id number]]  (Normal) Collected:  01/09/19 0832    Specimen:  Blood Updated:  01/09/19 1027      Vitamin B-12 460 pg/mL     Iron Profile [[id number]]  (Normal) Collected:  01/09/19 0832    Specimen:  Blood Updated:  01/09/19 0933     Iron 59 mcg/dL      TIBC 389 mcg/dL      Iron Saturation 15 %     Hepatitis Panel, Acute [[id number]]  (Normal) Collected:  01/07/19 1954    Specimen:  Blood Updated:  01/09/19 0843     Hepatitis C Ab Negative     Hep A IgM Negative     Hep B C IgM Negative     Hepatitis B Surface Ag Negative            Assessment/Plan   1. PAH/PVH/HFpEF. WHO Group 2.1/2.2/3.1/3.3/3.4; FC: Class 3.   NYHA stage C.  RV status: Adapted with normal right ventricular ejection fraction.  She is in a mildly hypervolemic, but perfused state.   I see no evidence of CpC-PAH or indications for RHC at this point.  I would like to get her to a more euvolemic state and then repeat an outpatient PAH-protocol TTE for further evaluation.  -No current indication for PAH-specific medications  -Will change Bumex to 2mg a day and follow UOP  -Agree with ACE-I and Aldactone  -Will arrange close outpatient follow-up with myself and Dr. Harris  -Continue low sodium diet and fluid restrictions  -PFTs pending             Thank you for allowing me to participate in the care of your patient.  If I can be of any further assistance, please do not hesitate to contact me.

## 2019-01-10 NOTE — THERAPY TREATMENT NOTE
Acute Care - Occupational Therapy Treatment Note  [facility]     Patient Name: Nicolsaa Rojas  : 1950  MRN: [id number]  Today's Date: 1/10/2019  Onset of Illness/Injury or Date of Surgery: 19  Date of Referral to OT: 19  Referring Physician: Dr. Catracho Jarrell    Admit Date: 2019       ICD-10-CM ICD-9-CM   1. Acute on chronic heart failure, unspecified heart failure type (CMS/HCC) I50.9 428.0   2. Elevated troponin R74.8 790.6   3. Impaired mobility and activities of daily living Z74.09 799.89   4. Impaired physical mobility Z74.09 781.99     Patient Active Problem List   Diagnosis   • Hyponatremia   • Acute on chronic diastolic CHF (congestive heart failure) (CMS/HCC)   • Hypokalemia   • Acute on chronic heart failure (CMS/HCC)   • Dyspnea   • Chronic renal impairment, stage 3 (moderate) (CMS/HCC)   • CAD (coronary artery disease)     Past Medical History:   Diagnosis Date   • Acute bronchitis    • Acute congestive heart failure (CMS/HCC)     resolving   • Acute kidney failure, unspecified (CMS/HCC)    • Acute kidney failure, unspecified (CMS/HCC)    • Acute posthemorrhagic anemia    • Asthenia    • Chest pain    • Chronic gastritis    • Chronic pharyngitis    • Chronic renal impairment    • Congenital renal failure    • Congestive heart failure (CMS/HCC)    • Contact dermatitis due to poison ivy    • COPD (chronic obstructive pulmonary disease) (CMS/HCC)    • Coronary arteriosclerosis    • D-dimer, elevated     D-dimer above reference range    • Degenerative joint disease involving multiple joints     back   • Depressive disorder    • Dysphagia    • Dyspnea    • Edema of lower extremity    • Encounter for immunization    • Encounter for long-term (current) use of medications    • Epigastric pain    • Esophagitis    • Essential hypertension    • Gastroesophageal reflux disease    • Gastroparesis     Gastroparesis syndrome    • Generalized abdominal pain    • H/O bone density study  01/09/2015   • H/O mammogram 01/15/2015   • H/O screening mammography 11/12/2013   • Headache    • History of transfusion    • Hyperlipidemia    • Hypokalemia    • Hypomagnesemia    • Injury of tendon of rotator cuff     Injury of tendon of the rotator cuff of shoulder      • Insomnia    • Insomnia, unspecified    • Knee pain    • Nausea and vomiting    • Neck pain    • Need for immunization against influenza    • Need for prophylactic vaccination and inoculation against influenza    • Need for prophylactic vaccination and inoculation against viral disease    • 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 Performed at [facility].   • ENDOSCOPY  02/09/2015    ESOPHAGEAL STRICTURE PRESENT, GASTRITIS FOUND IN THE STOMACH, NORMAL DUODENUM   • ENDOSCOPY W/ PEG TUBE PLACEMENT  12/19/2012    Esophagitis seen. Biopsy taken. Esophageal stricture present. Dilatation performed. Gastritis in stomach. Biopsy taken. Food was in stomach. Normal duodenum.   • HERNIA REPAIR     • HYSTERECTOMY      partial   • NECK SURGERY         Therapy Treatment    Rehabilitation Treatment Summary     Row Name 01/10/19 1000             Treatment Time/Intention    Discipline  occupational therapy assistant  -      Document Type  therapy note (daily note)  -      Subjective Information  complains of;fatigue  -      Mode of Treatment  individual therapy;occupational therapy  -      Therapy Frequency (OT Eval)  other (see comments) 5-7 days a wk   -      Patient Effort  good  -      Recorded by [] Maira Murphy COTA/JOSEMANUEL 01/10/19 4179       Row Name 01/10/19 1000             Vital Signs    Pre SpO2 (%)  96  -JH      O2 Delivery Pre Treatment  supplemental O2  -JH      Post SpO2 (%)  94  -JH      O2 Delivery Post Treatment  supplemental O2  -JH      Recorded by [] Maira Murphy COTA/L 01/10/19 1407      Row Name 01/10/19 1000             Cognitive Assessment/Intervention- PT/OT    Orientation Status (Cognition)  oriented x 3  -JH      Follows Commands (Cognition)  WFL  -JH      Recorded by [] Maira Murphy COTA/L 01/10/19 1407      Row Name 01/10/19 1000             Bed Mobility Assessment/Treatment    Bed Mobility Assessment/Treatment  bed mobility (all) activities;rolling right;scooting/bridging;supine-sit;sit-supine;supine-sit-supine  -JH      [place] Level (Bed Mobility)  conditional independence  -JH      Recorded by [] Maira Murphy COTA/L 01/10/19 1407      Row Name 01/10/19 1000             Therapeutic Exercise    Upper Extremity Range of Motion (Therapeutic Exercise)  shoulder flexion/extension, bilateral;shoulder horizontal abduction/adduction, bilateral;elbow flexion/extension, bilateral;forearm supination/pronation, bilateral  -JH      Hand (Therapeutic Exercise)  finger flexion/extension, bilateral  -JH      Weight/Resistance (Therapeutic Exercise)  1 pound  -      Exercise Type (Therapeutic Exercise)  AROM (active range of motion)  -      Position (Therapeutic Exercise)  seated  -      Sets/Reps (Therapeutic Exercise)  2/10-12  -JH      Equipment (Therapeutic Exercise)  free weight, barbell  -      Expected Outcome (Therapeutic Exercise)  improve functional tolerance, self-care activity;improve functional tolerance, household activity  -      Recorded by [] Maira Murphy COTA/L 01/10/19 1407      Row Name 01/10/19 1000             Positioning and Restraints    Pre-Treatment Position  in bed  -JH      Post Treatment Position  bed  -JH      In Bed  sitting EOB;call light within reach;encouraged to call  for assist;with family/caregiver  -      Recorded by [] Maira Murphy HOOPER/L 01/10/19 1407      Row Name 01/10/19 1000             Pain Scale: Numbers Pre/Post-Treatment    Pain Scale: Numbers, Pretreatment  0/10 - no pain  -      Pain Scale: Numbers, Post-Treatment  0/10 - no pain  -      Recorded by [] Maira Murphy HOOPER/L 01/10/19 1407      Row Name 01/10/19 1000             Outcome Summary/Treatment Plan (OT)    Daily Summary of Progress (OT)  progress toward functional goals is good  -      Plan for Continued Treatment (OT)  Continue per [facility]  -      Anticipated Discharge Disposition (OT)  home with home health;home with 24/7 care  -      Recorded by [] Maira Murphy HOOPER/JOSEMANUEL 01/10/19 1407        User Key  (r) = Recorded By, (t) = Taken By, (c) = Cosigned By    Initials Name Effective Dates Discipline     Maira Murphy HOOPER/L 03/07/18 -  OT           Rehab Goal Summary     Row Name 01/10/19 1000             Occupational Therapy Goals    Transfer Goal Selection (OT)  transfer, OT goal 1  -      Bathing Goal Selection (OT)  bathing, OT goal 1  -      Dressing Goal Selection (OT)  dressing, OT goal 1  -      Toileting Goal Selection (OT)  toileting, OT goal 1  -      Activity Tolerance Goal Selection (OT)  activity tolerance, OT goal 1  -         Transfer Goal 1 (OT)    Activity/Assistive Device (Transfer Goal 1, OT)  sit-to-stand/stand-to-sit;bed-to-chair/chair-to-bed;toilet  -      [place] Level/Cues Needed (Transfer Goal 1, OT)  conditional independence  -      Time Frame (Transfer Goal 1, OT)  long term goal (LTG);by discharge  -      Progress/Outcome (Transfer Goal 1, OT)  goal not met  -         Bathing Goal 1 (OT)    Activity/Assistive Device (Bathing Goal 1, OT)  bathing skills, all  -      [place] Level/Cues Needed (Bathing Goal 1, OT)  minimum assist (75% or more patient effort)  -      Time Frame (Bathing Goal 1, OT)  long term goal (LTG);by  discharge  -      Progress/Outcomes (Bathing Goal 1, OT)  goal not met  -         Dressing Goal 1 (OT)    Activity/Assistive Device (Dressing Goal 1, OT)  dressing skills, all  -JH      [place]/Cues Needed (Dressing Goal 1, OT)  minimum assist (75% or more patient effort)  -      Time Frame (Dressing Goal 1, OT)  long term goal (LTG);by discharge  -      Progress/Outcome (Dressing Goal 1, OT)  goal not met  -         Toileting Goal 1 (OT)    Activity/Device (Toileting Goal 1, OT)  toileting skills, all  -      [place] Level/Cues Needed (Toileting Goal 1, OT)  conditional independence  -JH      Time Frame (Toileting Goal 1, OT)  long term goal (LTG);by discharge  -      Progress/Outcome (Toileting Goal 1, OT)  goal not met  -          Activity Tolerance Goal 1 (OT)    Activity Level (Endurance Goal 1, OT)  10 min activity;O2 sat >/ equal to 88%  -        User Key  (r) = Recorded By, (t) = Taken By, (c) = Cosigned By    Initials Name Provider Type Discipline     Maira Murphy COTA/L Occupational Therapy Assistant OT        Occupational Therapy Education     Title: PT OT SLP Therapies (In Progress)     Topic: Occupational Therapy (In Progress)     Point: Precautions (Done)     Description: Instruct learner(s) on prescribed precautions during self-care and functional transfers.    Learning Progress Summary           Patient Acceptance, E, VU,NR by  at 1/9/2019 11:36 AM    Comment:  OT role, OT POC   Family Acceptance, E, VU,NR by  at 1/9/2019 11:36 AM    Comment:  OT role, OT POC                               User Key     Initials Effective Dates Name Provider Type Discipline     10/12/18 -  Dmitry Yuan Occupational Therapist OT                OT Recommendation and Plan  Outcome Summary/Treatment Plan (OT)  Daily Summary of Progress (OT): progress toward functional goals is good  Plan for Continued Treatment (OT): Continue per POC  Anticipated Discharge Disposition (OT):  home with home health, home with 24/7 care  Therapy Frequency (OT Eval): other (see comments)(5-7 days a wk )  Daily Summary of Progress (OT): progress toward functional goals is good  Outcome Measures     Row Name 01/10/19 1000 01/09/19 1655 01/09/19 1105       How much help from another person do you currently need...    Turning from your back to your side while in flat bed without using bedrails?  --  3  -KRISSY  --    Moving from lying on back to sitting on the side of a flat bed without bedrails?  --  2  -KRISSY  --    Moving to and from a bed to a chair (including a wheelchair)?  --  2  -KRISSY  --    Standing up from a chair using your arms (e.g., wheelchair, bedside chair)?  --  2  -KRISSY  --    Climbing 3-5 steps with a railing?  --  2  -KRISSY  --    To walk in hospital room?  --  2  -KRISSY  --    AM-PAC 6 Clicks Score  --  13  -KRISSY  --       How much help from another is currently needed...    Putting on and taking off regular lower body clothing?  2  -  --  3  -MH    Bathing (including washing, rinsing, and drying)  3  -  --  3  -MH    Toileting (which includes using toilet bed pan or urinal)  3  -  --  3  -MH    Putting on and taking off regular upper body clothing  3  -  --  3  -MH    Taking care of personal grooming (such as brushing teeth)  4  -  --  4  -    Eating meals  4  -  --  4  -    Score  19  -JH  --  20  -MH       Functional Assessment    Outcome Measure Options  --  AM-PAC 6 Clicks Basic Mobility (PT)  -  AM-PAC 6 Clicks Daily Activity (OT)  -      User Key  (r) = Recorded By, (t) = Taken By, (c) = Cosigned By    Initials Name Provider Type    Evelyn Manuel, PT Physical Therapist     Maira Murphy COTA/L Occupational Therapy Assistant     Dmitry Yuan Occupational Therapist           Time Calculation:   Time Calculation- OT     Row Name 01/10/19 1410             Time Calculation- OT    OT Start Time  1000  -      OT Stop Time  1038  -      OT Time Calculation (min)  38  min  -      OT Received On  01/10/19  -        User Key  (r) = Recorded By, (t) = Taken By, (c) = Cosigned By    Initials Name Provider Type     Maira Murphy COTA/L Occupational Therapy Assistant           Therapy Suggested Charges     Code   Minutes Charges    None           Therapy Charges for Today     Code Description Service Date Service Provider Modifiers Qty    20115453015 HC OT THERAPEUTIC ACT EA 15 MIN 1/10/2019 Maira Murphy COTA/L GO 1    06505586191 HC OT THER PROC EA 15 MIN 1/10/2019 Maira Murphy COTA/L GO 1    56406099879 HC OT SELF CARE/MGMT/TRAIN EA 15 MIN 1/10/2019 Maira Murphy COTA/L GO 1               JUAN Paniagua  1/10/2019

## 2019-01-10 NOTE — PROGRESS NOTES
[facility] Medicine Services  INPATIENT PROGRESS NOTE    Length of Stay: 1  Date of Admission: 1/7/2019  Primary Care Physician: Henry Muniz MD    Subjective   Chief Complaint: Shortness of air.    HPI: Patient is seen for follow-up.  She has history of coronary artery disease status post recent high risk PCI (at [facility]), O2 dependent COPD, CIERRA, chronic kidney disease, diabetes mellitus, congestive heart failure, morbid obesity, hypertension, chronic anemia, chronic pain syndrome and was readmitted (same day after discharge) for CHF decompensation/possible non-STEMI.  She remains on her baseline supplemental oxygen of 3 L nasal prongs but says that she is more short of air today.  She has also gained about 3 pounds in the last 24 hours.    Review of Systems   Constitutional: Positive for activity change and fatigue. Negative for appetite change, chills, diaphoresis, fever and unexpected weight change.   HENT: Negative for trouble swallowing and voice change.    Eyes: Negative for photophobia and visual disturbance.   Respiratory: Positive for shortness of breath. Negative for cough, choking, chest tightness, wheezing and stridor.    Cardiovascular: Positive for leg swelling. Negative for chest pain and palpitations.   Gastrointestinal: Negative for abdominal distention, abdominal pain, blood in stool, constipation, diarrhea, nausea and vomiting.   Endocrine: Negative for cold intolerance, heat intolerance, polydipsia, polyphagia and polyuria.   Genitourinary: Negative for decreased urine volume, difficulty urinating, dysuria, enuresis, flank pain, frequency, hematuria and urgency.   Musculoskeletal: Positive for arthralgias and gait problem. Negative for myalgias, neck pain and neck stiffness.   Skin: Negative for pallor, rash and wound.   Neurological: Negative for dizziness, tremors, seizures, syncope, facial asymmetry, speech difficulty, weakness,  light-headedness, numbness and headaches.   Hematological: Does not bruise/bleed easily.   Psychiatric/Behavioral: Negative for agitation, behavioral problems and confusion.       Objective    Temp:  [96.8 °F (36 °C)-98.2 °F (36.8 °C)] 98.1 °F (36.7 °C)  Heart Rate:  [45-67] 57  Resp:  [17-18] 18  BP: (112-153)/(51-82) 132/63    Physical Exam   Constitutional: She is oriented to person, place, and time. She appears well-developed and well-nourished. She is cooperative. No distress.   Patient is morbidly obese and has a BMI of 41.38   HENT:   Head: Normocephalic and atraumatic.   Right Ear: External ear normal.   Left Ear: External ear normal.   Nose: Nose normal.   Mouth/Throat: Oropharynx is clear and moist.   Eyes: Conjunctivae and EOM are normal. Pupils are equal, round, and reactive to light.   Neck: Normal range of motion. Neck supple. No JVD present. No thyromegaly present.   Cardiovascular: Normal rate, regular rhythm, normal heart sounds and intact distal pulses. Exam reveals no gallop and no friction rub.   No murmur heard.  Pulmonary/Chest: Effort normal. No stridor. No respiratory distress. She has decreased breath sounds. She has no wheezes. She has rhonchi. She has no rales. She exhibits no tenderness.   Abdominal: Soft. Bowel sounds are normal. She exhibits no distension and no mass. There is no tenderness. There is no rebound and no guarding. No hernia.   Musculoskeletal: Normal range of motion. She exhibits edema. She exhibits no tenderness or deformity.   Neurological: She is alert and oriented to person, place, and time. She has normal reflexes. No cranial nerve deficit or sensory deficit. She exhibits normal muscle tone.   Skin: Skin is warm and dry. No rash noted. She is not diaphoretic. No erythema. No pallor.   Psychiatric: She has a normal mood and affect. Her behavior is normal. Judgment and thought content normal.   Nursing note and vitals reviewed.        Medication Review:    Current  Facility-Administered Medications:   •  amLODIPine (NORVASC) tablet 5 mg, 5 mg, Oral, Daily, Yaniv Lobo MD, 5 mg at 01/10/19 0931  •  aspirin chewable tablet 81 mg, 81 mg, Oral, Daily, Sage Blanchard MD, 81 mg at 01/10/19 0947  •  atorvastatin (LIPITOR) tablet 40 mg, 40 mg, Oral, Nightly, Yaniv Lobo MD, 40 mg at 01/09/19 2132  •  bumetanide (BUMEX) tablet 2 mg, 2 mg, Oral, Daily, Jaya Byrd MD PhD, 2 mg at 01/10/19 0947  •  carvedilol (COREG) tablet 6.25 mg, 6.25 mg, Oral, BID With Meals, Catracho Jarrell MD  •  clopidogrel (PLAVIX) tablet 75 mg, 75 mg, Oral, Daily, Sage Blanchard MD, 75 mg at 01/10/19 0931  •  enoxaparin (LOVENOX) syringe 40 mg, 40 mg, Subcutaneous, Q24H, Catracho Jarrell MD, 40 mg at 01/10/19 0020  •  famotidine (PEPCID) tablet 40 mg, 40 mg, Oral, Daily, Sage Blanchard MD, 40 mg at 01/10/19 0931  •  HYDROcodone-acetaminophen (NORCO)  MG per tablet 1 tablet, 1 tablet, Oral, Q6H, Joon Dugan MD, 1 tablet at 01/10/19 0605  •  insulin patient supplied pump, , Subcutaneous, Continuous, Sage Blanchard MD  •  isosorbide mononitrate (IMDUR) 24 hr tablet 60 mg, 60 mg, Oral, BID, Yaniv Lobo MD, 60 mg at 01/10/19 0931  •  lisinopril (PRINIVIL,ZESTRIL) tablet 10 mg, 10 mg, Oral, Q24H, Joon Dugan MD, 10 mg at 01/10/19 0931  •  potassium chloride (MICRO-K) CR capsule 40 mEq, 40 mEq, Oral, PRN, 40 mEq at 01/09/19 0505 **OR** potassium chloride (KLOR-CON) packet 40 mEq, 40 mEq, Oral, PRN **OR** potassium chloride 10 mEq in 100 mL IVPB, 10 mEq, Intravenous, Q1H PRN, Catracho Jarrell MD  •  [COMPLETED] Insert peripheral IV, , , Once **AND** sodium chloride 0.9 % flush 10 mL, 10 mL, Intravenous, PRN, Tyrone Quinonez MD  •  sodium chloride 0.9 % flush 3 mL, 3 mL, Intravenous, Q12H, Sage Blanchard MD, 3 mL at 01/10/19 0931  •  sodium chloride 0.9 % flush 3-10 mL, 3-10 mL, Intravenous, PRN, Sage Blanchard MD  •  spironolactone (ALDACTONE) tablet 25 mg,  25 mg, Oral, Daily, Yaniv Lobo MD, 25 mg at 01/10/19 0931  •  zolpidem (AMBIEN) tablet 5 mg, 5 mg, Oral, Nightly PRN, Sage Blanchard MD, 5 mg at 01/09/19 2132    Results Review:  I have reviewed the labs, radiology results, and diagnostic studies.    Laboratory Data:   Results from last 7 days   Lab Units  01/10/19   0529  01/09/19   1547  01/09/19   0658   01/07/19   1954   01/04/19   1451  01/03/19   1657   SODIUM mmol/L  137  133*  137   < >  136*   < >  123*  127*   POTASSIUM mmol/L  4.1  4.4  4.2   < >  3.8   < >  2.7*  2.9*   CHLORIDE mmol/L  102  97  101   < >  96   < >  83*  86*   CO2 mmol/L  27.0  28.0  28.0   < >  28.0   < >  29.0  29.0   BUN mg/dL  60*  54*  51*   < >  47*   < >  49*  49*   CREATININE mg/dL  1.84*  1.93*  1.84*   < >  1.21*   < >  1.56*  1.38*   GLUCOSE mg/dL  78  107*  76   < >  164*   < >  200*  142*   CALCIUM mg/dL  8.7  8.5  8.3*   < >  8.9   < >  8.5  8.3*   BILIRUBIN mg/dL   --    --    --    --   2.1*   --   1.8*  1.6*   ALK PHOS U/L   --    --    --    --   115   --   97  84   ALT (SGPT) U/L   --    --    --    --   18   --   18  23   AST (SGOT) U/L   --    --    --    --   43*   --   47*  32   ANION GAP mmol/L  8.0  8.0  8.0   < >  12.0   < >  11.0  12.0    < > = values in this interval not displayed.     Estimated Creatinine Clearance: 35 mL/min (A) (by C-G formula based on SCr of 1.84 mg/dL (H)).  Results from last 7 days   Lab Units  01/08/19 2036  01/07/19   0518  01/05/19   0120   MAGNESIUM mg/dL  1.7  2.0  2.0         Results from last 7 days   Lab Units  01/10/19   0529  01/09/19 2023 01/09/19 0749  01/08/19 0728 01/07/19 1954 01/07/19 0518   WBC 10*3/mm3  5.60   --   4.31  4.97  6.84  4.28   HEMOGLOBIN g/dL  9.0*  8.3*  7.3*  8.2*  9.1*  7.7*   HEMATOCRIT %  27.5*  25.5*  22.5*  24.6*  28.2*  23.4*   PLATELETS 10*3/mm3  117*   --   96*  130*  137*  118*           Culture Data:   No results found for: BLOODCX  No results found for: URINECX  No  results found for: RESPCX  No results found for: WOUNDCX  No results found for: STOOLCX  No components found for: BODYFLD    Radiology Data:   Imaging Results (last 24 hours)     Procedure Component Value Units Date/Time    NM Lung Scan Perfusion Particulate [[id number]] Collected:  01/08/19 1353     Updated:  01/09/19 1113    Narrative:       PROCEDURE: NM LUNG SCAN PERFUSION PARTICULATE    CLINICAL HISTORY:     Chest pain, acute, PE suspected, intermed prob, negative D-dimer,  I50.9 Heart failure, unspecified R74.8 Abnormal levels of other  serum enzymes    INDICATION: Same as above    COMPARISON:     Chest x-ray, 1/7/2019. Nuclear medicine ventilation perfusion  scan done on 8/4/2014    TECHNIQUE:     Nuclear medicine perfusion scan was obtained following  intravenous administration of 6.1 mCi technetium 99m-MAA. Planar  images were obtained in the anterior projection for ventilation  with multi-planar imaging for perfusion.    The patient was unable to and delayed due to claustrophobia    FINDINGS:     The ventilation scan was not done    The perfusion scan shows near normal bilateral lung perfusion .      Impression:         Very low probability for a pulmonary embolus.    Electronically signed by:  Sincere Long MD  1/9/2019 11:12 AM CST  Workstation: [email or web address]          I have reviewed the patient's current medications.     Assessment/Plan     Hospital Problem List:  Principal Problem:  - Acute on chronic diastolic CHF (congestive heart failure): Continue diuretics (with adjustments secondary to weight gain), strict I's and O's, daily weights and fluid restriction.  Cardiologist/ Heart failure Navigator is following.  Patient's weight is 243 pounds today and her dry weight is between 230 and 235.  Echocardiogram done January 8, 2019 showed:  · Left ventricular wall thickness is consistent with mild concentric hypertrophy.  · Estimated EF = 57%.  · Left ventricular systolic function is normal.  · Left  ventricular diastolic dysfunction (grade I a) consistent with impaired relaxation.  · Right ventricular cavity is mildly dilated.  · Left atrial cavity size is mildly dilated.  · Mild aortic valve regurgitation is present.  · Moderate mitral valve regurgitation is present  · Moderate tricuspid valve regurgitation is present.  · The tricuspid valve is grossly normal. Moderate tricuspid valve regurgitation is present. Estimated right ventricular systolic pressure from tricuspid regurgitation is markedly elevated (>55 mmHg). Moderate to severe pulmonary hypertension is present.  · Mild pulmonic valve regurgitation is present.     - History of coronary artery disease with possible non-STEMI: Continue guidelines directed medical therapy.  Result of echocardiogram is as dictated above.   - Chronic renal impairment, stage 3: Creatinine is 1.84 today.  Continue to monitor.  Nephrologist is following.    - Elevated d-dimer is likely reactive. VQ scan showed low probability for pulmonary embolism and Duplex ultrasound both lower extremities is negative for DVT.   - History of moderate to severe pulmonary hypertension: Patient will be seen for follow-up by Dr. Byrd as outpatient on discharge.   - Hypertension: Stable. Continue current medications and make adjustments as needed for optimal blood pressure control.  - Asymptomatic bradycardia: Reduce dose of Coreg to 6.25 mg by mouth twice a day.  - Oxygen dependent COPD: Patient is currently not in exacerbation.  Continue supplemental oxygen and bronchodilators.  - Diabetes mellitus: Stable. Continue insulin pump with Accu-Cheks.  - Chronic anemia: Hemoglobin is up to 9.0 today following transfusion of one unit of packed red blood cells. Iron profile is unremarkable and stool for Hemoccult is pending. Continue to monitor.  - Obstructive sleep apnea: Continue CPAP.  - Chronic pain syndrome/chronic insomnia: Continue home medications.  - Chronic thrombocytopenia: Platelet  count is up to 117,000 today.  Her baseline seem to be 110 to 130K.  Continue to monitor.  - Continue GI and DVT prophylaxis.           Discharge Planning: In progress.    Catracho Jarrell MD   01/10/19   10:09 AM

## 2019-01-10 NOTE — PROGRESS NOTES
"[facility]  [address] [place] [address]. [address]  T - [phone]  F - [phone]     Progress Note          PATIENT  DEMOGRAPHICS   PATIENT NAME: Nicolasa Rojas                      PHYSICIAN: DOMINGA Padilla  : 1950  MRN: [id number]   LOS: 1 day    Patient Care Team:  Henry Muniz MD as PCP - General (Internal Medicine)  Subjective   SUBJECTIVE   Pt is more SOA today. Received cheeseburger and chips for supper, sodium content likely contributed to her fluid gains.         Objective   OBJECTIVE   Vital Signs  Temp:  [96.8 °F (36 °C)-98.2 °F (36.8 °C)] 98.1 °F (36.7 °C)  Heart Rate:  [45-67] 57  Resp:  [17-18] 18  BP: (112-153)/(51-82) 132/63    Flowsheet Rows      First Filed Value   Admission Height  162.6 cm (64\") Documented at 2019 1800   Admission Weight  113 kg (248 lb 8 oz) Documented at 2019 1800           I/O last 3 completed shifts:  In: 1050 [P.O.:750; Blood:300]  Out: 1450 [Urine:1450]    PHYSICAL EXAM    Physical Exam   Constitutional: She is oriented to person, place, and time. She appears well-developed.   HENT:   Head: Normocephalic and atraumatic.   Eyes: Pupils are equal, round, and reactive to light.   Neck: Normal range of motion.   Cardiovascular: Normal rate, regular rhythm and normal heart sounds.   Pulmonary/Chest: She has wheezes. She has rales.   Abdominal: Soft. Bowel sounds are normal.   Musculoskeletal: She exhibits edema.   Neurological: She is alert and oriented to person, place, and time.   Skin: Skin is warm.   Psychiatric: She has a normal mood and affect. Her behavior is normal.       RESULTS   Results Review:    Results from last 7 days   Lab Units  01/10/19   0529  19   1547  19   0658   19   1954   19   1451  19   1657   SODIUM mmol/L  137  133*  137   < >  136*   < >  123*  127*   POTASSIUM mmol/L  4.1  4.4  4.2   < >  3.8   < >  2.7*  2.9*   CHLORIDE mmol/L  102  97  101   < >  96   < > "  83*  86*   CO2 mmol/L  27.0  28.0  28.0   < >  28.0   < >  29.0  29.0   BUN mg/dL  60*  54*  51*   < >  47*   < >  49*  49*   CREATININE mg/dL  1.84*  1.93*  1.84*   < >  1.21*   < >  1.56*  1.38*   CALCIUM mg/dL  8.7  8.5  8.3*   < >  8.9   < >  8.5  8.3*   BILIRUBIN mg/dL   --    --    --    --   2.1*   --   1.8*  1.6*   ALK PHOS U/L   --    --    --    --   115   --   97  84   ALT (SGPT) U/L   --    --    --    --   18   --   18  23   AST (SGOT) U/L   --    --    --    --   43*   --   47*  32   GLUCOSE mg/dL  78  107*  76   < >  164*   < >  200*  142*    < > = values in this interval not displayed.       Estimated Creatinine Clearance: 35 mL/min (A) (by C-G formula based on SCr of 1.84 mg/dL (H)).    Results from last 7 days   Lab Units  01/08/19 2036 01/07/19   0518  01/05/19   0120   MAGNESIUM mg/dL  1.7  2.0  2.0             Results from last 7 days   Lab Units  01/10/19   0529  01/09/19 2023 01/09/19   0749  01/08/19   0728  01/07/19 1954 01/07/19   0518   WBC 10*3/mm3  5.60   --   4.31  4.97  6.84  4.28   HEMOGLOBIN g/dL  9.0*  8.3*  7.3*  8.2*  9.1*  7.7*   PLATELETS 10*3/mm3  117*   --   96*  130*  137*  118*               Imaging Results (last 24 hours)     Procedure Component Value Units Date/Time    NM Lung Scan Perfusion Particulate [[id number]] Collected:  01/08/19 1353     Updated:  01/09/19 1113    Narrative:       PROCEDURE: NM LUNG SCAN PERFUSION PARTICULATE    CLINICAL HISTORY:     Chest pain, acute, PE suspected, intermed prob, negative D-dimer,  I50.9 Heart failure, unspecified R74.8 Abnormal levels of other  serum enzymes    INDICATION: Same as above    COMPARISON:     Chest x-ray, 1/7/2019. Nuclear medicine ventilation perfusion  scan done on 8/4/2014    TECHNIQUE:     Nuclear medicine perfusion scan was obtained following  intravenous administration of 6.1 mCi technetium 99m-MAA. Planar  images were obtained in the anterior projection for ventilation  with multi-planar imaging for  perfusion.    The patient was unable to and delayed due to claustrophobia    FINDINGS:     The ventilation scan was not done    The perfusion scan shows near normal bilateral lung perfusion .      Impression:         Very low probability for a pulmonary embolus.    Electronically signed by:  Sincere Long MD  1/9/2019 11:12 AM [facility]  Workstation: [id number]           MEDICATIONS      amLODIPine 5 mg Oral Daily   aspirin 81 mg Oral Daily   atorvastatin 40 mg Oral Nightly   bumetanide 2 mg Oral Daily   carvedilol 6.25 mg Oral BID With Meals   clopidogrel 75 mg Oral Daily   enoxaparin 40 mg Subcutaneous Q24H   famotidine 40 mg Oral Daily   HYDROcodone-acetaminophen 1 tablet Oral Q6H   isosorbide mononitrate 60 mg Oral BID   lisinopril 10 mg Oral Q24H   sodium chloride 3 mL Intravenous Q12H   spironolactone 25 mg Oral Daily       insulin        Assessment/Plan   ASSESSMENT / PLAN      Dyspnea    Acute on chronic diastolic CHF (congestive heart failure) (CMS/HCC)    Acute on chronic heart failure (CMS/HCC)    Chronic renal impairment, stage 3 (moderate) (CMS/HCC)    CAD (coronary artery disease)       1. CKD3- baseline creatinine close to 1.6-1.7.  She has a history of contrast-induced nephropathy back in august 2018 after she had left heart catheterization.  She had multiple stents placed for non-ST elevation MI back in August 2018. FENa <1%, bumex lowered to daily.    -Cr stable from yesterday with some slight improvement    -Will give an extra dose of bumex this evening due to weight gain and SOA     2. Acute on chronic diastolic heart failure-  Plan as above with the diuretics, daily weights, and I and O's, and fluid restriction agreed with dr Purvis about lowering amlodipine and since bp is better I will stop it completely     3. CAD- prior non-ST elevation MI with stenting back in August 2018 to LAD and circumflex     4. Hypokalemia- improved    5. Hyponatremia hypervolemic type now improved.             This  document has been electronically signed by DOMINGA Padilla on January 10, 2019 9:49 AM      For this patient encounter, I have reviewed the Nurse Practitioner's documentation, medical decision making, and treatment plan and personally spent time with the patient.

## 2019-01-10 NOTE — PLAN OF CARE
Problem: Patient Care Overview  Goal: Plan of Care Review   01/10/19 1330   Coping/Psychosocial   Plan of Care Reviewed With spouse;patient   Plan of Care Review   Progress improving   OTHER   Outcome Summary sup-sit cond ind,sit-stand-sit cga of 1,amb 126' with rw and min of 1-no goals met-if d/c would benefit from hh pt and 24/7 assist     Goal: Discharge Needs Assessment   01/09/19 1425   Discharge Needs Assessment   Readmission Within the Last 30 Days previous discharge plan unsuccessful   Concerns to be Addressed denies needs/concerns at this time   Patient/Family Anticipates Transition to home with family;home with help/services   Patient/Family Anticipated Services at Transition home health care   Transportation Concerns car, none   Transportation Anticipated family or friend will provide   Anticipated Changes Related to Illness none   Equipment Needed After Discharge none   Outpatient/Agency/Support Group Needs homecare agency   Discharge Facility/Level of Care Needs home with home health   Offered/Gave Vendor List no   Patient's Choice of Community Agency(s) Patient is active with [facility].   Current Discharge Risk chronically ill;dependent with mobility/activities of daily living   Disability   Equipment Currently Used at Home bipap/cpap;commode;glucometer;oxygen;ramp;rollator;walker, standard;wheelchair;wheelchair, motorized

## 2019-01-11 NOTE — SIGNIFICANT NOTE
01/11/19 1534   Rehab Treatment   Discipline physical therapy assistant   Reason Treatment Not Performed patient/family declined treatment  (pt declines tx. states she has blood sugar of 60. nsg requests not to push pt if she doesn't want to participate. no tx. )

## 2019-01-11 NOTE — PLAN OF CARE
Problem: Patient Care Overview  Goal: Plan of Care Review   01/11/19 1020   Coping/Psychosocial   Plan of Care Reviewed With patient   Plan of Care Review   Progress improving   OTHER   Outcome Summary sup-sit cga of 1, sit-stand-sit cga of 1,amb 40,45,52,36 with rw and min of 1 and 1 to follow with w/c-no new goals met     Goal: Discharge Needs Assessment   01/09/19 1425   Discharge Needs Assessment   Readmission Within the Last 30 Days previous discharge plan unsuccessful   Concerns to be Addressed denies needs/concerns at this time   Patient/Family Anticipates Transition to home with family;home with help/services   Patient/Family Anticipated Services at Transition home health care   Transportation Concerns car, none   Transportation Anticipated family or friend will provide   Anticipated Changes Related to Illness none   Equipment Needed After Discharge none   Outpatient/Agency/Support Group Needs homecare agency   Discharge Facility/Level of Care Needs home with home health   Offered/Gave Vendor List no   Patient's Choice of Community Agency(s) Patient is active with [facility].   Current Discharge Risk chronically ill;dependent with mobility/activities of daily living   Disability   Equipment Currently Used at Home bipap/cpap;commode;glucometer;oxygen;ramp;rollator;walker, standard;wheelchair;wheelchair, motorized

## 2019-01-11 NOTE — THERAPY TREATMENT NOTE
Acute Care - Physical Therapy Treatment Note  [facility]     Patient Name: Nicolasa Rojas  : 1950  MRN: [id number]  Today's Date: 2019  Onset of Illness/Injury or Date of Surgery: 19  Date of Referral to PT: 19  Referring Physician: Dr. Catracho Jarrell    Admit Date: 2019    Visit Dx:    ICD-10-CM ICD-9-CM   1. Acute on chronic heart failure, unspecified heart failure type (CMS/HCC) I50.9 428.0   2. Elevated troponin R74.8 790.6   3. Impaired mobility and activities of daily living Z74.09 799.89   4. Impaired physical mobility Z74.09 781.99     Patient Active Problem List   Diagnosis   • Hyponatremia   • Acute on chronic diastolic CHF (congestive heart failure) (CMS/HCC)   • Hypokalemia   • Acute on chronic heart failure (CMS/HCC)   • Dyspnea   • Chronic renal impairment, stage 3 (moderate) (CMS/HCC)   • CAD (coronary artery disease)       Therapy Treatment    Rehabilitation Treatment Summary     Row Name 19 1108 19 1020          Treatment Time/Intention    Discipline  occupational therapy assistant  -  physical therapy assistant  -LN     Document Type  therapy note (daily note)  -  therapy note (daily note)  -LN     Subjective Information  no complaints  -  --     Mode of Treatment  individual therapy;occupational therapy  -  physical therapy  -LN     Patient/Family Observations  Pt seated EOB awaiting ADL  -  --     Therapy Frequency (PT Clinical Impression)  --  other (see comments) 5-14 times per week  -LN     Therapy Frequency (OT Eval)  other (see comments) 5-7 days a wk  -  --     Patient Effort  adequate  -  adequate  -LN     Existing Precautions/Restrictions  fall;oxygen therapy device and L/min  -  fall;oxygen therapy device and L/min  -LN     Recorded by [JH] Maira Murphy COTA/L 19 1450 [LN] Elinor Kennedy PTA 19 1348     Row Name 19 1108 19 1020          Vital Signs    Pre Systolic BP Rehab  --  169  -LN     Pre  Treatment Diastolic BP  --  72  -LN     Post Systolic BP Rehab  --  184  -LN     Post Treatment Diastolic BP  --  75  -LN     Pretreatment Heart Rate (beats/min)  60  -  51  -LN     Intratreatment Heart Rate (beats/min)  --  66  -LN     Posttreatment Heart Rate (beats/min)  --  57  -LN     Pre SpO2 (%)  94  -  93  -LN     O2 Delivery Pre Treatment  supplemental O2  -  supplemental O2  -LN     Intra SpO2 (%)  --  94  -LN     Post SpO2 (%)  --  97  -LN     Pre Patient Position  --  Supine  -LN     Intra Patient Position  --  Standing  -LN     Post Patient Position  --  Sitting  -LN     Recorded by [] Maira Murphy COTA/L 01/11/19 1450 [LN] Elinor Kennedy, PTA 01/11/19 1348     Row Name 01/11/19 1108 01/11/19 1020          Cognitive Assessment/Intervention- PT/OT    Orientation Status (Cognition)  oriented x 4  -  oriented x 4  -LN     Follows Commands (Cognition)  --  follows one step commands;over 90% accuracy  -LN     Recorded by [] Maira Murphy COTA/L 01/11/19 1450 [LN] Elinor Kennedy, PTA 01/11/19 1348     Row Name 01/11/19 1108 01/11/19 1020          Bed Mobility Assessment/Treatment    Bed Mobility Assessment/Treatment  --  rolling right  -LN     [place] Level (Bed Mobility)  --  conditional independence  -LN     Rolling Left [place] (Bed Mobility)  conditional independence  -  conditional independence  -LN     Rolling Right [place] (Bed Mobility)  conditional independence  -  conditional independence  -LN     Scooting/Bridging [place] (Bed Mobility)  verbal cues;minimum assist (75% patient effort)  -  minimum assist (75% patient effort)  -LN     Supine-Sit [place] (Bed Mobility)  verbal cues;contact guard  -  contact guard  -LN     Sit-Supine [place] (Bed Mobility)  supervision;verbal cues  -  not tested  -LN     Supine-Sit-Supine [place] (Bed Mobility)  contact guard;verbal cues  -  --     Recorded by [] Maira Murphy HOOPER/L 01/11/19 1450  [LN] Elinor Kennedy S, PTA 01/11/19 1348     Row Name 01/11/19 1108 01/11/19 1020          Sit-Stand Transfer    Sit-Stand [place] (Transfers)  contact guard  -JH  contact guard  -LN     Assistive Device (Sit-Stand Transfers)  --  -- none  -LN     Recorded by [JH] Maira Murphy HOOPER/L 01/11/19 1450 [LN] Elinor Kennedy, PTA 01/11/19 1348     Row Name 01/11/19 1108 01/11/19 1020          Stand-Sit Transfer    Stand-Sit [place] (Transfers)  contact guard  -JH  contact guard  -LN     Assistive Device (Stand-Sit Transfers)  --  -- none  -LN     Recorded by [JH] Maira Murphy COTA/L 01/11/19 1450 [LN] Elinor Kennedy S, PTA 01/11/19 1348     Row Name 01/11/19 1020             Toilet Transfer    [place] Level (Toilet Transfer)  not tested  -LN      Recorded by [LN] Elinor Kennedy, PTA 01/11/19 1348      Row Name 01/11/19 1020             Gait/Stairs Assessment/Training    [place] Level (Gait)  contact guard  -LN      Assistive Device (Gait)  walker, front-wheeled  -LN      Distance in Feet (Gait)  40,45,52,36  -LN2      Comment (Gait/Stairs)  spouse followed with w/c  -LN2      Recorded by [LN] Elinor Kennedy, PTA 01/11/19 1348  [LN2] Elinor Kennedy, PTA 01/11/19 1600      Row Name 01/11/19 1108             Bathing Assessment/Intervention    Bathing [place] Level  bathing skills;lower body;upper body;conditional independence  -JH      Assistive Devices (Bathing)  bath mitt  -JH      Bathing Position  edge of bed sitting;supine  -JH      Comment (Bathing)  Pt and spouse insist on completing together as they do at home   (Significant)   -JH      Recorded by [JH] Maira Murphy HOOPER/L 01/11/19 1450      Row Name 01/11/19 1108             Upper Body Dressing Assessment/Training    Upper Body Dressing [place] Level  upper body dressing skills;doff;don;pajama/robe  -JH      Upper Body Dressing Position  edge of bed sitting;supine  -JH      Recorded by [JH] Maira Murphy, RUFUS/L 01/11/19 6546      Row  Name 01/11/19 1108             Lower Body Dressing Assessment/Training    Lower Body Dressing [place] Level  doff;don;socks;undergarment;conditional independence  -      Lower Body Dressing Position  edge of bed sitting  -      Recorded by [JH] Maira Murphy COTA/L 01/11/19 1450      Row Name 01/11/19 1108             Grooming Assessment/Training    [place] Level (Grooming)  grooming skills;hair care, combing/brushing;wash face, hands;conditional independence  -      Grooming Position  edge of bed sitting  -      Recorded by [] Maira Murphy COTA/L 01/11/19 1450      Row Name 01/11/19 1020             Lower Extremity Seated Therapeutic Exercise    Performed, Seated Lower Extremity (Therapeutic Exercise)  hip flexion/extension;hip abduction/adduction;ankle dorsiflexion/plantarflexion;LAQ (long arc quad), knee extension  -LN      Exercise Type, Seated Lower Extremity (Therapeutic Exercise)  AROM (active range of motion)  -LN      Sets/Reps Detail, Seated Lower Extremity (Therapeutic Exercise)  1/15  -LN      Recorded by [LN] Elinor Kennedy PTA 01/11/19 1348      Row Name 01/11/19 1108 01/11/19 1020          Positioning and Restraints    Pre-Treatment Position  in bed  -JH  --     Post Treatment Position  bed  -  bed  -LN     In Bed  sitting EOB;call light within reach;encouraged to call for assist;with family/caregiver  -  sitting EOB;call light within reach;encouraged to call for assist;with family/caregiver  -LN     Recorded by [JH] Maira Murphy COTA/L 01/11/19 1450 [LN] Elinor Kennedy PTA 01/11/19 1348     Row Name 01/11/19 1108 01/11/19 1020          Pain Scale: Numbers Pre/Post-Treatment    Pain Scale: Numbers, Pretreatment  10/10  -  10/10  -LN     Pain Scale: Numbers, Post-Treatment  10/10  -  10/10  -LN     Pain Location  back  -  back hips  -LN     Pain Intervention(s)  Repositioned;Medication (See MAR)  -  -- nsg aware-going to give scheduled meds  -LN     Recorded  by [] Maira Murphy COTA/L 01/11/19 1450 [LN] Elinor Kennedy, PTA 01/11/19 1348     Row Name 01/11/19 1020             Vision Assessment/Intervention    Visual Impairment/Limitations  corrective lenses for reading  -LN      Recorded by [LN] Elinor Kennedy, PTA 01/11/19 1348      Row Name 01/11/19 1020             Coping    Observed Emotional State  calm;cooperative  -LN      Verbalized Emotional State  acceptance  -LN      Recorded by [LN] Elinor Kennedy, PTA 01/11/19 1348      Row Name 01/11/19 1020             Plan of Care Review    Plan of Care Reviewed With  patient  -LN      Recorded by [LN] Elinor Kennedy, PTA 01/11/19 1348      Row Name 01/11/19 1108 01/11/19 1020          Outcome Summary/Treatment Plan (OT)    Daily Summary of Progress (OT)  progress toward functional goals is good  [facility]  --     Plan for Continued Treatment (OT)  continue per [facility]  -  --     Anticipated Discharge Disposition (OT)  home with home health;home with 24/7 care  -  home with home health;home with 24/7 care  -LN     Recorded by [] Maira Murphy COTA/L 01/11/19 1450 [LN] Elinor Kennedy, PTA 01/11/19 1348     Row Name 01/11/19 1020             Outcome Summary/Treatment Plan (PT)    Anticipated Discharge Disposition (PT)  home with 24/7 care;home with home health  -LN      Recorded by [LN] Elinor Kennedy, PTA 01/11/19 1348        User Key  (r) = Recorded By, (t) = Taken By, (c) = Cosigned By    Initials Name Effective Dates Discipline     Elinor Kennedy, PTA 03/07/18 -  PT     Maira Murphy HOOPER/L 03/07/18 -  OT               Rehab Goal Summary     Row Name 01/11/19 1108 01/11/19 1020          Physical Therapy Goals    Bed Mobility Goal Selection (PT)  --  bed mobility, PT goal 1  -LN     Transfer Goal Selection (PT)  --  transfer, PT goal 1  -LN     Gait Training Goal Selection (PT)  --  gait training, PT goal 1  -LN     Strength Goal Selection (PT)  --  strength, PT goal 1  -LN        Bed Mobility Goal 1 (PT)     Activity/Assistive Device (Bed Mobility Goal 1, PT)  --  rolling to left;rolling to right;sit to supine;supine to sit  -LN     [place] Level/Cues Needed (Bed Mobility Goal 1, PT)  --  independent  -LN     Time Frame (Bed Mobility Goal 1, PT)  --  by discharge  -LN     Progress/Outcomes (Bed Mobility Goal 1, PT)  --  goal not met  -LN        Transfer Goal 1 (PT)    Activity/Assistive Device (Transfer Goal 1, PT)  --  sit-to-stand/stand-to-sit;bed-to-chair/chair-to-bed  -LN     [place] Level/Cues Needed (Transfer Goal 1, PT)  --  conditional independence  -LN     Time Frame (Transfer Goal 1, PT)  --  by discharge  -LN     Progress/Outcome (Transfer Goal 1, PT)  --  goal not met  -LN        Gait Training Goal 1 (PT)    Activity/Assistive Device (Gait Training Goal 1, PT)  --  gait (walking locomotion);assistive device use  -LN     [place] Level (Gait Training Goal 1, PT)  --  standby assist;conditional independence  -LN     Distance (Gait Goal 1, PT)  --  50ftx2  -LN     Time Frame (Gait Training Goal 1, PT)  --  by discharge  -LN     Progress/Outcome (Gait Training Goal 1, PT)  --  goal not met  -LN        Strength Goal 1 (PT)    Strength Goal 1 (PT)  --  Pt will tolerate 2 sets of 15 reps of AROM exercises OOB in chair with VSS  -LN     Time Frame (Strength Goal 1, PT)  --  by discharge  -LN     Progress/Outcome (Strength Goal 1, PT)  --  goal not met  -LN        Occupational Therapy Goals    Transfer Goal Selection (OT)  transfer, OT goal 1  -JH  --     Bathing Goal Selection (OT)  bathing, OT goal 1  -  --     Dressing Goal Selection (OT)  dressing, OT goal 1  -JH  --     Toileting Goal Selection (OT)  toileting, OT goal 1  -  --     Activity Tolerance Goal Selection (OT)  activity tolerance, OT goal 1  -  --        Transfer Goal 1 (OT)    Activity/Assistive Device (Transfer Goal 1, OT)  sit-to-stand/stand-to-sit;bed-to-chair/chair-to-bed;toilet  -  --     [place] Level/Cues Needed  (Transfer Goal 1, OT)  conditional independence  -JH  --     Time Frame (Transfer Goal 1, OT)  long term goal (LTG);by discharge  -  --     Progress/Outcome (Transfer Goal 1, OT)  goal not met  -JH  --        Bathing Goal 1 (OT)    Activity/Assistive Device (Bathing Goal 1, OT)  bathing skills, all  -JH  --     [place] Level/Cues Needed (Bathing Goal 1, OT)  minimum assist (75% or more patient effort)  -JH  --     Time Frame (Bathing Goal 1, OT)  long term goal (LTG);by discharge  -  --     Progress/Outcomes (Bathing Goal 1, OT)  goal met;goal ongoing  -JH  --        Dressing Goal 1 (OT)    Activity/Assistive Device (Dressing Goal 1, OT)  dressing skills, all  -JH  --     [place]/Cues Needed (Dressing Goal 1, OT)  minimum assist (75% or more patient effort)  -JH  --     Time Frame (Dressing Goal 1, OT)  long term goal (LTG);by discharge  -  --     Progress/Outcome (Dressing Goal 1, OT)  goal met;goal ongoing  -JH  --        Toileting Goal 1 (OT)    Activity/Device (Toileting Goal 1, OT)  toileting skills, all  -JH  --     [place] Level/Cues Needed (Toileting Goal 1, OT)  conditional independence  -JH  --     Time Frame (Toileting Goal 1, OT)  long term goal (LTG);by discharge  -  --     Progress/Outcome (Toileting Goal 1, OT)  goal not met  -  --         Activity Tolerance Goal 1 (OT)    Activity Level (Endurance Goal 1, OT)  10 min activity;O2 sat >/ equal to 88%  -  --     Time Frame (Activity Tolerance Goal 1, OT)  long term goal (LTG);by discharge  -  --       User Key  (r) = Recorded By, (t) = Taken By, (c) = Cosigned By    Initials Name Provider Type Discipline    Elinor Loomis, KOURTNEY Physical Therapy Assistant PT    Maira Parmar COTA/L Occupational Therapy Assistant OT          Physical Therapy Education     Title: PT OT SLP Therapies (In Progress)     Topic: Physical Therapy (Done)     Point: Mobility training (Done)     Learning Progress Summary           Patient  Acceptance, E,TB, VU by LN at 1/11/2019  1:49 PM    Acceptance, E,TB, VU,NR by LN at 1/10/2019  2:45 PM   Family Acceptance, E,TB, VU by LN at 1/11/2019  1:49 PM    Acceptance, E,TB, VU,NR by LN at 1/10/2019  2:45 PM                   Point: Home exercise program (Done)     Learning Progress Summary           Patient Acceptance, E,TB, VU by LN at 1/11/2019  1:49 PM    Acceptance, E,TB, VU,NR by LN at 1/10/2019  2:45 PM   Family Acceptance, E,TB, VU by LN at 1/11/2019  1:49 PM    Acceptance, E,TB, VU,NR by LN at 1/10/2019  2:45 PM                   Point: Body mechanics (Done)     Learning Progress Summary           Patient Acceptance, E,TB, VU by LN at 1/11/2019  1:49 PM    Acceptance, E,TB, VU,NR by LN at 1/10/2019  2:45 PM   Family Acceptance, E,TB, VU by LN at 1/11/2019  1:49 PM    Acceptance, E,TB, VU,NR by LN at 1/10/2019  2:45 PM                   Point: Precautions (Done)     Learning Progress Summary           Patient Acceptance, E,TB, VU by LN at 1/11/2019  1:49 PM    Acceptance, E,TB, VU,NR by LN at 1/10/2019  2:45 PM   Family Acceptance, E,TB, VU by LN at 1/11/2019  1:49 PM    Acceptance, E,TB, VU,NR by LN at 1/10/2019  2:45 PM                               User Key     Initials Effective Dates Name Provider Type Discipline     03/07/18 -  Elinor Kennedy, PTA Physical Therapy Assistant PT                PT Recommendation and Plan  Anticipated Discharge Disposition (PT): home with 24/7 care, home with home health  Therapy Frequency (PT Clinical Impression): other (see comments)(5-14 times per week)  Outcome Summary/Treatment Plan (PT)  Plan for Continued Treatment (PT): cont  Anticipated Discharge Disposition (PT): home with 24/7 care, home with home health  Plan of Care Reviewed With: patient  Progress: improving  Outcome Summary: sup-sit cga of 1, sit-stand-sit cga of 1,amb 40,45,52,36 with rw and min of 1 and 1 to follow with w/c-no new goals met  Outcome Measures     Row Name 01/11/19 1108 01/11/19  1020 01/10/19 1330       How much help from another person do you currently need...    Turning from your back to your side while in flat bed without using bedrails?  --  4  -LN  4  -LN    Moving from lying on back to sitting on the side of a flat bed without bedrails?  --  3  -LN  4  -LN    Moving to and from a bed to a chair (including a wheelchair)?  --  3  -LN  3  -LN    Standing up from a chair using your arms (e.g., wheelchair, bedside chair)?  --  3  -LN  3  -LN    Climbing 3-5 steps with a railing?  --  3  -LN  3  -LN    To walk in hospital room?  --  3  -LN  3  -LN    AM-PAC 6 Clicks Score  --  19  -LN  20  -LN       How much help from another is currently needed...    Putting on and taking off regular lower body clothing?  3  -JH  --  --    Bathing (including washing, rinsing, and drying)  3  -JH  --  --    Toileting (which includes using toilet bed pan or urinal)  3  -  --  --    Putting on and taking off regular upper body clothing  3  -JH  --  --    Taking care of personal grooming (such as brushing teeth)  3  -JH  --  --    Eating meals  3  -JH  --  --    Score  18  -JH  --  --       Functional Assessment    Outcome Measure Options  --  AM-PAC 6 Clicks Basic Mobility (PT)  -LN  AM-PAC 6 Clicks Basic Mobility (PT)  -LN    Row Name 01/10/19 1000 01/09/19 1655 01/09/19 1105       How much help from another person do you currently need...    Turning from your back to your side while in flat bed without using bedrails?  --  3  -KRISSY  --    Moving from lying on back to sitting on the side of a flat bed without bedrails?  --  2  -KRISSY  --    Moving to and from a bed to a chair (including a wheelchair)?  --  2  -KRISSY  --    Standing up from a chair using your arms (e.g., wheelchair, bedside chair)?  --  2  -KRISSY  --    Climbing 3-5 steps with a railing?  --  2  -KRISSY  --    To walk in hospital room?  --  2  -KRSISY  --    AM-PAC 6 Clicks Score  --  13  -KRISSY  --       How much help from another is currently needed...     Putting on and taking off regular lower body clothing?  2  -JH  --  3  -MH    Bathing (including washing, rinsing, and drying)  3  -JH  --  3  -MH    Toileting (which includes using toilet bed pan or urinal)  3  -JH  --  3  -MH    Putting on and taking off regular upper body clothing  3  -JH  --  3  -MH    Taking care of personal grooming (such as brushing teeth)  4  -JH  --  4  -MH    Eating meals  4  -JH  --  4  -    Score  19  -JH  --  20  -MH       Functional Assessment    Outcome Measure Options  --  AM-PAC 6 Clicks Basic Mobility (PT)  -  AM-PAC 6 Clicks Daily Activity (OT)  -      User Key  (r) = Recorded By, (t) = Taken By, (c) = Cosigned By    Initials Name Provider Type    Evelyn Manuel, PT Physical Therapist    LN Elinor Kennedy PTA Physical Therapy Assistant     Maira Murphy, HOOPER/L Occupational Therapy Assistant     Dmitry Yuan Occupational Therapist         Time Calculation:   PT Charges     Row Name 01/11/19 1020             Time Calculation    Start Time  1020  -LN      Stop Time  1100  -LN      Time Calculation (min)  40 min  -LN      PT Received On  01/11/19  -LN         Time Calculation- PT    Total Timed Code Minutes- PT  40 minute(s)  -LN        User Key  (r) = Recorded By, (t) = Taken By, (c) = Cosigned By    Initials Name Provider Type    LN Elinor Kennedy PTA Physical Therapy Assistant        Therapy Suggested Charges     Code   Minutes Charges    None           Therapy Charges for Today     Code Description Service Date Service Provider Modifiers Qty    30251828337 HC GAIT TRAINING EA 15 MIN 1/10/2019 Elinor Kennedy S, PTA GP 1    17102112453 HC PT THERAPEUTIC ACT EA 15 MIN 1/10/2019 Elinor Kennedy S, PTA GP 1    31044704195 HC PT THER PROC EA 15 MIN 1/10/2019 Elinor Kennedy S, PTA GP 1    22886260565 HC PT THERAPEUTIC ACT EA 15 MIN 1/11/2019 Elinor Kennedy S, PTA GP 1    28205630621 HC GAIT TRAINING EA 15 MIN 1/11/2019 Elinor Kennedy S, PTA GP 1    56279977893 HC PT THER PROC EA  15 MIN 1/11/2019 Elinor Kennedy, PTA GP 1          PT G-Codes  Outcome Measure Options: AM-PAC 6 Clicks Basic Mobility (PT)  AM-PAC 6 Clicks Score: 19  Score: 18    Elinor Kennedy PTA  1/11/2019

## 2019-01-11 NOTE — THERAPY TREATMENT NOTE
Acute Care - Occupational Therapy Treatment Note  [facility]     Patient Name: Nicolasa Rojas  : 1950  MRN: [id number]  Today's Date: 2019  Onset of Illness/Injury or Date of Surgery: 19  Date of Referral to OT: 19  Referring Physician: Dr. Catracho Jarrell    Admit Date: 2019       ICD-10-CM ICD-9-CM   1. Acute on chronic heart failure, unspecified heart failure type (CMS/HCC) I50.9 428.0   2. Elevated troponin R74.8 790.6   3. Impaired mobility and activities of daily living Z74.09 799.89   4. Impaired physical mobility Z74.09 781.99     Patient Active Problem List   Diagnosis   • Hyponatremia   • Acute on chronic diastolic CHF (congestive heart failure) (CMS/HCC)   • Hypokalemia   • Acute on chronic heart failure (CMS/HCC)   • Dyspnea   • Chronic renal impairment, stage 3 (moderate) (CMS/HCC)   • CAD (coronary artery disease)     Past Medical History:   Diagnosis Date   • Acute bronchitis    • Acute congestive heart failure (CMS/HCC)     resolving   • Acute kidney failure, unspecified (CMS/HCC)    • Acute kidney failure, unspecified (CMS/HCC)    • Acute posthemorrhagic anemia    • Asthenia    • Chest pain    • Chronic gastritis    • Chronic pharyngitis    • Chronic renal impairment    • Congenital renal failure    • Congestive heart failure (CMS/HCC)    • Contact dermatitis due to poison ivy    • COPD (chronic obstructive pulmonary disease) (CMS/HCC)    • Coronary arteriosclerosis    • D-dimer, elevated     D-dimer above reference range    • Degenerative joint disease involving multiple joints     back   • Depressive disorder    • Dysphagia    • Dyspnea    • Edema of lower extremity    • Encounter for immunization    • Encounter for long-term (current) use of medications    • Epigastric pain    • Esophagitis    • Essential hypertension    • Gastroesophageal reflux disease    • Gastroparesis     Gastroparesis syndrome    • Generalized abdominal pain    • H/O bone density study  01/09/2015   • H/O mammogram 01/15/2015   • H/O screening mammography 11/12/2013   • Headache    • History of transfusion    • Hyperlipidemia    • Hypokalemia    • Hypomagnesemia    • Injury of tendon of rotator cuff     Injury of tendon of the rotator cuff of shoulder      • Insomnia    • Insomnia, unspecified    • Knee pain    • Nausea and vomiting    • Neck pain    • Need for immunization against influenza    • Need for prophylactic vaccination and inoculation against influenza    • Need for prophylactic vaccination and inoculation against viral disease    • 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 Performed at [facility].   • ENDOSCOPY  02/09/2015    ESOPHAGEAL STRICTURE PRESENT, GASTRITIS FOUND IN THE STOMACH, NORMAL DUODENUM   • ENDOSCOPY W/ PEG TUBE PLACEMENT  12/19/2012    Esophagitis seen. Biopsy taken. Esophageal stricture present. Dilatation performed. Gastritis in stomach. Biopsy taken. Food was in stomach. Normal duodenum.   • HERNIA REPAIR     • HYSTERECTOMY      partial   • NECK SURGERY         Therapy Treatment    Rehabilitation Treatment Summary     Row Name 01/11/19 1108 01/11/19 1020          Treatment Time/Intention    Discipline  occupational therapy assistant  -  physical therapy assistant  -LN     Document Type  therapy note (daily note)  -  therapy note (daily note)  -LN     Subjective Information  no complaints  -  --     Mode of Treatment  individual therapy;occupational therapy  -  physical therapy  -LN     Patient/Family Observations  Pt seated EOB awaiting ADL  -  --      Therapy Frequency (PT Clinical Impression)  --  other (see comments) 5-14 times per week  -LN     Therapy Frequency (OT Eval)  other (see comments) 5-7 days a wk  -  --     Patient Effort  adequate  -  adequate  -LN     Existing Precautions/Restrictions  fall;oxygen therapy device and L/min  -  fall;oxygen therapy device and L/min  -LN     Recorded by [] Maira Murphy HOOPER/L 01/11/19 1450 [LN] Elinor Kennedy, PTA 01/11/19 1348     Row Name 01/11/19 1108 01/11/19 1020          Vital Signs    Pre Systolic BP Rehab  --  169  -LN     Pre Treatment Diastolic BP  --  72  -LN     Post Systolic BP Rehab  --  184  -LN     Post Treatment Diastolic BP  --  75  -LN     Pretreatment Heart Rate (beats/min)  60  -  51  -LN     Intratreatment Heart Rate (beats/min)  --  66  -LN     Posttreatment Heart Rate (beats/min)  --  57  -LN     Pre SpO2 (%)  94  -  93  -LN     O2 Delivery Pre Treatment  supplemental O2  -  supplemental O2  -LN     Intra SpO2 (%)  --  94  -LN     Post SpO2 (%)  --  97  -LN     Pre Patient Position  --  Supine  -LN     Intra Patient Position  --  Standing  -LN     Post Patient Position  --  Sitting  -LN     Recorded by [] Maira Murphy COTA/L 01/11/19 1450 [LN] Elinor Kennedy, PTA 01/11/19 1348     Row Name 01/11/19 1108 01/11/19 1020          Cognitive Assessment/Intervention- PT/OT    Orientation Status (Cognition)  oriented x 4  -  oriented x 4  -LN     Follows Commands (Cognition)  --  follows one step commands;over 90% accuracy  -LN     Recorded by [] Maira Murphy COTA/L 01/11/19 1450 [LN] Elinor Kennedy PTA 01/11/19 1348     Row Name 01/11/19 1108 01/11/19 1020          Bed Mobility Assessment/Treatment    Bed Mobility Assessment/Treatment  --  rolling right  -LN     [place] Level (Bed Mobility)  --  conditional independence  -LN     Rolling Left [place] (Bed Mobility)  conditional independence  -  conditional independence  -LN     Rolling Right [place] (Bed  Mobility)  conditional independence  -  conditional independence  -LN     Scooting/[place]ridging [place] (Bed Mobility)  verbal cues;minimum assist (75% patient effort)  -  minimum assist (75% patient effort)  -LN     Supine-Sit [place] (Bed Mobility)  verbal cues;contact guard  -  contact guard  -LN     Sit-Supine [place] (Bed Mobility)  supervision;verbal cues  -  not tested  -     Supine-Sit-Supine [place] (Bed Mobility)  contact guard;verbal cues  -  --     Recorded by [] Maira Murphy HOOPER/L 01/11/19 1450 [LN] Elinor Kennedy, PTA 01/11/19 1348     Row Name 01/11/19 1108 01/11/19 1020          Sit-Stand Transfer    Sit-Stand [place] (Transfers)  contact guard  -  contact guard  -LN     Assistive Device (Sit-Stand Transfers)  --  -- none  -LN     Recorded by [] Maira Murphy HOOPER/L 01/11/19 1450 [LN] Elinor Kennedy PTA 01/11/19 1348     Row Name 01/11/19 1108 01/11/19 1020          Stand-Sit Transfer    Stand-Sit [place] (Transfers)  contact guard  -  contact guard  -LN     Assistive Device (Stand-Sit Transfers)  --  -- none  -LN     Recorded by [] Maira Murphy HOOPER/L 01/11/19 1450 [LN] Elinor Kennedy, PTA 01/11/19 1348     Row Name 01/11/19 1020             Toilet Transfer    [place] Level (Toilet Transfer)  not tested  -LN      Recorded by [LN] Elinor Kennedy PTA 01/11/19 1348      Row Name 01/11/19 1020             Gait/Stairs Assessment/Training    [place] Level (Gait)  contact guard  -LN      Assistive Device (Gait)  walker, front-wheeled  -LN      Recorded by [LN] Elinor Kennedy PTA 01/11/19 1348      Row Name 01/11/19 1108             Bathing Assessment/Intervention    Bathing [place] Level  bathing skills;lower body;upper body;conditional independence  -      Assistive Devices (Bathing)  bath mitt  -      Bathing Position  edge of bed sitting;supine  -      Comment (Bathing)  Pt and spouse insist on completing together as they do  at home   (Significant)   -JH      Recorded by [] Maira Murphy COTA/L 01/11/19 1450      Row Name 01/11/19 1108             Upper Body Dressing Assessment/Training    Upper Body Dressing [place] Level  upper body dressing skills;doff;don;pajama/robe  -      Upper Body Dressing Position  edge of bed sitting;supine  -JH      Recorded by [] Maira Murphy COTA/L 01/11/19 1450      Row Name 01/11/19 1108             Lower Body Dressing Assessment/Training    Lower Body Dressing [place] Level  doff;don;socks;undergarment;conditional independence  -      Lower Body Dressing Position  edge of bed sitting  -JH      Recorded by [] Maira Murphy COTA/L 01/11/19 1450      Row Name 01/11/19 1108             Grooming Assessment/Training    [place] Level (Grooming)  grooming skills;hair care, combing/brushing;wash face, hands;conditional independence  -      Grooming Position  edge of bed sitting  -JH      Recorded by [] Maira Murphy COTA/L 01/11/19 1450      Row Name 01/11/19 1020             Lower Extremity Seated Therapeutic Exercise    Performed, Seated Lower Extremity (Therapeutic Exercise)  hip flexion/extension;hip abduction/adduction;ankle dorsiflexion/plantarflexion;LAQ (long arc quad), knee extension  -LN      Exercise Type, Seated Lower Extremity (Therapeutic Exercise)  AROM (active range of motion)  -LN      Sets/Reps Detail, Seated Lower Extremity (Therapeutic Exercise)  1/15  -LN      Recorded by [LN] Elinor Kennedy PTA 01/11/19 1348      Row Name 01/11/19 1108 01/11/19 1020          Positioning and Restraints    Pre-Treatment Position  in bed  -  --     Post Treatment Position  bed  -JH  bed  -LN     In Bed  sitting EOB;call light within reach;encouraged to call for assist;with family/caregiver  -  sitting EOB;call light within reach;encouraged to call for assist;with family/caregiver  -LN     Recorded by [JH] Maira Murphy HOOPER/L 01/11/19 1450 [LN] Elinor Kennedy,  PTA 01/11/19 1348     Row Name 01/11/19 1108 01/11/19 1020          Pain Scale: Numbers Pre/Post-Treatment    Pain Scale: Numbers, Pretreatment  10/10  -  10/10  -LN     Pain Scale: Numbers, Post-Treatment  10/10  -  10/10  -LN     Pain Location  back  -  back hips  -LN     Pain Intervention(s)  Repositioned;Medication (See MAR)  -  -- nsg aware-going to give scheduled meds  -LN     Recorded by [] Maira Murphy COTA/L 01/11/19 1450 [LN] Elinor Kennedy, PTA 01/11/19 1348     Row Name 01/11/19 1020             Vision Assessment/Intervention    Visual Impairment/Limitations  corrective lenses for reading  -LN      Recorded by [LN] Elinor Kennedy, PTA 01/11/19 1348      Row Name 01/11/19 1020             Coping    Observed Emotional State  calm;cooperative  -LN      Verbalized Emotional State  acceptance  -LN      Recorded by [LN] Elinor Kennedy, PTA 01/11/19 1348      Row Name 01/11/19 1020             Plan of Care Review    Plan of Care Reviewed With  patient  -LN      Recorded by [LN] Elinor Kennedy, PTA 01/11/19 1348      Row Name 01/11/19 1108 01/11/19 1020          Outcome Summary/Treatment Plan (OT)    Daily Summary of Progress (OT)  progress toward functional goals is good  [facility]  --     Plan for Continued Treatment (OT)  continue per [facility]  -  --     Anticipated Discharge Disposition (OT)  home with home health;home with 24/7 care  -  home with home health;home with 24/7 care  -LN     Recorded by [] Maira Murphy COTA/L 01/11/19 1450 [LN] Elinor Kennedy, PTA 01/11/19 1348     Row Name 01/11/19 1020             Outcome Summary/Treatment Plan (PT)    Anticipated Discharge Disposition (PT)  home with 24/7 care;home with home health  -LN      Recorded by [LN] Elinor Kennedy, PTA 01/11/19 1348        User Key  (r) = Recorded By, (t) = Taken By, (c) = Cosigned By    Initials Name Effective Dates Discipline     Elinor Kennedy, PTA 03/07/18 -  PT    Maira Parmar COTA/L 03/07/18 -  OT           Rehab  Goal Summary     Row Name 01/11/19 1108 01/11/19 1020          Physical Therapy Goals    Bed Mobility Goal Selection (PT)  --  bed mobility, PT goal 1  -LN     Transfer Goal Selection (PT)  --  transfer, PT goal 1  -LN     Gait Training Goal Selection (PT)  --  gait training, PT goal 1  -LN     Strength Goal Selection (PT)  --  strength, PT goal 1  -LN        Bed Mobility Goal 1 (PT)    Activity/Assistive Device (Bed Mobility Goal 1, PT)  --  rolling to left;rolling to right;sit to supine;supine to sit  -LN     Story Level/Cues Needed (Bed Mobility Goal 1, PT)  --  independent  -LN     Time Frame (Bed Mobility Goal 1, PT)  --  by discharge  -LN     Progress/Outcomes (Bed Mobility Goal 1, PT)  --  goal not met  -LN        Transfer Goal 1 (PT)    Activity/Assistive Device (Transfer Goal 1, PT)  --  sit-to-stand/stand-to-sit;bed-to-chair/chair-to-bed  -LN     Story Level/Cues Needed (Transfer Goal 1, PT)  --  conditional independence  -LN     Time Frame (Transfer Goal 1, PT)  --  by discharge  -LN     Progress/Outcome (Transfer Goal 1, PT)  --  goal not met  -LN        Gait Training Goal 1 (PT)    Activity/Assistive Device (Gait Training Goal 1, PT)  --  gait (walking locomotion);assistive device use  -LN     Story Level (Gait Training Goal 1, PT)  --  standby assist;conditional independence  -LN     Distance (Gait Goal 1, PT)  --  50ftx2  -LN     Time Frame (Gait Training Goal 1, PT)  --  by discharge  -LN     Progress/Outcome (Gait Training Goal 1, PT)  --  goal not met  -LN        Strength Goal 1 (PT)    Strength Goal 1 (PT)  --  Pt will tolerate 2 sets of 15 reps of AROM exercises OOB in chair with VSS  -LN     Time Frame (Strength Goal 1, PT)  --  by discharge  -LN     Progress/Outcome (Strength Goal 1, PT)  --  goal not met  -LN        Occupational Therapy Goals    Transfer Goal Selection (OT)  transfer, OT goal 1  -  --     Bathing Goal Selection (OT)  bathing, OT goal 1  -  --      Dressing Goal Selection (OT)  dressing, OT goal 1  -JH  --     Toileting Goal Selection (OT)  toileting, OT goal 1  -JH  --     Activity Tolerance Goal Selection (OT)  activity tolerance, OT goal 1  -JH  --        Transfer Goal 1 (OT)    Activity/Assistive Device (Transfer Goal 1, OT)  sit-to-stand/stand-to-sit;bed-to-chair/chair-to-bed;toilet  -JH  --     [place] Level/Cues Needed (Transfer Goal 1, OT)  conditional independence  -JH  --     Time Frame (Transfer Goal 1, OT)  long term goal (LTG);by discharge  -  --     Progress/Outcome (Transfer Goal 1, OT)  goal not met  -JH  --        Bathing Goal 1 (OT)    Activity/Assistive Device (Bathing Goal 1, OT)  bathing skills, all  -  --     [place] Level/Cues Needed (Bathing Goal 1, OT)  minimum assist (75% or more patient effort)  -JH  --     Time Frame (Bathing Goal 1, OT)  long term goal (LTG);by discharge  -  --     Progress/Outcomes (Bathing Goal 1, OT)  goal met;goal ongoing  -  --        Dressing Goal 1 (OT)    Activity/Assistive Device (Dressing Goal 1, OT)  dressing skills, all  -  --     [place]/Cues Needed (Dressing Goal 1, OT)  minimum assist (75% or more patient effort)  -JH  --     Time Frame (Dressing Goal 1, OT)  long term goal (LTG);by discharge  -  --     Progress/Outcome (Dressing Goal 1, OT)  goal met;goal ongoing  -  --        Toileting Goal 1 (OT)    Activity/Device (Toileting Goal 1, OT)  toileting skills, all  -  --     [place] Level/Cues Needed (Toileting Goal 1, OT)  conditional independence  -  --     Time Frame (Toileting Goal 1, OT)  long term goal (LTG);by discharge  -  --     Progress/Outcome (Toileting Goal 1, OT)  goal not met  -  --         Activity Tolerance Goal 1 (OT)    Activity Level (Endurance Goal 1, OT)  10 min activity;O2 sat >/ equal to 88%  -  --     Time Frame (Activity Tolerance Goal 1, OT)  long term goal (LTG);by discharge  -  --       User Key  (r) = Recorded By, (t) = Taken  By, (c) = Cosigned By    Initials Name Provider Type Discipline    LN Elinor Kennedy, PTA Physical Therapy Assistant PT    Maira Parmar COTA/L Occupational Therapy Assistant OT        Occupational Therapy Education     Title: PT OT SLP Therapies (In Progress)     Topic: Occupational Therapy (In Progress)     Point: Precautions (Done)     Description: Instruct learner(s) on prescribed precautions during self-care and functional transfers.    Learning Progress Summary           Patient Acceptance, E, VU,NR by  at 1/9/2019 11:36 AM    Comment:  OT role, OT POC   Family Acceptance, E, VU,NR by  at 1/9/2019 11:36 AM    Comment:  OT role, OT POC                               User Key     Initials Effective Dates Name Provider Type Discipline     10/12/18 -  Dmitry Yuan Occupational Therapist OT                OT Recommendation and Plan  Outcome Summary/Treatment Plan (OT)  Daily Summary of Progress (OT): progress toward functional goals is good  Plan for Continued Treatment (OT): continue per POC  Anticipated Discharge Disposition (OT): home with home health, home with 24/7 care  Therapy Frequency (OT Eval): other (see comments)(5-7 days a wk)  Daily Summary of Progress (OT): progress toward functional goals is good  Outcome Summary: Pt and spouse completed pt adl as they do at home, has met 2/5 OT goals, home with  OT/PT and continued spouse assist   Outcome Measures     Row Name 01/11/19 1108 01/11/19 1020 01/10/19 1330       How much help from another person do you currently need...    Turning from your back to your side while in flat bed without using bedrails?  --  4  -LN  4  -LN    Moving from lying on back to sitting on the side of a flat bed without bedrails?  --  3  -LN  4  -LN    Moving to and from a bed to a chair (including a wheelchair)?  --  3  -LN  3  -LN    Standing up from a chair using your arms (e.g., wheelchair, bedside chair)?  --  3  -LN  3  -LN    Climbing 3-5 steps with a  railing?  --  3  -LN  3  -LN    To walk in hospital room?  --  3  -LN  3  -LN    AM-PAC 6 Clicks Score  --  19  -LN  20  -LN       How much help from another is currently needed...    Putting on and taking off regular lower body clothing?  3  -JH  --  --    Bathing (including washing, rinsing, and drying)  3  -JH  --  --    Toileting (which includes using toilet bed pan or urinal)  3  -JH  --  --    Putting on and taking off regular upper body clothing  3  -JH  --  --    Taking care of personal grooming (such as brushing teeth)  3  -JH  --  --    Eating meals  3  -JH  --  --    Score  18  -JH  --  --       Functional Assessment    Outcome Measure Options  --  AM-PAC 6 Clicks Basic Mobility (PT)  -LN  AM-PAC 6 Clicks Basic Mobility (PT)  -LN    Row Name 01/10/19 1000 01/09/19 1655 01/09/19 1105       How much help from another person do you currently need...    Turning from your back to your side while in flat bed without using bedrails?  --  3  -KRISSY  --    Moving from lying on back to sitting on the side of a flat bed without bedrails?  --  2  -KRISSY  --    Moving to and from a bed to a chair (including a wheelchair)?  --  2  -KRISSY  --    Standing up from a chair using your arms (e.g., wheelchair, bedside chair)?  --  2  -KRISSY  --    Climbing 3-5 steps with a railing?  --  2  -KRISSY  --    To walk in hospital room?  --  2  -KRISSY  --    AM-PAC 6 Clicks Score  --  13  -KRISSY  --       How much help from another is currently needed...    Putting on and taking off regular lower body clothing?  2  -JH  --  3  -MH    Bathing (including washing, rinsing, and drying)  3  -JH  --  3  -MH    Toileting (which includes using toilet bed pan or urinal)  3  -JH  --  3  -MH    Putting on and taking off regular upper body clothing  3  -JH  --  3  -MH    Taking care of personal grooming (such as brushing teeth)  4  -JH  --  4  -MH    Eating meals  4  -JH  --  4  -MH    Score  19  -JH  --  20  -MH       Functional Assessment    Outcome Measure  Options  --  AM-PAC 6 Clicks Basic Mobility (PT)  -KRISSY  AM-PAC 6 Clicks Daily Activity (OT)  -      User Key  (r) = Recorded By, (t) = Taken By, (c) = Cosigned By    Initials Name Provider Type    Evelyn Manuel, PT Physical Therapist    Elinor Loomis, PTA Physical Therapy Assistant     Maira Murphy, HOOPER/L Occupational Therapy Assistant    Dmitry Davalos Occupational Therapist           Time Calculation:   Time Calculation- OT     Row Name 01/11/19 1455             Time Calculation- OT    OT Start Time  1108  -      OT Stop Time  1201  -      OT Time Calculation (min)  53 min  -      OT Received On  01/11/19  -        User Key  (r) = Recorded By, (t) = Taken By, (c) = Cosigned By    Initials Name Provider Type     Maira Murphy, HOOPER/L Occupational Therapy Assistant           Therapy Suggested Charges     Code   Minutes Charges    None           Therapy Charges for Today     Code Description Service Date Service Provider Modifiers Qty    75094148471 HC OT THERAPEUTIC ACT EA 15 MIN 1/10/2019 Maira Murphy HOOPER/L GO 1    96703493892 HC OT THER PROC EA 15 MIN 1/10/2019 Maira Murphy HOOPER/L GO 1    69498633640 HC OT SELF CARE/MGMT/TRAIN EA 15 MIN 1/10/2019 Maira Murphy HOOPER/L GO 1    64399825518 HC OT THERAPEUTIC ACT EA 15 MIN 1/11/2019 Maira Murphy HOOPER/L GO 1    20873351375 HC OT SELF CARE/MGMT/TRAIN EA 15 MIN 1/11/2019 Maira Murphy HOOPER/L GO 3               RUFUS Paniagua/JOSEMANUEL  1/11/2019

## 2019-01-11 NOTE — PLAN OF CARE
Problem: Patient Care Overview  Goal: Plan of Care Review  Outcome: Ongoing (interventions implemented as appropriate)  Maintain pt on prescribed diet.     01/11/19 6556   Coping/Psychosocial   Plan of Care Reviewed With spouse   Plan of Care Review   Progress improving   OTHER   Outcome Summary Intake 100% - 3x, 75% - 1x, 50% - 1x.

## 2019-01-11 NOTE — PROGRESS NOTES
"[facility]  [address] [place] [address]. [address]  T - [phone]  F - [phone]     Progress Note          PATIENT  DEMOGRAPHICS   PATIENT NAME: Nicolasa Rojas                      PHYSICIAN: Joon Dugan MD  : 1950  MRN: [id number]   LOS: 2 days    Patient Care Team:  Henry Muniz MD as PCP - General (Internal Medicine)  Subjective   SUBJECTIVE   Pt is more SOA today. Didn't have marked UO with po bumex       Objective   OBJECTIVE   Vital Signs  Temp:  [97.4 °F (36.3 °C)-98.5 °F (36.9 °C)] 98.5 °F (36.9 °C)  Heart Rate:  [48-72] 59  Resp:  [18-20] 20  BP: (112-188)/(57-88) 175/57    Flowsheet Rows      First Filed Value   Admission Height  162.6 cm (64\") Documented at 2019 1800   Admission Weight  113 kg (248 lb 8 oz) Documented at 2019 1800           I/O last 3 completed shifts:  In: 1500 [P.O.:1200; Blood:300]  Out: 2300 [Urine:2300]    PHYSICAL EXAM    Physical Exam   Constitutional: She is oriented to person, place, and time. She appears well-developed.   HENT:   Head: Normocephalic and atraumatic.   Eyes: Pupils are equal, round, and reactive to light.   Neck: Normal range of motion.   Cardiovascular: Normal rate, regular rhythm and normal heart sounds.   Pulmonary/Chest: She has wheezes. She has rales.   Abdominal: Soft. Bowel sounds are normal.   Musculoskeletal: She exhibits edema.   Neurological: She is alert and oriented to person, place, and time.   Skin: Skin is warm.   Psychiatric: She has a normal mood and affect. Her behavior is normal.       RESULTS   Results Review:    Results from last 7 days   Lab Units  19   0528  01/10/19   0529  19   1547   19   1954   19   1451   SODIUM mmol/L  136*  137  133*   < >  136*   < >  123*   POTASSIUM mmol/L  3.9  4.1  4.4   < >  3.8   < >  2.7*   CHLORIDE mmol/L  100  102  97   < >  96   < >  83*   CO2 mmol/L  26.0  27.0  28.0   < >  28.0   < >  29.0   BUN mg/dL  62*  60*  54*   " < >  47*   < >  49*   CREATININE mg/dL  1.56*  1.84*  1.93*   < >  1.21*   < >  1.56*   CALCIUM mg/dL  8.2*  8.7  8.5   < >  8.9   < >  8.5   BILIRUBIN mg/dL   --    --    --    --   2.1*   --   1.8*   ALK PHOS U/L   --    --    --    --   115   --   97   ALT (SGPT) U/L   --    --    --    --   18   --   18   AST (SGOT) U/L   --    --    --    --   43*   --   47*   GLUCOSE mg/dL  85  78  107*   < >  164*   < >  200*    < > = values in this interval not displayed.       Estimated Creatinine Clearance: 41.3 mL/min (A) (by C-G formula based on SCr of 1.56 mg/dL (H)).    Results from last 7 days   Lab Units  01/08/19 2036 01/07/19 0518  01/05/19   0120   MAGNESIUM mg/dL  1.7  2.0  2.0             Results from last 7 days   Lab Units  01/11/19   0528  01/10/19   0529  01/09/19 2023 01/09/19   0749  01/08/19 0728  01/07/19   1954   WBC 10*3/mm3  4.26  5.60   --   4.31  4.97  6.84   HEMOGLOBIN g/dL  8.3*  9.0*  8.3*  7.3*  8.2*  9.1*   PLATELETS 10*3/mm3  111*  117*   --   96*  130*  137*               Imaging Results (last 24 hours)     ** No results found for the last 24 hours. **           MEDICATIONS      aspirin 81 mg Oral Daily   atorvastatin 40 mg Oral Nightly   bumetanide 2 mg Oral Daily   carvedilol 6.25 mg Oral BID With Meals   clopidogrel 75 mg Oral Daily   enoxaparin 40 mg Subcutaneous Q24H   famotidine 40 mg Oral Daily   HYDROcodone-acetaminophen 1 tablet Oral Q6H   isosorbide mononitrate 60 mg Oral BID   lisinopril 10 mg Oral Q24H   polyethylene glycol 17 g Oral Daily   sodium chloride 3 mL Intravenous Q12H   spironolactone 25 mg Oral Daily       insulin        Assessment/Plan   ASSESSMENT / PLAN      Dyspnea    Acute on chronic diastolic CHF (congestive heart failure) (CMS/HCC)    Acute on chronic heart failure (CMS/HCC)    Chronic renal impairment, stage 3 (moderate) (CMS/HCC)    CAD (coronary artery disease)       1. CKD3- baseline creatinine close to 1.6-1.7.  She has a history of  contrast-induced nephropathy back in august 2018 after she had left heart catheterization.  She had multiple stents placed for non-ST elevation MI back in August 2018. FENa <1%, cr better and still is hypervolemic and will change to iv bumex 2mg bid.      2. Acute on chronic diastolic heart failure-  Plan as above with the diuretics, daily weights, and I and O's, and fluid restriction , off amlodipine due to edema.      3. CAD- prior non-ST elevation MI with stenting back in August 2018 to LAD and circumflex     4. Hypokalemia- improved    5. Hyponatremia hypervolemic type now improved.     6. htn elevated pre meds, amlodipine is stopped. If needed may increase lisinopril          This document has been electronically signed by Joon Dugan MD on January 11, 2019 9:02 AM

## 2019-01-11 NOTE — PLAN OF CARE
Problem: Patient Care Overview  Goal: Plan of Care Review  Outcome: Ongoing (interventions implemented as appropriate)   01/11/19 6643   Coping/Psychosocial   Plan of Care Reviewed With patient   Plan of Care Review   Progress no change       Problem: Fluid Volume Excess (Adult)  Goal: Identify Related Risk Factors and Signs and Symptoms  Outcome: Ongoing (interventions implemented as appropriate)      Problem: Diabetes, Type 2 (Adult)  Goal: Signs and Symptoms of Listed Potential Problems Will be Absent, Minimized or Managed (Diabetes, Type 2)  Outcome: Ongoing (interventions implemented as appropriate)

## 2019-01-11 NOTE — PROGRESS NOTES
[facility]  INPATIENT PROGRESS NOTE    Length of Stay: 2  Date of Admission: 1/7/2019  Primary Care Physician: Henry Muniz MD    Subjective   Chief Complaint: Shortness of air.    HPI: Patient is seen for follow-up.  She has history of coronary artery disease status post recent high risk PCI (at [facility]), O2 dependent COPD, CIERRA, chronic kidney disease/MELCHOR, diabetes mellitus, congestive heart failure, morbid obesity, hypertension, chronic anemia, chronic pain syndrome and was readmitted (same day after discharge) for CHF decompensation/possible non-STEMI.  She remains on her baseline supplemental oxygen of 3 L nasal prongs, less short of air and has lost 1 pound in the last 24 hours.    Review of Systems   Constitutional: Positive for activity change and fatigue. Negative for appetite change, chills, diaphoresis, fever and unexpected weight change.   HENT: Negative for trouble swallowing and voice change.    Eyes: Negative for photophobia and visual disturbance.   Respiratory: Positive for shortness of breath. Negative for cough, choking, chest tightness, wheezing and stridor.    Cardiovascular: Positive for leg swelling. Negative for chest pain and palpitations.   Gastrointestinal: Negative for abdominal distention, abdominal pain, blood in stool, constipation, diarrhea, nausea and vomiting.   Endocrine: Negative for cold intolerance, heat intolerance, polydipsia, polyphagia and polyuria.   Genitourinary: Negative for decreased urine volume, difficulty urinating, dysuria, enuresis, flank pain, frequency, hematuria and urgency.   Musculoskeletal: Positive for arthralgias and gait problem. Negative for myalgias, neck pain and neck stiffness.   Skin: Negative for pallor, rash and wound.   Neurological: Negative for dizziness, tremors, seizures, syncope, facial asymmetry, speech difficulty, weakness, light-headedness, numbness and headaches.    Hematological: Does not bruise/bleed easily.   Psychiatric/Behavioral: Negative for agitation, behavioral problems and confusion.       Objective    Temp:  [97.4 °F (36.3 °C)-98.5 °F (36.9 °C)] 97.9 °F (36.6 °C)  Heart Rate:  [48-72] 54  Resp:  [18-20] 18  BP: (112-188)/(57-88) 152/62    Physical Exam   Constitutional: She is oriented to person, place, and time. She appears well-developed and well-nourished. She is cooperative. No distress.   Patient is morbidly obese and has a BMI of 41.38   HENT:   Head: Normocephalic and atraumatic.   Right Ear: External ear normal.   Left Ear: External ear normal.   Nose: Nose normal.   Mouth/Throat: Oropharynx is clear and moist.   Eyes: Conjunctivae and EOM are normal. Pupils are equal, round, and reactive to light.   Neck: Normal range of motion. Neck supple. No JVD present. No thyromegaly present.   Cardiovascular: Normal rate, regular rhythm, normal heart sounds and intact distal pulses. Exam reveals no gallop and no friction rub.   No murmur heard.  Pulmonary/Chest: Effort normal. No stridor. No respiratory distress. She has decreased breath sounds. She has no wheezes. She has rhonchi. She has no rales. She exhibits no tenderness.   Abdominal: Soft. Bowel sounds are normal. She exhibits no distension and no mass. There is no tenderness. There is no rebound and no guarding. No hernia.   Musculoskeletal: Normal range of motion. She exhibits edema. She exhibits no tenderness or deformity.   Neurological: She is alert and oriented to person, place, and time. She has normal reflexes. No cranial nerve deficit or sensory deficit. She exhibits normal muscle tone.   Skin: Skin is warm and dry. No rash noted. She is not diaphoretic. No erythema. No pallor.   Psychiatric: She has a normal mood and affect. Her behavior is normal. Judgment and thought content normal.   Nursing note and vitals reviewed.        Medication Review:    Current Facility-Administered Medications:   •   aspirin chewable tablet 81 mg, 81 mg, Oral, Daily, Sage Blanchard MD, 81 mg at 01/11/19 0845  •  atorvastatin (LIPITOR) tablet 40 mg, 40 mg, Oral, Nightly, Yaniv Lobo MD, 40 mg at 01/10/19 2142  •  bumetanide (BUMEX) injection 2 mg, 2 mg, Intravenous, BID, Joon Dugan MD  •  carvedilol (COREG) tablet 6.25 mg, 6.25 mg, Oral, BID With Meals, Catracho Jarrell MD, 6.25 mg at 01/11/19 0845  •  clopidogrel (PLAVIX) tablet 75 mg, 75 mg, Oral, Daily, Sage Blanchard MD, 75 mg at 01/11/19 0845  •  enoxaparin (LOVENOX) syringe 40 mg, 40 mg, Subcutaneous, Q24H, Catracho Jarrell MD, 40 mg at 01/10/19 2356  •  famotidine (PEPCID) tablet 40 mg, 40 mg, Oral, Daily, Sage Blanchard MD, 40 mg at 01/11/19 0845  •  HYDROcodone-acetaminophen (NORCO)  MG per tablet 1 tablet, 1 tablet, Oral, Q6H, Joon Dugan MD, 1 tablet at 01/11/19 0544  •  insulin patient supplied pump, , Subcutaneous, Continuous, Sage Blanchard MD  •  isosorbide mononitrate (IMDUR) 24 hr tablet 60 mg, 60 mg, Oral, BID, Yaniv Lobo MD, 60 mg at 01/11/19 0845  •  lisinopril (PRINIVIL,ZESTRIL) tablet 10 mg, 10 mg, Oral, Q24H, Joon Dugan MD, 10 mg at 01/11/19 0845  •  polyethylene glycol 3350 powder (packet), 17 g, Oral, Daily, Catracho Jarrell MD, 17 g at 01/11/19 0846  •  potassium chloride (MICRO-K) CR capsule 40 mEq, 40 mEq, Oral, PRN, 40 mEq at 01/09/19 0505 **OR** potassium chloride (KLOR-CON) packet 40 mEq, 40 mEq, Oral, PRN **OR** potassium chloride 10 mEq in 100 mL IVPB, 10 mEq, Intravenous, Q1H PRN, Catracho Jarrell MD  •  [COMPLETED] Insert peripheral IV, , , Once **AND** sodium chloride 0.9 % flush 10 mL, 10 mL, Intravenous, PRN, Tyrone Quinonez MD  •  sodium chloride 0.9 % flush 3 mL, 3 mL, Intravenous, Q12H, Sage Blanchard MD, 3 mL at 01/11/19 0850  •  sodium chloride 0.9 % flush 3-10 mL, 3-10 mL, Intravenous, PRN, Sage Blanchard MD  •  spironolactone (ALDACTONE) tablet 25 mg, 25 mg, Oral, Daily, Vettiankal,  Yaniv JULIEN MD, 25 mg at 01/11/19 0845  •  zolpidem (AMBIEN) tablet 5 mg, 5 mg, Oral, Nightly, Catracho Jarrell MD    Results Review:  I have reviewed the labs, radiology results, and diagnostic studies.    Laboratory Data:   Results from last 7 days   Lab Units  01/11/19   0528  01/10/19   0529  01/09/19   1547   01/07/19 1954 01/04/19   1451   SODIUM mmol/L  136*  137  133*   < >  136*   < >  123*   POTASSIUM mmol/L  3.9  4.1  4.4   < >  3.8   < >  2.7*   CHLORIDE mmol/L  100  102  97   < >  96   < >  83*   CO2 mmol/L  26.0  27.0  28.0   < >  28.0   < >  29.0   BUN mg/dL  62*  60*  54*   < >  47*   < >  49*   CREATININE mg/dL  1.56*  1.84*  1.93*   < >  1.21*   < >  1.56*   GLUCOSE mg/dL  85  78  107*   < >  164*   < >  200*   CALCIUM mg/dL  8.2*  8.7  8.5   < >  8.9   < >  8.5   BILIRUBIN mg/dL   --    --    --    --   2.1*   --   1.8*   ALK PHOS U/L   --    --    --    --   115   --   97   ALT (SGPT) U/L   --    --    --    --   18   --   18   AST (SGOT) U/L   --    --    --    --   43*   --   47*   ANION GAP mmol/L  10.0  8.0  8.0   < >  12.0   < >  11.0    < > = values in this interval not displayed.     Estimated Creatinine Clearance: 41.3 mL/min (A) (by C-G formula based on SCr of 1.56 mg/dL (H)).  Results from last 7 days   Lab Units  01/08/19 2036 01/07/19 0518  01/05/19   0120   MAGNESIUM mg/dL  1.7  2.0  2.0         Results from last 7 days   Lab Units  01/11/19   0528  01/10/19   0529  01/09/19   2023  01/09/19   0749  01/08/19   0728  01/07/19 1954   WBC 10*3/mm3  4.26  5.60   --   4.31  4.97  6.84   HEMOGLOBIN g/dL  8.3*  9.0*  8.3*  7.3*  8.2*  9.1*   HEMATOCRIT %  25.1*  27.5*  25.5*  22.5*  24.6*  28.2*   PLATELETS 10*3/mm3  111*  117*   --   96*  130*  137*           Culture Data:   No results found for: BLOODCX  No results found for: URINECX  No results found for: RESPCX  No results found for: WOUNDCX  No results found for: STOOLCX  No components found for: BODYFLD    Radiology Data:    Imaging Results (last 24 hours)     ** No results found for the last 24 hours. **          I have reviewed the patient's current medications.     Assessment/Plan     Hospital Problem List:  Principal Problem:  - Acute on chronic diastolic CHF (congestive heart failure): Continue IV bumex, strict I's and O's, daily weights and fluid restriction.  Cardiologist/ Heart failure Navigator are following.  Patient's weight is 242 pounds today and her dry weight is between 230 and 235.  Echocardiogram done January 8, 2019 showed:  · Left ventricular wall thickness is consistent with mild concentric hypertrophy.  · Estimated EF = 57%.  · Left ventricular systolic function is normal.  · Left ventricular diastolic dysfunction (grade I a) consistent with impaired relaxation.  · Right ventricular cavity is mildly dilated.  · Left atrial cavity size is mildly dilated.  · Mild aortic valve regurgitation is present.  · Moderate mitral valve regurgitation is present  · Moderate tricuspid valve regurgitation is present.  · The tricuspid valve is grossly normal. Moderate tricuspid valve regurgitation is present. Estimated right ventricular systolic pressure from tricuspid regurgitation is markedly elevated (>55 mmHg). Moderate to severe pulmonary hypertension is present.  · Mild pulmonic valve regurgitation is present.     - History of coronary artery disease with possible non-STEMI: Continue guidelines directed medical therapy.  Result of echocardiogram is as dictated above.  Cardiologist is following.   - Chronic renal impairment, stage 3: Creatinine is down to 1.56  today.  Continue to monitor.  Nephrologist is following.    - Elevated d-dimer is likely reactive. VQ scan showed low probability for pulmonary embolism and Duplex ultrasound both lower extremities is negative for DVT.   - History of moderate to severe pulmonary hypertension: Patient will be seen for follow-up by Dr. Byrd as outpatient on discharge.   -  Hypertension: Stable. Continue current medications and make adjustments as needed for optimal blood pressure control.  - Asymptomatic bradycardia: Much improved with medication adjustment.    - Oxygen dependent COPD: Patient is currently not in exacerbation.  Continue supplemental oxygen and bronchodilators.  - Diabetes mellitus: Stable. Continue insulin pump with Accu-Cheks.  - Chronic anemia: Hemoglobin is stable post transfusion of one unit of packed red blood cells. Iron profile is unremarkable and stool for Hemoccult is pending. Continue to monitor.  - Obstructive sleep apnea: Continue CPAP.  - Chronic pain syndrome/chronic insomnia: Continue home medications.  - Chronic thrombocytopenia: Platelet count is 111,000 today.  Her baseline seem to be 110 to 130K.  Continue to monitor.  - Continue GI and DVT prophylaxis.           Discharge Planning: In progress.    Catracho Jarrell MD   01/11/19   10:09 AM

## 2019-01-11 NOTE — CONSULTS
Adult Nutrition  Assessment    Patient Name:  Nicolasa Rojas  YOB: 1950  MRN: [id number]  Admit Date:  1/7/2019    Assessment Date:  1/11/2019    Comments:  Pt resting.   present.  Indicates pt appetite is good.  Voiced no food preferences for pt.  Had no questions regarding pt diet.  Intake 100% - 4x, 75% - 1x, 50% - 1x.  Blood glucose is routinely elevated.  Pt uses an insulin pump to manage her blood sugars.  BUN, Creat are elevated consistent with dx CKD.  Will maintain pt on prescribed diet.    Reason for Assessment     Row Name 01/11/19 1608          Reason for Assessment    Reason For Assessment  follow-up protocol             Labs/Tests/Procedures/Meds     Row Name 01/11/19 1608          Labs/Procedures/Meds    Lab Results Reviewed  reviewed, pertinent        Medications    Pertinent Medications Reviewed  reviewed, pertinent             Nutrition Prescription Ordered     Row Name 01/11/19 1610          Nutrition Prescription PO    Current PO Diet  Regular     Common Modifiers  Cardiac;Consistent Carbohydrate;Fluid Restriction;Low Sodium     Fluid Restriction mL per Tray  250 mL     Fluid Restriction mL per Day  1500 mL     Low Sodium Details  2,000 mg Sodium         Evaluation of Received Nutrient/Fluid Intake     Row Name 01/11/19 1610          PO Evaluation    Number of Meals  6     % PO Intake  100% - 4x, 75% - 1x, 50% -1x               Electronically signed by:  Shraddha Clemente RD  01/11/19 4:12 PM

## 2019-01-11 NOTE — PLAN OF CARE
Problem: Fall Risk (Adult)  Goal: Identify Related Risk Factors and Signs and Symptoms  Outcome: Ongoing (interventions implemented as appropriate)    Goal: Absence of Fall  Outcome: Ongoing (interventions implemented as appropriate)      Problem: Patient Care Overview  Goal: Plan of Care Review  Outcome: Ongoing (interventions implemented as appropriate)   01/11/19 0321   Coping/Psychosocial   Plan of Care Reviewed With patient   Plan of Care Review   Progress no change   OTHER   Outcome Summary pt. rested well      Goal: Individualization and Mutuality  Outcome: Ongoing (interventions implemented as appropriate)    Goal: Discharge Needs Assessment  Outcome: Ongoing (interventions implemented as appropriate)    Goal: Interprofessional Rounds/Family Conf  Outcome: Ongoing (interventions implemented as appropriate)      Problem: Fluid Volume Excess (Adult)  Goal: Identify Related Risk Factors and Signs and Symptoms  Outcome: Ongoing (interventions implemented as appropriate)    Goal: Optimal Fluid Balance  Outcome: Ongoing (interventions implemented as appropriate)      Problem: Diabetes, Type 2 (Adult)  Goal: Signs and Symptoms of Listed Potential Problems Will be Absent, Minimized or Managed (Diabetes, Type 2)  Outcome: Ongoing (interventions implemented as appropriate)

## 2019-01-11 NOTE — PROGRESS NOTES
"  Cardiovascular Daily Inpatient Progress Note  Jaya Byrd M.D., Ph.D., [facility]      Subjective     Interval History:   States that dyspnea has improved.  However, she states that she did not lose as much fluid as she had hoped yesterday.    Review of Systems   Cardiovascular: Positive for dyspnea on exertion and leg swelling. Negative for chest pain and claudication.   Respiratory: Positive for shortness of breath. Negative for cough and hemoptysis.        Objective     Vital Sign Min/Max for last 24 hours  Temp  Min: 97.4 °F (36.3 °C)  Max: 98.5 °F (36.9 °C)   BP  Min: 112/58  Max: 188/80   Pulse  Min: 48  Max: 72   Resp  Min: 18  Max: 20   SpO2  Min: 92 %  Max: 99 %   No Data Recorded   Weight  Min: 110 kg (242 lb)  Max: 110 kg (242 lb)     Flowsheet Rows      First Filed Value   Admission Height  162.6 cm (64\") Documented at 01/07/2019 1800   Admission Weight  113 kg (248 lb 8 oz) Documented at 01/07/2019 1800            01/09/19  0500 01/10/19  0454 01/11/19  0453   Weight: 109 kg (240 lb) 110 kg (243 lb) 110 kg (242 lb)     Body mass index is 41.54 kg/m².  Physical Exam:  Physical Exam:  Vitals:    01/11/19 0404   BP: 175/57   Pulse: 63   Resp: 20   Temp: 98.5 °F (36.9 °C)   SpO2: 92%     Body mass index is 41.54 kg/m².   Pulse Ox: Normal        on NC  General: alert, appears stated age and cooperative     Body Habitus: morbidly obese    HEENT: Head: Normocephalic, no lesions, without obvious abnormality. No arcus senilis, xanthelasma or xanthomas.    JVP: Volume/Pulsation:       elevated jugular venous pressure to 11 cm vertical height above mid-right atrium with significant V waves  Imboden:  normal size and placement    Palpable S4: absent.  Heart rate: Bradycardic    Heart Rhythm: regular                                     Heart Sounds: S1: normal intensity  S2: increased P2  S3: absent                               S4: absent  Opening Snap: absent          A2-OS:  absent.   Pericardial rub: absent    "                    Ejection click: None                                        Murmurs:  absent   Extremity: moves all extremities equally.   LE Skin: Trace LE edema.            DATA REVIEWED:       ---------------------------------------------------  TTE/RAUL:  Results for orders placed during the hospital encounter of 01/07/19   Adult Transthoracic Echo Complete W/ Cont if Necessary Per Protocol    Narrative · Left ventricular wall thickness is consistent with mild concentric   hypertrophy.  · Estimated EF = 57%.  · Left ventricular systolic function is normal.  · Left ventricular diastolic dysfunction (grade I a) consistent with   impaired relaxation.  · Right ventricular cavity is mildly dilated.  · Left atrial cavity size is mildly dilated.  · Mild aortic valve regurgitation is present.  · Moderate mitral valve regurgitation is present  · Moderate tricuspid valve regurgitation is present.  · The tricuspid valve is grossly normal. Moderate tricuspid valve   regurgitation is present. Estimated right ventricular systolic pressure   from tricuspid regurgitation is markedly elevated (>55 mmHg). Moderate to   severe pulmonary hypertension is present.  · Mild pulmonic valve regurgitation is present.          --------------------------------------------------------------------------------------------------  LABS:   Lab Results   Component Value Date    GLUCOSE 85 01/11/2019    BUN 62 (H) 01/11/2019    CREATININE 1.56 (H) 01/11/2019    EGFRIFNONA 33 (L) 01/11/2019    BCR 39.7 (H) 01/11/2019    K 3.9 01/11/2019    CO2 26.0 01/11/2019    CALCIUM 8.2 (L) 01/11/2019    ALBUMIN 3.80 01/07/2019    AST 43 (H) 01/07/2019    ALT 18 01/07/2019       Lab Results   Component Value Date    WBC 4.26 01/11/2019    HGB 8.3 (L) 01/11/2019    HCT 25.1 (L) 01/11/2019    MCV 97.7 01/11/2019     (L) 01/11/2019       Lab Results   Component Value Date    CHOL 135 (L) 01/31/2018    CHLPL 120 (L) 03/14/2016    TRIG 72 01/31/2018    HDL  46 01/31/2018    LDL 75 01/31/2018       Lab Results   Component Value Date    TSH 2.740 01/31/2018    Y8HIKGN 8.48 01/31/2018       Lab Results   Component Value Date    CKTOTAL 69.0 01/11/2016    CKMB 1.2 08/04/2014    TROPONINI 1.190 (C) 01/08/2019       Lab Results   Component Value Date    HGBA1C 4.0 12/28/2018     Lab Results   Component Value Date    DDIMER 523 (H) 08/04/2014     Lab Results   Component Value Date    ALT 18 01/07/2019     Lab Results   Component Value Date    HGBA1C 4.0 12/28/2018    HGBA1C 6.4 (H) 01/31/2018    HGBA1C 6.13 (H) 05/17/2017     Lab Results   Component Value Date    MICROALBUR 11.0 01/31/2018    CREATININE 1.56 (H) 01/11/2019     Lab Results   Component Value Date    IRON 59 01/09/2019    TIBC 389 01/09/2019    FERRITIN 59.50 02/17/2017     Lab Results   Component Value Date    INR 1.0 08/04/2014    PROTIME 13.0 08/04/2014     Lab Results (last 24 hours)     Procedure Component Value Units Date/Time    Basic Metabolic Panel [[id number]]  (Abnormal) Collected:  01/11/19 0528    Specimen:  Blood Updated:  01/11/19 0611     Glucose 85 mg/dL      BUN 62 mg/dL      Creatinine 1.56 mg/dL      Sodium 136 mmol/L      Potassium 3.9 mmol/L      Comment: Specimen hemolyzed.  Results may be affected.        Chloride 100 mmol/L      CO2 26.0 mmol/L      Calcium 8.2 mg/dL      eGFR Non African Amer 33 mL/min/1.73      BUN/Creatinine Ratio 39.7     Anion Gap 10.0 mmol/L     CBC & Differential [[id number]] Collected:  01/11/19 0528    Specimen:  Blood Updated:  01/11/19 0606    Narrative:       The following orders were created for panel order CBC & Differential.  Procedure                               Abnormality         Status                     ---------                               -----------         ------                     Scan Slide[[id number]]                                       In process                 CBC Auto Differential[[id number]]        Abnormal            Final result                  Please view results for these tests on the individual orders.    CBC Auto Differential [[id number]]  (Abnormal) Collected:  01/11/19 0528    Specimen:  Blood Updated:  01/11/19 0606     WBC 4.26 10*3/mm3      RBC 2.57 10*6/mm3      Hemoglobin 8.3 g/dL      Hematocrit 25.1 %      MCV 97.7 fL      MCH 32.3 pg      MCHC 33.1 g/dL      RDW 17.8 %      RDW-SD 61.9 fl      MPV 10.2 fL      Platelets 111 10*3/mm3      Neutrophil % 76.1 %      Lymphocyte % 13.1 %      Monocyte % 6.6 %      Eosinophil % 2.6 %      Basophil % 0.9 %      Immature Grans % 0.7 %      Neutrophils, Absolute 3.24 10*3/mm3      Lymphocytes, Absolute 0.56 10*3/mm3      Monocytes, Absolute 0.28 10*3/mm3      Eosinophils, Absolute 0.11 10*3/mm3      Basophils, Absolute 0.04 10*3/mm3      Immature Grans, Absolute 0.03 10*3/mm3     Scan Slide [[id number]] Collected:  01/11/19 0528    Specimen:  Blood Updated:  01/11/19 0542    POC Glucose Once [[id number]]  (Abnormal) Collected:  01/10/19 1625    Specimen:  Blood Updated:  01/10/19 1718     Glucose 63 mg/dL      Comment: : [id number] IDANIA [email or web address] ID: [id number]       POC Glucose Once [[id number]]  (Normal) Collected:  01/10/19 1109    Specimen:  Blood Updated:  01/10/19 1121     Glucose 111 mg/dL      Comment: RN NotifiedOperator: [id number] IDANIA [email or web address] ID: [id number]               Assessment/Plan   1. PAH/PVH/HFpEF. WHO Group 2.1/2.2/3.1/3.3/3.4; FC: Class 3.   NYHA stage C.  RV status: Adapted with normal right ventricular ejection fraction.  She is in a mildly hypervolemic, but perfused state.   I see no evidence of CpC-PAH or indications for RHC at this point.  I do think that she needs better optimization of her COPD medications.  Her CPAP also needs to be adjusted.   I would like to get her to a more euvolemic state and then repeat an outpatient PAH-protocol TTE for further evaluation.  -No current indication for PAH-specific medications  -Agree with ACE-I and Aldactone,  Bumex  -Ongoing blood pressure control  -Continue low sodium diet and fluid restrictions  -I will sign off.  I will arrange close outpatient follow-up with myself and Dr. Harris.      Thank you for allowing me to participate in the care of your patient.  If I can be of any further assistance, please do not hesitate to contact me.

## 2019-01-11 NOTE — PLAN OF CARE
Problem: Patient Care Overview  Goal: Plan of Care Review  Outcome: Ongoing (interventions implemented as appropriate)   01/11/19 1108 01/11/19 143   Coping/Psychosocial   Plan of Care Reviewed With --  patient   Plan of Care Review   Progress --  no change   OTHER   Outcome Summary Pt and spouse completed pt adl as they do at home, has met 2/5 OT goals, home with HH OT/PT and continued spouse assist  --

## 2019-01-12 NOTE — PLAN OF CARE
Problem: Patient Care Overview  Goal: Plan of Care Review  Outcome: Ongoing (interventions implemented as appropriate)   01/12/19 1341   Coping/Psychosocial   Plan of Care Reviewed With patient   Plan of Care Review   Progress improving   OTHER   Outcome Summary pt. tolerated tx. well. pt. ambulated 68' and 74' with RW and CGA. pt. had no LOB with gait. pt. educated on HEP and v/c to slow down with exercises

## 2019-01-12 NOTE — THERAPY TREATMENT NOTE
Acute Care - Physical Therapy Treatment Note  [facility]     Patient Name: Nicolasa Rojas  : 1950  MRN: [id number]  Today's Date: 2019  Onset of Illness/Injury or Date of Surgery: 19  Date of Referral to PT: 19  Referring Physician: Dr. Catracho Jarrell    Admit Date: 2019    Visit Dx:    ICD-10-CM ICD-9-CM   1. Acute on chronic heart failure, unspecified heart failure type (CMS/HCC) I50.9 428.0   2. Elevated troponin R74.8 790.6   3. Impaired mobility and activities of daily living Z74.09 799.89   4. Impaired physical mobility Z74.09 781.99     Patient Active Problem List   Diagnosis   • Hyponatremia   • Acute on chronic diastolic CHF (congestive heart failure) (CMS/HCC)   • Hypokalemia   • Acute on chronic heart failure (CMS/HCC)   • Dyspnea   • Chronic renal impairment, stage 3 (moderate) (CMS/HCC)   • CAD (coronary artery disease)       Therapy Treatment    Rehabilitation Treatment Summary     Row Name 19 1307             Treatment Time/Intention    Discipline  physical therapy assistant  -CA      Document Type  therapy note (daily note)  -CA      Mode of Treatment  individual therapy;physical therapy  -CA      Patient/Family Observations  pt. sidelying in bed on R side   -CA      Existing Precautions/Restrictions  fall;oxygen therapy device and L/min  -CA      Recorded by [CA] Elia Trimble, PTA 19 1311      Row Name 19 1307             Vital Signs    Pre SpO2 (%)  94  -CA      O2 Delivery Pre Treatment  supplemental O2  -CA      Post SpO2 (%)  97  -CA2      O2 Delivery Post Treatment  supplemental O2  -CA2      Pre Patient Position  Side Lying  -CA2      Intra Patient Position  Standing  -CA2      Post Patient Position  Side Lying  -CA2      Recorded by [CA] Elia Trimble, PTA 19 1336  [CA2] Elia Trimble, PTA 19 1340      Row Name 19 1307             Cognitive Assessment/Intervention- PT/OT    Orientation Status (Cognition)  oriented x 4   -CA      Follows Commands (Cognition)  follows one step commands  -CA      Recorded by [CA] Elia Trimble, PTA 01/12/19 1312      Row Name 01/12/19 1307             Bed Mobility Assessment/Treatment    Supine-Sit Schleicher (Bed Mobility)  conditional independence  -CA      Sit-Supine Schleicher (Bed Mobility)  conditional independence  -CA      Assistive Device (Bed Mobility)  bed rails;head of bed elevated  -CA      Recorded by [CA] Elia Trimble, PTA 01/12/19 1340      Row Name 01/12/19 1307             Sit-Stand Transfer    Sit-Stand Schleicher (Transfers)  stand by assist;contact guard  -CA      Recorded by [CA] Elia Trimble, PTA 01/12/19 1340      Row Name 01/12/19 1307             Stand-Sit Transfer    Stand-Sit Schleicher (Transfers)  stand by assist;contact guard  -CA      Recorded by [CA] Elia Trimble, PTA 01/12/19 1340      Row Name 01/12/19 1307             Gait/Stairs Assessment/Training    Schleicher Level (Gait)  contact guard  -CA      Assistive Device (Gait)  walker, front-wheeled  -CA      Distance in Feet (Gait)  68' and 74'   -CA      Pattern (Gait)  step-through  -CA      Comment (Gait/Stairs)  spouse followed with w/c and pt. took seated rest break in between gait trips  -CA      Recorded by [CA] Elia Trimble, PTA 01/12/19 1340      Row Name 01/12/19 1307             Lower Extremity Seated Therapeutic Exercise    Performed, Seated Lower Extremity (Therapeutic Exercise)  hip flexion/extension;LAQ (long arc quad), knee extension  -CA      Exercise Type, Seated Lower Extremity (Therapeutic Exercise)  AROM (active range of motion)  -CA      Sets/Reps Detail, Seated Lower Extremity (Therapeutic Exercise)  1x20  -CA      Recorded by [CA] Elia Trimble, PTA 01/12/19 1340      Row Name 01/12/19 1307             Positioning and Restraints    Pre-Treatment Position  in bed  -CA      Post Treatment Position  bed  -CA      In Bed  sitting EOB;call light within reach;encouraged to call for  assist;with family/caregiver  -CA      Recorded by [CA] Elia Trimble, PTA 01/12/19 1340      Row Name 01/12/19 1307             Pain Scale: Numbers Pre/Post-Treatment    Pain Scale: Numbers, Pretreatment  0/10 - no pain  -CA      Pain Scale: Numbers, Post-Treatment  0/10 - no pain  -CA      Recorded by [CA] Elia Trimble, PTA 01/12/19 1340        User Key  (r) = Recorded By, (t) = Taken By, (c) = Cosigned By    Initials Name Effective Dates Discipline    CA Elia Trimble, PTA 03/07/18 -  PT               Rehab Goal Summary     Row Name 01/12/19 1307             Physical Therapy Goals    Bed Mobility Goal Selection (PT)  bed mobility, PT goal 1  -CA      Transfer Goal Selection (PT)  transfer, PT goal 1  -CA      Gait Training Goal Selection (PT)  gait training, PT goal 1  -CA      Strength Goal Selection (PT)  strength, PT goal 1  -CA         Bed Mobility Goal 1 (PT)    Activity/Assistive Device (Bed Mobility Goal 1, PT)  rolling to left;rolling to right;sit to supine;supine to sit  -CA      Trego Level/Cues Needed (Bed Mobility Goal 1, PT)  independent  -CA      Time Frame (Bed Mobility Goal 1, PT)  by discharge  -CA      Progress/Outcomes (Bed Mobility Goal 1, PT)  goal not met  -CA         Transfer Goal 1 (PT)    Activity/Assistive Device (Transfer Goal 1, PT)  sit-to-stand/stand-to-sit;bed-to-chair/chair-to-bed  -CA      Trego Level/Cues Needed (Transfer Goal 1, PT)  conditional independence  -CA      Time Frame (Transfer Goal 1, PT)  by discharge  -CA      Progress/Outcome (Transfer Goal 1, PT)  goal not met  -CA         Gait Training Goal 1 (PT)    Activity/Assistive Device (Gait Training Goal 1, PT)  gait (walking locomotion);assistive device use  -CA      Trego Level (Gait Training Goal 1, PT)  standby assist;conditional independence  -CA      Distance (Gait Goal 1, PT)  50ftx2  -CA      Time Frame (Gait Training Goal 1, PT)  by discharge  -CA      Progress/Outcome (Gait Training Goal  1, PT)  goal not met  -CA         Strength Goal 1 (PT)    Strength Goal 1 (PT)  Pt will tolerate 2 sets of 15 reps of AROM exercises OOB in chair with VSS  -CA      Time Frame (Strength Goal 1, PT)  by discharge  -CA      Progress/Outcome (Strength Goal 1, PT)  goal not met  -CA        User Key  (r) = Recorded By, (t) = Taken By, (c) = Cosigned By    Initials Name Provider Type Discipline    Elia Juan, PTA Physical Therapy Assistant PT          Physical Therapy Education     Title: PT OT SLP Therapies (In Progress)     Topic: Physical Therapy (Done)     Point: Mobility training (Done)     Learning Progress Summary           Patient Acceptance, E,TB, VU by LN at 1/11/2019  1:49 PM    Acceptance, E,TB, VU,NR by LN at 1/10/2019  2:45 PM   Family Acceptance, E,TB, VU by LN at 1/11/2019  1:49 PM    Acceptance, E,TB, VU,NR by LN at 1/10/2019  2:45 PM                   Point: Home exercise program (Done)     Learning Progress Summary           Patient Acceptance, E,TB, VU by LN at 1/11/2019  1:49 PM    Acceptance, E,TB, VU,NR by LN at 1/10/2019  2:45 PM   Family Acceptance, E,TB, VU by LN at 1/11/2019  1:49 PM    Acceptance, E,TB, VU,NR by LN at 1/10/2019  2:45 PM                   Point: Body mechanics (Done)     Learning Progress Summary           Patient Acceptance, E,TB, VU by LN at 1/11/2019  1:49 PM    Acceptance, E,TB, VU,NR by LN at 1/10/2019  2:45 PM   Family Acceptance, E,TB, VU by LN at 1/11/2019  1:49 PM    Acceptance, E,TB, VU,NR by LN at 1/10/2019  2:45 PM                   Point: Precautions (Done)     Learning Progress Summary           Patient Acceptance, E,TB, VU by LN at 1/11/2019  1:49 PM    Acceptance, E,TB, VU,NR by LN at 1/10/2019  2:45 PM   Family Acceptance, E,TB, VU by LN at 1/11/2019  1:49 PM    Acceptance, E,TB, VU,NR by LN at 1/10/2019  2:45 PM                               User Key     Initials Effective Dates Name Provider Type Discipline    LN 03/07/18 -  Elinor Kennedy, PTA Physical  Therapy Assistant PT                PT Recommendation and Plan     Plan of Care Reviewed With: patient  Progress: improving  Outcome Summary: pt. tolerated tx. well. pt. ambulated 68' and 74' with RW and CGA.  pt. had no LOB with gait. pt. educated on HEP and v/c to slow down with exercises  Outcome Measures     Row Name 01/12/19 1307 01/11/19 1108 01/11/19 1020       How much help from another person do you currently need...    Turning from your back to your side while in flat bed without using bedrails?  4  -CA  --  4  -LN    Moving from lying on back to sitting on the side of a flat bed without bedrails?  4  -CA  --  3  -LN    Moving to and from a bed to a chair (including a wheelchair)?  3  -CA  --  3  -LN    Standing up from a chair using your arms (e.g., wheelchair, bedside chair)?  3  -CA  --  3  -LN    Climbing 3-5 steps with a railing?  3  -CA  --  3  -LN    To walk in hospital room?  3  -CA  --  3  -LN    AM-PAC 6 Clicks Score  20  -CA  --  19  -LN       How much help from another is currently needed...    Putting on and taking off regular lower body clothing?  --  3  -JH  --    Bathing (including washing, rinsing, and drying)  --  3  -JH  --    Toileting (which includes using toilet bed pan or urinal)  --  3  -JH  --    Putting on and taking off regular upper body clothing  --  3  -JH  --    Taking care of personal grooming (such as brushing teeth)  --  3  -JH  --    Eating meals  --  3  -JH  --    Score  --  18  -JH  --       Functional Assessment    Outcome Measure Options  AM-PAC 6 Clicks Basic Mobility (PT)  -CA  --  AM-PAC 6 Clicks Basic Mobility (PT)  -LN    Row Name 01/10/19 1330 01/10/19 1000 01/09/19 1655       How much help from another person do you currently need...    Turning from your back to your side while in flat bed without using bedrails?  4  -LN  --  3  -KRISSY    Moving from lying on back to sitting on the side of a flat bed without bedrails?  4  -LN  --  2  -KRISSY    Moving to and from a  bed to a chair (including a wheelchair)?  3  -LN  --  2  -KRISSY    Standing up from a chair using your arms (e.g., wheelchair, bedside chair)?  3  -LN  --  2  -KRISSY    Climbing 3-5 steps with a railing?  3  -LN  --  2  -KRISSY    To walk in hospital room?  3  -LN  --  2  -KRISSY    AM-PAC 6 Clicks Score  20  -LN  --  13  -KRISSY       How much help from another is currently needed...    Putting on and taking off regular lower body clothing?  --  2  -JH  --    Bathing (including washing, rinsing, and drying)  --  3  -JH  --    Toileting (which includes using toilet bed pan or urinal)  --  3  -JH  --    Putting on and taking off regular upper body clothing  --  3  -JH  --    Taking care of personal grooming (such as brushing teeth)  --  4  -JH  --    Eating meals  --  4  -JH  --    Score  --  19  -JH  --       Functional Assessment    Outcome Measure Options  AM-PAC 6 Clicks Basic Mobility (PT)  -LN  --  AM-PAC 6 Clicks Basic Mobility (PT)  -KRISSY      User Key  (r) = Recorded By, (t) = Taken By, (c) = Cosigned By    Initials Name Provider Type    KRISSY Evelyn Keene, PT Physical Therapist    Elia Juan, KOURTNEY Physical Therapy Assistant    Elinor Loomis, KOURTNEY Physical Therapy Assistant     Maira Murphy, RUFUS/L Occupational Therapy Assistant         Time Calculation:   PT Charges     Row Name 01/12/19 1342             Time Calculation    Start Time  1307  -CA      Stop Time  1337  -CA      Time Calculation (min)  30 min  -CA      PT Received On  01/12/19  -CA         Time Calculation- PT    Total Timed Code Minutes- PT  30 minute(s)  -CA        User Key  (r) = Recorded By, (t) = Taken By, (c) = Cosigned By    Initials Name Provider Type    Elia Juan PTA Physical Therapy Assistant        Therapy Suggested Charges     Code   Minutes Charges    None           Therapy Charges for Today     Code Description Service Date Service Provider Modifiers Qty    40771397269 HC GAIT TRAINING EA 15 MIN 1/12/2019 Elia Trimble, KOURTNEY GP 1     [id number]  PT THER PROC EA 15 MIN 1/12/2019 Elia Trimble, PTA GP 1          PT G-Codes  Outcome Measure Options: AM-PAC 6 Clicks Basic Mobility (PT)  AM-PAC 6 Clicks Score: 20  Score: 18    Elia Trimble PTA  1/12/2019

## 2019-01-12 NOTE — PLAN OF CARE
Problem: Patient Care Overview  Goal: Plan of Care Review  Outcome: Ongoing (interventions implemented as appropriate)   01/12/19 0843   Coping/Psychosocial   Plan of Care Reviewed With patient   Plan of Care Review   Progress improving   OTHER   Outcome Summary Pt tolerated tx well this date. Pt was cod I with sit-sup. Pt was SBA/CGA with toilet t/f. Pt gave good effort with UE ther ex. Continue OT POC.

## 2019-01-12 NOTE — PROGRESS NOTES
[facility]  INPATIENT PROGRESS NOTE    Length of Stay: 3  Date of Admission: 1/7/2019  Primary Care Physician: Henry Muniz MD    Subjective   Chief Complaint: No new complaints.      HPI: Patient is seen for follow-up.  She has history of coronary artery disease status post recent high risk PCI (at [facility]), O2 dependent COPD, CIERRA, chronic kidney disease/MELCHOR, diabetes mellitus, congestive heart failure, morbid obesity, hypertension, chronic anemia, chronic pain syndrome and was readmitted (same day after discharge) for CHF decompensation/possible non-STEMI.  She remains on her baseline supplemental oxygen of 3 L nasal prongs, less short of air and voices no new complaints.  Edema both legs is improving.     Review of Systems   Constitutional: Positive for activity change and fatigue. Negative for appetite change, chills, diaphoresis, fever and unexpected weight change.   HENT: Negative for trouble swallowing and voice change.    Eyes: Negative for photophobia and visual disturbance.   Respiratory: Positive for shortness of breath. Negative for cough, choking, chest tightness, wheezing and stridor.    Cardiovascular: Positive for leg swelling. Negative for chest pain and palpitations.   Gastrointestinal: Negative for abdominal distention, abdominal pain, blood in stool, constipation, diarrhea, nausea and vomiting.   Endocrine: Negative for cold intolerance, heat intolerance, polydipsia, polyphagia and polyuria.   Genitourinary: Negative for decreased urine volume, difficulty urinating, dysuria, enuresis, flank pain, frequency, hematuria and urgency.   Musculoskeletal: Positive for arthralgias and gait problem. Negative for myalgias, neck pain and neck stiffness.   Skin: Negative for pallor, rash and wound.   Neurological: Negative for dizziness, tremors, seizures, syncope, facial asymmetry, speech difficulty, weakness, light-headedness,  numbness and headaches.   Hematological: Does not bruise/bleed easily.   Psychiatric/Behavioral: Negative for agitation, behavioral problems and confusion.       Objective    Temp:  [97 °F (36.1 °C)-98.1 °F (36.7 °C)] 97.4 °F (36.3 °C)  Heart Rate:  [50-66] 50  Resp:  [18-20] 18  BP: (122-165)/(60-75) 140/60    Physical Exam   Constitutional: She is oriented to person, place, and time. She appears well-developed and well-nourished. She is cooperative. No distress.   Patient is morbidly obese and has a BMI of 41.38   HENT:   Head: Normocephalic and atraumatic.   Right Ear: External ear normal.   Left Ear: External ear normal.   Nose: Nose normal.   Mouth/Throat: Oropharynx is clear and moist.   Eyes: Conjunctivae and EOM are normal. Pupils are equal, round, and reactive to light.   Neck: Normal range of motion. Neck supple. No JVD present. No thyromegaly present.   Cardiovascular: Normal rate, regular rhythm, normal heart sounds and intact distal pulses. Exam reveals no gallop and no friction rub.   No murmur heard.  Pulmonary/Chest: Effort normal. No stridor. No respiratory distress. She has decreased breath sounds. She has no wheezes. She has rhonchi. She has no rales. She exhibits no tenderness.   Abdominal: Soft. Bowel sounds are normal. She exhibits no distension and no mass. There is no tenderness. There is no rebound and no guarding. No hernia.   Musculoskeletal: Normal range of motion. She exhibits edema. She exhibits no tenderness or deformity.   Neurological: She is alert and oriented to person, place, and time. She has normal reflexes. No cranial nerve deficit or sensory deficit. She exhibits normal muscle tone.   Skin: Skin is warm and dry. No rash noted. She is not diaphoretic. No erythema. No pallor.   Psychiatric: She has a normal mood and affect. Her behavior is normal. Judgment and thought content normal.   Nursing note and vitals reviewed.        Medication Review:    Current Facility-Administered  Medications:   •  aspirin chewable tablet 81 mg, 81 mg, Oral, Daily, Sage Blanchard MD, 81 mg at 01/12/19 0858  •  atorvastatin (LIPITOR) tablet 40 mg, 40 mg, Oral, Nightly, Yaniv Lobo MD, 40 mg at 01/11/19 2041  •  bumetanide (BUMEX) injection 2 mg, 2 mg, Intravenous, BID, Joon Dugan MD, 2 mg at 01/12/19 0643  •  carvedilol (COREG) tablet 6.25 mg, 6.25 mg, Oral, BID With Meals, Catracho Jarrell MD, 6.25 mg at 01/12/19 0859  •  clopidogrel (PLAVIX) tablet 75 mg, 75 mg, Oral, Daily, Sage Blanchard MD, 75 mg at 01/12/19 0859  •  enoxaparin (LOVENOX) syringe 40 mg, 40 mg, Subcutaneous, Q24H, Catracho Jarrell MD, 40 mg at 01/11/19 2331  •  epoetin kenyetta (EPOGEN,PROCRIT) injection 10,000 Units, 10,000 Units, Subcutaneous, Once, Joon Dugan MD  •  famotidine (PEPCID) tablet 40 mg, 40 mg, Oral, Daily, Sage Blanchard MD, 40 mg at 01/12/19 0859  •  HYDROcodone-acetaminophen (NORCO)  MG per tablet 1 tablet, 1 tablet, Oral, Q6H, Joon Dugan MD, 1 tablet at 01/12/19 0643  •  insulin patient supplied pump, , Subcutaneous, Continuous, Sage Blanchard MD  •  isosorbide mononitrate (IMDUR) 24 hr tablet 60 mg, 60 mg, Oral, BID, Yaniv Lobo MD, 60 mg at 01/12/19 0859  •  lisinopril (PRINIVIL,ZESTRIL) tablet 10 mg, 10 mg, Oral, Q24H, Joon Dugan MD, 10 mg at 01/12/19 0859  •  polyethylene glycol 3350 powder (packet), 17 g, Oral, Daily, Catracho Jarrell MD, 17 g at 01/12/19 0858  •  potassium chloride (MICRO-K) CR capsule 40 mEq, 40 mEq, Oral, PRN, 40 mEq at 01/09/19 0505 **OR** potassium chloride (KLOR-CON) packet 40 mEq, 40 mEq, Oral, PRN **OR** potassium chloride 10 mEq in 100 mL IVPB, 10 mEq, Intravenous, Q1H PRN, Catracho Jarrell MD  •  [COMPLETED] Insert peripheral IV, , , Once **AND** sodium chloride 0.9 % flush 10 mL, 10 mL, Intravenous, PRN, Tyrone Quinonez MD  •  sodium chloride 0.9 % flush 3 mL, 3 mL, Intravenous, Q12H, Sage Blanchard MD, 3 mL at 01/12/19 0859  •  sodium  chloride 0.9 % flush 3-10 mL, 3-10 mL, Intravenous, PRN, Sage Blanchard MD  •  spironolactone (ALDACTONE) tablet 25 mg, 25 mg, Oral, Daily, Yaniv Lobo MD, 25 mg at 01/12/19 0859  •  zolpidem (AMBIEN) tablet 5 mg, 5 mg, Oral, Nightly, Catracho Jarrell MD, 5 mg at 01/11/19 2041    Results Review:  I have reviewed the labs, radiology results, and diagnostic studies.    Laboratory Data:   Results from last 7 days   Lab Units  01/12/19 0552  01/11/19 0528  01/10/19   0529   01/07/19   1954   SODIUM mmol/L  138  136*  137   < >  136*   POTASSIUM mmol/L  3.9  3.9  4.1   < >  3.8   CHLORIDE mmol/L  99  100  102   < >  96   CO2 mmol/L  29.0  26.0  27.0   < >  28.0   BUN mg/dL  58*  62*  60*   < >  47*   CREATININE mg/dL  1.64*  1.56*  1.84*   < >  1.21*   GLUCOSE mg/dL  99  85  78   < >  164*   CALCIUM mg/dL  8.4  8.2*  8.7   < >  8.9   BILIRUBIN mg/dL   --    --    --    --   2.1*   ALK PHOS U/L   --    --    --    --   115   ALT (SGPT) U/L   --    --    --    --   18   AST (SGOT) U/L   --    --    --    --   43*   ANION GAP mmol/L  10.0  10.0  8.0   < >  12.0    < > = values in this interval not displayed.     Estimated Creatinine Clearance: 39.3 mL/min (A) (by C-G formula based on SCr of 1.64 mg/dL (H)).  Results from last 7 days   Lab Units  01/08/19 2036 01/07/19   0518   MAGNESIUM mg/dL  1.7  2.0         Results from last 7 days   Lab Units  01/12/19   0552  01/11/19   0528  01/10/19   0529  01/09/19 2023 01/09/19   0749  01/08/19   0728   WBC 10*3/mm3  4.09  4.26  5.60   --   4.31  4.97   HEMOGLOBIN g/dL  8.0*  8.3*  9.0*  8.3*  7.3*  8.2*   HEMATOCRIT %  24.8*  25.1*  27.5*  25.5*  22.5*  24.6*   PLATELETS 10*3/mm3  105*  111*  117*   --   96*  130*           Culture Data:   No results found for: BLOODCX  No results found for: URINECX  No results found for: RESPCX  No results found for: WOUNDCX  No results found for: STOOLCX  No components found for: BODYFLD    Radiology Data:   Imaging  Results (last 24 hours)     ** No results found for the last 24 hours. **          I have reviewed the patient's current medications.     Assessment/Plan     Hospital Problem List:  Principal Problem:  - Acute on chronic diastolic CHF (congestive heart failure): Continue diuretic therapy, strict I's and O's, daily weights and fluid/ salt restriction.  Cardiologist/ Heart failure Navigator are following.  Patient's weight is 242 pounds today and her dry weight is between 230 and 235.  Echocardiogram done January 8, 2019 showed:  · Left ventricular wall thickness is consistent with mild concentric hypertrophy.  · Estimated EF = 57%.  · Left ventricular systolic function is normal.  · Left ventricular diastolic dysfunction (grade I a) consistent with impaired relaxation.  · Right ventricular cavity is mildly dilated.  · Left atrial cavity size is mildly dilated.  · Mild aortic valve regurgitation is present.  · Moderate mitral valve regurgitation is present  · Moderate tricuspid valve regurgitation is present.  · The tricuspid valve is grossly normal. Moderate tricuspid valve regurgitation is present. Estimated right ventricular systolic pressure from tricuspid regurgitation is markedly elevated (>55 mmHg). Moderate to severe pulmonary hypertension is present.  · Mild pulmonic valve regurgitation is present.     - History of coronary artery disease with possible non-STEMI: Continue guidelines directed medical therapy.  Result of echocardiogram is as dictated above.  Cardiologist is following.   - Chronic renal impairment, stage 3: Creatinine has increased to 1.64  today.  Continue to monitor.  Nephrologist is following.    - Elevated d-dimer is likely reactive. VQ scan showed low probability for pulmonary embolism and Duplex ultrasound both lower extremities is negative for DVT.   - History of moderate to severe pulmonary hypertension: Patient will be seen for follow-up by Dr. Byrd as outpatient on discharge.   -  Hypertension: Stable. Continue current medications and make adjustments as needed for optimal blood pressure control.  - Oxygen dependent COPD: Patient is currently not in exacerbation.  Continue supplemental oxygen and bronchodilators.  - Diabetes mellitus: Stable. Continue insulin pump with Accu-Cheks.  - Chronic anemia: Hemoglobin is stable post transfusion of one unit of packed red blood cells. Iron profile is unremarkable. Continue to monitor.  - Obstructive sleep apnea: Continue CPAP.  - Chronic pain syndrome/chronic insomnia: Continue home medications.  - Chronic thrombocytopenia: Platelet count is 151,000 today.  Her baseline seem to be 110 to 130K.  Continue to monitor.  - Continue GI and DVT prophylaxis.           Discharge Planning: In progress.    Catracho Jarrell MD   01/12/19   10:22 AM

## 2019-01-12 NOTE — PLAN OF CARE
Problem: Fall Risk (Adult)  Goal: Absence of Fall  Outcome: Ongoing (interventions implemented as appropriate)      Problem: Patient Care Overview  Goal: Plan of Care Review  Outcome: Ongoing (interventions implemented as appropriate)   01/12/19 0307   Coping/Psychosocial   Plan of Care Reviewed With patient   Plan of Care Review   Progress improving       Problem: Fluid Volume Excess (Adult)  Goal: Optimal Fluid Balance  Outcome: Ongoing (interventions implemented as appropriate)      Problem: Diabetes, Type 2 (Adult)  Goal: Signs and Symptoms of Listed Potential Problems Will be Absent, Minimized or Managed (Diabetes, Type 2)  Outcome: Ongoing (interventions implemented as appropriate)

## 2019-01-12 NOTE — THERAPY TREATMENT NOTE
Acute Care - Occupational Therapy Treatment Note  [facility]     Patient Name: Nicolasa Rojas  : 1950  MRN: [id number]  Today's Date: 2019  Onset of Illness/Injury or Date of Surgery: 19  Date of Referral to OT: 19  Referring Physician: Dr. Catracho Jarrell    Admit Date: 2019       ICD-10-CM ICD-9-CM   1. Acute on chronic heart failure, unspecified heart failure type (CMS/HCC) I50.9 428.0   2. Elevated troponin R74.8 790.6   3. Impaired mobility and activities of daily living Z74.09 799.89   4. Impaired physical mobility Z74.09 781.99     Patient Active Problem List   Diagnosis   • Hyponatremia   • Acute on chronic diastolic CHF (congestive heart failure) (CMS/HCC)   • Hypokalemia   • Acute on chronic heart failure (CMS/HCC)   • Dyspnea   • Chronic renal impairment, stage 3 (moderate) (CMS/HCC)   • CAD (coronary artery disease)     Past Medical History:   Diagnosis Date   • Acute bronchitis    • Acute congestive heart failure (CMS/HCC)     resolving   • Acute kidney failure, unspecified (CMS/HCC)    • Acute kidney failure, unspecified (CMS/HCC)    • Acute posthemorrhagic anemia    • Asthenia    • Chest pain    • Chronic gastritis    • Chronic pharyngitis    • Chronic renal impairment    • Congenital renal failure    • Congestive heart failure (CMS/HCC)    • Contact dermatitis due to poison ivy    • COPD (chronic obstructive pulmonary disease) (CMS/HCC)    • Coronary arteriosclerosis    • D-dimer, elevated     D-dimer above reference range    • Degenerative joint disease involving multiple joints     back   • Depressive disorder    • Dysphagia    • Dyspnea    • Edema of lower extremity    • Encounter for immunization    • Encounter for long-term (current) use of medications    • Epigastric pain    • Esophagitis    • Essential hypertension    • Gastroesophageal reflux disease    • Gastroparesis     Gastroparesis syndrome    • Generalized abdominal pain    • H/O bone density study  01/09/2015   • H/O mammogram 01/15/2015   • H/O screening mammography 11/12/2013   • Headache    • History of transfusion    • Hyperlipidemia    • Hypokalemia    • Hypomagnesemia    • Injury of tendon of rotator cuff     Injury of tendon of the rotator cuff of shoulder      • Insomnia    • Insomnia, unspecified    • Knee pain    • Nausea and vomiting    • Neck pain    • Need for immunization against influenza    • Need for prophylactic vaccination and inoculation against influenza    • Need for prophylactic vaccination and inoculation against viral disease    • 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 Performed at [facility].   • ENDOSCOPY  02/09/2015    ESOPHAGEAL STRICTURE PRESENT, GASTRITIS FOUND IN THE STOMACH, NORMAL DUODENUM   • ENDOSCOPY W/ PEG TUBE PLACEMENT  12/19/2012    Esophagitis seen. Biopsy taken. Esophageal stricture present. Dilatation performed. Gastritis in stomach. Biopsy taken. Food was in stomach. Normal duodenum.   • HERNIA REPAIR     • HYSTERECTOMY      partial   • NECK SURGERY         Therapy Treatment    Rehabilitation Treatment Summary     Row Name 01/12/19 1344 01/12/19 1307          Treatment Time/Intention    Discipline  occupational therapy assistant  -CS  physical therapy assistant  -CA     Document Type  therapy note (daily note)  -CS  therapy note (daily note)  -CA     Subjective Information  complains of;pain  -CS  --     Mode of Treatment  occupational therapy  -CS  individual therapy;physical therapy  -CA     Patient/Family Observations  --  pt. sidelying in bed on R side    -CA     Therapy Frequency (OT Eval)  other (see comments) 5-7 days/wk  -CS  --     Patient Effort  good  -CS2  --     Existing Precautions/Restrictions  fall;oxygen therapy device and L/min  -CS  fall;oxygen therapy device and L/min  -CA     Recorded by [CS] Malena Alexis, HOOPER/L 01/12/19 1353  [CS2] Malena Alexis, HOOPER/L 01/12/19 1420 [CA] Elia Trimble, PTA 01/12/19 1311     Row Name 01/12/19 1344 01/12/19 1307          Vital Signs    Pretreatment Heart Rate (beats/min)  65  -CS  --     Posttreatment Heart Rate (beats/min)  58  -CS2  --     Pre SpO2 (%)  91  -CS  94  -CA     O2 Delivery Pre Treatment  supplemental O2  -CS  supplemental O2  -CA     Post SpO2 (%)  96  -CS2  97  -CA2     O2 Delivery Post Treatment  supplemental O2  -CS2  supplemental O2  -CA2     Pre Patient Position  Sitting  -CS2  Side Lying  -CA2     Intra Patient Position  --  Standing  -CA2     Post Patient Position  Supine  -CS2  Side Lying  -CA2     Recorded by [CS] Malena Alexis, HOOPER/L 01/12/19 1353  [CS2] Malena Alexis, HOPOER/L 01/12/19 1420 [CA] Elia Trimble, PTA 01/12/19 1336  [CA2] Elia Trimble, PTA 01/12/19 1340     Row Name 01/12/19 1344 01/12/19 1307          Cognitive Assessment/Intervention- PT/OT    Orientation Status (Cognition)  oriented x 4  -CS  oriented x 4  -CA     Follows Commands (Cognition)  follows one step commands  -CS  follows one step commands  -CA     Recorded by [CS] Malena Alexis HOOPER/L 01/12/19 1353 [CA] Elia Trimble, PTA 01/12/19 1312     Row Name 01/12/19 1344 01/12/19 1307          Bed Mobility Assessment/Treatment    Supine-Sit [place] (Bed Mobility)  --  conditional independence  -CA     Sit-Supine [place] (Bed Mobility)  conditional independence  -CS  conditional independence  -CA     Assistive Device (Bed Mobility)  bed rails;head of bed elevated  -CS  bed rails;head of bed elevated  -CA     Recorded by [CS] Malena Alexis, HOOPER/L 01/12/19 0100 [CA] Elia Trimble, PTA  01/12/19 1340     Row Name 01/12/19 1344 01/12/19 1307          Sit-Stand Transfer    Sit-Stand [place] (Transfers)  stand by assist;contact guard  -CS  stand by assist;contact guard  -CA     Recorded by [CS] Malena Alexis COTA/JOSEMANUEL 01/12/19 1353 [CA] Elia Trimble, PTA 01/12/19 1340     Row Name 01/12/19 1344 01/12/19 1307          Stand-Sit Transfer    Stand-Sit [place] (Transfers)  stand by assist;contact guard  -CS  stand by assist;contact guard  -CA     Recorded by [CS] Malena Alexis COTA/JOSEMANUEL 01/12/19 1353 [CA] Elia Trimble, PTA 01/12/19 1340     Row Name 01/12/19 1344             Toilet Transfer    Type (Toilet Transfer)  sit-stand;stand-sit  -CS      [place] Level (Toilet Transfer)  supervision;contact guard  -CS      Recorded by [CS] Malena Alexis COTA/L 01/12/19 1353      Row Name 01/12/19 1307             Gait/Stairs Assessment/Training    [place] Level (Gait)  contact guard  -CA      Assistive Device (Gait)  walker, front-wheeled  -CA      Distance in Feet (Gait)  68' and 74'   -CA      Pattern (Gait)  step-through  -CA      Comment (Gait/Stairs)  spouse followed with w/c and pt. took seated rest break in between gait trips  -CA      Recorded by [CA] Elia Trimble, PTA 01/12/19 1340      Row Name 01/12/19 1344             ADL Assessment/Intervention    BADL Assessment/Intervention  toileting  -CS      Recorded by [CS] Malena Alexis COTA/L 01/12/19 1420      Row Name 01/12/19 1344             Toileting Assessment/Training    [place] Level (Toileting)  supervision  -CS      Assistive Devices (Toileting)  grab bar/safety frame;raised toilet seat  -CS      Toileting Position  supported sitting;supported standing  -CS      Recorded by [CS] Malena Alexis COTA/L 01/12/19 1420      Row Name 01/12/19 1307             Lower Extremity Seated Therapeutic Exercise    Performed, Seated Lower Extremity (Therapeutic Exercise)  hip flexion/extension;LAQ (long arc quad), knee  extension  -CA      Exercise Type, Seated Lower Extremity (Therapeutic Exercise)  AROM (active range of motion)  -CA      Sets/Reps Detail, Seated Lower Extremity (Therapeutic Exercise)  1x20  -CA      Recorded by [CA] Elia Trimble PTA 01/12/19 1340      Row Name 01/12/19 1344             Therapeutic Exercise    Upper Extremity (Therapeutic Exercise)  bicep curl, bilateral  -CS      Upper Extremity Range of Motion (Therapeutic Exercise)  shoulder flexion/extension, bilateral;shoulder internal/external rotation, bilateral;elbow flexion/extension, bilateral;forearm supination/pronation, bilateral;wrist flexion/extension, bilateral  -CS      Hand (Therapeutic Exercise)  finger flexion/extension, bilateral  -CS      Weight/Resistance (Therapeutic Exercise)  1 pound  -CS      Exercise Type (Therapeutic Exercise)  AROM (active range of motion)  -CS      Position (Therapeutic Exercise)  seated  -CS      Sets/Reps (Therapeutic Exercise)  1/20  -CS      Equipment (Therapeutic Exercise)  free weight, barbell  -CS      Expected Outcome (Therapeutic Exercise)  improve functional tolerance, self-care activity;improve performance, BADLs;improve performance, transfer skills;increase active range of motion  -CS      Recorded by [CS] Malena Alexis COTA/L 01/12/19 1420      Row Name 01/12/19 1344 01/12/19 1307          Positioning and Restraints    Pre-Treatment Position  in bed  -CS  in bed  -CA     Post Treatment Position  bed  -CS  bed  -CA     In Bed  supine;encouraged to call for assist;exit alarm on;call light within reach;with family/caregiver  -CS  sitting EOB;call light within reach;encouraged to call for assist;with family/caregiver  -CA     Recorded by [CS] Malena Alexis COTA/JOSEMANUEL 01/12/19 1420 [CA] Elia Trimble, KOURTNEY 01/12/19 1340     Row Name 01/12/19 1344 01/12/19 1307          Pain Scale: Numbers Pre/Post-Treatment    Pain Scale: Numbers, Pretreatment  10/10  -CS  0/10 - no pain  -CA     Pain Scale: Numbers,  Post-Treatment  10/10  -CS2  0/10 - no pain  -CA     Pain Location  back  -CS  --     Recorded by [CS] Malena Alexis COTA/L 01/12/19 1353  [CS2] Malena Alexis COTA/JOSEMANUEL 01/12/19 1420 [CA] Atilio Elia PHYLLIS, PTA 01/12/19 1340     Row Name 01/12/19 1344             Outcome Summary/Treatment Plan (OT)    Daily Summary of Progress (OT)  progress toward functional goals is good  -CS      Plan for Continued Treatment (OT)  cont OT POC  -CS      Anticipated Discharge Disposition (OT)  home with home health;home with 24/7 care  -CS      Recorded by [CS] Malena Alexis COTA/JOSEMANUEL 01/12/19 1420        User Key  (r) = Recorded By, (t) = Taken By, (c) = Cosigned By    Initials Name Effective Dates Discipline    CA Atilio Elia FERGUSON, PTA 03/07/18 -  PT    CS Malena Alexis COTA/JOSEMANUEL 03/07/18 -  OT           Rehab Goal Summary     Row Name 01/12/19 1344 01/12/19 1307          Physical Therapy Goals    Bed Mobility Goal Selection (PT)  --  bed mobility, PT goal 1  -CA     Transfer Goal Selection (PT)  --  transfer, PT goal 1  -CA     Gait Training Goal Selection (PT)  --  gait training, PT goal 1  -CA     Strength Goal Selection (PT)  --  strength, PT goal 1  -CA        Bed Mobility Goal 1 (PT)    Activity/Assistive Device (Bed Mobility Goal 1, PT)  --  rolling to left;rolling to right;sit to supine;supine to sit  -CA     [place] Level/Cues Needed (Bed Mobility Goal 1, PT)  --  independent  -CA     Time Frame (Bed Mobility Goal 1, PT)  --  by discharge  -CA     Progress/Outcomes (Bed Mobility Goal 1, PT)  --  goal not met  -CA        Transfer Goal 1 (PT)    Activity/Assistive Device (Transfer Goal 1, PT)  --  sit-to-stand/stand-to-sit;bed-to-chair/chair-to-bed  -CA     [place] Level/Cues Needed (Transfer Goal 1, PT)  --  conditional independence  -CA     Time Frame (Transfer Goal 1, PT)  --  by discharge  -CA     Progress/Outcome (Transfer Goal 1, PT)  --  goal not met  -CA        Gait Training Goal 1 (PT)     Activity/Assistive Device (Gait Training Goal 1, PT)  --  gait (walking locomotion);assistive device use  -CA     [place] Level (Gait Training Goal 1, PT)  --  standby assist;conditional independence  -CA     Distance (Gait Goal 1, PT)  --  50ftx2  -CA     Time Frame (Gait Training Goal 1, PT)  --  by discharge  -CA     Progress/Outcome (Gait Training Goal 1, PT)  --  goal not met  -CA        Strength Goal 1 (PT)    Strength Goal 1 (PT)  --  Pt will tolerate 2 sets of 15 reps of AROM exercises OOB in chair with VSS  -CA     Time Frame (Strength Goal 1, PT)  --  by discharge  -CA     Progress/Outcome (Strength Goal 1, PT)  --  goal not met  -CA        Transfer Goal 1 (OT)    Activity/Assistive Device (Transfer Goal 1, OT)  sit-to-stand/stand-to-sit;bed-to-chair/chair-to-bed;toilet  -CS  --     [place] Level/Cues Needed (Transfer Goal 1, OT)  conditional independence  -CS  --     Time Frame (Transfer Goal 1, OT)  long term goal (LTG);by discharge  -CS  --     Progress/Outcome (Transfer Goal 1, OT)  goal not met  -CS  --        Bathing Goal 1 (OT)    Activity/Assistive Device (Bathing Goal 1, OT)  bathing skills, all  -CS  --     [place] Level/Cues Needed (Bathing Goal 1, OT)  minimum assist (75% or more patient effort)  -CS  --     Time Frame (Bathing Goal 1, OT)  long term goal (LTG);by discharge  -CS  --     Progress/Outcomes (Bathing Goal 1, OT)  goal met;goal ongoing  -CS  --        Dressing Goal 1 (OT)    Activity/Assistive Device (Dressing Goal 1, OT)  dressing skills, all  -CS  --     [place]/Cues Needed (Dressing Goal 1, OT)  minimum assist (75% or more patient effort)  -CS  --     Time Frame (Dressing Goal 1, OT)  long term goal (LTG);by discharge  -CS  --     Progress/Outcome (Dressing Goal 1, OT)  goal met;goal ongoing  -CS  --        Toileting Goal 1 (OT)    Activity/Device (Toileting Goal 1, OT)  toileting skills, all  -CS  --     [place] Level/Cues Needed (Toileting Goal 1, OT)   conditional independence  -CS  --     Time Frame (Toileting Goal 1, OT)  long term goal (LTG);by discharge  -CS  --     Progress/Outcome (Toileting Goal 1, OT)  goal not met  -CS  --         Activity Tolerance Goal 1 (OT)    Activity Level (Endurance Goal 1, OT)  10 min activity;O2 sat >/ equal to 88%  -CS  --     Time Frame (Activity Tolerance Goal 1, OT)  long term goal (LTG);by discharge  -CS  --     Progress/Outcome (Activity Tolerance Goal 1, OT)  goal met  -CS  --       User Key  (r) = Recorded By, (t) = Taken By, (c) = Cosigned By    Initials Name Provider Type Discipline    CA Elia Trimble, PTA Physical Therapy Assistant PT    CS Malena Alexis, HOOPER/L Occupational Therapy Assistant OT        Occupational Therapy Education     Title: PT OT SLP Therapies (In Progress)     Topic: Occupational Therapy (In Progress)     Point: Precautions (Done)     Description: Instruct learner(s) on prescribed precautions during self-care and functional transfers.    Learning Progress Summary           Patient Acceptance, E, VU,NR by  at 1/9/2019 11:36 AM    Comment:  OT role, OT POC   Family Acceptance, E, VU,NR by  at 1/9/2019 11:36 AM    Comment:  OT role, OT POC                               User Key     Initials Effective Dates Name Provider Type Discipline     10/12/18 -  Dmitry Yuan Occupational Therapist OT                OT Recommendation and Plan  Outcome Summary/Treatment Plan (OT)  Daily Summary of Progress (OT): progress toward functional goals is good  Plan for Continued Treatment (OT): cont OT POC  Anticipated Discharge Disposition (OT): home with home health, home with 24/7 care  Therapy Frequency (OT Eval): other (see comments)(5-7 days/wk)  Daily Summary of Progress (OT): progress toward functional goals is good  Plan of Care Review  Plan of Care Reviewed With: patient  Plan of Care Reviewed With: patient  Outcome Summary: Pt tolerated tx well this date. Pt was cod I with sit-sup. Pt was  SBA/CGA with toilet t/f. Pt gave good effort with UE ther ex. Continue OT POC.  Outcome Measures     Row Name 01/12/19 1400 01/12/19 1307 01/11/19 1108       How much help from another person do you currently need...    Turning from your back to your side while in flat bed without using bedrails?  --  4  -CA  --    Moving from lying on back to sitting on the side of a flat bed without bedrails?  --  4  -CA  --    Moving to and from a bed to a chair (including a wheelchair)?  --  3  -CA  --    Standing up from a chair using your arms (e.g., wheelchair, bedside chair)?  --  3  -CA  --    Climbing 3-5 steps with a railing?  --  3  -CA  --    To walk in hospital room?  --  3  -CA  --    AM-PAC 6 Clicks Score  --  20  -CA  --       How much help from another is currently needed...    Putting on and taking off regular lower body clothing?  3  -CS  --  3  -JH    Bathing (including washing, rinsing, and drying)  3  -CS  --  3  -JH    Toileting (which includes using toilet bed pan or urinal)  3  -CS  --  3  -JH    Putting on and taking off regular upper body clothing  3  -CS  --  3  -JH    Taking care of personal grooming (such as brushing teeth)  3  -CS  --  3  -JH    Eating meals  3  -CS  --  3  -JH    Score  18  -CS  --  18  -JH       Functional Assessment    Outcome Measure Options  AM-PAC 6 Clicks Daily Activity (OT)  -CS  AM-PAC 6 Clicks Basic Mobility (PT)  -CA  --    Row Name 01/11/19 1020 01/10/19 1330 01/10/19 1000       How much help from another person do you currently need...    Turning from your back to your side while in flat bed without using bedrails?  4  -LN  4  -LN  --    Moving from lying on back to sitting on the side of a flat bed without bedrails?  3  -LN  4  -LN  --    Moving to and from a bed to a chair (including a wheelchair)?  3  -LN  3  -LN  --    Standing up from a chair using your arms (e.g., wheelchair, bedside chair)?  3  -LN  3  -LN  --    Climbing 3-5 steps with a railing?  3  -LN  3  -LN   --    To walk in hospital room?  3  -LN  3  -LN  --    AM-PAC 6 Clicks Score  19  -LN  20  -LN  --       How much help from another is currently needed...    Putting on and taking off regular lower body clothing?  --  --  2  -JH    Bathing (including washing, rinsing, and drying)  --  --  3  -JH    Toileting (which includes using toilet bed pan or urinal)  --  --  3  -JH    Putting on and taking off regular upper body clothing  --  --  3  -JH    Taking care of personal grooming (such as brushing teeth)  --  --  4  -JH    Eating meals  --  --  4  -JH    Score  --  --  19  -JH       Functional Assessment    Outcome Measure Options  AM-PAC 6 Clicks Basic Mobility (PT)  -LN  AM-[facility] 6 Clicks Basic Mobility (PT)  -LN  --    Row Name 01/09/19 1655             How much help from another person do you currently need...    Turning from your back to your side while in flat bed without using bedrails?  3  -KRISSY      Moving from lying on back to sitting on the side of a flat bed without bedrails?  2  -KRISSY      Moving to and from a bed to a chair (including a wheelchair)?  2  -KRISSY      Standing up from a chair using your arms (e.g., wheelchair, bedside chair)?  2  -KRISSY      Climbing 3-5 steps with a railing?  2  -KRISSY      To walk in hospital room?  2  -KRISSY      AM-PAC 6 Clicks Score  13  -KRISSY         Functional Assessment    Outcome Measure Options  AM-PAC 6 Clicks Basic Mobility (PT)  -KRISSY        User Key  (r) = Recorded By, (t) = Taken By, (c) = Cosigned By    Initials Name Provider Type    KRISSY Evelyn Keene, PT Physical Therapist    Elia Juan, PTA Physical Therapy Assistant    LN Elinor Kennedy, PTA Physical Therapy Assistant    JH Maira Murphy, HOOPER/L Occupational Therapy Assistant    CS Malena Alexis, HOOPER/L Occupational Therapy Assistant           Time Calculation:   Time Calculation- OT     Row Name 01/12/19 1526             Time Calculation- OT    OT Start Time  1344  -CS      OT Stop Time  1424  -CS      OT Time  Calculation (min)  40 min  -CS      Total Timed Code Minutes- OT  40 minute(s)  -CS      OT Received On  01/12/19  -CS        User Key  (r) = Recorded By, (t) = Taken By, (c) = Cosigned By    Initials Name Provider Type    CS Malena Alexis COTA/JOSEMANUEL Occupational Therapy Assistant           Therapy Suggested Charges     Code   Minutes Charges    None           Therapy Charges for Today     Code Description Service Date Service Provider Modifiers Qty    70650906712 HC OT SELF CARE/MGMT/TRAIN EA 15 MIN 1/12/2019 Malena Alexis COTA/L GO 1    31714293868 HC OT THER PROC EA 15 MIN 1/12/2019 Malena Alexis COTA/L GO 2               JUAN Peter  1/12/2019

## 2019-01-12 NOTE — PROGRESS NOTES
"[facility]  [address] [place] [address]. [address]  T - [phone]  F - [phone]     Progress Note          PATIENT  DEMOGRAPHICS   PATIENT NAME: Nicolasa Rojas                      PHYSICIAN: Joon Dugan MD  : 1950  MRN: [id number]   LOS: 3 days    Patient Care Team:  Henry Muniz MD as PCP - General (Internal Medicine)  Subjective   SUBJECTIVE   Good UO breathing some better       Objective   OBJECTIVE   Vital Signs  Temp:  [97 °F (36.1 °C)-98.1 °F (36.7 °C)] 97.4 °F (36.3 °C)  Heart Rate:  [50-66] 50  Resp:  [18-20] 18  BP: (122-165)/(60-75) 140/60    Flowsheet Rows      First Filed Value   Admission Height  162.6 cm (64\") Documented at 2019 1800   Admission Weight  113 kg (248 lb 8 oz) Documented at 2019 1800           I/O last 3 completed shifts:  In: 840 [P.O.:840]  Out: 3300 [Urine:3300]    PHYSICAL EXAM    Physical Exam   Constitutional: She is oriented to person, place, and time. She appears well-developed.   HENT:   Head: Normocephalic and atraumatic.   Eyes: Pupils are equal, round, and reactive to light.   Neck: Normal range of motion.   Cardiovascular: Normal rate, regular rhythm and normal heart sounds.   Pulmonary/Chest: She has wheezes. She has rales.   Abdominal: Soft. Bowel sounds are normal.   Musculoskeletal: She exhibits edema.   Neurological: She is alert and oriented to person, place, and time.   Skin: Skin is warm.   Psychiatric: She has a normal mood and affect. Her behavior is normal.       RESULTS   Results Review:    Results from last 7 days   Lab Units  19   0552  19   0528  01/10/19   0529   01/07/19   1954   SODIUM mmol/L  138  136*  137   < >  136*   POTASSIUM mmol/L  3.9  3.9  4.1   < >  3.8   CHLORIDE mmol/L  99  100  102   < >  96   CO2 mmol/L  29.0  26.0  27.0   < >  28.0   BUN mg/dL  58*  62*  60*   < >  47*   CREATININE mg/dL  1.64*  1.56*  1.84*   < >  1.21*   CALCIUM mg/dL  8.4  8.2*  8.7   < >  8.9 "   BILIRUBIN mg/dL   --    --    --    --   2.1*   ALK PHOS U/L   --    --    --    --   115   ALT (SGPT) U/L   --    --    --    --   18   AST (SGOT) U/L   --    --    --    --   43*   GLUCOSE mg/dL  99  85  78   < >  164*    < > = values in this interval not displayed.       Estimated Creatinine Clearance: 39.3 mL/min (A) (by C-G formula based on SCr of 1.64 mg/dL (H)).    Results from last 7 days   Lab Units  01/08/19 2036 01/07/19   0518   MAGNESIUM mg/dL  1.7  2.0             Results from last 7 days   Lab Units  01/12/19   0552  01/11/19   0528  01/10/19   0529  01/09/19 2023 01/09/19 0749  01/08/19   0728   WBC 10*3/mm3  4.09  4.26  5.60   --   4.31  4.97   HEMOGLOBIN g/dL  8.0*  8.3*  9.0*  8.3*  7.3*  8.2*   PLATELETS 10*3/mm3  105*  111*  117*   --   96*  130*               Imaging Results (last 24 hours)     ** No results found for the last 24 hours. **           MEDICATIONS      aspirin 81 mg Oral Daily   atorvastatin 40 mg Oral Nightly   bumetanide 2 mg Intravenous BID   carvedilol 6.25 mg Oral BID With Meals   clopidogrel 75 mg Oral Daily   enoxaparin 40 mg Subcutaneous Q24H   famotidine 40 mg Oral Daily   HYDROcodone-acetaminophen 1 tablet Oral Q6H   isosorbide mononitrate 60 mg Oral BID   lisinopril 10 mg Oral Q24H   polyethylene glycol 17 g Oral Daily   sodium chloride 3 mL Intravenous Q12H   spironolactone 25 mg Oral Daily   zolpidem 5 mg Oral Nightly       insulin        Assessment/Plan   ASSESSMENT / PLAN      Dyspnea    Acute on chronic diastolic CHF (congestive heart failure) (CMS/HCC)    Acute on chronic heart failure (CMS/HCC)    Chronic renal impairment, stage 3 (moderate) (CMS/[facility])    CAD (coronary artery disease)       1. CKD3- baseline creatinine close to 1.6-1.7.  She has a history of contrast-induced nephropathy back in august 2018 after she had left heart catheterization.  She had multiple stents placed for non-ST elevation MI back in August 2018. FENa <1%, cr better and still  is hypervolemic and responding well with iv bumex 2mg bid.      2. Acute on chronic diastolic heart failure-  Plan as above with the diuretics, daily weights, and I and O's, and fluid restriction , off amlodipine due to edema.      3. CAD- prior non-ST elevation MI with stenting back in August 2018 to LAD and circumflex     4. Hypokalemia- improved    5. Hyponatremia hypervolemic type now improved.     6. htn elevated pre meds, amlodipine is stopped. Currently stable bp    7. Anemia ckd fe b12 and folate are ok. Add procrit shot x 1.           This document has been electronically signed by Joon Dugan MD on January 12, 2019 9:25 AM

## 2019-01-13 NOTE — PROGRESS NOTES
"[facility]  [address] [place] [address]. [address]  T - [phone]  F - [phone]     Progress Note          PATIENT  DEMOGRAPHICS   PATIENT NAME: Nicolasa Rojas                      PHYSICIAN: Joon Dugan MD  : 1950  MRN: [id number]   LOS: 4 days    Patient Care Team:  Henry Muniz MD as PCP - General (Internal Medicine)  Subjective   SUBJECTIVE   Good UO breathing some better, wt is down few lbs       Objective   OBJECTIVE   Vital Signs  Temp:  [97.8 °F (36.6 °C)-99.4 °F (37.4 °C)] 97.9 °F (36.6 °C)  Heart Rate:  [54-77] 63  Resp:  [18] 18  BP: (128-175)/(58-81) 160/70    Flowsheet Rows      First Filed Value   Admission Height  162.6 cm (64\") Documented at 2019 1800   Admission Weight  113 kg (248 lb 8 oz) Documented at 2019 1800           I/O last 3 completed shifts:  In: 720 [P.O.:720]  Out: 5600 [Urine:5600]    PHYSICAL EXAM    Physical Exam   Constitutional: She is oriented to person, place, and time. She appears well-developed.   HENT:   Head: Normocephalic and atraumatic.   Eyes: Pupils are equal, round, and reactive to light.   Neck: Normal range of motion.   Cardiovascular: Normal rate, regular rhythm and normal heart sounds.   Pulmonary/Chest: She has wheezes. She has rales.   Abdominal: Soft. Bowel sounds are normal.   Musculoskeletal: She exhibits edema.   Neurological: She is alert and oriented to person, place, and time.   Skin: Skin is warm.   Psychiatric: She has a normal mood and affect. Her behavior is normal.       RESULTS   Results Review:    Results from last 7 days   Lab Units  19   0545  19   0552  19   0528   19   SODIUM mmol/L  138  138  136*   < >  136*   POTASSIUM mmol/L  3.6  3.9  3.9   < >  3.8   CHLORIDE mmol/L  100  99  100   < >  96   CO2 mmol/L  30.0  29.0  26.0   < >  28.0   BUN mg/dL  55*  58*  62*   < >  47*   CREATININE mg/dL  1.30*  1.64*  1.56*   < >  1.21*   CALCIUM mg/dL  8.5  8.4  " 8.2*   < >  8.9   BILIRUBIN mg/dL   --    --    --    --   2.1*   ALK PHOS U/L   --    --    --    --   115   ALT (SGPT) U/L   --    --    --    --   18   AST (SGOT) U/L   --    --    --    --   43*   GLUCOSE mg/dL  84  99  85   < >  164*    < > = values in this interval not displayed.       Estimated Creatinine Clearance: 49 mL/min (A) (by C-G formula based on SCr of 1.3 mg/dL (H)).    Results from last 7 days   Lab Units  01/08/19 2036 01/07/19   0518   MAGNESIUM mg/dL  1.7  2.0             Results from last 7 days   Lab Units  01/13/19   0545  01/12/19   0552  01/11/19   0528  01/10/19   0529  01/09/19 2023 01/09/19   0749   WBC 10*3/mm3  4.14  4.09  4.26  5.60   --   4.31   HEMOGLOBIN g/dL  8.0*  8.0*  8.3*  9.0*  8.3*  7.3*   PLATELETS 10*3/mm3  109*  105*  111*  117*   --   96*               Imaging Results (last 24 hours)     ** No results found for the last 24 hours. **           MEDICATIONS      aspirin 81 mg Oral Daily   atorvastatin 40 mg Oral Nightly   bumetanide 2 mg Intravenous BID   carvedilol 6.25 mg Oral BID With Meals   clopidogrel 75 mg Oral Daily   enoxaparin 40 mg Subcutaneous Q24H   famotidine 40 mg Oral Daily   HYDROcodone-acetaminophen 1 tablet Oral Q6H   isosorbide mononitrate 60 mg Oral BID   lisinopril 10 mg Oral Q24H   polyethylene glycol 17 g Oral Daily   sodium chloride 3 mL Intravenous Q12H   spironolactone 25 mg Oral Daily   zolpidem 5 mg Oral Nightly       insulin        Assessment/Plan   ASSESSMENT / PLAN      Dyspnea    Acute on chronic diastolic CHF (congestive heart failure) (CMS/HCC)    Acute on chronic heart failure (CMS/[facility])    Chronic renal impairment, stage 3 (moderate) (CMS/[facility])    CAD (coronary artery disease)       1. CKD3- baseline creatinine close to 1.6-1.7.  She has a history of contrast-induced nephropathy back in august 2018 after she had left heart catheterization.  She had multiple stents placed for non-ST elevation MI back in August 2018. FENa <1%, cr  better and keep iv bumex 2mg bid. Add metolazone x 1. Keep aldactone     2. Acute on chronic diastolic heart failure-  Plan as above with the diuretics, daily weights, and I and O's, and fluid restriction , off amlodipine due to edema.      3. CAD- prior non-ST elevation MI with stenting back in August 2018 to LAD and circumflex     4. Hypokalemia- improved    5. Hyponatremia hypervolemic type now improved.     6. htn elevated pre meds, amlodipine is stopped.     7. Anemia ckd fe b12 and folate are ok. s/p procrit shot x 1.           This document has been electronically signed by Joon Dugan MD on January 13, 2019 9:18 AM

## 2019-01-13 NOTE — PLAN OF CARE
Problem: Patient Care Overview  Goal: Plan of Care Review  Outcome: Ongoing (interventions implemented as appropriate)   01/13/19 8906   Coping/Psychosocial   Plan of Care Reviewed With patient   Plan of Care Review   Progress improving   OTHER   Outcome Summary pt. met 1 new goal this tx. pt. was independent with bed mobility and t/f with Modified [place]. pt. ambulated 294' with 3 sitting rest breaks to recover from fatigue.

## 2019-01-13 NOTE — THERAPY TREATMENT NOTE
Acute Care - Physical Therapy Treatment Note  [facility]     Patient Name: Nicolasa Rojas  : 1950  MRN: [id number]  Today's Date: 2019  Onset of Illness/Injury or Date of Surgery: 19  Date of Referral to PT: 19  Referring Physician: Dr. Catracho Jarrell    Admit Date: 2019    Visit Dx:    ICD-10-CM ICD-9-CM   1. Acute on chronic heart failure, unspecified heart failure type (CMS/HCC) I50.9 428.0   2. Elevated troponin R74.8 790.6   3. Impaired mobility and activities of daily living Z74.09 799.89   4. Impaired physical mobility Z74.09 781.99     Patient Active Problem List   Diagnosis   • Hyponatremia   • Acute on chronic diastolic CHF (congestive heart failure) (CMS/HCC)   • Hypokalemia   • Acute on chronic heart failure (CMS/HCC)   • Dyspnea   • Chronic renal impairment, stage 3 (moderate) (CMS/HCC)   • CAD (coronary artery disease)       Therapy Treatment    Rehabilitation Treatment Summary     Row Name 19             Treatment Time/Intention    Discipline  physical therapy assistant  -CA      Document Type  therapy note (daily note)  -CA      Subjective Information  no complaints  -CA      Mode of Treatment  individual therapy;physical therapy  -CA      Existing Precautions/Restrictions  fall;oxygen therapy device and L/min  -CA      Recorded by [CA] Elia Trimble PTA 19      Row Name 19             Vital Signs    Pre SpO2 (%)  91  -CA      O2 Delivery Pre Treatment  supplemental O2  -CA      Pre Patient Position  Side Lying  -CA2      Intra Patient Position  Standing  -CA      Post Patient Position  Sitting  -CA      Recorded by [CA] Elia Trimble, PTA 19 1130  [CA2] Elia Trimble PTA 19      Row Name 19             Cognitive Assessment/Intervention- PT/OT    Orientation Status (Cognition)  oriented x 4  -CA      Follows Commands (Cognition)  follows one step commands  -CA      Recorded by [CA] Elia Trimble, PTA  01/13/19 0955      Row Name 01/13/19 0939             Bed Mobility Assessment/Treatment    Scooting/Bridging [place] (Bed Mobility)  independent  -CA      Supine-Sit [place] (Bed Mobility)  independent  -CA      Sit-Supine [place] (Bed Mobility)  independent  -CA      Assistive Device (Bed Mobility)  head of bed elevated  -CA2      Recorded by [CA] Elia Trimble, PTA 01/13/19 1133  [CA2] Elia Trimble, [facility] 01/13/19 1130      Row Name 01/13/19 0939             Transfer Assessment/Treatment    Transfer Assessment/Treatment  sit-stand transfer;stand-sit transfer  -CA      Recorded by [CA] Elia Trimble, [facility] 01/13/19 1130      Row Name 01/13/19 0939             Sit-Stand Transfer    Sit-Stand [place] (Transfers)  conditional independence  -CA      Recorded by [CA] Elia Trimble, [facility] 01/13/19 1130      Row Name 01/13/19 0939             Stand-Sit Transfer    Stand-Sit [place] (Transfers)  conditional independence  -CA      Recorded by [CA] Elia Trimble, [facility] 01/13/19 1130      Row Name 01/13/19 0939             Gait/Stairs Assessment/Training    [place] Level (Gait)  stand by assist  -CA      Assistive Device (Gait)  walker, front-wheeled  -CA      Distance in Feet (Gait)  75' 100' x 2 and 75'   -CA      Pattern (Gait)  step-through  -CA      Comment (Gait/Stairs)  pt. took a couple of seated rest breaks with gait to recover with O2  -CA      Recorded by [CA] Elia Trimble, PTA 01/13/19 1130      Row Name 01/13/19 0939             Lower Extremity Seated Therapeutic Exercise    Performed, Seated Lower Extremity (Therapeutic Exercise)  hip flexion/extension;LAQ (long arc quad), knee extension;ankle dorsiflexion/plantarflexion  -CA      Exercise Type, Seated Lower Extremity (Therapeutic Exercise)  AROM (active range of motion)  -CA      Sets/Reps Detail, Seated Lower Extremity (Therapeutic Exercise)  1x15  -CA      Recorded by [CA] Elia Trimble, PTA 01/13/19 1130      Row Name 01/13/19 0939              Positioning and Restraints    Pre-Treatment Position  in bed  -CA      Post Treatment Position  bed  -CA      In Bed  sitting EOB;call light within reach;encouraged to call for assist;with family/caregiver  -CA      Recorded by [CA] Elia Trimble, PTA 01/13/19 1130      Row Name 01/13/19 0939             Pain Scale: Numbers Pre/Post-Treatment    Pain Scale: Numbers, Pretreatment  8/10  -CA      Pain Scale: Numbers, Post-Treatment  8/10  -CA      Pain Location  back  -CA      Recorded by [CA] Elia Trimble, PTA 01/13/19 1130        User Key  (r) = Recorded By, (t) = Taken By, (c) = Cosigned By    Initials Name Effective Dates Discipline    CA Elia Trimble, PTA 03/07/18 -  PT               Rehab Goal Summary     Row Name 01/13/19 0939             Physical Therapy Goals    Bed Mobility Goal Selection (PT)  bed mobility, PT goal 1  -CA      Transfer Goal Selection (PT)  transfer, PT goal 1  -CA      Gait Training Goal Selection (PT)  gait training, PT goal 1  -CA      Strength Goal Selection (PT)  strength, PT goal 1  -CA         Bed Mobility Goal 1 (PT)    Activity/Assistive Device (Bed Mobility Goal 1, PT)  rolling to left;rolling to right;sit to supine;supine to sit  -CA      [place] Level/Cues Needed (Bed Mobility Goal 1, PT)  independent  -CA      Time Frame (Bed Mobility Goal 1, PT)  by discharge  -CA      Progress/Outcomes (Bed Mobility Goal 1, PT)  goal met  (Significant)   -CA         Transfer Goal 1 (PT)    Activity/Assistive Device (Transfer Goal 1, PT)  sit-to-stand/stand-to-sit;bed-to-chair/chair-to-bed  -CA      [place] Level/Cues Needed (Transfer Goal 1, PT)  conditional independence  -CA      Time Frame (Transfer Goal 1, PT)  by discharge  -CA      Progress/Outcome (Transfer Goal 1, PT)  goal not met  -CA         Gait Training Goal 1 (PT)    Activity/Assistive Device (Gait Training Goal 1, PT)  gait (walking locomotion);assistive device use  -CA      [place] Level (Gait Training  Goal 1, PT)  standby assist;conditional independence  -CA      Distance (Gait Goal 1, PT)  50ftx2  -CA      Time Frame (Gait Training Goal 1, PT)  by discharge  -CA      Progress/Outcome (Gait Training Goal 1, PT)  goal not met  -CA         Strength Goal 1 (PT)    Strength Goal 1 (PT)  Pt will tolerate 2 sets of 15 reps of AROM exercises OOB in chair with VSS  -CA      Time Frame (Strength Goal 1, PT)  by discharge  -CA      Progress/Outcome (Strength Goal 1, PT)  goal not met  -CA        User Key  (r) = Recorded By, (t) = Taken By, (c) = Cosigned By    Initials Name Provider Type Discipline    Elia Juan, PTA Physical Therapy Assistant PT          Physical Therapy Education     Title: PT OT SLP Therapies (In Progress)     Topic: Physical Therapy (Done)     Point: Mobility training (Done)     Learning Progress Summary           Patient Acceptance, E,TB, VU by LN at 1/11/2019  1:49 PM    Acceptance, E,TB, VU,NR by LN at 1/10/2019  2:45 PM   Family Acceptance, E,TB, VU by LN at 1/11/2019  1:49 PM    Acceptance, E,TB, VU,NR by LN at 1/10/2019  2:45 PM                   Point: Home exercise program (Done)     Learning Progress Summary           Patient Acceptance, E,TB, VU by LN at 1/11/2019  1:49 PM    Acceptance, E,TB, VU,NR by LN at 1/10/2019  2:45 PM   Family Acceptance, E,TB, VU by LN at 1/11/2019  1:49 PM    Acceptance, E,TB, VU,NR by LN at 1/10/2019  2:45 PM                   Point: Body mechanics (Done)     Learning Progress Summary           Patient Acceptance, E,TB, VU by LN at 1/11/2019  1:49 PM    Acceptance, E,TB, VU,NR by LN at 1/10/2019  2:45 PM   Family Acceptance, E,TB, VU by LN at 1/11/2019  1:49 PM    Acceptance, E,TB, VU,NR by LN at 1/10/2019  2:45 PM                   Point: Precautions (Done)     Learning Progress Summary           Patient Acceptance, E,TB, VU by LN at 1/11/2019  1:49 PM    Acceptance, E,TB, VU,NR by LN at 1/10/2019  2:45 PM   Family Acceptance, EJOHN, VU by LN at 1/11/2019   1:49 PM    Acceptance, E,TB, VU,NR by KARINA at 1/10/2019  2:45 PM                               User Key     Initials Effective Dates Name Provider Type Discipline    KARINA 03/07/18 -  Elinor Kennedy PTA Physical Therapy Assistant PT                PT Recommendation and Plan     Plan of Care Reviewed With: patient  Progress: improving  Outcome Summary: pt. met 1 new goal this tx.  pt. was independent with bed mobility and t/f with Modified [place].  pt. ambulated 294' with 3 rest breaks to recover from fatigue.  Outcome Measures     Row Name 01/13/19 0939 01/12/19 1400 01/12/19 1307       How much help from another person do you currently need...    Turning from your back to your side while in flat bed without using bedrails?  4  -CA  --  4  -CA    Moving from lying on back to sitting on the side of a flat bed without bedrails?  4  -CA  --  4  -CA    Moving to and from a bed to a chair (including a wheelchair)?  3  -CA  --  3  -CA    Standing up from a chair using your arms (e.g., wheelchair, bedside chair)?  3  -CA  --  3  -CA    Climbing 3-5 steps with a railing?  3  -CA  --  3  -CA    To walk in hospital room?  3  -CA  --  3  -CA    AM-PAC 6 Clicks Score  20  -CA  --  20  -CA       How much help from another is currently needed...    Putting on and taking off regular lower body clothing?  --  3  -CS  --    Bathing (including washing, rinsing, and drying)  --  3  -CS  --    Toileting (which includes using toilet bed pan or urinal)  --  3  -CS  --    Putting on and taking off regular upper body clothing  --  3  -CS  --    Taking care of personal grooming (such as brushing teeth)  --  3  -CS  --    Eating meals  --  3  -CS  --    Score  --  18  -CS  --       Functional Assessment    Outcome Measure Options  AM-PAC 6 Clicks Basic Mobility (PT)  -CA  AM-PAC 6 Clicks Daily Activity (OT)  -CS  AM-PAC 6 Clicks Basic Mobility (PT)  -CA    Row Name 01/11/19 1108 01/11/19 1020 01/10/19 1330       How much help from another  person do you currently need...    Turning from your back to your side while in flat bed without using bedrails?  --  4  -LN  4  -LN    Moving from lying on back to sitting on the side of a flat bed without bedrails?  --  3  -LN  4  -LN    Moving to and from a bed to a chair (including a wheelchair)?  --  3  -LN  3  -LN    Standing up from a chair using your arms (e.g., wheelchair, bedside chair)?  --  3  -LN  3  -LN    Climbing 3-5 steps with a railing?  --  3  -LN  3  -LN    To walk in hospital room?  --  3  -LN  3  -LN    AM-PAC 6 Clicks Score  --  19  -LN  20  -LN       How much help from another is currently needed...    Putting on and taking off regular lower body clothing?  3  -JH  --  --    Bathing (including washing, rinsing, and drying)  3  -JH  --  --    Toileting (which includes using toilet bed pan or urinal)  3  -JH  --  --    Putting on and taking off regular upper body clothing  3  -JH  --  --    Taking care of personal grooming (such as brushing teeth)  3  -JH  --  --    Eating meals  3  -JH  --  --    Score  18  -JH  --  --       Functional Assessment    Outcome Measure Options  --  AM-PAC 6 Clicks Basic Mobility (PT)  -LN  AM-PAC 6 Clicks Basic Mobility (PT)  -LN      User Key  (r) = Recorded By, (t) = Taken By, (c) = Cosigned By    Initials Name Provider Type    Elia Juan, KOURTNEY Physical Therapy Assistant    Elinor Loomis, KOURTNEY Physical Therapy Assistant     Maira Murphy, HOOPER/L Occupational Therapy Assistant     Malena Alexis, HOOPER/L Occupational Therapy Assistant         Time Calculation:   PT Charges     Row Name 01/13/19 1137             Time Calculation    Start Time  0939  -CA      Stop Time  1019  -CA      Time Calculation (min)  40 min  -CA      PT Received On  01/13/19  -CA         Time Calculation- PT    Total Timed Code Minutes- PT  40 minute(s)  -CA        User Key  (r) = Recorded By, (t) = Taken By, (c) = Cosigned By    Initials Name Provider Type    Elia Juan  KOURTNEY FERGUSON Physical Therapy Assistant        Therapy Suggested Charges     Code   Minutes Charges    None           Therapy Charges for Today     Code Description Service Date Service Provider Modifiers Qty    21597727502 HC GAIT TRAINING EA 15 MIN 1/12/2019 Elia Trimble, PTA GP 1    46809327911 HC PT THER PROC EA 15 MIN 1/12/2019 Elia Trimble, PTA GP 1    50637996388 HC PT THERAPEUTIC ACT EA 15 MIN 1/13/2019 Elia Trimble, PTA GP 1    41462098918 HC GAIT TRAINING EA 15 MIN 1/13/2019 Elia Trimble, PTA GP 1    10292242288 HC PT THER PROC EA 15 MIN 1/13/2019 Elia Trimble, PTA GP 1          PT G-Codes  Outcome Measure Options: AM-PAC 6 Clicks Basic Mobility (PT)  AM-PAC 6 Clicks Score: 20  Score: 18    Elia Trimble PTA  1/13/2019

## 2019-01-13 NOTE — PLAN OF CARE
Problem: Patient Care Overview  Goal: Plan of Care Review  Outcome: Ongoing (interventions implemented as appropriate)   01/13/19 4339   Coping/Psychosocial   Plan of Care Reviewed With patient   Plan of Care Review   Progress improving   OTHER   Outcome Summary improving toward all goals and perf well with OT

## 2019-01-13 NOTE — THERAPY TREATMENT NOTE
Inpatient Rehabilitation - Occupational Therapy Treatment Note  [facility]     Patient Name: Nicolasa Rojas  : 1950  MRN: [id number]  Today's Date: 2019  Onset of Illness/Injury or Date of Surgery: 19  Date of Referral to OT: 19  Referring Physician: Dr. Catracho Jarrell    Admit Date: 2019       ICD-10-CM ICD-9-CM   1. Acute on chronic heart failure, unspecified heart failure type (CMS/HCC) I50.9 428.0   2. Elevated troponin R74.8 790.6   3. Impaired mobility and activities of daily living Z74.09 799.89   4. Impaired physical mobility Z74.09 781.99     Patient Active Problem List   Diagnosis   • Hyponatremia   • Acute on chronic diastolic CHF (congestive heart failure) (CMS/HCC)   • Hypokalemia   • Acute on chronic heart failure (CMS/HCC)   • Dyspnea   • Chronic renal impairment, stage 3 (moderate) (CMS/HCC)   • CAD (coronary artery disease)     Past Medical History:   Diagnosis Date   • Acute bronchitis    • Acute congestive heart failure (CMS/HCC)     resolving   • Acute kidney failure, unspecified (CMS/HCC)    • Acute kidney failure, unspecified (CMS/HCC)    • Acute posthemorrhagic anemia    • Asthenia    • Chest pain    • Chronic gastritis    • Chronic pharyngitis    • Chronic renal impairment    • Congenital renal failure    • Congestive heart failure (CMS/HCC)    • Contact dermatitis due to poison ivy    • COPD (chronic obstructive pulmonary disease) (CMS/HCC)    • Coronary arteriosclerosis    • D-dimer, elevated     D-dimer above reference range    • Degenerative joint disease involving multiple joints     back   • Depressive disorder    • Dysphagia    • Dyspnea    • Edema of lower extremity    • Encounter for immunization    • Encounter for long-term (current) use of medications    • Epigastric pain    • Esophagitis    • Essential hypertension    • Gastroesophageal reflux disease    • Gastroparesis     Gastroparesis syndrome    • Generalized abdominal pain    • H/O bone  density study 01/09/2015   • H/O mammogram 01/15/2015   • H/O screening mammography 11/12/2013   • Headache    • History of transfusion    • Hyperlipidemia    • Hypokalemia    • Hypomagnesemia    • Injury of tendon of rotator cuff     Injury of tendon of the rotator cuff of shoulder      • Insomnia    • Insomnia, unspecified    • Knee pain    • Nausea and vomiting    • Neck pain    • Need for immunization against influenza    • Need for prophylactic vaccination and inoculation against influenza    • Need for prophylactic vaccination and inoculation against viral disease    • 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 Performed at [facility].   • ENDOSCOPY  02/09/2015    ESOPHAGEAL STRICTURE PRESENT, GASTRITIS FOUND IN THE STOMACH, NORMAL DUODENUM   • ENDOSCOPY W/ PEG TUBE PLACEMENT  12/19/2012    Esophagitis seen. Biopsy taken. Esophageal stricture present. Dilatation performed. Gastritis in stomach. Biopsy taken. Food was in stomach. Normal duodenum.   • HERNIA REPAIR     • HYSTERECTOMY      partial   • NECK SURGERY         Therapy Treatment    Rehabilitation Treatment Summary     Row Name 01/13/19 1440 01/13/19 0939          Treatment Time/Intention    Discipline  occupational therapy assistant  -LM  physical therapy assistant  -CA     Document Type  therapy note (daily note)  -LM  therapy note (daily note)  -CA     Subjective Information  weakness;fatigue;pain  -LM  no complaints  -CA     Mode of Treatment  individual therapy;occupational therapy  -LM  individual therapy;physical therapy  -CA     Patient  Effort  good  -LM  --     Reason Treatment Not Performed  --  -LM2  --     Existing Precautions/Restrictions  fall;oxygen therapy device and L/min  -LM  fall;oxygen therapy device and L/min  -CA     Recorded by [LM] Francy Wilde HOOPER/L 01/13/19 1511  [LM2] Francy Wilde, HOOPER/L 01/13/19 1537 [CA] Elia Trimble, PTA 01/13/19 0955     Row Name 01/13/19 1440 01/13/19 0939          Vital Signs    Pre SpO2 (%)  --  91  -CA     O2 Delivery Pre Treatment  --  supplemental O2  -CA     Intra SpO2 (%)  96  -LM  --     Pre Patient Position  --  Side Lying  -CA2     Intra Patient Position  --  Standing  -CA     Post Patient Position  --  Sitting  -CA     Recorded by [LM] Francy Wilde HOOPER/L 01/13/19 1511 [CA] Elai Trimble, PTA 01/13/19 1130  [CA2] Elia Trimble, PTA 01/13/19 0955     Row Name 01/13/19 1440 01/13/19 0939          Cognitive Assessment/Intervention- PT/OT    Orientation Status (Cognition)  oriented x 4  -LM  oriented x 4  -CA     Follows Commands (Cognition)  follows one step commands  -LM  follows one step commands  -CA     Recorded by [LM] Francy Wilde HOOPER/L 01/13/19 1511 [CA] Elia Trimble, PTA 01/13/19 0955     Row Name 01/13/19 1440 01/13/19 0939          Bed Mobility Assessment/Treatment    [place] Level (Bed Mobility)  conditional independence  -LM  --     Rolling Left [place] (Bed Mobility)  conditional independence  -LM  --     Rolling Right [place] (Bed Mobility)  conditional independence  -LM  --     Scooting/Bridging [place] (Bed Mobility)  independent  -LM  independent  -CA     Supine-Sit [place] (Bed Mobility)  independent  -LM  independent  -CA     Sit-Supine [place] (Bed Mobility)  conditional independence  -LM  independent  -CA     Supine-Sit-Supine [place] (Bed Mobility)  conditional independence  -LM  --     Assistive Device (Bed Mobility)  --  head of bed elevated  -CA2     Recorded by [LM] Francy Wilde HOOPER/L 01/13/19 1511  [CA] Elia Trimble, PTA 01/13/19 1133  [CA2] Elia Trimble, PTA 01/13/19 1130     Row Name 01/13/19 0939             Transfer Assessment/Treatment    Transfer Assessment/Treatment  sit-stand transfer;stand-sit transfer  -CA      Recorded by [CA] Elia Trimble, PTA 01/13/19 1130      Row Name 01/13/19 1440 01/13/19 0939          Sit-Stand Transfer    Sit-Stand [place] (Transfers)  conditional independence  -LM  conditional independence  -CA     Recorded by [LM] Francy Wilde HOOPER/L 01/13/19 1511 [CA] Elia Trimble, PTA 01/13/19 1130     Row Name 01/13/19 0939             Stand-Sit Transfer    Stand-Sit [place] (Transfers)  conditional independence  -CA      Recorded by [CA] Elia Trimble, PTA 01/13/19 1130      Row Name 01/13/19 0939             Gait/Stairs Assessment/Training    [place] Level (Gait)  stand by assist  -CA      Assistive Device (Gait)  walker, front-wheeled  -CA      Distance in Feet (Gait)  75' 100' x 2 and 75'   -CA      Pattern (Gait)  step-through  -CA      Comment (Gait/Stairs)  pt. took a couple of seated rest breaks with gait to recover with O2  -CA      Recorded by [CA] Elia Trimble, PTA 01/13/19 1130      Row Name 01/13/19 1440             Lower Body Dressing Assessment/Training    Lower Body Dressing [place] Level  lower body dressing skills;doff;don;socks;supervision  -LM      Assistive Devices (Lower Body Dressing)  sock-aid ed on all other lhe  -LM      Lower Body Dressing Position  edge of bed sitting  -LM      Recorded by [LM] Francy Wilde HOOPER/L 01/13/19 1537      Row Name 01/13/19 0939             Lower Extremity Seated Therapeutic Exercise    Performed, Seated Lower Extremity (Therapeutic Exercise)  hip flexion/extension;LAQ (long arc quad), knee extension;ankle dorsiflexion/plantarflexion  -CA      Exercise Type, Seated Lower Extremity (Therapeutic Exercise)  AROM (active range of motion)  -CA      Sets/Reps Detail, Seated Lower Extremity  (Therapeutic Exercise)  1x15  -CA      Recorded by [CA] Elia Trimble, PTA 01/13/19 1130      Row Name 01/13/19 1440             Therapeutic Exercise    Upper Extremity (Therapeutic Exercise)  bicep curl, bilateral  -LM      Upper Extremity Range of Motion (Therapeutic Exercise)  shoulder flexion/extension, bilateral;shoulder abduction/adduction, bilateral;shoulder horizontal abduction/adduction, bilateral;shoulder internal/external rotation, bilateral;elbow flexion/extension, bilateral  -LM      Recorded by [LM] Francy Wilde HOOPER/L 01/13/19 1511      Row Name 01/13/19 1440 01/13/19 0939          Positioning and Restraints    Pre-Treatment Position  in bed  -LM  in bed  -CA     Post Treatment Position  bed  -LM  bed  -CA     In Bed  --  sitting EOB;call light within reach;encouraged to call for assist;with family/caregiver  -CA     Recorded by [LM] Francy Wilde COTA/L 01/13/19 1511 [CA] Elia Trimble, PTA 01/13/19 1130     Row Name 01/13/19 1440 01/13/19 0939          Pain Scale: Numbers Pre/Post-Treatment    Pain Scale: Numbers, Pretreatment  8/10  -LM  8/10  -CA     Pain Scale: Numbers, Post-Treatment  8/10  -LM  8/10  -CA     Pain Location  back  -LM  back  -CA     Recorded by [LM] Francy Wilde HOOPER/L 01/13/19 1511 [CA] Elia Trimble, PTA 01/13/19 1130     Row Name 01/13/19 1440             Outcome Summary/Treatment Plan (OT)    Daily Summary of Progress (OT)  progress toward functional goals as expected  -LM      Recorded by [LM] Francy Wilde HOOPER/L 01/13/19 1511        User Key  (r) = Recorded By, (t) = Taken By, (c) = Cosigned By    Initials Name Effective Dates Discipline    CA Eila Trimble, PTA 03/07/18 -  PT    LM Francy Wilde HOOPER/L 03/07/18 -  OT           Rehab Goal Summary     Row Name 01/13/19 1537 01/13/19 0939          Physical Therapy Goals    Bed Mobility Goal Selection (PT)  --  bed mobility, PT goal 1  -CA     Transfer Goal Selection (PT)  --  transfer, PT goal  1  -CA     Gait Training Goal Selection (PT)  --  gait training, PT goal 1  -CA     Strength Goal Selection (PT)  --  strength, PT goal 1  -CA        Bed Mobility Goal 1 (PT)    Activity/Assistive Device (Bed Mobility Goal 1, PT)  --  rolling to left;rolling to right;sit to supine;supine to sit  -CA     [place] Level/Cues Needed (Bed Mobility Goal 1, PT)  --  independent  -CA     Time Frame (Bed Mobility Goal 1, PT)  --  by discharge  -CA     Progress/Outcomes (Bed Mobility Goal 1, PT)  --  goal met  (Significant)   -CA        Transfer Goal 1 (PT)    Activity/Assistive Device (Transfer Goal 1, PT)  --  sit-to-stand/stand-to-sit;bed-to-chair/chair-to-bed  -CA     [place] Level/Cues Needed (Transfer Goal 1, PT)  --  conditional independence  -CA     Time Frame (Transfer Goal 1, PT)  --  by discharge  -CA     Progress/Outcome (Transfer Goal 1, PT)  --  goal not met  -CA        Gait Training Goal 1 (PT)    Activity/Assistive Device (Gait Training Goal 1, PT)  --  gait (walking locomotion);assistive device use  -CA     [place] Level (Gait Training Goal 1, PT)  --  standby assist;conditional independence  -CA     Distance (Gait Goal 1, PT)  --  50ftx2  -CA     Time Frame (Gait Training Goal 1, PT)  --  by discharge  -CA     Progress/Outcome (Gait Training Goal 1, PT)  --  goal not met  -CA        Strength Goal 1 (PT)    Strength Goal 1 (PT)  --  Pt will tolerate 2 sets of 15 reps of AROM exercises OOB in chair with VSS  -CA     Time Frame (Strength Goal 1, PT)  --  by discharge  -CA     Progress/Outcome (Strength Goal 1, PT)  --  goal not met  -CA        Transfer Goal 1 (OT)    Activity/Assistive Device (Transfer Goal 1, OT)  sit-to-stand/stand-to-sit;bed-to-chair/chair-to-bed;toilet  -LM  --     [place] Level/Cues Needed (Transfer Goal 1, OT)  conditional independence  -LM  --     Time Frame (Transfer Goal 1, OT)  long term goal (LTG);by discharge  -LM  --     Progress/Outcome (Transfer Goal 1, OT)   goal not met  -LM  --        Bathing Goal 1 (OT)    Activity/Assistive Device (Bathing Goal 1, OT)  bathing skills, all  -LM  --     [place] Level/Cues Needed (Bathing Goal 1, OT)  minimum assist (75% or more patient effort)  -LM  --     Time Frame (Bathing Goal 1, OT)  long term goal (LTG);by discharge  -LM  --     Progress/Outcomes (Bathing Goal 1, OT)  goal met;goal ongoing  -LM  --        Dressing Goal 1 (OT)    Activity/Assistive Device (Dressing Goal 1, OT)  dressing skills, all  -LM  --     [place]/Cues Needed (Dressing Goal 1, OT)  minimum assist (75% or more patient effort)  -LM  --     Time Frame (Dressing Goal 1, OT)  long term goal (LTG);by discharge  -LM  --     Progress/Outcome (Dressing Goal 1, OT)  goal met;goal ongoing  -LM  --        Toileting Goal 1 (OT)    Activity/Device (Toileting Goal 1, OT)  toileting skills, all  -LM  --     [place] Level/Cues Needed (Toileting Goal 1, OT)  conditional independence  -LM  --     Time Frame (Toileting Goal 1, OT)  long term goal (LTG);by discharge  -LM  --     Progress/Outcome (Toileting Goal 1, OT)  goal not met  -LM  --         Activity Tolerance Goal 1 (OT)    Activity Level (Endurance Goal 1, OT)  10 min activity;O2 sat >/ equal to 88%  -LM  --     Progress/Outcome (Activity Tolerance Goal 1, OT)  goal met  -LM  --       User Key  (r) = Recorded By, (t) = Taken By, (c) = Cosigned By    Initials Name Provider Type Discipline    Elia Juan, PTA Physical Therapy Assistant PT    Francy Acosta, HOOPER/L Occupational Therapy Assistant OT        Occupational Therapy Education     Title: PT OT SLP Therapies (Done)     Topic: Occupational Therapy (Done)     Point: ADL training (Done)     Description: Instruct learner(s) on proper safety adaptation and remediation techniques during self care or transfers.   Instruct in proper use of assistive devices.    Learning Progress Summary           Patient LANIE Zuluaga VU by RANDY at 1/13/2019  3:39 PM                    Point: Home exercise program (Done)     Description: Instruct learner(s) on appropriate technique for monitoring, assisting and/or progressing therapeutic exercises/activities.    Learning Progress Summary           Patient LANIE Zuluaga, VU by  at 1/13/2019  3:39 PM                   Point: Precautions (Done)     Description: Instruct learner(s) on prescribed precautions during self-care and functional transfers.    Learning Progress Summary           Patient LANIE Zuluaga, VU by  at 1/13/2019  3:39 PM    Acceptance, E, VU,NR by  at 1/9/2019 11:36 AM    Comment:  OT role, OT POC   Family Acceptance, E, VU,NR by  at 1/9/2019 11:36 AM    Comment:  OT role, OT POC                   Point: Body mechanics (Done)     Description: Instruct learner(s) on proper positioning and spine alignment during self-care, functional mobility activities and/or exercises.    Learning Progress Summary           Patient LANIE Zuluaga, VU by  at 1/13/2019  3:39 PM                               User Key     Initials Effective Dates Name Provider Type Discipline     03/07/18 -  Francy Wilde COTA/L Occupational Therapy Assistant OT     10/12/18 -  Dmitry Yuan Occupational Therapist OT                OT Recommendation and Plan  Outcome Summary/Treatment Plan (OT)  Daily Summary of Progress (OT): progress toward functional goals as expected  Daily Summary of Progress (OT): progress toward functional goals as expected  Plan of Care Review  Plan of Care Reviewed With: patient  Plan of Care Reviewed With: patient  Outcome Summary: improving toward all goals and perf well with OT  Outcome Measures     Row Name 01/13/19 0939 01/12/19 1400 01/12/19 1307       How much help from another person do you currently need...    Turning from your back to your side while in flat bed without using bedrails?  4  -CA  --  4  -CA    Moving from lying on back to sitting on the side of a flat bed without bedrails?  4  -CA  --  4  -CA     Moving to and from a bed to a chair (including a wheelchair)?  3  -CA  --  3  -CA    Standing up from a chair using your arms (e.g., wheelchair, bedside chair)?  3  -CA  --  3  -CA    Climbing 3-5 steps with a railing?  3  -CA  --  3  -CA    To walk in hospital room?  3  -CA  --  3  -CA    AM-PAC 6 Clicks Score  20  -CA  --  20  -CA       How much help from another is currently needed...    Putting on and taking off regular lower body clothing?  --  3  -CS  --    Bathing (including washing, rinsing, and drying)  --  3  -CS  --    Toileting (which includes using toilet bed pan or urinal)  --  3  -CS  --    Putting on and taking off regular upper body clothing  --  3  -CS  --    Taking care of personal grooming (such as brushing teeth)  --  3  -CS  --    Eating meals  --  3  -CS  --    Score  --  18  -CS  --       Functional Assessment    Outcome Measure Options  AM-PAC 6 Clicks Basic Mobility (PT)  -CA  AM-[facility] 6 Clicks Daily Activity (OT)  -CS  -[facility] 6 Clicks Basic Mobility (PT)  -CA    Row Name 01/11/19 1108 01/11/19 1020          How much help from another person do you currently need...    Turning from your back to your side while in flat bed without using bedrails?  --  4  -LN     Moving from lying on back to sitting on the side of a flat bed without bedrails?  --  3  -LN     Moving to and from a bed to a chair (including a wheelchair)?  --  3  -LN     Standing up from a chair using your arms (e.g., wheelchair, bedside chair)?  --  3  -LN     Climbing 3-5 steps with a railing?  --  3  -LN     To walk in hospital room?  --  3  -LN     AM-PAC 6 Clicks Score  --  19  -LN        How much help from another is currently needed...    Putting on and taking off regular lower body clothing?  3  -JH  --     Bathing (including washing, rinsing, and drying)  3  -JH  --     Toileting (which includes using toilet bed pan or urinal)  3  -JH  --     Putting on and taking off regular upper body clothing  3  -JH  --     Taking  care of personal grooming (such as brushing teeth)  3  -JH  --     Eating meals  3  -JH  --     Score  18  -JH  --        Functional Assessment    Outcome Measure Options  --  AM-PAC 6 Clicks Basic Mobility (PT)  -LN       User Key  (r) = Recorded By, (t) = Taken By, (c) = Cosigned By    Initials Name Provider Type    CA Elia Trimble, PTA Physical Therapy Assistant    LN Elinor Kennedy, PTA Physical Therapy Assistant     Maira Murphy, HOOPER/L Occupational Therapy Assistant     Malena Alexis, HOOPER/L Occupational Therapy Assistant           Time Calculation:   Time Calculation- OT     Row Name 01/13/19 1455             Time Calculation- OT    OT Start Time  1440  -LM      OT Stop Time  1510  -LM      OT Time Calculation (min)  30 min  -LM      Total Timed Code Minutes- OT  30 minute(s)  -LM      OT Received On  01/13/19  -        User Key  (r) = Recorded By, (t) = Taken By, (c) = Cosigned By    Initials Name Provider Type     Francy Wilde COTA/L Occupational Therapy Assistant           Therapy Suggested Charges     Code   Minutes Charges    None           Therapy Charges for Today     Code Description Service Date Service Provider Modifiers Qty    28612199861 HC OT THER SUPP EA 15 MIN 1/13/2019 Francy Wilde COTA/L GO 1    75742886951 HC OT THER PROC EA 15 MIN 1/13/2019 Francy Wilde COTA/L GO 1    94308323583 HC OT THERAPEUTIC ACT EA 15 MIN 1/13/2019 Francy Wilde COTA/L GO 1               RUFUS Shetty/JOSEMANUEL  1/13/2019

## 2019-01-13 NOTE — PROGRESS NOTES
[facility] Medicine Services  INPATIENT PROGRESS NOTE    Length of Stay: 4  Date of Admission: 1/7/2019  Primary Care Physician: Henry Muniz MD    Subjective   Chief Complaint: No new complaints.      HPI: Patient is seen for follow-up.  She has history of coronary artery disease status post recent high risk PCI (at [facility]), O2 dependent COPD, CIERRA, chronic kidney disease/MELCHOR, diabetes mellitus, congestive heart failure, morbid obesity, hypertension, chronic anemia, chronic pain syndrome and was readmitted (same day after discharge) for CHF decompensation/possible non-STEMI.  She remains on her baseline supplemental oxygen of 3 L nasal prongs, less short of air and voices no new complaints.  Edema both legs is improving.     Review of Systems   Constitutional: Positive for activity change and fatigue. Negative for appetite change, chills, diaphoresis, fever and unexpected weight change.   HENT: Negative for trouble swallowing and voice change.    Eyes: Negative for photophobia and visual disturbance.   Respiratory: Positive for shortness of breath. Negative for cough, choking, chest tightness, wheezing and stridor.    Cardiovascular: Positive for leg swelling. Negative for chest pain and palpitations.   Gastrointestinal: Negative for abdominal distention, abdominal pain, blood in stool, constipation, diarrhea, nausea and vomiting.   Endocrine: Negative for cold intolerance, heat intolerance, polydipsia, polyphagia and polyuria.   Genitourinary: Negative for decreased urine volume, difficulty urinating, dysuria, enuresis, flank pain, frequency, hematuria and urgency.   Musculoskeletal: Positive for arthralgias and gait problem. Negative for myalgias, neck pain and neck stiffness.   Skin: Negative for pallor, rash and wound.   Neurological: Negative for dizziness, tremors, seizures, syncope, facial asymmetry, speech difficulty, weakness, light-headedness,  numbness and headaches.   Hematological: Does not bruise/bleed easily.   Psychiatric/Behavioral: Negative for agitation, behavioral problems and confusion.       Objective    Temp:  [97.8 °F (36.6 °C)-99.4 °F (37.4 °C)] 97.9 °F (36.6 °C)  Heart Rate:  [54-77] 63  Resp:  [18] 18  BP: (128-175)/(58-81) 160/70    Physical Exam   Constitutional: She is oriented to person, place, and time. She appears well-developed and well-nourished. She is cooperative. No distress.   Patient is morbidly obese and has a BMI of 41.02   HENT:   Head: Normocephalic and atraumatic.   Right Ear: External ear normal.   Left Ear: External ear normal.   Nose: Nose normal.   Mouth/Throat: Oropharynx is clear and moist.   Eyes: Conjunctivae and EOM are normal. Pupils are equal, round, and reactive to light.   Neck: Normal range of motion. Neck supple. No JVD present. No thyromegaly present.   Cardiovascular: Normal rate, regular rhythm, normal heart sounds and intact distal pulses. Exam reveals no gallop and no friction rub.   No murmur heard.  Pulmonary/Chest: Effort normal. No stridor. No respiratory distress. She has decreased breath sounds. She has no wheezes. She has rhonchi. She has no rales. She exhibits no tenderness.   Abdominal: Soft. Bowel sounds are normal. She exhibits no distension and no mass. There is no tenderness. There is no rebound and no guarding. No hernia.   Musculoskeletal: Normal range of motion. She exhibits edema. She exhibits no tenderness or deformity.   Neurological: She is alert and oriented to person, place, and time. She has normal reflexes. No cranial nerve deficit or sensory deficit. She exhibits normal muscle tone.   Skin: Skin is warm and dry. No rash noted. She is not diaphoretic. No erythema. No pallor.   Psychiatric: She has a normal mood and affect. Her behavior is normal. Judgment and thought content normal.   Nursing note and vitals reviewed.        Medication Review:    Current Facility-Administered  Medications:   •  aspirin chewable tablet 81 mg, 81 mg, Oral, Daily, Sage Blanchard MD, 81 mg at 01/13/19 0911  •  atorvastatin (LIPITOR) tablet 40 mg, 40 mg, Oral, Nightly, Yaniv Lobo MD, 40 mg at 01/12/19 2109  •  bumetanide (BUMEX) injection 2 mg, 2 mg, Intravenous, BID, Joon Dugan MD, 2 mg at 01/13/19 0628  •  carvedilol (COREG) tablet 6.25 mg, 6.25 mg, Oral, BID With Meals, Catracho Jarrell MD, 6.25 mg at 01/13/19 0914  •  clopidogrel (PLAVIX) tablet 75 mg, 75 mg, Oral, Daily, Sage Blanchard MD, 75 mg at 01/13/19 0912  •  enoxaparin (LOVENOX) syringe 40 mg, 40 mg, Subcutaneous, Q24H, Catracho Jarrell MD, 40 mg at 01/12/19 2352  •  famotidine (PEPCID) tablet 40 mg, 40 mg, Oral, Daily, Sage Blanchard MD, 40 mg at 01/13/19 0914  •  HYDROcodone-acetaminophen (NORCO)  MG per tablet 1 tablet, 1 tablet, Oral, Q6H, Joon Dugan MD, 1 tablet at 01/13/19 0629  •  insulin patient supplied pump, , Subcutaneous, Continuous, Sage Blanchard MD  •  isosorbide mononitrate (IMDUR) 24 hr tablet 60 mg, 60 mg, Oral, BID, Yaniv Lobo MD, 60 mg at 01/13/19 0912  •  lisinopril (PRINIVIL,ZESTRIL) tablet 10 mg, 10 mg, Oral, Q24H, Joon Dugan MD, 10 mg at 01/13/19 0912  •  metOLazone (ZAROXOLYN) tablet 2.5 mg, 2.5 mg, Oral, Once, Joon Dugan MD  •  polyethylene glycol 3350 powder (packet), 17 g, Oral, Daily, Catracho Jarrell MD, 17 g at 01/13/19 0911  •  potassium chloride (MICRO-K) CR capsule 40 mEq, 40 mEq, Oral, PRN, 40 mEq at 01/09/19 0505 **OR** potassium chloride (KLOR-CON) packet 40 mEq, 40 mEq, Oral, PRN **OR** potassium chloride 10 mEq in 100 mL IVPB, 10 mEq, Intravenous, Q1H PRN, Catracho Jarrell MD  •  [COMPLETED] Insert peripheral IV, , , Once **AND** sodium chloride 0.9 % flush 10 mL, 10 mL, Intravenous, PRN, Tyrone Quinonez MD  •  sodium chloride 0.9 % flush 3 mL, 3 mL, Intravenous, Q12H, Sage Blanchard MD, 3 mL at 01/13/19 0915  •  sodium chloride 0.9 % flush 3-10 mL, 3-10  mL, Intravenous, PRN, Sage Blanchard MD  •  spironolactone (ALDACTONE) tablet 25 mg, 25 mg, Oral, Daily, Yaniv Lobo MD, 25 mg at 01/13/19 0912  •  zolpidem (AMBIEN) tablet 5 mg, 5 mg, Oral, Nightly, Catracho Jarrell MD, 5 mg at 01/12/19 2109    Results Review:  I have reviewed the labs, radiology results, and diagnostic studies.    Laboratory Data:   Results from last 7 days   Lab Units  01/13/19 0545  01/12/19 0552 01/11/19 0528 01/07/19   1954   SODIUM mmol/L  138  138  136*   < >  136*   POTASSIUM mmol/L  3.6  3.9  3.9   < >  3.8   CHLORIDE mmol/L  100  99  100   < >  96   CO2 mmol/L  30.0  29.0  26.0   < >  28.0   BUN mg/dL  55*  58*  62*   < >  47*   CREATININE mg/dL  1.30*  1.64*  1.56*   < >  1.21*   GLUCOSE mg/dL  84  99  85   < >  164*   CALCIUM mg/dL  8.5  8.4  8.2*   < >  8.9   BILIRUBIN mg/dL   --    --    --    --   2.1*   ALK PHOS U/L   --    --    --    --   115   ALT (SGPT) U/L   --    --    --    --   18   AST (SGOT) U/L   --    --    --    --   43*   ANION GAP mmol/L  8.0  10.0  10.0   < >  12.0    < > = values in this interval not displayed.     Estimated Creatinine Clearance: 49 mL/min (A) (by C-G formula based on SCr of 1.3 mg/dL (H)).  Results from last 7 days   Lab Units  01/08/19 2036 01/07/19   0518   MAGNESIUM mg/dL  1.7  2.0         Results from last 7 days   Lab Units  01/13/19 0545  01/12/19   0552  01/11/19   0528  01/10/19   0529  01/09/19 2023 01/09/19   0749   WBC 10*3/mm3  4.14  4.09  4.26  5.60   --   4.31   HEMOGLOBIN g/dL  8.0*  8.0*  8.3*  9.0*  8.3*  7.3*   HEMATOCRIT %  24.5*  24.8*  25.1*  27.5*  25.5*  22.5*   PLATELETS 10*3/mm3  109*  105*  111*  117*   --   96*           Culture Data:   No results found for: BLOODCX  No results found for: URINECX  No results found for: RESPCX  No results found for: WOUNDCX  No results found for: STOOLCX  No components found for: BODYFLD    Radiology Data:   Imaging Results (last 24 hours)     ** No results  found for the last 24 hours. **          I have reviewed the patient's current medications.     Assessment/Plan     Hospital Problem List:  Principal Problem:  - Acute on chronic diastolic CHF (congestive heart failure): Improving as patient has lost 3 pounds in the last 24 hours.  Continue diuretic therapy, strict I's and O's, daily weights and fluid/ salt restriction.  Cardiologist/ Heart failure Navigator are following.  Patient's dry weight is between 230 and 235.  Echocardiogram done January 8, 2019 showed:  · Left ventricular wall thickness is consistent with mild concentric hypertrophy.  · Estimated EF = 57%.  · Left ventricular systolic function is normal.  · Left ventricular diastolic dysfunction (grade I a) consistent with impaired relaxation.  · Right ventricular cavity is mildly dilated.  · Left atrial cavity size is mildly dilated.  · Mild aortic valve regurgitation is present.  · Moderate mitral valve regurgitation is present  · Moderate tricuspid valve regurgitation is present.  · The tricuspid valve is grossly normal. Moderate tricuspid valve regurgitation is present. Estimated right ventricular systolic pressure from tricuspid regurgitation is markedly elevated (>55 mmHg). Moderate to severe pulmonary hypertension is present.  · Mild pulmonic valve regurgitation is present.     - History of coronary artery disease with possible non-STEMI: Continue guidelines directed medical therapy.  Result of echocardiogram is as dictated above.  Cardiologist is following.   - Chronic renal impairment, stage 3: Creatinine is down to 1.30 today   Continue to monitor.  Nephrologist is following.    - Elevated d-dimer is likely reactive. VQ scan showed low probability for pulmonary embolism and Duplex ultrasound both lower extremities is negative for DVT.   - History of moderate to severe pulmonary hypertension: Patient will be seen for follow-up by Dr. Byrd as outpatient on discharge.   - Hypertension: Stable.  Continue current medications and make adjustments as needed for optimal blood pressure control.  - Oxygen dependent COPD: Patient is currently not in exacerbation.  Continue supplemental oxygen and bronchodilators.  - Diabetes mellitus: Stable. Continue insulin pump with Accu-Cheks.  - Chronic anemia: Hemoglobin is stable post transfusion of one unit of packed red blood cells. Iron profile is unremarkable. Continue to monitor.  - Obstructive sleep apnea: Continue CPAP.  - Chronic pain syndrome/chronic insomnia: Continue home medications.  - Chronic thrombocytopenia: Platelet count is 109,000 today.  Her baseline seem to be 110 to 130K.  Continue to monitor.  - Continue GI and DVT prophylaxis.           Discharge Planning: In progress.    Catracho Jarrell MD   01/13/19   10:28 AM

## 2019-01-13 NOTE — PLAN OF CARE
Problem: Fall Risk (Adult)  Goal: Absence of Fall  Outcome: Ongoing (interventions implemented as appropriate)      Problem: Patient Care Overview  Goal: Plan of Care Review  Outcome: Ongoing (interventions implemented as appropriate)   01/13/19 0442   Coping/Psychosocial   Plan of Care Reviewed With patient   Plan of Care Review   Progress improving       Problem: Fluid Volume Excess (Adult)  Goal: Optimal Fluid Balance  Outcome: Ongoing (interventions implemented as appropriate)      Problem: Diabetes, Type 2 (Adult)  Goal: Signs and Symptoms of Listed Potential Problems Will be Absent, Minimized or Managed (Diabetes, Type 2)  Outcome: Ongoing (interventions implemented as appropriate)

## 2019-01-13 NOTE — SIGNIFICANT NOTE
01/13/19 1310   Rehab Treatment   Discipline occupational therapy assistant   Reason Treatment Not Performed patient/family declined treatment   pt denied OT at this time check back later

## 2019-01-14 NOTE — PROGRESS NOTES
[facility] Medicine Services  INPATIENT PROGRESS NOTE    Length of Stay: 5  Date of Admission: 1/7/2019  Primary Care Physician: Henry Muniz MD    Subjective   Chief Complaint:No new complaints.      HPI:pt seen and examined for follow-up.  She has history of coronary artery disease status post recent high risk PCI (at [facility]), O2 dependent COPD, CIERRA, chronic kidney disease/MELCHOR, diabetes mellitus, congestive heart failure, morbid obesity, hypertension, chronic anemia, chronic pain syndrome and was readmitted (same day after discharge) for CHF decompensation/possible non-STEMI.  No acute events overnight. Edema both legs is improving. Denies chest pain, pressure, dyspnea.    Review of Systems     All pertinent negatives and positives are as above. All other systems have been reviewed and are negative unless otherwise stated.     Objective    Temp:  [97.5 °F (36.4 °C)-98.9 °F (37.2 °C)] 97.7 °F (36.5 °C)  Heart Rate:  [55-95] 62  Resp:  [18] 18  BP: (120-176)/(60-95) 142/65    Physical Exam  Constitutional: She is oriented to person, place, and time. She appears well-developed and well-nourished. She is cooperative. No distress.   Patient is morbidly obese and has a BMI of 41.02   Head: Normocephalic and atraumatic.   Right Ear: External ear normal.   Left Ear: External ear normal.   Nose: Nose normal.   Mouth/Throat: Oropharynx is clear and moist.   Eyes: Conjunctivae and EOM are normal. Pupils are equal, round, and reactive to light.   Neck: Normal range of motion. Neck supple. No JVD present. No thyromegaly present.   Cardiovascular: Normal rate, regular rhythm, normal heart sounds and intact distal pulses. Exam reveals no gallop and no friction rub.   No murmur heard.  Pulmonary/Chest: Effort normal. No stridor. No respiratory distress. She has decreased breath sounds. She has no wheezes. She has rhonchi. She has no rales. She exhibits no tenderness.    Abdominal: Soft. Bowel sounds are normal. She exhibits no distension and no mass. There is no tenderness. There is no rebound and no guarding. No hernia.   Musculoskeletal: Normal range of motion. She exhibits edema. She exhibits no tenderness or deformity.   Neurological: She is alert and oriented to person, place, and time. She has normal reflexes. No cranial nerve deficit or sensory deficit. She exhibits normal muscle tone.   Skin: Skin is warm and dry. No rash noted. She is not diaphoretic. No erythema. No pallor.   Psychiatric: She has a normal mood and affect. Her behavior is normal. Judgment and thought content normal.   Nursing note and vitals reviewed.                Results Review:  I have reviewed the labs, radiology results, and diagnostic studies.    Laboratory Data:   Results from last 7 days   Lab Units  01/14/19   0527  01/13/19   2151  01/13/19   0545  01/12/19   0552   01/07/19   1954   SODIUM mmol/L  137   --   138  138   < >  136*   POTASSIUM mmol/L  4.2  4.3  3.6  3.9   < >  3.8   CHLORIDE mmol/L  101   --   100  99   < >  96   CO2 mmol/L  33.0*   --   30.0  29.0   < >  28.0   BUN mg/dL  51*   --   55*  58*   < >  47*   CREATININE mg/dL  1.30*   --   1.30*  1.64*   < >  1.21*   GLUCOSE mg/dL  114*   --   84  99   < >  164*   CALCIUM mg/dL  8.7   --   8.5  8.4   < >  8.9   BILIRUBIN mg/dL   --    --    --    --    --   2.1*   ALK PHOS U/L   --    --    --    --    --   115   ALT (SGPT) U/L   --    --    --    --    --   18   AST (SGOT) U/L   --    --    --    --    --   43*   ANION GAP mmol/L  3.0*   --   8.0  10.0   < >  12.0    < > = values in this interval not displayed.     Estimated Creatinine Clearance: 49.3 mL/min (A) (by C-G formula based on SCr of 1.3 mg/dL (H)).  Results from last 7 days   Lab Units  01/08/19   2036   MAGNESIUM mg/dL  1.7         Results from last 7 days   Lab Units  01/14/19   0527  01/13/19   0545  01/12/19   0552  01/11/19   0528  01/10/19   0529   WBC 10*3/mm3   3.61  4.14  4.09  4.26  5.60   HEMOGLOBIN g/dL  8.3*  8.0*  8.0*  8.3*  9.0*   HEMATOCRIT %  25.3*  24.5*  24.8*  25.1*  27.5*   PLATELETS 10*3/mm3  108*  109*  105*  111*  117*           Culture Data:   No results found for: BLOODCX  No results found for: URINECX  No results found for: RESPCX  No results found for: WOUNDCX  No results found for: STOOLCX  No components found for: BODYFLD    Radiology Data:   Imaging Results (last 24 hours)     ** No results found for the last 24 hours. **          I have reviewed the patient's current medications.     Assessment/Plan     Active Hospital Problems    Diagnosis   • **Dyspnea   • Chronic renal impairment, stage 3 (moderate) (CMS/[facility])   • CAD (coronary artery disease)   • Acute on chronic heart failure (CMS/[facility])   • Acute on chronic diastolic CHF (congestive heart failure) (CMS/[facility])       Plan:    Acute on chronic diastolic CHF (congestive heart failure)  -Improving however gained 1 pounds in the last 24 hours.  Continue diuretic therapy, strict I's and O's, daily weights and fluid/ salt restriction.  Cardiologist/ Heart failure Navigator are following.  Patient's dry weight is between 230 and 235.  Echocardiogram done January 8, 2019 showed:  · Left ventricular wall thickness is consistent with mild concentric hypertrophy.  · Estimated EF = 57%.  · Left ventricular systolic function is normal.  · Left ventricular diastolic dysfunction (grade I a) consistent with impaired relaxation.  · Right ventricular cavity is mildly dilated.  · Left atrial cavity size is mildly dilated.  · Mild aortic valve regurgitation is present.  · Moderate mitral valve regurgitation is present  · Moderate tricuspid valve regurgitation is present.  · The tricuspid valve is grossly normal. Moderate tricuspid valve regurgitation is present. Estimated right ventricular systolic pressure from tricuspid regurgitation is markedly elevated (>55 mmHg). Moderate to severe pulmonary hypertension is  present.  · Mild pulmonic valve regurgitation is present.        History of coronary artery disease with possible non-STEMI:  - Continue guidelines directed medical therapy.  Result of echocardiogram is as dictated above.  Cardiologist is following.      Chronic renal impairment, stage 3:   -Creatinine is down to 1.30 today   Continue to monitor.  Nephrologist is following.       Elevated d-dimer is likely reactive.   -VQ scan showed low probability for pulmonary embolism and Duplex ultrasound both lower extremities is negative for DVT.      History of moderate to severe pulmonary hypertension:   -Patient will be seen for follow-up by Dr. Byrd as outpatient on discharge.      Hypertension: Stable.   -Continue current medications and make adjustments as needed for optimal blood pressure control.    Oxygen dependent COPD:   -compensated  - Continue supplemental oxygen and bronchodilators.     Diabetes mellitus:   -Stable. Continue insulin pump with Accu-Cheks.     Chronic anemia:   -Hemoglobin is stable post transfusion of one unit of packed red blood cells. Iron profile is unremarkable. Continue to monitor.     Obstructive sleep apnea:   -Continue CPAP.     Chronic pain syndrome/chronic insomnia:   -Continue home medications.     Chronic thrombocytopenia: Platelet count is 108,000 today.  Her baseline seem to be 110 to 130K.  Continue to monitor.     Continue GI and DVT prophylaxis.      Discharge Planning: In progress.

## 2019-01-14 NOTE — PROGRESS NOTES
"[facility]  [address] [place] [address]. [address]  T - [phone]  F - [phone]     Progress Note          PATIENT  DEMOGRAPHICS   PATIENT NAME: Nicolasa Rojas                      PHYSICIAN: DOMINGA Padilla  : 1950  MRN: [id number]   LOS: 5 days    Patient Care Team:  Henry Muniz MD as PCP - General (Internal Medicine)  Subjective   SUBJECTIVE   Patient is agitated today, had a long discussion with her about fluid intake and adherence to diet/fluid restrictions.       Objective   OBJECTIVE   Vital Signs  Temp:  [97.5 °F (36.4 °C)-98.9 °F (37.2 °C)] 97.7 °F (36.5 °C)  Heart Rate:  [55-95] 62  Resp:  [18] 18  BP: (120-176)/(60-95) 142/65    Flowsheet Rows      First Filed Value   Admission Height  162.6 cm (64\") Documented at 2019 1800   Admission Weight  113 kg (248 lb 8 oz) Documented at 2019 1800           I/O last 3 completed shifts:  In: 1920 [P.O.:1920]  Out: 3700 [Urine:3700]    PHYSICAL EXAM    Physical Exam   Constitutional: She is oriented to person, place, and time. She appears well-developed.   HENT:   Head: Normocephalic and atraumatic.   Eyes: Pupils are equal, round, and reactive to light.   Neck: Normal range of motion.   Cardiovascular: Normal rate, regular rhythm and normal heart sounds.   Pulmonary/Chest: She has wheezes. She has rales.   Abdominal: Soft. Bowel sounds are normal.   Musculoskeletal: She exhibits edema.   Neurological: She is alert and oriented to person, place, and time.   Skin: Skin is warm.   Psychiatric: She has a normal mood and affect. Her behavior is normal.       RESULTS   Results Review:    Results from last 7 days   Lab Units  19   0527  19   0545  19   0552   19   SODIUM mmol/L  137   --   138  138   < >  136*   POTASSIUM mmol/L  4.2  4.3  3.6  3.9   < >  3.8   CHLORIDE mmol/L  101   --   100  99   < >  96   CO2 mmol/L  33.0*   --   30.0  29.0   < >  28.0   BUN " mg/dL  51*   --   55*  58*   < >  47*   CREATININE mg/dL  1.30*   --   1.30*  1.64*   < >  1.21*   CALCIUM mg/dL  8.7   --   8.5  8.4   < >  8.9   BILIRUBIN mg/dL   --    --    --    --    --   2.1*   ALK PHOS U/L   --    --    --    --    --   115   ALT (SGPT) U/L   --    --    --    --    --   18   AST (SGOT) U/L   --    --    --    --    --   43*   GLUCOSE mg/dL  114*   --   84  99   < >  164*    < > = values in this interval not displayed.       Estimated Creatinine Clearance: 49.3 mL/min (A) (by C-G formula based on SCr of 1.3 mg/dL (H)).    Results from last 7 days   Lab Units  01/08/19   2036   MAGNESIUM mg/dL  1.7             Results from last 7 days   Lab Units  01/14/19   0527  01/13/19   0545  01/12/19   0552  01/11/19   0528  01/10/19   0529   WBC 10*3/mm3  3.61  4.14  4.09  4.26  5.60   HEMOGLOBIN g/dL  8.3*  8.0*  8.0*  8.3*  9.0*   PLATELETS 10*3/mm3  108*  109*  105*  111*  117*               Imaging Results (last 24 hours)     ** No results found for the last 24 hours. **           MEDICATIONS      aspirin 81 mg Oral Daily   atorvastatin 40 mg Oral Nightly   bumetanide 2 mg Intravenous BID   carvedilol 6.25 mg Oral BID With Meals   clopidogrel 75 mg Oral Daily   enoxaparin 40 mg Subcutaneous Q24H   famotidine 40 mg Oral Daily   HYDROcodone-acetaminophen 1 tablet Oral Q6H   isosorbide mononitrate 60 mg Oral BID   lisinopril 10 mg Oral Q24H   polyethylene glycol 17 g Oral Daily   sodium chloride 3 mL Intravenous Q12H   spironolactone 25 mg Oral Daily   zolpidem 5 mg Oral Nightly       insulin        Assessment/Plan   ASSESSMENT / PLAN      Dyspnea    Acute on chronic diastolic CHF (congestive heart failure) (CMS/HCC)    Acute on chronic heart failure (CMS/HCC)    Chronic renal impairment, stage 3 (moderate) (CMS/HCC)    CAD (coronary artery disease)       1. CKD3- baseline creatinine close to 1.6-1.7.  She has a history of contrast-induced nephropathy back in august 2018 after she had left heart  catheterization.  She had multiple stents placed for non-ST elevation MI back in August 2018. FENa <1%, Cr better, will keep IV bumex 2mg BID.  Keep aldactone.    -Good UO. Give metolazone again today.  Standing weight today is 235 lbs. edw is around 232 lbs.      2. Acute on chronic diastolic heart failure-  Plan as above with the diuretics, daily weights, and I and O's, and fluid restriction , off amlodipine due to edema.      3. CAD- prior non-ST elevation MI with stenting back in August 2018 to LAD and circumflex     4. Hypokalemia- improved    5. Hyponatremia- hypervolemic type now improved.     6. HTN- elevated pre meds, amlodipine is stopped.     7. Anemia of CKD- fe b12 and folate are ok. s/p procrit shot x 1. Add po fe           This document has been electronically signed by DOMINGA Padilla on January 14, 2019 9:05 AM      For this patient encounter, I have reviewed the Nurse Practitioner's documentation, medical decision making, and treatment plan and personally spent time with the patient.

## 2019-01-14 NOTE — THERAPY TREATMENT NOTE
Acute Care - Physical Therapy Treatment Note  [facility]     Patient Name: Nicolasa Rojas  : 1950  MRN: [id number]  Today's Date: 2019  Onset of Illness/Injury or Date of Surgery: 19  Date of Referral to PT: 19  Referring Physician: Dr. Catracho Jarrell    Admit Date: 2019    Visit Dx:    ICD-10-CM ICD-9-CM   1. Acute on chronic heart failure, unspecified heart failure type (CMS/HCC) I50.9 428.0   2. Elevated troponin R74.8 790.6   3. Impaired mobility and activities of daily living Z74.09 799.89   4. Impaired physical mobility Z74.09 781.99     Patient Active Problem List   Diagnosis   • Hyponatremia   • Acute on chronic diastolic CHF (congestive heart failure) (CMS/HCC)   • Hypokalemia   • Acute on chronic heart failure (CMS/HCC)   • Dyspnea   • Chronic renal impairment, stage 3 (moderate) (CMS/HCC)   • CAD (coronary artery disease)       Therapy Treatment    Rehabilitation Treatment Summary     Row Name 19 1600 19 1420          Treatment Time/Intention    Discipline  physical therapy assistant  -LN  occupational therapy assistant  -KD     Document Type  therapy note (daily note)  -LN  therapy note (daily note)  -KD     Subjective Information  complains of;pain  -LN  complains of;weakness  -KD     Mode of Treatment  individual therapy;physical therapy  -LN  occupational therapy  -KD     Patient/Family Observations  --  spouse present  -KD     Therapy Frequency (OT Eval)  --  other (see comments) 5-7days/wk  -KD     Patient Effort  --  good  -KD     Existing Precautions/Restrictions  fall;oxygen therapy device and L/min  -LN  --     Recorded by [LN] Elinor Kennedy, KOURTNEY 19 1637 [KD] Thuy Burton COTA/L 19 1518     Row Name 19 1600 19 1420          Vital Signs    Pre Systolic BP Rehab  140  -LN  188  -KD     Pre Treatment Diastolic BP  60  -LN  79  -KD     Pretreatment Heart Rate (beats/min)  62  -LN  68  -KD     Intratreatment Heart Rate  (beats/min)  71  -LN  --     Posttreatment Heart Rate (beats/min)  66  -LN  66  -KD     Pre SpO2 (%)  94  -LN  91  -KD     O2 Delivery Pre Treatment  supplemental O2  -LN  supplemental O2  -KD     Intra SpO2 (%)  93  -LN  --     Post SpO2 (%)  94  -LN  93  -KD     O2 Delivery Post Treatment  --  supplemental O2  -KD     Pre Patient Position  Supine  -LN  Supine  -KD     Intra Patient Position  Standing  -LN  Standing  -KD     Post Patient Position  Sitting  -LN  Sitting  -KD     Recorded by [LN] Elinor Kennedy, PTA 01/14/19 1637 [KD] Thuy Burton HOOPER/L 01/14/19 1518     Row Name 01/14/19 1600 01/14/19 1420          Cognitive Assessment/Intervention- PT/OT    Orientation Status (Cognition)  oriented x 4  -LN  oriented x 4  -KD     Follows Commands (Cognition)  follows one step commands  -LN  follows two step commands  -KD     Recorded by [LN] Elinor Kennedy, KOURTNEY 01/14/19 1637 [KD] Thuy Burton HOOPER/L 01/14/19 1518     Row Name 01/14/19 1600 01/14/19 1420          Bed Mobility Assessment/Treatment    Bed Mobility Assessment/Treatment  --  bed mobility (all) activities;supine-sit;sit-supine  -KD     [place] Level (Bed Mobility)  --  conditional independence  -KD     Scooting/Bridging [place] (Bed Mobility)  independent  -LN  --     Supine-Sit [place] (Bed Mobility)  not tested  -LN  conditional independence  -KD     Sit-Supine [place] (Bed Mobility)  independent  -LN  conditional independence  -KD     Assistive Device (Bed Mobility)  head of bed elevated  -LN  --     Recorded by [LN] Elinor Kennedy, PTA 01/14/19 1637 [KD] Thuy Burton HOOPER/L 01/14/19 1518     Row Name 01/14/19 1420             Functional Mobility    Functional Mobility- Ind. Level  contact guard assist  -KD      Functional Mobility- Device  rolling walker  -KD      Functional Mobility-Distance (Feet)  10 then 15, then 15  -KD      Functional Mobility- Comment  pt required RB's 2' c/o fear of R knee buckling  -KD       Recorded by [KD] Thuy Burton COTA/L 01/14/19 1518      Row Name 01/14/19 1600             Transfer Assessment/Treatment    Transfer Assessment/Treatment  sit-stand transfer;stand-sit transfer  -LN      Recorded by [LN] Elinor Kennedy, PTA 01/14/19 1637      Row Name 01/14/19 1600 01/14/19 1420          Sit-Stand Transfer    Sit-Stand [place] (Transfers)  conditional independence  -LN  conditional independence  -KD     Assistive Device (Sit-Stand Transfers)  -- none  -LN  walker, front-wheeled  -KD     Recorded by [LN] Elinor Kennedy, PTA 01/14/19 1637 [KD] Thuy Burton HOOPER/L 01/14/19 1518     Row Name 01/14/19 1600 01/14/19 1420          Stand-Sit Transfer    Stand-Sit [place] (Transfers)  conditional independence  -LN  conditional independence  -KD     Assistive Device (Stand-Sit Transfers)  -- none  -LN  walker, front-wheeled  -KD     Recorded by [LN] Elinor Kennedy, PTA 01/14/19 1637 [KD] Thuy Burotn HOOPER/L 01/14/19 1518     Row Name 01/14/19 1420             Toilet Transfer    Type (Toilet Transfer)  sit-stand;stand-sit  -KD      [place] Level (Toilet Transfer)  supervision  -KD      Assistive Device (Toilet Transfer)  grab bars/safety frame;commode;walker, front-wheeled  -KD      Recorded by [KD] Thuy Burton COTA/L 01/14/19 1518      Row Name 01/14/19 1600             Gait/Stairs Assessment/Training    [place] Level (Gait)  stand by assist  -LN      Assistive Device (Gait)  walker, front-wheeled  -LN      Distance in Feet (Gait)  312 with 3 sitting rest breaks  -LN      Pattern (Gait)  step-through  -LN      Recorded by [LN] Elinor Kennedy, PTA 01/14/19 1637      Row Name 01/14/19 1420             Grooming Assessment/Training    [place] Level (Grooming)  grooming skills;wash face, hands;supervision  -KD      Grooming Position  sink side  -KD      Recorded by [KD] Thuy Burton HOOPER/L 01/14/19 1518      Row Name 01/14/19 1420             Toileting Assessment/Training     [place] Level (Toileting)  supervision  -KD      Assistive Devices (Toileting)  commode;grab bar/safety frame  -KD      Toileting Position  unsupported sitting  -KD      Recorded by [KD] Thuy Burton COTA/L 01/14/19 1518      Row Name 01/14/19 1600             Positioning and Restraints    Post Treatment Position  bed  -LN      In Bed  sitting EOB;call light within reach;encouraged to call for assist;with family/caregiver  -LN      Recorded by [LN] Elinor Kennedy, KOURTNEY 01/14/19 1637      Row Name 01/14/19 1600 01/14/19 1420          Pain Scale: Numbers Pre/Post-Treatment    Pain Scale: Numbers, Pretreatment  9/10  -LN  8/10  -KD     Pain Scale: Numbers, Post-Treatment  9/10  -LN  8/10  -KD     Pain Location  back  -LN  back and hips  -KD     Pain Intervention(s)  -- meds not due  -LN  Repositioned  -KD     Recorded by [LN] Elinor Kennedy PTA 01/14/19 1637 [KD] Thuy Burton COTA/L 01/14/19 1518     Row Name 01/14/19 1600             Plan of Care Review    Plan of Care Reviewed With  patient  -LN      Recorded by [LN] Elinor Kennedy PTA 01/14/19 1637      Row Name 01/14/19 1420             Outcome Summary/Treatment Plan (OT)    Daily Summary of Progress (OT)  progress toward functional goals as expected  -KD      Plan for Continued Treatment (OT)  cont ot poc  -KD      Anticipated Discharge Disposition (OT)  home with home health  -KD      Recorded by [KD] Thuy Burton COTA/L 01/14/19 1518      Row Name 01/14/19 1600             Outcome Summary/Treatment Plan (PT)    Plan for Continued Treatment (PT)  cont  -LN      Recorded by [LN] Elinor Kennedy PTA 01/14/19 1637        User Key  (r) = Recorded By, (t) = Taken By, (c) = Cosigned By    Initials Name Effective Dates Discipline    LN Elinor Kennedy, PTA 03/07/18 -  PT    KD Thuy Burton COTA/L 03/07/18 -  OT               Rehab Goal Summary     Row Name 01/14/19 1600 01/14/19 1420          Physical Therapy Goals    Bed Mobility Goal Selection (PT)   bed mobility, PT goal 1  -LN  --     Transfer Goal Selection (PT)  transfer, PT goal 1  -LN  --     Gait Training Goal Selection (PT)  gait training, PT goal 1  -LN  --     Strength Goal Selection (PT)  strength, PT goal 1  -LN  --        Bed Mobility Goal 1 (PT)    Activity/Assistive Device (Bed Mobility Goal 1, PT)  rolling to left;rolling to right;sit to supine;supine to sit  -LN  --     [place] Level/Cues Needed (Bed Mobility Goal 1, PT)  independent  -LN  --     Time Frame (Bed Mobility Goal 1, PT)  by discharge  -LN  --     Progress/Outcomes (Bed Mobility Goal 1, PT)  goal met  -LN  --        Transfer Goal 1 (PT)    Activity/Assistive Device (Transfer Goal 1, PT)  sit-to-stand/stand-to-sit;bed-to-chair/chair-to-bed  -LN  --     [place] Level/Cues Needed (Transfer Goal 1, PT)  conditional independence  -LN  --     Time Frame (Transfer Goal 1, PT)  by discharge  -LN  --     Progress/Outcome (Transfer Goal 1, PT)  goal not met  -LN  --        Gait Training Goal 1 (PT)    Activity/Assistive Device (Gait Training Goal 1, PT)  gait (walking locomotion);assistive device use  -LN  --     [place] Level (Gait Training Goal 1, PT)  standby assist;conditional independence  -LN  --     Distance (Gait Goal 1, PT)  50ftx2  -LN  --     Time Frame (Gait Training Goal 1, PT)  by discharge  -LN  --     Progress/Outcome (Gait Training Goal 1, PT)  goal not met  -LN  --        Strength Goal 1 (PT)    Strength Goal 1 (PT)  Pt will tolerate 2 sets of 15 reps of AROM exercises OOB in chair with VSS  -LN  --     Time Frame (Strength Goal 1, PT)  by discharge  -LN  --     Progress/Outcome (Strength Goal 1, PT)  goal not met  -LN  --        Transfer Goal 1 (OT)    Activity/Assistive Device (Transfer Goal 1, OT)  --  sit-to-stand/stand-to-sit;bed-to-chair/chair-to-bed;toilet  -KD     [place] Level/Cues Needed (Transfer Goal 1, OT)  --  conditional independence  -KD     Time Frame (Transfer Goal 1, OT)  --  long term  goal (LTG);by discharge  -KD     Progress/Outcome (Transfer Goal 1, OT)  --  goal not met  -KD        Bathing Goal 1 (OT)    Activity/Assistive Device (Bathing Goal 1, OT)  --  bathing skills, all  -KD     [place] Level/Cues Needed (Bathing Goal 1, OT)  --  minimum assist (75% or more patient effort)  -KD     Time Frame (Bathing Goal 1, OT)  --  long term goal (LTG);by discharge  -KD     Progress/Outcomes (Bathing Goal 1, OT)  --  goal met;goal ongoing  -KD        Dressing Goal 1 (OT)    Activity/Assistive Device (Dressing Goal 1, OT)  --  dressing skills, all  -KD     [place]/Cues Needed (Dressing Goal 1, OT)  --  minimum assist (75% or more patient effort)  -KD     Time Frame (Dressing Goal 1, OT)  --  long term goal (LTG);by discharge  -KD     Progress/Outcome (Dressing Goal 1, OT)  --  goal met;goal ongoing  -KD        Toileting Goal 1 (OT)    Activity/Device (Toileting Goal 1, OT)  --  toileting skills, all  -KD     [place] Level/Cues Needed (Toileting Goal 1, OT)  --  conditional independence  -KD     Time Frame (Toileting Goal 1, OT)  --  long term goal (LTG);by discharge  -KD     Progress/Outcome (Toileting Goal 1, OT)  --  goal not met  -KD         Activity Tolerance Goal 1 (OT)    Activity Level (Endurance Goal 1, OT)  --  10 min activity;O2 sat >/ equal to 88%  -KD     Time Frame (Activity Tolerance Goal 1, OT)  --  long term goal (LTG)  -KD     Progress/Outcome (Activity Tolerance Goal 1, OT)  --  goal met  -KD       User Key  (r) = Recorded By, (t) = Taken By, (c) = Cosigned By    Initials Name Provider Type Discipline    Elinor Loomis, KOURTNEY Physical Therapy Assistant PT    Thuy Rice COTA/L Occupational Therapy Assistant OT          Physical Therapy Education     Title: PT OT SLP Therapies (Done)     Topic: Physical Therapy (Done)     Point: Mobility training (Done)     Learning Progress Summary           Patient Acceptance, E,TB, VU by KARINA at 1/14/2019  4:38 PM    Acceptance,  E,TB, VU by LN at 1/11/2019  1:49 PM    Acceptance, E,TB, VU,NR by LN at 1/10/2019  2:45 PM   Family Acceptance, E,TB, VU by LN at 1/11/2019  1:49 PM    Acceptance, E,TB, VU,NR by LN at 1/10/2019  2:45 PM                   Point: Home exercise program (Done)     Learning Progress Summary           Patient Acceptance, E,TB, VU by LN at 1/11/2019  1:49 PM    Acceptance, E,TB, VU,NR by LN at 1/10/2019  2:45 PM   Family Acceptance, E,TB, VU by LN at 1/11/2019  1:49 PM    Acceptance, E,TB, VU,NR by LN at 1/10/2019  2:45 PM                   Point: Body mechanics (Done)     Learning Progress Summary           Patient Acceptance, E,TB, VU by LN at 1/14/2019  4:38 PM    Acceptance, E,TB, VU by LN at 1/11/2019  1:49 PM    Acceptance, E,TB, VU,NR by LN at 1/10/2019  2:45 PM   Family Acceptance, E,TB, VU by LN at 1/11/2019  1:49 PM    Acceptance, E,TB, VU,NR by LN at 1/10/2019  2:45 PM                   Point: Precautions (Done)     Learning Progress Summary           Patient Acceptance, E,TB, VU by LN at 1/14/2019  4:38 PM    Acceptance, E,TB, VU by LN at 1/11/2019  1:49 PM    Acceptance, E,TB, VU,NR by LN at 1/10/2019  2:45 PM   Family Acceptance, E,TB, VU by LN at 1/11/2019  1:49 PM    Acceptance, E,TB, VU,NR by LN at 1/10/2019  2:45 PM                               User Key     Initials Effective Dates Name Provider Type Discipline    LN 03/07/18 -  Elinor Kennedy, PTA Physical Therapy Assistant PT                PT Recommendation and Plan  Anticipated Discharge Disposition (PT): home with 24/7 care, home with home health  Therapy Frequency (PT Clinical Impression): other (see comments)(5-14 times per week)  Outcome Summary/Treatment Plan (PT)  Plan for Continued Treatment (PT): cont  Anticipated Discharge Disposition (PT): home with 24/7 care, home with home health  Plan of Care Reviewed With: patient  Progress: improving  Outcome Summary: sup-sit-stand-sit cond ind,amb 312' with rw and sba of 1 with 3 sitting rest  breaks-no new goals met  Outcome Measures     Row Name 01/14/19 1600 01/14/19 1420 01/13/19 0939       How much help from another person do you currently need...    Turning from your back to your side while in flat bed without using bedrails?  4  -LN  --  4  -CA    Moving from lying on back to sitting on the side of a flat bed without bedrails?  4  -LN  --  4  -CA    Moving to and from a bed to a chair (including a wheelchair)?  3  -LN  --  3  -CA    Standing up from a chair using your arms (e.g., wheelchair, bedside chair)?  4  -LN  --  3  -CA    Climbing 3-5 steps with a railing?  3  -LN  --  3  -CA    To walk in hospital room?  3  -LN  --  3  -CA    AM-PAC 6 Clicks Score  21  -LN  --  20  -CA       How much help from another is currently needed...    Putting on and taking off regular lower body clothing?  --  3  -KD  --    Bathing (including washing, rinsing, and drying)  --  3  -KD  --    Toileting (which includes using toilet bed pan or urinal)  --  3  -KD  --    Putting on and taking off regular upper body clothing  --  3  -KD  --    Taking care of personal grooming (such as brushing teeth)  --  3  -KD  --    Eating meals  --  4  -KD  --    Score  --  19  -KD  --       Functional Assessment    Outcome Measure Options  AM-PAC 6 Clicks Basic Mobility (PT)  -LN  --  AM-PAC 6 Clicks Basic Mobility (PT)  -CA    Row Name 01/12/19 1400 01/12/19 1307          How much help from another person do you currently need...    Turning from your back to your side while in flat bed without using bedrails?  --  4  -CA     Moving from lying on back to sitting on the side of a flat bed without bedrails?  --  4  -CA     Moving to and from a bed to a chair (including a wheelchair)?  --  3  -CA     Standing up from a chair using your arms (e.g., wheelchair, bedside chair)?  --  3  -CA     Climbing 3-5 steps with a railing?  --  3  -CA     To walk in hospital room?  --  3  -CA     AM-PAC 6 Clicks Score  --  20  -CA        How much  help from another is currently needed...    Putting on and taking off regular lower body clothing?  3  -CS  --     Bathing (including washing, rinsing, and drying)  3  -CS  --     Toileting (which includes using toilet bed pan or urinal)  3  -CS  --     Putting on and taking off regular upper body clothing  3  -CS  --     Taking care of personal grooming (such as brushing teeth)  3  -CS  --     Eating meals  3  -CS  --     Score  18  -CS  --        Functional Assessment    Outcome Measure Options  AM-PAC 6 Clicks Daily Activity (OT)  -CS  AM-PAC 6 Clicks Basic Mobility (PT)  -CA       User Key  (r) = Recorded By, (t) = Taken By, (c) = Cosigned By    Initials Name Provider Type    Elia Juan PTA Physical Therapy Assistant    Elinor Loomis PTA Physical Therapy Assistant    Thuy Rice, HOOPER/L Occupational Therapy Assistant    CS Malena Alexis, HOOPER/L Occupational Therapy Assistant         Time Calculation:   PT Charges     Row Name 01/14/19 1600             Time Calculation    Start Time  1600  -LN      Stop Time  1627  -LN      Time Calculation (min)  27 min  -LN      PT Received On  01/14/19  -LN         Time Calculation- PT    Total Timed Code Minutes- PT  27 minute(s)  -LN        User Key  (r) = Recorded By, (t) = Taken By, (c) = Cosigned By    Initials Name Provider Type    Elinor Loomis PTA Physical Therapy Assistant        Therapy Suggested Charges     Code   Minutes Charges    None           Therapy Charges for Today     Code Description Service Date Service Provider Modifiers Qty    36387724928 HC GAIT TRAINING EA 15 MIN 1/14/2019 Elinor Kennedy PTA GP 1    69289768625 HC PT THERAPEUTIC ACT EA 15 MIN 1/14/2019 Elinor Kennedy PTA GP 1          PT G-Codes  Outcome Measure Options: AM-PAC 6 Clicks Basic Mobility (PT)  AM-PAC 6 Clicks Score: 21  Score: 19    Elinor Kennedy PTA  1/14/2019

## 2019-01-14 NOTE — PLAN OF CARE
Problem: Fall Risk (Adult)  Goal: Identify Related Risk Factors and Signs and Symptoms  Outcome: Ongoing (interventions implemented as appropriate)    Goal: Absence of Fall  Outcome: Ongoing (interventions implemented as appropriate)      Problem: Patient Care Overview  Goal: Plan of Care Review  Outcome: Ongoing (interventions implemented as appropriate)    Goal: Individualization and Mutuality  Outcome: Ongoing (interventions implemented as appropriate)    Goal: Discharge Needs Assessment  Outcome: Ongoing (interventions implemented as appropriate)    Goal: Interprofessional Rounds/Family Conf  Outcome: Ongoing (interventions implemented as appropriate)      Problem: Fluid Volume Excess (Adult)  Goal: Identify Related Risk Factors and Signs and Symptoms  Outcome: Ongoing (interventions implemented as appropriate)    Goal: Optimal Fluid Balance  Outcome: Ongoing (interventions implemented as appropriate)      Problem: Diabetes, Type 2 (Adult)  Goal: Signs and Symptoms of Listed Potential Problems Will be Absent, Minimized or Managed (Diabetes, Type 2)  Outcome: Ongoing (interventions implemented as appropriate)      Problem: Cardiac: Heart Failure (Adult)  Goal: Signs and Symptoms of Listed Potential Problems Will be Absent, Minimized or Managed (Cardiac: Heart Failure)  Outcome: Ongoing (interventions implemented as appropriate)      Problem: Kidney Disease, Chronic/End Stage Renal Disease (Adult)  Goal: Signs and Symptoms of Listed Potential Problems Will be Absent, Minimized or Managed (Kidney Disease, Chronic/End Stage Renal Disease)  Outcome: Ongoing (interventions implemented as appropriate)

## 2019-01-14 NOTE — THERAPY TREATMENT NOTE
Acute Care - Occupational Therapy Treatment Note  [facility]     Patient Name: Nicolasa Rojas  : 1950  MRN: [id number]  Today's Date: 2019  Onset of Illness/Injury or Date of Surgery: 19  Date of Referral to OT: 19  Referring Physician: Dr. Catracho Jarrell    Admit Date: 2019       ICD-10-CM ICD-9-CM   1. Acute on chronic heart failure, unspecified heart failure type (CMS/HCC) I50.9 428.0   2. Elevated troponin R74.8 790.6   3. Impaired mobility and activities of daily living Z74.09 799.89   4. Impaired physical mobility Z74.09 781.99     Patient Active Problem List   Diagnosis   • Hyponatremia   • Acute on chronic diastolic CHF (congestive heart failure) (CMS/HCC)   • Hypokalemia   • Acute on chronic heart failure (CMS/HCC)   • Dyspnea   • Chronic renal impairment, stage 3 (moderate) (CMS/HCC)   • CAD (coronary artery disease)     Past Medical History:   Diagnosis Date   • Acute bronchitis    • Acute congestive heart failure (CMS/HCC)     resolving   • Acute kidney failure, unspecified (CMS/HCC)    • Acute kidney failure, unspecified (CMS/HCC)    • Acute posthemorrhagic anemia    • Asthenia    • Chest pain    • Chronic gastritis    • Chronic pharyngitis    • Chronic renal impairment    • Congenital renal failure    • Congestive heart failure (CMS/HCC)    • Contact dermatitis due to poison ivy    • COPD (chronic obstructive pulmonary disease) (CMS/HCC)    • Coronary arteriosclerosis    • D-dimer, elevated     D-dimer above reference range    • Degenerative joint disease involving multiple joints     back   • Depressive disorder    • Dysphagia    • Dyspnea    • Edema of lower extremity    • Encounter for immunization    • Encounter for long-term (current) use of medications    • Epigastric pain    • Esophagitis    • Essential hypertension    • Gastroesophageal reflux disease    • Gastroparesis     Gastroparesis syndrome    • Generalized abdominal pain    • H/O bone density study  01/09/2015   • H/O mammogram 01/15/2015   • H/O screening mammography 11/12/2013   • Headache    • History of transfusion    • Hyperlipidemia    • Hypokalemia    • Hypomagnesemia    • Injury of tendon of rotator cuff     Injury of tendon of the rotator cuff of shoulder      • Insomnia    • Insomnia, unspecified    • Knee pain    • Nausea and vomiting    • Neck pain    • Need for immunization against influenza    • Need for prophylactic vaccination and inoculation against influenza    • Need for prophylactic vaccination and inoculation against viral disease    • 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 Performed at [facility].   • ENDOSCOPY  02/09/2015    ESOPHAGEAL STRICTURE PRESENT, GASTRITIS FOUND IN THE STOMACH, NORMAL DUODENUM   • ENDOSCOPY W/ PEG TUBE PLACEMENT  12/19/2012    Esophagitis seen. Biopsy taken. Esophageal stricture present. Dilatation performed. Gastritis in stomach. Biopsy taken. Food was in stomach. Normal duodenum.   • HERNIA REPAIR     • HYSTERECTOMY      partial   • NECK SURGERY         Therapy Treatment    Rehabilitation Treatment Summary     Row Name 01/14/19 1420             Treatment Time/Intention    Discipline  occupational therapy assistant  -KD      Document Type  therapy note (daily note)  -KD      Subjective Information  complains of;weakness  -KD      Mode of Treatment  occupational therapy  -KD      Patient/Family Observations  spouse present  -KD      Therapy Frequency (OT Eval)  other (see comments) 5-7days/wk  -KD      Patient Effort  good  -KD      Recorded by [KD]  Thuy Burton COTA/L 01/14/19 1518      Row Name 01/14/19 1420             Vital Signs    Pre Systolic BP Rehab  188  -KD      Pre Treatment Diastolic BP  79  -KD      Pretreatment Heart Rate (beats/min)  68  -KD      Posttreatment Heart Rate (beats/min)  66  -KD      Pre SpO2 (%)  91  -KD      O2 Delivery Pre Treatment  supplemental O2  -KD      Post SpO2 (%)  93  -KD      O2 Delivery Post Treatment  supplemental O2  -KD      Pre Patient Position  Supine  -KD      Intra Patient Position  Standing  -KD      Post Patient Position  Sitting  -KD      Recorded by [KD] Thuy Burton COTA/L 01/14/19 1518      Row Name 01/14/19 1420             Cognitive Assessment/Intervention- PT/OT    Orientation Status (Cognition)  oriented x 4  -KD      Follows Commands (Cognition)  follows two step commands  -KD      Recorded by [KD] Thuy Burton COTA/L 01/14/19 1518      Row Name 01/14/19 1420             Bed Mobility Assessment/Treatment    Bed Mobility Assessment/Treatment  bed mobility (all) activities;supine-sit;sit-supine  -KD      [place] Level (Bed Mobility)  conditional independence  -KD      Supine-Sit [place] (Bed Mobility)  conditional independence  -KD      Sit-Supine [place] (Bed Mobility)  conditional independence  -KD      Recorded by [KD] Thuy Burton COTA/L 01/14/19 1518      Row Name 01/14/19 1420             Functional Mobility    Functional Mobility- Ind. Level  contact guard assist  -KD      Functional Mobility- Device  rolling walker  -KD      Functional Mobility-Distance (Feet)  10 then 15, then 15  -KD      Functional Mobility- Comment  pt required RB's 2' c/o fear of R knee buckling  -KD      Recorded by [KD] Thuy Burton COTA/L 01/14/19 1518      Row Name 01/14/19 1420             Sit-Stand Transfer    Sit-Stand [place] (Transfers)  conditional independence  -KD      Assistive Device (Sit-Stand Transfers)  walker, front-wheeled  -KD      Recorded by [KD] Thuy Burton  RUFUS JULIEN/L 01/14/19 1518      Row Name 01/14/19 1420             Stand-Sit Transfer    Stand-Sit [place] (Transfers)  conditional independence  -KD      Assistive Device (Stand-Sit Transfers)  walker, front-wheeled  -KD      Recorded by [KD] Thuy Burton COTA/L 01/14/19 1518      Row Name 01/14/19 1420             Toilet Transfer    Type (Toilet Transfer)  sit-stand;stand-sit  -KD      [place] Level (Toilet Transfer)  supervision  -KD      Assistive Device (Toilet Transfer)  grab bars/safety frame;commode;walker, front-wheeled  -KD      Recorded by [KD] Thuy Burton COTA/L 01/14/19 1518      Row Name 01/14/19 1420             Grooming Assessment/Training    [place] Level (Grooming)  grooming skills;wash face, hands;supervision  -KD      Grooming Position  sink side  -KD      Recorded by [KD] Thuy Burton COTA/L 01/14/19 1518      Row Name 01/14/19 1420             Toileting Assessment/Training    [place] Level (Toileting)  supervision  -KD      Assistive Devices (Toileting)  commode;grab bar/safety frame  -KD      Toileting Position  unsupported sitting  -KD      Recorded by [KD] Thuy Burton COTA/L 01/14/19 1518      Row Name 01/14/19 1420             Pain Scale: Numbers Pre/Post-Treatment    Pain Scale: Numbers, Pretreatment  8/10  -KD      Pain Scale: Numbers, Post-Treatment  8/10  -KD      Pain Location  back and hips  -KD      Pain Intervention(s)  Repositioned  -KD      Recorded by [KD] Thuy Burton COTA/L 01/14/19 1518      Row Name 01/14/19 1420             Outcome Summary/Treatment Plan (OT)    Daily Summary of Progress (OT)  progress toward functional goals as expected  -KD      Plan for Continued Treatment (OT)  cont ot poc  -KD      Anticipated Discharge Disposition (OT)  home with home health  -KD      Recorded by [KD] Thuy Burton COTA/L 01/14/19 1518        User Key  (r) = Recorded By, (t) = Taken By, (c) = Cosigned By    Initials Name Effective Dates  Discipline    Thuy Rice COTA/L 03/07/18 -  OT           Rehab Goal Summary     Row Name 01/14/19 3413             Transfer Goal 1 (OT)    Activity/Assistive Device (Transfer Goal 1, OT)  sit-to-stand/stand-to-sit;bed-to-chair/chair-to-bed;toilet  -KD      [place] Level/Cues Needed (Transfer Goal 1, OT)  conditional independence  -KD      Time Frame (Transfer Goal 1, OT)  long term goal (LTG);by discharge  -KD      Progress/Outcome (Transfer Goal 1, OT)  goal not met  -KD         Bathing Goal 1 (OT)    Activity/Assistive Device (Bathing Goal 1, OT)  bathing skills, all  -KD      [place] Level/Cues Needed (Bathing Goal 1, OT)  minimum assist (75% or more patient effort)  -KD      Time Frame (Bathing Goal 1, OT)  long term goal (LTG);by discharge  -KD      Progress/Outcomes (Bathing Goal 1, OT)  goal met;goal ongoing  -KD         Dressing Goal 1 (OT)    Activity/Assistive Device (Dressing Goal 1, OT)  dressing skills, all  -KD      [place]/Cues Needed (Dressing Goal 1, OT)  minimum assist (75% or more patient effort)  -KD      Time Frame (Dressing Goal 1, OT)  long term goal (LTG);by discharge  -KD      Progress/Outcome (Dressing Goal 1, OT)  goal met;goal ongoing  -KD         Toileting Goal 1 (OT)    Activity/Device (Toileting Goal 1, OT)  toileting skills, all  -KD      [place] Level/Cues Needed (Toileting Goal 1, OT)  conditional independence  -KD      Time Frame (Toileting Goal 1, OT)  long term goal (LTG);by discharge  -KD      Progress/Outcome (Toileting Goal 1, OT)  goal not met  -KD          Activity Tolerance Goal 1 (OT)    Activity Level (Endurance Goal 1, OT)  10 min activity;O2 sat >/ equal to 88%  -KD      Time Frame (Activity Tolerance Goal 1, OT)  long term goal (LTG)  -KD      Progress/Outcome (Activity Tolerance Goal 1, OT)  goal met  -KD        User Key  (r) = Recorded By, (t) = Taken By, (c) = Cosigned By    Initials Name Provider Type Discipline    Thuy Rice  HOOPER/L Occupational Therapy Assistant OT        Occupational Therapy Education     Title: PT OT SLP Therapies (Done)     Topic: Occupational Therapy (Done)     Point: ADL training (Done)     Description: Instruct learner(s) on proper safety adaptation and remediation techniques during self care or transfers.   Instruct in proper use of assistive devices.    Learning Progress Summary           Patient Eager, E, VU by  at 1/13/2019  3:39 PM                   Point: Home exercise program (Done)     Description: Instruct learner(s) on appropriate technique for monitoring, assisting and/or progressing therapeutic exercises/activities.    Learning Progress Summary           Patient Eager, E, VU by  at 1/13/2019  3:39 PM                   Point: Precautions (Done)     Description: Instruct learner(s) on prescribed precautions during self-care and functional transfers.    Learning Progress Summary           Patient Eager, E, VU by  at 1/13/2019  3:39 PM    Acceptance, E, VU,NR by  at 1/9/2019 11:36 AM    Comment:  OT role, OT POC   Family Acceptance, E, VU,NR by  at 1/9/2019 11:36 AM    Comment:  OT role, OT POC                   Point: Body mechanics (Done)     Description: Instruct learner(s) on proper positioning and spine alignment during self-care, functional mobility activities and/or exercises.    Learning Progress Summary           Patient Eager, E, VU by  at 1/13/2019  3:39 PM                               User Key     Initials Effective Dates Name Provider Type Discipline     03/07/18 -  Francy Wilde COTA/L Occupational Therapy Assistant OT     10/12/18 -  Dmitry Yuan Occupational Therapist OT                OT Recommendation and Plan  Outcome Summary/Treatment Plan (OT)  Daily Summary of Progress (OT): progress toward functional goals as expected  Plan for Continued Treatment (OT): cont ot poc  Anticipated Discharge Disposition (OT): home with home health  Therapy Frequency (OT  Kevin): other (see comments)(5-7days/wk)  Daily Summary of Progress (OT): progress toward functional goals as expected  Outcome Summary: pt indep w/ bed mobility, sit/stand/sit-cga, pt amb 10' then 15, then 15 again with RW  followed with W/C and CGA of 1. pt had c/o R knee buckling and required RB's PRN. pt cga for toilet t/f and was SBA w/ nhi care. no new goals met this date. cont ot poc.  Outcome Measures     Row Name 01/14/19 1420 01/13/19 0939 01/12/19 1400       How much help from another person do you currently need...    Turning from your back to your side while in flat bed without using bedrails?  --  4  -CA  --    Moving from lying on back to sitting on the side of a flat bed without bedrails?  --  4  -CA  --    Moving to and from a bed to a chair (including a wheelchair)?  --  3  -CA  --    Standing up from a chair using your arms (e.g., wheelchair, bedside chair)?  --  3  -CA  --    Climbing 3-5 steps with a railing?  --  3  -CA  --    To walk in hospital room?  --  3  -CA  --    AM-PAC 6 Clicks Score  --  20  -CA  --       How much help from another is currently needed...    Putting on and taking off regular lower body clothing?  3  -KD  --  3  -CS    Bathing (including washing, rinsing, and drying)  3  -KD  --  3  -CS    Toileting (which includes using toilet bed pan or urinal)  3  -KD  --  3  -CS    Putting on and taking off regular upper body clothing  3  -KD  --  3  -CS    Taking care of personal grooming (such as brushing teeth)  3  -KD  --  3  -CS    Eating meals  4  -KD  --  3  -CS    Score  19  -KD  --  18  -CS       Functional Assessment    Outcome Measure Options  --  AM-PAC 6 Clicks Basic Mobility (PT)  -CA  AM-PAC 6 Clicks Daily Activity (OT)  -CS    Row Name 01/12/19 1307             How much help from another person do you currently need...    Turning from your back to your side while in flat bed without using bedrails?  4  -CA      Moving from lying on back to sitting on the side of a  flat bed without bedrails?  4  -CA      Moving to and from a bed to a chair (including a wheelchair)?  3  -CA      Standing up from a chair using your arms (e.g., wheelchair, bedside chair)?  3  -CA      Climbing 3-5 steps with a railing?  3  -CA      To walk in hospital room?  3  -CA      AM-PAC 6 Clicks Score  20  -CA         Functional Assessment    Outcome Measure Options  AM-PAC 6 Clicks Basic Mobility (PT)  -CA        User Key  (r) = Recorded By, (t) = Taken By, (c) = Cosigned By    Initials Name Provider Type    CA Elia Trimble, KOURTNEY Physical Therapy Assistant    Thuy Rice HOOPER/L Occupational Therapy Assistant    Malena Abebe HOOPER/L Occupational Therapy Assistant           Time Calculation:   Time Calculation- OT     Row Name 01/14/19 1522             Time Calculation- OT    OT Start Time  1420  -KD      OT Stop Time  1448  -KD      OT Time Calculation (min)  28 min  -KD      Total Timed Code Minutes- OT  28 minute(s)  -KD      OT Received On  01/14/19  -KD        User Key  (r) = Recorded By, (t) = Taken By, (c) = Cosigned By    Initials Name Provider Type    Thuy Rice HOOPER/L Occupational Therapy Assistant           Therapy Suggested Charges     Code   Minutes Charges    None           Therapy Charges for Today     Code Description Service Date Service Provider Modifiers Qty    43687493027 HC OT SELF CARE/MGMT/TRAIN EA 15 MIN 1/14/2019 Thuy Burton COTA/L GO 1    09011601066 HC OT THERAPEUTIC ACT EA 15 MIN 1/14/2019 Thuy Burton COTA/L GO 1               RUFUS [place]/L  1/14/2019

## 2019-01-14 NOTE — PLAN OF CARE
Problem: Patient Care Overview  Goal: Plan of Care Review   01/14/19 1600   Coping/Psychosocial   Plan of Care Reviewed With patient   Plan of Care Review   Progress improving   OTHER   Outcome Summary sup-sit-stand-sit cond ind,amb 312' with rw and sba of 1 with 3 sitting rest breaks-no new goals met     Goal: Discharge Needs Assessment   01/09/19 1425   Discharge Needs Assessment   Readmission Within the Last 30 Days previous discharge plan unsuccessful   Concerns to be Addressed denies needs/concerns at this time   Patient/Family Anticipates Transition to home with family;home with help/services   Patient/Family Anticipated Services at Transition home health care   Transportation Concerns car, none   Transportation Anticipated family or friend will provide   Anticipated Changes Related to Illness none   Equipment Needed After Discharge none   Outpatient/Agency/Support Group Needs homecare agency   Discharge Facility/Level of Care Needs home with home health   Offered/Gave Vendor List no   Patient's Choice of Community Agency(s) Patient is active with [facility].   Current Discharge Risk chronically ill;dependent with mobility/activities of daily living   Disability   Equipment Currently Used at Home bipap/cpap;commode;glucometer;oxygen;ramp;rollator;walker, standard;wheelchair;wheelchair, motorized

## 2019-01-14 NOTE — PLAN OF CARE
Problem: Patient Care Overview  Goal: Plan of Care Review  Outcome: Ongoing (interventions implemented as appropriate)   01/14/19 1801   OTHER   Outcome Summary pt indep w/ bed mobility, sit/stand/sit-cga, pt amb 10' then 15, then 15 again with RW followed with W/C and CGA of 1. pt had c/o R knee buckling and required RB's PRN. pt cga for toilet t/f and was SBA w/ nhi care. no new goals met this date. cont ot poc.

## 2019-01-15 NOTE — THERAPY TREATMENT NOTE
Acute Care - Physical Therapy Treatment Note  [facility]     Patient Name: Nicolasa Rojas  : 1950  MRN: [id number]  Today's Date: 1/15/2019  Onset of Illness/Injury or Date of Surgery: 19  Date of Referral to PT: 19  Referring Physician: Dr. Catracho Jarrell    Admit Date: 2019    Visit Dx:    ICD-10-CM ICD-9-CM   1. Acute on chronic heart failure, unspecified heart failure type (CMS/HCC) I50.9 428.0   2. Elevated troponin R74.8 790.6   3. Impaired mobility and activities of daily living Z74.09 799.89   4. Impaired physical mobility Z74.09 781.99     Patient Active Problem List   Diagnosis   • Hyponatremia   • Acute on chronic diastolic CHF (congestive heart failure) (CMS/HCC)   • Hypokalemia   • Acute on chronic heart failure (CMS/HCC)   • Dyspnea   • Chronic renal impairment, stage 3 (moderate) (CMS/HCC)   • CAD (coronary artery disease)       Therapy Treatment    Rehabilitation Treatment Summary     Row Name 01/15/19 0810             Treatment Time/Intention    Discipline  physical therapy assistant  -      Document Type  therapy note (daily note)  -      Subjective Information  complains of;fatigue  -      Mode of Treatment  individual therapy;physical therapy  -      Existing Precautions/Restrictions  fall;oxygen therapy device and L/min  -JW      Recorded by [HUGH] Enedina Sandoval, KOURTNEY 01/15/19 1053      Row Name 01/15/19 0810             Vital Signs    Pre Systolic BP Rehab  167  -JW      Pre Treatment Diastolic BP  69  -JW      Pretreatment Heart Rate (beats/min)  62  -JW      Intratreatment Heart Rate (beats/min)  74  -JW      Posttreatment Heart Rate (beats/min)  73  -JW      Pre SpO2 (%)  97  -JW      O2 Delivery Pre Treatment  supplemental O2  -JW      Intra SpO2 (%)  90  -JW      O2 Delivery Intra Treatment  supplemental O2  -JW      Post SpO2 (%)  95  -JW      O2 Delivery Post Treatment  supplemental O2  -JW      Pre Patient Position  Supine  -JW      Post  Patient Position  Supine  -JW      Recorded by [JW] Enedina Sandoval, PTA 01/15/19 1053      Row Name 01/15/19 0810             Cognitive Assessment/Intervention- PT/OT    Orientation Status (Cognition)  oriented x 4  -JW      Follows Commands (Cognition)  follows one step commands  -JW      Recorded by [JW] Enedina Sandoval, PTA 01/15/19 1053      Row Name 01/15/19 0810             Bed Mobility Assessment/Treatment    Supine-Sit [place] (Bed Mobility)  independent  -JW      Sit-Supine [place] (Bed Mobility)  independent  -JW      Recorded by [JW] Enedina Sandoval, PTA 01/15/19 1053      Row Name 01/15/19 0810             Sit-Stand Transfer    Sit-Stand [place] (Transfers)  conditional independence  -JW      Assistive Device (Sit-Stand Transfers)  walker, front-wheeled  -JW      Recorded by [JW] Enedina Sandoval, PTA 01/15/19 1053      Row Name 01/15/19 0810             Stand-Sit Transfer    Stand-Sit [place] (Transfers)  conditional independence  -JW      Assistive Device (Stand-Sit Transfers)  walker, front-wheeled  -JW      Recorded by [JW] Enedina Sandoval, PTA 01/15/19 1053      Row Name 01/15/19 0810             Gait/Stairs Assessment/Training    [place] Level (Gait)  stand by assist  -JW      Assistive Device (Gait)  walker, front-wheeled  -JW      Distance in Feet (Gait)  260 w/ 3 sitting rest breaks  -JW      Pattern (Gait)  step-through  -JW      Recorded by [JW] Enedina Sandoval, PTA 01/15/19 1053      Row Name 01/15/19 0810             Lower Extremity Seated Therapeutic Exercise    Performed, Seated Lower Extremity (Therapeutic Exercise)  ankle dorsiflexion/plantarflexion;LAQ (long arc quad), knee extension;hip flexion/extension;hip abduction/adduction  -JW      Exercise Type, Seated Lower Extremity (Therapeutic Exercise)  AROM (active range of motion)  -JW      Sets/Reps Detail, Seated Lower Extremity (Therapeutic Exercise)  15  -JW      Recorded by  [JW] Enedina Sandoval, PTA 01/15/19 1053      Row Name 01/15/19 0810             Positioning and Restraints    Pre-Treatment Position  in bed  -JW      Post Treatment Position  bed  -JW      In Bed  supine;call light within reach;encouraged to call for assist;with family/caregiver  -JW      Recorded by [JW] Enedina Sandoval, PTA 01/15/19 1053      Row Name 01/15/19 0810             Pain Scale: Numbers Pre/Post-Treatment    Pain Scale: Numbers, Pretreatment  6/10  -JW      Pain Scale: Numbers, Post-Treatment  6/10  -JW      Pain Location  -- back, B LE  -JW      Recorded by [JW] Enedina Sandoval, PTA 01/15/19 1053      Row Name 01/15/19 0810             Outcome Summary/Treatment Plan (PT)    Plan for Continued Treatment (PT)  cont  -JW      Recorded by [JW] Enedina Sandoval, [facility] 01/15/19 1053        User Key  (r) = Recorded By, (t) = Taken By, (c) = Cosigned By    Initials Name Effective Dates Discipline    JW Enedina Sandoval, [facility] 08/11/15 -  PT               Rehab Goal Summary     Row Name 01/15/19 0810             Physical Therapy Goals    Bed Mobility Goal Selection (PT)  bed mobility, PT goal 1  -JW      Transfer Goal Selection (PT)  transfer, PT goal 1  -      Gait Training Goal Selection (PT)  gait training, PT goal 1  -      Strength Goal Selection (PT)  strength, PT goal 1  -JW         Bed Mobility Goal 1 (PT)    Activity/Assistive Device (Bed Mobility Goal 1, PT)  rolling to left;rolling to right;sit to supine;supine to sit  -      [place] Level/Cues Needed (Bed Mobility Goal 1, PT)  independent  -JW      Time Frame (Bed Mobility Goal 1, PT)  by discharge  -      Progress/Outcomes (Bed Mobility Goal 1, PT)  goal met  -JW         Transfer Goal 1 (PT)    Activity/Assistive Device (Transfer Goal 1, PT)  sit-to-stand/stand-to-sit;bed-to-chair/chair-to-bed  -JW      [place] Level/Cues Needed (Transfer Goal 1, PT)  conditional independence  -JW      Time Frame (Transfer  Goal 1, PT)  by discharge  -JW      Progress/Outcome (Transfer Goal 1, PT)  goal partially met  -JW         Gait Training Goal 1 (PT)    Activity/Assistive Device (Gait Training Goal 1, PT)  gait (walking locomotion);assistive device use  -      [place] Level (Gait Training Goal 1, PT)  standby assist;conditional independence  -      Distance (Gait Goal 1, PT)  50ftx2  -JW      Time Frame (Gait Training Goal 1, PT)  by discharge  -JW      Progress/Outcome (Gait Training Goal 1, PT)  goal not met  -JW         Strength Goal 1 (PT)    Strength Goal 1 (PT)  Pt will tolerate 2 sets of 15 reps of AROM exercises OOB in chair with VSS  -JW      Time Frame (Strength Goal 1, PT)  by discharge  -JW      Progress/Outcome (Strength Goal 1, PT)  goal not met  -JW        User Key  (r) = Recorded By, (t) = Taken By, (c) = Cosigned By    Initials Name Provider Type Discipline    Enedina Price, PTA Physical Therapy Assistant PT          Physical Therapy Education     Title: PT OT SLP Therapies (Done)     Topic: Physical Therapy (Done)     Point: Mobility training (Done)     Learning Progress Summary           Patient Acceptance, E,TB, VU by LN at 1/14/2019  4:38 PM    Acceptance, E,TB, VU by LN at 1/11/2019  1:49 PM    Acceptance, E,TB, VU,NR by LN at 1/10/2019  2:45 PM   Family Acceptance, E,TB, VU by LN at 1/11/2019  1:49 PM    Acceptance, E,TB, VU,NR by LN at 1/10/2019  2:45 PM                   Point: Home exercise program (Done)     Learning Progress Summary           Patient Acceptance, E,TB, VU by LN at 1/11/2019  1:49 PM    Acceptance, E,TB, VU,NR by LN at 1/10/2019  2:45 PM   Family Acceptance, E,TB, VU by LN at 1/11/2019  1:49 PM    Acceptance, E,TB, VU,NR by LN at 1/10/2019  2:45 PM                   Point: Body mechanics (Done)     Learning Progress Summary           Patient Acceptance, E,TB, VU by LN at 1/14/2019  4:38 PM    Acceptance, E,TB, VU by LN at 1/11/2019  1:49 PM    Acceptance, E,TB, VU,NR  by LN at 1/10/2019  2:45 PM   Family Acceptance, E,TB, VU by LN at 1/11/2019  1:49 PM    Acceptance, E,TB, VU,NR by LN at 1/10/2019  2:45 PM                   Point: Precautions (Done)     Learning Progress Summary           Patient Acceptance, E,TB, VU by LN at 1/14/2019  4:38 PM    Acceptance, E,TB, VU by LN at 1/11/2019  1:49 PM    Acceptance, E,TB, VU,NR by LN at 1/10/2019  2:45 PM   Family Acceptance, E,TB, VU by LN at 1/11/2019  1:49 PM    Acceptance, E,TB, VU,NR by LN at 1/10/2019  2:45 PM                               User Key     Initials Effective Dates Name Provider Type Discipline    LN 03/07/18 -  Elinor Kennedy, PTA Physical Therapy Assistant PT                PT Recommendation and Plan     Outcome Summary/Treatment Plan (PT)  Plan for Continued Treatment (PT): cont  Plan of Care Reviewed With: patient  Progress: improving  Outcome Summary: pt w/ Ind bed mobility, Mod Ind sit/stands, ambulated ~260' w/ RW w/ SBA w/ 1 to follow w/ w/c for 3 sitting rest breaks, pt performed 15 reps of B LE ther ex in sitting, recommend HHPT at d/c  Outcome Measures     Row Name 01/15/19 0810 01/14/19 1600 01/14/19 1420       How much help from another person do you currently need...    Turning from your back to your side while in flat bed without using bedrails?  4  -JW  4  -LN  --    Moving from lying on back to sitting on the side of a flat bed without bedrails?  4  -JW  4  -LN  --    Moving to and from a bed to a chair (including a wheelchair)?  3  -JW  3  -LN  --    Standing up from a chair using your arms (e.g., wheelchair, bedside chair)?  4  -JW  4  -LN  --    Climbing 3-5 steps with a railing?  3  -JW  3  -LN  --    To walk in hospital room?  3  -JW  3  -LN  --    AM-PAC 6 Clicks Score  21  -JW  21  -LN  --       How much help from another is currently needed...    Putting on and taking off regular lower body clothing?  --  --  3  -KD    Bathing (including washing, rinsing, and drying)  --  --  3  -KD     Toileting (which includes using toilet bed pan or urinal)  --  --  3  -KD    Putting on and taking off regular upper body clothing  --  --  3  -KD    Taking care of personal grooming (such as brushing teeth)  --  --  3  -KD    Eating meals  --  --  4  -KD    Score  --  --  19  -KD       Functional Assessment    Outcome Measure Options  -[facility] 6 Clicks Basic Mobility (PT)  -[facility] 6 Clicks Basic Mobility (PT)  -LN  --    Row Name 01/13/19 0939 01/12/19 1400 01/12/19 1307       How much help from another person do you currently need...    Turning from your back to your side while in flat bed without using bedrails?  4  -CA  --  4  -CA    Moving from lying on back to sitting on the side of a flat bed without bedrails?  4  -CA  --  4  -CA    Moving to and from a bed to a chair (including a wheelchair)?  3  -CA  --  3  -CA    Standing up from a chair using your arms (e.g., wheelchair, bedside chair)?  3  -CA  --  3  -CA    Climbing 3-5 steps with a railing?  3  -CA  --  3  -CA    To walk in hospital room?  3  -CA  --  3  -CA    AM-PAC 6 Clicks Score  20  -CA  --  20  -CA       How much help from another is currently needed...    Putting on and taking off regular lower body clothing?  --  3  -CS  --    Bathing (including washing, rinsing, and drying)  --  3  -CS  --    Toileting (which includes using toilet bed pan or urinal)  --  3  -CS  --    Putting on and taking off regular upper body clothing  --  3  -CS  --    Taking care of personal grooming (such as brushing teeth)  --  3  -CS  --    Eating meals  --  3  -CS  --    Score  --  18  -CS  --       Functional Assessment    Outcome Measure Options  AM-PAC 6 Clicks Basic Mobility (PT)  -CA  AM-PAC 6 Clicks Daily Activity (OT)  -CS  AM-[facility] 6 Clicks Basic Mobility (PT)  -CA      User Key  (r) = Recorded By, (t) = Taken By, (c) = Cosigned By    Initials Name Provider Type    Enedina Price PTA Physical Therapy Assistant    Elia Juan PTA Physical  Therapy Assistant    Elinor Loomis, PTA Physical Therapy Assistant    Thuy Rice, HOOPER/L Occupational Therapy Assistant    Malena Abebe, HOOPER/L Occupational Therapy Assistant         Time Calculation:   PT Charges     Row Name 01/15/19 0810             Time Calculation    Start Time  0810  -      Stop Time  0840  -      Time Calculation (min)  30 min  -      PT Received On  01/15/19  -         Time Calculation- PT    Total Timed Code Minutes- PT  30 minute(s)  -        User Key  (r) = Recorded By, (t) = Taken By, (c) = Cosigned By    Initials Name Provider Type    Enedina Price, KOURTNEY Physical Therapy Assistant        Therapy Suggested Charges     Code   Minutes Charges    None           Therapy Charges for Today     Code Description Service Date Service Provider Modifiers Qty    82785609436 HC GAIT TRAINING EA 15 MIN 1/15/2019 Enedina Sandoval PTA GP 1    85351581129 HC PT THER PROC EA 15 MIN 1/15/2019 Enedina Sandoval, PTA GP 1          PT G-Codes  Outcome Measure Options: AM-PAC 6 Clicks Basic Mobility (PT)  AM-PAC 6 Clicks Score: 21  Score: 19    Enedina Sandoval PTA  1/15/2019

## 2019-01-15 NOTE — PLAN OF CARE
Problem: Patient Care Overview  Goal: Plan of Care Review  Outcome: Ongoing (interventions implemented as appropriate)   01/15/19 0810   Coping/Psychosocial   Plan of Care Reviewed With patient   Plan of Care Review   Progress improving   OTHER   Outcome Summary pt w/ Ind bed mobility, Mod Ind sit/stands, ambulated ~260' w/ RW w/ SBA w/ 1 to follow w/ w/c for 3 sitting rest breaks, pt performed 15 reps of B LE ther ex in sitting, recommend HHPT at d/c

## 2019-01-15 NOTE — PLAN OF CARE
Problem: Patient Care Overview  Goal: Plan of Care Review  Outcome: Ongoing (interventions implemented as appropriate)   01/15/19 0810 01/15/19 2918   Coping/Psychosocial   Plan of Care Reviewed With --  patient   Plan of Care Review   Progress improving --    OTHER   Outcome Summary --  pt ind with bed mobility, Cond indep sit/stands, pt completed 5 sit<>stands w/ RW, pt sat EOB and completed BUE ther ex w/ 1lb HW. no new goals met this date. cont ot poc.

## 2019-01-15 NOTE — PROGRESS NOTES
[facility] Medicine Services  INPATIENT PROGRESS NOTE    Length of Stay: 6  Date of Admission: 1/7/2019  Primary Care Physician: Henry Muniz MD    Subjective   Chief Complaint: No new complaints.      HPI: Patient is seen for follow-up.  She has history of coronary artery disease status post recent high risk PCI (at [facility]), O2 dependent COPD, CIERRA, chronic kidney disease/MELCHOR, diabetes mellitus, congestive heart failure, morbid obesity, hypertension, chronic anemia, chronic pain syndrome and was readmitted (same day after discharge) for CHF decompensation/possible non-STEMI.  She remains on her baseline supplemental oxygen of 3 L nasal prongs, less short of air and voices no new complaints.  Edema both legs is improving.     Review of Systems   Constitutional: Positive for activity change and fatigue. Negative for appetite change, chills, diaphoresis, fever and unexpected weight change.   HENT: Negative for trouble swallowing and voice change.    Eyes: Negative for photophobia and visual disturbance.   Respiratory: Positive for shortness of breath. Negative for cough, choking, chest tightness, wheezing and stridor.    Cardiovascular: Positive for leg swelling. Negative for chest pain and palpitations.   Gastrointestinal: Negative for abdominal distention, abdominal pain, blood in stool, constipation, diarrhea, nausea and vomiting.   Endocrine: Negative for cold intolerance, heat intolerance, polydipsia, polyphagia and polyuria.   Genitourinary: Negative for decreased urine volume, difficulty urinating, dysuria, enuresis, flank pain, frequency, hematuria and urgency.   Musculoskeletal: Positive for arthralgias and gait problem. Negative for myalgias, neck pain and neck stiffness.   Skin: Negative for pallor, rash and wound.   Neurological: Negative for dizziness, tremors, seizures, syncope, facial asymmetry, speech difficulty, weakness, light-headedness,  numbness and headaches.   Hematological: Does not bruise/bleed easily.   Psychiatric/Behavioral: Negative for agitation, behavioral problems and confusion.       Objective    Temp:  [97.2 °F (36.2 °C)-98.7 °F (37.1 °C)] 97.9 °F (36.6 °C)  Heart Rate:  [49-71] 67  Resp:  [16-18] 18  BP: (140-162)/(60-70) 140/62    Physical Exam   Constitutional: She is oriented to person, place, and time. She appears well-developed and well-nourished. She is cooperative. No distress.   Patient is morbidly obese and has a BMI of 40.04   HENT:   Head: Normocephalic and atraumatic.   Right Ear: External ear normal.   Left Ear: External ear normal.   Nose: Nose normal.   Mouth/Throat: Oropharynx is clear and moist.   Eyes: Conjunctivae and EOM are normal. Pupils are equal, round, and reactive to light.   Neck: Normal range of motion. Neck supple. No JVD present. No thyromegaly present.   Cardiovascular: Normal rate, regular rhythm, normal heart sounds and intact distal pulses. Exam reveals no gallop and no friction rub.   No murmur heard.  Pulmonary/Chest: Effort normal. No stridor. No respiratory distress. She has decreased breath sounds. She has no wheezes. She has rhonchi. She has no rales. She exhibits no tenderness.   Abdominal: Soft. Bowel sounds are normal. She exhibits no distension and no mass. There is no tenderness. There is no rebound and no guarding. No hernia.   Musculoskeletal: Normal range of motion. She exhibits edema. She exhibits no tenderness or deformity.   Neurological: She is alert and oriented to person, place, and time. She has normal reflexes. No cranial nerve deficit or sensory deficit. She exhibits normal muscle tone.   Skin: Skin is warm and dry. No rash noted. She is not diaphoretic. No erythema. No pallor.   Psychiatric: She has a normal mood and affect. Her behavior is normal. Judgment and thought content normal.   Nursing note and vitals reviewed.        Medication Review:    Current Facility-Administered  Medications:   •  aspirin chewable tablet 81 mg, 81 mg, Oral, Daily, Sage Blanchard MD, 81 mg at 01/15/19 0851  •  atorvastatin (LIPITOR) tablet 40 mg, 40 mg, Oral, Nightly, Yaniv Lobo MD, 40 mg at 01/14/19 2050  •  bumetanide (BUMEX) injection 2 mg, 2 mg, Intravenous, BID, Joon Dugan MD, 2 mg at 01/15/19 0851  •  carvedilol (COREG) tablet 6.25 mg, 6.25 mg, Oral, BID With Meals, Catracho Jarrell MD, 6.25 mg at 01/15/19 0851  •  clopidogrel (PLAVIX) tablet 75 mg, 75 mg, Oral, Daily, Sage Blanchard MD, 75 mg at 01/15/19 0851  •  enoxaparin (LOVENOX) syringe 40 mg, 40 mg, Subcutaneous, Q24H, Catracho Jarrell MD, 40 mg at 01/14/19 2302  •  famotidine (PEPCID) tablet 40 mg, 40 mg, Oral, Daily, Sage Blanchard MD, 40 mg at 01/15/19 0851  •  ferrous sulfate EC tablet 324 mg, 324 mg, Oral, BID With Meals, Joon Dugan MD, 324 mg at 01/15/19 0851  •  HYDROcodone-acetaminophen (NORCO)  MG per tablet 1 tablet, 1 tablet, Oral, Q6H, Joon Dugan MD, 1 tablet at 01/14/19 2302  •  insulin patient supplied pump, , Subcutaneous, Continuous, Sage Blanchard MD  •  isosorbide mononitrate (IMDUR) 24 hr tablet 60 mg, 60 mg, Oral, BID, Yaniv Lobo MD, 60 mg at 01/15/19 0851  •  lisinopril (PRINIVIL,ZESTRIL) tablet 10 mg, 10 mg, Oral, Q24H, Joon Dugan MD, 10 mg at 01/15/19 0851  •  polyethylene glycol 3350 powder (packet), 17 g, Oral, Daily, Catracho Jarrell MD, 17 g at 01/15/19 0851  •  potassium chloride (MICRO-K) CR capsule 40 mEq, 40 mEq, Oral, PRN, 40 mEq at 01/13/19 1818 **OR** potassium chloride (KLOR-CON) packet 40 mEq, 40 mEq, Oral, PRN **OR** potassium chloride 10 mEq in 100 mL IVPB, 10 mEq, Intravenous, Q1H PRN, Catracho Jarrell MD  •  promethazine (PHENERGAN) injection 12.5 mg, 12.5 mg, Intravenous, Q6H PRN, Catracho Jarrell MD  •  [COMPLETED] Insert peripheral IV, , , Once **AND** sodium chloride 0.9 % flush 10 mL, 10 mL, Intravenous, PRN, Tyrone Quinonez MD  •  sodium  chloride 0.9 % flush 3 mL, 3 mL, Intravenous, Q12H, Sage Blanchard MD, 3 mL at 01/15/19 0852  •  sodium chloride 0.9 % flush 3-10 mL, 3-10 mL, Intravenous, PRN, Sage Blanchard MD  •  spironolactone (ALDACTONE) tablet 25 mg, 25 mg, Oral, Daily, Yaniv Lobo MD, 25 mg at 01/15/19 0851  •  zolpidem (AMBIEN) tablet 5 mg, 5 mg, Oral, Nightly, Catracho Jarrell MD, 5 mg at 01/14/19 2050    Results Review:  I have reviewed the labs, radiology results, and diagnostic studies.    Laboratory Data:   Results from last 7 days   Lab Units  01/15/19   0716  01/14/19   0527  01/13/19 2151 01/13/19   0545   SODIUM mmol/L  136*  137   --   138   POTASSIUM mmol/L  3.7  4.2  4.3  3.6   CHLORIDE mmol/L  98  101   --   100   CO2 mmol/L  32.0*  33.0*   --   30.0   BUN mg/dL  53*  51*   --   55*   CREATININE mg/dL  1.45*  1.30*   --   1.30*   GLUCOSE mg/dL  89  114*   --   84   CALCIUM mg/dL  8.4  8.7   --   8.5   ANION GAP mmol/L  6.0  3.0*   --   8.0     Estimated Creatinine Clearance: 43.5 mL/min (A) (by C-G formula based on SCr of 1.45 mg/dL (H)).  Results from last 7 days   Lab Units  01/08/19   2036   MAGNESIUM mg/dL  1.7         Results from last 7 days   Lab Units  01/15/19   0520  01/14/19   0527  01/13/19   0545  01/12/19   0552  01/11/19   0528   WBC 10*3/mm3  3.45  3.61  4.14  4.09  4.26   HEMOGLOBIN g/dL  7.9*  8.3*  8.0*  8.0*  8.3*   HEMATOCRIT %  25.3*  25.3*  24.5*  24.8*  25.1*   PLATELETS 10*3/mm3  118*  108*  109*  105*  111*           Culture Data:   No results found for: BLOODCX  No results found for: URINECX  No results found for: RESPCX  No results found for: WOUNDCX  No results found for: STOOLCX  No components found for: BODYFLD    Radiology Data:   Imaging Results (last 24 hours)     ** No results found for the last 24 hours. **          I have reviewed the patient's current medications.     Assessment/Plan     Hospital Problem List:  Principal Problem:  - Acute on chronic diastolic CHF (congestive  heart failure): Much improved as patient has lost about 15 pounds since admission and is at her dry weight (her dry weight is between 230 and 235 pounds). Continue diuretic therapy, strict I's and O's, daily weights and fluid/ salt restriction.  Cardiologist/ Heart failure Navigator are following.    Echocardiogram done January 8, 2019 showed:  · Left ventricular wall thickness is consistent with mild concentric hypertrophy.  · Estimated EF = 57%.  · Left ventricular systolic function is normal.  · Left ventricular diastolic dysfunction (grade I a) consistent with impaired relaxation.  · Right ventricular cavity is mildly dilated.  · Left atrial cavity size is mildly dilated.  · Mild aortic valve regurgitation is present.  · Moderate mitral valve regurgitation is present  · Moderate tricuspid valve regurgitation is present.  · The tricuspid valve is grossly normal. Moderate tricuspid valve regurgitation is present. Estimated right ventricular systolic pressure from tricuspid regurgitation is markedly elevated (>55 mmHg). Moderate to severe pulmonary hypertension is present.  · Mild pulmonic valve regurgitation is present.     - History of coronary artery disease with possible non-STEMI: Continue guidelines directed medical therapy.  Result of echocardiogram is as dictated above.  Cardiologist is following.   - Chronic renal impairment, stage 3: Creatinine is 1.45 today   Continue to monitor.  Nephrologist is following.    - Elevated d-dimer is likely reactive. VQ scan showed low probability for pulmonary embolism and Duplex ultrasound both lower extremities is negative for DVT.   - History of moderate to severe pulmonary hypertension: Patient will be seen for follow-up by Dr. Byrd as outpatient on discharge.   - Hypertension: Stable. Continue current medications and make adjustments as needed for optimal blood pressure control.  - Oxygen dependent COPD: Patient is currently not in exacerbation.  Continue  supplemental oxygen and bronchodilators.  - Diabetes mellitus: Stable. Continue insulin pump with Accu-Cheks.  - Chronic anemia: Hemoglobin is stable post transfusion of one unit of packed red blood cells. Iron profile is unremarkable. Continue to monitor.  - Obstructive sleep apnea: Continue CPAP.  - Chronic pain syndrome/chronic insomnia: Continue home medications.  - Chronic thrombocytopenia: Platelet count is 118,000 today.  Her baseline seem to be 110 to 130K.  Continue to monitor.  - Continue GI and DVT prophylaxis.           Discharge Planning: Likely discharge in the a.m.      Catracho Jarrell MD   01/15/19   10:43 AM

## 2019-01-15 NOTE — THERAPY TREATMENT NOTE
Acute Care - Occupational Therapy Treatment Note  [facility]     Patient Name: Nicolasa Rojas  : 1950  MRN: [id number]  Today's Date: 1/15/2019  Onset of Illness/Injury or Date of Surgery: 19  Date of Referral to OT: 19  Referring Physician: Dr. Catracho Jarrell    Admit Date: 2019       ICD-10-CM ICD-9-CM   1. Acute on chronic heart failure, unspecified heart failure type (CMS/HCC) I50.9 428.0   2. Elevated troponin R74.8 790.6   3. Impaired mobility and activities of daily living Z74.09 799.89   4. Impaired physical mobility Z74.09 781.99     Patient Active Problem List   Diagnosis   • Hyponatremia   • Acute on chronic diastolic CHF (congestive heart failure) (CMS/HCC)   • Hypokalemia   • Acute on chronic heart failure (CMS/HCC)   • Dyspnea   • Chronic renal impairment, stage 3 (moderate) (CMS/HCC)   • CAD (coronary artery disease)     Past Medical History:   Diagnosis Date   • Acute bronchitis    • Acute congestive heart failure (CMS/HCC)     resolving   • Acute kidney failure, unspecified (CMS/HCC)    • Acute kidney failure, unspecified (CMS/HCC)    • Acute posthemorrhagic anemia    • Asthenia    • Chest pain    • Chronic gastritis    • Chronic pharyngitis    • Chronic renal impairment    • Congenital renal failure    • Congestive heart failure (CMS/HCC)    • Contact dermatitis due to poison ivy    • COPD (chronic obstructive pulmonary disease) (CMS/HCC)    • Coronary arteriosclerosis    • D-dimer, elevated     D-dimer above reference range    • Degenerative joint disease involving multiple joints     back   • Depressive disorder    • Dysphagia    • Dyspnea    • Edema of lower extremity    • Encounter for immunization    • Encounter for long-term (current) use of medications    • Epigastric pain    • Esophagitis    • Essential hypertension    • Gastroesophageal reflux disease    • Gastroparesis     Gastroparesis syndrome    • Generalized abdominal pain    • H/O bone density study  01/09/2015   • H/O mammogram 01/15/2015   • H/O screening mammography 11/12/2013   • Headache    • History of transfusion    • Hyperlipidemia    • Hypokalemia    • Hypomagnesemia    • Injury of tendon of rotator cuff     Injury of tendon of the rotator cuff of shoulder      • Insomnia    • Insomnia, unspecified    • Knee pain    • Nausea and vomiting    • Neck pain    • Need for immunization against influenza    • Need for prophylactic vaccination and inoculation against influenza    • Need for prophylactic vaccination and inoculation against viral disease    • 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 Performed at [facility].   • ENDOSCOPY  02/09/2015    ESOPHAGEAL STRICTURE PRESENT, GASTRITIS FOUND IN THE STOMACH, NORMAL DUODENUM   • ENDOSCOPY W/ PEG TUBE PLACEMENT  12/19/2012    Esophagitis seen. Biopsy taken. Esophageal stricture present. Dilatation performed. Gastritis in stomach. Biopsy taken. Food was in stomach. Normal duodenum.   • HERNIA REPAIR     • HYSTERECTOMY      partial   • NECK SURGERY         Therapy Treatment    Rehabilitation Treatment Summary     Row Name 01/15/19 1100 01/15/19 0810          Treatment Time/Intention    Discipline  occupational therapy assistant  -KD  physical therapy assistant  -HUGH     Document Type  therapy note (daily note)  -KD  therapy note (daily note)  -HUGH     Subjective Information  complains of;weakness  -KD  complains of;fatigue  -JW     Mode of Treatment  occupational therapy  -KD  individual therapy;physical therapy  -JW     Patient/Family Observations  spouse  "present  -KD  --     Therapy Frequency (OT Eval)  other (see comments) 5-7days/wk  -KD  --     Patient Effort  good  -KD  --     Comment  Pt declined ADL with \"a stranger\"  will do my bath, pt stated  -KD  --     Existing Precautions/Restrictions  fall;oxygen therapy device and L/min  -KD  fall;oxygen therapy device and L/min  -JW     Recorded by [KD] Thuy Burton HOOPER/L 01/15/19 1502 [JW] Enedina Sandoval, PTA 01/15/19 1053     Row Name 01/15/19 1100 01/15/19 0810          Vital Signs    Pre Systolic BP Rehab  162  -KD  167  -JW     Pre Treatment Diastolic BP  60  -KD  69  -JW     Pretreatment Heart Rate (beats/min)  70  -KD  62  -JW     Intratreatment Heart Rate (beats/min)  --  74  -JW     Posttreatment Heart Rate (beats/min)  75  -KD  73  -JW     Pre SpO2 (%)  98  -KD  97  -JW     O2 Delivery Pre Treatment  supplemental O2  -KD  supplemental O2  -JW     Intra SpO2 (%)  --  90  -JW     O2 Delivery Intra Treatment  --  supplemental O2  -JW     Post SpO2 (%)  94  -KD  95  -JW     O2 Delivery Post Treatment  supplemental O2  -KD  supplemental O2  -JW     Pre Patient Position  --  Supine  -JW     Post Patient Position  --  Supine  -JW     Recorded by [KD] Thuy Burton HOOPER/L 01/15/19 1502 [JW] Enedina Sandoval, PTA 01/15/19 1053     Row Name 01/15/19 1100 01/15/19 0810          Cognitive Assessment/Intervention- PT/OT    Orientation Status (Cognition)  oriented x 4  -KD  oriented x 4  -JW     Follows Commands (Cognition)  follows one step commands  -KD  follows one step commands  -JW     Recorded by [KD] Thuy Burton HOOPER/L 01/15/19 1502 [JW] Enedina Sandoval, PTA 01/15/19 1053     Row Name 01/15/19 1100 01/15/19 0810          Bed Mobility Assessment/Treatment    Bed Mobility Assessment/Treatment  bed mobility (all) activities  -KD  --     [place] Level (Bed Mobility)  independent  -KD  --     Supine-Sit [place] (Bed Mobility)  independent  -KD  independent  -JW     " Sit-Supine [place] (Bed Mobility)  independent  -KD  independent  -JW     Recorded by [KD] Thuy Burton HOOPER/L 01/15/19 1502 [JW] Enedina Sandoval, PTA 01/15/19 1053     Row Name 01/15/19 1100             Functional Mobility    Functional Mobility- Ind. Level  supervision required  -KD      Functional Mobility- Device  rolling walker  -KD      Functional Mobility-Distance (Feet)  10 to and from BR  -KD      Functional Mobility- Safety Issues  supplemental O2  -KD      Recorded by [KD] Thuy Burton HOOPER/L 01/15/19 1502      Row Name 01/15/19 1100             Transfer Assessment/Treatment    Comment (Transfers)  pt completed 5 sit<>stands  -KD      Recorded by [KD] Thuy Burton HOOPER/L 01/15/19 1502      Row Name 01/15/19 1100 01/15/19 0810          Sit-Stand Transfer    Sit-Stand [place] (Transfers)  conditional independence  -KD  conditional independence  -JW     Assistive Device (Sit-Stand Transfers)  walker, front-wheeled  -KD  walker, front-wheeled  -JW     Recorded by [KD] Thuy Burton HOOPER/L 01/15/19 1502 [JW] Enedina Sandoval, PTA 01/15/19 1053     Row Name 01/15/19 1100 01/15/19 0810          Stand-Sit Transfer    Stand-Sit [place] (Transfers)  conditional independence  -KD  conditional independence  -JW     Assistive Device (Stand-Sit Transfers)  walker, front-wheeled  -KD  walker, front-wheeled  -JW     Recorded by [KD] Thuy Burton HOOPER/L 01/15/19 1502 [JW] Enedina Sandoval, PTA 01/15/19 1053     Row Name 01/15/19 1100             Toilet Transfer    Type (Toilet Transfer)  sit-stand;stand-sit  -KD      [place] Level (Toilet Transfer)  supervision  -KD      Assistive Device (Toilet Transfer)  commode;grab bars/safety frame  -KD      Recorded by [KD] Thuy Burton HOOPER/L 01/15/19 1502      Row Name 01/15/19 0810             Gait/Stairs Assessment/Training    [place] Level (Gait)  stand by assist  -JW      Assistive Device (Gait)  walker,  front-wheeled  -JW      Distance in Feet (Gait)  260 w/ 3 sitting rest breaks  -JW      Pattern (Gait)  step-through  -JW      Recorded by [JW] Enedina Sandoval, PTA 01/15/19 1053      Row Name 01/15/19 1100             Lower Body Dressing Assessment/Training    Lower Body Dressing [place] Level  lower body dressing skills;doff;don;socks;conditional independence  -KD      Assistive Devices (Lower Body Dressing)  sock-aid  -KD      Lower Body Dressing Position  edge of bed sitting  -KD      Comment (Lower Body Dressing)  pt reported spouse will don and doff her socks  -KD      Recorded by [KD] Thuy Burton COTA/L 01/15/19 1502      Row Name 01/15/19 1100             Toileting Assessment/Training    [place] Level (Toileting)  toileting skills;perform perineal hygiene;supervision  -KD      Assistive Devices (Toileting)  commode;grab bar/safety frame  -KD      Toileting Position  unsupported sitting  -KD      Recorded by [KD] Thuy Burton HOOPER/L 01/15/19 1502      Row Name 01/15/19 0810             Lower Extremity Seated Therapeutic Exercise    Performed, Seated Lower Extremity (Therapeutic Exercise)  ankle dorsiflexion/plantarflexion;LAQ (long arc quad), knee extension;hip flexion/extension;hip abduction/adduction  -JW      Exercise Type, Seated Lower Extremity (Therapeutic Exercise)  AROM (active range of motion)  -JW      Sets/Reps Detail, Seated Lower Extremity (Therapeutic Exercise)  15  -JW      Recorded by [JW] Enedina Sandoval, PTA 01/15/19 1053      Row Name 01/15/19 1100             Therapeutic Exercise    Upper Extremity Range of Motion (Therapeutic Exercise)  shoulder flexion/extension, bilateral;shoulder abduction/adduction, bilateral;shoulder horizontal abduction/adduction, bilateral;shoulder internal/external rotation, bilateral;elbow flexion/extension, bilateral;forearm supination/pronation, bilateral;wrist flexion/extension, bilateral  -KD      Weight/Resistance (Therapeutic  Exercise)  1 pound  -KD      Exercise Type (Therapeutic Exercise)  AROM (active range of motion)  -KD      Position (Therapeutic Exercise)  seated eob  -KD      Sets/Reps (Therapeutic Exercise)  1/20  -KD      Equipment (Therapeutic Exercise)  free weight, barbell  -KD      Expected Outcome (Therapeutic Exercise)  improve performance, transfer skills  -KD      Recorded by [KD] Thuy Burton COTA/L 01/15/19 1502      Row Name 01/15/19 1100 01/15/19 0810          Positioning and Restraints    Pre-Treatment Position  in bed  -KD  in bed  -JW     Post Treatment Position  bed  -KD  bed  -JW     In Bed  supine;call light within reach;encouraged to call for assist;exit alarm on;with family/caregiver  -KD  supine;call light within reach;encouraged to call for assist;with family/caregiver  -JW     Recorded by [KD] Thuy Burton COTA/L 01/15/19 1502 [JW] Enedina Sandoval, PTA 01/15/19 1053     Row Name 01/15/19 1100 01/15/19 0810          Pain Scale: Numbers Pre/Post-Treatment    Pain Scale: Numbers, Pretreatment  10/10  -KD  6/10  -JW     Pain Scale: Numbers, Post-Treatment  10/10  -KD  6/10  -JW     Pain Location  back hips, knees,  pt reports chronic  -KD  -- back, B LE  -JW     Recorded by [KD] Thuy Burton COTA/L 01/15/19 1502 [JW] Enedina Sandoval, PTA 01/15/19 1053     Row Name 01/15/19 1100             Outcome Summary/Treatment Plan (OT)    Daily Summary of Progress (OT)  progress toward functional goals as expected  -KD      Plan for Continued Treatment (OT)  cont ot poc  -KD      Anticipated Discharge Disposition (OT)  home with home health  -KD      Recorded by [KD] Thuy Burton HOOPER/L 01/15/19 1502      Row Name 01/15/19 0810             Outcome Summary/Treatment Plan (PT)    Plan for Continued Treatment (PT)  cont  -JW      Recorded by [JW] Enedina Sandoval, PTA 01/15/19 1053        User Key  (r) = Recorded By, (t) = Taken By, (c) = Cosigned By    Initials Name Effective Dates  Discipline    JW Enedina Sandoval, PTA 08/11/15 -  PT    KD Thuy Burton, HOOPER/L 03/07/18 -  OT           Rehab Goal Summary     Row Name 01/15/19 1100 01/15/19 0810          Physical Therapy Goals    Bed Mobility Goal Selection (PT)  --  bed mobility, PT goal 1  -JW     Transfer Goal Selection (PT)  --  transfer, PT goal 1  -JW     Gait Training Goal Selection (PT)  --  gait training, PT goal 1  -     Strength Goal Selection (PT)  --  strength, PT goal 1  -JW        Bed Mobility Goal 1 (PT)    Activity/Assistive Device (Bed Mobility Goal 1, PT)  --  rolling to left;rolling to right;sit to supine;supine to sit  -JW     [place] Level/Cues Needed (Bed Mobility Goal 1, PT)  --  independent  -JW     Time Frame (Bed Mobility Goal 1, PT)  --  by discharge  -     Progress/Outcomes (Bed Mobility Goal 1, PT)  --  goal met  -        Transfer Goal 1 (PT)    Activity/Assistive Device (Transfer Goal 1, PT)  --  sit-to-stand/stand-to-sit;bed-to-chair/chair-to-bed  -JW     [place] Level/Cues Needed (Transfer Goal 1, PT)  --  conditional independence  -JW     Time Frame (Transfer Goal 1, PT)  --  by discharge  -     Progress/Outcome (Transfer Goal 1, PT)  --  goal partially met  -        Gait Training Goal 1 (PT)    Activity/Assistive Device (Gait Training Goal 1, PT)  --  gait (walking locomotion);assistive device use  -     [place] Level (Gait Training Goal 1, PT)  --  standby assist;conditional independence  -     Distance (Gait Goal 1, PT)  --  50ftx2  -     Time Frame (Gait Training Goal 1, PT)  --  by discharge  -     Progress/Outcome (Gait Training Goal 1, PT)  --  goal not met  -        Strength Goal 1 (PT)    Strength Goal 1 (PT)  --  Pt will tolerate 2 sets of 15 reps of AROM exercises OOB in chair with VSS  -     Time Frame (Strength Goal 1, PT)  --  by discharge  -     Progress/Outcome (Strength Goal 1, PT)  --  goal not met  -        Occupational Therapy Goals     Transfer Goal Selection (OT)  transfer, OT goal 1  -KD  --     Bathing Goal Selection (OT)  bathing, OT goal 1  -KD  --     Dressing Goal Selection (OT)  dressing, OT goal 1  -KD  --     Toileting Goal Selection (OT)  toileting, OT goal 1  -KD  --     Activity Tolerance Goal Selection (OT)  activity tolerance, OT goal 1  -KD  --        Transfer Goal 1 (OT)    Activity/Assistive Device (Transfer Goal 1, OT)  sit-to-stand/stand-to-sit;bed-to-chair/chair-to-bed;toilet  -KD  --     [place] Level/Cues Needed (Transfer Goal 1, OT)  conditional independence  -KD  --     Time Frame (Transfer Goal 1, OT)  long term goal (LTG);by discharge  -KD  --     Progress/Outcome (Transfer Goal 1, OT)  goal not met  -KD  --        Bathing Goal 1 (OT)    Activity/Assistive Device (Bathing Goal 1, OT)  bathing skills, all  -KD  --     [place] Level/Cues Needed (Bathing Goal 1, OT)  minimum assist (75% or more patient effort)  -KD  --     Time Frame (Bathing Goal 1, OT)  long term goal (LTG);by discharge  -KD  --     Progress/Outcomes (Bathing Goal 1, OT)  goal met;goal ongoing  -KD  --        Dressing Goal 1 (OT)    Activity/Assistive Device (Dressing Goal 1, OT)  dressing skills, all  -KD  --     [place]/Cues Needed (Dressing Goal 1, OT)  minimum assist (75% or more patient effort)  -KD  --     Time Frame (Dressing Goal 1, OT)  long term goal (LTG);by discharge  -KD  --     Progress/Outcome (Dressing Goal 1, OT)  goal met;goal ongoing  -KD  --        Toileting Goal 1 (OT)    Activity/Device (Toileting Goal 1, OT)  toileting skills, all  -KD  --     [place] Level/Cues Needed (Toileting Goal 1, OT)  conditional independence  -KD  --     Time Frame (Toileting Goal 1, OT)  long term goal (LTG);by discharge  -KD  --     Progress/Outcome (Toileting Goal 1, OT)  goal not met  -KD  --         Activity Tolerance Goal 1 (OT)    Activity Level (Endurance Goal 1, OT)  10 min activity;O2 sat >/ equal to 88%  -KD  --     Time Frame  (Activity Tolerance Goal 1, OT)  long term goal (LTG)  -KD  --     Progress/Outcome (Activity Tolerance Goal 1, OT)  goal met  (Significant)   -KD  --       User Key  (r) = Recorded By, (t) = Taken By, (c) = Cosigned By    Initials Name Provider Type Discipline    Enedina Price, PTA Physical Therapy Assistant PT    Thuy Rice COTA/L Occupational Therapy Assistant OT        Occupational Therapy Education     Title: PT OT SLP Therapies (Done)     Topic: Occupational Therapy (Done)     Point: ADL training (Done)     Description: Instruct learner(s) on proper safety adaptation and remediation techniques during self care or transfers.   Instruct in proper use of assistive devices.    Learning Progress Summary           Patient Acceptance, E,TB, VU by KARINA at 1/14/2019  4:38 PM    Eager, E, VU by RANDY at 1/13/2019  3:39 PM                   Point: Home exercise program (Done)     Description: Instruct learner(s) on appropriate technique for monitoring, assisting and/or progressing therapeutic exercises/activities.    Learning Progress Summary           Patient Acceptance, E,TB, VU by KARINA at 1/14/2019  4:38 PM    Eager, E, VU by RANDY at 1/13/2019  3:39 PM                   Point: Precautions (Done)     Description: Instruct learner(s) on prescribed precautions during self-care and functional transfers.    Learning Progress Summary           Patient Acceptance, E,TB, VU by KARINA at 1/14/2019  4:38 PM    Eager, E, VU by RANDY at 1/13/2019  3:39 PM    Acceptance, E, VU,NR by  at 1/9/2019 11:36 AM    Comment:  OT role, OT POC   Family Acceptance, E, VU,NR by  at 1/9/2019 11:36 AM    Comment:  OT role, OT POC                   Point: Body mechanics (Done)     Description: Instruct learner(s) on proper positioning and spine alignment during self-care, functional mobility activities and/or exercises.    Learning Progress Summary           Patient Acceptance, E,TB, VU by KARINA at 1/14/2019  4:38 PM    Eager, E, VU by RANDY at  1/13/2019  3:39 PM                               User Key     Initials Effective Dates Name Provider Type Discipline    LN 03/07/18 -  Elinor Kennedy, KOURTNEY Physical Therapy Assistant PT    LM 03/07/18 -  Francy Wilde COTA/L Occupational Therapy Assistant OT     10/12/18 -  Dmitry Yuan Occupational Therapist OT                OT Recommendation and Plan  Outcome Summary/Treatment Plan (OT)  Daily Summary of Progress (OT): progress toward functional goals as expected  Plan for Continued Treatment (OT): cont ot poc  Anticipated Discharge Disposition (OT): home with home health  Therapy Frequency (OT Eval): other (see comments)(5-7days/wk)  Daily Summary of Progress (OT): progress toward functional goals as expected  Plan of Care Review  Plan of Care Reviewed With: patient  Plan of Care Reviewed With: patient  Outcome Summary: pt ind with bed mobility, Cond indep sit/stands, pt completed 5 sit<>stands w/ RW, pt sat EOB and completed BUE ther ex w/ 1lb HW. no new goals met this date. cont ot poc.  Outcome Measures     Row Name 01/15/19 1100 01/15/19 0810 01/14/19 1600       How much help from another person do you currently need...    Turning from your back to your side while in flat bed without using bedrails?  --  4  -JW  4  -LN    Moving from lying on back to sitting on the side of a flat bed without bedrails?  --  4  -JW  4  -LN    Moving to and from a bed to a chair (including a wheelchair)?  --  3  -JW  3  -LN    Standing up from a chair using your arms (e.g., wheelchair, bedside chair)?  --  4  -JW  4  -LN    Climbing 3-5 steps with a railing?  --  3  -JW  3  -LN    To walk in hospital room?  --  3  -JW  3  -LN    AM-PAC 6 Clicks Score  --  21  -JW  21  -LN       How much help from another is currently needed...    Putting on and taking off regular lower body clothing?  3  -KD  --  --    Bathing (including washing, rinsing, and drying)  3  -KD  --  --    Toileting (which includes using toilet bed pan  or urinal)  3  -KD  --  --    Putting on and taking off regular upper body clothing  3  -KD  --  --    Taking care of personal grooming (such as brushing teeth)  3  -KD  --  --    Eating meals  4  -KD  --  --    Score  19  -KD  --  --       Functional Assessment    Outcome Measure Options  --  AM-PAC 6 Clicks Basic Mobility (PT)  -JW  AM-PAC 6 Clicks Basic Mobility (PT)  -LN    Row Name 01/14/19 1420 01/13/19 0939          How much help from another person do you currently need...    Turning from your back to your side while in flat bed without using bedrails?  --  4  -CA     Moving from lying on back to sitting on the side of a flat bed without bedrails?  --  4  -CA     Moving to and from a bed to a chair (including a wheelchair)?  --  3  -CA     Standing up from a chair using your arms (e.g., wheelchair, bedside chair)?  --  3  -CA     Climbing 3-5 steps with a railing?  --  3  -CA     To walk in hospital room?  --  3  -CA     AM-PAC 6 Clicks Score  --  20  -CA        How much help from another is currently needed...    Putting on and taking off regular lower body clothing?  3  -KD  --     Bathing (including washing, rinsing, and drying)  3  -KD  --     Toileting (which includes using toilet bed pan or urinal)  3  -KD  --     Putting on and taking off regular upper body clothing  3  -KD  --     Taking care of personal grooming (such as brushing teeth)  3  -KD  --     Eating meals  4  -KD  --     Score  19  -KD  --        Functional Assessment    Outcome Measure Options  --  AM-PAC 6 Clicks Basic Mobility (PT)  -CA       User Key  (r) = Recorded By, (t) = Taken By, (c) = Cosigned By    Initials Name Provider Type    Enedina Price, PTA Physical Therapy Assistant    CA Elia Trimble, PTA Physical Therapy Assistant    LN Elinor Kennedy, PTA Physical Therapy Assistant    KD Thuy Burton, RUFUS/L Occupational Therapy Assistant           Time Calculation:   Time Calculation- OT     Row Name 01/15/19 7278              Time Calculation- OT    OT Start Time  1100  -KD      OT Stop Time  1138  -KD      OT Time Calculation (min)  38 min  -KD      Total Timed Code Minutes- OT  38 minute(s)  -KD      OT Received On  01/15/19  -KD        User Key  (r) = Recorded By, (t) = Taken By, (c) = Cosigned By    Initials Name Provider Type     Thuy Burton HOOPER/L Occupational Therapy Assistant           Therapy Suggested Charges     Code   Minutes Charges    None           Therapy Charges for Today     Code Description Service Date Service Provider Modifiers Qty    83597886294 HC OT SELF CARE/MGMT/TRAIN EA 15 MIN 1/14/2019 Thuy Burton HOOPER/L GO 1    34258987575 HC OT THERAPEUTIC ACT EA 15 MIN 1/14/2019 Thuy Burton HOOPER/L GO 1    26922110836 HC OT SELF CARE/MGMT/TRAIN EA 15 MIN 1/15/2019 Thuy Burton HOOPER/L GO 1    77036432877 HC OT THERAPEUTIC ACT EA 15 MIN 1/15/2019 Thuy Burton HOOPER/L GO 1    74112143219 HC OT THER PROC EA 15 MIN 1/15/2019 Thuy Burton HOOPER/L GO 1               RUFUS [place]/JOSEMANUEL  1/15/2019

## 2019-01-15 NOTE — PROGRESS NOTES
"[facility]  [address] [place] [address]. [address]  T - [phone]  F - [phone]     Progress Note          PATIENT  DEMOGRAPHICS   PATIENT NAME: Nicolasa Rojas                      PHYSICIAN: Joon Dugan MD  : 1950  MRN: [id number]   LOS: 6 days    Patient Care Team:  Henry Muniz MD as PCP - General (Internal Medicine)  Subjective   SUBJECTIVE   SOME SOA,  Has right sided hip pain       Objective   OBJECTIVE   Vital Signs  Temp:  [97.2 °F (36.2 °C)-98.7 °F (37.1 °C)] 97.3 °F (36.3 °C)  Heart Rate:  [49-75] 75  Resp:  [16-20] 20  BP: (140-162)/(60-80) 162/80    Flowsheet Rows      First Filed Value   Admission Height  162.6 cm (64\") Documented at 2019 1800   Admission Weight  113 kg (248 lb 8 oz) Documented at 2019 1800           I/O last 3 completed shifts:  In: 1210 [P.O.:1210]  Out: 4400 [Urine:4400]    PHYSICAL EXAM    Physical Exam   Constitutional: She is oriented to person, place, and time. She appears well-developed.   HENT:   Head: Normocephalic and atraumatic.   Eyes: Pupils are equal, round, and reactive to light.   Neck: Normal range of motion.   Cardiovascular: Normal rate, regular rhythm and normal heart sounds.   Pulmonary/Chest: She has wheezes. She has rales.   Abdominal: Soft. Bowel sounds are normal.   Musculoskeletal: She exhibits edema.   Neurological: She is alert and oriented to person, place, and time.   Skin: Skin is warm.   Psychiatric: She has a normal mood and affect. Her behavior is normal.       RESULTS   Results Review:    Results from last 7 days   Lab Units  01/15/19   0716  19   0527  19   2151  19   0545   SODIUM mmol/L  136*  137   --   138   POTASSIUM mmol/L  3.7  4.2  4.3  3.6   CHLORIDE mmol/L  98  101   --   100   CO2 mmol/L  32.0*  33.0*   --   30.0   BUN mg/dL  53*  51*   --   55*   CREATININE mg/dL  1.45*  1.30*   --   1.30*   CALCIUM mg/dL  8.4  8.7   --   8.5   GLUCOSE mg/dL  89  114*   --   " 84       Estimated Creatinine Clearance: 43.5 mL/min (A) (by C-G formula based on SCr of 1.45 mg/dL (H)).    Results from last 7 days   Lab Units  01/08/19   2036   MAGNESIUM mg/dL  1.7             Results from last 7 days   Lab Units  01/15/19   0520  01/14/19   0527  01/13/19   0545  01/12/19   0552  01/11/19   0528   WBC 10*3/mm3  3.45  3.61  4.14  4.09  4.26   HEMOGLOBIN g/dL  7.9*  8.3*  8.0*  8.0*  8.3*   PLATELETS 10*3/mm3  118*  108*  109*  105*  111*               Imaging Results (last 24 hours)     ** No results found for the last 24 hours. **           MEDICATIONS      aspirin 81 mg Oral Daily   atorvastatin 40 mg Oral Nightly   bumetanide 2 mg Intravenous BID   carvedilol 6.25 mg Oral BID With Meals   clopidogrel 75 mg Oral Daily   enoxaparin 40 mg Subcutaneous Q24H   famotidine 40 mg Oral Daily   ferrous sulfate 324 mg Oral BID With Meals   HYDROcodone-acetaminophen 1 tablet Oral Q6H   isosorbide mononitrate 60 mg Oral BID   lisinopril 10 mg Oral Q24H   polyethylene glycol 17 g Oral Daily   sodium chloride 3 mL Intravenous Q12H   spironolactone 25 mg Oral Daily   zolpidem 5 mg Oral Nightly       insulin        Assessment/Plan   ASSESSMENT / PLAN      Dyspnea    Acute on chronic diastolic CHF (congestive heart failure) (CMS/[facility])    Acute on chronic heart failure (CMS/[facility])    Chronic renal impairment, stage 3 (moderate) (CMS/[facility])    CAD (coronary artery disease)       1. CKD3- baseline creatinine close to 1.6-1.7.  She has a history of contrast-induced nephropathy back in august 2018 after she had left heart catheterization.  She had multiple stents placed for non-ST elevation MI back in August 2018. FENa <1%, Cr better, will keep IV bumex 2mg BID.  Keep aldactone. Add metolazone again today and will keep every other day for 4 weeks    -edw is around 232 lbs.      2. Acute on chronic diastolic heart failure-  Plan as above with the diuretics, daily weights, and I and O's, and fluid restriction , off  amlodipine due to edema.      3. CAD- prior non-ST elevation MI with stenting back in August 2018 to LAD and circumflex     4. Hypokalemia- improved    5. Hyponatremia- hypervolemic type now improved.     6. HTN- elevated pre meds, amlodipine is stopped.     7. Anemia of CKD- fe b12 and folate are ok. s/p procrit shot x 1. Add po fe           This document has been electronically signed by Joon Dugan MD on January 15, 2019 3:25 PM

## 2019-01-15 NOTE — PLAN OF CARE
Problem: Fall Risk (Adult)  Goal: Identify Related Risk Factors and Signs and Symptoms  Outcome: Ongoing (interventions implemented as appropriate)    Goal: Absence of Fall  Outcome: Ongoing (interventions implemented as appropriate)      Problem: Patient Care Overview  Goal: Plan of Care Review  Outcome: Ongoing (interventions implemented as appropriate)   01/14/19 8165   Coping/Psychosocial   Plan of Care Reviewed With patient;spouse   Plan of Care Review   Progress no change   OTHER   Outcome Summary pt encouraged not to use Pure-wick, more education needed     Goal: Individualization and Mutuality  Outcome: Ongoing (interventions implemented as appropriate)    Goal: Discharge Needs Assessment  Outcome: Ongoing (interventions implemented as appropriate)      Problem: Fluid Volume Excess (Adult)  Goal: Identify Related Risk Factors and Signs and Symptoms  Outcome: Ongoing (interventions implemented as appropriate)    Goal: Optimal Fluid Balance  Outcome: Ongoing (interventions implemented as appropriate)      Problem: Diabetes, Type 2 (Adult)  Goal: Signs and Symptoms of Listed Potential Problems Will be Absent, Minimized or Managed (Diabetes, Type 2)  Outcome: Ongoing (interventions implemented as appropriate)      Problem: Cardiac: Heart Failure (Adult)  Goal: Signs and Symptoms of Listed Potential Problems Will be Absent, Minimized or Managed (Cardiac: Heart Failure)  Outcome: Ongoing (interventions implemented as appropriate)      Problem: Kidney Disease, Chronic/End Stage Renal Disease (Adult)  Goal: Signs and Symptoms of Listed Potential Problems Will be Absent, Minimized or Managed (Kidney Disease, Chronic/End Stage Renal Disease)  Outcome: Ongoing (interventions implemented as appropriate)

## 2019-01-16 NOTE — PLAN OF CARE
Problem: Patient Care Overview  Goal: Plan of Care Review  Outcome: Unable to achieve outcome(s) by discharge Date Met: 01/16/19 01/16/19 0955   Coping/Psychosocial   Plan of Care Reviewed With patient;spouse   Plan of Care Review   Progress improving   OTHER   Outcome Summary Pt. met 1/4 goals prior to D/C home with 24/7 care & HHPT     Goal: Discharge Needs Assessment  Outcome: Unable to achieve outcome(s) by discharge Date Met: 01/16/19 01/09/19 1425   Discharge Needs Assessment   Readmission Within the Last 30 Days previous discharge plan unsuccessful   Concerns to be Addressed denies needs/concerns at this time   Patient/Family Anticipates Transition to home with family;home with help/services   Patient/Family Anticipated Services at Transition home health care   Transportation Concerns car, none   Transportation Anticipated family or friend will provide   Anticipated Changes Related to Illness none   Equipment Needed After Discharge none   Outpatient/Agency/Support Group Needs homecare agency   Discharge Facility/Level of Care Needs home with home health   Offered/Gave Vendor List no   Patient's Choice of Community Agency(s) Patient is active with [facility] [place].   Current Discharge Risk chronically ill;dependent with mobility/activities of daily living   Disability   Equipment Currently Used at Home bipap/cpap;commode;glucometer;oxygen;ramp;rollator;walker, standard;wheelchair;wheelchair, motorized

## 2019-01-16 NOTE — DISCHARGE SUMMARY
[facility] Medicine Services  DISCHARGE SUMMARY       Date of Admission: 1/7/2019  Date of Discharge:  1/16/2019  Primary Care Physician: Henry Muniz MD    Presenting Problem/History of Present Illness:  Elevated troponin [R74.8]  Acute on chronic heart failure, unspecified heart failure type (CMS/HCC) [I50.9]  Acute on chronic heart failure, unspecified heart failure type (CMS/HCC) [I50.9]     Nicolasa Rojas is a 68 y.o. female with medical history significant for hypertension, hyperlipidemia, heart failure with preserved ejection fraction, CAD, chronic pain, and diabetes mellitus presents with worsening dyspnea.     Patient currently oxygen dependent on 3 L/m at home presented to the emergency department after being discharged from our facility earlier on 1/7/2019.  Patient states that she became more short of air at home after feeling some better prior to discharge as such presented back to the emergency department.  Patient is unsure of her dry weight however she weighed 241 this morning and currently weighs 244 after administration of IV Lasix in the emergency department.  Patient states that she did not have much to eat or drink after discharge.  Patient does endorse dyspnea on exertion.  Patient denies any fever, chills, nausea, vomiting, chest pain, or diaphoresis        Final Discharge Diagnoses:  Active Hospital Problems    Diagnosis   • **Dyspnea   • Chronic renal impairment, stage 3 (moderate) (CMS/HCC)   • CAD (coronary artery disease)   • Acute on chronic heart failure (CMS/HCC)   • Acute on chronic diastolic CHF (congestive heart failure) (CMS/[facility])       Consults:   Consults     Date and Time Order Name Status Description    1/8/2019 1049 Inpatient Nephrology Consult Completed     1/8/2019 0951 Inpatient Cardiology Consult Completed           Procedures Performed: None                Pertinent Test Results:   Lab Results (last 7 days)     Procedure  Component Value Units Date/Time    POC Glucose Once [[id number]]  (Normal) Collected:  01/16/19 0616    Specimen:  Blood Updated:  01/16/19 0728     Glucose 112 mg/dL      Comment: RN NotifiedOperator: [id number] ROSA ELENA Johnston ID: [id number]       Basic Metabolic Panel [[id number]]  (Abnormal) Collected:  01/16/19 0527    Specimen:  Blood Updated:  01/16/19 0601     Glucose 95 mg/dL      BUN 57 mg/dL      Creatinine 1.47 mg/dL      Sodium 139 mmol/L      Potassium 3.6 mmol/L      Chloride 99 mmol/L      CO2 30.0 mmol/L      Calcium 8.4 mg/dL      eGFR Non African Amer 35 mL/min/1.73      BUN/Creatinine Ratio 38.8     Anion Gap 10.0 mmol/L     CBC & Differential [[id number]] Collected:  01/16/19 0527    Specimen:  Blood Updated:  01/16/19 0543    Narrative:       The following orders were created for panel order CBC & Differential.  Procedure                               Abnormality         Status                     ---------                               -----------         ------                     CBC Auto Differential[[id number]]        Abnormal            Final result                 Please view results for these tests on the individual orders.    CBC Auto Differential [[id number]]  (Abnormal) Collected:  01/16/19 0527    Specimen:  Blood Updated:  01/16/19 0543     WBC 4.41 10*3/mm3      RBC 2.55 10*6/mm3      Hemoglobin 8.2 g/dL      Hematocrit 26.4 %      .5 fL      MCH 32.2 pg      MCHC 31.1 g/dL      RDW 18.2 %      RDW-SD 68.6 fl      MPV 8.9 fL      Platelets 118 10*3/mm3      Neutrophil % 71.6 %      Lymphocyte % 16.1 %      Monocyte % 7.7 %      Eosinophil % 3.6 %      Basophil % 0.5 %      Immature Grans % 0.5 %      Neutrophils, Absolute 3.16 10*3/mm3      Lymphocytes, Absolute 0.71 10*3/mm3      Monocytes, Absolute 0.34 10*3/mm3      Eosinophils, Absolute 0.16 10*3/mm3      Basophils, Absolute 0.02 10*3/mm3      Immature Grans, Absolute 0.02 10*3/mm3      nRBC 0.0 /100 WBC     POC  Glucose Once [[id number]]  (Abnormal) Collected:  01/15/19 1942    Specimen:  Blood Updated:  01/15/19 2035     Glucose 245 mg/dL      Comment: Result Not ConfirmedOperator: [id number] ROSA ELENA LOUISNAMeter ID: [id number]       POC Glucose Once [[id number]]  (Abnormal) Collected:  01/15/19 1537    Specimen:  Blood Updated:  01/15/19 1628     Glucose 197 mg/dL      Comment: RN NotifiedOperator: [id number] RUBINA Arzate ID: [id number]       POC Glucose Once [[id number]]  (Abnormal) Collected:  01/15/19 1041    Specimen:  Blood Updated:  01/15/19 1056     Glucose 173 mg/dL      Comment: RN NotifiedOperator: [id number] IDANIA CHAPARROYMeter ID: [id number]       Basic Metabolic Panel [[id number]]  (Abnormal) Collected:  01/15/19 0716    Specimen:  Blood Updated:  01/15/19 0814     Glucose 89 mg/dL      BUN 53 mg/dL      Creatinine 1.45 mg/dL      Sodium 136 mmol/L      Potassium 3.7 mmol/L      Chloride 98 mmol/L      CO2 32.0 mmol/L      Calcium 8.4 mg/dL      eGFR Non African Amer 36 mL/min/1.73      BUN/Creatinine Ratio 36.6     Anion Gap 6.0 mmol/L     POC Glucose Once [[id number]]  (Normal) Collected:  01/15/19 0625    Specimen:  Blood Updated:  01/15/19 0754     Glucose 98 mg/dL      Comment: : [id number] ROSA ELENA LOUISNAMeter ID: [id number]       CBC & Differential [[id number]] Collected:  01/15/19 0520    Specimen:  Blood Updated:  01/15/19 0558    Narrative:       The following orders were created for panel order CBC & Differential.  Procedure                               Abnormality         Status                     ---------                               -----------         ------                     CBC Auto Differential[[id number]]        Abnormal            Final result                 Please view results for these tests on the individual orders.    CBC Auto Differential [[id number]]  (Abnormal) Collected:  01/15/19 0520    Specimen:  Blood Updated:  01/15/19 0558     WBC 3.45 10*3/mm3      RBC 2.44  10*6/mm3      Hemoglobin 7.9 g/dL      Hematocrit 25.3 %      .7 fL      MCH 32.4 pg      MCHC 31.2 g/dL      RDW 18.2 %      RDW-SD 68.0 fl      MPV 9.2 fL      Platelets 118 10*3/mm3      Neutrophil % 65.7 %      Lymphocyte % 20.3 %      Monocyte % 8.7 %      Eosinophil % 3.8 %      Basophil % 0.9 %      Immature Grans % 0.6 %      Neutrophils, Absolute 2.27 10*3/mm3      Lymphocytes, Absolute 0.70 10*3/mm3      Monocytes, Absolute 0.30 10*3/mm3      Eosinophils, Absolute 0.13 10*3/mm3      Basophils, Absolute 0.03 10*3/mm3      Immature Grans, Absolute 0.02 10*3/mm3      nRBC 0.0 /100 WBC     POC Glucose Once [[id number]]  (Abnormal) Collected:  01/14/19 2101    Specimen:  Blood Updated:  01/14/19 2112     Glucose 152 mg/dL      Comment: RN NotifiedOperator: [id number] MORE Wiley ID: [id number]       POC Glucose Once [[id number]]  (Abnormal) Collected:  01/14/19 1642    Specimen:  Blood Updated:  01/14/19 1713     Glucose 232 mg/dL      Comment: RN NotifiedOperator: [id number] IDANIA CHAPARROYMeter ID: [id number]       POC Glucose Once [[id number]]  (Abnormal) Collected:  01/14/19 1110    Specimen:  Blood Updated:  01/14/19 1206     Glucose 200 mg/dL      Comment: RN NotifiedOperator: [id number] IDANIA BRANDYMeter ID: [id number]       POC Glucose Once [[id number]]  (Normal) Collected:  01/14/19 0627    Specimen:  Blood Updated:  01/14/19 0645     Glucose 105 mg/dL      Comment: : [id number] ROSA ELENA PEREZWNAMeter ID: [id number]       Basic Metabolic Panel [[id number]]  (Abnormal) Collected:  01/14/19 0527    Specimen:  Blood Updated:  01/14/19 0606     Glucose 114 mg/dL      BUN 51 mg/dL      Creatinine 1.30 mg/dL      Sodium 137 mmol/L      Potassium 4.2 mmol/L      Chloride 101 mmol/L      CO2 33.0 mmol/L      Calcium 8.7 mg/dL      eGFR Non African Amer 41 mL/min/1.73      BUN/Creatinine Ratio 39.2     Anion Gap 3.0 mmol/L     CBC & Differential [[id number]] Collected:  01/14/19 0530     Specimen:  Blood Updated:  01/14/19 0547    Narrative:       The following orders were created for panel order CBC & Differential.  Procedure                               Abnormality         Status                     ---------                               -----------         ------                     CBC Auto Differential[[id number]]        Abnormal            Final result                 Please view results for these tests on the individual orders.    CBC Auto Differential [[id number]]  (Abnormal) Collected:  01/14/19 0527    Specimen:  Blood Updated:  01/14/19 0547     WBC 3.61 10*3/mm3      RBC 2.52 10*6/mm3      Hemoglobin 8.3 g/dL      Hematocrit 25.3 %      .4 fL      MCH 32.9 pg      MCHC 32.8 g/dL      RDW 17.6 %      RDW-SD 64.3 fl      MPV 10.0 fL      Platelets 108 10*3/mm3      Neutrophil % 70.3 %      Lymphocyte % 19.4 %      Monocyte % 7.2 %      Eosinophil % 2.2 %      Basophil % 0.6 %      Immature Grans % 0.3 %      Neutrophils, Absolute 2.54 10*3/mm3      Lymphocytes, Absolute 0.70 10*3/mm3      Monocytes, Absolute 0.26 10*3/mm3      Eosinophils, Absolute 0.08 10*3/mm3      Basophils, Absolute 0.02 10*3/mm3      Immature Grans, Absolute 0.01 10*3/mm3      nRBC 0.0 /100 WBC     Potassium [[id number]]  (Normal) Collected:  01/13/19 2151    Specimen:  Blood Updated:  01/13/19 2219     Potassium 4.3 mmol/L     POC Glucose Once [[id number]]  (Abnormal) Collected:  01/13/19 2006    Specimen:  Blood Updated:  01/13/19 2043     Glucose 189 mg/dL      Comment: Result Not ConfirmedOperator: [id number] [facility] ID: [id number]       Basic Metabolic Panel [[id number]]  (Abnormal) Collected:  01/13/19 0545    Specimen:  Blood Updated:  01/13/19 0644     Glucose 84 mg/dL      BUN 55 mg/dL      Creatinine 1.30 mg/dL      Sodium 138 mmol/L      Potassium 3.6 mmol/L      Chloride 100 mmol/L      CO2 30.0 mmol/L      Calcium 8.5 mg/dL      eGFR Non African Amer 41 mL/min/1.73      BUN/Creatinine  Ratio 42.3     Anion Gap 8.0 mmol/L     CBC & Differential [[id number]] Collected:  01/13/19 0545    Specimen:  Blood Updated:  01/13/19 0632    Narrative:       The following orders were created for panel order CBC & Differential.  Procedure                               Abnormality         Status                     ---------                               -----------         ------                     CBC Auto Differential[[id number]]        Abnormal            Final result                 Please view results for these tests on the individual orders.    CBC Auto Differential [[id number]]  (Abnormal) Collected:  01/13/19 0545    Specimen:  Blood Updated:  01/13/19 0632     WBC 4.14 10*3/mm3      RBC 2.44 10*6/mm3      Hemoglobin 8.0 g/dL      Hematocrit 24.5 %      .4 fL      MCH 32.8 pg      MCHC 32.7 g/dL      RDW 17.7 %      RDW-SD 64.0 fl      MPV 9.6 fL      Platelets 109 10*3/mm3      Neutrophil % 74.7 %      Lymphocyte % 14.0 %      Monocyte % 7.5 %      Eosinophil % 3.1 %      Basophil % 0.5 %      Immature Grans % 0.2 %      Neutrophils, Absolute 3.09 10*3/mm3      Lymphocytes, Absolute 0.58 10*3/mm3      Monocytes, Absolute 0.31 10*3/mm3      Eosinophils, Absolute 0.13 10*3/mm3      Basophils, Absolute 0.02 10*3/mm3      Immature Grans, Absolute 0.01 10*3/mm3     Basic Metabolic Panel [[id number]]  (Abnormal) Collected:  01/12/19 0552    Specimen:  Blood Updated:  01/12/19 0656     Glucose 99 mg/dL      BUN 58 mg/dL      Creatinine 1.64 mg/dL      Sodium 138 mmol/L      Potassium 3.9 mmol/L      Chloride 99 mmol/L      CO2 29.0 mmol/L      Calcium 8.4 mg/dL      eGFR Non African Amer 31 mL/min/1.73      BUN/Creatinine Ratio 35.4     Anion Gap 10.0 mmol/L     CBC & Differential [[id number]] Collected:  01/12/19 0552    Specimen:  Blood Updated:  01/12/19 0647    Narrative:       The following orders were created for panel order CBC & Differential.  Procedure                               Abnormality          Status                     ---------                               -----------         ------                     CBC Auto Differential[[id number]]        Abnormal            Final result                 Please view results for these tests on the individual orders.    CBC Auto Differential [[id number]]  (Abnormal) Collected:  01/12/19 0552    Specimen:  Blood Updated:  01/12/19 0647     WBC 4.09 10*3/mm3      RBC 2.49 10*6/mm3      Hemoglobin 8.0 g/dL      Hematocrit 24.8 %      MCV 99.6 fL      MCH 32.1 pg      MCHC 32.3 g/dL      RDW 17.6 %      RDW-SD 62.1 fl      MPV 9.9 fL      Platelets 105 10*3/mm3      Neutrophil % 77.0 %      Lymphocyte % 11.5 %      Monocyte % 7.6 %      Eosinophil % 2.9 %      Basophil % 0.5 %      Immature Grans % 0.5 %      Neutrophils, Absolute 3.15 10*3/mm3      Lymphocytes, Absolute 0.47 10*3/mm3      Monocytes, Absolute 0.31 10*3/mm3      Eosinophils, Absolute 0.12 10*3/mm3      Basophils, Absolute 0.02 10*3/mm3      Immature Grans, Absolute 0.02 10*3/mm3     POC Glucose Once [[id number]]  (Normal) Collected:  01/11/19 1655    Specimen:  Blood Updated:  01/11/19 1714     Glucose 95 mg/dL      Comment: : [id number] IDANIA BRANDYMeter ID: [id number]       POC Glucose Once [[id number]]  (Abnormal) Collected:  01/11/19 1023    Specimen:  Blood Updated:  01/11/19 1047     Glucose 135 mg/dL      Comment: RN NotifiedOperator: [id number] IDANIA BRANDYMeter ID: [id number]       CBC & Differential [[id number]] Collected:  01/11/19 0528    Specimen:  Blood Updated:  01/11/19 0823    Narrative:       The following orders were created for panel order CBC & Differential.  Procedure                               Abnormality         Status                     ---------                               -----------         ------                     Manual Differential[[id number]]          Abnormal            Final result               Scan Slide[[id number]]                                                                   CBC Auto Differential[[id number]]        Abnormal            Edited Result - FINAL        Please view results for these tests on the individual orders.    Manual Differential [[id number]]  (Abnormal) Collected:  01/11/19 0528    Specimen:  Blood Updated:  01/11/19 0822     Neutrophil % 79.0 %      Lymphocyte % 10.0 %      Monocyte % 4.0 %      Eosinophil % 1.0 %      Basophil % 1.0 %      Bands %  5.0 %      Neutrophils Absolute 3.58 10*3/mm3      Lymphocytes Absolute 0.43 10*3/mm3      Monocytes Absolute 0.17 10*3/mm3      Eosinophils Absolute 0.04 10*3/mm3      Basophils Absolute 0.04 10*3/mm3      Anisocytosis Large/3+     Polychromasia Slight/1+     Target Cells Slight/1+     Hypersegmented Neutrophils Slight/1+     Platelet Estimate Decreased    CBC Auto Differential [[id number]]  (Abnormal) Collected:  01/11/19 0528    Specimen:  Blood Updated:  01/11/19 0815     WBC 4.26 10*3/mm3      RBC 2.57 10*6/mm3      Hemoglobin 8.3 g/dL      Hematocrit 25.1 %      MCV 97.7 fL      MCH 32.3 pg      MCHC 33.1 g/dL      RDW 17.8 %      RDW-SD 61.9 fl      MPV 10.2 fL      Platelets 111 10*3/mm3      Neutrophil % -- %      Comment: PLEASE REFER TO MANUAL DIFF  Corrected result. Previous result was 76.1 % on 1/11/2019 at 0606 CST.        Lymphocyte % -- %      Comment: PLEASE REFER TO MANUAL DIFF  Corrected result. Previous result was 13.1 % on 1/11/2019 at 0606 CST.        Monocyte % -- %      Comment: PLEASE REFER TO MANUAL DIFF  Corrected result. Previous result was 6.6 % on 1/11/2019 at 0606 CST.        Eosinophil % -- %      Comment: PLEASE REFER TO MANUAL DIFF  Corrected result. Previous result was 2.6 % on 1/11/2019 at 0606 CST.        Basophil % -- %      Comment: PLEASE REFER TO MANUAL DIFF  Corrected result. Previous result was 0.9 % on 1/11/2019 at 0606 CST.        Immature Grans % -- %      Comment: PLEASE REFER TO MANUAL DIFF  Corrected result. Previous result was 0.7 % on 1/11/2019 at 0606  CST.        Neutrophils, Absolute -- 10*3/mm3      Comment: PLEASE REFER TO MANUAL DIFF  Corrected result. Previous result was 3.24 10*3/mm3 on 1/11/2019 at 0606 CST.        Lymphocytes, Absolute -- 10*3/mm3      Comment: PLEASE REFER TO MANUAL DIFF  Corrected result. Previous result was 0.56 10*3/mm3 on 1/11/2019 at 0606 CST.        Monocytes, Absolute -- 10*3/mm3      Comment: PLEASE REFER TO MANUAL DIFF  Corrected result. Previous result was 0.28 10*3/mm3 on 1/11/2019 at 0606 CST.        Eosinophils, Absolute -- 10*3/mm3      Comment: PLEASE REFER TO MANUAL DIFF  Corrected result. Previous result was 0.11 10*3/mm3 on 1/11/2019 at 0606 CST.        Basophils, Absolute -- 10*3/mm3      Comment: PLEASE REFER TO MANUAL DIFF  Corrected result. Previous result was 0.04 10*3/mm3 on 1/11/2019 at 0606 CST.        Immature Grans, Absolute -- 10*3/mm3      Comment: PLEASE REFER TO MANUAL DIFF  Corrected result. Previous result was 0.03 10*3/mm3 on 1/11/2019 at 0606 CST.       Basic Metabolic Panel [[id number]]  (Abnormal) Collected:  01/11/19 0528    Specimen:  Blood Updated:  01/11/19 0611     Glucose 85 mg/dL      BUN 62 mg/dL      Creatinine 1.56 mg/dL      Sodium 136 mmol/L      Potassium 3.9 mmol/L      Comment: Specimen hemolyzed.  Results may be affected.        Chloride 100 mmol/L      CO2 26.0 mmol/L      Calcium 8.2 mg/dL      eGFR Non African Amer 33 mL/min/1.73      BUN/Creatinine Ratio 39.7     Anion Gap 10.0 mmol/L     POC Glucose Once [[id number]]  (Abnormal) Collected:  01/10/19 1625    Specimen:  Blood Updated:  01/10/19 1718     Glucose 63 mg/dL      Comment: : [id number] IDANIA BRANDYMeter ID: [id number]       POC Glucose Once [[id number]]  (Normal) Collected:  01/10/19 1109    Specimen:  Blood Updated:  01/10/19 1121     Glucose 111 mg/dL      Comment: RN NotifiedOperator: [id number] IDANIA BRANDYMeter ID: [id number]       Basic Metabolic Panel [[id number]]  (Abnormal) Collected:  01/10/19 0529     Specimen:  Blood Updated:  01/10/19 0616     Glucose 78 mg/dL      BUN 60 mg/dL      Creatinine 1.84 mg/dL      Sodium 137 mmol/L      Potassium 4.1 mmol/L      Chloride 102 mmol/L      CO2 27.0 mmol/L      Calcium 8.7 mg/dL      eGFR Non African Amer 27 mL/min/1.73      BUN/Creatinine Ratio 32.6     Anion Gap 8.0 mmol/L     CBC & Differential [[id number]] Collected:  01/10/19 0529    Specimen:  Blood Updated:  01/10/19 0602    Narrative:       The following orders were created for panel order CBC & Differential.  Procedure                               Abnormality         Status                     ---------                               -----------         ------                     CBC Auto Differential[[id number]]        Abnormal            Final result                 Please view results for these tests on the individual orders.    CBC Auto Differential [[id number]]  (Abnormal) Collected:  01/10/19 0529    Specimen:  Blood Updated:  01/10/19 0602     WBC 5.60 10*3/mm3      RBC 2.78 10*6/mm3      Hemoglobin 9.0 g/dL      Hematocrit 27.5 %      MCV 98.9 fL      MCH 32.4 pg      MCHC 32.7 g/dL      RDW 17.8 %      RDW-SD 63.0 fl      MPV 9.9 fL      Platelets 117 10*3/mm3      Neutrophil % 76.0 %      Lymphocyte % 11.6 %      Monocyte % 9.3 %      Eosinophil % 2.0 %      Basophil % 0.4 %      Immature Grans % 0.7 %      Neutrophils, Absolute 4.26 10*3/mm3      Lymphocytes, Absolute 0.65 10*3/mm3      Monocytes, Absolute 0.52 10*3/mm3      Eosinophils, Absolute 0.11 10*3/mm3      Basophils, Absolute 0.02 10*3/mm3      Immature Grans, Absolute 0.04 10*3/mm3     Hemoglobin & Hematocrit, Blood [[id number]]  (Abnormal) Collected:  01/09/19 2023    Specimen:  Blood Updated:  01/09/19 2034     Hemoglobin 8.3 g/dL      Hematocrit 25.5 %     Creatinine, Urine, Random - Urine, Clean Catch [[id number]] Collected:  01/09/19 1809    Specimen:  Urine, Clean Catch Updated:  01/09/19 1933     Creatinine, Urine 79.6 mg/dL     Urea  Nitrogen, Urine - Urine, Clean Catch [[id number]] Collected:  01/09/19 1809    Specimen:  Urine, Clean Catch Updated:  01/09/19 1933     Urea Nitrogen, Urine 380 mg/dL     Sodium, Urine, Random - Urine, Clean Catch [[id number]]  (Abnormal) Collected:  01/09/19 1809    Specimen:  Urine, Clean Catch Updated:  01/09/19 1837     Sodium, Urine 27 mmol/L     Basic Metabolic Panel [[id number]]  (Abnormal) Collected:  01/09/19 1547    Specimen:  Blood Updated:  01/09/19 1628     Glucose 107 mg/dL      BUN 54 mg/dL      Creatinine 1.93 mg/dL      Sodium 133 mmol/L      Potassium 4.4 mmol/L      Chloride 97 mmol/L      CO2 28.0 mmol/L      Calcium 8.5 mg/dL      eGFR Non African Amer 26 mL/min/1.73      BUN/Creatinine Ratio 28.0     Anion Gap 8.0 mmol/L     Folate [[id number]]  (Normal) Collected:  01/09/19 0832    Specimen:  Blood Updated:  01/09/19 1025     Folate >20.00 ng/mL     Vitamin B12 [[id number]]  (Normal) Collected:  01/09/19 0832    Specimen:  Blood Updated:  01/09/19 1025     Vitamin B-12 460 pg/mL         Imaging Results (last 7 days)     Procedure Component Value Units Date/Time    NM Lung Scan Perfusion Particulate [[id number]] Collected:  01/08/19 1353     Updated:  01/09/19 1113    Narrative:       PROCEDURE: NM LUNG SCAN PERFUSION PARTICULATE    CLINICAL HISTORY:     Chest pain, acute, PE suspected, intermed prob, negative D-dimer,  I50.9 Heart failure, unspecified R74.8 Abnormal levels of other  serum enzymes    INDICATION: Same as above    COMPARISON:     Chest x-ray, 1/7/2019. Nuclear medicine ventilation perfusion  scan done on 8/4/2014    TECHNIQUE:     Nuclear medicine perfusion scan was obtained following  intravenous administration of 6.1 mCi technetium 99m-MAA. Planar  images were obtained in the anterior projection for ventilation  with multi-planar imaging for perfusion.    The patient was unable to and delayed due to claustrophobia    FINDINGS:     The ventilation scan was not done    The perfusion  scan shows near normal bilateral lung perfusion .      Impression:         Very low probability for a pulmonary embolus.    Electronically signed by:  Sincere Long MD  1/9/2019 11:12 AM CST  Workstation: [email or web address]            Chief Complaint on Day of Discharge: None.    Hospital Course:  The patient was admitted to monitored bed and managed as follows:      - Acute on chronic diastolic CHF (congestive heart failure): She was started on diuretic therapy, strict I's and O's, daily weights, salt and fluid restriction.  She was much improved, less symptomatic and did lose close to 20 pounds prior to discharge.  She weighed 229 pounds on discharge (her weight on admission was 248 pounds ). Her dry weight between 230 and 235 pounds.  she was seen by the cardiologist and Heart failure Navigator on consult and appropriate recommendations were made.      Echocardiogram done January 8, 2019 showed:  · Left ventricular wall thickness is consistent with mild concentric hypertrophy.  · Estimated EF = 57%.  · Left ventricular systolic function is normal.  · Left ventricular diastolic dysfunction (grade I a) consistent with impaired relaxation.  · Right ventricular cavity is mildly dilated.  · Left atrial cavity size is mildly dilated.  · Mild aortic valve regurgitation is present.  · Moderate mitral valve regurgitation is present  · Moderate tricuspid valve regurgitation is present.  · The tricuspid valve is grossly normal. Moderate tricuspid valve regurgitation is present. Estimated right ventricular systolic pressure from tricuspid regurgitation is markedly elevated (>55 mmHg). Moderate to severe pulmonary hypertension is present.  · Mild pulmonic valve regurgitation is present.      - History of coronary artery disease with possible non-STEMI: She was continued on guidelines directed medical therapy.  Result of echocardiogram is as dictated above.    - Chronic renal impairment, stage 3: Stable.  Her creatinine on  "discharge was 1.47 which is about her baseline.  She was seen by nephrologist on consult and will be followed up as outpatient.     - Elevated d-dimer is likely reactive. VQ scan showed low probability for pulmonary embolism and Duplex ultrasound both lower extremities is negative for DVT.   - History of moderate to severe pulmonary hypertension: Patient will be seen for follow-up by Dr. Byrd as outpatient on discharge.   - Hypertension: Stable.  She was continued on most of her outpatient medications with some adjustments.    - Oxygen dependent COPD: Patient was not in exacerbation. She was continued on supplemental oxygen and bronchodilators.  - Diabetes mellitus: Stable.  She was continued on insulin pump with Accu-Cheks.  - Chronic anemia: Hemoglobin remained stable post transfusion of one unit of packed red blood cells. Iron profile was unremarkable.   - Obstructive sleep apnea: Nightly CPAP was continued..  - Chronic pain syndrome/chronic insomnia: Continued on her home medications.  - Chronic thrombocytopenia: Platelet count is 118,000 on discharge which is about her baseline.    - Deconditioning: She benefited from PT and OT will be referred for ambulatory home health.    Condition on Discharge:  Stable and improved.    Physical Exam on Discharge:  /60 (BP Location: Left arm, Patient Position: Lying)   Pulse 59   Temp 97.8 °F (36.6 °C) (Temporal)   Resp 18   Ht 162.6 cm (64.02\")   Wt 104 kg (229 lb 6.4 oz)   SpO2 96%   BMI 39.36 kg/m²   Physical Exam   Constitutional: She is oriented to person, place, and time. She appears well-developed and well-nourished. She is cooperative. No distress.   Patient is obese and has a BMI of 39.36   HENT:   Head: Normocephalic and atraumatic.   Right Ear: External ear normal.   Left Ear: External ear normal.   Nose: Nose normal.   Mouth/Throat: Oropharynx is clear and moist.   Eyes: Conjunctivae and EOM are normal. Pupils are equal, round, and reactive to " light.   Neck: Normal range of motion. Neck supple. No JVD present. No thyromegaly present.   Cardiovascular: Normal rate, regular rhythm, normal heart sounds and intact distal pulses. Exam reveals no gallop and no friction rub.   No murmur heard.  Pulmonary/Chest: Effort normal. No stridor. No respiratory distress. She has decreased breath sounds. She has no wheezes. She has rhonchi. She has no rales. She exhibits no tenderness.   Abdominal: Soft. Bowel sounds are normal. She exhibits no distension and no mass. There is no tenderness. There is no rebound and no guarding. No hernia.   Musculoskeletal: Normal range of motion. She exhibits no edema, tenderness or deformity.   Neurological: She is alert and oriented to person, place, and time. She has normal reflexes. No cranial nerve deficit or sensory deficit. Coordination normal.   Skin: Skin is warm and dry. No rash noted. She is not diaphoretic. No erythema. No pallor.   Psychiatric: She has a normal mood and affect. Her behavior is normal. Judgment and thought content normal.   Nursing note and vitals reviewed.        Discharge Disposition:  Home or Self Care    Discharge Medications:     Discharge Medications      New Medications      Instructions Start Date   ferrous sulfate 324 (65 Fe) MG tablet delayed-release EC tablet   324 mg, Oral, 2 Times Daily With Meals      lisinopril 10 MG tablet  Commonly known as:  PRINIVIL,ZESTRIL   10 mg, Oral, Every 24 Hours Scheduled      metOLazone 2.5 MG tablet  Commonly known as:  ZAROXOLYN   2.5 mg, Oral, Every Other Day      spironolactone 25 MG tablet  Commonly known as:  ALDACTONE   25 mg, Oral, Daily         Changes to Medications      Instructions Start Date   hydrALAZINE 50 MG tablet  Commonly known as:  APRESOLINE  What changed:    · how much to take  · when to take this   50 mg, Oral, 2 Times Daily      isosorbide mononitrate 60 MG 24 hr tablet  Commonly known as:  IMDUR  What changed:    · medication  "strength  · how much to take  · when to take this   60 mg, Oral, 2 Times Daily      vitamin D 18773 units capsule capsule  Commonly known as:  ERGOCALCIFEROL  What changed:  additional instructions   50,000 Units, Oral, Weekly         Continue These Medications      Instructions Start Date   aspirin 81 MG chewable tablet   81 mg, Oral, Daily      atorvastatin 80 MG tablet  Commonly known as:  LIPITOR   80 mg, Oral, Nightly      bumetanide 2 MG tablet  Commonly known as:  BUMEX   2 mg, Oral, Daily      carvedilol 3.125 MG tablet  Commonly known as:  COREG   6.25 mg, Oral, 2 Times Daily With Meals      clopidogrel 75 MG tablet  Commonly known as:  PLAVIX   75 mg, Oral, Daily      glucose blood test strip   Needs to see pcp for refills      HYDROcodone-acetaminophen  MG per tablet  Commonly known as:  NORCO   1 tablet, Oral, 4 Times Daily      Insulin Lispro 100 UNIT/ML solution cartridge   150 Units, Subcutaneous, Daily, DX: E11      HUMALOG 100 UNIT/ML injection  Generic drug:  insulin lispro   INJECT 150 UNITS SUB-Q DAILY PER INSULIN PUMP      Insulin Syringe-Needle U-100 30G X 5/16\" 0.3 ML misc  Commonly known as:  GLOBAL INSULIN SYRINGES   SQ injections as needed when insulin pump is non functioning      nitroglycerin 0.4 MG/SPRAY spray  Commonly known as:  NITROLINGUAL   1 spray, Sublingual, Every 5 Minutes PRN      potassium chloride 10 MEQ CR capsule  Commonly known as:  MICRO-K   20 mEq, Oral, 2 Times Daily      raNITIdine 300 MG tablet  Commonly known as:  ZANTAC   TAKE 1 TABLET BY MOUTH EVERY NIGHT.      sertraline 50 MG tablet  Commonly known as:  ZOLOFT   50 mg, Oral, Daily      zolpidem 10 MG tablet  Commonly known as:  AMBIEN   10 mg, Oral, Nightly PRN         Stop These Medications    amLODIPine 10 MG tablet  Commonly known as:  NORVASC            Discharge Diet:  cardiac and diabetic.  Free water and salt  restrictions have been stressed.    Activity at Discharge:  as tolerated.    Discharge Care " Plan/Instructions: Patient has been advised to take her medications as prescribed and to return to emergency room in the event of any worsening symptoms.    Follow-up Appointments:   Future Appointments   Date Time Provider Department Center   1/18/2019 10:15 AM Yaniv Lobo MD [facility] CD [facility] None   1/21/2019  1:30 PM Yaquelin Harris MD [facility] PULM [facility] None   2/8/2019 10:30 AM Jaya Byrd MD PhD [facility] None   2/12/2019  9:00 AM Bryon Samano MD [facility] END [facility] None       Test Results Pending at Discharge:     Catracho Jarrell MD  01/16/19  9:44 AM    Time: 35 minutes

## 2019-01-16 NOTE — THERAPY DISCHARGE NOTE
Acute Care - Physical Therapy Treatment Note/Discharge  [facility]     Patient Name: Nicolasa Rojas  : 1950  MRN: [id number]  Today's Date: 2019  Onset of Illness/Injury or Date of Surgery: 19  Date of Referral to PT: 19  Referring Physician: Dr. Catracho Jarrell    Admit Date: 2019    Visit Dx:    ICD-10-CM ICD-9-CM   1. Acute on chronic heart failure, unspecified heart failure type (CMS/HCC) I50.9 428.0   2. Elevated troponin R74.8 790.6   3. Impaired mobility and activities of daily living Z74.09 799.89   4. Impaired physical mobility Z74.09 781.99     Patient Active Problem List   Diagnosis   • Hyponatremia   • Acute on chronic diastolic CHF (congestive heart failure) (CMS/HCC)   • Hypokalemia   • Acute on chronic heart failure (CMS/HCC)   • Dyspnea   • Chronic renal impairment, stage 3 (moderate) (CMS/[facility])   • CAD (coronary artery disease)       Physical Therapy Education     Title: PT OT SLP Therapies (Done)     Topic: Physical Therapy (Done)     Point: Mobility training (Done)     Learning Progress Summary           Patient Acceptance, E,TB, VU by LN at 2019  4:38 PM    Acceptance, E,TB, VU by LN at 2019  1:49 PM    Acceptance, E,TB, VU,NR by LN at 1/10/2019  2:45 PM   Family Acceptance, E,TB, VU by LN at 2019  1:49 PM    Acceptance, E,TB, VU,NR by LN at 1/10/2019  2:45 PM                   Point: Home exercise program (Done)     Learning Progress Summary           Patient Acceptance, E,TB, VU by LN at 2019  1:49 PM    Acceptance, E,TB, VU,NR by LN at 1/10/2019  2:45 PM   Family Acceptance, E,TB, VU by LN at 2019  1:49 PM    Acceptance, E,TB, VU,NR by LN at 1/10/2019  2:45 PM                   Point: Body mechanics (Done)     Learning Progress Summary           Patient Acceptance, E,TB, VU by LN at 2019  4:38 PM    Acceptance, E,TB, VU by LN at 2019  1:49 PM    Acceptance, E,TB, VU,NR by LN at 1/10/2019  2:45 PM   Family Acceptance, E,TB,  VU by LN at 1/11/2019  1:49 PM    Acceptance, E,TB, VU,NR by LN at 1/10/2019  2:45 PM                   Point: Precautions (Done)     Learning Progress Summary           Patient Acceptance, E,TB, VU by LN at 1/14/2019  4:38 PM    Acceptance, E,TB, VU by LN at 1/11/2019  1:49 PM    Acceptance, E,TB, VU,NR by LN at 1/10/2019  2:45 PM   Family Acceptance, E,TB, VU by LN at 1/11/2019  1:49 PM    Acceptance, E,TB, VU,NR by LN at 1/10/2019  2:45 PM                               User Key     Initials Effective Dates Name Provider Type Discipline    LN 03/07/18 -  Elinor Kennedy PTA Physical Therapy Assistant PT              Rehab Goal Summary     Row Name 01/16/19 0950             Physical Therapy Goals    Bed Mobility Goal Selection (PT)  bed mobility, PT goal 1  -JA      Transfer Goal Selection (PT)  transfer, PT goal 1  -JA      Gait Training Goal Selection (PT)  gait training, PT goal 1  -JA      Strength Goal Selection (PT)  strength, PT goal 1  -JA         Bed Mobility Goal 1 (PT)    Activity/Assistive Device (Bed Mobility Goal 1, PT)  rolling to left;rolling to right;sit to supine;supine to sit  -JA      [place] Level/Cues Needed (Bed Mobility Goal 1, PT)  independent  -JA      Time Frame (Bed Mobility Goal 1, PT)  by discharge  -JA      Progress/Outcomes (Bed Mobility Goal 1, PT)  goal met  (Significant)   -JA         Transfer Goal 1 (PT)    Activity/Assistive Device (Transfer Goal 1, PT)  sit-to-stand/stand-to-sit;bed-to-chair/chair-to-bed  -JA      [place] Level/Cues Needed (Transfer Goal 1, PT)  conditional independence  -JA      Time Frame (Transfer Goal 1, PT)  by discharge  -JA      Progress/Outcome (Transfer Goal 1, PT)  goal partially met  -JA         Gait Training Goal 1 (PT)    Activity/Assistive Device (Gait Training Goal 1, PT)  gait (walking locomotion);assistive device use  -JA      [place] Level (Gait Training Goal 1, PT)  standby assist;conditional independence  -JA      Distance  (Gait Goal 1, PT)  50ftx2  -JA      Time Frame (Gait Training Goal 1, PT)  by discharge  -JA      Progress/Outcome (Gait Training Goal 1, PT)  goal not met  -JA         Strength Goal 1 (PT)    Strength Goal 1 (PT)  Pt will tolerate 2 sets of 15 reps of AROM exercises OOB in chair with VSS  -JA      Time Frame (Strength Goal 1, PT)  by discharge  -JA      Progress/Outcome (Strength Goal 1, PT)  goal not met  -JA        User Key  (r) = Recorded By, (t) = Taken By, (c) = Cosigned By    Initials Name Provider Type Discipline    Esequiel Lemos, KOURTNEY Physical Therapy Assistant PT        Therapy Treatment  Rehabilitation Treatment Summary     Row Name 01/16/19 0950             Treatment Time/Intention    Discipline  physical therapy assistant  -BRITTANY      Document Type  therapy note (daily note)  -BRITTANY      Subjective Information  complains of;pain  -BRITTANY      Mode of Treatment  individual therapy;physical therapy  -BRITTANY      Patient/Family Observations  Pt. lying supine with spouse present   -      Therapy Frequency (PT Clinical Impression)  other (see comments) 5-14x/wk  -      Existing Precautions/Restrictions  fall;oxygen therapy device and L/min  -JA      Recorded by [JA] Esequiel Quinones PTA 01/16/19 1154      Row Name 01/16/19 0950             Vital Signs    Pre Systolic BP Rehab  130  -JA      Pre Treatment Diastolic BP  54  -JA      Pretreatment Heart Rate (beats/min)  72  -JA      Intratreatment Heart Rate (beats/min)  74  -JA      Posttreatment Heart Rate (beats/min)  74  -JA      Pre SpO2 (%)  94  -JA      O2 Delivery Pre Treatment  supplemental O2  -JA      Intra SpO2 (%)  96  -JA      O2 Delivery Intra Treatment  supplemental O2  -JA      Post SpO2 (%)  92 -93% after PLB   -JA      O2 Delivery Post Treatment  supplemental O2  -JA      Recorded by [BRITTANY] Esequiel Quinones, KOURTNEY 01/16/19 1154      Row Name 01/16/19 0950             Cognitive Assessment/Intervention- PT/OT    Orientation Status (Cognition)   oriented x 4  -JA      Follows Commands (Cognition)  follows one step commands  -JA      Recorded by [JA] Esequiel Quinones, PTA 01/16/19 1154      Row Name 01/16/19 0950             Bed Mobility Assessment/Treatment    Supine-Sit [place] (Bed Mobility)  independent  -JA      Sit-Supine [place] (Bed Mobility)  independent  -JA      Recorded by [JA] Esequiel Quinones, PTA 01/16/19 1154      Row Name 01/16/19 0950             Sit-Stand Transfer    Sit-Stand [place] (Transfers)  conditional independence  -JA      Assistive Device (Sit-Stand Transfers)  walker, front-wheeled  -JA      Recorded by [JA] Esequiel Quinones, PTA 01/16/19 1154      Row Name 01/16/19 0950             Stand-Sit Transfer    Stand-Sit [place] (Transfers)  conditional independence  -JA      Assistive Device (Stand-Sit Transfers)  walker, front-wheeled  -JA      Recorded by [JA] Esequiel Quinones, PTA 01/16/19 1154      Row Name 01/16/19 0950             Gait/Stairs Assessment/Training    [place] Level (Gait)  stand by assist;1 person to manage equipment spouse followed with w/c   -JA      Assistive Device (Gait)  walker, front-wheeled  -BRITTANY      Distance in Feet (Gait)  83', 90'   -BRITTANY      Pattern (Gait)  step-through  -JA      Recorded by [JA] Esequiel Quinones, PTA 01/16/19 1154      Row Name 01/16/19 0950             Lower Extremity Seated Therapeutic Exercise    Performed, Seated Lower Extremity (Therapeutic Exercise)  ankle dorsiflexion/plantarflexion;LAQ (long arc quad), knee extension;hip flexion/extension;hip abduction/adduction  -JA      Exercise Type, Seated Lower Extremity (Therapeutic Exercise)  AROM (active range of motion)  -JA      Sets/Reps Detail, Seated Lower Extremity (Therapeutic Exercise)  x20  -JA      Recorded by [JA] Esequiel Quinones, PTA 01/16/19 1154      Row Name 01/16/19 0950             Positioning and Restraints    Pre-Treatment Position  in bed  -JA      Post Treatment Position   bed  -JA      In Bed  supine;call light within reach;encouraged to call for assist;with family/caregiver  -JA      Recorded by [BRITTANY] Esequiel Quinones, PTA 01/16/19 1154      Row Name 01/16/19 0950             Pain Scale: Numbers Pre/Post-Treatment    Pain Scale: Numbers, Pretreatment  10/10  -JA      Pain Scale: Numbers, Post-Treatment  10/10  -JA      Pain Location  back B hips  -JA      Recorded by [BRITTANY] Esequiel Quinones, PTA 01/16/19 1154      Row Name 01/16/19 0950             Outcome Summary/Treatment Plan (PT)    Daily Summary of Progress (PT)  progress toward functional goals as expected  -BRITTANY      Plan for Continued Treatment (PT)  Cont. Gt & therex. Pt. reports scheduled to d/c home today with spouse  -BRITTANY      Anticipated Discharge Disposition (PT)  home with 24/7 care;home with home health  -BRITTANY      Recorded by [BRITTANY] Esequiel Quinones, PTA 01/16/19 1154        User Key  (r) = Recorded By, (t) = Taken By, (c) = Cosigned By    Initials Name Effective Dates Discipline    Esequiel Lemos, PTA 03/07/18 -  PT             PT Recommendation and Plan  Anticipated Discharge Disposition (PT): home with home health, home with 24/7 care  Therapy Frequency (PT Clinical Impression): other (see comments)(5-14x/wk)  Outcome Summary/Treatment Plan (PT)  Daily Summary of Progress (PT): progress toward functional goals as expected  Plan for Continued Treatment (PT): Cont. Gt & therex. Pt. reports scheduled to d/c home today with spouse  Anticipated Discharge Disposition (PT): home with home health, home with 24/7 care  Plan of Care Reviewed With: patient, spouse  Progress: improving  Outcome Summary: Pt. met 1/4 goals prior to D/C home with 24/7 care & HHPT    Outcome Measures     Row Name 01/16/19 0950 01/15/19 1100 01/15/19 0810       How much help from another person do you currently need...    Turning from your back to your side while in flat bed without using bedrails?  4  -JA  --  4  -JW    Moving from lying  on back to sitting on the side of a flat bed without bedrails?  4  -JA  --  4  -JW    Moving to and from a bed to a chair (including a wheelchair)?  3  -JA  --  3  -JW    Standing up from a chair using your arms (e.g., wheelchair, bedside chair)?  4  -JA  --  4  -JW    Climbing 3-5 steps with a railing?  3  -JA  --  3  -JW    To walk in hospital room?  3  -JA  --  3  -JW    AM-PAC 6 Clicks Score  21  -JA  --  21  -JW       How much help from another is currently needed...    Putting on and taking off regular lower body clothing?  --  3  -KD  --    Bathing (including washing, rinsing, and drying)  --  3  -KD  --    Toileting (which includes using toilet bed pan or urinal)  --  3  -KD  --    Putting on and taking off regular upper body clothing  --  3  -KD  --    Taking care of personal grooming (such as brushing teeth)  --  3  -KD  --    Eating meals  --  4  -KD  --    Score  --  19  -KD  --       Functional Assessment    Outcome Measure Options  AM-PAC 6 Clicks Basic Mobility (PT)  -  --  AM-PAC 6 Clicks Basic Mobility (PT)  -    Row Name 01/14/19 1600 01/14/19 1420          How much help from another person do you currently need...    Turning from your back to your side while in flat bed without using bedrails?  4  -LN  --     Moving from lying on back to sitting on the side of a flat bed without bedrails?  4  -LN  --     Moving to and from a bed to a chair (including a wheelchair)?  3  -LN  --     Standing up from a chair using your arms (e.g., wheelchair, bedside chair)?  4  -LN  --     Climbing 3-5 steps with a railing?  3  -LN  --     To walk in hospital room?  3  -LN  --     AM-PAC 6 Clicks Score  21  -LN  --        How much help from another is currently needed...    Putting on and taking off regular lower body clothing?  --  3  -KD     Bathing (including washing, rinsing, and drying)  --  3  -KD     Toileting (which includes using toilet bed pan or urinal)  --  3  -KD     Putting on and taking off  regular upper body clothing  --  3  -KD     Taking care of personal grooming (such as brushing teeth)  --  3  -KD     Eating meals  --  4  -KD     Score  --  19  -KD        Functional Assessment    Outcome Measure Options  AM-PAC 6 Clicks Basic Mobility (PT)  -LN  --       User Key  (r) = Recorded By, (t) = Taken By, (c) = Cosigned By    Initials Name Provider Type    JW Enedina Sandoval, PTA Physical Therapy Assistant    Esequiel Lemos, PTA Physical Therapy Assistant    Elinor Loomis, PTA Physical Therapy Assistant    Thuy Rice, RUFUS/L Occupational Therapy Assistant           Time Calculation:   PT Charges     Row Name 01/16/19 1155             Time Calculation    Start Time  0950  -      Stop Time  1015  -      Time Calculation (min)  25 min  -         Time Calculation- PT    Total Timed Code Minutes- PT  25 minute(s)  -         Timed Charges    82999 - PT Therapeutic Exercise Minutes  15  -JA      89112 - Gait Training Minutes   10  -JA        User Key  (r) = Recorded By, (t) = Taken By, (c) = Cosigned By    Initials Name Provider Type    Esequiel Lemos, KOURTNEY Physical Therapy Assistant        Therapy Suggested Charges     Code   Minutes Charges    56161 (CPT®) Hc Pt Neuromusc Re Education Ea 15 Min      02499 (CPT®) Hc Pt Ther Proc Ea 15 Min 15 1    91252 (CPT®) Hc Gait Training Ea 15 Min 10 1    00060 (CPT®) Hc Pt Therapeutic Act Ea 15 Min      86383 (CPT®) Hc Pt Manual Therapy Ea 15 Min      00952 (CPT®) Hc Pt Iontophoresis Ea 15 Min      87084 (CPT®) Hc Pt Elec Stim Ea-Per 15 Min      44673 (CPT®) Hc Pt Ultrasound Ea 15 Min      87958 (CPT®) Hc Pt Self Care/Mgmt/Train Ea 15 Min      07493 (CPT®) Hc Pt Prosthetic (S) Train Initial Encounter, Each 15 Min      56591 (CPT®) Hc Pt Orthotic(S)/Prosthetic(S) Encounter, Each 15 Min      22277 (CPT®) Hc Orthotic(S) Mgmt/Train Initial Encounter, Each 15min      Total  25 2          Therapy Charges for Today     Code Description  Service Date Service Provider Modifiers Qty    54818925139 HC PT THER PROC EA 15 MIN 1/16/2019 Esequiel Quinones, PTA GP 1    44958775988 HC GAIT TRAINING EA 15 MIN 1/16/2019 Esequiel Quinones, KOURTNEY GP 1          PT G-Codes  Outcome Measure Options: AM-PAC 6 Clicks Basic Mobility (PT)  AM-PAC 6 Clicks Score: 21  Score: 19    PT Discharge Summary  Anticipated Discharge Disposition (PT): home with home health, home with 24/7 care  Reason for Discharge: Discharge from facility, Per MD order  Outcomes Achieved: Patient able to partially acheive established goals  Discharge Destination: Home with home health, Home with assist    Esequiel Quinones PTA  1/16/2019

## 2019-01-16 NOTE — SIGNIFICANT NOTE
01/16/19 1155   PT Discharge Summary   Anticipated Discharge Disposition (PT) home with home health;home with 24/7 care   Reason for Discharge Discharge from facility;Per MD order   Outcomes Achieved Patient able to partially acheive established goals   Discharge Destination Home with home health;Home with assist

## 2019-01-16 NOTE — THERAPY TREATMENT NOTE
Acute Care - Occupational Therapy Treatment Note  [facility]     Patient Name: Nicolasa Rojas  : 1950  MRN: [id number]  Today's Date: 2019  Onset of Illness/Injury or Date of Surgery: 19  Date of Referral to OT: 19  Referring Physician: Dr. Catracho Jarrell    Admit Date: 2019       ICD-10-CM ICD-9-CM   1. Acute on chronic heart failure, unspecified heart failure type (CMS/HCC) I50.9 428.0   2. Elevated troponin R74.8 790.6   3. Impaired mobility and activities of daily living Z74.09 799.89   4. Impaired physical mobility Z74.09 781.99     Patient Active Problem List   Diagnosis   • Hyponatremia   • Acute on chronic diastolic CHF (congestive heart failure) (CMS/HCC)   • Hypokalemia   • Acute on chronic heart failure (CMS/HCC)   • Dyspnea   • Chronic renal impairment, stage 3 (moderate) (CMS/HCC)   • CAD (coronary artery disease)     Past Medical History:   Diagnosis Date   • Acute bronchitis    • Acute congestive heart failure (CMS/HCC)     resolving   • Acute kidney failure, unspecified (CMS/HCC)    • Acute kidney failure, unspecified (CMS/HCC)    • Acute posthemorrhagic anemia    • Asthenia    • Chest pain    • Chronic gastritis    • Chronic pharyngitis    • Chronic renal impairment    • Congenital renal failure    • Congestive heart failure (CMS/HCC)    • Contact dermatitis due to poison ivy    • COPD (chronic obstructive pulmonary disease) (CMS/HCC)    • Coronary arteriosclerosis    • D-dimer, elevated     D-dimer above reference range    • Degenerative joint disease involving multiple joints     back   • Depressive disorder    • Dysphagia    • Dyspnea    • Edema of lower extremity    • Encounter for immunization    • Encounter for long-term (current) use of medications    • Epigastric pain    • Esophagitis    • Essential hypertension    • Gastroesophageal reflux disease    • Gastroparesis     Gastroparesis syndrome    • Generalized abdominal pain    • H/O bone density study  01/09/2015   • H/O mammogram 01/15/2015   • H/O screening mammography 11/12/2013   • Headache    • History of transfusion    • Hyperlipidemia    • Hypokalemia    • Hypomagnesemia    • Injury of tendon of rotator cuff     Injury of tendon of the rotator cuff of shoulder      • Insomnia    • Insomnia, unspecified    • Knee pain    • Nausea and vomiting    • Neck pain    • Need for immunization against influenza    • Need for prophylactic vaccination and inoculation against influenza    • Need for prophylactic vaccination and inoculation against viral disease    • 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 Performed at [facility].   • ENDOSCOPY  02/09/2015    ESOPHAGEAL STRICTURE PRESENT, GASTRITIS FOUND IN THE STOMACH, NORMAL DUODENUM   • ENDOSCOPY W/ PEG TUBE PLACEMENT  12/19/2012    Esophagitis seen. Biopsy taken. Esophageal stricture present. Dilatation performed. Gastritis in stomach. Biopsy taken. Food was in stomach. Normal duodenum.   • HERNIA REPAIR     • HYSTERECTOMY      partial   • NECK SURGERY         Therapy Treatment    Rehabilitation Treatment Summary     Row Name 01/16/19 1024 01/16/19 0950          Treatment Time/Intention    Discipline  occupational therapy assistant  -KD  physical therapy assistant  -BRITTANY     Document Type  therapy note (daily note)  -KD  therapy note (daily note)  -BRITTANY     Subjective Information  complains of;pain  -KD2  complains of;pain  -JA     Mode of Treatment  occupational therapy  -KD2  individual therapy;physical therapy  -JA     Patient/Family Observations  pt side lying  in bed  -KD2  Pt. lying supine with spouse present   -JA     Therapy Frequency (PT Clinical Impression)  --  other (see comments) 5-14x/wk  -JA     Therapy Frequency (OT Eval)  other (see comments) 5-7 days/wk  -KD2  --     Patient Effort  fair  -KD2  --     Comment  educated pt on HS and pt demonstrated HEP  -KD3  --     Existing Precautions/Restrictions  fall;oxygen therapy device and L/min  -KD2  fall;oxygen therapy device and L/min  -JA     Recorded by [KD] Thuy Burton HOOPER/L 01/16/19 1456  [KD2] Thuy Burton, HOOPER/L 01/16/19 1457  [KD3] Thuy Burton, HOOPER/L 01/16/19 1504 [JA] Esequiel Quinones, PTA 01/16/19 1154     Row Name 01/16/19 1024 01/16/19 0950          Vital Signs    Pre Systolic BP Rehab  122  -KD  130  -JA     Pre Treatment Diastolic BP  59  -KD  54  -JA     Pretreatment Heart Rate (beats/min)  70  -KD  72  -JA     Intratreatment Heart Rate (beats/min)  --  74  -JA     Posttreatment Heart Rate (beats/min)  76  -KD  74  -JA     Pre SpO2 (%)  94  -KD  94  -JA     O2 Delivery Pre Treatment  supplemental O2  -KD  supplemental O2  -JA     Intra SpO2 (%)  --  96  -JA     O2 Delivery Intra Treatment  --  supplemental O2  -JA     Post SpO2 (%)  94  -KD  92 -93% after PLB   -JA     O2 Delivery Post Treatment  supplemental O2  -KD  supplemental O2  -JA     Pre Patient Position  Side Lying  -KD  --     Intra Patient Position  Sitting  -KD  --     Post Patient Position  Side Lying  -KD  --     Recorded by [KD] Thuy Burton HOOPER/L 01/16/19 1504 [JA] Esequiel Quinones, PTA 01/16/19 1154     Row Name 01/16/19 1024 01/16/19 0950          Cognitive Assessment/Intervention- PT/OT    Orientation Status (Cognition)  oriented x 4  -KD  oriented x 4  -JA     Follows Commands (Cognition)  follows one step commands  -KD  follows one step commands  -JA     Recorded by [KD] Thuy Burton HOOPER/L 01/16/19 1504 [JA] Esequiel Quinones, PTA 01/16/19 1154     Row Name 01/16/19 0950             Bed Mobility  Assessment/Treatment    Supine-Sit [place] (Bed Mobility)  independent  -JA      Sit-Supine [place] (Bed Mobility)  independent  -JA      Recorded by [JA] Esequiel Quinones, PTA 01/16/19 1154      Row Name 01/16/19 0950             Sit-Stand Transfer    Sit-Stand [place] (Transfers)  conditional independence  -JA      Assistive Device (Sit-Stand Transfers)  walker, front-wheeled  -JA      Recorded by [JA] Esequiel Quinones, PTA 01/16/19 1154      Row Name 01/16/19 0950             Stand-Sit Transfer    Stand-Sit [place] (Transfers)  conditional independence  -JA      Assistive Device (Stand-Sit Transfers)  walker, front-wheeled  -JA      Recorded by [JA] Esequiel Quinones, PTA 01/16/19 1154      Row Name 01/16/19 0950             Gait/Stairs Assessment/Training    [place] Level (Gait)  stand by assist;1 person to manage equipment spouse followed with w/c   -JA      Assistive Device (Gait)  walker, front-wheeled  -BRITTANY      Distance in Feet (Gait)  83', 90'   -JA      Pattern (Gait)  step-through  -JA      Recorded by [JA] Esequiel Quinones, PTA 01/16/19 1154      Row Name 01/16/19 0950             Lower Extremity Seated Therapeutic Exercise    Performed, Seated Lower Extremity (Therapeutic Exercise)  ankle dorsiflexion/plantarflexion;LAQ (long arc quad), knee extension;hip flexion/extension;hip abduction/adduction  -JA      Exercise Type, Seated Lower Extremity (Therapeutic Exercise)  AROM (active range of motion)  -JA      Sets/Reps Detail, Seated Lower Extremity (Therapeutic Exercise)  x20  -JA      Recorded by [JA] Esequiel Quinones, PTA 01/16/19 1154      Row Name 01/16/19 1024             Therapeutic Exercise    Upper Extremity Range of Motion (Therapeutic Exercise)  shoulder flexion/extension, bilateral;shoulder abduction/adduction, bilateral;shoulder horizontal abduction/adduction, bilateral;shoulder internal/external rotation, bilateral;elbow flexion/extension,  bilateral;forearm supination/pronation, bilateral;wrist flexion/extension, bilateral  -KD      Weight/Resistance (Therapeutic Exercise)  1 pound  -KD      Exercise Type (Therapeutic Exercise)  AROM (active range of motion)  -KD      Position (Therapeutic Exercise)  seated  -KD      Sets/Reps (Therapeutic Exercise)  1/20  -KD      Equipment (Therapeutic Exercise)  free weight, barbell  -KD      Expected Outcome (Therapeutic Exercise)  improve functional tolerance, self-care activity  -KD      Recorded by [KD] Thuy Burton COTA/L 01/16/19 1504      Row Name 01/16/19 1024 01/16/19 0950          Positioning and Restraints    Pre-Treatment Position  in bed  -KD  in bed  -JA     Post Treatment Position  bed  -KD  bed  -JA     In Bed  supine;call light within reach;encouraged to call for assist;exit alarm on  -KD  supine;call light within reach;encouraged to call for assist;with family/caregiver  -JA     Recorded by [KD] Thuy Burton COTA/L 01/16/19 1504 [JA] Esequiel Quinones, PTA 01/16/19 1154     Row Name 01/16/19 1024 01/16/19 0950          Pain Scale: Numbers Pre/Post-Treatment    Pain Scale: Numbers, Pretreatment  6/10  -KD  10/10  -JA     Pain Scale: Numbers, Post-Treatment  6/10  -KD  10/10  -JA     Pain Location  back  -KD  back B hips  -JA     Pain Intervention(s)  Repositioned  -KD  --     Recorded by [KD] Thuy Burton COTA/L 01/16/19 1504 [JA] Esequiel Quinones, PTA 01/16/19 1154     Row Name 01/16/19 1024             Outcome Summary/Treatment Plan (OT)    Daily Summary of Progress (OT)  progress toward functional goals as expected  -KD      Plan for Continued Treatment (OT)  cont ot poc  -KD      Anticipated Discharge Disposition (OT)  home with home health  -KD      Recorded by [KD] Thuy Burton COTA/L 01/16/19 1504      Row Name 01/16/19 0950             Outcome Summary/Treatment Plan (PT)    Daily Summary of Progress (PT)  progress toward functional goals as expected  -JA      Plan for  Continued Treatment (PT)  Cont. Gt & therex. Pt. reports scheduled to d/c home today with spouse  -BRITTANY      Anticipated Discharge Disposition (PT)  home with 24/7 care;home with home health  -      Recorded by [BRITTANY] Esequiel Quinones, PTA 01/16/19 1154        User Key  (r) = Recorded By, (t) = Taken By, (c) = Cosigned By    Initials Name Effective Dates Discipline    Esequiel Lemos, PTA 03/07/18 -  PT    KD Thuy Burton, HOOPER/L 03/07/18 -  OT           Rehab Goal Summary     Row Name 01/16/19 1024 01/16/19 0950          Physical Therapy Goals    Bed Mobility Goal Selection (PT)  --  bed mobility, PT goal 1  -     Transfer Goal Selection (PT)  --  transfer, PT goal 1  -     Gait Training Goal Selection (PT)  --  gait training, PT goal 1  -     Strength Goal Selection (PT)  --  strength, PT goal 1  -        Bed Mobility Goal 1 (PT)    Activity/Assistive Device (Bed Mobility Goal 1, PT)  --  rolling to left;rolling to right;sit to supine;supine to sit  -     [place] Level/Cues Needed (Bed Mobility Goal 1, PT)  --  independent  -     Time Frame (Bed Mobility Goal 1, PT)  --  by discharge  -     Progress/Outcomes (Bed Mobility Goal 1, PT)  --  goal met  (Significant)   -        Transfer Goal 1 (PT)    Activity/Assistive Device (Transfer Goal 1, PT)  --  sit-to-stand/stand-to-sit;bed-to-chair/chair-to-bed  -     [place] Level/Cues Needed (Transfer Goal 1, PT)  --  conditional independence  -     Time Frame (Transfer Goal 1, PT)  --  by discharge  -     Progress/Outcome (Transfer Goal 1, PT)  --  goal partially met  -        Gait Training Goal 1 (PT)    Activity/Assistive Device (Gait Training Goal 1, PT)  --  gait (walking locomotion);assistive device use  -     [place] Level (Gait Training Goal 1, PT)  --  standby assist;conditional independence  -     Distance (Gait Goal 1, PT)  --  50ftx2  -     Time Frame (Gait Training Goal 1, PT)  --  by discharge  -      Progress/Outcome (Gait Training Goal 1, PT)  --  goal not met  -        Strength Goal 1 (PT)    Strength Goal 1 (PT)  --  Pt will tolerate 2 sets of 15 reps of AROM exercises OOB in chair with VSS  -     Time Frame (Strength Goal 1, PT)  --  by discharge  -     Progress/Outcome (Strength Goal 1, PT)  --  goal not met  -        Occupational Therapy Goals    Transfer Goal Selection (OT)  transfer, OT goal 1  -KD  --     Bathing Goal Selection (OT)  bathing, OT goal 1  -KD  --     Dressing Goal Selection (OT)  dressing, OT goal 1  -KD  --     Toileting Goal Selection (OT)  toileting, OT goal 1  -KD  --     Activity Tolerance Goal Selection (OT)  activity tolerance, OT goal 1  -KD  --        Transfer Goal 1 (OT)    Activity/Assistive Device (Transfer Goal 1, OT)  sit-to-stand/stand-to-sit;bed-to-chair/chair-to-bed;toilet  -KD  --     [place] Level/Cues Needed (Transfer Goal 1, OT)  conditional independence  -KD  --     Time Frame (Transfer Goal 1, OT)  long term goal (LTG);by discharge  -KD  --     Progress/Outcome (Transfer Goal 1, OT)  goal not met  -KD  --        Bathing Goal 1 (OT)    Activity/Assistive Device (Bathing Goal 1, OT)  bathing skills, all  -KD  --     [place] Level/Cues Needed (Bathing Goal 1, OT)  minimum assist (75% or more patient effort)  -KD  --     Time Frame (Bathing Goal 1, OT)  long term goal (LTG);by discharge  -KD  --     Progress/Outcomes (Bathing Goal 1, OT)  goal met;goal ongoing  -KD  --        Dressing Goal 1 (OT)    Activity/Assistive Device (Dressing Goal 1, OT)  dressing skills, all  -KD  --     [place]/Cues Needed (Dressing Goal 1, OT)  minimum assist (75% or more patient effort)  -KD  --     Time Frame (Dressing Goal 1, OT)  long term goal (LTG);by discharge  -KD  --     Progress/Outcome (Dressing Goal 1, OT)  goal met;goal ongoing  -KD  --        Toileting Goal 1 (OT)    Activity/Device (Toileting Goal 1, OT)  toileting skills, all  -KD  --     [place]  Level/Cues Needed (Toileting Goal 1, OT)  conditional independence  -KD  --     Time Frame (Toileting Goal 1, OT)  long term goal (LTG);by discharge  -KD  --     Progress/Outcome (Toileting Goal 1, OT)  goal not met  -KD  --         Activity Tolerance Goal 1 (OT)    Activity Level (Endurance Goal 1, OT)  10 min activity;O2 sat >/ equal to 88%  -KD  --     Progress/Outcome (Activity Tolerance Goal 1, OT)  goal met  -KD  --       User Key  (r) = Recorded By, (t) = Taken By, (c) = Cosigned By    Initials Name Provider Type Discipline    Esequiel Lemos, PTA Physical Therapy Assistant PT    Thuy Rice, RUFUS/L Occupational Therapy Assistant OT        Occupational Therapy Education     Title: PT OT SLP Therapies (Done)     Topic: Occupational Therapy (Done)     Point: ADL training (Done)     Description: Instruct learner(s) on proper safety adaptation and remediation techniques during self care or transfers.   Instruct in proper use of assistive devices.    Learning Progress Summary           Patient Acceptance, E,TB, VU by KARINA at 1/14/2019  4:38 PM    Eager, E, VU by RANDY at 1/13/2019  3:39 PM                   Point: Home exercise program (Done)     Description: Instruct learner(s) on appropriate technique for monitoring, assisting and/or progressing therapeutic exercises/activities.    Learning Progress Summary           Patient Acceptance, E,TB, VU by KARINA at 1/14/2019  4:38 PM    Eager, E, VU by RANDY at 1/13/2019  3:39 PM                   Point: Precautions (Done)     Description: Instruct learner(s) on prescribed precautions during self-care and functional transfers.    Learning Progress Summary           Patient Acceptance, E,TB, VU by KARINA at 1/14/2019  4:38 PM    Eager, E, VU by RANDY at 1/13/2019  3:39 PM    Acceptance, E, VU,NR by  at 1/9/2019 11:36 AM    Comment:  OT role, OT POC   Family Acceptance, E, VU,NR by  at 1/9/2019 11:36 AM    Comment:  OT role, OT POC                   Point: Body mechanics  (Done)     Description: Instruct learner(s) on proper positioning and spine alignment during self-care, functional mobility activities and/or exercises.    Learning Progress Summary           Patient AcceptanceLANIE,JOHN, VU by LN at 1/14/2019  4:38 PM    LANIE Zuluaga VU by  at 1/13/2019  3:39 PM                               User Key     Initials Effective Dates Name Provider Type Discipline    LN 03/07/18 -  Elinor Kennedy, KOURTNEY Physical Therapy Assistant PT     03/07/18 -  Francy Wilde COTA/L Occupational Therapy Assistant OT     10/12/18 -  Dmitry Yuan Occupational Therapist OT                OT Recommendation and Plan  Outcome Summary/Treatment Plan (OT)  Daily Summary of Progress (OT): progress toward functional goals as expected  Plan for Continued Treatment (OT): cont ot poc  Anticipated Discharge Disposition (OT): home with home health  Therapy Frequency (OT Eval): other (see comments)(5-7 days/wk)  Daily Summary of Progress (OT): progress toward functional goals as expected  Plan of Care Review  Plan of Care Reviewed With: patient  Plan of Care Reviewed With: patient  Outcome Summary: pt ind with bed mobility, Cond indep sit/stands, pt completed 5 sit<>stands w/ RW, pt sat EOB and completed BUE ther ex w/ 1lb HW. no new goals met this date. cont ot poc.  Outcome Measures     Row Name 01/16/19 1024 01/16/19 0950 01/15/19 1100       How much help from another person do you currently need...    Turning from your back to your side while in flat bed without using bedrails?  --  4  -JA  --    Moving from lying on back to sitting on the side of a flat bed without bedrails?  --  4  -JA  --    Moving to and from a bed to a chair (including a wheelchair)?  --  3  -JA  --    Standing up from a chair using your arms (e.g., wheelchair, bedside chair)?  --  4  -JA  --    Climbing 3-5 steps with a railing?  --  3  -JA  --    To walk in hospital room?  --  3  -JA  --    AM-PAC 6 Clicks Score  --  21  -JA  --        How much help from another is currently needed...    Putting on and taking off regular lower body clothing?  3  -KD  --  3  -KD    Bathing (including washing, rinsing, and drying)  3  -KD  --  3  -KD    Toileting (which includes using toilet bed pan or urinal)  3  -KD  --  3  -KD    Putting on and taking off regular upper body clothing  3  -KD  --  3  -KD    Taking care of personal grooming (such as brushing teeth)  3  -KD  --  3  -KD    Eating meals  4  -KD  --  4  -KD    Score  19  -KD  --  19  -KD       Functional Assessment    Outcome Measure Options  --  AM-PAC 6 Clicks Basic Mobility (PT)  -  --    Row Name 01/15/19 0810 01/14/19 1600 01/14/19 1420       How much help from another person do you currently need...    Turning from your back to your side while in flat bed without using bedrails?  4  -  4  -LN  --    Moving from lying on back to sitting on the side of a flat bed without bedrails?  4  -  4  -LN  --    Moving to and from a bed to a chair (including a wheelchair)?  3  -  3  -LN  --    Standing up from a chair using your arms (e.g., wheelchair, bedside chair)?  4  -  4  -LN  --    Climbing 3-5 steps with a railing?  3  -  3  -LN  --    To walk in hospital room?  3  -  3  -LN  --    AM-PAC 6 Clicks Score  21  -JW  21  -LN  --       How much help from another is currently needed...    Putting on and taking off regular lower body clothing?  --  --  3  -KD    Bathing (including washing, rinsing, and drying)  --  --  3  -KD    Toileting (which includes using toilet bed pan or urinal)  --  --  3  -KD    Putting on and taking off regular upper body clothing  --  --  3  -KD    Taking care of personal grooming (such as brushing teeth)  --  --  3  -KD    Eating meals  --  --  4  -KD    Score  --  --  19  -KD       Functional Assessment    Outcome Measure Options  AM-PAC 6 Clicks Basic Mobility (PT)  -[facility]-PAC 6 Clicks Basic Mobility (PT)  -LN  --      User Key  (r) = Recorded By, (t) =  Taken By, (c) = Cosigned By    Initials Name Provider Type    Enedina Price, PTA Physical Therapy Assistant    Esequiel Lemos, PTA Physical Therapy Assistant    Elinor Loomis, PTA Physical Therapy Assistant    Thuy Rice COTA/L Occupational Therapy Assistant           Time Calculation:   Time Calculation- OT     Row Name 01/16/19 1504 01/16/19 1155          Time Calculation- OT    OT Start Time  1024  -KD  --     OT Stop Time  1048  -KD  --     OT Time Calculation (min)  24 min  -KD  --     Total Timed Code Minutes- OT  24 minute(s)  -KD  --     OT Received On  01/16/19  -KD  --        Timed Charges    94087 - Gait Training Minutes   --  10  -JA       User Key  (r) = Recorded By, (t) = Taken By, (c) = Cosigned By    Initials Name Provider Type    Esequiel Lemos, KOURTNEY Physical Therapy Assistant    Thuy Rice, HOOPER/L Occupational Therapy Assistant           Therapy Suggested Charges     Code   Minutes Charges    None           Therapy Charges for Today     Code Description Service Date Service Provider Modifiers Qty    59352353179 HC OT SELF CARE/MGMT/TRAIN EA 15 MIN 1/15/2019 Thuy Burton HOOPER/L GO 1    17096931618 HC OT THERAPEUTIC ACT EA 15 MIN 1/15/2019 Thuy Burton HOOPER/L GO 1    43349823060 HC OT THER PROC EA 15 MIN 1/15/2019 Thuy Burton HOOPER/L GO 1    78020885036 HC OT THER PROC EA 15 MIN 1/16/2019 Thuy Burton HOOPER/L GO 1    81911231270 HC OT THERAPEUTIC ACT EA 15 MIN 1/16/2019 Thuy Burton HOOPER/L GO 1               RUFUS [place]/JOSEMANUEL  1/16/2019

## 2019-01-16 NOTE — THERAPY DISCHARGE NOTE
Acute Care - Occupational Therapy Discharge Summary  [facility]     Patient Name: Nicolasa Rojas  : 1950  MRN: [id number]    Today's Date: 2019  Onset of Illness/Injury or Date of Surgery: 19    Date of Referral to OT: 19  Referring Physician: Dr. Catracho Jarrell      Admit Date: 2019        OT Recommendation and Plan    Visit Dx:    ICD-10-CM ICD-9-CM   1. Acute on chronic heart failure, unspecified heart failure type (CMS/HCC) I50.9 428.0   2. Elevated troponin R74.8 790.6   3. Impaired mobility and activities of daily living Z74.09 799.89   4. Impaired physical mobility Z74.09 781.99         Time Calculation- OT     Row Name 19 1504 19 1155          Time Calculation- OT    OT Start Time  1024  -KD  --     OT Stop Time  1048  -KD  --     OT Time Calculation (min)  24 min  -KD  --     Total Timed Code Minutes- OT  24 minute(s)  -KD  --     OT Received On  19  -KD  --        Timed Charges    56399 - Gait Training Minutes   --  10  -JA       User Key  (r) = Recorded By, (t) = Taken By, (c) = Cosigned By    Initials Name Provider Type    Esequiel Lemos, KOURTNEY Physical Therapy Assistant    Thuy Rice, RUFUS/L Occupational Therapy Assistant            Rehab Goal Summary     Row Name 19 1024 19 0950          Physical Therapy Goals    Bed Mobility Goal Selection (PT)  --  bed mobility, PT goal 1  -     Transfer Goal Selection (PT)  --  transfer, PT goal 1  -     Gait Training Goal Selection (PT)  --  gait training, PT goal 1  -     Strength Goal Selection (PT)  --  strength, PT goal 1  -        Bed Mobility Goal 1 (PT)    Activity/Assistive Device (Bed Mobility Goal 1, PT)  --  rolling to left;rolling to right;sit to supine;supine to sit  -     [place] Level/Cues Needed (Bed Mobility Goal 1, PT)  --  independent  -     Time Frame (Bed Mobility Goal 1, PT)  --  by discharge  -     Progress/Outcomes (Bed Mobility Goal 1, PT)   --  goal met  (Significant)   -        Transfer Goal 1 (PT)    Activity/Assistive Device (Transfer Goal 1, PT)  --  sit-to-stand/stand-to-sit;bed-to-chair/chair-to-bed  -     [place] Level/Cues Needed (Transfer Goal 1, PT)  --  conditional independence  -     Time Frame (Transfer Goal 1, PT)  --  by discharge  -     Progress/Outcome (Transfer Goal 1, PT)  --  goal partially met  -        Gait Training Goal 1 (PT)    Activity/Assistive Device (Gait Training Goal 1, PT)  --  gait (walking locomotion);assistive device use  -     [place] Level (Gait Training Goal 1, PT)  --  standby assist;conditional independence  -     Distance (Gait Goal 1, PT)  --  50ftx2  -     Time Frame (Gait Training Goal 1, PT)  --  by discharge  -     Progress/Outcome (Gait Training Goal 1, PT)  --  goal not met  -        Strength Goal 1 (PT)    Strength Goal 1 (PT)  --  Pt will tolerate 2 sets of 15 reps of AROM exercises OOB in chair with VSS  -     Time Frame (Strength Goal 1, PT)  --  by discharge  -     Progress/Outcome (Strength Goal 1, PT)  --  goal not met  -        Occupational Therapy Goals    Transfer Goal Selection (OT)  transfer, OT goal 1  -KD  --     Bathing Goal Selection (OT)  bathing, OT goal 1  -KD  --     Dressing Goal Selection (OT)  dressing, OT goal 1  -KD  --     Toileting Goal Selection (OT)  toileting, OT goal 1  -KD  --     Activity Tolerance Goal Selection (OT)  activity tolerance, OT goal 1  -KD  --        Transfer Goal 1 (OT)    Activity/Assistive Device (Transfer Goal 1, OT)  sit-to-stand/stand-to-sit;bed-to-chair/chair-to-bed;toilet  -KD  --     [place] Level/Cues Needed (Transfer Goal 1, OT)  conditional independence  -KD  --     Time Frame (Transfer Goal 1, OT)  long term goal (LTG);by discharge  -KD  --     Progress/Outcome (Transfer Goal 1, OT)  goal not met  -KD  --        Bathing Goal 1 (OT)    Activity/Assistive Device (Bathing Goal 1, OT)  bathing skills, all  -KD   --     [place] Level/Cues Needed (Bathing Goal 1, OT)  minimum assist (75% or more patient effort)  -KD  --     Time Frame (Bathing Goal 1, OT)  long term goal (LTG);by discharge  -KD  --     Progress/Outcomes (Bathing Goal 1, OT)  goal met;goal ongoing  -KD  --        Dressing Goal 1 (OT)    Activity/Assistive Device (Dressing Goal 1, OT)  dressing skills, all  -KD  --     [place]/Cues Needed (Dressing Goal 1, OT)  minimum assist (75% or more patient effort)  -KD  --     Time Frame (Dressing Goal 1, OT)  long term goal (LTG);by discharge  -KD  --     Progress/Outcome (Dressing Goal 1, OT)  goal met;goal ongoing  -KD  --        Toileting Goal 1 (OT)    Activity/Device (Toileting Goal 1, OT)  toileting skills, all  -KD  --     [place] Level/Cues Needed (Toileting Goal 1, OT)  conditional independence  -KD  --     Time Frame (Toileting Goal 1, OT)  long term goal (LTG);by discharge  -KD  --     Progress/Outcome (Toileting Goal 1, OT)  goal not met  -KD  --         Activity Tolerance Goal 1 (OT)    Activity Level (Endurance Goal 1, OT)  10 min activity;O2 sat >/ equal to 88%  -KD  --     Progress/Outcome (Activity Tolerance Goal 1, OT)  goal met  -KD  --       User Key  (r) = Recorded By, (t) = Taken By, (c) = Cosigned By    Initials Name Provider Type Discipline    Esequiel Lemos, KOURTNEY Physical Therapy Assistant PT    Thuy Rice, RUFUS/L Occupational Therapy Assistant OT          Outcome Measures     Row Name 01/16/19 1024 01/16/19 0950 01/15/19 1100       How much help from another person do you currently need...    Turning from your back to your side while in flat bed without using bedrails?  --  4  -JA  --    Moving from lying on back to sitting on the side of a flat bed without bedrails?  --  4  -JA  --    Moving to and from a bed to a chair (including a wheelchair)?  --  3  -JA  --    Standing up from a chair using your arms (e.g., wheelchair, bedside chair)?  --  4  -JA  --     Climbing 3-5 steps with a railing?  --  3  -JA  --    To walk in hospital room?  --  3  -JA  --    AM-PAC 6 Clicks Score  --  21  -JA  --       How much help from another is currently needed...    Putting on and taking off regular lower body clothing?  3  -KD  --  3  -KD    Bathing (including washing, rinsing, and drying)  3  -KD  --  3  -KD    Toileting (which includes using toilet bed pan or urinal)  3  -KD  --  3  -KD    Putting on and taking off regular upper body clothing  3  -KD  --  3  -KD    Taking care of personal grooming (such as brushing teeth)  3  -KD  --  3  -KD    Eating meals  4  -KD  --  4  -KD    Score  19  -KD  --  19  -KD       Functional Assessment    Outcome Measure Options  --  AM-PAC 6 Clicks Basic Mobility (PT)  -JA  --    Row Name 01/15/19 0810 01/14/19 1600 01/14/19 1420       How much help from another person do you currently need...    Turning from your back to your side while in flat bed without using bedrails?  4  -JW  4  -LN  --    Moving from lying on back to sitting on the side of a flat bed without bedrails?  4  -JW  4  -LN  --    Moving to and from a bed to a chair (including a wheelchair)?  3  -JW  3  -LN  --    Standing up from a chair using your arms (e.g., wheelchair, bedside chair)?  4  -JW  4  -LN  --    Climbing 3-5 steps with a railing?  3  -JW  3  -LN  --    To walk in hospital room?  3  -JW  3  -LN  --    AM-PAC 6 Clicks Score  21  -JW  21  -LN  --       How much help from another is currently needed...    Putting on and taking off regular lower body clothing?  --  --  3  -KD    Bathing (including washing, rinsing, and drying)  --  --  3  -KD    Toileting (which includes using toilet bed pan or urinal)  --  --  3  -KD    Putting on and taking off regular upper body clothing  --  --  3  -KD    Taking care of personal grooming (such as brushing teeth)  --  --  3  -KD    Eating meals  --  --  4  -KD    Score  --  --  19  -KD       Functional Assessment    Outcome Measure  Options  AM-PAC 6 Clicks Basic Mobility (PT)  -HUGH  AM-PAC 6 Clicks Basic Mobility (PT)  -LN  --      User Key  (r) = Recorded By, (t) = Taken By, (c) = Cosigned By    Initials Name Provider Type    Enedina Price, PTA Physical Therapy Assistant    Esequiel Lemos, PTA Physical Therapy Assistant    Elinor Loomis, PTA Physical Therapy Assistant    Thuy Rice, HOOPER/L Occupational Therapy Assistant          Therapy Suggested Charges     Code   Minutes Charges    None                 OT Discharge Summary  Anticipated Discharge Disposition (OT): home with home health  Reason for Discharge: Discharge from facility, Per MD order  Outcomes Achieved: Patient able to partially acheive established goals  Discharge Destination: Home      Lucy Luna OTR/L  1/16/2019

## 2019-01-16 NOTE — PLAN OF CARE
Problem: Fall Risk (Adult)  Goal: Identify Related Risk Factors and Signs and Symptoms  Outcome: Outcome(s) achieved Date Met: 01/16/19    Goal: Absence of Fall  Outcome: Ongoing (interventions implemented as appropriate)      Problem: Patient Care Overview  Goal: Plan of Care Review  Outcome: Ongoing (interventions implemented as appropriate)    Goal: Individualization and Mutuality  Outcome: Ongoing (interventions implemented as appropriate)    Goal: Discharge Needs Assessment  Outcome: Ongoing (interventions implemented as appropriate)    Goal: Interprofessional Rounds/Family Conf  Outcome: Ongoing (interventions implemented as appropriate)      Problem: Fluid Volume Excess (Adult)  Goal: Identify Related Risk Factors and Signs and Symptoms  Outcome: Outcome(s) achieved Date Met: 01/16/19    Goal: Optimal Fluid Balance  Outcome: Ongoing (interventions implemented as appropriate)      Problem: Diabetes, Type 2 (Adult)  Goal: Signs and Symptoms of Listed Potential Problems Will be Absent, Minimized or Managed (Diabetes, Type 2)  Outcome: Ongoing (interventions implemented as appropriate)      Problem: Cardiac: Heart Failure (Adult)  Goal: Signs and Symptoms of Listed Potential Problems Will be Absent, Minimized or Managed (Cardiac: Heart Failure)  Outcome: Ongoing (interventions implemented as appropriate)      Problem: Kidney Disease, Chronic/End Stage Renal Disease (Adult)  Goal: Signs and Symptoms of Listed Potential Problems Will be Absent, Minimized or Managed (Kidney Disease, Chronic/End Stage Renal Disease)  Outcome: Ongoing (interventions implemented as appropriate)

## 2019-01-18 NOTE — PROGRESS NOTES
"  [facility]  OFFICE NOTE    Nicolasa Rojas  68 y.o. female    01/18/2019  1. Acute on chronic diastolic CHF (congestive heart failure) (CMS/HCC)    2. Coronary artery disease involving native coronary artery of native heart with angina pectoris (CMS/HCC)    3. Chronic renal impairment, stage 3 (moderate) (CMS/HCC)    4. Hyponatremia        Chief complaint -follow-up from hospitalization from yesterday    History of present Illness- 68-year-old lady who had diastolic heart failure, pulmonary hypertension, CKD and having shortness of breath she is pretty much sedentary on a wheelchair secondary to morbid obesity with a BMI of 39.5.  She has been aggressively getting diuresed and she says she is short of breath and is being followed by nephrology also.  She had recent PCI at [place] and is on antiplatelet therapy.  I asked her to be careful about overdiuresis as it causes hypokalemia with prerenal acidemia.  She is a diabetic              Allergies   Allergen Reactions   • Iodides Anaphylaxis     Iodine and iodide containing products  \"knocks my kidneys out\"    • Iodine      Iodine and iodide containing products   • Other Unknown (See Comments)     Steroids \"makes my tongue feel like it swells\"   • Stadol [Butorphanol] Unknown (See Comments)     \"rises my BP\"         Past Medical History:   Diagnosis Date   • Acute bronchitis    • Acute congestive heart failure (CMS/HCC)     resolving   • Acute kidney failure, unspecified (CMS/HCC)    • Acute kidney failure, unspecified (CMS/HCC)    • Acute posthemorrhagic anemia    • Asthenia    • Chest pain    • Chronic gastritis    • Chronic pharyngitis    • Chronic renal impairment    • Congenital renal failure    • Congestive heart failure (CMS/HCC)    • Contact dermatitis due to poison ivy    • COPD (chronic obstructive pulmonary disease) (CMS/HCC)    • Coronary arteriosclerosis    • D-dimer, elevated     D-dimer above reference range    • " Degenerative joint disease involving multiple joints     back   • Depressive disorder    • Dysphagia    • Dyspnea    • Edema of lower extremity    • Encounter for immunization    • Encounter for long-term (current) use of medications    • Epigastric pain    • Esophagitis    • Essential hypertension    • Gastroesophageal reflux disease    • Gastroparesis     Gastroparesis syndrome    • Generalized abdominal pain    • H/O bone density study 01/09/2015   • H/O mammogram 01/15/2015   • H/O screening mammography 11/12/2013   • Headache    • History of transfusion    • Hyperlipidemia    • Hypokalemia    • Hypomagnesemia    • Injury of tendon of rotator cuff     Injury of tendon of the rotator cuff of shoulder      • Insomnia    • Insomnia, unspecified    • Knee pain    • Nausea and vomiting    • Neck pain    • Need for immunization against influenza    • Need for prophylactic vaccination and inoculation against influenza    • Need for prophylactic vaccination and inoculation against viral disease    • 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 Performed at [facility].   • ENDOSCOPY  02/09/2015    ESOPHAGEAL STRICTURE PRESENT, GASTRITIS FOUND IN THE STOMACH, NORMAL DUODENUM   • ENDOSCOPY W/ PEG TUBE PLACEMENT  12/19/2012    Esophagitis seen. Biopsy taken. Esophageal stricture present. Dilatation performed. Gastritis in stomach. Biopsy taken. Food was in stomach. Normal duodenum.   • HERNIA REPAIR     • HYSTERECTOMY      partial   • NECK SURGERY           Family History   Problem Relation Age of Onset   • Cancer  Mother    • Diabetes Mother    • Stroke Father    • Heart disease Sister    • Colon cancer Other    • Hypertension Other    • Breast cancer Maternal Aunt          Social History     Socioeconomic History   • Marital status:      Spouse name: Not on file   • Number of children: Not on file   • Years of education: Not on file   • Highest education level: Not on file   Social Needs   • Financial resource strain: Not on file   • Food insecurity - worry: Not on file   • Food insecurity - inability: Not on file   • Transportation needs - medical: Not on file   • Transportation needs - non-medical: Not on file   Occupational History   • Not on file   Tobacco Use   • Smoking status: Former Smoker   • Smokeless tobacco: Never Used   • Tobacco comment: quit 30 years ago    Substance and Sexual Activity   • Alcohol use: No   • Drug use: No   • Sexual activity: Defer   Other Topics Concern   • Not on file   Social History Narrative    Pt denies ever having a feeding tube, denies PEG tube placement.           Current Outpatient Medications   Medication Sig Dispense Refill   • aspirin 81 MG chewable tablet Chew 81 mg Daily.     • bumetanide (BUMEX) 2 MG tablet Take 1 tablet by mouth Daily. 90 tablet 1   • carvedilol (COREG) 3.125 MG tablet Take 6.25 mg by mouth 2 (Two) Times a Day With Meals.     • clopidogrel (PLAVIX) 75 MG tablet Take 1 tablet by mouth Daily. 90 tablet 1   • ferrous sulfate 324 (65 Fe) MG tablet delayed-release EC tablet Take 1 tablet by mouth 2 (Two) Times a Day With Meals. 30 tablet 1   • glucose blood test strip Needs to see pcp for refills 900 each 0   • HUMALOG 100 UNIT/ML injection INJECT 150 UNITS SUB-Q DAILY PER INSULIN PUMP 40 mL 11   • hydrALAZINE (APRESOLINE) 50 MG tablet Take 1 tablet by mouth 2 (Two) Times a Day. (Patient taking differently: Take 75 mg by mouth 3 (Three) Times a Day.) 180 tablet 1   • HYDROcodone-acetaminophen (NORCO)  MG per tablet Take 1 tablet by mouth 4 (Four)  "Times a Day.     • Insulin Lispro 100 UNIT/ML solution cartridge Inject 150 Units under the skin Daily. DX: E11 5 cartridge 11   • Insulin Syringe-Needle U-100 (GLOBAL INSULIN SYRINGES) 30G X 5/16\" 0.3 ML misc SQ injections as needed when insulin pump is non functioning 50 each 1   • isosorbide mononitrate (IMDUR) 60 MG 24 hr tablet Take 1 tablet by mouth 2 (Two) Times a Day. 30 tablet 1   • lisinopril (PRINIVIL,ZESTRIL) 10 MG tablet Take 1 tablet by mouth Daily. 30 tablet 1   • metOLazone (ZAROXOLYN) 2.5 MG tablet Take 1 tablet by mouth Every Other Day for 7 doses. 77 tablet 0   • nitroglycerin (NITROLINGUAL) 0.4 MG/SPRAY spray Place 1 spray under the tongue Every 5 (Five) Minutes As Needed for Chest Pain. 4.9 g 5   • potassium chloride (MICRO-K) 10 MEQ CR capsule Take 2 capsules by mouth 2 (Two) Times a Day. 60 capsule 3   • raNITIdine (ZANTAC) 300 MG tablet TAKE 1 TABLET BY MOUTH EVERY NIGHT. 90 tablet 1   • sertraline (ZOLOFT) 50 MG tablet Take 1 tablet by mouth Daily. 90 tablet 1   • spironolactone (ALDACTONE) 25 MG tablet Take 1 tablet by mouth Daily. 30 tablet 1   • vitamin D (ERGOCALCIFEROL) 62211 UNITS capsule capsule Take 1 capsule by mouth 1 (One) Time Per Week. (Patient taking differently: Take 50,000 Units by mouth 1 (One) Time Per Week. Friday) 4 capsule 6   • zolpidem (AMBIEN) 10 MG tablet Take 1 tablet by mouth At Night As Needed for Sleep. 30 tablet 5   • rosuvastatin (CRESTOR) 20 MG tablet Take 1 tablet by mouth Daily. 90 tablet 3     No current facility-administered medications for this visit.          Review of Systems     Constitution: Denies any fatigue, fever or chills    HENT: Denies any headache, hearing impairment,     Eyes: Denies any blurring of vision, or photophobia     Cardivascular - As per history of present illness     Respiratory system- dyspnea NYHA class III, obstructive sleep apnea     Endocrine:   history of hyperlipidemia, diabetes,  Hypothyroidism                       " "Musculoskeletal:   history of arthritis with musculoskeletal problems    Gastrointestinal: No nausea, vomiting, or melena    Genitourinary: Chronic kidney disease stage III    Neurological:   No history of seizure disorder, stroke, memory problems    Psychiatric/Behavioral:        No history of depression    Hematological- no history of easy bruising or any bleeding diathesis            OBJECTIVE    /70   Ht 162.6 cm (64.02\")   Wt 104 kg (230 lb)   BMI 39.46 kg/m²       Physical Exam     Constitutional: is oriented to person, place, and time.     Skin-warm and dry    Well developed and nourished on oxygen     Head: Normocephalic and atraumatic.     Eyes: Pupils are equal    Neck: Neck supple. No bruit in the carotids    Cardiovascular: [place] in the fifth intercostal space   Regular rate, and  Rhythm,    S1 greater than S2,     Pulmonary/Chest:   Air  Entry is equal on both sides  No wheezing or crackles,      Abdominal: Soft.  Morbid obesity    Musculoskeletal: No kyphoscoliosis, no significant thickening of the joints    Neurological: is alert and oriented to person, place, and time.    cranial nerve are intact .   No motor or sensory deficit    Extremities-no edema, no radial femoral delay      Psychiatric: He has a normal mood and affect.                  His behavior is normal.           Procedures      Lab Results   Component Value Date    WBC 4.41 01/16/2019    HGB 8.2 (L) 01/16/2019    HCT 26.4 (L) 01/16/2019    .5 (H) 01/16/2019     (L) 01/16/2019     Lab Results   Component Value Date    GLUCOSE 95 01/16/2019    BUN 57 (H) 01/16/2019    CREATININE 1.47 (H) 01/16/2019    EGFRIFNONA 35 (L) 01/16/2019    BCR 38.8 (H) 01/16/2019    CO2 30.0 01/16/2019    CALCIUM 8.4 01/16/2019    ALBUMIN 3.80 01/07/2019    AST 43 (H) 01/07/2019    ALT 18 01/07/2019     Lab Results   Component Value Date    CHOL 135 (L) 01/31/2018    CHOL 118 (L) 05/17/2017     Lab Results   Component Value Date    TRIG 72 " 01/31/2018    TRIG 73 05/17/2017    TRIG 81 03/14/2016     Lab Results   Component Value Date    HDL 46 01/31/2018    HDL 32 (L) 05/17/2017    HDL 29.4 (L) 03/14/2016     No components found for: LDLCALC  Lab Results   Component Value Date    LDL 75 01/31/2018    LDL 71 05/17/2017    LDL 74 03/14/2016     No results found for: HDLLDLRATIO  No components found for: CHOLHDL  Lab Results   Component Value Date    HGBA1C 4.0 12/28/2018     Lab Results   Component Value Date    TSH 2.740 01/31/2018    S9ONPUA 8.48 01/31/2018                  A/P  CAD status post stent placement at [place] currently on antiplatelet therapy with Plavix and aspirin, has chronic kidney disease stage III with diastolic heart failure on aggressive diuresis per nephrology.    Diabetes on insulin    Hypertension on lisinopril, Imdur, Aldactone and Coreg and her EF is normal she has preserved LV function with diastolic heart failure.    Pulmonary hypertension due to multifactorial-due to obesity, sleep apnea.    Shortness of breath with deconditioning-need rehabilitation.    Follow-up in 2 months          This document has been electronically signed by Yaniv Lobo MD on January 18, 2019 10:41 AM       EMR Dragon/Transcription disclaimer:   Some of this note may be an electronic transcription/translation of spoken language to printed text. The electronic translation of spoken language may permit erroneous, or at times, nonsensical words or phrases to be inadvertently transcribed; Although I have reviewed the note for such errors, some may still exist.

## 2019-01-18 NOTE — OUTREACH NOTE
CHF Week 1 Survey      Responses   Facility patient discharged from?  [facility]   Does the patient have one of the following disease processes/diagnoses(primary or secondary)?  CHF   Is there a successful TCM telephone encounter documented?  No   CHF Week 1 attempt successful?  No   Unsuccessful attempts  Attempt 1          Enedina Fernandez RN

## 2019-01-21 PROBLEM — E66.01 CLASS 2 SEVERE OBESITY DUE TO EXCESS CALORIES WITH SERIOUS COMORBIDITY AND BODY MASS INDEX (BMI) OF 39.0 TO 39.9 IN ADULT (HCC): Status: ACTIVE | Noted: 2019-01-01

## 2019-01-21 NOTE — OUTREACH NOTE
CHF Week 1 Survey      Responses   Facility patient discharged from?  [facility]   Does the patient have one of the following disease processes/diagnoses(primary or secondary)?  CHF   Is there a successful TCM telephone encounter documented?  No   CHF Week 1 attempt successful?  No   Unsuccessful attempts  Attempt 2          Fannie Jang RN  
no

## 2019-01-23 NOTE — OUTREACH NOTE
CHF Week 2 Survey      Responses   Facility patient discharged from?  [place]   Does the patient have one of the following disease processes/diagnoses(primary or secondary)?  CHF   Week 2 attempt successful?  No   Unsuccessful attempts  Attempt 1          Maryjane Flaherty RN

## 2019-01-25 NOTE — OUTREACH NOTE
CHF Week 2 Survey      Responses   Facility patient discharged from?  [facility]   Does the patient have one of the following disease processes/diagnoses(primary or secondary)?  CHF   Week 2 attempt successful?  No   Unsuccessful attempts  Attempt 2          Yasmin Mandel RN

## 2019-02-06 PROBLEM — I21.4 NSTEMI (NON-ST ELEVATED MYOCARDIAL INFARCTION) (HCC): Status: ACTIVE | Noted: 2019-01-01

## 2019-02-06 NOTE — H&P
[facility] Admission      Date of Admission: 2/6/2019      Primary Care Physician: Henry Muniz MD      Chief Complaint:  Chest pain, generalized weakness    HPI: She is a 68-year-old female with known coronary artery disease, congestive heart failure, and COPD who presented to the [facility] emergency department with complaints of generalized weakness and chest pain on the day before admission.  She states on the day prior to admission she had a burning type chest pain in her midsternal area that radiated to her shoulder and was associated with significant nausea and vomiting.  She states that she was somewhat short of breath.  Her  states that he thinks that she may have been less short of breath than normal on the day prior to admission as she is always short of breath.  She states that the chest pain went away with 3 nitroglycerin.  And she stayed at home.  She slipped with no further symptoms, but when she woke up this morning she had significant weakness and difficulty with balance and walking.  She did not have any specific chest pain or shortness of breath symptoms on morning of admission.  She presented to [facility] emergency department was found to have a markedly elevated troponin at 2.84 and a creatinine that is higher than her baseline.  She was transferred to our facility for further specialty workup.  During my review she was chest pain-free.  There are no alleviating or exacerbating factors.    Concurrent Medical History:  has a past medical history of Acute bronchitis, Acute congestive heart failure (CMS/HCC), Acute kidney failure, unspecified (CMS/HCC), Acute kidney failure, unspecified (CMS/HCC), Acute posthemorrhagic anemia, Asthenia, Chest pain, Chronic gastritis, Chronic pharyngitis, Chronic renal impairment, Congenital renal failure, Congestive heart failure (CMS/HCC), Contact dermatitis due to poison ivy, COPD  "(chronic obstructive pulmonary disease) (CMS/[facility]), Coronary arteriosclerosis, D-dimer, elevated, Degenerative joint disease involving multiple joints, Depressive disorder, Dysphagia, Dyspnea, Edema of lower extremity, Encounter for immunization, Encounter for long-term (current) use of medications, Epigastric pain, Esophagitis, Essential hypertension, Gastroesophageal reflux disease, Gastroparesis, Generalized abdominal pain, H/O bone density study (01/09/2015), H/O mammogram (01/15/2015), H/O screening mammography (11/12/2013), Headache, History of transfusion, Hyperlipidemia, Hypokalemia, Hypomagnesemia, Injury of tendon of rotator cuff, Insomnia, Insomnia, unspecified, Knee pain, Nausea and vomiting, Neck pain, Need for immunization against influenza, Need for prophylactic vaccination and inoculation against influenza, Need for prophylactic vaccination and inoculation against viral disease, Neoplasm of uncertain behavior of breast, Orthopnea, Osteoarthritis, Pain of breast, Shoulder pain, Sleep disorder, Stricture of esophagus, Symptomatic menopausal or female climacteric states, and Type 2 diabetes mellitus (CMS/[facility]).    Past Surgical History:  has a past surgical history that includes Hernia repair; Cholecystectomy; Colonoscopy (03/29/2012); Esophagogastroduodenoscopy (02/09/2015); Esophagogastroduodenoscopy w/ PEG (12/19/2012); Neck surgery; Breast biopsy (Bilateral); and Hysterectomy.    Family History: family history includes Breast cancer in her maternal aunt; Cancer in her mother; Colon cancer in her other; Diabetes in her mother; Heart disease in her sister; Hypertension in her other; Stroke in her father.     Social History:  reports that she has quit smoking. she has never used smokeless tobacco. She reports that she does not drink alcohol or use drugs.    Allergies:   Allergies   Allergen Reactions   • Iodides Anaphylaxis     Iodine and iodide containing products  \"knocks my kidneys out\"    • Iodine  " "    Iodine and iodide containing products   • Other Unknown (See Comments)     Steroids \"makes my tongue feel like it swells\"   • Stadol [Butorphanol] Unknown (See Comments)     \"rises my BP\"       Medications:   Medications Prior to Admission   Medication Sig Dispense Refill Last Dose   • aspirin 81 MG chewable tablet Chew 81 mg Daily.   Taking   • bumetanide (BUMEX) 2 MG tablet Take 1 tablet by mouth Daily. 90 tablet 1 Taking   • carvedilol (COREG) 3.125 MG tablet Take 6.25 mg by mouth 2 (Two) Times a Day With Meals.   Taking   • clopidogrel (PLAVIX) 75 MG tablet Take 1 tablet by mouth Daily. 90 tablet 1 Taking   • ferrous sulfate 324 (65 Fe) MG tablet delayed-release EC tablet Take 1 tablet by mouth 2 (Two) Times a Day With Meals. 30 tablet 1 Taking   • glucose blood test strip Needs to see pcp for refills 900 each 0 Taking   • HUMALOG 100 UNIT/ML injection INJECT 150 UNITS SUB-Q DAILY PER INSULIN PUMP 40 mL 11 Taking   • hydrALAZINE (APRESOLINE) 50 MG tablet Take 1 tablet by mouth 2 (Two) Times a Day. (Patient taking differently: Take 75 mg by mouth 3 (Three) Times a Day.) 180 tablet 1 Taking   • HYDROcodone-acetaminophen (NORCO)  MG per tablet Take 1 tablet by mouth 4 (Four) Times a Day.   Taking   • Insulin Lispro 100 UNIT/ML solution cartridge Inject 150 Units under the skin Daily. DX: E11 5 cartridge 11 Taking   • Insulin Syringe-Needle U-100 (GLOBAL INSULIN SYRINGES) 30G X 5/16\" 0.3 ML misc SQ injections as needed when insulin pump is non functioning 50 each 1 Taking   • isosorbide mononitrate (IMDUR) 60 MG 24 hr tablet Take 1 tablet by mouth 2 (Two) Times a Day. 30 tablet 1 Taking   • lisinopril (PRINIVIL,ZESTRIL) 10 MG tablet Take 1 tablet by mouth Daily. 30 tablet 1 Taking   • nitroglycerin (NITROLINGUAL) 0.4 MG/SPRAY spray Place 1 spray under the tongue Every 5 (Five) Minutes As Needed for Chest Pain. 4.9 g 5 Taking   • potassium chloride (MICRO-K) 10 MEQ CR capsule Take 2 capsules by mouth 2 " (Two) Times a Day. 60 capsule 3 Taking   • raNITIdine (ZANTAC) 300 MG tablet TAKE 1 TABLET BY MOUTH EVERY NIGHT. 90 tablet 1 Taking   • rosuvastatin (CRESTOR) 20 MG tablet Take 1 tablet by mouth Daily. 90 tablet 3    • sertraline (ZOLOFT) 50 MG tablet Take 1 tablet by mouth Daily. 90 tablet 1 Taking   • spironolactone (ALDACTONE) 25 MG tablet Take 1 tablet by mouth Daily. 30 tablet 1 Taking   • vitamin D (ERGOCALCIFEROL) 32718 UNITS capsule capsule Take 1 capsule by mouth 1 (One) Time Per Week. (Patient taking differently: Take 50,000 Units by mouth 1 (One) Time Per Week. Friday) 4 capsule 6 Taking   • zolpidem (AMBIEN) 10 MG tablet Take 1 tablet by mouth At Night As Needed for Sleep. 30 tablet 5 Taking       Review of Systems:  Review of Systems   Constitutional: Positive for activity change and fatigue. Negative for appetite change, chills, fever and unexpected weight change.   HENT: Negative for congestion, facial swelling, hearing loss, nosebleeds, rhinorrhea, sneezing, trouble swallowing and voice change.    Eyes: Negative for photophobia and visual disturbance.   Respiratory: Positive for chest tightness and shortness of breath. Negative for apnea, cough, choking, wheezing and stridor.    Cardiovascular: Positive for chest pain. Negative for palpitations and leg swelling.   Gastrointestinal: Positive for nausea and vomiting. Negative for abdominal pain, blood in stool, constipation and diarrhea.   Endocrine: Negative for cold intolerance, heat intolerance, polydipsia, polyphagia and polyuria.   Genitourinary: Negative for dysuria, flank pain and hematuria.   Musculoskeletal: Negative for arthralgias, back pain, myalgias and neck pain.   Skin: Negative for rash and wound.   Allergic/Immunologic: Negative for immunocompromised state.   Neurological: Positive for weakness. Negative for dizziness, seizures, syncope, speech difficulty, light-headedness, numbness and headaches.   Hematological: Does not bruise/bleed  easily.   Psychiatric/Behavioral: Negative for agitation, behavioral problems, confusion, decreased concentration, hallucinations, self-injury and suicidal ideas. The patient is not nervous/anxious.       Otherwise complete ROS is negative except as mentioned above.    Physical Exam:   Temp:  [98.8 °F (37.1 °C)] 98.8 °F (37.1 °C)  Heart Rate:  [70-73] 70  Resp:  [18] 18  BP: (159)/(70) 159/70     Physical Exam   Constitutional: She is oriented to person, place, and time. She appears well-developed and well-nourished.   HENT:   Head: Normocephalic and atraumatic.   Nose: Nose normal.   Mouth/Throat: Oropharynx is clear and moist.   Eyes: Conjunctivae, EOM and lids are normal. Pupils are equal, round, and reactive to light. No scleral icterus.   Neck: Normal range of motion. Neck supple. No JVD present. No tracheal tenderness and no spinous process tenderness present. No neck rigidity. No tracheal deviation, no edema and normal range of motion present.   Cardiovascular: Normal rate, regular rhythm, S1 normal, S2 normal, normal heart sounds and normal pulses. Exam reveals no gallop and no friction rub.   No murmur heard.  Pulmonary/Chest: Effort normal and breath sounds normal. No accessory muscle usage. No respiratory distress. She has no decreased breath sounds. She has no wheezes. She has no rales. She exhibits no tenderness.   Abdominal: Soft. Bowel sounds are normal. She exhibits no distension and no mass. There is no tenderness. There is no rebound and no guarding.   Musculoskeletal: She exhibits no edema or tenderness.   Neurological: She is alert and oriented to person, place, and time. She has normal reflexes. She displays no atrophy and normal reflexes. No cranial nerve deficit or sensory deficit. She exhibits normal muscle tone. She displays no seizure activity. Coordination normal.   Skin: Skin is warm. No rash noted. No pallor.   Psychiatric: She has a normal mood and affect. Her behavior is normal.  Judgment and thought content normal.         Results Reviewed:  I have personally reviewed current lab, radiology, and data and agree with results.  Lab Results (last 24 hours)     ** No results found for the last 24 hours. **        Imaging Results (last 24 hours)     ** No results found for the last 24 hours. **            Assessment:    Active Hospital Problems    Diagnosis   • NSTEMI (non-ST elevated myocardial infarction) (CMS/[facility])             Plan:  1.  Non-ST elevation myocardial infarction: Patient with chest pain and shortness of breath yesterday.  Her chest pain was atypical in nature.  EKG today is unchanged.  Her troponin; however, was 2.84 at [facility].  Laboratory data is pending at our facility.  At home she is on aspirin, Plavix, Coreg, lisinopril, and Crestor.  We will continue all of those except lisinopril due to her renal failure.  Cardiology has been consulted.  2.  Acute kidney injury on chronic kidney disease: Patient with a baseline creatinine that has been reported as 1.5.  Creatinine was 2.5 at [facility].  That is up from 2.18 when she was seen by Dr. Dugan on February 1.  Will hold nephrotoxic agents.  Nephrology has been consulted.  3.  Elevated BNP: Echocardiogram done on January 8 of this year showed ejection fraction of 57% with grade 1 diastolic dysfunction.  Elevated BNP is likely secondary to acute kidney injury and non-ST elevation myocardial infarction.  4.  COPD: Patient is chronically short of breath per her and her .  They state that she is breathing better now than at her baseline.  5.  Hyponatremia: Patient's sodium was 119 at [facility].  Repeat is pending.  6.  DVT prophylaxis: Heparin.    I discussed the patient's findings and my recommendations with: patient and spouse        This document has been electronically signed by Raphael Middleton MD on February 6, 2019 5:38 PM

## 2019-02-07 NOTE — CONSULTS
Adult Nutrition  Assessment    Patient Name:  Nicolasa Rojas  YOB: 1950  MRN: [id number]  Admit Date:  2/6/2019    Assessment Date:  2/7/2019    Comments:   present.  Good appetite reported.  Intake 100% - 1x, 75% - 1x.   indicates pt wt will fluctuate 1-2 lb.  Hgb, Hct are low.  Ferrous Sulfate prescribed.  Blood glucose is usually elevated.  Pt uses an insulin pump to manage blood sugars.  Will maintain pt on prescried diet.    Reason for Assessment     Row Name 02/07/19 1654          Reason for Assessment    Reason For Assessment  identified at risk by screening criteria     Identified At Risk by Screening Criteria  MST SCORE 2+             Labs/Tests/Procedures/Meds     Row Name 02/07/19 1656          Labs/Procedures/Meds    Lab Results Reviewed  reviewed, pertinent        Medications    Pertinent Medications Reviewed  reviewed, pertinent           Estimated/Assessed Needs     Row Name 02/07/19 3538          Calculation Measurements    Weight Used For Calculations  62.8 kg (138 lb 7.2 oz)        Estimated/Assessed Needs    Additional Documentation  Calorie Requirements (Group);Fluid Requirements (Group);Lafour[place]-St. Jeor Equation (Group);Protein Requirements (Group)        Calorie Requirements    Estimated Calorie Requirement (kcal/day)  1458     Estimated Calorie Need Method  L[place]-St Jeor        KCAL/KG    14 Kcal/Kg (kcal)  879.2     15 Kcal/Kg (kcal)  942     18 Kcal/Kg (kcal)  1130.4     20 Kcal/Kg (kcal)  1256     25 Kcal/Kg (kcal)  1570     30 Kcal/Kg (kcal)  1884     35 Kcal/Kg (kcal)  2198     40 Kcal/Kg (kcal)  2512     45 Kcal/Kg (kcal)  2826     50 Kcal/Kg (kcal)  3140        Lafourche-St. Jeor Equation    RMR (Lafourche-St. Jeor Equation)  1122.13        Protein Requirements    Est Protein Requirement Amount (gms/kg)  1.2 gm protein     Estimated Protein Requirements (gms/day)  75.36        Fluid Requirements    Estimated Fluid Requirements (mL/day)  1571     RDA  Method (mL)  1571     Bre Method (over 20 kg)  2756         Nutrition Prescription Ordered     Row Name 02/07/19 1655          Nutrition Prescription PO    Current PO Diet  Regular     Common Modifiers  Cardiac;Consistent Carbohydrate;Renal         Evaluation of Received Nutrient/Fluid Intake     Row Name 02/07/19 1656 02/07/19 1654       Calculation Measurements    Weight Used For Calculations  --  62.8 kg (138 lb 7.2 oz)       PO Evaluation    Number of Meals  2  --    % PO Intake  100% - 1x, 75% - 1x  --        Evaluation of Prescribed Nutrient/Fluid Intake     Row Name 02/07/19 1654          Calculation Measurements    Weight Used For Calculations  62.8 kg (138 lb 7.2 oz)             Electronically signed by:  Shraddha Clemente, ZURI  02/07/19 4:57 PM

## 2019-02-07 NOTE — PLAN OF CARE
Problem: Patient Care Overview  Goal: Plan of Care Review  Outcome: Ongoing (interventions implemented as appropriate)  Encourage adequate intake.   02/07/19 1706   Coping/Psychosocial   Plan of Care Reviewed With patient;spouse   Plan of Care Review   Progress no change   OTHER   Outcome Summary initial assessment

## 2019-02-07 NOTE — PROGRESS NOTES
Discharge Planning Assessment  [facility]     Patient Name: Nicolasa Rojas  MRN: [id number]  Today's Date: 2/7/2019    Admit Date: 2/6/2019    Discharge Needs Assessment     Row Name 02/07/19 1014       Living Environment    Lives With  spouse    Current Living Arrangements  home/apartment/condo Information confirmed on face sheet with patient.    Primary Care Provided by  spouse/significant other    Provides Primary Care For  no one    Family Caregiver if Needed  spouse    Quality of Family Relationships  helpful;involved;supportive    Able to Return to Prior Arrangements  yes       Resource/Environmental Concerns    Transportation Concerns  car, none       Transition Planning    Patient/Family Anticipates Transition to  home with family    Patient/Family Anticipated Services at Transition  home health care    Transportation Anticipated  family or friend will provide Spouse assists with transportation.       Discharge Needs Assessment    Readmission Within the Last 30 Days  other (see comments);current reason for admission unrelated to previous admission DX: NSTEMI    Concerns to be Addressed  adjustment to diagnosis/illness    Concerns Comments  Spouse voiced that he monitors patient's blood sugar, blood pressure and performs daily weights. He voiced that he also keeps record.  Spouse voiced that patient is compliant with fluid and diet restrictions. He states she is compliant with home medications and also with follow up appointments with providers.     Equipment Currently Used at Home  bipap/cpap;cane, straight;walker, rolling;nebulizer;oxygen Patient require 3 liters of continuous oxygen. Portable tanks are available for travel. DME vendor of choice is [employer].     Anticipated Changes Related to Illness  none    Equipment Needed After Discharge  none    Discharge Facility/Level of Care Needs  home with home health    Offered/Gave Vendor List  yes    Patient's Choice of Community Agency(s)   [facility] home health    Current Discharge Risk  chronically ill DM and COPD        Discharge Plan     Row Name 02/07/19 1018       Plan    Plan  home with home health services skilled care v's a transition visit    Plan Comments  LACE complete. Note left for MD requesting home health services for skilled care v's transition visit. Both patient and spouse are agreeable to services.         Destination      No service coordination in this encounter.      Durable Medical Equipment      No service coordination in this encounter.      Dialysis/Infusion      No service coordination in this encounter.      Home Medical Care      No service coordination in this encounter.      Community Resources      No service coordination in this encounter.          Demographic Summary     Row Name 02/07/19 1006       General Information    Admission Type  inpatient    Arrived From  emergency department Transfer from [facility]    Referral Source  high risk screening    Preferred Language  English     Used During This Interaction  no    General Information Comments  Spouse assisted with completion of assessment.         Functional Status     Row Name 02/07/19 1007       Functional Status    Usual Activity Tolerance  moderate    Functional Status Comments  Patient voiced that she is independent with ambulation. Patient requires the use of an assistive device cane and/or walker. Patient states that her spouse assists with dressing and bathing.        Functional Status, IADL    Medications  independent;other (see comments) Pharmacy, [facility], and coverage confirmed with patient. Patient receives medications prefilled in a pill pack provided by the drug store.      Meal Preparation  assistive person    Housekeeping  assistive person    Laundry  assistive person    Shopping  assistive person    IADL Comments  Spouse performs most of the IADL's and also assists patient with ADL needs.         Mental Status Summary    Recent  Changes in Mental Status/Cognitive Functioning  no changes       Employment/    Employment Status  disabled        Psychosocial    No documentation.       Abuse/Neglect    No documentation.       Legal    No documentation.       Substance Abuse    No documentation.       Patient Forms    No documentation.           RUBIO Bradley

## 2019-02-07 NOTE — H&P
[facility]  [address] [place] [address]. [address]   - [phone]  F  [phone]     Consultation         PATIENT  DEMOGRAPHICS   PATIENT NAME: Nicolasa Rojas                      PHYSICIAN: Delia Carrasquillo, Medical Student  : 1950  MRN: [id number]    Subjective   SUBJECTIVE   Referring Provider: Raphael Middleton MD   Reason for Consultation: MONE on CKD   History of present illness:  Mrs. Nicolasa Rojas is a 69 yo. Woman who presented to the ED in [facility] with chest pain that she describes as crushing. The pain radiated down her abdomen and down her left arm, it was also substernal. She had nausea and vomiting, diaphoresis. She was SOB during presentation but is no longer SOB on nasal cannula. She took 3 nitroglycerin at home and it did not resolve her chest pain so she told her  to call the ambulance. She was diagnosed with an NSTEMI and was transferred here. Her troponins were elevated to 2.84 in the ED and her Cr was elevated above her baseline.     She has ckd 3 with baseline 1.7-1.8 and in last admission it improved to 1.4. She is discharged on bumex aldactone and kcl (bc of persistent low k despite on aldactone). Lab work in office showed k of 5.5 and we have stopped kcl and held aldactone for 2 days. Also she has some metabolic alkalosis and bumex is reduced to daily.     Past Medical History:   Diagnosis Date   • Acute bronchitis    • Acute congestive heart failure (CMS/HCC)     resolving   • Acute kidney failure, unspecified (CMS/HCC)    • Acute kidney failure, unspecified (CMS/HCC)    • Acute posthemorrhagic anemia    • Asthenia    • Chest pain    • Chronic gastritis    • Chronic pharyngitis    • Chronic renal impairment    • Congenital renal failure    • Congestive heart failure (CMS/HCC)    • Contact dermatitis due to poison ivy    • COPD (chronic obstructive pulmonary disease) (CMS/HCC)    • Coronary arteriosclerosis    • D-dimer, elevated      D-dimer above reference range    • Degenerative joint disease involving multiple joints     back   • Depressive disorder    • Dysphagia    • Dyspnea    • Edema of lower extremity    • Encounter for immunization    • Encounter for long-term (current) use of medications    • Epigastric pain    • Esophagitis    • Essential hypertension    • Gastroesophageal reflux disease    • Gastroparesis     Gastroparesis syndrome    • Generalized abdominal pain    • H/O bone density study 01/09/2015   • H/O mammogram 01/15/2015   • H/O screening mammography 11/12/2013   • Headache    • History of transfusion    • Hyperlipidemia    • Hypokalemia    • Hypomagnesemia    • Injury of tendon of rotator cuff     Injury of tendon of the rotator cuff of shoulder      • Insomnia    • Insomnia, unspecified    • Knee pain    • Nausea and vomiting    • Neck pain    • Need for immunization against influenza    • Need for prophylactic vaccination and inoculation against influenza    • Need for prophylactic vaccination and inoculation against viral disease    • 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 Performed at [facility].   • ENDOSCOPY  02/09/2015    ESOPHAGEAL STRICTURE PRESENT, GASTRITIS FOUND IN THE STOMACH, NORMAL DUODENUM   • ENDOSCOPY W/ PEG TUBE PLACEMENT  12/19/2012    Esophagitis seen. Biopsy taken. Esophageal stricture present. Dilatation performed. Gastritis in stomach. Biopsy taken. Food was in stomach. Normal duodenum.   • HERNIA REPAIR     • HYSTERECTOMY      partial   • NECK SURGERY       Family History   Problem Relation  "Age of Onset   • Cancer Mother    • Diabetes Mother    • Stroke Father    • Heart disease Sister    • Colon cancer Other    • Hypertension Other    • Breast cancer Maternal Aunt      Social History     Tobacco Use   • Smoking status: Former Smoker   • Smokeless tobacco: Never Used   • Tobacco comment: quit 30 years ago    Substance Use Topics   • Alcohol use: No   • Drug use: No     Allergies:  Iodides; Iodine; Other; and Stadol [butorphanol]     REVIEW OF SYSTEMS    Review of Systems   Constitutional: Positive for activity change and fatigue.   Respiratory: Positive for chest tightness and shortness of breath. Negative for wheezing.    Cardiovascular: Positive for chest pain.   Gastrointestinal: Positive for nausea and vomiting.   Skin: Negative for pallor.   Neurological: Positive for weakness.       Objective   OBJECTIVE   Vital Signs  Temp:  [96.5 °F (35.8 °C)-98.8 °F (37.1 °C)] 96.8 °F (36 °C)  Heart Rate:  [47-73] 53  Resp:  [18] 18  BP: (100-159)/(49-70) 138/60    Flowsheet Rows      First Filed Value   Admission Height  160 cm (63\") Documented at 02/06/2019 1612   Admission Weight  102 kg (224 lb 12.8 oz) Documented at 02/06/2019 1612           I/O last 3 completed shifts:  In: 240 [P.O.:240]  Out: 450 [Urine:450]    PHYSICAL EXAM    Physical Exam   HENT:   Head: Normocephalic.   Cardiovascular: Normal rate, regular rhythm and normal heart sounds.   No murmur heard.  Pulmonary/Chest: Effort normal and breath sounds normal. She has no wheezes. She has no rales. She exhibits no tenderness.   Skin: Skin is warm and dry.       RESULTS   Results Review:    Results from last 7 days   Lab Units 02/07/19  0513 02/06/19  1747 02/01/19  1143   SODIUM mmol/L 122* 121* 133*   POTASSIUM mmol/L 4.0 4.6 5.5*   CHLORIDE mmol/L 81* 77* 93*   CO2 mmol/L 28.0 29.0 31.0*   BUN mg/dL 86* 83* 68*   CREATININE mg/dL 2.23* 2.11* 2.18*   CALCIUM mg/dL 8.7 8.7 9.1   GLUCOSE mg/dL 88 122* 200*       Estimated Creatinine Clearance: 27 " mL/min (A) (by C-G formula based on SCr of 2.23 mg/dL (H)).    Results from last 7 days   Lab Units 02/01/19  1143   MAGNESIUM mg/dL 2.2             Results from last 7 days   Lab Units 02/07/19  0513 02/06/19  1747   WBC 10*3/mm3 5.16 5.38   HEMOGLOBIN g/dL 8.4* 8.7*   PLATELETS 10*3/mm3 149* 161              MEDICATIONS      aspirin 81 mg Oral Daily   carvedilol 3.125 mg Oral BID With Meals   clopidogrel 75 mg Oral Daily   famotidine 40 mg Oral Daily   ferrous sulfate 324 mg Oral BID With Meals   heparin (porcine) 5,000 Units Subcutaneous Q12H   hydrALAZINE 50 mg Oral BID   HYDROcodone-acetaminophen 1 tablet Oral 4x Daily   isosorbide mononitrate 60 mg Oral BID   rosuvastatin 20 mg Oral Daily   sertraline 50 mg Oral Daily   sodium chloride 10 mL Intravenous Q12H   spironolactone 25 mg Oral Daily       insulin      Medications Prior to Admission   Medication Sig Dispense Refill Last Dose   • aspirin 81 MG chewable tablet Chew 81 mg Daily.   Taking   • bumetanide (BUMEX) 2 MG tablet Take 1 tablet by mouth Daily. 90 tablet 1 Taking   • carvedilol (COREG) 3.125 MG tablet Take 6.25 mg by mouth 2 (Two) Times a Day With Meals.   Taking   • clopidogrel (PLAVIX) 75 MG tablet Take 1 tablet by mouth Daily. 90 tablet 1 Taking   • ferrous sulfate 324 (65 Fe) MG tablet delayed-release EC tablet Take 1 tablet by mouth 2 (Two) Times a Day With Meals. 30 tablet 1 Taking   • glucose blood test strip Needs to see pcp for refills 900 each 0 Taking   • HUMALOG 100 UNIT/ML injection INJECT 150 UNITS SUB-Q DAILY PER INSULIN PUMP 40 mL 11 Taking   • hydrALAZINE (APRESOLINE) 50 MG tablet Take 1 tablet by mouth 2 (Two) Times a Day. (Patient taking differently: Take 75 mg by mouth 3 (Three) Times a Day.) 180 tablet 1 Taking   • HYDROcodone-acetaminophen (NORCO)  MG per tablet Take 1 tablet by mouth 4 (Four) Times a Day.   Taking   • Insulin Lispro 100 UNIT/ML solution cartridge Inject 150 Units under the skin Daily. DX: E11 5  "cartridge 11 Taking   • Insulin Syringe-Needle U-100 (GLOBAL INSULIN SYRINGES) 30G X 5/16\" 0.3 ML misc SQ injections as needed when insulin pump is non functioning 50 each 1 Taking   • isosorbide mononitrate (IMDUR) 60 MG 24 hr tablet Take 1 tablet by mouth 2 (Two) Times a Day. 30 tablet 1 Taking   • lisinopril (PRINIVIL,ZESTRIL) 10 MG tablet Take 1 tablet by mouth Daily. 30 tablet 1 Taking   • nitroglycerin (NITROLINGUAL) 0.4 MG/SPRAY spray Place 1 spray under the tongue Every 5 (Five) Minutes As Needed for Chest Pain. 4.9 g 5 Taking   • potassium chloride (MICRO-K) 10 MEQ CR capsule Take 2 capsules by mouth 2 (Two) Times a Day. 60 capsule 3 Taking   • raNITIdine (ZANTAC) 300 MG tablet TAKE 1 TABLET BY MOUTH EVERY NIGHT. 90 tablet 1 Taking   • rosuvastatin (CRESTOR) 20 MG tablet Take 1 tablet by mouth Daily. 90 tablet 3    • sertraline (ZOLOFT) 50 MG tablet Take 1 tablet by mouth Daily. 90 tablet 1 Taking   • spironolactone (ALDACTONE) 25 MG tablet Take 1 tablet by mouth Daily. 30 tablet 1 Taking   • vitamin D (ERGOCALCIFEROL) 43559 UNITS capsule capsule Take 1 capsule by mouth 1 (One) Time Per Week. (Patient taking differently: Take 50,000 Units by mouth 1 (One) Time Per Week. Friday) 4 capsule 6 Taking   • zolpidem (AMBIEN) 10 MG tablet Take 1 tablet by mouth At Night As Needed for Sleep. 30 tablet 5 Taking     Assessment/Plan   ASSESSMENT / PLAN      NSTEMI (non-ST elevated myocardial infarction) (CMS/HCC)  MONE on CKD   1. Pt's Cr was elevated above baseline at admission. Cr this morning was 2.23.   BUN to Cr ratio > 20 (38.6) suggests prerenal azotemia from NSTEMI (cardio renal syndrome). Baseline cr 1.6-1.7 and recently came down to 1.4. Due to increasing alkalosis in the office we have stopped metolazone and kept on bumex 2mg daily along with aldactone.     At present I will keep aldactone. Agreed with holding the bumex. Her jvd is high and willl need diuretic soon. Hold metolazone for now. She has " significant wt loss and then lowest at home was 217 lbs. Her dry weight at best is close to 220 lbs.     She will be a poor candidate for invasive workup and likely end up with worsening renal failure (had hd in two different occasions after the heart cath). She is chest pain free and I have discussed the case with Dr Montana as well. Plan to observe and see how is her renal indices in next 1-2 days along with sodium and then plan accordingly but it will be high risk.     2- hyponatremia jvd is high likely hypervolemic after nstemi (high adh release). Keep aldactone. Add a dose of tolvaptan. Check urine lytes    3- htn stable    4- anemia of ckd her fe b12 and folate are normal. She gets procrit but since she has recent nstemi I will wait for next dose of procrit.     Thank you for the referral will continue to follow.          I discussed the patients findings and my recommendations with patient and family         This document has been electronically signed by Delia Carrasquillo, Medical Student on February 7, 2019 8:54 AM      As the teaching physician, I have personally re-performed the physical exam and medical decision making activities included in the student note.    Joon Dugan MD  2/7/2019  12:05 PM

## 2019-02-07 NOTE — PROGRESS NOTES
[facility]  INPATIENT PROGRESS NOTE    Length of Stay: 1  Date of Admission: 2/6/2019  Primary Care Physician: Henry Muniz MD    Subjective   Chief Complaint: No new complaints.  HPI: Patient is seen for follow-up today.  She has multiple medical problems including coronary artery disease 9status post high risk PCI in [place]), congestive heart failure, O2 dependent COPD, sleep apnea, chronic kidney disease, diabetes mellitus, morbid obesity and was admitted for non-STEMI.  She is chest pain-free and remains on her baseline supplemental oxygen of 3 L nasal prongs and maintaining saturation greater than 92%.         Review of Systems   Constitutional: Positive for activity change and fatigue. Negative for appetite change, chills, diaphoresis and fever.   HENT: Negative for trouble swallowing and voice change.    Eyes: Negative for photophobia and visual disturbance.   Respiratory: Positive for shortness of breath. Negative for cough, choking, chest tightness, wheezing and stridor.    Cardiovascular: Negative for chest pain, palpitations and leg swelling.   Gastrointestinal: Negative for abdominal distention, abdominal pain, blood in stool, constipation, diarrhea, nausea and vomiting.   Endocrine: Negative for cold intolerance, heat intolerance, polydipsia, polyphagia and polyuria.   Genitourinary: Negative for decreased urine volume, difficulty urinating, dysuria, enuresis, flank pain, frequency, hematuria and urgency.   Musculoskeletal: Negative for arthralgias, gait problem, myalgias, neck pain and neck stiffness.   Skin: Negative for pallor, rash and wound.   Neurological: Negative for dizziness, tremors, seizures, syncope, facial asymmetry, speech difficulty, weakness, light-headedness, numbness and headaches.   Hematological: Does not bruise/bleed easily.   Psychiatric/Behavioral: Negative for agitation, behavioral problems and confusion.        Objective    Temp:  [96.5 °F (35.8 °C)-98.8 °F (37.1 °C)] 96.7 °F (35.9 °C)  Heart Rate:  [47-73] 58  Resp:  [16-18] 16  BP: (100-159)/(49-70) 149/66    Physical Exam   Constitutional: She is oriented to person, place, and time. She appears well-developed and well-nourished. She is cooperative. No distress.   Patient is obese and has a BMI of 39.57.   HENT:   Head: Normocephalic and atraumatic.   Right Ear: External ear normal.   Left Ear: External ear normal.   Nose: Nose normal.   Mouth/Throat: Oropharynx is clear and moist.   Eyes: Conjunctivae and EOM are normal. Pupils are equal, round, and reactive to light.   Neck: Normal range of motion. Neck supple. No JVD present. No thyromegaly present.   Cardiovascular: Normal rate, regular rhythm, normal heart sounds and intact distal pulses. Exam reveals no gallop and no friction rub.   No murmur heard.  Pulmonary/Chest: Effort normal. No stridor. No respiratory distress. She has decreased breath sounds. She has no wheezes. She has rhonchi. She has no rales. She exhibits no tenderness.   Abdominal: Soft. Bowel sounds are normal. She exhibits no distension and no mass. There is no tenderness. There is no rebound and no guarding. No hernia.   Musculoskeletal: Normal range of motion. She exhibits no edema, tenderness or deformity.   Neurological: She is alert and oriented to person, place, and time. She has normal reflexes.   Skin: Skin is warm and dry. No rash noted. She is not diaphoretic. No erythema. No pallor.   Psychiatric: She has a normal mood and affect. Her behavior is normal. Judgment and thought content normal.   Nursing note and vitals reviewed.        Medication Review:    Current Facility-Administered Medications:   •  aspirin chewable tablet 81 mg, 81 mg, Oral, Daily, Raphael Middleton MD, 81 mg at 02/07/19 0924  •  carvedilol (COREG) tablet 3.125 mg, 3.125 mg, Oral, BID With Meals, Catracho Jarrell MD  •  clopidogrel (PLAVIX) tablet 75 mg, 75  mg, Oral, Daily, Raphael Middleton MD, 75 mg at 02/07/19 0924  •  famotidine (PEPCID) tablet 40 mg, 40 mg, Oral, Daily, Raphael Middleton MD, 40 mg at 02/07/19 0924  •  ferrous sulfate EC tablet 324 mg, 324 mg, Oral, BID With Meals, Raphael Middleton MD, 324 mg at 02/07/19 0924  •  heparin (porcine) 5000 UNIT/ML injection 5,000 Units, 5,000 Units, Subcutaneous, Q12H, Raphael Middleton MD  •  hydrALAZINE (APRESOLINE) tablet 50 mg, 50 mg, Oral, BID, Raphael Middleton MD, 50 mg at 02/07/19 0924  •  HYDROcodone-acetaminophen (NORCO)  MG per tablet 1 tablet, 1 tablet, Oral, Q6H PRN, Catracho Jarrell MD, 1 tablet at 02/07/19 0938  •  insulin patient supplied pump, , Subcutaneous, Continuous, Raphael Middleton MD  •  isosorbide mononitrate (IMDUR) 24 hr tablet 60 mg, 60 mg, Oral, BID, Raphael Middleton MD, 60 mg at 02/07/19 0924  •  nitroglycerin (NITROSTAT) SL tablet 0.4 mg, 0.4 mg, Sublingual, Q5 Min PRN, Raphael Middleton MD, 0.4 mg at 02/06/19 1743  •  ranolazine (RANEXA) 12 hr tablet 500 mg, 500 mg, Oral, Q12H, Edgar Montana MD  •  rosuvastatin (CRESTOR) tablet 20 mg, 20 mg, Oral, Daily, Raphael Middleton MD, 20 mg at 02/07/19 0924  •  sertraline (ZOLOFT) tablet 50 mg, 50 mg, Oral, Daily, Raphael Middleton MD, 50 mg at 02/07/19 0925  •  sodium chloride 0.9 % flush 10 mL, 10 mL, Intravenous, Q12H, Raphael Middleton MD, 10 mL at 02/07/19 0927  •  sodium chloride 0.9 % flush 10 mL, 10 mL, Intravenous, PRN, Raphael Middleton MD  •  spironolactone (ALDACTONE) tablet 25 mg, 25 mg, Oral, Daily, Raphael Middleton MD, 25 mg at 02/07/19 0925  •  tolvaptan (SAMSCA) half tablet 15 mg, 15 mg, Oral, Once, Joon Dugan MD  •  zolpidem (AMBIEN) tablet 10 mg, 10 mg, Oral, Nightly PRN, Raphael Middleton MD, 10 mg at 02/06/19 2830    Results Review:  I have reviewed the labs, radiology results, and diagnostic studies.    Laboratory Data:   Results from last 7 days   Lab Units 02/07/19  2229  02/06/19  1747 02/01/19  1143   SODIUM mmol/L 122* 121* 133*   POTASSIUM mmol/L 4.0 4.6 5.5*   CHLORIDE mmol/L 81* 77* 93*   CO2 mmol/L 28.0 29.0 31.0*   BUN mg/dL 86* 83* 68*   CREATININE mg/dL 2.23* 2.11* 2.18*   GLUCOSE mg/dL 88 122* 200*   CALCIUM mg/dL 8.7 8.7 9.1   ANION GAP mmol/L 13.0 15.0 9.0     Estimated Creatinine Clearance: 27 mL/min (A) (by C-G formula based on SCr of 2.23 mg/dL (H)).  Results from last 7 days   Lab Units 02/01/19  1143   MAGNESIUM mg/dL 2.2         Results from last 7 days   Lab Units 02/07/19  0513 02/06/19  1747   WBC 10*3/mm3 5.16 5.38   HEMOGLOBIN g/dL 8.4* 8.7*   HEMATOCRIT % 22.3* 24.6*   PLATELETS 10*3/mm3 149* 161           Culture Data:   No results found for: BLOODCX  No results found for: URINECX  No results found for: RESPCX  No results found for: WOUNDCX  No results found for: STOOLCX  No components found for: BODYFLD    Radiology Data:   Imaging Results (last 24 hours)     ** No results found for the last 24 hours. **          I have reviewed the patient's current medications.     Assessment/Plan     Hospital Problem List:  Active Problems:   -History of coronary artery disease with NSTEMI (non-ST elevated myocardial infarction): Patient has been seen by the cardiologist and recommended optimization of medical management.  Echocardiogram done on January 8, 2019 showed:  · Left ventricular wall thickness is consistent with mild concentric hypertrophy.  · Estimated EF = 57%.  · Left ventricular systolic function is normal.  · Left ventricular diastolic dysfunction (grade I a) consistent with impaired relaxation.  · Right ventricular cavity is mildly dilated.  · Left atrial cavity size is mildly dilated.  · Mild aortic valve regurgitation is present.  · Moderate mitral valve regurgitation is present  · Moderate tricuspid valve regurgitation is present.  · The tricuspid valve is grossly normal. Moderate tricuspid valve regurgitation is present. Estimated right ventricular  systolic pressure from tricuspid regurgitation is markedly elevated (>55 mmHg). Moderate to severe pulmonary hypertension is present.  · Mild pulmonic valve regurgitation is present.     Repeat echocardiogram is pending.  Cardiologist is following.    -Heart failure with preserved ejection fraction: Patient is euvolemic and not in exacerbation.  Bumex is currently on hold due to worsening renal function.  Continue strict I's and O's, daily weights, salt and fluid restriction.    -Acute on chronic kidney injury: Patient's baseline creatinine seems to be between 1.4-2.0.  Continue to monitor renal function and avoid nephrotoxins.  Nephrologist is following.    -Diabetes mellitus: Stable.  Continue insulin pump.    -Anemia of chronic inflammation: Hemoglobin remains stable.  Continue to monitor.     -Hypertension: Stable.  Continue home medications.    -Hyponatremia: Patient has been given a dose of Samsca.  Continue free water restriction.  Nephrology is following.  Will continue to monitor.    -O2 dependent COPD: Stable.  Continue supplemental oxygen and bronchodilators.    -Obstructive sleep apnea: Continue nightly CPAP.    -Obesity: Diet and lifestyle modifications have been recommended.     -Continue GI and DVT prophylaxis.      Discharge Planning: In progress.    Catracho Jarrell MD   02/07/19   11:00 AM

## 2019-02-08 NOTE — PROGRESS NOTES
"[facility]  [address] [place] [address]. [address]  T - [phone]  F - [phone]     Progress Note          PATIENT  DEMOGRAPHICS   PATIENT NAME: Nicolasa Rojas                      PHYSICIAN: Delia Carrasquillo, Medical Student  : 1950  MRN: [id number]   LOS: 2 days    Patient Care Team:  Henry Muniz MD as PCP - General (Internal Medicine)  Subjective   SUBJECTIVE   Pt and her family say she had a rough night with a \"spell\". She got out of the bed to go to the bathroom and experienced exertional angina that took 3 nitroglycerins and 2mg morphine injection to relieve. Pt is still on supplemental oxygen of 3 L nasal prongs and maintaining saturation greater than 92%. Cr has decreased to baseline and Sodium has increased.                 Objective   OBJECTIVE   Vital Signs  Temp:  [96.7 °F (35.9 °C)-98.5 °F (36.9 °C)] 97.6 °F (36.4 °C)  Heart Rate:  [52-83] 55  Resp:  [16-20] 18  BP: (133-180)/(60-77) 136/65    Flowsheet Rows      First Filed Value   Admission Height  160 cm (63\") Documented at 2019 1612   Admission Weight  102 kg (224 lb 12.8 oz) Documented at 2019 1612           I/O last 3 completed shifts:  In: 1074 [P.O.:1074]  Out: 2000 [Urine:2000]    PHYSICAL EXAM    Physical Exam   Constitutional: She is oriented to person, place, and time.   Neck: JVD present.   Cardiovascular: Normal rate. Exam reveals gallop and S3.   No murmur heard.  Pulmonary/Chest: Effort normal and breath sounds normal.   Abdominal: Soft. Bowel sounds are normal.   Musculoskeletal: She exhibits edema.   Neurological: She is alert and oriented to person, place, and time.       RESULTS   Results Review:    Results from last 7 days   Lab Units 19  0705 19  1838 19  0513 19  1747   SODIUM mmol/L 125* 121* 122* 121*   POTASSIUM mmol/L 4.3  --  4.0 4.6   CHLORIDE mmol/L 84*  --  81* 77*   CO2 mmol/L 30.0  --  28.0 29.0   BUN mg/dL 85*  --  86* 83*   CREATININE " mg/dL 1.89*  --  2.23* 2.11*   CALCIUM mg/dL 9.0  --  8.7 8.7   GLUCOSE mg/dL 126*  --  88 122*       Estimated Creatinine Clearance: 31.8 mL/min (A) (by C-G formula based on SCr of 1.89 mg/dL (H)).    Results from last 7 days   Lab Units 02/01/19  1143   MAGNESIUM mg/dL 2.2             Results from last 7 days   Lab Units 02/08/19  0705 02/07/19  0513 02/06/19  1747   WBC 10*3/mm3 6.69 5.16 5.38   HEMOGLOBIN g/dL 8.4* 8.4* 8.7*   PLATELETS 10*3/mm3 149* 149* 161               Imaging Results (last 24 hours)     ** No results found for the last 24 hours. **           MEDICATIONS      aspirin 81 mg Oral Daily   carvedilol 3.125 mg Oral BID With Meals   clopidogrel 75 mg Oral Daily   famotidine 40 mg Oral Daily   ferrous sulfate 324 mg Oral BID With Meals   heparin (porcine) 5,000 Units Subcutaneous Q12H   hydrALAZINE 50 mg Oral BID   isosorbide mononitrate 60 mg Oral BID   ranolazine 500 mg Oral Q12H   rosuvastatin 20 mg Oral Daily   sertraline 50 mg Oral Daily   sodium chloride 10 mL Intravenous Q12H   spironolactone 25 mg Oral Daily       insulin        Assessment/Plan   ASSESSMENT / PLAN      NSTEMI (non-ST elevated myocardial infarction) (CMS/HCC)    MONE on CKD   1. Pt's Cr was elevated above baseline at admission. Cr this morning was 1.89.   BUN to Cr ratio on admission was > 20 (38.6) suggesting prerenal azotemia from NSTEMI (cardio renal syndrome). Baseline cr 1.6-1.7 and recently came down to 1.4. Due to increasing alkalosis in the office we have stopped metolazone and kept on bumex 2mg daily along with aldactone in the office     At present I will keep aldactone. Agreed with holding the bumex. Her jvd is high and willl need diuretic soon. Hold metolazone for now. She has significant wt loss and then lowest at home was 217 lbs. Her dry weight at best is close to 220 lbs.      She will be a poor candidate for invasive workup and likely end up with worsening renal failure (had hd in two different occasions  after the heart cath). No plan for invasive workup for nstemi     2- hyponatremia jvd is high likely hypervolemic after nstemi (high adh release). Keep aldactone. Add a dose of tolvaptan again today. Urine osmolality is not markedly elevated., urine na is on low side     3- htn stable     4- anemia of ckd her fe b12 and folate are normal. She gets procrit but since she has recent nstemi I will wait for next dose of procrit.               This document has been electronically signed by Delia Carrasquillo, Medical Student on February 8, 2019 8:35 AM      As the teaching physician, I have personally re-performed the physical exam and medical decision making activities included in the student note.    Joon Dugan MD  2/8/2019  10:51 AM

## 2019-02-08 NOTE — PLAN OF CARE
Problem: Patient Care Overview  Goal: Plan of Care Review  Outcome: Ongoing (interventions implemented as appropriate)   02/08/19 6341   Coping/Psychosocial   Plan of Care Reviewed With patient   OTHER   Outcome Summary Initial PT evaluation completed this date. Patient completes bed mobility with CGA; requires CGA for sit to stand using rolling walker. Patient ambulates 5'x1 taking sidesteps close to bed using rolling walker with CGA. Recommend home health PT upon discharge. Continue skilled PT.      Goal: Discharge Needs Assessment  Outcome: Ongoing (interventions implemented as appropriate)

## 2019-02-08 NOTE — PLAN OF CARE
Problem: Patient Care Overview  Goal: Plan of Care Review  Outcome: Ongoing (interventions implemented as appropriate)   02/08/19 7710   Coping/Psychosocial   Plan of Care Reviewed With patient;spouse   Plan of Care Review   Progress no change   OTHER   Outcome Summary c/o chest pain, relieved by ntg x 3 the first time, ms the second time. sr on cardiac monitor       Problem: Kidney Disease, Chronic/End Stage Renal Disease (Adult)  Goal: Signs and Symptoms of Listed Potential Problems Will be Absent, Minimized or Managed (Kidney Disease, Chronic/End Stage Renal Disease)  Outcome: Ongoing (interventions implemented as appropriate)      Problem: Fluid Volume Excess (Adult)  Goal: Identify Related Risk Factors and Signs and Symptoms  Outcome: Ongoing (interventions implemented as appropriate)      Problem: Fall Risk (Adult)  Goal: Identify Related Risk Factors and Signs and Symptoms  Outcome: Ongoing (interventions implemented as appropriate)      Problem: Cardiac: ACS (Acute Coronary Syndrome) (Adult)  Goal: Signs and Symptoms of Listed Potential Problems Will be Absent, Minimized or Managed (Cardiac: ACS)  Outcome: Ongoing (interventions implemented as appropriate)

## 2019-02-08 NOTE — PROGRESS NOTES
"[facility] Progress Note   LOS: 2 days   Patient Care Team:  Henry Muniz MD as PCP - General (Internal Medicine)    Chief Complaint:  \"chest pain\"     Subjective     Interval History:   Patient Denies anything. Sleeping. Not wanting to wake up and talk. Discussion with  at bedside.     Objective     Vital Sign Min/Max for last 24 hours  Temp  Min: 97.2 °F (36.2 °C)  Max: 98.5 °F (36.9 °C)   BP  Min: 133/63  Max: 180/71   Pulse  Min: 52  Max: 83   Resp  Min: 18  Max: 20   SpO2  Min: 92 %  Max: 100 %   Flow (L/min)  Min: 3  Max: 3   Weight  Min: 101 kg (222 lb 12.8 oz)  Max: 101 kg (222 lb 12.8 oz)     Flowsheet Rows      First Filed Value   Admission Height  160 cm (63\") Documented at 02/06/2019 1612   Admission Weight  102 kg (224 lb 12.8 oz) Documented at 02/06/2019 1612            02/06/19  1612 02/07/19  0600 02/08/19  0500   Weight: 102 kg (224 lb 12.8 oz) 101 kg (223 lb 6.4 oz) 101 kg (222 lb 12.8 oz)       Physical Exam:  Physical Exam   Constitutional: She is oriented to person, place, and time. She appears well-developed and well-nourished. No distress.   HENT:   Head: Normocephalic.   Eyes: Conjunctivae are normal.   Neck: No JVD present.   Cardiovascular: Normal rate, regular rhythm, S1 normal, S2 normal, normal heart sounds and intact distal pulses. Exam reveals no gallop and no friction rub.   No murmur heard.  Pulmonary/Chest: Effort normal and breath sounds normal. No respiratory distress. She has no wheezes. She has no rales.   Abdominal: Soft. Bowel sounds are normal. She exhibits no distension.   Musculoskeletal: Normal range of motion. She exhibits no edema.   Neurological: She is alert and oriented to person, place, and time.   Skin: Skin is warm and dry. She is not diaphoretic. No erythema.   Psychiatric: She has a normal mood and affect. Her behavior is normal. Judgment and thought content normal.   Nursing note and vitals reviewed.       Results Review:     Results from last 7 " days   Lab Units 02/08/19  0705 02/07/19  1838 02/07/19  0513 02/06/19  1747   SODIUM mmol/L 125* 121* 122* 121*   POTASSIUM mmol/L 4.3  --  4.0 4.6   CHLORIDE mmol/L 84*  --  81* 77*   CO2 mmol/L 30.0  --  28.0 29.0   BUN mg/dL 85*  --  86* 83*   CREATININE mg/dL 1.89*  --  2.23* 2.11*   CALCIUM mg/dL 9.0  --  8.7 8.7   GLUCOSE mg/dL 126*  --  88 122*       Estimated Creatinine Clearance: 31.8 mL/min (A) (by C-G formula based on SCr of 1.89 mg/dL (H)).    Results from last 7 days   Lab Units 02/01/19  1143   MAGNESIUM mg/dL 2.2       Results from last 7 days   Lab Units 02/08/19  0705 02/07/19  0513 02/06/19  1747   WBC 10*3/mm3 6.69 5.16 5.38   HEMOGLOBIN g/dL 8.4* 8.4* 8.7*   PLATELETS 10*3/mm3 149* 149* 161           Lab Results   Component Value Date    PROBNP 14,100.0 (H) 01/07/2019       I/O last 3 completed shifts:  In: 1074 [P.O.:1074]  Out: 2000 [Urine:2000]    Cardiographics:    Results for orders placed during the hospital encounter of 02/06/19   Adult Transthoracic Echo Complete W/ Cont if Necessary Per Protocol    Narrative · Left ventricular wall thickness is consistent with mild-to-moderate   concentric hypertrophy.  · Estimated EF = 57%.  · Left ventricular systolic function is normal.  · Left ventricular diastolic dysfunction (grade I) consistent with   impaired relaxation.  · Right ventricular cavity is mildly dilated.  · Left atrial cavity size is moderately dilated.  · Mild aortic valve regurgitation is present.  · Mild mitral valve regurgitation is present  · Moderate tricuspid valve regurgitation is present.  · Mild pulmonic valve regurgitation is present.  · The tricuspid valve is grossly normal. Moderate tricuspid valve   regurgitation is present. Estimated right ventricular systolic pressure   from tricuspid regurgitation is markedly elevated (>55 mmHg).          Nm Lung Scan Perfusion Particulate    Result Date: 1/9/2019  Very low probability for a pulmonary embolus. Electronically signed  by:  Sincere Long MD  1/9/2019 11:12 AM CST Workstation: [id number]    Us Venous Doppler Lower Extremity Bilateral (duplex)    Result Date: 1/8/2019  No deep venous thrombosis of the right and the left lower extremity. Location of Interpretation:  [id number] Electronically signed by:  Sincere Long MD  1/8/2019 5:43 PM CST Workstation: [id number]      Medication Review:     Current Facility-Administered Medications:   •  aspirin chewable tablet 81 mg, 81 mg, Oral, Daily, Raphael Middleton MD, 81 mg at 02/08/19 1012  •  carvedilol (COREG) tablet 3.125 mg, 3.125 mg, Oral, BID With Meals, Catracho Jarrell MD, 3.125 mg at 02/08/19 1013  •  clopidogrel (PLAVIX) tablet 75 mg, 75 mg, Oral, Daily, Raphael Middleton MD, 75 mg at 02/08/19 1013  •  famotidine (PEPCID) tablet 40 mg, 40 mg, Oral, Daily, Raphael Middleton MD, 40 mg at 02/08/19 1012  •  ferrous sulfate EC tablet 324 mg, 324 mg, Oral, BID With Meals, Raphael Middleton MD, 324 mg at 02/08/19 1012  •  heparin (porcine) 5000 UNIT/ML injection 5,000 Units, 5,000 Units, Subcutaneous, Q12H, Raphael Middleton MD  •  hydrALAZINE (APRESOLINE) tablet 50 mg, 50 mg, Oral, BID, Raphael Middleton MD, 50 mg at 02/08/19 1013  •  HYDROcodone-acetaminophen (NORCO)  MG per tablet 1 tablet, 1 tablet, Oral, Q6H PRN, Catracho Jarrell MD, 1 tablet at 02/07/19 1743  •  insulin patient supplied pump, , Subcutaneous, Continuous, Raphael Middleton MD  •  isosorbide mononitrate (IMDUR) 24 hr tablet 60 mg, 60 mg, Oral, BID, Raphael Middleton MD, 60 mg at 02/08/19 1013  •  morphine injection 2 mg, 2 mg, Intravenous, Q2H PRN, Hill Wright MD, 2 mg at 02/07/19 2200  •  nitroglycerin (NITROSTAT) SL tablet 0.4 mg, 0.4 mg, Sublingual, Q5 Min PRN, Raphael Middleton MD, 0.4 mg at 02/07/19 2058  •  promethazine (PHENERGAN) injection 12.5 mg, 12.5 mg, Intravenous, Q6H PRN, Catracho Jarrell MD, 12.5 mg at 02/07/19 1204  •  ranolazine (RANEXA) 12 hr tablet 500 mg, 500  mg, Oral, Q12H, Edgar Montana MD, 500 mg at 02/08/19 1012  •  rosuvastatin (CRESTOR) tablet 20 mg, 20 mg, Oral, Daily, Raphael Middleton MD, 20 mg at 02/08/19 1012  •  sertraline (ZOLOFT) tablet 50 mg, 50 mg, Oral, Daily, Raphael Middleton MD, 50 mg at 02/08/19 1013  •  sodium chloride 0.9 % flush 10 mL, 10 mL, Intravenous, Q12H, Raphael Middleton MD, 10 mL at 02/08/19 1020  •  sodium chloride 0.9 % flush 10 mL, 10 mL, Intravenous, PRN, Raphael Middleton MD, 10 mL at 02/07/19 2200  •  spironolactone (ALDACTONE) tablet 25 mg, 25 mg, Oral, Daily, Raphael Middleton MD, 25 mg at 02/08/19 1013  •  tolvaptan (SAMSCA) half tablet 15 mg, 15 mg, Oral, Once, Joon Dugan MD  •  zolpidem (AMBIEN) tablet 10 mg, 10 mg, Oral, Nightly PRN, Raphael Middleton MD, 10 mg at 02/07/19 2138    Assessment/Plan       NSTEMI (non-ST elevated myocardial infarction) (CMS/[facility])  - Medical management. See extensive A/P by Dr. Mendez on 2/7/19    Spoke to Mr. Rojas regarding the possibility of palliative care as an outpatient. I would agree that she may not meet full criteria of hospice at this time, but she is very symptomatic with her CAD and CHF.   Mr. Rojas is diligent regarding CHF management. But this admission was secondary to CP and NSTEMI. She had an event of CP overnight requiring 3 SL nitro and Morphine. The pain was relieved with Morphine. I feel that outpatient symptomatic control should be priority. This includes narcotics for dyspnea and chest pain. And home management of CHF symptoms versus outpatient appointments. Ms. Rojas is slightly stubborn at times regarding what she wants to do and doesn't. Outpatient appointments are a struggle for Mr. Rojas to convince her to come.     Brochure provided for palliative care and will follow up this afternoon as I am not here for the weekend.       Plan for disposition: per attending    40 minutes out of 40 minutes face to face spent in counseling/coordination  of care as relates to presentation of CAD, CHF, palliative care with  Conversation as outlined above.              This document has been electronically signed by DOMINGA Carney on February 8, 2019 10:52 AM

## 2019-02-08 NOTE — THERAPY EVALUATION
Acute Care - Physical Therapy Initial Evaluation  [facility]     Patient Name: Nicolasa Rojas  : 1950  MRN: [id number]  Today's Date: 2019   Onset of Illness/Injury or Date of Surgery: 19  Date of Referral to PT: 19  Referring Physician: Dr. Jarrell      Admit Date: 2019    Visit Dx:     ICD-10-CM ICD-9-CM   1. Impaired physical mobility Z74.09 781.99     Patient Active Problem List   Diagnosis   • Hyponatremia   • Acute on chronic diastolic CHF (congestive heart failure) (CMS/[facility])   • Hypokalemia   • Acute on chronic heart failure (CMS/HCC)   • Dyspnea   • Chronic renal impairment, stage 3 (moderate) (CMS/[facility])   • CAD (coronary artery disease)   • Class 2 severe obesity due to excess calories with serious comorbidity and body mass index (BMI) of 39.0 to 39.9 in adult (CMS/[facility])   • NSTEMI (non-ST elevated myocardial infarction) (CMS/[facility])     Past Medical History:   Diagnosis Date   • Acute bronchitis    • Acute congestive heart failure (CMS/[facility])     resolving   • Acute kidney failure, unspecified (CMS/[facility])    • Acute kidney failure, unspecified (CMS/[facility])    • Acute posthemorrhagic anemia    • Asthenia    • Chest pain    • Chronic gastritis    • Chronic pharyngitis    • Chronic renal impairment    • Congenital renal failure    • Congestive heart failure (CMS/[facility])    • Contact dermatitis due to poison ivy    • COPD (chronic obstructive pulmonary disease) (CMS/[facility])    • Coronary arteriosclerosis    • D-dimer, elevated     D-dimer above reference range    • Degenerative joint disease involving multiple joints     back   • Depressive disorder    • Dysphagia    • Dyspnea    • Edema of lower extremity    • Encounter for immunization    • Encounter for long-term (current) use of medications    • Epigastric pain    • Esophagitis    • Essential hypertension    • Gastroesophageal reflux disease    • Gastroparesis     Gastroparesis syndrome    • Generalized abdominal pain    • H/O bone  density study 01/09/2015   • H/O mammogram 01/15/2015   • H/O screening mammography 11/12/2013   • Headache    • History of transfusion    • Hyperlipidemia    • Hypokalemia    • Hypomagnesemia    • Injury of tendon of rotator cuff     Injury of tendon of the rotator cuff of shoulder      • Insomnia    • Insomnia, unspecified    • Knee pain    • Nausea and vomiting    • Neck pain    • Need for immunization against influenza    • Need for prophylactic vaccination and inoculation against influenza    • Need for prophylactic vaccination and inoculation against viral disease    • 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 Performed at [facility].   • ENDOSCOPY  02/09/2015    ESOPHAGEAL STRICTURE PRESENT, GASTRITIS FOUND IN THE STOMACH, NORMAL DUODENUM   • ENDOSCOPY W/ PEG TUBE PLACEMENT  12/19/2012    Esophagitis seen. Biopsy taken. Esophageal stricture present. Dilatation performed. Gastritis in stomach. Biopsy taken. Food was in stomach. Normal duodenum.   • HERNIA REPAIR     • HYSTERECTOMY      partial   • NECK SURGERY          PT ASSESSMENT (last 12 hours)      Physical Therapy Evaluation     Row Name 02/08/19 1433          PT Evaluation Time/Intention    Subjective Information  no complaints  -CB     Document Type  evaluation  -CB     Mode of Treatment  individual therapy;physical therapy  -CB     Total Evaluation Minutes, Physical Therapy  31  -CB     Patient Effort  fair  -CB     Symptoms Noted During/After Treatment  fatigue  -CB     Row Name 02/08/19 1430          General Information    Patient Profile  Reviewed?  yes  -CB     Onset of Illness/Injury or Date of Surgery  02/06/19  -CB     Referring Physician  MEET Jarrell MD  -CB     Patient Observations  alert;cooperative;agree to therapy  -CB     Patient/Family Observations  daughter present  -CB     General Observations of Patient  Supine in bed, O2 @3L, telemetry, IV, insulin pump, no apparent distress  -CB     Prior Level of Function  independent:;all household mobility;community mobility  -CB     Equipment Currently Used at Home  commode, bedside;power chair, (recliner lift);oxygen;walker, rolling;wheelchair, motorized;wheelchair;cane, quad  -CB     Pertinent History of Current Functional Problem  To ER with chest pain, weakness, and elevated troponins  -CB     Existing Precautions/Restrictions  fall;oxygen therapy device and L/min  -CB     Benefits Reviewed  patient:;improve function;increase independence  -CB     Row Name 02/08/19 1433          Relationship/Environment    Lives With  spouse  -CB     Row Name 02/08/19 1433          Resource/Environmental Concerns    Current Living Arrangements  home/apartment/condo  -CB     Row Name 02/08/19 1433          Cognitive Assessment/Intervention- PT/OT    Orientation Status (Cognition)  oriented x 4  -CB     Follows Commands (Cognition)  does not follow one step commands;over 90% accuracy;repetition of directions required  -CB     Row Name 02/08/19 1433          Safety Issues, Functional Mobility    Safety Issues Affecting Function (Mobility)  awareness of need for assistance  -CB     Row Name 02/08/19 1433          Bed Mobility Assessment/Treatment    Bed Mobility Assessment/Treatment  supine-sit-supine  -CB     Supine-Sit [place] (Bed Mobility)  contact guard  -CB     Sit-Supine [place] (Bed Mobility)  contact guard  -CB     Supine-Sit-Supine [place] (Bed Mobility)  contact guard  -CB     Assistive Device (Bed Mobility)  bed rails;head of bed elevated  -CB     Row Name 02/08/19 1433          Transfer  Assessment/Treatment    Transfer Assessment/Treatment  sit-stand transfer;stand-sit transfer  -CB     Sit-Stand [place] (Transfers)  contact guard  -CB     Stand-Sit [place] (Transfers)  contact guard  -CB     Row Name 02/08/19 1433          Sit-Stand Transfer    Assistive Device (Sit-Stand Transfers)  walker, front-wheeled  -CB     Row Name 02/08/19 1433          Stand-Sit Transfer    Assistive Device (Stand-Sit Transfers)  walker, front-wheeled  -CB     Row Name 02/08/19 1433          Gait/Stairs Assessment/Training    [place] Level (Gait)  contact guard  -CB     Assistive Device (Gait)  walker, front-wheeled  -CB     Distance in Feet (Gait)  5'x1 (side steps)  -CB     Comment (Gait/Stairs)  Patient agreeable to sidestepping next to bed but did not want to ambulate any further.  -CB     Row Name 02/08/19 1433          General ROM    GENERAL ROM COMMENTS  BLE WFL  -CB     Row Name 02/08/19 1433          MMT (Manual Muscle Testing)    General MMT Comments  LLE 4-/5, RLE 3+/5  -CB     Row Name 02/08/19 1433          Sensory Assessment/Intervention    Sensory General Assessment  --  -CB     Row Name 02/08/19 1433          Hearing Assessment    Hearing Status  hearing impairment, bilaterally  -CB     Row Name 02/08/19 1433          Light Touch Sensation Assessment    Left Upper Extremity: Light Touch Sensation Assessment  intact  -CB     Right Upper Extremity: Light Touch Sensation Assessment  intact  -CB     Row Name 02/08/19 1433          Vision Assessment/Intervention    Visual Impairment/Limitations  corrective lenses for reading  -CB     Row Name 02/08/19 1433          Pain Assessment    Additional Documentation  Pain Scale: Numbers Pre/Post-Treatment (Group)  -CB     Row Name 02/08/19 1433          Pain Scale: Numbers Pre/Post-Treatment    Pain Scale: Numbers, Pretreatment  2/10  -CB     Pain Scale: Numbers, Post-Treatment  4/10  -CB     Pain Location  back  -CB     Pre/Post Treatment Pain Comment   Patient also complaining of heartburn.  -CB     Pain Intervention(s)  Medication (See MAR);Ambulation/increased activity;Repositioned  -CB     Row Name 02/08/19 1433          Plan of Care Review    Plan of Care Reviewed With  patient  -CB     Row Name 02/08/19 [facility]          Physical Therapy Clinical Impression    Date of Referral to PT  02/08/19  -CB     PT Diagnosis (PT Clinical Impression)  impaired physical mobility  -CB     Prognosis (PT Clinical Impression)  good  -CB     Criteria for Skilled Interventions Met (PT Clinical Impression)  yes;treatment indicated  -CB     Pathology/Pathophysiology Noted (Describe Specifically for Each System)  musculoskeletal  -CB     Impairments Found (describe specific impairments)  aerobic capacity/endurance;gait, locomotion, and balance;muscle performance;ventilation and respiration/gas exchange  -CB     Rehab Potential (PT Clinical Summary)  good, to achieve stated therapy goals  -CB     Predicted Duration of Therapy (PT)  2-4 days  -CB     Row Name 02/08/19 1433          Vital Signs    Pre Systolic BP Rehab  130  -CB     Pre Treatment Diastolic BP  70  -CB     Post Systolic BP Rehab  138  -CB     Post Treatment Diastolic BP  68  -CB     Pretreatment Heart Rate (beats/min)  70  -CB     Posttreatment Heart Rate (beats/min)  80  -CB     Pre SpO2 (%)  98  -CB     O2 Delivery Pre Treatment  supplemental O2  -CB     Post SpO2 (%)  97  -CB     O2 Delivery Post Treatment  supplemental O2  -CB     Pre Patient Position  Supine  -CB     Post Patient Position  Side Lying  -CB     Row Name 02/08/19 1433          Physical Therapy Goals    Bed Mobility Goal Selection (PT)  bed mobility, PT goal 1  -CB     Transfer Goal Selection (PT)  transfer, PT goal 1  -CB     Gait Training Goal Selection (PT)  gait training, PT goal 1  -CB     Row Name 02/08/19 1433          Bed Mobility Goal 1 (PT)    Activity/Assistive Device (Bed Mobility Goal 1, PT)  sit to supine/supine to sit  -CB     [place]  Level/Cues Needed (Bed Mobility Goal 1, PT)  independent  -CB     Time Frame (Bed Mobility Goal 1, PT)  short term goal (STG);2 days  -CB     Barriers (Bed Mobility Goal 1, PT)  HOB flat, no bed rails  -CB     Progress/Outcomes (Bed Mobility Goal 1, PT)  goal not met  -CB     Row Name 02/08/19 1433          Transfer Goal 1 (PT)    Activity/Assistive Device (Transfer Goal 1, PT)  sit-to-stand/stand-to-sit;bed-to-chair/chair-to-bed  -CB     [place] Level/Cues Needed (Transfer Goal 1, PT)  independent  -CB     Time Frame (Transfer Goal 1, PT)  long term goal (LTG);by discharge  -CB     Barriers (Transfers Goal 1, PT)  fatigues easily  -CB     Progress/Outcome (Transfer Goal 1, PT)  goal not met  -CB     Row Name 02/08/19 1433          Gait Training Goal 1 (PT)    Activity/Assistive Device (Gait Training Goal 1, PT)  gait (walking locomotion)  -CB     [place] Level (Gait Training Goal 1, PT)  supervision required  -CB     Distance (Gait Goal 1, PT)  50'x1  -CB     Time Frame (Gait Training Goal 1, PT)  long term goal (LTG);by discharge  -CB     Barriers (Gait Training Goal 1, PT)  fatigues easily  -CB     Progress/Outcome (Gait Training Goal 1, PT)  goal not met  -CB     Row Name 02/08/19 1433          Positioning and Restraints    Pre-Treatment Position  in bed  -CB     Post Treatment Position  bed  -CB     In Bed  side lying right;call light within reach;encouraged to call for assist;exit alarm on;with family/caregiver;side rails up x2  -CB     Row Name 02/08/19 1433          Living Environment    Home Accessibility  wheelchair accessible  -CB       User Key  (r) = Recorded By, (t) = Taken By, (c) = Cosigned By    Initials Name Provider Type    Tara Finch, PT Physical Therapist        Physical Therapy Education     Title: PT OT SLP Therapies (Not Started)     Topic: Physical Therapy (In Progress)     Point: Mobility training (Done)     Learning Progress Summary           Patient Acceptance, E, VU,NR  by CB at 2/8/2019  3:49 PM    Comment:  Role of PT, POC, goals of therapy.                   Point: Precautions (Done)     Learning Progress Summary           Patient Acceptance, E, VU,NR by  at 2/8/2019  3:49 PM    Comment:  Patient instructed to use call button when she needs assistance and not to attempt to get out of bed on her own. Bed alarm activated.                               User Key     Initials Effective Dates Name Provider Type Discipline     04/06/17 -  Tara Bella, PT Physical Therapist PT              PT Recommendation and Plan  Anticipated Discharge Disposition (PT): home with home health  Planned Therapy Interventions (PT Eval): balance training, bed mobility training, gait training, home exercise program, patient/family education, strengthening, transfer training  Therapy Frequency (PT Clinical Impression): daily  Outcome Summary/Treatment Plan (PT)  Anticipated Discharge Disposition (PT): home with home health  Plan of Care Reviewed With: patient  Outcome Summary: Initial PT evaluation completed this date. Patient completes bed mobility with CGA; requires CGA for sit to stand using rolling walker.  Patient ambulates 5'x1 taking sidesteps close to bed using rolling walker with CGA. Recommend home health PT upon discharge. Continue skilled PT.   Outcome Measures     Row Name 02/08/19 7868             How much help from another person do you currently need...    Turning from your back to your side while in flat bed without using bedrails?  4  -CB      Moving from lying on back to sitting on the side of a flat bed without bedrails?  3  -CB      Moving to and from a bed to a chair (including a wheelchair)?  3  -CB      Standing up from a chair using your arms (e.g., wheelchair, bedside chair)?  3  -CB      Climbing 3-5 steps with a railing?  3  -CB      To walk in hospital room?  3  -CB      AM-PAC 6 Clicks Score  19  -CB         Functional Assessment    Outcome Measure Options  AM-PAC 6  Clicks Basic Mobility (PT)  -CB        User Key  (r) = Recorded By, (t) = Taken By, (c) = Cosigned By    Initials Name Provider Type    Tara Finch, PT Physical Therapist         Time Calculation:   PT Charges     Row Name 02/08/19 1554             Time Calculation    Start Time  1433  -CB      Stop Time  1504  -CB      Time Calculation (min)  31 min  -CB      PT Received On  02/08/19  -CB      PT Goal Re-Cert Due Date  02/21/19  -CB        User Key  (r) = Recorded By, (t) = Taken By, (c) = Cosigned By    Initials Name Provider Type    Tara Finch, PT Physical Therapist        Therapy Suggested Charges     Code   Minutes Charges    None           Therapy Charges for Today     Code Description Service Date Service Provider Modifiers Qty    [id number] HC PT EVAL MOD COMPLEXITY 2 2/8/2019 Tara Bella PT GP 1          PT G-Codes  Outcome Measure Options: AM-PAC 6 Clicks Basic Mobility (PT)  AM-PAC 6 Clicks Score: 19      Tara Bella PT  2/8/2019

## 2019-02-08 NOTE — PROGRESS NOTES
[facility] Medicine Services  INPATIENT PROGRESS NOTE    Length of Stay: 2  Date of Admission: 2/6/2019  Primary Care Physician: Henry Muniz MD    Subjective   Chief Complaint: No new complaints.  HPI: Patient is seen for follow-up today.  She has multiple medical problems including coronary artery disease (status post high risk PCI in [place]), congestive heart failure, O2 dependent COPD, sleep apnea, chronic kidney disease, diabetes mellitus, morbid obesity and was admitted for non-STEMI.  She remains chest pain-free and on her baseline supplemental oxygen of 3 L nasal prongs and maintaining saturation greater than 94%.         Review of Systems   Constitutional: Positive for activity change and fatigue. Negative for appetite change, chills, diaphoresis and fever.   HENT: Negative for trouble swallowing and voice change.    Eyes: Negative for photophobia and visual disturbance.   Respiratory: Positive for shortness of breath. Negative for cough, choking, chest tightness, wheezing and stridor.    Cardiovascular: Negative for chest pain, palpitations and leg swelling.   Gastrointestinal: Negative for abdominal distention, abdominal pain, blood in stool, constipation, diarrhea, nausea and vomiting.   Endocrine: Negative for cold intolerance, heat intolerance, polydipsia, polyphagia and polyuria.   Genitourinary: Negative for decreased urine volume, difficulty urinating, dysuria, enuresis, flank pain, frequency, hematuria and urgency.   Musculoskeletal: Negative for arthralgias, gait problem, myalgias, neck pain and neck stiffness.   Skin: Negative for pallor, rash and wound.   Neurological: Negative for dizziness, tremors, seizures, syncope, facial asymmetry, speech difficulty, weakness, light-headedness, numbness and headaches.   Hematological: Does not bruise/bleed easily.   Psychiatric/Behavioral: Negative for agitation, behavioral problems and confusion.        Objective    Temp:  [96.7 °F (35.9 °C)-98.5 °F (36.9 °C)] 97.6 °F (36.4 °C)  Heart Rate:  [52-83] 55  Resp:  [16-20] 18  BP: (133-180)/(60-77) 136/65    Physical Exam   Constitutional: She is oriented to person, place, and time. She appears well-developed and well-nourished. She is cooperative. No distress.   Patient is obese and has a BMI of 39.57.   HENT:   Head: Normocephalic and atraumatic.   Right Ear: External ear normal.   Left Ear: External ear normal.   Nose: Nose normal.   Mouth/Throat: Oropharynx is clear and moist.   Eyes: Conjunctivae and EOM are normal. Pupils are equal, round, and reactive to light.   Neck: Normal range of motion. Neck supple. No JVD present. No thyromegaly present.   Cardiovascular: Normal rate, regular rhythm, normal heart sounds and intact distal pulses. Exam reveals no gallop and no friction rub.   No murmur heard.  Pulmonary/Chest: Effort normal. No stridor. No respiratory distress. She has decreased breath sounds. She has no wheezes. She has rhonchi. She has no rales. She exhibits no tenderness.   Abdominal: Soft. Bowel sounds are normal. She exhibits no distension and no mass. There is no tenderness. There is no rebound and no guarding. No hernia.   Musculoskeletal: Normal range of motion. She exhibits no edema, tenderness or deformity.   Neurological: She is alert and oriented to person, place, and time. She has normal reflexes.   Skin: Skin is warm and dry. No rash noted. She is not diaphoretic. No erythema. No pallor.   Psychiatric: She has a normal mood and affect. Her behavior is normal. Judgment and thought content normal.   Nursing note and vitals reviewed.        Medication Review:    Current Facility-Administered Medications:   •  aspirin chewable tablet 81 mg, 81 mg, Oral, Daily, Raphael Middleton MD, 81 mg at 02/07/19 6515  •  carvedilol (COREG) tablet 3.125 mg, 3.125 mg, Oral, BID With Meals, Catracho Jarrell MD, 3.125 mg at 02/07/19 2241  •  clopidogrel  (PLAVIX) tablet 75 mg, 75 mg, Oral, Daily, Raphael Middleton MD, 75 mg at 02/07/19 0924  •  famotidine (PEPCID) tablet 40 mg, 40 mg, Oral, Daily, Raphael Middleton MD, 40 mg at 02/07/19 0924  •  ferrous sulfate EC tablet 324 mg, 324 mg, Oral, BID With Meals, Raphael Middleton MD, 324 mg at 02/07/19 1744  •  heparin (porcine) 5000 UNIT/ML injection 5,000 Units, 5,000 Units, Subcutaneous, Q12H, Raphael Middleton MD  •  hydrALAZINE (APRESOLINE) tablet 50 mg, 50 mg, Oral, BID, Raphael Middleton MD, 50 mg at 02/07/19 2027  •  HYDROcodone-acetaminophen (NORCO)  MG per tablet 1 tablet, 1 tablet, Oral, Q6H PRN, Catracho Jarrell MD, 1 tablet at 02/07/19 1743  •  insulin patient supplied pump, , Subcutaneous, Continuous, Raphael Middleton MD  •  isosorbide mononitrate (IMDUR) 24 hr tablet 60 mg, 60 mg, Oral, BID, Raphael Middleton MD, 60 mg at 02/07/19 2026  •  morphine injection 2 mg, 2 mg, Intravenous, Q2H PRN, Hill Wright MD, 2 mg at 02/07/19 2200  •  nitroglycerin (NITROSTAT) SL tablet 0.4 mg, 0.4 mg, Sublingual, Q5 Min PRN, Raphael Middleton MD, 0.4 mg at 02/07/19 2058  •  promethazine (PHENERGAN) injection 12.5 mg, 12.5 mg, Intravenous, Q6H PRN, Catracho Jarrell MD, 12.5 mg at 02/07/19 1204  •  ranolazine (RANEXA) 12 hr tablet 500 mg, 500 mg, Oral, Q12H, Edgar Montana MD, 500 mg at 02/07/19 2026  •  rosuvastatin (CRESTOR) tablet 20 mg, 20 mg, Oral, Daily, Raphael Middleton MD, 20 mg at 02/07/19 0924  •  sertraline (ZOLOFT) tablet 50 mg, 50 mg, Oral, Daily, Raphael Middleton MD, 50 mg at 02/07/19 0925  •  sodium chloride 0.9 % flush 10 mL, 10 mL, Intravenous, Q12H, Raphael Middleton MD, 10 mL at 02/07/19 2027  •  sodium chloride 0.9 % flush 10 mL, 10 mL, Intravenous, PRN, Raphael Middleton MD, 10 mL at 02/07/19 2200  •  spironolactone (ALDACTONE) tablet 25 mg, 25 mg, Oral, Daily, Raphael Middleton MD, 25 mg at 02/07/19 0925  •  zolpidem (AMBIEN) tablet 10 mg, 10 mg, Oral, Nightly  Kane SMITH Kevin L, MD, 10 mg at 02/07/19 2138    Results Review:  I have reviewed the labs, radiology results, and diagnostic studies.    Laboratory Data:   Results from last 7 days   Lab Units 02/08/19  0705 02/07/19  1838 02/07/19  0513 02/06/19  1747   SODIUM mmol/L 125* 121* 122* 121*   POTASSIUM mmol/L 4.3  --  4.0 4.6   CHLORIDE mmol/L 84*  --  81* 77*   CO2 mmol/L 30.0  --  28.0 29.0   BUN mg/dL 85*  --  86* 83*   CREATININE mg/dL 1.89*  --  2.23* 2.11*   GLUCOSE mg/dL 126*  --  88 122*   CALCIUM mg/dL 9.0  --  8.7 8.7   ANION GAP mmol/L 11.0  --  13.0 15.0     Estimated Creatinine Clearance: 31.8 mL/min (A) (by C-G formula based on SCr of 1.89 mg/dL (H)).  Results from last 7 days   Lab Units 02/01/19  1143   MAGNESIUM mg/dL 2.2         Results from last 7 days   Lab Units 02/08/19  0705 02/07/19  0513 02/06/19  1747   WBC 10*3/mm3 6.69 5.16 5.38   HEMOGLOBIN g/dL 8.4* 8.4* 8.7*   HEMATOCRIT % 24.2* 22.3* 24.6*   PLATELETS 10*3/mm3 149* 149* 161           Culture Data:   No results found for: BLOODCX  No results found for: URINECX  No results found for: RESPCX  No results found for: WOUNDCX  No results found for: STOOLCX  No components found for: BODYFLD    Radiology Data:   Imaging Results (last 24 hours)     ** No results found for the last 24 hours. **          I have reviewed the patient's current medications.     Assessment/Plan     Hospital Problem List:  Active Problems:   -History of coronary artery disease with NSTEMI (non-ST elevated myocardial infarction): Patient has been seen by the cardiologist and recommended optimization of medical management.  Echocardiogram done on January 8, 2019 showed:  · Left ventricular wall thickness is consistent with mild concentric hypertrophy.  · Estimated EF = 57%.  · Left ventricular systolic function is normal.  · Left ventricular diastolic dysfunction (grade I a) consistent with impaired relaxation.  · Right ventricular cavity is mildly dilated.  · Left  atrial cavity size is mildly dilated.  · Mild aortic valve regurgitation is present.  · Moderate mitral valve regurgitation is present  · Moderate tricuspid valve regurgitation is present.  · The tricuspid valve is grossly normal. Moderate tricuspid valve regurgitation is present. Estimated right ventricular systolic pressure from tricuspid regurgitation is markedly elevated (>55 mmHg). Moderate to severe pulmonary hypertension is present.  · Mild pulmonic valve regurgitation is present.     Repeat echocardiogram done on February 7, 2019 is essentially unchanged.  Cardiologist is following.    -Heart failure with preserved ejection fraction: Patient is euvolemic and not in exacerbation.  Bumex is on hold for now she has lost 1 pound in the last 24 hours. Continue strict I's and O's, daily weights, salt and fluid restriction.    -Acute on chronic kidney injury: Improved as creatinine is down to 1.89 from a high of 2.23.  Patient's baseline creatinine seems to be between 1.4-2.0.  Continue to monitor renal function and avoid nephrotoxins.  Nephrologist is following.    -Diabetes mellitus: Stable.  Continue insulin pump.    -Anemia of chronic inflammation: Hemoglobin remains stable.  Continue iron supplementation and continue to monitor.     -Hypertension: Stable.  Continue home medications.    -Hyponatremia: Improving.  Patient has had a dose of Samsca.  Continue free water restriction.  Nephrology is following.  Will continue to monitor.    -O2 dependent COPD: Stable.  Continue supplemental oxygen and bronchodilators.    -Obstructive sleep apnea: Continue nightly CPAP.    -Obesity: Diet and lifestyle modifications have been recommended.     -Continue GI and DVT prophylaxis.    -Deconditioning: Consult PT and OT.    -Transfer to telemetry.      Discharge Planning: Likely discharge in 1-2 days.      Catracho Jarrell MD   02/08/19   8:42 AM

## 2019-02-08 NOTE — THERAPY EVALUATION
Acute Care - Occupational Therapy Initial Evaluation  [facility]     Patient Name: Nicolasa Rojas  : 1950  MRN: [id number]  Today's Date: 2019  Onset of Illness/Injury or Date of Surgery: 19  Date of Referral to OT: 19  Referring Physician: Dr. Jarrell    Admit Date: 2019       ICD-10-CM ICD-9-CM   1. Impaired physical mobility Z74.09 781.99   2. Impaired mobility and ADLs Z74.09 799.89     Patient Active Problem List   Diagnosis   • Hyponatremia   • Acute on chronic diastolic CHF (congestive heart failure) (CMS/[facility])   • Hypokalemia   • Acute on chronic heart failure (CMS/[facility])   • Dyspnea   • Chronic renal impairment, stage 3 (moderate) (CMS/[facility])   • CAD (coronary artery disease)   • Class 2 severe obesity due to excess calories with serious comorbidity and body mass index (BMI) of 39.0 to 39.9 in adult (CMS/[facility])   • NSTEMI (non-ST elevated myocardial infarction) (CMS/[facility])     Past Medical History:   Diagnosis Date   • Acute bronchitis    • Acute congestive heart failure (CMS/[facility])     resolving   • Acute kidney failure, unspecified (CMS/[facility])    • Acute kidney failure, unspecified (CMS/[facility])    • Acute posthemorrhagic anemia    • Asthenia    • Chest pain    • Chronic gastritis    • Chronic pharyngitis    • Chronic renal impairment    • Congenital renal failure    • Congestive heart failure (CMS/[facility])    • Contact dermatitis due to poison ivy    • COPD (chronic obstructive pulmonary disease) (CMS/[facility])    • Coronary arteriosclerosis    • D-dimer, elevated     D-dimer above reference range    • Degenerative joint disease involving multiple joints     back   • Depressive disorder    • Dysphagia    • Dyspnea    • Edema of lower extremity    • Encounter for immunization    • Encounter for long-term (current) use of medications    • Epigastric pain    • Esophagitis    • Essential hypertension    • Gastroesophageal reflux disease    • Gastroparesis     Gastroparesis syndrome    •  Generalized abdominal pain    • H/O bone density study 01/09/2015   • H/O mammogram 01/15/2015   • H/O screening mammography 11/12/2013   • Headache    • History of transfusion    • Hyperlipidemia    • Hypokalemia    • Hypomagnesemia    • Injury of tendon of rotator cuff     Injury of tendon of the rotator cuff of shoulder      • Insomnia    • Insomnia, unspecified    • Knee pain    • Nausea and vomiting    • Neck pain    • Need for immunization against influenza    • Need for prophylactic vaccination and inoculation against influenza    • Need for prophylactic vaccination and inoculation against viral disease    • 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 Performed at .   • ENDOSCOPY  02/09/2015    ESOPHAGEAL STRICTURE PRESENT, GASTRITIS FOUND IN THE STOMACH, NORMAL DUODENUM   • ENDOSCOPY W/ PEG TUBE PLACEMENT  12/19/2012    Esophagitis seen. Biopsy taken. Esophageal stricture present. Dilatation performed. Gastritis in stomach. Biopsy taken. Food was in stomach. Normal duodenum.   • HERNIA REPAIR     • HYSTERECTOMY      partial   • NECK SURGERY            OT ASSESSMENT FLOWSHEET (last 72 hours)      Occupational Therapy Evaluation     Row Name 02/08/19 6835                   OT Evaluation Time/Intention    Subjective Information  complains of;weakness;fatigue  -BH        Document Type  evaluation  -        Mode of Treatment  individual therapy;occupational therapy  -BH        Total Evaluation Minutes, Occupational Therapy  27  -BH        Patient Effort  fair  -BH        Symptoms Noted During/After  Treatment  fatigue  -           General Information    Patient Profile Reviewed?  yes  -        Onset of Illness/Injury or Date of Surgery  02/06/19  -        Referring Physician  Dr. Jarrell  -        Patient Observations  alert;agree to therapy  -        Patient/Family Observations  dtr present   -        General Observations of Patient  pt sidelying on the right on O2 3L, tele, bed alarm, IV   -        Prior Level of Function  independent:;feeding;grooming;mod assist:;dressing;bathing;min assist:;all household mobility;transfer;bed mobility  -        Equipment Currently Used at Home  shower chair;grab bar;oxygen;commode, bedside;wheelchair;rollator;ramp  -        Pertinent History of Current Functional Problem  To ER with chest pain, weakness, and elevated troponins  -        Existing Precautions/Restrictions  fall;oxygen therapy device and L/min  -        Limitations/Impairments  safety/cognitive  -        Equipment Issued to Patient  gait belt  -        Risks Reviewed  patient and family:;LOB;increased discomfort;change in vital signs  -        Benefits Reviewed  patient and family:;improve function;increase independence;increase strength;increase balance;increase knowledge  -           Relationship/Environment    Lives With  spouse  -           Resource/Environmental Concerns    Current Living Arrangements  home/apartment/condo  -           Cognitive Assessment/Interventions    Additional Documentation  Cognitive Assessment/Intervention (Group)  [facility]           Cognitive Assessment/Intervention- PT/OT    Affect/Mental Status (Cognitive)  confused;WFL  -        Orientation Status (Cognition)  verbal cues/prompts needed for orientation;oriented x 3 assist with month/year   -        Follows Commands (Cognition)  follows one step commands;over 90% accuracy;repetition of directions required;verbal cues/prompting required  -        Safety Deficit (Cognitive)  mild  deficit;moderate deficit  -        Personal Safety Interventions  fall prevention program maintained;gait belt;nonskid shoes/slippers when out of bed;supervised activity  -           Safety Issues, Functional Mobility    Safety Issues Affecting Function (Mobility)  awareness of need for assistance;insight into deficits/self awareness;judgment;positioning of assistive device;problem solving;safety precaution awareness;safety precautions follow-through/compliance;ability to follow commands  -        Impairments Affecting Function (Mobility)  balance;coordination;endurance/activity tolerance;pain;postural/trunk control;strength;shortness of breath  -           Bed Mobility Assessment/Treatment    Bed Mobility Assessment/Treatment  supine-sit;sit-supine  -        Supine-Sit [place] (Bed Mobility)  contact guard;verbal cues  -        Sit-Supine [place] (Bed Mobility)  contact guard;verbal cues  [facility]        Assistive Device (Bed Mobility)  bed rails;head of bed elevated  -           Functional Mobility    Functional Mobility- Ind. Level  minimum assist (75% patient effort)  -        Functional Mobility- Device  rolling walker  -        Functional Mobility- Comment  pt was picking up RW and attempting to carry, pt fatigued and sat down quickly, where redirection and assist for safety.   -           Transfer Assessment/Treatment    Transfer Assessment/Treatment  sit-stand transfer;stand-sit transfer  -           Sit-Stand Transfer    Sit-Stand [place] (Transfers)  minimum assist (75% patient effort);contact guard;verbal cues  [facility]        Assistive Device (Sit-Stand Transfers)  walker, front-wheeled  [facility]           Stand-Sit Transfer    Stand-Sit [place] (Transfers)  contact guard;minimum assist (75% patient effort);verbal cues  -        Assistive Device (Stand-Sit Transfers)  walker, front-wheeled  [facility]           ADL Assessment/Intervention    BADL Assessment/Intervention  lower body  dressing  -           Lower Body Dressing Assessment/Training    Lower Body Dressing [place] Level  doff;don;socks;dependent (less than 25% patient effort)  -        Comment (Lower Body Dressing)  pt reports  does LB dressing at home and LB bathing.   -           BADL Safety/Performance    Impairments, BADL Safety/Performance  balance;endurance/activity tolerance;coordination;pain;range of motion;strength;shortness of breath;trunk/postural control;motor planning;motor control  -           General ROM    GENERAL ROM COMMENTS  BUE WFL, fair digit to thumb   -           MMT (Manual Muscle Testing)    General MMT Comments  BUE  grossly 3+/5; BUE grossly 3+/5   -           Sensory Assessment/Intervention    Additional Documentation  Hearing Assessment (Group);Vision Assessment/Intervention (Group)  -           Light Touch Sensation Assessment    Left Upper Extremity: Light Touch Sensation Assessment  intact  -        Right Upper Extremity: Light Touch Sensation Assessment  intact  -           Hearing Assessment    Hearing Status  hearing impairment, bilaterally  -           Vision Assessment/Intervention    Visual Impairment/Limitations  corrective lenses for reading  -           Positioning and Restraints    Pre-Treatment Position  in bed  -        Post Treatment Position  bed  -        In Bed  supine;call light within reach;encouraged to call for assist;with family/caregiver;side rails up x2  -           Pain Assessment    Additional Documentation  Pain Scale: Numbers Pre/Post-Treatment (Group)  -           Pain Scale: Numbers Pre/Post-Treatment    Pain Scale: Numbers, Pretreatment  7/10  -        Pain Scale: Numbers, Post-Treatment  7/10  -        Pain Location  back hips  -        Pain Intervention(s)  Repositioned;Ambulation/increased activity;Rest  -           Clinical Impression (OT)    Date of Referral to OT  02/08/19  -        OT Diagnosis  Impaired  mobility and ADL   -        Prognosis (OT Eval)  fair  -        Functional Level at Time of Evaluation (OT Eval)  pt decreased endurance, strength, safety and independence with ADL.   -        Patient/Family Goals Statement (OT Eval)  to go home  -        Criteria for Skilled Therapeutic Interventions Met (OT Eval)  yes;treatment indicated  -        Rehab Potential (OT Eval)  fair, will monitor progress closely  -        Therapy Frequency (OT Eval)  other (see comments) 5-7 days a week   -        Predicted Duration of Therapy Intervention (Therapy Eval)  until d/c   -        Care Plan Review (OT)  evaluation/treatment results reviewed;care plan/treatment goals reviewed;risks/benefits reviewed;patient/other agree to care plan;current/potential barriers reviewed  -        Care Plan Review, Other Participant (OT Eval)  family  -        Anticipated Discharge Disposition (OT)  home with /7 care;home with home health  -           Vital Signs    Pretreatment Heart Rate (beats/min)  66  -BH        Intratreatment Heart Rate (beats/min)  75  -BH        Posttreatment Heart Rate (beats/min)  76  -BH        Pre SpO2 (%)  96  -BH        O2 Delivery Pre Treatment  supplemental O2  -        Intra SpO2 (%)  91  -BH        O2 Delivery Intra Treatment  supplemental O2  -BH        Post SpO2 (%)  99  -BH        O2 Delivery Post Treatment  supplemental O2  -BH        Pre Patient Position  Side Lying  -        Post Patient Position  Supine  -           Planned OT Interventions    Planned Therapy Interventions (OT Eval)  activity tolerance training;adaptive equipment training;BADL retraining;IADL retraining;functional balance retraining;occupation/activity based interventions;passive ROM/stretching;patient/caregiver education/training;ROM/therapeutic exercise;strengthening exercise;transfer/mobility retraining  -           OT Goals    Transfer Goal Selection (OT)  transfer, OT goal 1  -        Bathing Goal  Selection (OT)  bathing, OT goal 1  -        Dressing Goal Selection (OT)  dressing, OT goal 1  -        Toileting Goal Selection (OT)  toileting, OT goal 1  -        Endurance Goal Selection (OT)  endurance, OT goal 1  -        Functional Mobility Goal Selection (OT)  functional mobility, OT goal 1  -        Additional Documentation  Functional Mobility Selection (OT) (Row);Endurance Goal Selection (OT) (Row)  -           Transfer Goal 1 (OT)    Activity/Assistive Device (Transfer Goal 1, OT)  toilet  -        [place] Level/Cues Needed (Transfer Goal 1, OT)  standby assist  -        Time Frame (Transfer Goal 1, OT)  long term goal (LTG);by discharge  -        Progress/Outcome (Transfer Goal 1, OT)  goal not met  -           Bathing Goal 1 (OT)    Activity/Assistive Device (Bathing Goal 1, OT)  upper body bathing  -        [place] Level/Cues Needed (Bathing Goal 1, OT)  minimum assist (75% or more patient effort)  -        Time Frame (Bathing Goal 1, OT)  long term goal (LTG);by discharge  -        Progress/Outcomes (Bathing Goal 1, OT)  goal not met  -           Dressing Goal 1 (OT)    Activity/Assistive Device (Dressing Goal 1, OT)  upper body dressing  -        [place]/Cues Needed (Dressing Goal 1, OT)  minimum assist (75% or more patient effort)  -        Time Frame (Dressing Goal 1, OT)  long term goal (LTG);by discharge  -        Progress/Outcome (Dressing Goal 1, OT)  goal not met  -           Toileting Goal 1 (OT)    Activity/Device (Toileting Goal 1, OT)  toileting skills, all  -        [place] Level/Cues Needed (Toileting Goal 1, OT)  standby assist  -        Time Frame (Toileting Goal 1, OT)  long term goal (LTG);by discharge  -        Progress/Outcome (Toileting Goal 1, OT)  goal not met  -            Endurance Goal 1 (OT)    Activity Level (Endurance Goal 1, OT)  endurance 2 fair + 25 min functional task 3 or less rest breaks O2 90 or up   -        Time Frame (Endurance Goal 1, OT)  long term goal (LTG);by discharge  -        Progress/Outcome (Endurance Goal 1, OT)  goal not met  -           Functional Mobility Goal 1 (OT)    Activity/Assistive Device (Functional Mobility Goal 1, OT)  walker, rolling  -        [place] Level/Cues Needed (Functional Mobility Goal 1, OT)  contact guard assist  -        Distance Goal 1 (Functional Mobility, OT)  to bathroom and back to bed/chair   -        Time Frame (Functional Mobility Goal 1, OT)  long term goal (LTG);by discharge  -        Progress/Outcome (Functional Mobility Goal 1, OT)  goal not met  -           Living Environment    Home Accessibility  wheelchair accessible ramp with handrails both sides; walk in tub   -          User Key  (r) = Recorded By, (t) = Taken By, (c) = Cosigned By    Initials Name Effective Dates     Lucy Luna, OTR/L 06/08/18 -          Occupational Therapy Education     Title: PT OT SLP Therapies (Not Started)     Topic: Occupational Therapy (In Progress)     Point: ADL training (Done)     Description: Instruct learner(s) on proper safety adaptation and remediation techniques during self care or transfers.   Instruct in proper use of assistive devices.    Learning Progress Summary           Patient Acceptance, E, VU,NR by  at 2/8/2019  4:03 PM    Comment:  Educated about OT and POC. Educated to call for assist. Educated on safety throughout.   Family Acceptance, E, VU,NR by  at 2/8/2019  4:03 PM    Comment:  Educated about OT and POC. Educated to call for assist. Educated on safety throughout.                   Point: Precautions (Done)     Description: Instruct learner(s) on prescribed precautions during self-care and functional transfers.    Learning Progress Summary           Patient Acceptance, E, VU,NR by  at 2/8/2019  4:03 PM    Comment:  Educated about OT and POC. Educated to call for assist. Educated on safety throughout.   Family  Acceptance, E, VU,NR by  at 2/8/2019  4:03 PM    Comment:  Educated about OT and POC. Educated to call for assist. Educated on safety throughout.                               User Key     Initials Effective Dates Name Provider Type Discipline     06/08/18 -  Lucy Luna, OTR/L Occupational Therapist OT                  OT Recommendation and Plan  Outcome Summary/Treatment Plan (OT)  Anticipated Discharge Disposition (OT): home with 24/7 care, home with home health  Planned Therapy Interventions (OT Eval): activity tolerance training, adaptive equipment training, BADL retraining, IADL retraining, functional balance retraining, occupation/activity based interventions, passive ROM/stretching, patient/caregiver education/training, ROM/therapeutic exercise, strengthening exercise, transfer/mobility retraining  Therapy Frequency (OT Eval): other (see comments)(5-7 days a week )  Plan of Care Review  Plan of Care Reviewed With: family, patient  Plan of Care Reviewed With: family, patient  Outcome Summary: OT eval completed this date. pt was CGA for sup to sit to sup with vc with HOB up and handrails. pt CGA to min assist for sit to stand t/f at bed and CGA to min assist with RW to side step, pt declined to step forwards. Pt was dependent to don and doff socks. Pt may benefit from further skilled OT to reach maximum independence with ADL and endurance level. Recommend to d/c home with 24/7 care and home health OT and PT.     Outcome Measures     Row Name 02/08/19 1515 02/08/19 1433          How much help from another person do you currently need...    Turning from your back to your side while in flat bed without using bedrails?  --  4  -CB     Moving from lying on back to sitting on the side of a flat bed without bedrails?  --  3  -CB     Moving to and from a bed to a chair (including a wheelchair)?  --  3  -CB     Standing up from a chair using your arms (e.g., wheelchair, bedside chair)?  --  3  -CB     Climbing  3-5 steps with a railing?  --  3  -CB     To walk in hospital room?  --  3  -CB     [facility] 6 Clicks Score  --  19  -CB        How much help from another is currently needed...    Putting on and taking off regular lower body clothing?  1  -  --     Bathing (including washing, rinsing, and drying)  2  -  --     Toileting (which includes using toilet bed pan or urinal)  2  -  --     Putting on and taking off regular upper body clothing  3  -  --     Taking care of personal grooming (such as brushing teeth)  3  -  --     Eating meals  4  -  --     Score  15  -  --        Functional Assessment    Outcome Measure Options  [facility] 6 Clicks Daily Activity (OT)  -  [facility] 6 Clicks Basic Mobility (PT)  -       User Key  (r) = Recorded By, (t) = Taken By, (c) = Cosigned By    Initials Name Provider Type    CB Tara Bella, PT Physical Therapist     Lucy Luna OTR/L Occupational Therapist          Time Calculation:   Time Calculation- OT     Row Name 02/08/19 1606             Time Calculation-     OT Start Time  1515  -      OT Stop Time  1542  -      OT Time Calculation (min)  27 min  -      OT Received On  02/08/19  -      OT Goal Re-Cert Due Date  02/21/19  -        User Key  (r) = Recorded By, (t) = Taken By, (c) = Cosigned By    Initials Name Provider Type     Lucy Luna, OTR/L Occupational Therapist        Therapy Suggested Charges     Code   Minutes Charges    None           Therapy Charges for Today     Code Description Service Date Service Provider Modifiers Qty    [id number]  OT EVAL MOD COMPLEXITY 2 2/8/2019 Lucy Luna OTR/L GO 1               MEÑO Cyr/JOSEMANUEL  2/8/2019

## 2019-02-08 NOTE — PLAN OF CARE
Problem: Patient Care Overview  Goal: Interprofessional Rounds/Family Conf  Outcome: Ongoing (interventions implemented as appropriate)      Problem: Kidney Disease, Chronic/End Stage Renal Disease (Adult)  Goal: Signs and Symptoms of Listed Potential Problems Will be Absent, Minimized or Managed (Kidney Disease, Chronic/End Stage Renal Disease)  Outcome: Ongoing (interventions implemented as appropriate)      Problem: Fluid Volume Excess (Adult)  Goal: Identify Related Risk Factors and Signs and Symptoms  Outcome: Ongoing (interventions implemented as appropriate)    Goal: Optimal Fluid Balance  Outcome: Ongoing (interventions implemented as appropriate)      Problem: Fall Risk (Adult)  Goal: Identify Related Risk Factors and Signs and Symptoms  Outcome: Ongoing (interventions implemented as appropriate)    Goal: Absence of Fall  Outcome: Ongoing (interventions implemented as appropriate)

## 2019-02-08 NOTE — PLAN OF CARE
Problem: Patient Care Overview  Goal: Plan of Care Review  Outcome: Ongoing (interventions implemented as appropriate)   02/08/19 8747   Coping/Psychosocial   Plan of Care Reviewed With family;patient   OTHER   Outcome Summary OT mickeyal completed this date. pt was CGA for sup to sit to sup with vc with HOB up and handrails. pt CGA to min assist for sit to stand t/f at bed and CGA to min assist with RW to side step, pt declined to step forwards. Pt was dependent to don and doff socks. Pt may benefit from further skilled OT to reach maximum independence with ADL and endurance level. Recommend to d/c home with 24/7 care and home health OT and PT.

## 2019-02-09 PROBLEM — J44.9 COPD (CHRONIC OBSTRUCTIVE PULMONARY DISEASE) (HCC): Status: ACTIVE | Noted: 2019-01-01

## 2019-02-09 PROBLEM — E11.9 TYPE 2 DIABETES MELLITUS (HCC): Status: ACTIVE | Noted: 2019-01-01

## 2019-02-09 PROBLEM — I10 ESSENTIAL HYPERTENSION: Status: ACTIVE | Noted: 2019-01-01

## 2019-02-09 PROBLEM — G47.33 OSA (OBSTRUCTIVE SLEEP APNEA): Status: ACTIVE | Noted: 2019-01-01

## 2019-02-09 PROBLEM — E78.5 HYPERLIPIDEMIA: Status: ACTIVE | Noted: 2019-01-01

## 2019-02-09 NOTE — THERAPY TREATMENT NOTE
Acute Care - Occupational Therapy Treatment Note  [facility]     Patient Name: Nicolasa Rojas  : 1950  MRN: [id number]  Today's Date: 2019  Onset of Illness/Injury or Date of Surgery: 19  Date of Referral to OT: 19  Referring Physician: Dr. Jarrell    Admit Date: 2019       ICD-10-CM ICD-9-CM   1. Impaired physical mobility Z74.09 781.99   2. Impaired mobility and ADLs Z74.09 799.89     Patient Active Problem List   Diagnosis   • Hyponatremia   • Acute on chronic diastolic CHF (congestive heart failure) (CMS/[facility])   • Hypokalemia   • Acute on chronic heart failure (CMS/[facility])   • Dyspnea   • Chronic renal impairment, stage 3 (moderate) (CMS/[facility])   • CAD (coronary artery disease)   • Class 2 severe obesity due to excess calories with serious comorbidity and body mass index (BMI) of 39.0 to 39.9 in adult (CMS/[facility])   • NSTEMI (non-ST elevated myocardial infarction) (CMS/[facility])   • COPD (chronic obstructive pulmonary disease) (CMS/[facility])   • CIERRA (obstructive sleep apnea)   • Hyperlipidemia   • Essential hypertension   • Type 2 diabetes mellitus (CMS/[facility])     Past Medical History:   Diagnosis Date   • Acute bronchitis    • Acute congestive heart failure (CMS/[facility])     resolving   • Acute kidney failure, unspecified (CMS/[facility])    • Acute kidney failure, unspecified (CMS/[facility])    • Acute posthemorrhagic anemia    • Asthenia    • Chest pain    • Chronic gastritis    • Chronic pharyngitis    • Chronic renal impairment    • Congenital renal failure    • Congestive heart failure (CMS/[facility])    • Contact dermatitis due to poison ivy    • COPD (chronic obstructive pulmonary disease) (CMS/[facility])    • Coronary arteriosclerosis    • D-dimer, elevated     D-dimer above reference range    • Degenerative joint disease involving multiple joints     back   • Depressive disorder    • Dysphagia    • Dyspnea    • Edema of lower extremity    • Encounter for immunization    • Encounter for long-term (current) use of  medications    • Epigastric pain    • Esophagitis    • Essential hypertension    • Gastroesophageal reflux disease    • Gastroparesis     Gastroparesis syndrome    • Generalized abdominal pain    • H/O bone density study 01/09/2015   • H/O mammogram 01/15/2015   • H/O screening mammography 11/12/2013   • Headache    • History of transfusion    • Hyperlipidemia    • Hypokalemia    • Hypomagnesemia    • Injury of tendon of rotator cuff     Injury of tendon of the rotator cuff of shoulder      • Insomnia    • Insomnia, unspecified    • Knee pain    • Nausea and vomiting    • Neck pain    • Need for immunization against influenza    • Need for prophylactic vaccination and inoculation against influenza    • Need for prophylactic vaccination and inoculation against viral disease    • 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 Performed at [facility].   • ENDOSCOPY  02/09/2015    ESOPHAGEAL STRICTURE PRESENT, GASTRITIS FOUND IN THE STOMACH, NORMAL DUODENUM   • ENDOSCOPY W/ PEG TUBE PLACEMENT  12/19/2012    Esophagitis seen. Biopsy taken. Esophageal stricture present. Dilatation performed. Gastritis in stomach. Biopsy taken. Food was in stomach. Normal duodenum.   • HERNIA REPAIR     • HYSTERECTOMY      partial   • NECK SURGERY         Therapy Treatment    Rehabilitation Treatment Summary     Row Name 02/09/19 1025             Treatment Time/Intention    Discipline  occupational therapy assistant  -BB      Document Type  therapy note (daily note)  -BB      Subjective Information  complains of;pain  -BB      Mode of  Treatment  individual therapy;occupational therapy  -BB      Patient/Family Observations   present  -BB      Total Minutes, Occupational Therapy Treatment  25  -BB      Therapy Frequency (OT Eval)  other (see comments) 5-7 days/wk  -BB      Patient Effort  fair  -BB      Recorded by [BB] Shahida Coto COTA/L 02/09/19 1510      Row Name 02/09/19 1025             Vital Signs    Pretreatment Heart Rate (beats/min)  68  -BB      Posttreatment Heart Rate (beats/min)  73  -BB      Pre SpO2 (%)  97  -BB      O2 Delivery Pre Treatment  supplemental O2  -BB      Post SpO2 (%)  96  -BB      O2 Delivery Post Treatment  supplemental O2  -BB      Pre Patient Position  Supine  -BB      Post Patient Position  Supine  -BB      Recorded by [BB] Shahida Coot COTA/L 02/09/19 1510      Row Name 02/09/19 1025             Cognitive Assessment/Intervention- PT/OT    Affect/Mental Status (Cognitive)  WFL  -BB      Orientation Status (Cognition)  oriented x 4  -BB      Follows Commands (Cognition)  follows one step commands;over 90% accuracy  -BB      Recorded by [BB] Shahida Coto COTA/L 02/09/19 1510      Row Name 02/09/19 1025             Bed Mobility Assessment/Treatment    Comment (Bed Mobility)  Pt states she is having too much pain to sit EOB, agrees to bed exercises  -BB      Recorded by [BB] Shahida Coto COTA/L 02/09/19 1510      Row Name 02/09/19 1025             Therapeutic Exercise    Upper Extremity Range of Motion (Therapeutic Exercise)  shoulder flexion/extension, bilateral;elbow flexion/extension, bilateral;forearm supination/pronation, bilateral;wrist flexion/extension, bilateral;shoulder internal/external rotation, bilateral  -BB      Hand (Therapeutic Exercise)  finger flexion/extension, bilateral  -BB      Exercise Type (Therapeutic Exercise)  AROM (active range of motion)  -BB      Position (Therapeutic Exercise)  supine  -BB      Sets/Reps (Therapeutic Exercise)  1/15  -BB       Expected Outcome (Therapeutic Exercise)  improve functional tolerance, self-care activity  -BB      Recorded by [BB] Shahida Coto COTA/L 02/09/19 1510      Row Name 02/09/19 1025             Positioning and Restraints    Pre-Treatment Position  in bed  -BB      Post Treatment Position  bed  -BB      In Bed  supine;call light within reach;encouraged to call for assist;exit alarm on  -BB      Recorded by [BB] Shahida Coto COTA/L 02/09/19 1510      Row Name 02/09/19 1025             Pain Assessment    Additional Documentation  Pain Scale: Numbers Pre/Post-Treatment (Group)  -BB      Recorded by [LOTTIE] Shahida Coto COTA/L 02/09/19 1510      Row Name 02/09/19 1025             Pain Scale: Numbers Pre/Post-Treatment    Pain Scale: Numbers, Pretreatment  9/10  -BB      Pain Scale: Numbers, Post-Treatment  9/10  -BB      Pain Location  -- B LEs  -BB      Pain Intervention(s)  Medication (See MAR)  -BB      Recorded by [BB] Shahida Coto COTA/L 02/09/19 1510      Row Name 02/09/19 1025             Plan of Care Review    Plan of Care Reviewed With  patient  -BB      Recorded by [BB] Shahida Coto COTA/L 02/09/19 1510      Row Name 02/09/19 1025             Outcome Summary/Treatment Plan (OT)    Daily Summary of Progress (OT)  progress toward functional goals is gradual  -BB      Plan for Continued Treatment (OT)  continue POC  -BB      Anticipated Discharge Disposition (OT)  home with 24/7 care;home with home health;skilled nursing facility  -BB      Recorded by [LOTTIE] Shahida Coto COTA/L 02/09/19 1510        User Key  (r) = Recorded By, (t) = Taken By, (c) = Cosigned By    Initials Name Effective Dates Discipline    BB Shahida Coto COTA/L 03/07/18 -  OT           Rehab Goal Summary     Row Name 02/09/19 1025             Occupational Therapy Goals    Transfer Goal Selection (OT)  transfer, OT goal 1  -BB      Bathing Goal Selection (OT)  bathing, OT goal 1  -BB      Dressing  Goal Selection (OT)  dressing, OT goal 1  -BB      Toileting Goal Selection (OT)  toileting, OT goal 1  -BB      Endurance Goal Selection (OT)  endurance, OT goal 1  -BB      Functional Mobility Goal Selection (OT)  functional mobility, OT goal 1  -BB         Transfer Goal 1 (OT)    Activity/Assistive Device (Transfer Goal 1, OT)  toilet  -BB      [place] Level/Cues Needed (Transfer Goal 1, OT)  standby assist  -BB      Time Frame (Transfer Goal 1, OT)  long term goal (LTG);by discharge  -BB      Progress/Outcome (Transfer Goal 1, OT)  goal not met  -BB         Bathing Goal 1 (OT)    Activity/Assistive Device (Bathing Goal 1, OT)  upper body bathing  -BB      [place] Level/Cues Needed (Bathing Goal 1, OT)  minimum assist (75% or more patient effort)  -BB      Time Frame (Bathing Goal 1, OT)  long term goal (LTG);by discharge  -BB      Progress/Outcomes (Bathing Goal 1, OT)  goal not met  -BB         Dressing Goal 1 (OT)    Activity/Assistive Device (Dressing Goal 1, OT)  upper body dressing  -BB      [place]/Cues Needed (Dressing Goal 1, OT)  minimum assist (75% or more patient effort)  -BB      Time Frame (Dressing Goal 1, OT)  long term goal (LTG);by discharge  -BB      Progress/Outcome (Dressing Goal 1, OT)  goal not met  -BB         Toileting Goal 1 (OT)    Activity/Device (Toileting Goal 1, OT)  toileting skills, all  -BB      [place] Level/Cues Needed (Toileting Goal 1, OT)  standby assist  -BB      Time Frame (Toileting Goal 1, OT)  long term goal (LTG);by discharge  -BB      Progress/Outcome (Toileting Goal 1, OT)  goal not met  -BB          Endurance Goal 1 (OT)    Activity Level (Endurance Goal 1, OT)  endurance 2 fair + 25 min functional task 3 or less rest breaks O2 90 or up  -BB      Time Frame (Endurance Goal 1, OT)  long term goal (LTG);by discharge  -BB      Progress/Outcome (Endurance Goal 1, OT)  goal not met  -BB         Functional Mobility Goal 1 (OT)    Activity/Assistive  Device (Functional Mobility Goal 1, OT)  walker, rolling  -BB      [place] Level/Cues Needed (Functional Mobility Goal 1, OT)  contact guard assist  -BB      Distance Goal 1 (Functional Mobility, OT)  to bathroom and back to bed/chair   -BB      Time Frame (Functional Mobility Goal 1, OT)  long term goal (LTG);by discharge  -BB      Progress/Outcome (Functional Mobility Goal 1, OT)  goal not met  -BB        User Key  (r) = Recorded By, (t) = Taken By, (c) = Cosigned By    Initials Name Provider Type Discipline    Shahida Villela COTA/L Occupational Therapy Assistant OT        Occupational Therapy Education     Title: PT OT SLP Therapies (Not Started)     Topic: Occupational Therapy (In Progress)     Point: ADL training (Done)     Description: Instruct learner(s) on proper safety adaptation and remediation techniques during self care or transfers.   Instruct in proper use of assistive devices.    Learning Progress Summary           Patient Acceptance, E, VU,NR by  at 2/8/2019  4:03 PM    Comment:  Educated about OT and POC. Educated to call for assist. Educated on safety throughout.   Family Acceptance, E, VU,NR by  at 2/8/2019  4:03 PM    Comment:  Educated about OT and POC. Educated to call for assist. Educated on safety throughout.                   Point: Home exercise program (In Progress)     Description: Instruct learner(s) on appropriate technique for monitoring, assisting and/or progressing therapeutic exercises/activities.    Learning Progress Summary           Patient Acceptance, E, NR by LOTTIE at 2/9/2019  3:11 PM                   Point: Precautions (Done)     Description: Instruct learner(s) on prescribed precautions during self-care and functional transfers.    Learning Progress Summary           Patient Acceptance, E, VU,NR by  at 2/8/2019  4:03 PM    Comment:  Educated about OT and POC. Educated to call for assist. Educated on safety throughout.   Family Acceptance, E, VU,NR by  at  2/8/2019  4:03 PM    Comment:  Educated about OT and POC. Educated to call for assist. Educated on safety throughout.                               User Key     Initials Effective Dates Name Provider Type Discipline     06/08/18 -  Lucy Luna OTR/L Occupational Therapist OT     03/07/18 -  Shahida Coto COTA/L Occupational Therapy Assistant OT                OT Recommendation and Plan  Outcome Summary/Treatment Plan (OT)  Daily Summary of Progress (OT): progress toward functional goals is gradual  Plan for Continued Treatment (OT): continue POC  Anticipated Discharge Disposition (OT): home with 24/7 care, home with home health, skilled nursing facility  Therapy Frequency (OT Eval): other (see comments)(5-7 days/wk)  Daily Summary of Progress (OT): progress toward functional goals is gradual  Plan of Care Review  Plan of Care Reviewed With: patient  Plan of Care Reviewed With: patient  Outcome Summary: Pt deferred EOB/OOB. Pt agreed to B UE exercises in supine. Pt may benefit from HHOT services at time of D/C.   Outcome Measures     Row Name 02/09/19 1025 02/08/19 1515 02/08/19 1433       How much help from another person do you currently need...    Turning from your back to your side while in flat bed without using bedrails?  --  --  4  -CB    Moving from lying on back to sitting on the side of a flat bed without bedrails?  --  --  3  -CB    Moving to and from a bed to a chair (including a wheelchair)?  --  --  3  -CB    Standing up from a chair using your arms (e.g., wheelchair, bedside chair)?  --  --  3  -CB    Climbing 3-5 steps with a railing?  --  --  3  -CB    To walk in hospital room?  --  --  3  -CB    AM-PAC 6 Clicks Score  --  --  19  -CB       How much help from another is currently needed...    Putting on and taking off regular lower body clothing?  1  -BB  1  -BH  --    Bathing (including washing, rinsing, and drying)  2  -BB  2  -BH  --    Toileting (which includes using toilet bed  pan or urinal)  2  -BB  2  -BH  --    Putting on and taking off regular upper body clothing  3  -BB  3  -BH  --    Taking care of personal grooming (such as brushing teeth)  3  -BB  3  -BH  --    Eating meals  4  -BB  4  -BH  --    Score  15  -BB  15  -BH  --       Functional Assessment    Outcome Measure Options  --  AM-PAC 6 Clicks Daily Activity (OT)  -  AM-PAC 6 Clicks Basic Mobility (PT)  -      User Key  (r) = Recorded By, (t) = Taken By, (c) = Cosigned By    Initials Name Provider Type    CB Tara Bella, PT Physical Therapist     Lucy Luna, OTR/L Occupational Therapist    Shahida Villela COTA/L Occupational Therapy Assistant         Non-skid socks and gait belt in place. Toileting offered. Call light and needs within reach. Pt advised to not get up alone and call the nurse for assistance.  Bed alarm on.     Time Calculation:   Time Calculation- OT     Row Name 02/09/19 1513             Time Calculation- OT    OT Start Time  1025  -BB      OT Stop Time  1050  -BB      OT Time Calculation (min)  25 min  -      Total Timed Code Minutes- OT  25 minute(s)  -BB      OT Received On  02/09/19  -        User Key  (r) = Recorded By, (t) = Taken By, (c) = Cosigned By    Initials Name Provider Type    Shahida Villela COTA/L Occupational Therapy Assistant           Therapy Suggested Charges     Code   Minutes Charges    None           Therapy Charges for Today     Code Description Service Date Service Provider Modifiers Qty    [id number]  OT THER PROC EA 15 MIN 2/9/2019 Shahida Coto COTA/L GO 2               JUAN Fung  2/9/2019

## 2019-02-09 NOTE — PLAN OF CARE
Problem: Patient Care Overview  Goal: Individualization and Mutuality  Outcome: Ongoing (interventions implemented as appropriate)    Goal: Discharge Needs Assessment  Outcome: Ongoing (interventions implemented as appropriate)   02/07/19 1014 02/08/19 1433   Discharge Needs Assessment   Readmission Within the Last 30 Days other (see comments);current reason for admission unrelated to previous admission  (DX: NSTEMI) --    Concerns to be Addressed --  no discharge needs identified   Concerns Comments Spouse voiced that he monitors patient's blood sugar, blood pressure and performs daily weights. He voiced that he also keeps record. Spouse voiced that patient is compliant with fluid and diet restrictions. He states she is compliant with home medications and also with follow up appointments with providers.  --    Patient/Family Anticipates Transition to --  home with help/services   Patient/Family Anticipated Services at Transition --  home health care   Transportation Concerns --  car, none   Transportation Anticipated --  family or friend will provide   Anticipated Changes Related to Illness none --    Equipment Needed After Discharge --  none   Outpatient/Agency/Support Group Needs --  homecare agency   Discharge Facility/Level of Care Needs --  home with home health   Offered/Gave Vendor List yes --    Patient's Choice of Community Agency(s) [facility] home health --    Current Discharge Risk --  chronically ill   Disability   Equipment Currently Used at Home --  cane, quad;commode;walker, rolling;wheelchair;wheelchair, motorized;bipap/cpap;oxygen       Problem: Kidney Disease, Chronic/End Stage Renal Disease (Adult)  Goal: Signs and Symptoms of Listed Potential Problems Will be Absent, Minimized or Managed (Kidney Disease, Chronic/End Stage Renal Disease)  Outcome: Ongoing (interventions implemented as appropriate)      Problem: Fluid Volume Excess (Adult)  Goal: Identify Related Risk Factors and Signs and  Symptoms  Outcome: Ongoing (interventions implemented as appropriate)    Goal: Optimal Fluid Balance  Outcome: Ongoing (interventions implemented as appropriate)      Problem: Fall Risk (Adult)  Goal: Identify Related Risk Factors and Signs and Symptoms  Outcome: Ongoing (interventions implemented as appropriate)    Goal: Absence of Fall  Outcome: Ongoing (interventions implemented as appropriate)      Problem: Cardiac: ACS (Acute Coronary Syndrome) (Adult)  Goal: Signs and Symptoms of Listed Potential Problems Will be Absent, Minimized or Managed (Cardiac: ACS)  Outcome: Ongoing (interventions implemented as appropriate)      Problem: Pain, Chronic (Adult)  Goal: Identify Related Risk Factors and Signs and Symptoms  Outcome: Ongoing (interventions implemented as appropriate)    Goal: Acceptable Pain/Comfort Level and Functional Ability  Outcome: Ongoing (interventions implemented as appropriate)

## 2019-02-09 NOTE — PROGRESS NOTES
"DAILY PROGRESS NOTE  [facility]    Patient Identification:  Name: Nicolasa Rojas  Age: 68 y.o.  Sex: female  :  1950  MRN: [id number]         Primary Care Physician: Henry Muniz MD    Subjective:  Interval History:She complains of back pain. No chest pain    Objective:    Scheduled Meds:  aspirin 81 mg Oral Daily   carvedilol 3.125 mg Oral BID With Meals   clopidogrel 75 mg Oral Daily   famotidine 40 mg Oral Daily   ferrous sulfate 324 mg Oral BID With Meals   heparin (porcine) 5,000 Units Subcutaneous Q12H   hydrALAZINE 50 mg Oral BID   isosorbide mononitrate 60 mg Oral BID   ranolazine 500 mg Oral Q12H   rosuvastatin 20 mg Oral Daily   sertraline 50 mg Oral Daily   sodium chloride 10 mL Intravenous Q12H   spironolactone 25 mg Oral Daily     Continuous Infusions:  insulin        Vital signs in last 24 hours:  Temp:  [97.2 °F (36.2 °C)-99.5 °F (37.5 °C)] 98 °F (36.7 °C)  Heart Rate:  [60-71] 63  Resp:  [18] 18  BP: (130-162)/(67-79) 135/78    Intake/Output:    Intake/Output Summary (Last 24 hours) at 2019 1102  Last data filed at 2019 0628  Gross per 24 hour   Intake 120 ml   Output 700 ml   Net -580 ml       Exam:  /78 (BP Location: Left arm, Patient Position: Lying)   Pulse 63   Temp 98 °F (36.7 °C) (Temporal)   Resp 18   Ht 160 cm (63\")   Wt 101 kg (221 lb 14.4 oz)   SpO2 95%   BMI 39.31 kg/m²     General Appearance:    Alert, cooperative, no distress   Head:    Normocephalic, without obvious abnormality, atraumatic   Eyes:       Throat:   Lips, tongue, gums normal   Neck:   Supple, symmetrical, trachea midline, no JVD   Lungs:     Clear to auscultation bilaterally, respirations unlabored   Chest Wall:    No tenderness or deformity    Heart:    Regular rate and rhythm, S1 and S2 normal, no murmur,no  Rub or gallop   Abdomen:     Soft, non-tender, bowel sounds active, no masses, no organomegaly    Extremities:   Extremities normal, atraumatic, no cyanosis or " edema   Pulses:      Skin:   Skin is warm and dry,  no rashes or palpable lesions   Neurologic:   no focal deficits noted      [unfilled]  Data Review:  Results from last 7 days   Lab Units 02/09/19  0737 02/08/19  0705 02/07/19  1838 02/07/19  0513   SODIUM mmol/L 129* 125* 121* 122*   POTASSIUM mmol/L 4.2 4.3  --  4.0   CHLORIDE mmol/L 89* 84*  --  81*   CO2 mmol/L 32.0* 30.0  --  28.0   BUN mg/dL 84* 85*  --  86*   CREATININE mg/dL 1.98* 1.89*  --  2.23*   GLUCOSE mg/dL 127* 126*  --  88   CALCIUM mg/dL 9.3 9.0  --  8.7     Results from last 7 days   Lab Units 02/09/19 0737 02/08/19  0705 02/07/19  0513   WBC 10*3/mm3 7.57 6.69 5.16   HEMOGLOBIN g/dL 8.6* 8.4* 8.4*   HEMATOCRIT % 23.7* 24.2* 22.3*   PLATELETS 10*3/mm3 175 149* 149*             No results found for: TROPONINT            Invalid input(s): PROT, LABALBU          Glucose   Date/Time Value Ref Range Status   02/09/2019 0540 149 (H) 70 - 130 mg/dL Final     Comment:     RN NotifiedOperator: [id number] ADIEL MERAZMeter ID: [id number]   02/08/2019 2045 170 (H) 70 - 130 mg/dL Final     Comment:     RN NotifiedOperator: [id number] ADIEL MERAZMetbetsy ID: [id number]   02/08/2019 1113 117 70 - 130 mg/dL Final     Comment:     Result Not ConfirmedOperator: [id number] ESTEFANÍA CLIFTONMeter ID: [id number]   02/08/2019 0542 122 70 - 130 mg/dL Final     Comment:     INS PUMPOperator: [id number] ROSE  LYNNEMeter ID: [id number]   02/07/2019 2023 174 (H) 70 - 130 mg/dL Final     Comment:     INS PUMPOperator: [id number] GLAYSBROOK  LYNNEMeter ID: [id number]   02/07/2019 1729 90 70 - 130 mg/dL Final     Comment:     : [id number] SHANICE AUSTINMeter ID: [id number]   02/07/2019 1202 111 70 - 130 mg/dL Final     Comment:     : [id number] SHANICE AUSTINMeter ID: [id number]   02/07/2019 0632 101 70 - 130 mg/dL Final     Comment:     : [id number] LAURA AMYMeter ID: [id number]           Patient Active Problem List   Diagnosis Code    • Hyponatremia E87.1   • Acute on chronic diastolic CHF (congestive heart failure) (CMS/HCC) I50.33   • Hypokalemia E87.6   • Acute on chronic heart failure (CMS/HCC) I50.9   • Dyspnea R06.00   • Chronic renal impairment, stage 3 (moderate) (CMS/HCC) N18.3   • CAD (coronary artery disease) I25.10   • Class 2 severe obesity due to excess calories with serious comorbidity and body mass index (BMI) of 39.0 to 39.9 in adult (CMS/HCC) E66.01, Z68.39   • NSTEMI (non-ST elevated myocardial infarction) (CMS/HCC) I21.4   • COPD (chronic obstructive pulmonary disease) (CMS/HCC) J44.9   • CIERRA (obstructive sleep apnea) G47.33   • Hyperlipidemia E78.5   • Essential hypertension I10   • Type 2 diabetes mellitus (CMS/HCC) E11.9       Assessment:  Active Hospital Problems    Diagnosis Date Noted   • **NSTEMI (non-ST elevated myocardial infarction) (CMS/HCC) [I21.4] 02/06/2019   • COPD (chronic obstructive pulmonary disease) (CMS/HCC) [J44.9] 02/09/2019   • CIERRA (obstructive sleep apnea) [G47.33] 02/09/2019   • Hyperlipidemia [E78.5] 02/09/2019   • Essential hypertension [I10] 02/09/2019   • Type 2 diabetes mellitus (CMS/HCC) [E11.9] 02/09/2019   • Class 2 severe obesity due to excess calories with serious comorbidity and body mass index (BMI) of 39.0 to 39.9 in adult (CMS/HCC) [E66.01, Z68.39] 01/21/2019   • Chronic renal impairment, stage 3 (moderate) (CMS/HCC) [N18.3] 01/07/2019   • CAD (coronary artery disease) [I25.10] 01/07/2019   • Hyponatremia [E87.1] 01/04/2019   • Acute on chronic diastolic CHF (congestive heart failure) (CMS/HCC) [I50.33] 01/04/2019      Resolved Hospital Problems   No resolved problems to display.       Plan:  As per cardiology and nephrology. Continue medical RX. Molilize with PT and OT. Follow up lab.    Evangelista Lee MD  2/9/2019  11:02 AM

## 2019-02-09 NOTE — PLAN OF CARE
Problem: Patient Care Overview  Goal: Plan of Care Review  Outcome: Ongoing (interventions implemented as appropriate)    Goal: Individualization and Mutuality  Outcome: Ongoing (interventions implemented as appropriate)    Goal: Discharge Needs Assessment  Outcome: Ongoing (interventions implemented as appropriate)    Goal: Interprofessional Rounds/Family Conf  Outcome: Ongoing (interventions implemented as appropriate)      Problem: Kidney Disease, Chronic/End Stage Renal Disease (Adult)  Goal: Signs and Symptoms of Listed Potential Problems Will be Absent, Minimized or Managed (Kidney Disease, Chronic/End Stage Renal Disease)  Outcome: Ongoing (interventions implemented as appropriate)      Problem: Fluid Volume Excess (Adult)  Goal: Identify Related Risk Factors and Signs and Symptoms  Outcome: Ongoing (interventions implemented as appropriate)    Goal: Optimal Fluid Balance  Outcome: Ongoing (interventions implemented as appropriate)      Problem: Fall Risk (Adult)  Goal: Identify Related Risk Factors and Signs and Symptoms  Outcome: Ongoing (interventions implemented as appropriate)    Goal: Absence of Fall  Outcome: Ongoing (interventions implemented as appropriate)      Problem: Cardiac: ACS (Acute Coronary Syndrome) (Adult)  Goal: Signs and Symptoms of Listed Potential Problems Will be Absent, Minimized or Managed (Cardiac: ACS)  Outcome: Ongoing (interventions implemented as appropriate)      Problem: Pain, Chronic (Adult)  Goal: Identify Related Risk Factors and Signs and Symptoms  Outcome: Ongoing (interventions implemented as appropriate)    Goal: Acceptable Pain/Comfort Level and Functional Ability  Outcome: Ongoing (interventions implemented as appropriate)

## 2019-02-09 NOTE — PLAN OF CARE
Problem: Patient Care Overview  Goal: Plan of Care Review  Outcome: Ongoing (interventions implemented as appropriate)   02/09/19 1511   Coping/Psychosocial   Plan of Care Reviewed With patient   Plan of Care Review   Progress no change   OTHER   Outcome Summary Pt deferred EOB/OOB. Pt agreed to B UE exercises in supine. Pt may benefit from HHOT services at time of D/C.

## 2019-02-09 NOTE — PROGRESS NOTES
"[facility]  [address] [place] [address]. [id number]  T - [phone]  F - [phone]     Progress Note          PATIENT  DEMOGRAPHICS   PATIENT NAME: Nicolasa Rojas                      PHYSICIAN: Joon Dugan MD  : 1950  MRN: [id number]   LOS: 3 days    Patient Care Team:  Henry Muniz MD as PCP - General (Internal Medicine)  Subjective   SUBJECTIVE   Pt had vomiting last night no soa today              Objective   OBJECTIVE   Vital Signs  Temp:  [97.3 °F (36.3 °C)-99.5 °F (37.5 °C)] 98.3 °F (36.8 °C)  Heart Rate:  [60-71] 69  Resp:  [16-18] 16  BP: (130-162)/(67-79) 152/71    Flowsheet Rows      First Filed Value   Admission Height  160 cm (63\") Documented at 2019 1612   Admission Weight  102 kg (224 lb 12.8 oz) Documented at 2019 1612           I/O last 3 completed shifts:  In: 360 [P.O.:360]  Out:  [Urine:]    PHYSICAL EXAM    Physical Exam   Constitutional: She is oriented to person, place, and time.   Neck: JVD present.   Cardiovascular: Normal rate. Exam reveals gallop and S3.   No murmur heard.  Pulmonary/Chest: Effort normal and breath sounds normal.   Abdominal: Soft. Bowel sounds are normal.   Musculoskeletal: She exhibits edema.   Neurological: She is alert and oriented to person, place, and time.       RESULTS   Results Review:    Results from last 7 days   Lab Units 19  0737 19  0705 19  1838 19  0513   SODIUM mmol/L 129* 125* 121* 122*   POTASSIUM mmol/L 4.2 4.3  --  4.0   CHLORIDE mmol/L 89* 84*  --  81*   CO2 mmol/L 32.0* 30.0  --  28.0   BUN mg/dL 84* 85*  --  86*   CREATININE mg/dL 1.98* 1.89*  --  2.23*   CALCIUM mg/dL 9.3 9.0  --  8.7   GLUCOSE mg/dL 127* 126*  --  88       Estimated Creatinine Clearance: 30.4 mL/min (A) (by C-G formula based on SCr of 1.98 mg/dL (H)).                Results from last 7 days   Lab Units 19  0737 19  0705 19  0513 19  1747   WBC 10*3/mm3 7.57 6.69 5.16 " 5.38   HEMOGLOBIN g/dL 8.6* 8.4* 8.4* 8.7*   PLATELETS 10*3/mm3 175 149* 149* 161               Imaging Results (last 24 hours)     ** No results found for the last 24 hours. **           MEDICATIONS      aspirin 81 mg Oral Daily   carvedilol 3.125 mg Oral BID With Meals   clopidogrel 75 mg Oral Daily   famotidine 40 mg Oral Daily   ferrous sulfate 324 mg Oral BID With Meals   heparin (porcine) 5,000 Units Subcutaneous Q12H   hydrALAZINE 50 mg Oral BID   isosorbide mononitrate 60 mg Oral BID   ranolazine 500 mg Oral Q12H   rosuvastatin 20 mg Oral Daily   sertraline 50 mg Oral Daily   sodium chloride 10 mL Intravenous Q12H   spironolactone 25 mg Oral Daily       insulin        Assessment/Plan   ASSESSMENT / PLAN      NSTEMI (non-ST elevated myocardial infarction) (CMS/[facility])    Hyponatremia    Acute on chronic diastolic CHF (congestive heart failure) (CMS/[facility])    Chronic renal impairment, stage 3 (moderate) (CMS/[facility])    CAD (coronary artery disease)    Class 2 severe obesity due to excess calories with serious comorbidity and body mass index (BMI) of 39.0 to 39.9 in adult (CMS/[facility])    COPD (chronic obstructive pulmonary disease) (CMS/[facility])    CIERRA (obstructive sleep apnea)    Hyperlipidemia    Essential hypertension    Type 2 diabetes mellitus (CMS/[facility])    MONE on CKD   1. Pt's Cr was elevated above baseline at admission upto 1.89.   BUN to Cr ratio on admission was > 20 (38.6) suggesting prerenal azotemia from NSTEMI (cardio renal syndrome). Baseline cr 1.6-1.7 and recently came down to 1.4. Due to increasing alkalosis in the office we have stopped metolazone and kept on bumex 2mg daily along with aldactone in the office     At present I will keep aldactone. Agreed with holding the bumex. Hold metolazone for now. She has significant wt loss and then lowest at home was 217 lbs. Her dry weight at best is close to 220 lbs.      She will be a poor candidate for invasive workup and likely end up with worsening renal failure  (had hd in two different occasions after the heart cath). No plan for invasive workup for nstemi.      2- hyponatremia jvd is high likely hypervolemic after nstemi (high adh release). Keep aldactone. Add a dose of tolvaptan again today. Urine osmolality is not markedly elevated., urine na is on low side     3- htn stable     4- anemia of ckd her fe b12 and folate are normal. She gets procrit but since she has recent nstemi I will wait for next dose of procrit. Procrit shot likely on monday              This document has been electronically signed by Joon Dugan MD on February 9, 2019 11:22 AM

## 2019-02-09 NOTE — THERAPY TREATMENT NOTE
Acute Care - Physical Therapy Treatment Note  [facility]     Patient Name: Nicolasa Rojas  : 1950  MRN: [id number]  Today's Date: 2019  Onset of Illness/Injury or Date of Surgery: 19  Date of Referral to PT: 19  Referring Physician: Dr. Jarrell    Admit Date: 2019    Visit Dx:    ICD-10-CM ICD-9-CM   1. Impaired physical mobility Z74.09 781.99   2. Impaired mobility and ADLs Z74.09 799.89     Patient Active Problem List   Diagnosis   • Hyponatremia   • Acute on chronic diastolic CHF (congestive heart failure) (CMS/[facility])   • Hypokalemia   • Acute on chronic heart failure (CMS/[facility])   • Dyspnea   • Chronic renal impairment, stage 3 (moderate) (CMS/[facility])   • CAD (coronary artery disease)   • Class 2 severe obesity due to excess calories with serious comorbidity and body mass index (BMI) of 39.0 to 39.9 in adult (CMS/[facility])   • NSTEMI (non-ST elevated myocardial infarction) (CMS/[facility])   • COPD (chronic obstructive pulmonary disease) (CMS/[facility])   • CIERRA (obstructive sleep apnea)   • Hyperlipidemia   • Essential hypertension   • Type 2 diabetes mellitus (CMS/[facility])       Therapy Treatment    Rehabilitation Treatment Summary     Row Name 19 1440 19 1025          Treatment Time/Intention    Discipline  physical therapy assistant  -LN  occupational therapy assistant  -BB     Document Type  therapy note (daily note)  -LN  therapy note (daily note)  -BB     Subjective Information  complains of;weakness;pain;fatigue chronic back pain  -LN  complains of;pain  -BB     Mode of Treatment  physical therapy  -LN  individual therapy;occupational therapy  -BB     Patient/Family Observations  --   present  -BB     Therapy Frequency (PT Clinical Impression)  daily  -LN  --     Total Minutes, Occupational Therapy Treatment  --  25  -BB     Therapy Frequency (OT Eval)  --  other (see comments) 5-7 days/wk  -BB     Patient Effort  fair  -LN  fair  -BB     Existing Precautions/Restrictions   fall;oxygen therapy device and L/min  -LN  --     Recorded by [LN] Elinor Kennedy, PTA 02/09/19 1648 [BB] Shahida Coto COTA/L 02/09/19 1510     Row Name 02/09/19 1440 02/09/19 1025          Vital Signs    Pre Systolic BP Rehab  133  -LN  --     Pre Treatment Diastolic BP  60  -LN  --     Post Systolic BP Rehab  170  -LN  --     Post Treatment Diastolic BP  80  -LN  --     Pretreatment Heart Rate (beats/min)  57  -LN  68  -BB     Posttreatment Heart Rate (beats/min)  55  -LN  73  -BB     Pre SpO2 (%)  95  -LN  97  -BB     O2 Delivery Pre Treatment  supplemental O2  -LN  supplemental O2  -BB     Intra SpO2 (%)  -- 88-91% with gt  -LN  --     Post SpO2 (%)  95  -LN  96  -BB     O2 Delivery Post Treatment  --  supplemental O2  -BB     Pre Patient Position  Side Lying  -LN  Supine  -BB     Intra Patient Position  Sitting  -LN  --     Post Patient Position  Supine  -LN  Supine  -BB     Recorded by [LN] Elinor Kennedy, KOURTNEY 02/09/19 1648 [BB] Shahida Coto COTA/L 02/09/19 1510     Row Name 02/09/19 1440 02/09/19 1025          Cognitive Assessment/Intervention- PT/OT    Affect/Mental Status (Cognitive)  --  WFL  -BB     Orientation Status (Cognition)  oriented x 4  -LN  oriented x 4  -BB     Follows Commands (Cognition)  does not follow one step commands;over 90% accuracy;repetition of directions required  -LN  follows one step commands;over 90% accuracy  -BB     Recorded by [LN] Elinor Kennedy, KOURTNEY 02/09/19 1648 [BB] Shahida Coto COTA/L 02/09/19 1510     Row Name 02/09/19 1440 02/09/19 1025          Bed Mobility Assessment/Treatment    Bed Mobility Assessment/Treatment  supine-sit-supine  -LN  --     Supine-Sit [place] (Bed Mobility)  supervision  -LN  --     Sit-Supine [place] (Bed Mobility)  supervision  -LN  --     Assistive Device (Bed Mobility)  bed rails;head of bed elevated  -LN  --     Comment (Bed Mobility)  --  Pt states she is having too much pain to sit EOB, agrees to bed exercises   -BB     Recorded by [LN] Elinor Kennedy, PTA 02/09/19 1648 [BB] Shahida Coto COTA/L 02/09/19 1510     Row Name 02/09/19 1440             Transfer Assessment/Treatment    Transfer Assessment/Treatment  sit-stand transfer;stand-sit transfer  -LN      Recorded by [LN] Elinor Kennedy, PTA 02/09/19 1648      Row Name 02/09/19 1440             Sit-Stand Transfer    Sit-Stand [place] (Transfers)  contact guard  -LN      Assistive Device (Sit-Stand Transfers)  walker, front-wheeled  -LN      Recorded by [LN] Elinor Kennedy, PTA 02/09/19 1648      Row Name 02/09/19 1440             Stand-Sit Transfer    Stand-Sit [place] (Transfers)  contact guard  -LN      Assistive Device (Stand-Sit Transfers)  walker, front-wheeled  -LN      Recorded by [LN] Elinor Kennedy, PTA 02/09/19 1648      Row Name 02/09/19 1440             Gait/Stairs Assessment/Training    [place] Level (Gait)  contact guard;1 person to manage equipment follow with w/c  -LN      Assistive Device (Gait)  walker, front-wheeled  -LN      Distance in Feet (Gait)  42,73,68,6  -LN      Recorded by [LN] Elinor Kennedy, PTA 02/09/19 1648      Row Name 02/09/19 1025             Therapeutic Exercise    Upper Extremity Range of Motion (Therapeutic Exercise)  shoulder flexion/extension, bilateral;elbow flexion/extension, bilateral;forearm supination/pronation, bilateral;wrist flexion/extension, bilateral;shoulder internal/external rotation, bilateral  -BB      Hand (Therapeutic Exercise)  finger flexion/extension, bilateral  -BB      Exercise Type (Therapeutic Exercise)  AROM (active range of motion)  -BB      Position (Therapeutic Exercise)  supine  -BB      Sets/Reps (Therapeutic Exercise)  1/15  -BB      Expected Outcome (Therapeutic Exercise)  improve functional tolerance, self-care activity  -BB      Recorded by [BB] Shahida Coto COTA/L 02/09/19 1510      Row Name 02/09/19 1440 02/09/19 1025          Positioning and Restraints    Pre-Treatment  Position  --  in bed  -BB     Post Treatment Position  bed  -LN  bed  -BB     In Bed  side lying right;call light within reach;encouraged to call for assist;with family/caregiver  -LN  supine;call light within reach;encouraged to call for assist;exit alarm on  -BB     Recorded by [LN] Elinor Kennedy, PTA 02/09/19 1648 [BB] Shahida Coto HOOPER/L 02/09/19 1510     Row Name 02/09/19 1025             Pain Assessment    Additional Documentation  Pain Scale: Numbers Pre/Post-Treatment (Group)  -BB      Recorded by [BB] Shahida Coto HOOPER/L 02/09/19 1510      Row Name 02/09/19 1440 02/09/19 1025          Pain Scale: Numbers Pre/Post-Treatment    Pain Scale: Numbers, Pretreatment  8/10  -LN  9/10  -BB     Pain Scale: Numbers, Post-Treatment  8/10  -LN  9/10  -BB     Pain Location  back  -LN  -- B LEs  -BB     Pain Intervention(s)  -- meds not due  -LN  Medication (See MAR)  -BB     Recorded by [LN] Elinor Kennedy, PTA 02/09/19 1648 [BB] Shahida Coto HOOPER/L 02/09/19 1510     Row Name 02/09/19 1440             Hearing Assessment    Hearing Status  hearing impairment, bilaterally  -LN      Recorded by [LN] Elinor Kennedy, PTA 02/09/19 1648      Row Name 02/09/19 1440             Vision Assessment/Intervention    Visual Impairment/Limitations  corrective lenses for reading  -LN      Recorded by [LN] Elinor Kennedy, PTA 02/09/19 1648      Row Name 02/09/19 1440 02/09/19 1025          Plan of Care Review    Plan of Care Reviewed With  patient  -LN  patient  -BB     Recorded by [LN] Elinor Knenedy, PTA 02/09/19 1648 [BB] Shahida Coto HOOPER/L 02/09/19 1510     Row Name 02/09/19 1025             Outcome Summary/Treatment Plan (OT)    Daily Summary of Progress (OT)  progress toward functional goals is gradual  -BB      Plan for Continued Treatment (OT)  continue POC  -BB      Anticipated Discharge Disposition (OT)  home with 24/7 care;home with home health;skilled nursing facility  -BB      Recorded by [BB]  Shahida Coto HOOPER/L 02/09/19 1510      Row Name 02/09/19 1440             Outcome Summary/Treatment Plan (PT)    Plan for Continued Treatment (PT)  cont-gt/ex  -LN      Anticipated Discharge Disposition (PT)  home with home health  -LN      Recorded by [LN] Elinor Kennedy, PTA 02/09/19 9981        User Key  (r) = Recorded By, (t) = Taken By, (c) = Cosigned By    Initials Name Effective Dates Discipline    LN Elinor Kennedy, PTA 03/07/18 -  PT    BB Shahida Coto HOOPER/L 03/07/18 -  OT               Rehab Goal Summary     Row Name 02/09/19 1440 02/09/19 1025          Physical Therapy Goals    Bed Mobility Goal Selection (PT)  bed mobility, PT goal 1  -LN  --     Transfer Goal Selection (PT)  transfer, PT goal 1  -LN  --     Gait Training Goal Selection (PT)  gait training, PT goal 1  -LN  --        Bed Mobility Goal 1 (PT)    Activity/Assistive Device (Bed Mobility Goal 1, PT)  sit to supine/supine to sit  -LN  --     [place] Level/Cues Needed (Bed Mobility Goal 1, PT)  independent  -LN  --     Time Frame (Bed Mobility Goal 1, PT)  short term goal (STG);2 days  -LN  --     Barriers (Bed Mobility Goal 1, PT)  HOB flat, no bed rails  -LN  --     Progress/Outcomes (Bed Mobility Goal 1, PT)  goal not met  -LN  --        Transfer Goal 1 (PT)    Activity/Assistive Device (Transfer Goal 1, PT)  sit-to-stand/stand-to-sit;bed-to-chair/chair-to-bed  -LN  --     [place] Level/Cues Needed (Transfer Goal 1, PT)  independent  -LN  --     Time Frame (Transfer Goal 1, PT)  long term goal (LTG);by discharge  -LN  --     Barriers (Transfers Goal 1, PT)  fatigues easily  -LN  --     Progress/Outcome (Transfer Goal 1, PT)  goal not met  -LN  --        Gait Training Goal 1 (PT)    Activity/Assistive Device (Gait Training Goal 1, PT)  gait (walking locomotion)  -LN  --     [place] Level (Gait Training Goal 1, PT)  supervision required  -LN  --     Distance (Gait Goal 1, PT)  50'x1  -LN  --     Time Frame (Gait  Training Goal 1, PT)  long term goal (LTG);by discharge  -LN  --     Barriers (Gait Training Goal 1, PT)  fatigues easily  -LN  --     Progress/Outcome (Gait Training Goal 1, PT)  goal not met  -LN  --        Occupational Therapy Goals    Transfer Goal Selection (OT)  --  transfer, OT goal 1  -BB     Bathing Goal Selection (OT)  --  bathing, OT goal 1  -BB     Dressing Goal Selection (OT)  --  dressing, OT goal 1  -BB     Toileting Goal Selection (OT)  --  toileting, OT goal 1  -BB     Endurance Goal Selection (OT)  --  endurance, OT goal 1  -BB     Functional Mobility Goal Selection (OT)  --  functional mobility, OT goal 1  -BB        Transfer Goal 1 (OT)    Activity/Assistive Device (Transfer Goal 1, OT)  --  toilet  -BB     [place] Level/Cues Needed (Transfer Goal 1, OT)  --  standby assist  -BB     Time Frame (Transfer Goal 1, OT)  --  long term goal (LTG);by discharge  -BB     Progress/Outcome (Transfer Goal 1, OT)  --  goal not met  -BB        Bathing Goal 1 (OT)    Activity/Assistive Device (Bathing Goal 1, OT)  --  upper body bathing  -BB     [place] Level/Cues Needed (Bathing Goal 1, OT)  --  minimum assist (75% or more patient effort)  -BB     Time Frame (Bathing Goal 1, OT)  --  long term goal (LTG);by discharge  -BB     Progress/Outcomes (Bathing Goal 1, OT)  --  goal not met  -BB        Dressing Goal 1 (OT)    Activity/Assistive Device (Dressing Goal 1, OT)  --  upper body dressing  -BB     [place]/Cues Needed (Dressing Goal 1, OT)  --  minimum assist (75% or more patient effort)  -BB     Time Frame (Dressing Goal 1, OT)  --  long term goal (LTG);by discharge  -BB     Progress/Outcome (Dressing Goal 1, OT)  --  goal not met  -BB        Toileting Goal 1 (OT)    Activity/Device (Toileting Goal 1, OT)  --  toileting skills, all  -BB     [place] Level/Cues Needed (Toileting Goal 1, OT)  --  standby assist  -BB     Time Frame (Toileting Goal 1, OT)  --  long term goal (LTG);by discharge   -BB     Progress/Outcome (Toileting Goal 1, OT)  --  goal not met  -BB         Endurance Goal 1 (OT)    Activity Level (Endurance Goal 1, OT)  --  endurance 2 fair + 25 min functional task 3 or less rest breaks O2 90 or up  -BB     Time Frame (Endurance Goal 1, OT)  --  long term goal (LTG);by discharge  -BB     Progress/Outcome (Endurance Goal 1, OT)  --  goal not met  -BB        Functional Mobility Goal 1 (OT)    Activity/Assistive Device (Functional Mobility Goal 1, OT)  --  walker, rolling  -BB     [place] Level/Cues Needed (Functional Mobility Goal 1, OT)  --  contact guard assist  -BB     Distance Goal 1 (Functional Mobility, OT)  --  to bathroom and back to bed/chair   -BB     Time Frame (Functional Mobility Goal 1, OT)  --  long term goal (LTG);by discharge  -BB     Progress/Outcome (Functional Mobility Goal 1, OT)  --  goal not met  -BB       User Key  (r) = Recorded By, (t) = Taken By, (c) = Cosigned By    Initials Name Provider Type Discipline    Elinor Loomis, PTA Physical Therapy Assistant PT    BB Shahida Coto COTA/L Occupational Therapy Assistant OT          Physical Therapy Education     Title: PT OT SLP Therapies (Not Started)     Topic: Physical Therapy (In Progress)     Point: Mobility training (Done)     Learning Progress Summary           Patient Acceptance, E,TB, VU by KARINA at 2/9/2019  4:49 PM    Acceptance, TB,E, DU by LEE at 2/9/2019  3:17 AM    Acceptance, E, VU,NR by BELLA at 2/8/2019  3:49 PM    Comment:  Role of PT, POC, goals of therapy.                   Point: Body mechanics (Done)     Learning Progress Summary           Patient Acceptance, E,TB, VU by KARINA at 2/9/2019  4:49 PM                   Point: Precautions (Done)     Learning Progress Summary           Patient Acceptance, E,TB, VU by KARINA at 2/9/2019  4:49 PM    Acceptance, TB,E, DU by LEE at 2/9/2019  3:17 AM    Acceptance, E, VU,NR by BELLA at 2/8/2019  3:49 PM    Comment:  Patient instructed to use call button when she  needs assistance and not to attempt to get out of bed on her own. Bed alarm activated.                               User Key     Initials Effective Dates Name Provider Type Discipline    CB 04/06/17 -  Tara Bella, PT Physical Therapist PT    TJ 10/17/16 -  Anthony Triana RN Registered Nurse Nurse    LN 03/07/18 -  Elinor Kennedy PTA Physical Therapy Assistant PT                PT Recommendation and Plan  Anticipated Discharge Disposition (PT): home with home health  Therapy Frequency (PT Clinical Impression): daily  Outcome Summary/Treatment Plan (PT)  Plan for Continued Treatment (PT): cont-gt/ex  Anticipated Discharge Disposition (PT): home with home health  Plan of Care Reviewed With: patient  Progress: improving  Outcome Summary: sup-sit-sup sba of 1,sit-stand-sit cga of 1,amb 42,73,68 with rw and cga of 1 and 1 to follow with w/c-no goals met-if d/c would benefit from hh and 24/7 assist  Outcome Measures     Row Name 02/09/19 1440 02/09/19 1025 02/08/19 1515       How much help from another person do you currently need...    Turning from your back to your side while in flat bed without using bedrails?  4  -LN  --  --    Moving from lying on back to sitting on the side of a flat bed without bedrails?  3  -LN  --  --    Moving to and from a bed to a chair (including a wheelchair)?  3  -LN  --  --    Standing up from a chair using your arms (e.g., wheelchair, bedside chair)?  3  -LN  --  --    Climbing 3-5 steps with a railing?  3  -LN  --  --    To walk in hospital room?  3  -LN  --  --    AM-PAC 6 Clicks Score  19  -LN  --  --       How much help from another is currently needed...    Putting on and taking off regular lower body clothing?  --  1  -BB  1  -BH    Bathing (including washing, rinsing, and drying)  --  2  -BB  2  -BH    Toileting (which includes using toilet bed pan or urinal)  --  2  -BB  2  -BH    Putting on and taking off regular upper body clothing  --  3  -BB  3  -BH    Taking care of  personal grooming (such as brushing teeth)  --  3  -BB  3  -BH    Eating meals  --  4  -BB  4  -BH    Score  --  15  -BB  15  -BH       Functional Assessment    Outcome Measure Options  AM-PAC 6 Clicks Basic Mobility (PT)  -LN  --  AM-PAC 6 Clicks Daily Activity (OT)  -    Row Name 02/08/19 1433             How much help from another person do you currently need...    Turning from your back to your side while in flat bed without using bedrails?  4  -CB      Moving from lying on back to sitting on the side of a flat bed without bedrails?  3  -CB      Moving to and from a bed to a chair (including a wheelchair)?  3  -CB      Standing up from a chair using your arms (e.g., wheelchair, bedside chair)?  3  -CB      Climbing 3-5 steps with a railing?  3  -CB      To walk in hospital room?  3  -CB      AM-PAC 6 Clicks Score  19  -CB         Functional Assessment    Outcome Measure Options  AM-PAC 6 Clicks Basic Mobility (PT)  -CB        User Key  (r) = Recorded By, (t) = Taken By, (c) = Cosigned By    Initials Name Provider Type    CB Tara Bella, PT Physical Therapist     Lucy Luna, OTR/L Occupational Therapist    LN Elinor Kennedy PTA Physical Therapy Assistant    BB Shahida Coto, HOOPER/L Occupational Therapy Assistant         Time Calculation:   PT Charges     Row Name 02/09/19 1440             Time Calculation    Start Time  1440  -LN      Stop Time  1520  -LN      Time Calculation (min)  40 min  -LN      PT Received On  02/09/19  -LN         Time Calculation- PT    Total Timed Code Minutes- PT  40 minute(s)  -LN        User Key  (r) = Recorded By, (t) = Taken By, (c) = Cosigned By    Initials Name Provider Type    Elinor Loomis PTA Physical Therapy Assistant        Therapy Suggested Charges     Code   Minutes Charges    None           Therapy Charges for Today     Code Description Service Date Service Provider Modifiers Qty    28771927723 HC GAIT TRAINING EA 15 MIN 2/9/2019 Elinor Kennedy PTA  GP 1    [id number]  PT THERAPEUTIC ACT EA 15 MIN 2/9/2019 Elinor Kennedy, PTA GP 2          PT G-Codes  Outcome Measure Options: AM-PAC 6 Clicks Basic Mobility (PT)  AM-PAC 6 Clicks Score: 19  Score: 15    Elinor Kennedy PTA  2/9/2019

## 2019-02-09 NOTE — PLAN OF CARE
Problem: Patient Care Overview  Goal: Plan of Care Review   02/09/19 1440   Coping/Psychosocial   Plan of Care Reviewed With patient   Plan of Care Review   Progress improving   OTHER   Outcome Summary sup-sit-sup sba of 1,sit-stand-sit cga of 1,amb 42,73,68 with rw and cga of 1 and 1 to follow with w/c-no goals met-if d/c would benefit from hh and 24/7 assist     Goal: Discharge Needs Assessment   02/07/19 1014 02/08/19 1433   Discharge Needs Assessment   Readmission Within the Last 30 Days other (see comments);current reason for admission unrelated to previous admission  (DX: NSTEMI) --    Concerns to be Addressed --  no discharge needs identified   Concerns Comments Spouse voiced that he monitors patient's blood sugar, blood pressure and performs daily weights. He voiced that he also keeps record. Spouse voiced that patient is compliant with fluid and diet restrictions. He states she is compliant with home medications and also with follow up appointments with providers.  --    Patient/Family Anticipates Transition to --  home with help/services   Patient/Family Anticipated Services at Transition --  home health care   Transportation Concerns --  car, none   Transportation Anticipated --  family or friend will provide   Anticipated Changes Related to Illness none --    Equipment Needed After Discharge --  none   Outpatient/Agency/Support Group Needs --  homecare agency   Discharge Facility/Level of Care Needs --  home with home health   Offered/Gave Vendor List yes --    Patient's Choice of Community Agency(s) Religion home health --    Current Discharge Risk --  chronically ill   Disability   Equipment Currently Used at Home --  cane, quad;commode;walker, rolling;wheelchair;wheelchair, motorized;bipap/cpap;oxygen

## 2019-02-10 NOTE — PROGRESS NOTES
"DAILY PROGRESS NOTE  [facility]    Patient Identification:  Name: Nicolasa Rojas  Age: 68 y.o.  Sex: female  :  1950  MRN: [id number]         Primary Care Physician: Henry Muniz MD    Subjective:  Interval History:She complains of back pain. No chest pain.    Objective:    Scheduled Meds:    aspirin 81 mg Oral Daily   bumetanide 1 mg Oral Daily   carvedilol 3.125 mg Oral BID With Meals   clopidogrel 75 mg Oral Daily   [START ON 2019] epoetin kenyetta 6,000 Units Subcutaneous Once   famotidine 40 mg Oral Daily   ferrous sulfate 324 mg Oral BID With Meals   heparin (porcine) 5,000 Units Subcutaneous Q12H   hydrALAZINE 50 mg Oral BID   isosorbide mononitrate 60 mg Oral BID   polyethylene glycol 17 g Oral Daily   ranolazine 500 mg Oral Q12H   rosuvastatin 20 mg Oral Daily   sertraline 50 mg Oral Daily   sodium chloride 10 mL Intravenous Q12H   spironolactone 25 mg Oral Daily     Continuous Infusions:    insulin        Vital signs in last 24 hours:  Temp:  [97 °F (36.1 °C)-98.2 °F (36.8 °C)] 97.1 °F (36.2 °C)  Heart Rate:  [50-69] 63  Resp:  [16-18] 18  BP: (102-169)/(49-74) 148/67    Intake/Output:    Intake/Output Summary (Last 24 hours) at 2/10/2019 1304  Last data filed at 2/10/2019 1225  Gross per 24 hour   Intake 360 ml   Output 400 ml   Net -40 ml       Exam:  /67 (BP Location: Left arm, Patient Position: Lying)   Pulse 63   Temp 97.1 °F (36.2 °C) (Temporal)   Resp 18   Ht 160 cm (63\")   Wt 100 kg (220 lb 6.4 oz)   SpO2 98%   BMI 39.04 kg/m²     General Appearance:    Alert, cooperative, no distress   Head:    Normocephalic, without obvious abnormality, atraumatic   Eyes:       Throat:   Lips, tongue, gums normal   Neck:   Supple, symmetrical, trachea midline, no JVD   Lungs:     Clear to auscultation bilaterally, respirations unlabored   Chest Wall:    No tenderness or deformity    Heart:    Regular rate and rhythm, S1 and S2 normal, no murmur,no  Rub or gallop "   Abdomen:     Soft, non-tender, bowel sounds active, no masses, no organomegaly    Extremities:   Extremities normal, atraumatic, no cyanosis or edema   Pulses:      Skin:   Skin is warm and dry,  no rashes or palpable lesions   Neurologic:   no focal deficits noted      [unfilled]  Data Review:  Results from last 7 days   Lab Units 02/10/19  0708 02/09/19  0737 02/08/19  0705   SODIUM mmol/L 134* 129* 125*   POTASSIUM mmol/L 4.1 4.2 4.3   CHLORIDE mmol/L 91* 89* 84*   CO2 mmol/L 32.0* 32.0* 30.0   BUN mg/dL 91* 84* 85*   CREATININE mg/dL 1.99* 1.98* 1.89*   GLUCOSE mg/dL 91 127* 126*   CALCIUM mg/dL 9.5 9.3 9.0     Results from last 7 days   Lab Units 02/10/19  0708 02/09/19  0737 02/08/19  0705   WBC 10*3/mm3 6.31 7.57 6.69   HEMOGLOBIN g/dL 9.2* 8.6* 8.4*   HEMATOCRIT % 26.8* 23.7* 24.2*   PLATELETS 10*3/mm3 201 175 149*             No results found for: TROPONINT            Invalid input(s): PROT, LABALBU          Glucose   Date/Time Value Ref Range Status   02/10/2019 1025 270 (H) 70 - 130 mg/dL Final     Comment:     RN NotifiedOperator: [id number] GRACY NOAHMeter ID: [id number]   02/10/2019 0610 100 70 - 130 mg/dL Final     Comment:     : [id number] LEXII ELRITAMeter ID: [id number]   02/09/2019 2054 133 (H) 70 - 130 mg/dL Final     Comment:     RN NotifiedOperator: [id number] LEXII ELRITAMeter ID: [id number]   02/09/2019 1623 117 70 - 130 mg/dL Final     Comment:     RN NotifiedOperator: [id number] GRACY NOAHMeter ID: [id number]   02/09/2019 1042 249 (H) 70 - 130 mg/dL Final     Comment:     RN NotifiedOperator: [id number] GRACY Mccarthy ID: [id number]   02/09/2019 0540 149 (H) 70 - 130 mg/dL Final     Comment:     RN NotifiedOperator: [id number] ADIEL Hart ID: [id number]   02/08/2019 2045 170 (H) 70 - 130 mg/dL Final     Comment:     RN NotifiedOperator: [id number] ADIEL Hart ID: [id number]   02/08/2019 1113 117 70 - 130 mg/dL Final     Comment:     Result Not  ConfirmedOperator: [id number] ESTEFANÍA Singh ID: [id number]           Patient Active Problem List   Diagnosis Code   • Hyponatremia E87.1   • Acute on chronic diastolic CHF (congestive heart failure) (CMS/HCC) I50.33   • Hypokalemia E87.6   • Acute on chronic heart failure (CMS/HCC) I50.9   • Dyspnea R06.00   • Chronic renal impairment, stage 3 (moderate) (CMS/HCC) N18.3   • CAD (coronary artery disease) I25.10   • Class 2 severe obesity due to excess calories with serious comorbidity and body mass index (BMI) of 39.0 to 39.9 in adult (CMS/HCC) E66.01, Z68.39   • NSTEMI (non-ST elevated myocardial infarction) (CMS/HCC) I21.4   • COPD (chronic obstructive pulmonary disease) (CMS/HCC) J44.9   • CIERRA (obstructive sleep apnea) G47.33   • Hyperlipidemia E78.5   • Essential hypertension I10   • Type 2 diabetes mellitus (CMS/HCC) E11.9       Assessment:  Active Hospital Problems    Diagnosis Date Noted   • **NSTEMI (non-ST elevated myocardial infarction) (CMS/HCC) [I21.4] 02/06/2019   • COPD (chronic obstructive pulmonary disease) (CMS/HCC) [J44.9] 02/09/2019   • CIERRA (obstructive sleep apnea) [G47.33] 02/09/2019   • Hyperlipidemia [E78.5] 02/09/2019   • Essential hypertension [I10] 02/09/2019   • Type 2 diabetes mellitus (CMS/HCC) [E11.9] 02/09/2019   • Class 2 severe obesity due to excess calories with serious comorbidity and body mass index (BMI) of 39.0 to 39.9 in adult (CMS/HCC) [E66.01, Z68.39] 01/21/2019   • Chronic renal impairment, stage 3 (moderate) (CMS/HCC) [N18.3] 01/07/2019   • CAD (coronary artery disease) [I25.10] 01/07/2019   • Hyponatremia [E87.1] 01/04/2019   • Acute on chronic diastolic CHF (congestive heart failure) (CMS/HCC) [I50.33] 01/04/2019      Resolved Hospital Problems   No resolved problems to display.       Plan:  As per cardiology and nephrology. Continue medical RX. Molilize with PT and OT. Follow up lab.    Evangelista Lee MD  2/10/2019  1:04 PM

## 2019-02-10 NOTE — THERAPY TREATMENT NOTE
Acute Care - Physical Therapy Treatment Note  [facility]     Patient Name: Nicolasa Rojas  : 1950  MRN: [id number]  Today's Date: 2/10/2019  Onset of Illness/Injury or Date of Surgery: 19  Date of Referral to PT: 19  Referring Physician: Dr. Jarrell    Admit Date: 2019    Visit Dx:    ICD-10-CM ICD-9-CM   1. Impaired physical mobility Z74.09 781.99   2. Impaired mobility and ADLs Z74.09 799.89     Patient Active Problem List   Diagnosis   • Hyponatremia   • Acute on chronic diastolic CHF (congestive heart failure) (CMS/[facility])   • Hypokalemia   • Acute on chronic heart failure (CMS/[facility])   • Dyspnea   • Chronic renal impairment, stage 3 (moderate) (CMS/[facility])   • CAD (coronary artery disease)   • Class 2 severe obesity due to excess calories with serious comorbidity and body mass index (BMI) of 39.0 to 39.9 in adult (CMS/[facility])   • NSTEMI (non-ST elevated myocardial infarction) (CMS/[facility])   • COPD (chronic obstructive pulmonary disease) (CMS/[facility])   • CIERRA (obstructive sleep apnea)   • Hyperlipidemia   • Essential hypertension   • Type 2 diabetes mellitus (CMS/[facility])       Therapy Treatment    Rehabilitation Treatment Summary     Row Name 02/10/19 1330 02/10/19 0705          Treatment Time/Intention    Discipline  physical therapy assistant  -LN  occupational therapy assistant  -BB     Document Type  therapy note (daily note)  -LN  therapy note (daily note)  -BB     Subjective Information  complains of;weakness;pain;dizziness  -LN  no complaints  -BB     Mode of Treatment  physical therapy  -LN  individual therapy;occupational therapy  -BB     Patient/Family Observations  --   initially in room  but stepped out during tx  -BB     Therapy Frequency (PT Clinical Impression)  daily  -LN  --     Total Minutes, Occupational Therapy Treatment  --  39  -BB     Therapy Frequency (OT Eval)  --  other (see comments) 5-7 days/wk  -BB     Patient Effort  fair  -LN  fair  -BB     Existing  Precautions/Restrictions  fall;oxygen therapy device and L/min  -LN  --     Recorded by [LN] Elinor Kennedy, PTA 02/10/19 1411 [BB] Shahida Coto COTA/L 02/10/19 1314     Row Name 02/10/19 1330 02/10/19 0705          Vital Signs    Pre Systolic BP Rehab  160  -LN  --     Pre Treatment Diastolic BP  50  -LN  --     Post Systolic BP Rehab  150  -LN  --     Post Treatment Diastolic BP  76  -LN  --     Pretreatment Heart Rate (beats/min)  73  -LN  68  -BB     Intratreatment Heart Rate (beats/min)  88  -LN  --     Posttreatment Heart Rate (beats/min)  81  -LN  70  -BB     Pre SpO2 (%)  99  -LN  97  -BB     O2 Delivery Pre Treatment  supplemental O2  -LN  supplemental O2  -BB     Intra SpO2 (%)  90  -LN  --     Post SpO2 (%)  99  -LN  96  -BB     O2 Delivery Post Treatment  --  supplemental O2  -BB     Pre Patient Position  Side Lying  -LN  Supine  -BB     Intra Patient Position  Standing  -LN  --     Post Patient Position  Side Lying  -LN  Sitting  -BB     Recorded by [LN] Elinor Kennedy, PTA 02/10/19 1411 [BB] Shahida Coto COTA/L 02/10/19 1314     Row Name 02/10/19 1330 02/10/19 0705          Cognitive Assessment/Intervention- PT/OT    Orientation Status (Cognition)  oriented x 4  -LN  oriented x 4  -BB     Follows Commands (Cognition)  does not follow one step commands;over 90% accuracy;repetition of directions required  -LN  repetition of directions required  -BB     Personal Safety Interventions  --  fall prevention program maintained;gait belt;nonskid shoes/slippers when out of bed;muscle strengthening facilitated  -BB     Recorded by [LN] Elinor Kennedy, PTA 02/10/19 1411 [BB] Shahida Coto COTA/L 02/10/19 1314     Row Name 02/10/19 1330 02/10/19 0705          Bed Mobility Assessment/Treatment    Bed Mobility Assessment/Treatment  supine-sit-supine  -LN  --     Supine-Sit [place] (Bed Mobility)  conditional independence  -LN  contact guard  -BB     Sit-Supine [place] (Bed Mobility)   conditional independence  -LN  contact guard  -BB     Bed Mobility, Safety Issues  --  decreased use of arms for pushing/pulling;decreased use of legs for bridging/pushing;impaired trunk control for bed mobility  -BB     Assistive Device (Bed Mobility)  bed rails;head of bed elevated  -LN  bed rails;head of bed elevated  -BB     Recorded by [LN] Elinor Kennedy, PTA 02/10/19 1411 [BB] Shahida Coto HOOPER/L 02/10/19 1314     Row Name 02/10/19 1330             Transfer Assessment/Treatment    Transfer Assessment/Treatment  sit-stand transfer;stand-sit transfer  -LN      Recorded by [LN] Elinor Kennedy, PTA 02/10/19 1411      Row Name 02/10/19 1330 02/10/19 0705          Sit-Stand Transfer    Sit-Stand [place] (Transfers)  contact guard  -LN  contact guard  -BB     Assistive Device (Sit-Stand Transfers)  walker, front-wheeled  -LN  walker, front-wheeled  -BB     Recorded by [LN] Elinor Kennedy, PTA 02/10/19 1411 [BB] Shahida Coto HOOPER/L 02/10/19 1314     Row Name 02/10/19 1330 02/10/19 0705          Stand-Sit Transfer    Stand-Sit [place] (Transfers)  contact guard  -LN  contact guard  -BB     Assistive Device (Stand-Sit Transfers)  walker, front-wheeled  -LN  walker, front-wheeled  -BB     Recorded by [LN] Elinor Kennedy, PTA 02/10/19 1411 [BB] Shahida Coto HOOPER/L 02/10/19 1314     Row Name 02/10/19 1330             Gait/Stairs Assessment/Training    [place] Level (Gait)  contact guard;1 person to manage equipment follow with w/c  -LN      Assistive Device (Gait)  walker, front-wheeled  -LN      Distance in Feet (Gait)  52,55,70,56  -LN      Comment (Gait/Stairs)  min dizziness 3rd gt but resolved with sitting  -LN      Recorded by [LN] Elinor Kennedy, PTA 02/10/19 1411      Row Name 02/10/19 0705             Bathing Assessment/Intervention    Bathing [place] Level  upper body;set up;contact guard assist  -BB      Bathing Position  edge of bed sitting  -BB      Recorded by [BB]  Shahida Coto COTA/L 02/10/19 1314      Row Name 02/10/19 0705             Upper Body Dressing Assessment/Training    Upper Body Dressing [place] Level  doff;don;set up;contact guard assist gown  -BB      Upper Body Dressing Position  edge of bed sitting  -BB      Recorded by [BB] Shahida Coto COTA/L 02/10/19 1314      Row Name 02/10/19 0705             Grooming Assessment/Training    [place] Level (Grooming)  hair care, combing/brushing;wash face, hands;set up;supervision apply face cream and lotion  -BB      Grooming Position  edge of bed sitting  -BB      Recorded by [BB] Shahida Coto COTA/L 02/10/19 1314      Row Name 02/10/19 1330 02/10/19 0705          Positioning and Restraints    Pre-Treatment Position  --  in bed  -BB     Post Treatment Position  bed  -LN  bed  -BB     In Bed  notified nsg;supine;call light within reach;encouraged to call for assist;with family/caregiver  -LN  supine;call light within reach;encouraged to call for assist;exit alarm on  -BB     Recorded by [LN] Elinor Kennedy, PTA 02/10/19 1411 [BB] Shahida Coto HOOPER/L 02/10/19 1314     Row Name 02/10/19 0705             Pain Assessment    Additional Documentation  Pain Scale: Numbers Pre/Post-Treatment (Group)  -BB      Recorded by [BB] Shahida Coto COTA/L 02/10/19 1314      Row Name 02/10/19 1330 02/10/19 0705          Pain Scale: Numbers Pre/Post-Treatment    Pain Scale: Numbers, Pretreatment  9/10  -LN  2/10  -BB     Pain Scale: Numbers, Post-Treatment  10/10  -LN  2/10  -BB     Pain Location  back  -LN  back  -BB     Pain Intervention(s)  Medication (See MAR)  -LN  Medication (See MAR)  -BB     Recorded by [LN] Elinor Kennedy, PTA 02/10/19 1411 [BB] Shahida Coto HOOPER/L 02/10/19 1314     Row Name 02/10/19 1330             Hearing Assessment    Hearing Status  hearing impairment, bilaterally  -LN      Recorded by [LN] Elinor Kennedy, PTA 02/10/19 1411      Row Name 02/10/19 1330              Vision Assessment/Intervention    Visual Impairment/Limitations  corrective lenses for reading  -LN      Recorded by [LN] Elinor Kennedy, PTA 02/10/19 1411      Row Name 02/10/19 1330             Coping    Observed Emotional State  calm;cooperative  -LN      Recorded by [LN] Elinor Kennedy, PTA 02/10/19 1411      Row Name 02/10/19 1330 02/10/19 0705          Plan of Care Review    Plan of Care Reviewed With  patient;spouse  -LN  patient  -BB     Recorded by [LN] Elinor Kennedy, PTA 02/10/19 1411 [BB] Shahida Coto COTA/L 02/10/19 1314     Row Name 02/10/19 0705             Outcome Summary/Treatment Plan (OT)    Daily Summary of Progress (OT)  progress toward functional goals is gradual  -BB      Plan for Continued Treatment (OT)  continue POC  -BB      Anticipated Discharge Disposition (OT)  home with 24/7 care;home with home health  -BB      Recorded by [BB] Shahida Coto COTA/L 02/10/19 1314      Row Name 02/10/19 1330             Outcome Summary/Treatment Plan (PT)    Plan for Continued Treatment (PT)  cont-bed mobs with hob down no hr  -LN      Anticipated Discharge Disposition (PT)  home with home health  -LN      Recorded by [LN] Elinor Kennedy, PTA 02/10/19 1411        User Key  (r) = Recorded By, (t) = Taken By, (c) = Cosigned By    Initials Name Effective Dates Discipline    LN Elinor Kennedy, PTA 03/07/18 -  PT    BB Shahida Coto COTA/L 03/07/18 -  OT               Rehab Goal Summary     Row Name 02/10/19 1330 02/10/19 0748          Physical Therapy Goals    Bed Mobility Goal Selection (PT)  bed mobility, PT goal 1  -LN  --     Transfer Goal Selection (PT)  transfer, PT goal 1  -LN  --     Gait Training Goal Selection (PT)  gait training, PT goal 1  -LN  --        Bed Mobility Goal 1 (PT)    Activity/Assistive Device (Bed Mobility Goal 1, PT)  sit to supine/supine to sit  -LN  --     [place] Level/Cues Needed (Bed Mobility Goal 1, PT)  independent  -LN  --     Time Frame (Bed  Mobility Goal 1, PT)  short term goal (STG);2 days  -LN  --     Barriers (Bed Mobility Goal 1, PT)  HOB flat, no bed rails  -LN  --     Progress/Outcomes (Bed Mobility Goal 1, PT)  goal not met  -LN  --        Transfer Goal 1 (PT)    Activity/Assistive Device (Transfer Goal 1, PT)  sit-to-stand/stand-to-sit;bed-to-chair/chair-to-bed  -LN  --     [place] Level/Cues Needed (Transfer Goal 1, PT)  independent  -LN  --     Time Frame (Transfer Goal 1, PT)  long term goal (LTG);by discharge  -LN  --     Barriers (Transfers Goal 1, PT)  fatigues easily  -LN  --     Progress/Outcome (Transfer Goal 1, PT)  goal not met  -LN  --        Gait Training Goal 1 (PT)    Activity/Assistive Device (Gait Training Goal 1, PT)  gait (walking locomotion)  -LN  --     [place] Level (Gait Training Goal 1, PT)  supervision required  -LN  --     Distance (Gait Goal 1, PT)  50'x1  -LN  --     Time Frame (Gait Training Goal 1, PT)  long term goal (LTG);by discharge  -LN  --     Barriers (Gait Training Goal 1, PT)  fatigues easily  -LN  --     Progress/Outcome (Gait Training Goal 1, PT)  goal not met  -LN  --        Occupational Therapy Goals    Transfer Goal Selection (OT)  --  transfer, OT goal 1  -BB     Bathing Goal Selection (OT)  --  bathing, OT goal 1  -BB     Dressing Goal Selection (OT)  --  dressing, OT goal 1  -BB     Toileting Goal Selection (OT)  --  toileting, OT goal 1  -BB     Endurance Goal Selection (OT)  --  endurance, OT goal 1  -BB     Functional Mobility Goal Selection (OT)  --  functional mobility, OT goal 1  -BB        Transfer Goal 1 (OT)    Activity/Assistive Device (Transfer Goal 1, OT)  --  toilet  -BB     [place] Level/Cues Needed (Transfer Goal 1, OT)  --  standby assist  -BB     Time Frame (Transfer Goal 1, OT)  --  long term goal (LTG);by discharge  -BB     Progress/Outcome (Transfer Goal 1, OT)  --  goal not met  -BB        Bathing Goal 1 (OT)    Activity/Assistive Device (Bathing Goal 1, OT)  --   upper body bathing  -BB     [place] Level/Cues Needed (Bathing Goal 1, OT)  --  minimum assist (75% or more patient effort)  -BB     Time Frame (Bathing Goal 1, OT)  --  long term goal (LTG);by discharge  -BB     Progress/Outcomes (Bathing Goal 1, OT)  --  goal not met  -BB        Dressing Goal 1 (OT)    Activity/Assistive Device (Dressing Goal 1, OT)  --  upper body dressing  -BB     [place]/Cues Needed (Dressing Goal 1, OT)  --  minimum assist (75% or more patient effort)  -BB     Time Frame (Dressing Goal 1, OT)  --  long term goal (LTG);by discharge  -BB     Progress/Outcome (Dressing Goal 1, OT)  --  goal not met  -BB        Toileting Goal 1 (OT)    Activity/Device (Toileting Goal 1, OT)  --  toileting skills, all  -BB     [place] Level/Cues Needed (Toileting Goal 1, OT)  --  standby assist  -BB     Time Frame (Toileting Goal 1, OT)  --  long term goal (LTG);by discharge  -BB     Progress/Outcome (Toileting Goal 1, OT)  --  goal not met  -BB         Endurance Goal 1 (OT)    Activity Level (Endurance Goal 1, OT)  --  endurance 2 fair + 25 min functional task 3 or less rest breaks O2 90 or up  -BB     Time Frame (Endurance Goal 1, OT)  --  long term goal (LTG);by discharge  -BB     Progress/Outcome (Endurance Goal 1, OT)  --  goal not met  -BB        Functional Mobility Goal 1 (OT)    Activity/Assistive Device (Functional Mobility Goal 1, OT)  --  walker, rolling  -BB     [place] Level/Cues Needed (Functional Mobility Goal 1, OT)  --  contact guard assist  -BB     Distance Goal 1 (Functional Mobility, OT)  --  to bathroom and back to bed/chair   -BB     Time Frame (Functional Mobility Goal 1, OT)  --  long term goal (LTG);by discharge  -BB     Progress/Outcome (Functional Mobility Goal 1, OT)  --  goal not met  -BB       User Key  (r) = Recorded By, (t) = Taken By, (c) = Cosigned By    Initials Name Provider Type Discipline    Elinor Loomis, PTA Physical Therapy Assistant PT    LOTTIE Coto  JUAN Luo Occupational Therapy Assistant OT          Physical Therapy Education     Title: PT OT SLP Therapies (Not Started)     Topic: Physical Therapy (In Progress)     Point: Mobility training (Done)     Learning Progress Summary           Patient Acceptance, E,TB, VU by LN at 2/10/2019  2:12 PM    Acceptance, E,TB, VU by LN at 2/9/2019  4:49 PM    Acceptance, TB,E, DU by LEE at 2/9/2019  3:17 AM    Acceptance, E, VU,NR by BELLA at 2/8/2019  3:49 PM    Comment:  Role of PT, POC, goals of therapy.   Significant Other Acceptance, E,TB, VU by LN at 2/10/2019  2:12 PM                   Point: Body mechanics (Done)     Learning Progress Summary           Patient Acceptance, E,TB, VU by LN at 2/10/2019  2:12 PM    Acceptance, E,TB, VU by LN at 2/9/2019  4:49 PM   Significant Other Acceptance, E,TB, VU by LN at 2/10/2019  2:12 PM                   Point: Precautions (Done)     Learning Progress Summary           Patient Acceptance, E,TB, VU by LN at 2/10/2019  2:12 PM    Acceptance, E,TB, VU by KARINA at 2/9/2019  4:49 PM    Acceptance, TB,E, DU by LEE at 2/9/2019  3:17 AM    Acceptance, E, VU,NR by BELLA at 2/8/2019  3:49 PM    Comment:  Patient instructed to use call button when she needs assistance and not to attempt to get out of bed on her own. Bed alarm activated.   Significant Other Acceptance, E,TB, VU by KARINA at 2/10/2019  2:12 PM                               User Key     Initials Effective Dates Name Provider Type Discipline    CB 04/06/17 -  Tara Bella, PT Physical Therapist PT    TJ 10/17/16 -  Anthony Triana RN Registered Nurse Nurse    LN 03/07/18 -  Elinor Kennedy PTA Physical Therapy Assistant PT                PT Recommendation and Plan  Anticipated Discharge Disposition (PT): home with home health  Therapy Frequency (PT Clinical Impression): daily  Outcome Summary/Treatment Plan (PT)  Plan for Continued Treatment (PT): cont-bed mobs with hob down no hr  Anticipated Discharge Disposition (PT): home  with home health  Plan of Care Reviewed With: patient, spouse  Progress: improving  Outcome Summary: sup-sit-sup cond ind,sit-stand-sit sba/cga due to pt impulsive at times,amb 52,55,70,56 with cga of 1 and 1 to follow with w/c-no goals met-if d/c would benefit from hh pt-bed mobs with hob down and no hr next rx  Outcome Measures     Row Name 02/10/19 1330 02/10/19 0748 02/09/19 1440       How much help from another person do you currently need...    Turning from your back to your side while in flat bed without using bedrails?  4  -LN  --  4  -LN    Moving from lying on back to sitting on the side of a flat bed without bedrails?  4  -LN  --  3  -LN    Moving to and from a bed to a chair (including a wheelchair)?  3  -LN  --  3  -LN    Standing up from a chair using your arms (e.g., wheelchair, bedside chair)?  3  -LN  --  3  -LN    Climbing 3-5 steps with a railing?  3  -LN  --  3  -LN    To walk in hospital room?  3  -LN  --  3  -LN    AM-PAC 6 Clicks Score  20  -LN  --  19  -LN       How much help from another is currently needed...    Putting on and taking off regular lower body clothing?  --  1  -BB  --    Bathing (including washing, rinsing, and drying)  --  2  -BB  --    Toileting (which includes using toilet bed pan or urinal)  --  2  -BB  --    Putting on and taking off regular upper body clothing  --  3  -BB  --    Taking care of personal grooming (such as brushing teeth)  --  3  -BB  --    Eating meals  --  4  -BB  --    Score  --  15  -BB  --       Functional Assessment    Outcome Measure Options  AM-PAC 6 Clicks Basic Mobility (PT)  -LN  --  AM-PAC 6 Clicks Basic Mobility (PT)  -LN    Row Name 02/09/19 1025 02/08/19 1515 02/08/19 1433       How much help from another person do you currently need...    Turning from your back to your side while in flat bed without using bedrails?  --  --  4  -CB    Moving from lying on back to sitting on the side of a flat bed without bedrails?  --  --  3  -CB    Moving to  and from a bed to a chair (including a wheelchair)?  --  --  3  -CB    Standing up from a chair using your arms (e.g., wheelchair, bedside chair)?  --  --  3  -CB    Climbing 3-5 steps with a railing?  --  --  3  -CB    To walk in hospital room?  --  --  3  -CB    AM-PAC 6 Clicks Score  --  --  19  -CB       How much help from another is currently needed...    Putting on and taking off regular lower body clothing?  1  -BB  1  -BH  --    Bathing (including washing, rinsing, and drying)  2  -BB  2  -BH  --    Toileting (which includes using toilet bed pan or urinal)  2  -BB  2  -BH  --    Putting on and taking off regular upper body clothing  3  -BB  3  -BH  --    Taking care of personal grooming (such as brushing teeth)  3  -BB  3  -BH  --    Eating meals  4  -BB  4  -BH  --    Score  15  -BB  15  -BH  --       Functional Assessment    Outcome Measure Options  --  AM-PAC 6 Clicks Daily Activity (OT)  -  AM-PAC 6 Clicks Basic Mobility (PT)  -CB      User Key  (r) = Recorded By, (t) = Taken By, (c) = Cosigned By    Initials Name Provider Type    CB Tara Bella, PT Physical Therapist     Lucy Luna, OTR/L Occupational Therapist    LN Elinor Kennedy PTA Physical Therapy Assistant    BB Shahida Coto, HOOPER/L Occupational Therapy Assistant         Time Calculation:   PT Charges     Row Name 02/10/19 1330             Time Calculation    Start Time  1330  -LN      Stop Time  1400  -LN      Time Calculation (min)  30 min  -LN      PT Received On  02/10/19  -LN         Time Calculation- PT    Total Timed Code Minutes- PT  30 minute(s)  -LN        User Key  (r) = Recorded By, (t) = Taken By, (c) = Cosigned By    Initials Name Provider Type    Elinor Loomis PTA Physical Therapy Assistant        Therapy Suggested Charges     Code   Minutes Charges    None           Therapy Charges for Today     Code Description Service Date Service Provider Modifiers Qty    23236593625 HC GAIT TRAINING EA 15 MIN 2/9/2019  Elinor Kennedy, PTA GP 1    32180658874 HC PT THERAPEUTIC ACT EA 15 MIN 2/9/2019 Eilnor Kennedy, PTA GP 2    60156422822 HC GAIT TRAINING EA 15 MIN 2/10/2019 Elinor Kennedy, PTA GP 1    11548607235 HC PT THERAPEUTIC ACT EA 15 MIN 2/10/2019 Elinor Kennedy, PTA GP 1          PT G-Codes  Outcome Measure Options: AM-PAC 6 Clicks Basic Mobility (PT)  AM-PAC 6 Clicks Score: 20  Score: 15    Elinor Kennedy PTA  2/10/2019

## 2019-02-10 NOTE — PLAN OF CARE
Problem: Patient Care Overview  Goal: Plan of Care Review   02/10/19 1315 02/10/19 1330   Coping/Psychosocial   Plan of Care Reviewed With --  patient;spouse   Plan of Care Review   Progress no change --    OTHER   Outcome Summary --  sup-sit-sup cond ind,sit-stand-sit sba/cga due to pt impulsive at times,amb 52,55,70,56 with cga of 1 and 1 to follow with w/c-no goals met-if d/c would benefit from hh pt-bed mobs with hob down and no hr next rx     Goal: Discharge Needs Assessment   02/07/19 1014 02/08/19 1433   Discharge Needs Assessment   Readmission Within the Last 30 Days other (see comments);current reason for admission unrelated to previous admission  (DX: NSTEMI) --    Concerns to be Addressed --  no discharge needs identified   Concerns Comments Spouse voiced that he monitors patient's blood sugar, blood pressure and performs daily weights. He voiced that he also keeps record. Spouse voiced that patient is compliant with fluid and diet restrictions. He states she is compliant with home medications and also with follow up appointments with providers.  --    Patient/Family Anticipates Transition to --  home with help/services   Patient/Family Anticipated Services at Transition --  home health care   Transportation Concerns --  car, none   Transportation Anticipated --  family or friend will provide   Anticipated Changes Related to Illness none --    Equipment Needed After Discharge --  none   Outpatient/Agency/Support Group Needs --  homecare agency   Discharge Facility/Level of Care Needs --  home with home health   Offered/Gave Vendor List yes --    Patient's Choice of Community Agency(s) [facility] --    Current Discharge Risk --  chronically ill   Disability   Equipment Currently Used at Home --  cane, quad;commode;walker, rolling;wheelchair;wheelchair, motorized;bipap/cpap;oxygen

## 2019-02-10 NOTE — PLAN OF CARE
Problem: Patient Care Overview  Goal: Individualization and Mutuality  Outcome: Ongoing (interventions implemented as appropriate)    Goal: Discharge Needs Assessment  Outcome: Ongoing (interventions implemented as appropriate)   02/07/19 1014 02/08/19 1433   Discharge Needs Assessment   Readmission Within the Last 30 Days other (see comments);current reason for admission unrelated to previous admission  (DX: NSTEMI) --    Concerns to be Addressed --  no discharge needs identified   Concerns Comments Spouse voiced that he monitors patient's blood sugar, blood pressure and performs daily weights. He voiced that he also keeps record. Spouse voiced that patient is compliant with fluid and diet restrictions. He states she is compliant with home medications and also with follow up appointments with providers.  --    Patient/Family Anticipates Transition to --  home with help/services   Patient/Family Anticipated Services at Transition --  home health care   Transportation Concerns --  car, none   Transportation Anticipated --  family or friend will provide   Anticipated Changes Related to Illness none --    Equipment Needed After Discharge --  none   Outpatient/Agency/Support Group Needs --  homecare agency   Discharge Facility/Level of Care Needs --  home with home health   Offered/Gave Vendor List yes --    Patient's Choice of Community Agency(s) [facility] home health --    Current Discharge Risk --  chronically ill   Disability   Equipment Currently Used at Home --  cane, quad;commode;walker, rolling;wheelchair;wheelchair, motorized;bipap/cpap;oxygen       Problem: Kidney Disease, Chronic/End Stage Renal Disease (Adult)  Goal: Signs and Symptoms of Listed Potential Problems Will be Absent, Minimized or Managed (Kidney Disease, Chronic/End Stage Renal Disease)  Outcome: Ongoing (interventions implemented as appropriate)      Problem: Fluid Volume Excess (Adult)  Goal: Identify Related Risk Factors and Signs and  Symptoms  Outcome: Ongoing (interventions implemented as appropriate)    Goal: Optimal Fluid Balance  Outcome: Ongoing (interventions implemented as appropriate)      Problem: Fall Risk (Adult)  Goal: Identify Related Risk Factors and Signs and Symptoms  Outcome: Ongoing (interventions implemented as appropriate)    Goal: Absence of Fall  Outcome: Ongoing (interventions implemented as appropriate)      Problem: Cardiac: ACS (Acute Coronary Syndrome) (Adult)  Goal: Signs and Symptoms of Listed Potential Problems Will be Absent, Minimized or Managed (Cardiac: ACS)  Outcome: Ongoing (interventions implemented as appropriate)      Problem: Pain, Chronic (Adult)  Goal: Identify Related Risk Factors and Signs and Symptoms  Outcome: Ongoing (interventions implemented as appropriate)    Goal: Acceptable Pain/Comfort Level and Functional Ability  Outcome: Ongoing (interventions implemented as appropriate)

## 2019-02-10 NOTE — PROGRESS NOTES
"[facility]  [address] [place] [address]. [address]  T - [phone]  F - [phone]     Progress Note          PATIENT  DEMOGRAPHICS   PATIENT NAME: Nicolasa Rojas                      PHYSICIAN: Joon Dugan MD  : 1950  MRN: [id number]   LOS: 4 days    Patient Care Team:  Henry Muniz MD as PCP - General (Internal Medicine)  Subjective   SUBJECTIVE   Still has vomiting , no chest pain       Objective   OBJECTIVE   Vital Signs  Temp:  [97 °F (36.1 °C)-98.3 °F (36.8 °C)] 97 °F (36.1 °C)  Heart Rate:  [50-69] 50  Resp:  [16] 16  BP: (102-169)/(49-74) 144/62    Flowsheet Rows      First Filed Value   Admission Height  160 cm (63\") Documented at 2019 1612   Admission Weight  102 kg (224 lb 12.8 oz) Documented at 2019 1612           I/O last 3 completed shifts:  In: -   Out: 700 [Urine:700]    PHYSICAL EXAM    Physical Exam   Constitutional: She is oriented to person, place, and time.   Neck: JVD present.   Cardiovascular: Normal rate. Exam reveals gallop and S3.   No murmur heard.  Pulmonary/Chest: Effort normal and breath sounds normal.   Abdominal: Soft. Bowel sounds are normal.   Musculoskeletal: She exhibits edema.   Neurological: She is alert and oriented to person, place, and time.       RESULTS   Results Review:    Results from last 7 days   Lab Units 02/10/19  0708 19  0719  0705   SODIUM mmol/L 134* 129* 125*   POTASSIUM mmol/L 4.1 4.2 4.3   CHLORIDE mmol/L 91* 89* 84*   CO2 mmol/L 32.0* 32.0* 30.0   BUN mg/dL 91* 84* 85*   CREATININE mg/dL 1.99* 1.98* 1.89*   CALCIUM mg/dL 9.5 9.3 9.0   GLUCOSE mg/dL 91 127* 126*       Estimated Creatinine Clearance: 30.1 mL/min (A) (by C-G formula based on SCr of 1.99 mg/dL (H)).                Results from last 7 days   Lab Units 02/10/19  0708 19  0737 19  0705 19  0513 19  1747   WBC 10*3/mm3 6.31 7.57 6.69 5.16 5.38   HEMOGLOBIN g/dL 9.2* 8.6* 8.4* 8.4* 8.7*   PLATELETS " 10*3/mm3 201 175 149* 149* 161               Imaging Results (last 24 hours)     ** No results found for the last 24 hours. **           MEDICATIONS      aspirin 81 mg Oral Daily   carvedilol 3.125 mg Oral BID With Meals   clopidogrel 75 mg Oral Daily   famotidine 40 mg Oral Daily   ferrous sulfate 324 mg Oral BID With Meals   heparin (porcine) 5,000 Units Subcutaneous Q12H   hydrALAZINE 50 mg Oral BID   isosorbide mononitrate 60 mg Oral BID   ranolazine 500 mg Oral Q12H   rosuvastatin 20 mg Oral Daily   sertraline 50 mg Oral Daily   sodium chloride 10 mL Intravenous Q12H   spironolactone 25 mg Oral Daily       insulin        Assessment/Plan   ASSESSMENT / PLAN      NSTEMI (non-ST elevated myocardial infarction) (CMS/[facility])    Hyponatremia    Acute on chronic diastolic CHF (congestive heart failure) (CMS/[facility])    Chronic renal impairment, stage 3 (moderate) (CMS/[facility])    CAD (coronary artery disease)    Class 2 severe obesity due to excess calories with serious comorbidity and body mass index (BMI) of 39.0 to 39.9 in adult (CMS/[facility])    COPD (chronic obstructive pulmonary disease) (CMS/[facility])    CIERRA (obstructive sleep apnea)    Hyperlipidemia    Essential hypertension    Type 2 diabetes mellitus (CMS/[facility])    MONE on CKD   1. Pt's Cr was elevated above baseline at admission upto 1.89.   BUN to Cr ratio on admission was > 20 (38.6) suggesting prerenal azotemia from NSTEMI (cardio renal syndrome). Baseline cr 1.6-1.7 and recently came down to 1.4. Due to increasing alkalosis in the office we have stopped metolazone and kept on bumex 2mg daily along with aldactone in the office     At present I will keep aldactone. Add bumex 1 mg daily.Hold metolazone for now. She has significant wt loss and then lowest at home was 217 lbs. Her dry weight at best is close to 220 lbs. Wt stable and c oming down slowly     She will be a poor candidate for invasive workup and likely end up with worsening renal failure (had hd in two different  occasions after the heart cath). No plan for invasive workup for nstemi.      2- hyponatremia jvd is high likely hypervolemic after nstemi (high adh release). Keep aldactone. Urine osmolality is not markedly elevated., urine na is on low side. Na better with tolvaptan. No need of adh antagonist today     3- htn stable     4- anemia of ckd her fe b12 and folate are normal. She gets procrit but since she has recent nstemi I will wait for next dose of procrit. Procrit shot tomorrow              This document has been electronically signed by Joon Dugan MD on February 10, 2019 10:00 AM

## 2019-02-10 NOTE — THERAPY TREATMENT NOTE
Acute Care - Occupational Therapy Treatment Note  [facility]     Patient Name: Nicolasa Rojas  : 1950  MRN: [id number]  Today's Date: 2/10/2019  Onset of Illness/Injury or Date of Surgery: 19  Date of Referral to OT: 19  Referring Physician: Dr. Jarrell    Admit Date: 2019       ICD-10-CM ICD-9-CM   1. Impaired physical mobility Z74.09 781.99   2. Impaired mobility and ADLs Z74.09 799.89     Patient Active Problem List   Diagnosis   • Hyponatremia   • Acute on chronic diastolic CHF (congestive heart failure) (CMS/[facility])   • Hypokalemia   • Acute on chronic heart failure (CMS/[facility])   • Dyspnea   • Chronic renal impairment, stage 3 (moderate) (CMS/[facility])   • CAD (coronary artery disease)   • Class 2 severe obesity due to excess calories with serious comorbidity and body mass index (BMI) of 39.0 to 39.9 in adult (CMS/[facility])   • NSTEMI (non-ST elevated myocardial infarction) (CMS/[facility])   • COPD (chronic obstructive pulmonary disease) (CMS/[facility])   • CIERRA (obstructive sleep apnea)   • Hyperlipidemia   • Essential hypertension   • Type 2 diabetes mellitus (CMS/[facility])     Past Medical History:   Diagnosis Date   • Acute bronchitis    • Acute congestive heart failure (CMS/[facility])     resolving   • Acute kidney failure, unspecified (CMS/[facility])    • Acute kidney failure, unspecified (CMS/[facility])    • Acute posthemorrhagic anemia    • Asthenia    • Chest pain    • Chronic gastritis    • Chronic pharyngitis    • Chronic renal impairment    • Congenital renal failure    • Congestive heart failure (CMS/[facility])    • Contact dermatitis due to poison ivy    • COPD (chronic obstructive pulmonary disease) (CMS/[facility])    • Coronary arteriosclerosis    • D-dimer, elevated     D-dimer above reference range    • Degenerative joint disease involving multiple joints     back   • Depressive disorder    • Dysphagia    • Dyspnea    • Edema of lower extremity    • Encounter for immunization    • Encounter for long-term (current) use of  medications    • Epigastric pain    • Esophagitis    • Essential hypertension    • Gastroesophageal reflux disease    • Gastroparesis     Gastroparesis syndrome    • Generalized abdominal pain    • H/O bone density study 01/09/2015   • H/O mammogram 01/15/2015   • H/O screening mammography 11/12/2013   • Headache    • History of transfusion    • Hyperlipidemia    • Hypokalemia    • Hypomagnesemia    • Injury of tendon of rotator cuff     Injury of tendon of the rotator cuff of shoulder      • Insomnia    • Insomnia, unspecified    • Knee pain    • Nausea and vomiting    • Neck pain    • Need for immunization against influenza    • Need for prophylactic vaccination and inoculation against influenza    • Need for prophylactic vaccination and inoculation against viral disease    • 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 Performed at [facility].   • ENDOSCOPY  02/09/2015    ESOPHAGEAL STRICTURE PRESENT, GASTRITIS FOUND IN THE STOMACH, NORMAL DUODENUM   • ENDOSCOPY W/ PEG TUBE PLACEMENT  12/19/2012    Esophagitis seen. Biopsy taken. Esophageal stricture present. Dilatation performed. Gastritis in stomach. Biopsy taken. Food was in stomach. Normal duodenum.   • HERNIA REPAIR     • HYSTERECTOMY      partial   • NECK SURGERY         Therapy Treatment    Rehabilitation Treatment Summary     Row Name 02/10/19 0705             Treatment Time/Intention    Discipline  occupational therapy assistant  -BB      Document Type  therapy note (daily note)  -BB      Subjective Information  no complaints  -BB      Mode of  Treatment  individual therapy;occupational therapy  -BB      Patient/Family Observations   initially in room  but stepped out during tx  -BB      Total Minutes, Occupational Therapy Treatment  39  -BB      Therapy Frequency (OT Eval)  other (see comments) 5-7 days/wk  -BB      Patient Effort  fair  -BB      Recorded by [BB] Shahida Coto COTA/L 02/10/19 1314      Row Name 02/10/19 0705             Vital Signs    Pretreatment Heart Rate (beats/min)  68  -BB      Posttreatment Heart Rate (beats/min)  70  -BB      Pre SpO2 (%)  97  -BB      O2 Delivery Pre Treatment  supplemental O2  -BB      Post SpO2 (%)  96  -BB      O2 Delivery Post Treatment  supplemental O2  -BB      Pre Patient Position  Supine  -BB      Post Patient Position  Sitting  -BB      Recorded by [BB] Shahida Coto COTA/L 02/10/19 1314      Row Name 02/10/19 0705             Cognitive Assessment/Intervention- PT/OT    Orientation Status (Cognition)  oriented x 4  -BB      Follows Commands (Cognition)  repetition of directions required  -BB      Personal Safety Interventions  fall prevention program maintained;gait belt;nonskid shoes/slippers when out of bed;muscle strengthening facilitated  -BB      Recorded by [BB] Shahiad Coto COTA/L 02/10/19 1314      Row Name 02/10/19 0705             Bed Mobility Assessment/Treatment    Supine-Sit [place] (Bed Mobility)  contact guard  -BB      Sit-Supine [place] (Bed Mobility)  contact guard  -BB      Bed Mobility, Safety Issues  decreased use of arms for pushing/pulling;decreased use of legs for bridging/pushing;impaired trunk control for bed mobility  -BB      Assistive Device (Bed Mobility)  bed rails;head of bed elevated  -BB      Recorded by [BB] Shahida Coto COTA/L 02/10/19 1314      Row Name 02/10/19 0705             Sit-Stand Transfer    Sit-Stand [place] (Transfers)  contact guard  -BB      Assistive Device (Sit-Stand Transfers)  walker, front-wheeled   -BB      Recorded by [BB] Shahida Coto COTA/L 02/10/19 1314      Row Name 02/10/19 0705             Stand-Sit Transfer    Stand-Sit [place] (Transfers)  contact guard  -BB      Assistive Device (Stand-Sit Transfers)  walker, front-wheeled  -BB      Recorded by [BB] Shahida Coto COTA/L 02/10/19 1314      Row Name 02/10/19 0705             Bathing Assessment/Intervention    Bathing [place] Level  upper body;set up;contact guard assist  -BB      Bathing Position  edge of bed sitting  -BB      Recorded by [BB] Shahida Coto COTA/L 02/10/19 1314      Row Name 02/10/19 0705             Upper Body Dressing Assessment/Training    Upper Body Dressing [place] Level  doff;don;set up;contact guard assist gown  -BB      Upper Body Dressing Position  edge of bed sitting  -BB      Recorded by [BB] Shahida Coto COTA/L 02/10/19 1314      Row Name 02/10/19 0705             Grooming Assessment/Training    [place] Level (Grooming)  hair care, combing/brushing;wash face, hands;set up;supervision apply face cream and lotion  -BB      Grooming Position  edge of bed sitting  -BB      Recorded by [BB] Shahida Coto COTA/L 02/10/19 1314      Row Name 02/10/19 0705             Positioning and Restraints    Pre-Treatment Position  in bed  -BB      Post Treatment Position  bed  -BB      In Bed  supine;call light within reach;encouraged to call for assist;exit alarm on  -BB      Recorded by [BB] Shahida Coto COTA/L 02/10/19 1314      Row Name 02/10/19 0705             Pain Assessment    Additional Documentation  Pain Scale: Numbers Pre/Post-Treatment (Group)  -BB      Recorded by [BB] Shahida Coto COTA/L 02/10/19 1314      Row Name 02/10/19 0705             Pain Scale: Numbers Pre/Post-Treatment    Pain Scale: Numbers, Pretreatment  2/10  -BB      Pain Scale: Numbers, Post-Treatment  2/10  -BB      Pain Location  back  -BB      Pain Intervention(s)  Medication (See MAR)   -BB      Recorded by [BB] Shahida Coto COTA/L 02/10/19 1314      Row Name 02/10/19 0705             Plan of Care Review    Plan of Care Reviewed With  patient  -BB      Recorded by [LOTTIE] Shahida Coto COTA/L 02/10/19 1314      Row Name 02/10/19 0705             Outcome Summary/Treatment Plan (OT)    Daily Summary of Progress (OT)  progress toward functional goals is gradual  -BB      Plan for Continued Treatment (OT)  continue POC  -BB      Anticipated Discharge Disposition (OT)  home with 24/7 care;home with home health  -BB      Recorded by [LOTTIE] Shahida Coto COTA/L 02/10/19 1314        User Key  (r) = Recorded By, (t) = Taken By, (c) = Cosigned By    Initials Name Effective Dates Discipline    BB Shahida Coto COTA/L 03/07/18 -  OT           Rehab Goal Summary     Row Name 02/10/19 0748             Occupational Therapy Goals    Transfer Goal Selection (OT)  transfer, OT goal 1  -BB      Bathing Goal Selection (OT)  bathing, OT goal 1  -BB      Dressing Goal Selection (OT)  dressing, OT goal 1  -BB      Toileting Goal Selection (OT)  toileting, OT goal 1  -BB      Endurance Goal Selection (OT)  endurance, OT goal 1  -BB      Functional Mobility Goal Selection (OT)  functional mobility, OT goal 1  -BB         Transfer Goal 1 (OT)    Activity/Assistive Device (Transfer Goal 1, OT)  toilet  -BB      [place] Level/Cues Needed (Transfer Goal 1, OT)  standby assist  -BB      Time Frame (Transfer Goal 1, OT)  long term goal (LTG);by discharge  -BB      Progress/Outcome (Transfer Goal 1, OT)  goal not met  -BB         Bathing Goal 1 (OT)    Activity/Assistive Device (Bathing Goal 1, OT)  upper body bathing  -BB      [place] Level/Cues Needed (Bathing Goal 1, OT)  minimum assist (75% or more patient effort)  -BB      Time Frame (Bathing Goal 1, OT)  long term goal (LTG);by discharge  -BB      Progress/Outcomes (Bathing Goal 1, OT)  goal not met  -BB         Dressing Goal 1 (OT)     Activity/Assistive Device (Dressing Goal 1, OT)  upper body dressing  -BB      [place]/Cues Needed (Dressing Goal 1, OT)  minimum assist (75% or more patient effort)  -BB      Time Frame (Dressing Goal 1, OT)  long term goal (LTG);by discharge  -BB      Progress/Outcome (Dressing Goal 1, OT)  goal not met  -BB         Toileting Goal 1 (OT)    Activity/Device (Toileting Goal 1, OT)  toileting skills, all  -BB      [place] Level/Cues Needed (Toileting Goal 1, OT)  standby assist  -BB      Time Frame (Toileting Goal 1, OT)  long term goal (LTG);by discharge  -BB      Progress/Outcome (Toileting Goal 1, OT)  goal not met  -BB          Endurance Goal 1 (OT)    Activity Level (Endurance Goal 1, OT)  endurance 2 fair + 25 min functional task 3 or less rest breaks O2 90 or up  -BB      Time Frame (Endurance Goal 1, OT)  long term goal (LTG);by discharge  -BB      Progress/Outcome (Endurance Goal 1, OT)  goal not met  -BB         Functional Mobility Goal 1 (OT)    Activity/Assistive Device (Functional Mobility Goal 1, OT)  walker, rolling  -BB      [place] Level/Cues Needed (Functional Mobility Goal 1, OT)  contact guard assist  -BB      Distance Goal 1 (Functional Mobility, OT)  to bathroom and back to bed/chair   -BB      Time Frame (Functional Mobility Goal 1, OT)  long term goal (LTG);by discharge  -BB      Progress/Outcome (Functional Mobility Goal 1, OT)  goal not met  -BB        User Key  (r) = Recorded By, (t) = Taken By, (c) = Cosigned By    Initials Name Provider Type Discipline    BB Shahida Coto, HOOPER/L Occupational Therapy Assistant OT        Occupational Therapy Education     Title: PT OT SLP Therapies (Not Started)     Topic: Occupational Therapy (In Progress)     Point: ADL training (In Progress)     Description: Instruct learner(s) on proper safety adaptation and remediation techniques during self care or transfers.   Instruct in proper use of assistive devices.    Learning Progress  Summary           Patient Acceptance, E, NR by  at 2/10/2019  1:15 PM    Acceptance, E, VU,NR by  at 2/8/2019  4:03 PM    Comment:  Educated about OT and POC. Educated to call for assist. Educated on safety throughout.   Family Acceptance, E, VU,NR by  at 2/8/2019  4:03 PM    Comment:  Educated about OT and POC. Educated to call for assist. Educated on safety throughout.                   Point: Home exercise program (In Progress)     Description: Instruct learner(s) on appropriate technique for monitoring, assisting and/or progressing therapeutic exercises/activities.    Learning Progress Summary           Patient Acceptance, E, NR by  at 2/9/2019  3:11 PM                   Point: Precautions (Done)     Description: Instruct learner(s) on prescribed precautions during self-care and functional transfers.    Learning Progress Summary           Patient Acceptance, E, VU,NR by  at 2/8/2019  4:03 PM    Comment:  Educated about OT and POC. Educated to call for assist. Educated on safety throughout.   Family Acceptance, E, VU,NR by  at 2/8/2019  4:03 PM    Comment:  Educated about OT and POC. Educated to call for assist. Educated on safety throughout.                   Point: Body mechanics (In Progress)     Description: Instruct learner(s) on proper positioning and spine alignment during self-care, functional mobility activities and/or exercises.    Learning Progress Summary           Patient Acceptance, E, NR by  at 2/10/2019  1:15 PM                               User Key     Initials Effective Dates Name Provider Type Discipline     06/08/18 -  Lucy Luna OTR/L Occupational Therapist OT     03/07/18 -  Shahida Coto, HOOPER/L Occupational Therapy Assistant OT              Non-skid socks and gait belt in place. Toileting offered. Call light and needs within reach. Pt advised to not get up alone and call the nurse for assistance.  Bed alarm on.     OT Recommendation and Plan  Outcome  Summary/Treatment Plan (OT)  Daily Summary of Progress (OT): progress toward functional goals is gradual  Plan for Continued Treatment (OT): continue POC  Anticipated Discharge Disposition (OT): home with 24/7 care, home with home health  Therapy Frequency (OT Eval): other (see comments)(5-7 days/wk)  Daily Summary of Progress (OT): progress toward functional goals is gradual  Plan of Care Review  Plan of Care Reviewed With: patient  Plan of Care Reviewed With: patient  Outcome Summary: Pt completed UB ADL with Min A sitting EOB. Pt performed grooming task with set up and SBA, No goals met this tx.  Outcome Measures     Row Name 02/10/19 0748 02/09/19 1440 02/09/19 1025       How much help from another person do you currently need...    Turning from your back to your side while in flat bed without using bedrails?  --  4  -LN  --    Moving from lying on back to sitting on the side of a flat bed without bedrails?  --  3  -LN  --    Moving to and from a bed to a chair (including a wheelchair)?  --  3  -LN  --    Standing up from a chair using your arms (e.g., wheelchair, bedside chair)?  --  3  -LN  --    Climbing 3-5 steps with a railing?  --  3  -LN  --    To walk in hospital room?  --  3  -LN  --    AM-PAC 6 Clicks Score  --  19  -LN  --       How much help from another is currently needed...    Putting on and taking off regular lower body clothing?  1  -BB  --  1  -BB    Bathing (including washing, rinsing, and drying)  2  -BB  --  2  -BB    Toileting (which includes using toilet bed pan or urinal)  2  -BB  --  2  -BB    Putting on and taking off regular upper body clothing  3  -BB  --  3  -BB    Taking care of personal grooming (such as brushing teeth)  3  -BB  --  3  -BB    Eating meals  4  -BB  --  4  -BB    Score  15  -BB  --  15  -BB       Functional Assessment    Outcome Measure Options  --  AM-PAC 6 Clicks Basic Mobility (PT)  -LN  --    Row Name 02/08/19 1515 02/08/19 1433          How much help from  another person do you currently need...    Turning from your back to your side while in flat bed without using bedrails?  --  4  -CB     Moving from lying on back to sitting on the side of a flat bed without bedrails?  --  3  -CB     Moving to and from a bed to a chair (including a wheelchair)?  --  3  -CB     Standing up from a chair using your arms (e.g., wheelchair, bedside chair)?  --  3  -CB     Climbing 3-5 steps with a railing?  --  3  -CB     To walk in hospital room?  --  3  -CB     AM-PAC 6 Clicks Score  --  19  -CB        How much help from another is currently needed...    Putting on and taking off regular lower body clothing?  1  -BH  --     Bathing (including washing, rinsing, and drying)  2  -BH  --     Toileting (which includes using toilet bed pan or urinal)  2  -BH  --     Putting on and taking off regular upper body clothing  3  -BH  --     Taking care of personal grooming (such as brushing teeth)  3  -BH  --     Eating meals  4  -  --     Score  15  -BH  --        Functional Assessment    Outcome Measure Options  AM-PAC 6 Clicks Daily Activity (OT)  -  AM-[facility] 6 Clicks Basic Mobility (PT)  -       User Key  (r) = Recorded By, (t) = Taken By, (c) = Cosigned By    Initials Name Provider Type    Tara Finch, PT Physical Therapist     Lucy Luna, OTR/L Occupational Therapist    LN Elinor Kennedy, PTA Physical Therapy Assistant    Shahida Villela, HOOPER/L Occupational Therapy Assistant           Time Calculation:   Time Calculation- OT     Row Name 02/10/19 1320             Time Calculation- OT    OT Start Time  0748  -BB      OT Stop Time  0828  -BB      OT Time Calculation (min)  40 min  -BB      Total Timed Code Minutes- OT  40 minute(s)  -BB      OT Received On  02/10/19  -BB        User Key  (r) = Recorded By, (t) = Taken By, (c) = Cosigned By    Initials Name Provider Type    Shahida Villela, HOOPER/L Occupational Therapy Assistant           Therapy Suggested  Charges     Code   Minutes Charges    None           Therapy Charges for Today     Code Description Service Date Service Provider Modifiers Qty    64676540343 HC OT THER PROC EA 15 MIN 2/9/2019 Shahida Coto COTA/L GO 2    50587950873 HC OT SELF CARE/MGMT/TRAIN EA 15 MIN 2/10/2019 Shahida Coto COTA/L GO 3               RUFUS Fung/JOSEMANUEL  2/10/2019

## 2019-02-10 NOTE — PLAN OF CARE
Problem: Patient Care Overview  Goal: Plan of Care Review  Outcome: Ongoing (interventions implemented as appropriate)   02/10/19 1392   Coping/Psychosocial   Plan of Care Reviewed With patient   Plan of Care Review   Progress no change   OTHER   Outcome Summary Pt completed UB ADL with Min A sitting EOB. Pt performed grooming task with set up and SBA, No goals met this tx.

## 2019-02-11 NOTE — PROGRESS NOTES
CARDIOLOGY PROGRESS NOTE      LOS: 5 days   Patient Care Team:  Henry Muniz MD as PCP - General (Internal Medicine)    Problems/Diagnoses:   Mrs. Rojas is a 68-year-old female with history of three-vessel CAD, type 2 diabetes, hypertension, COPD, obesity, CIERRA, peripheral arterial disease, pulmonary hypertension, diastolic heart failure, who presented to [facility] with generalized weakness/fatigue and chest pain.  The chest pain was described as a burning type of discomfort and associated with some degree of nausea and vomiting.  She also did report some degree of shortness of breath.  The pain did improve with sublingual nitroglycerin, but she continued to feel weak and other workup at [facility] ER showed a troponin of 2.84 and evidence of renal failure with elevated BUN and creatinine from her normal baseline.  She was hence transferred to [facility] for further management.  EKG showed sinus rhythm, IVCD, nonspecific ST-T changes and troponins have been elevated at 2.2.  The patient was pain-free at the time of my examination.  The patient sees Dr. Lobo on a regular basis and her history was reviewed in detail.  She has had all other interventions at [place] and records from [place] from November 2018 was reviewed.  She was admitted with chest pain and ruled in for a non-ST segment elevation myocardial infarction with a peak troponin of 37 at that time  Hospital Course at [place] as summarized below:   She was started on ACS protocol with aspirin, Plavix, and heparin. She was taken the Cath Lab with findings most pertinent for an ostial LAD stenosis. An intra-aortic balloon pump was placed for coronary perfusion and she was transferred to CCU afterwards without intervention. Cardiac surgery was involved and deemed her to not be a surgical revascularization candidate. She underwent high risk PCI with balloon pump support on 11/19/2018  The patient was transferred to  the CCU after her LHC with balloon pump placement on 11/17/18.     The LHC showed LM: Diffuse 20% disease. Pressure ventricularization with catheter engagement. LAD: Ostial 95% stenosis and mid 80-90% stenoses. Very small D2 has a proximal 100% . LCX: Proximal 80% stenosis and OM1 100% in-stent . RCA: Dominant, the proximal stents are widely patent.     Cardiac surgery was consulted for evaluation for surgical intervention who recommended proceeding with a high risk PCI due to morbid obesity, deconditioned state and extensive medical history.  She underwent the high risk PCI on 11/19 with drug-eluting stents placed in the proximal LCx, mid-LAD and left main extending into the proximal mid-LAD. She had the IABP removed on 11/20 without complications.   Additionally, patient's course has been complicated by anemia (hematocrit fredy 21); transfused 1 unit of pRBC this morning (haptoglobin 14, , appropriate retic count, iron studies (anemia of chronic disease)). No evidence of bleeding and stabilized to 27 at discharge. Endocrine was consulted and managed her insulin pump while inpatient.  Additionally, she has had worsening renal function (prior history of contrast-induced nephropathy with prior caths) and urine output reducing. She ultimately became essentially anuric, and required several episodes of HD,     The patient and the family are fully aware of the multiple issues at [place].  The patient has been noted to be hyponatremic, anemic in addition to worsening renal function.  Echocardiogram in January 2019 did show an ejection fraction of 57%.  Right ventricle was mildly dilated.  There is moderate mitral valve regurgitation, mild aortic valve insufficiency and moderate tricuspid valve regurgitation with evidence of severe pulmonary hypertension with RVSP more than 55 mmHg.       The patient denied any chest pain and there is no shortness of breath.  She has been followed by nephrology and  hyponatremia has improved.    Subjective       Review of Systems:     Constitutional:  Denies recent weight loss, weight gain, fever or chills. C/o generalized weakness     HENT:  Denies any hearing loss, epistaxis, hoarseness, or difficulty speaking.     Eyes: Wears eyeglasses or contact lenses     Respiratory:  Denies dyspnea with exertion,no cough, wheezing, or hemoptysis.     Cardiovascular: Negative for palpitations, chest pain    Gastrointestinal:  Denies change in bowel habits, dyspepsia, ulcer disease, hematochezia, or melena.     Endocrine: Negative for cold intolerance, heat intolerance, polydipsia, polyphagia and polyuria.     Genitourinary: Negative.      Musculoskeletal: DJD    Objective     Vital Signs  Temp:  [96.9 °F (36.1 °C)-98.3 °F (36.8 °C)] 96.9 °F (36.1 °C)  Heart Rate:  [58-86] 65  Resp:  [18] 18  BP: (130-159)/(52-74) 159/74    Physical Exam:    Constitutional: Cooperative, alert and oriented, in no acute distress.     HENT:   Head: Normocephalic, normal hair patterns, no masses or tenderness.  Ears, Nose, and Throat: No gross abnormalities. No pallor or cyanosis.  Eyes: EOMS intact, PERRL, conjunctivae and lids unremarkable. Fundoscopic exam and visual fields not performed.   Neck: No palpable masses or adenopathy, no thyromegaly, no JVD, carotid pulses are full and equal bilaterally and without  Bruits.     Cardiovascular: Regular rhythm, S1 and S2 normal, no S3 or S4. Apical impulse not displaced. No murmurs, gallops, or rubs detected.     Pulmonary/Chest: Chest: normal symmetry, no tenderness to palpation, normal respiratory excursion, no intercostal retraction, no use of accessory muscles.            Pulmonary: Normal breath sounds. No rales or rhonchi.    Abdominal: Abdomen soft, bowel sounds normoactive, no masses, no hepatosplenomegaly, non-tender, no bruits.     Musculoskeletal: No deformities, clubbing, cyanosis, erythema, or edema observed      Results Review:    Lab Results (last  24 hours)     Procedure Component Value Units Date/Time    POC Glucose Once [[id number]]  (Abnormal) Collected:  02/11/19 0628    Specimen:  Blood Updated:  02/11/19 0702     Glucose 63 mg/dL      Comment: : [id number] ROSA ELENA Johnston ID: [id number]       Basic Metabolic Panel [[id number]]  (Abnormal) Collected:  02/11/19 0512    Specimen:  Blood Updated:  02/11/19 0602     Glucose 62 mg/dL      BUN 80 mg/dL      Creatinine 1.50 mg/dL      Sodium 134 mmol/L      Potassium 3.7 mmol/L      Chloride 94 mmol/L      CO2 33.0 mmol/L      Calcium 9.0 mg/dL      eGFR Non African Amer 34 mL/min/1.73      BUN/Creatinine Ratio 53.3     Anion Gap 7.0 mmol/L     CBC & Differential [[id number]] Collected:  02/11/19 0512    Specimen:  Blood Updated:  02/11/19 0540    Narrative:       The following orders were created for panel order CBC & Differential.  Procedure                               Abnormality         Status                     ---------                               -----------         ------                     CBC Auto Differential[[id number]]        Abnormal            Final result                 Please view results for these tests on the individual orders.    CBC Auto Differential [[id number]]  (Abnormal) Collected:  02/11/19 0512    Specimen:  Blood Updated:  02/11/19 0540     WBC 6.29 10*3/mm3      RBC 2.58 10*6/mm3      Hemoglobin 8.2 g/dL      Hematocrit 24.7 %      MCV 95.7 fL      MCH 31.8 pg      MCHC 33.2 g/dL      RDW 14.0 %      RDW-SD 48.0 fl      MPV 8.8 fL      Platelets 210 10*3/mm3      Neutrophil % 73.3 %      Lymphocyte % 11.3 %      Monocyte % 9.7 %      Eosinophil % 4.6 %      Basophil % 0.3 %      Immature Grans % 0.8 %      Neutrophils, Absolute 4.61 10*3/mm3      Lymphocytes, Absolute 0.71 10*3/mm3      Monocytes, Absolute 0.61 10*3/mm3      Eosinophils, Absolute 0.29 10*3/mm3      Basophils, Absolute 0.02 10*3/mm3      Immature Grans, Absolute 0.05 10*3/mm3      nRBC 0.0 /100 WBC      POC Glucose Once [[id number]]  (Abnormal) Collected:  02/10/19 2014    Specimen:  Blood Updated:  02/10/19 2047     Glucose 66 mg/dL      Comment: : [id number] ROSA ELENA LOUISNAMeter ID: [id number]       POC Glucose Once [[id number]]  (Abnormal) Collected:  02/10/19 1631    Specimen:  Blood Updated:  02/10/19 1701     Glucose 169 mg/dL      Comment: RN NotifiedOperator: [id number] IDANIA BRANDYMeter ID: [id number]       POC Glucose Once [[id number]]  (Abnormal) Collected:  02/10/19 1025    Specimen:  Blood Updated:  02/10/19 1108     Glucose 270 mg/dL      Comment: RN NotifiedOperator: [id number] GRACY Mccarthy ID: [id number]               Medication Review:   Current Facility-Administered Medications   Medication Dose Route Frequency Provider Last Rate Last Dose   • aspirin chewable tablet 81 mg  81 mg Oral Daily Raphael Middleton MD   81 mg at 02/11/19 0856   • bumetanide (BUMEX) tablet 1 mg  1 mg Oral Daily Joon Dugan MD   1 mg at 02/11/19 0856   • carvedilol (COREG) tablet 3.125 mg  3.125 mg Oral BID With Meals Catracho Jarrell MD   3.125 mg at 02/11/19 0856   • clopidogrel (PLAVIX) tablet 75 mg  75 mg Oral Daily Raphael Middleton MD   75 mg at 02/11/19 0856   • epoetin kenyetta (EPOGEN,PROCRIT) injection 6,000 Units  6,000 Units Subcutaneous Once Joon Dugan MD       • famotidine (PEPCID) tablet 40 mg  40 mg Oral Daily Raphael Middleton MD   40 mg at 02/11/19 0856   • ferrous sulfate EC tablet 324 mg  324 mg Oral BID With Meals Raphael Middleton MD   324 mg at 02/11/19 0856   • heparin (porcine) 5000 UNIT/ML injection 5,000 Units  5,000 Units Subcutaneous Q12H Raphael Middleton MD       • hydrALAZINE (APRESOLINE) tablet 50 mg  50 mg Oral BID Raphael Middleton MD   50 mg at 02/11/19 0856   • HYDROcodone-acetaminophen (NORCO)  MG per tablet 1 tablet  1 tablet Oral Q6H PRN Catracho Jarrell MD   1 tablet at 02/10/19 2112   • insulin patient supplied pump   Subcutaneous Continuous  Raphael Middleton MD       • isosorbide mononitrate (IMDUR) 24 hr tablet 60 mg  60 mg Oral BID Raphael Middleton MD   60 mg at 02/11/19 0856   • morphine injection 2 mg  2 mg Intravenous Q2H PRN Hill Wright MD   2 mg at 02/09/19 1142   • nitroglycerin (NITROSTAT) SL tablet 0.4 mg  0.4 mg Sublingual Q5 Min PRN Raphael Middleton MD   0.4 mg at 02/07/19 2058   • polyethylene glycol (MIRALAX) powder 17 g  17 g Oral Daily Joon Dugan MD   17 g at 02/11/19 0856   • promethazine (PHENERGAN) injection 12.5 mg  12.5 mg Intravenous Q6H PRN Catracho Jarrell MD   12.5 mg at 02/08/19 1818   • ranolazine (RANEXA) 12 hr tablet 500 mg  500 mg Oral Q12H Edgar Montana MD   500 mg at 02/11/19 0856   • rosuvastatin (CRESTOR) tablet 20 mg  20 mg Oral Daily Raphael Middleton MD   20 mg at 02/11/19 0856   • sertraline (ZOLOFT) tablet 50 mg  50 mg Oral Daily Raphael Middleton MD   50 mg at 02/11/19 0856   • sodium chloride (OCEAN) nasal spray 2 spray  2 spray Each Nare PRN Evangelista Lee MD       • sodium chloride 0.9 % flush 10 mL  10 mL Intravenous Q12H Raphael Middleotn MD   10 mL at 02/11/19 0856   • sodium chloride 0.9 % flush 10 mL  10 mL Intravenous PRN Raphael Middleton MD   10 mL at 02/07/19 2200   • spironolactone (ALDACTONE) tablet 25 mg  25 mg Oral Daily Raphael Middleton MD   25 mg at 02/11/19 0855   • zolpidem (AMBIEN) tablet 10 mg  10 mg Oral Nightly PRN Raphael Middleton MD   10 mg at 02/10/19 2113         Assessment/Plan     Mrs. Rojas has multiple medical issues as discussed on the history of present illness and multiple comorbidities.  Not a candidate for invasive workup.  This has been discussed with both the family and Dr. Dugan who agrees.  Continue present maximal medical management.  Palliative care is being considered by the family.        NSTEMI (non-ST elevated myocardial infarction) (CMS/HCC)    Hyponatremia    Acute on chronic diastolic CHF (congestive heart failure)  (CMS/[facility])    Chronic renal impairment, stage 3 (moderate) (CMS/[facility])    CAD (coronary artery disease)    Class 2 severe obesity due to excess calories with serious comorbidity and body mass index (BMI) of 39.0 to 39.9 in adult (CMS/[facility])    COPD (chronic obstructive pulmonary disease) (CMS/[facility])    CIERRA (obstructive sleep apnea)    Hyperlipidemia    Essential hypertension    Type 2 diabetes mellitus (CMS/[facility])      Edgar Montana MD  02/11/19  9:17 AM

## 2019-02-11 NOTE — PLAN OF CARE
Problem: Patient Care Overview  Goal: Individualization and Mutuality  Outcome: Ongoing (interventions implemented as appropriate)    Goal: Discharge Needs Assessment  Outcome: Ongoing (interventions implemented as appropriate)   02/07/19 1014 02/08/19 1433   Discharge Needs Assessment   Readmission Within the Last 30 Days other (see comments);current reason for admission unrelated to previous admission  (DX: NSTEMI) --    Concerns to be Addressed --  no discharge needs identified   Concerns Comments Spouse voiced that he monitors patient's blood sugar, blood pressure and performs daily weights. He voiced that he also keeps record. Spouse voiced that patient is compliant with fluid and diet restrictions. He states she is compliant with home medications and also with follow up appointments with providers.  --    Patient/Family Anticipates Transition to --  home with help/services   Patient/Family Anticipated Services at Transition --  home health care   Transportation Concerns --  car, none   Transportation Anticipated --  family or friend will provide   Anticipated Changes Related to Illness none --    Equipment Needed After Discharge --  none   Outpatient/Agency/Support Group Needs --  homecare agency   Discharge Facility/Level of Care Needs --  home with home health   Offered/Gave Vendor List yes --    Patient's Choice of Community Agency(s) [facility] home health --    Current Discharge Risk --  chronically ill   Disability   Equipment Currently Used at Home --  cane, quad;commode;walker, rolling;wheelchair;wheelchair, motorized;bipap/cpap;oxygen       Problem: Kidney Disease, Chronic/End Stage Renal Disease (Adult)  Goal: Signs and Symptoms of Listed Potential Problems Will be Absent, Minimized or Managed (Kidney Disease, Chronic/End Stage Renal Disease)  Outcome: Ongoing (interventions implemented as appropriate)      Problem: Fluid Volume Excess (Adult)  Goal: Identify Related Risk Factors and Signs and  Symptoms  Outcome: Ongoing (interventions implemented as appropriate)    Goal: Optimal Fluid Balance  Outcome: Ongoing (interventions implemented as appropriate)      Problem: Fall Risk (Adult)  Goal: Identify Related Risk Factors and Signs and Symptoms  Outcome: Ongoing (interventions implemented as appropriate)    Goal: Absence of Fall  Outcome: Ongoing (interventions implemented as appropriate)      Problem: Pain, Chronic (Adult)  Goal: Identify Related Risk Factors and Signs and Symptoms  Outcome: Ongoing (interventions implemented as appropriate)    Goal: Acceptable Pain/Comfort Level and Functional Ability  Outcome: Ongoing (interventions implemented as appropriate)

## 2019-02-11 NOTE — PLAN OF CARE
Problem: Patient Care Overview  Goal: Plan of Care Review  Outcome: Ongoing (interventions implemented as appropriate)   02/11/19 6186   Coping/Psychosocial   Plan of Care Reviewed With patient;spouse   OTHER   Outcome Summary PT treatment completed. Pt. was independent with supine<>sit bed mobility with the bed flat and no use of bed rails. Pt. met bed mobility goal. Pt. was CGA with ambulation 70 feet x 2 with RW. Energy conservation was reinforced by relying on pt. to report when she needed to return to room to sit down and rest between bouts of ambulation requiring min VC's. Pt. completed 10 reps of 3 BLE therapuetic exercises to facilitate BLE strengthening. Recommend return to home with assist from spouse and HH PT following acute care DC.

## 2019-02-11 NOTE — THERAPY TREATMENT NOTE
Acute Care - Occupational Therapy Treatment Note  [facility]     Patient Name: Nicolasa Rojas  : 1950  MRN: [id number]  Today's Date: 2019  Onset of Illness/Injury or Date of Surgery: 19  Date of Referral to OT: 19  Referring Physician: Dr. Jarrell    Admit Date: 2019       ICD-10-CM ICD-9-CM   1. Impaired physical mobility Z74.09 781.99   2. Impaired mobility and ADLs Z74.09 799.89     Patient Active Problem List   Diagnosis   • Hyponatremia   • Acute on chronic diastolic CHF (congestive heart failure) (CMS/[facility])   • Hypokalemia   • Acute on chronic heart failure (CMS/[facility])   • Dyspnea   • Chronic renal impairment, stage 3 (moderate) (CMS/[facility])   • CAD (coronary artery disease)   • Class 2 severe obesity due to excess calories with serious comorbidity and body mass index (BMI) of 39.0 to 39.9 in adult (CMS/[facility])   • NSTEMI (non-ST elevated myocardial infarction) (CMS/[facility])   • COPD (chronic obstructive pulmonary disease) (CMS/[facility])   • CIERRA (obstructive sleep apnea)   • Hyperlipidemia   • Essential hypertension   • Type 2 diabetes mellitus (CMS/[facility])     Past Medical History:   Diagnosis Date   • Acute bronchitis    • Acute congestive heart failure (CMS/[facility])     resolving   • Acute kidney failure, unspecified (CMS/[facility])    • Acute kidney failure, unspecified (CMS/[facility])    • Acute posthemorrhagic anemia    • Asthenia    • Chest pain    • Chronic gastritis    • Chronic pharyngitis    • Chronic renal impairment    • Congenital renal failure    • Congestive heart failure (CMS/[facility])    • Contact dermatitis due to poison ivy    • COPD (chronic obstructive pulmonary disease) (CMS/[facility])    • Coronary arteriosclerosis    • D-dimer, elevated     D-dimer above reference range    • Degenerative joint disease involving multiple joints     back   • Depressive disorder    • Dysphagia    • Dyspnea    • Edema of lower extremity    • Encounter for immunization    • Encounter for long-term (current) use of  medications    • Epigastric pain    • Esophagitis    • Essential hypertension    • Gastroesophageal reflux disease    • Gastroparesis     Gastroparesis syndrome    • Generalized abdominal pain    • H/O bone density study 01/09/2015   • H/O mammogram 01/15/2015   • H/O screening mammography 11/12/2013   • Headache    • History of transfusion    • Hyperlipidemia    • Hypokalemia    • Hypomagnesemia    • Injury of tendon of rotator cuff     Injury of tendon of the rotator cuff of shoulder      • Insomnia    • Insomnia, unspecified    • Knee pain    • Nausea and vomiting    • Neck pain    • Need for immunization against influenza    • Need for prophylactic vaccination and inoculation against influenza    • Need for prophylactic vaccination and inoculation against viral disease    • 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 Performed at [facility].   • ENDOSCOPY  02/09/2015    ESOPHAGEAL STRICTURE PRESENT, GASTRITIS FOUND IN THE STOMACH, NORMAL DUODENUM   • ENDOSCOPY W/ PEG TUBE PLACEMENT  12/19/2012    Esophagitis seen. Biopsy taken. Esophageal stricture present. Dilatation performed. Gastritis in stomach. Biopsy taken. Food was in stomach. Normal duodenum.   • HERNIA REPAIR     • HYSTERECTOMY      partial   • NECK SURGERY         Therapy Treatment    Rehabilitation Treatment Summary     Row Name 02/11/19 0815             Treatment Time/Intention    Discipline  occupational therapy assistant  -      Document Type  therapy note (daily note)  -      Subjective Information  complains of;fatigue;weakness  -       Mode of Treatment  individual therapy;occupational therapy  -      Patient/Family Observations  spouse present  -      Therapy Frequency (OT Eval)  other (see comments) 5-7 days a wk  -JH      Patient Effort  good  -      Recorded by [] Maira Murphy COTA/L 02/11/19 1335      Row Name 02/11/19 0815             Cognitive Assessment/Intervention- PT/OT    Affect/Mental Status (Cognitive)  WNL  -      Orientation Status (Cognition)  oriented x 4  -      Recorded by [] Maira Murphy COTA/L 02/11/19 1335      Row Name 02/11/19 0815             Bed Mobility Assessment/Treatment    Supine-Sit [place] (Bed Mobility)  independent  -      Sit-Supine [place] (Bed Mobility)  independent  -      Supine-Sit-Supine [place] (Bed Mobility)  independent  -      Recorded by [] Maira Murphy COTA/L 02/11/19 1335      Row Name 02/11/19 0815             Bathing Assessment/Intervention    Comment (Bathing)  Pt declined states she and spouse will take care of it later   -      Recorded by [] Maira Murphy COTA/L 02/11/19 1335      Row Name 02/11/19 0815             Lower Body Dressing Assessment/Training    Lower Body Dressing [place] Level  socks;conditional independence  -      Lower Body Dressing Position  edge of bed sitting  -      Recorded by [] Maira Murphy COTA/L 02/11/19 1335      Row Name 02/11/19 0815             Positioning and Restraints    Pre-Treatment Position  in bed  -      Post Treatment Position  bed  -      In Bed  supine;fowlers;side lying right;call light within reach;encouraged to call for assist;with family/caregiver Pt insist on returning to bed vs transfer to chair   -      Recorded by [] Maira Murphy COTA/L 02/11/19 1335      Row Name 02/11/19 0815             Pain Scale: Numbers Pre/Post-Treatment    Pain Scale: Numbers, Pretreatment  0/10 - no pain  -      Pain Scale: Numbers, Post-Treatment  0/10 - no pain  -      Recorded by  [] Maira Murphy HOOPER/L 02/11/19 1335      Row Name 02/11/19 0815             Outcome Summary/Treatment Plan (OT)    Daily Summary of Progress (OT)  progress toward functional goals is good  -      Plan for Continued Treatment (OT)  Continue per [facility]  -      Anticipated Discharge Disposition (OT)  home with home health  -      Recorded by [] Maira Murphy HOOPER/L 02/11/19 1335        User Key  (r) = Recorded By, (t) = Taken By, (c) = Cosigned By    Initials Name Effective Dates Discipline     Maira Murphy HOOPER/L 03/07/18 -  OT           Rehab Goal Summary     Row Name 02/11/19 0815             Occupational Therapy Goals    Transfer Goal Selection (OT)  transfer, OT goal 1  -      Bathing Goal Selection (OT)  bathing, OT goal 1  -      Dressing Goal Selection (OT)  dressing, OT goal 1  -      Toileting Goal Selection (OT)  toileting, OT goal 1  -      Endurance Goal Selection (OT)  endurance, OT goal 1  -      Functional Mobility Goal Selection (OT)  functional mobility, OT goal 1  -         Transfer Goal 1 (OT)    Activity/Assistive Device (Transfer Goal 1, OT)  toilet  -      [place] Level/Cues Needed (Transfer Goal 1, OT)  standby assist  -      Time Frame (Transfer Goal 1, OT)  long term goal (LTG);by discharge  -      Progress/Outcome (Transfer Goal 1, OT)  goal not met  -         Bathing Goal 1 (OT)    Activity/Assistive Device (Bathing Goal 1, OT)  upper body bathing  -      [place] Level/Cues Needed (Bathing Goal 1, OT)  minimum assist (75% or more patient effort)  -      Time Frame (Bathing Goal 1, OT)  long term goal (LTG);by discharge  -      Progress/Outcomes (Bathing Goal 1, OT)  goal not met  -         Dressing Goal 1 (OT)    Activity/Assistive Device (Dressing Goal 1, OT)  upper body dressing  -      [place]/Cues Needed (Dressing Goal 1, OT)  minimum assist (75% or more patient effort)  -      Time Frame (Dressing Goal 1, OT)  long term  goal (LTG);by discharge  -      Progress/Outcome (Dressing Goal 1, OT)  goal not met  -         Toileting Goal 1 (OT)    Activity/Device (Toileting Goal 1, OT)  toileting skills, all  -      [place] Level/Cues Needed (Toileting Goal 1, OT)  standby assist  -      Time Frame (Toileting Goal 1, OT)  long term goal (LTG);by discharge  -      Progress/Outcome (Toileting Goal 1, OT)  goal not met  -          Endurance Goal 1 (OT)    Activity Level (Endurance Goal 1, OT)  endurance 2 fair + 25 min functional task 3 or less rest breaks O2 90 or up  -      Progress/Outcome (Endurance Goal 1, OT)  goal not met  -         Functional Mobility Goal 1 (OT)    Activity/Assistive Device (Functional Mobility Goal 1, OT)  walker, rolling  -      [place] Level/Cues Needed (Functional Mobility Goal 1, OT)  contact guard assist  -      Distance Goal 1 (Functional Mobility, OT)  to bathroom and back to bed/chair   -      Time Frame (Functional Mobility Goal 1, OT)  long term goal (LTG);by discharge  -      Progress/Outcome (Functional Mobility Goal 1, OT)  goal not met  -        User Key  (r) = Recorded By, (t) = Taken By, (c) = Cosigned By    Initials Name Provider Type Discipline     Maira Murphy COTA/L Occupational Therapy Assistant OT        Occupational Therapy Education     Title: PT OT SLP Therapies (Not Started)     Topic: Occupational Therapy (Done)     Point: ADL training (Done)     Description: Instruct learner(s) on proper safety adaptation and remediation techniques during self care or transfers.   Instruct in proper use of assistive devices.    Learning Progress Summary           Patient Acceptance, E VU by ADRIANA at 2/11/2019  1:38 PM    Comment:  Inclusion of home safety fall prevention with postural changes    Acceptance, E, NR by BB at 2/10/2019  1:15 PM    Acceptance, E, VU,NR by  at 2/8/2019  4:03 PM    Comment:  Educated about OT and POC. Educated to call for assist. Educated  on safety throughout.   Family Acceptance, E, VU by  at 2/11/2019  1:38 PM    Comment:  Inclusion of home safety fall prevention with postural changes    Acceptance, E, VU,NR by  at 2/8/2019  4:03 PM    Comment:  Educated about OT and POC. Educated to call for assist. Educated on safety throughout.                   Point: Home exercise program (Done)     Description: Instruct learner(s) on appropriate technique for monitoring, assisting and/or progressing therapeutic exercises/activities.    Learning Progress Summary           Patient Acceptance, E, VU by  at 2/11/2019  1:38 PM    Comment:  Inclusion of home safety fall prevention with postural changes    Acceptance, E, NR by  at 2/9/2019  3:11 PM   Family Acceptance, E, VU by  at 2/11/2019  1:38 PM    Comment:  Inclusion of home safety fall prevention with postural changes                   Point: Precautions (Done)     Description: Instruct learner(s) on prescribed precautions during self-care and functional transfers.    Learning Progress Summary           Patient Acceptance, E, VU by  at 2/11/2019  1:38 PM    Comment:  Inclusion of home safety fall prevention with postural changes    Acceptance, TB,E, DU by  at 2/11/2019  2:39 AM    Acceptance, E, VU,NR by  at 2/8/2019  4:03 PM    Comment:  Educated about OT and POC. Educated to call for assist. Educated on safety throughout.   Family Acceptance, E, VU by  at 2/11/2019  1:38 PM    Comment:  Inclusion of home safety fall prevention with postural changes    Acceptance, E, VU,NR by  at 2/8/2019  4:03 PM    Comment:  Educated about OT and POC. Educated to call for assist. Educated on safety throughout.                   Point: Body mechanics (Done)     Description: Instruct learner(s) on proper positioning and spine alignment during self-care, functional mobility activities and/or exercises.    Learning Progress Summary           Patient Acceptance, E, VU by  at 2/11/2019  1:38 PM    Comment:   Inclusion of home safety fall prevention with postural changes    Acceptance, E, NR by LOTTIE at 2/10/2019  1:15 PM   Family Acceptance, E, VU by ADRIANA at 2/11/2019  1:38 PM    Comment:  Inclusion of home safety fall prevention with postural changes                               User Key     Initials Effective Dates Name Provider Type Discipline     06/08/18 -  Lucy Luna, OTR/L Occupational Therapist OT     10/17/16 -  Anthony Triana, RN Registered Nurse Nurse    LOTTIE 03/07/18 -  Shahida Coto HOOPER/L Occupational Therapy Assistant OT     03/07/18 -  Maira Murphy HOOPER/L Occupational Therapy Assistant OT                OT Recommendation and Plan  Outcome Summary/Treatment Plan (OT)  Daily Summary of Progress (OT): progress toward functional goals is good  Plan for Continued Treatment (OT): Continue per POC  Anticipated Discharge Disposition (OT): home with home health  Therapy Frequency (OT Eval): other (see comments)(5-7 days a wk)  Daily Summary of Progress (OT): progress toward functional goals is good  Outcome Summary: Pt agree to work on overall UB strengthening ex's, no goals met this treatment, home with  OT/PT is advised    Outcome Measures     Row Name 02/11/19 0815 02/10/19 1330 02/10/19 0748       How much help from another person do you currently need...    Turning from your back to your side while in flat bed without using bedrails?  --  4  -LN  --    Moving from lying on back to sitting on the side of a flat bed without bedrails?  --  4  -LN  --    Moving to and from a bed to a chair (including a wheelchair)?  --  3  -LN  --    Standing up from a chair using your arms (e.g., wheelchair, bedside chair)?  --  3  -LN  --    Climbing 3-5 steps with a railing?  --  3  -LN  --    To walk in hospital room?  --  3  -LN  --    AM-PAC 6 Clicks Score  --  20  -LN  --       How much help from another is currently needed...    Putting on and taking off regular lower body clothing?  3  -ADRIANA  --  1  -LOTTIE     Bathing (including washing, rinsing, and drying)  3  -JH  --  2  -BB    Toileting (which includes using toilet bed pan or urinal)  3  -JH  --  2  -BB    Putting on and taking off regular upper body clothing  3  -JH  --  3  -BB    Taking care of personal grooming (such as brushing teeth)  3  -JH  --  3  -BB    Eating meals  4  -JH  --  4  -BB    Score  19  -JH  --  15  -BB       Functional Assessment    Outcome Measure Options  --  AM-PAC 6 Clicks Basic Mobility (PT)  -LN  --    Row Name 02/09/19 1440 02/09/19 1025 02/08/19 1515       How much help from another person do you currently need...    Turning from your back to your side while in flat bed without using bedrails?  4  -LN  --  --    Moving from lying on back to sitting on the side of a flat bed without bedrails?  3  -LN  --  --    Moving to and from a bed to a chair (including a wheelchair)?  3  -LN  --  --    Standing up from a chair using your arms (e.g., wheelchair, bedside chair)?  3  -LN  --  --    Climbing 3-5 steps with a railing?  3  -LN  --  --    To walk in hospital room?  3  -LN  --  --    AM-PAC 6 Clicks Score  19  -LN  --  --       How much help from another is currently needed...    Putting on and taking off regular lower body clothing?  --  1  -BB  1  -BH    Bathing (including washing, rinsing, and drying)  --  2  -BB  2  -BH    Toileting (which includes using toilet bed pan or urinal)  --  2  -BB  2  -BH    Putting on and taking off regular upper body clothing  --  3  -BB  3  -BH    Taking care of personal grooming (such as brushing teeth)  --  3  -BB  3  -BH    Eating meals  --  4  -BB  4  -BH    Score  --  15  -BB  15  -BH       Functional Assessment    Outcome Measure Options  AM-PAC 6 Clicks Basic Mobility (PT)  -LN  --  AM-PAC 6 Clicks Daily Activity (OT)  -BH    Row Name 02/08/19 1433             How much help from another person do you currently need...    Turning from your back to your side while in flat bed without using bedrails?   4  -CB      Moving from lying on back to sitting on the side of a flat bed without bedrails?  3  -CB      Moving to and from a bed to a chair (including a wheelchair)?  3  -CB      Standing up from a chair using your arms (e.g., wheelchair, bedside chair)?  3  -CB      Climbing 3-5 steps with a railing?  3  -CB      To walk in hospital room?  3  -CB      AM-PAC 6 Clicks Score  19  -CB         Functional Assessment    Outcome Measure Options  AM-PAC 6 Clicks Basic Mobility (PT)  -CB        User Key  (r) = Recorded By, (t) = Taken By, (c) = Cosigned By    Initials Name Provider Type    CB Tara Bella, PT Physical Therapist    BH Lucy Luna, OTR/L Occupational Therapist    LN Elinor Kennedy, PTA Physical Therapy Assistant    BB Shahida Coto, HOOPER/L Occupational Therapy Assistant     Maira Murphy HOOPER/L Occupational Therapy Assistant           Time Calculation:   Time Calculation- OT     Row Name 02/11/19 1339             Time Calculation-     OT Start Time  0815  -      OT Stop Time  0930  -      OT Time Calculation (min)  75 min  -      OT Received On  02/11/19  -        User Key  (r) = Recorded By, (t) = Taken By, (c) = Cosigned By    Initials Name Provider Type     Maira Murphy HOOPER/L Occupational Therapy Assistant           Therapy Suggested Charges     Code   Minutes Charges    None           Therapy Charges for Today     Code Description Service Date Service Provider Modifiers Qty    26417058880 HC OT THERAPEUTIC ACT EA 15 MIN 2/11/2019 Maira Murphy COTA/L GO 2    37035108138 HC OT SELF CARE/MGMT/TRAIN EA 15 MIN 2/11/2019 Maira Murphy COTA/L GO 1    59540885064 HC OT THER PROC EA 15 MIN 2/11/2019 Maira Murphy COTA/L GO 2               JUAN Paniagua  2/11/2019

## 2019-02-11 NOTE — PLAN OF CARE
Problem: Patient Care Overview  Goal: Plan of Care Review  Outcome: Ongoing (interventions implemented as appropriate)    Goal: Individualization and Mutuality  Outcome: Ongoing (interventions implemented as appropriate)    Goal: Discharge Needs Assessment  Outcome: Ongoing (interventions implemented as appropriate)    Goal: Interprofessional Rounds/Family Conf  Outcome: Ongoing (interventions implemented as appropriate)      Problem: Kidney Disease, Chronic/End Stage Renal Disease (Adult)  Goal: Signs and Symptoms of Listed Potential Problems Will be Absent, Minimized or Managed (Kidney Disease, Chronic/End Stage Renal Disease)  Outcome: Ongoing (interventions implemented as appropriate)      Problem: Fluid Volume Excess (Adult)  Goal: Identify Related Risk Factors and Signs and Symptoms  Outcome: Outcome(s) achieved Date Met: 02/11/19    Goal: Optimal Fluid Balance  Outcome: Ongoing (interventions implemented as appropriate)      Problem: Fall Risk (Adult)  Goal: Identify Related Risk Factors and Signs and Symptoms  Outcome: Outcome(s) achieved Date Met: 02/11/19    Goal: Absence of Fall  Outcome: Ongoing (interventions implemented as appropriate)      Problem: Cardiac: ACS (Acute Coronary Syndrome) (Adult)  Goal: Signs and Symptoms of Listed Potential Problems Will be Absent, Minimized or Managed (Cardiac: ACS)  Outcome: Ongoing (interventions implemented as appropriate)      Problem: Pain, Chronic (Adult)  Goal: Identify Related Risk Factors and Signs and Symptoms  Outcome: Outcome(s) achieved Date Met: 02/11/19    Goal: Acceptable Pain/Comfort Level and Functional Ability  Outcome: Ongoing (interventions implemented as appropriate)

## 2019-02-11 NOTE — THERAPY TREATMENT NOTE
Acute Care - Physical Therapy Treatment Note  [facility]     Patient Name: Nicolasa Rojas  : 1950  MRN: [id number]  Today's Date: 2019  Onset of Illness/Injury or Date of Surgery: 19  Date of Referral to PT: 19  Referring Physician: Dr. Jarrell    Admit Date: 2019    Visit Dx:    ICD-10-CM ICD-9-CM   1. Impaired physical mobility Z74.09 781.99   2. Impaired mobility and ADLs Z74.09 799.89     Patient Active Problem List   Diagnosis   • Hyponatremia   • Acute on chronic diastolic CHF (congestive heart failure) (CMS/Formerly [facility])   • Hypokalemia   • Acute on chronic heart failure (CMS/Formerly [facility])   • Dyspnea   • Chronic renal impairment, stage 3 (moderate) (CMS/Formerly [facility])   • CAD (coronary artery disease)   • Class 2 severe obesity due to excess calories with serious comorbidity and body mass index (BMI) of 39.0 to 39.9 in adult (CMS/Formerly [facility])   • NSTEMI (non-ST elevated myocardial infarction) (CMS/Formerly [facility])   • COPD (chronic obstructive pulmonary disease) (CMS/Formerly [facility])   • CIERRA (obstructive sleep apnea)   • Hyperlipidemia   • Essential hypertension   • Type 2 diabetes mellitus (CMS/Formerly [facility])       Therapy Treatment    Rehabilitation Treatment Summary     Row Name 19 1420 19 0815          Treatment Time/Intention    Discipline  physical therapist  -  occupational therapy assistant  -     Document Type  therapy note (daily note)  -  therapy note (daily note)  -     Subjective Information  no complaints  -  complains of;fatigue;weakness  -     Mode of Treatment  individual therapy  -  individual therapy;occupational therapy  -     Patient/Family Observations  Pt. supine in bed with HOB elevated, tele, 3L O2 NC,  present  -  spouse present  -     Care Plan Review  evaluation/treatment results reviewed;care plan/treatment goals reviewed;risks/benefits reviewed;current/potential barriers reviewed;patient/other agree to care plan  -  --     Care Plan Review, Other Participant(s)  spouse   -LF  --     Therapy Frequency (OT Eval)  --  other (see comments) 5-7 days a wk  -     Patient Effort  good  -LF  good  -     Existing Precautions/Restrictions  fall;cardiac;oxygen therapy device and L/min  -LF  --     Treatment Considerations/Comments   not available for follow with gait training. Energy conservation utilized for treatment relying on pt. to report when she needed to return to room to sit down and rest  -LF  --     Recorded by [LF] Cheri Engel, PT 02/11/19 1509 [JH] Maira Murphy COTA/L 02/11/19 1335     Row Name 02/11/19 1420             Vital Signs    Pre Systolic BP Rehab  158  -LF      Pre Treatment Diastolic BP  70  -LF      Intra Treatment Diastolic BP  165  -LF2      Post Systolic BP Rehab  72  -LF2      Pretreatment Heart Rate (beats/min)  76  -LF      Intratreatment Heart Rate (beats/min)  90  -LF2      Posttreatment Heart Rate (beats/min)  84  -LF2      Pre SpO2 (%)  98  -LF      O2 Delivery Pre Treatment  supplemental O2  -LF      Intra SpO2 (%)  94  -LF2      O2 Delivery Intra Treatment  supplemental O2  -LF2      Post SpO2 (%)  97  -LF2      O2 Delivery Post Treatment  supplemental O2  -LF2      Pre Patient Position  Sitting  -LF      Intra Patient Position  Sitting  -LF2      Post Patient Position  Sitting  -LF2      Recorded by [LF] Cheri Engel, PT 02/11/19 1427  [LF2] Cheri Engel, PT 02/11/19 1451      Row Name 02/11/19 1420 02/11/19 0815          Cognitive Assessment/Intervention- PT/OT    Affect/Mental Status (Cognitive)  WFL  -LF  WNL  -JH     Orientation Status (Cognition)  oriented x 4  -LF  oriented x 4  -JH     Follows Commands (Cognition)  follows one step commands;over 90% accuracy  -LF  --     Safety Deficit (Cognitive)  awareness of need for assistance  -LF  --     Personal Safety Interventions  fall prevention program maintained;gait belt;muscle strengthening facilitated;nonskid shoes/slippers when out of bed;supervised activity  -LF  --     Recorded by [LF]  Cheri Engel, PT 02/11/19 1509 [JH] Maira Murphy HOOPER/L 02/11/19 1335     Row Name 02/11/19 1420             Safety Issues, Functional Mobility    Safety Issues Affecting Function (Mobility)  awareness of need for assistance  -LF      Impairments Affecting Function (Mobility)  balance;endurance/activity tolerance;postural/trunk control;shortness of breath;strength  -LF      Recorded by [LF] Cheri Engel, PT 02/11/19 1509      Row Name 02/11/19 1420 02/11/19 0815          Bed Mobility Assessment/Treatment    Bed Mobility Assessment/Treatment  supine-sit;sit-supine  -LF  --     Supine-Sit [place] (Bed Mobility)  independent  -LF  independent  -JH     Sit-Supine [place] (Bed Mobility)  independent  -LF  independent  -JH     Supine-Sit-Supine [place] (Bed Mobility)  --  independent  -JH     Assistive Device (Bed Mobility)  other (see comments) flat bed, no bed rails  -LF  --     Recorded by [LF] Cheri Engel, PT 02/11/19 1509 [JH] Maira Murphy COTA/L 02/11/19 1335     Row Name 02/11/19 1420             Transfer Assessment/Treatment    Transfer Assessment/Treatment  sit-stand transfer;stand-sit transfer  -LF      Recorded by [LF] Cheri Engel, PT 02/11/19 1509      Row Name 02/11/19 1420             Sit-Stand Transfer    Sit-Stand [place] (Transfers)  stand by assist  -LF      Assistive Device (Sit-Stand Transfers)  walker, front-wheeled  -LF      Recorded by [LF] Cheri Engel, PT 02/11/19 1509      Row Name 02/11/19 1420             Stand-Sit Transfer    Stand-Sit [place] (Transfers)  stand by assist  -LF      Assistive Device (Stand-Sit Transfers)  walker, front-wheeled  -LF      Recorded by [LF] Cheri Engel, PT 02/11/19 1509      Row Name 02/11/19 1420             Gait/Stairs Assessment/Training    [place] Level (Gait)  contact guard;verbal cues  -LF      Assistive Device (Gait)  walker, front-wheeled  -LF      Distance in Feet (Gait)  70 + 70 feet  -LF      Pattern (Gait)   step-through  -LF      Deviations/Abnormal Patterns (Gait)  gait speed decreased;stride length decreased  -LF      Recorded by [LF] Cheri Engel, PT 02/11/19 1509      Row Name 02/11/19 0815             Bathing Assessment/Intervention    Comment (Bathing)  Pt declined states she and spouse will take care of it later   -JH      Recorded by [JH] Maira Murphy HOOPER/L 02/11/19 1335      Row Name 02/11/19 0815             Lower Body Dressing Assessment/Training    Lower Body Dressing [place] Level  socks;conditional independence  -      Lower Body Dressing Position  edge of bed sitting  -JH      Recorded by [JH] Maira Murphy HOOPER/L 02/11/19 1335      Row Name 02/11/19 1420             Motor Skills Assessment/Interventions    Additional Documentation  Therapeutic Exercise (Group)  -LF      Recorded by [LF] Cheri Engel, PT 02/11/19 1509      Row Name 02/11/19 1420             Therapeutic Exercise    Lower Extremity (Therapeutic Exercise)  LAQ (long arc quad), bilateral;marching while seated AP's, bilateral; all BLE exercises performed sitting EOB  -LF      Exercise Type (Therapeutic Exercise)  AROM (active range of motion)  -LF      Position (Therapeutic Exercise)  seated  -LF      Sets/Reps (Therapeutic Exercise)  10 reps ea.   -LF      Expected Outcome (Therapeutic Exercise)  improve performance, gait skills;improve performance, transfer skills  -LF      Recorded by [LF] Cheri Engel, PT 02/11/19 1509      Row Name 02/11/19 1420 02/11/19 0815          Positioning and Restraints    Pre-Treatment Position  in bed  -  in bed  -     Post Treatment Position  bed  -  bed  -     In Bed  supine;call light within reach;encouraged to call for assist;with family/caregiver;side rails up x2  -LF  supine;fowlers;side lying right;call light within reach;encouraged to call for assist;with family/caregiver Pt insist on returning to bed vs transfer to chair   -     Recorded by [LF] Cheri Engel, PT 02/11/19 1509 [JH]  Maira Murphy HOOPER/L 02/11/19 1335     Row Name 02/11/19 1420 02/11/19 0815          Pain Scale: Numbers Pre/Post-Treatment    Pain Scale: Numbers, Pretreatment  0/10 - no pain  -LF  0/10 - no pain  -     Pain Scale: Numbers, Post-Treatment  0/10 - no pain  -LF  0/10 - no pain  -     Recorded by [LF] Cheri Engel, PT 02/11/19 1509 [] Maira Murphy COTA/L 02/11/19 1335     Row Name 02/11/19 1420             Plan of Care Review    Plan of Care Reviewed With  patient;spouse  -LF      Recorded by [LF] Cheri Engel, PT 02/11/19 1509      Row Name 02/11/19 0815             Outcome Summary/Treatment Plan (OT)    Daily Summary of Progress (OT)  progress toward functional goals is good  -      Plan for Continued Treatment (OT)  Continue per POC  -      Anticipated Discharge Disposition (OT)  home with home health  -JH      Recorded by [] Maira Murphy COTA/L 02/11/19 1335      Row Name 02/11/19 1420             Outcome Summary/Treatment Plan (PT)    Daily Summary of Progress (PT)  progress toward functional goals is good  -      Barriers to Overall Progress (PT)  cardiac precautions  -LF      Plan for Continued Treatment (PT)  Continue with PT POC  -LF      Anticipated Discharge Disposition (PT)  home with home health;home with assist  -LF      Recorded by [LF] Cheri Engel, PT 02/11/19 1509        User Key  (r) = Recorded By, (t) = Taken By, (c) = Cosigned By    Initials Name Effective Dates Discipline     Maira Murphy HOOPER/L 03/07/18 -  OT     Cheri Engel, PT 07/23/18 -  PT               Rehab Goal Summary     Row Name 02/11/19 1420 02/11/19 0815          Physical Therapy Goals    Bed Mobility Goal Selection (PT)  bed mobility, PT goal 1  -LF  --     Transfer Goal Selection (PT)  transfer, PT goal 1  -LF  --     Gait Training Goal Selection (PT)  gait training, PT goal 1  -LF  --        Bed Mobility Goal 1 (PT)    Activity/Assistive Device (Bed Mobility Goal 1, PT)  sit to supine/supine to sit   -LF  --     [place] Level/Cues Needed (Bed Mobility Goal 1, PT)  independent  -LF  --     Time Frame (Bed Mobility Goal 1, PT)  short term goal (STG);2 days  -LF  --     Barriers (Bed Mobility Goal 1, PT)  HOB flat, no bed rails  -LF  --     Progress/Outcomes (Bed Mobility Goal 1, PT)  goal met  (Significant)   -LF  --        Transfer Goal 1 (PT)    Activity/Assistive Device (Transfer Goal 1, PT)  sit-to-stand/stand-to-sit;bed-to-chair/chair-to-bed  -LF  --     [place] Level/Cues Needed (Transfer Goal 1, PT)  independent  -LF  --     Time Frame (Transfer Goal 1, PT)  long term goal (LTG);by discharge  -LF  --     Barriers (Transfers Goal 1, PT)  fatigues easily  -LF  --     Progress/Outcome (Transfer Goal 1, PT)  goal not met;good progress toward goal  -LF  --        Gait Training Goal 1 (PT)    Activity/Assistive Device (Gait Training Goal 1, PT)  gait (walking locomotion)  -LF  --     [place] Level (Gait Training Goal 1, PT)  supervision required  -LF  --     Distance (Gait Goal 1, PT)  50'x1  -LF  --     Time Frame (Gait Training Goal 1, PT)  long term goal (LTG);by discharge  -LF  --     Barriers (Gait Training Goal 1, PT)  fatigues easily  -LF  --     Progress/Outcome (Gait Training Goal 1, PT)  goal not met;goal ongoing  -LF  --        Occupational Therapy Goals    Transfer Goal Selection (OT)  --  transfer, OT goal 1  -JH     Bathing Goal Selection (OT)  --  bathing, OT goal 1  -JH     Dressing Goal Selection (OT)  --  dressing, OT goal 1  -     Toileting Goal Selection (OT)  --  toileting, OT goal 1  -     Endurance Goal Selection (OT)  --  endurance, OT goal 1  -     Functional Mobility Goal Selection (OT)  --  functional mobility, OT goal 1  -JH        Transfer Goal 1 (OT)    Activity/Assistive Device (Transfer Goal 1, OT)  --  toilet  -     [place] Level/Cues Needed (Transfer Goal 1, OT)  --  standby assist  -     Time Frame (Transfer Goal 1, OT)  --  long term goal (LTG);by  discharge  -     Progress/Outcome (Transfer Goal 1, OT)  --  goal not met  -        Bathing Goal 1 (OT)    Activity/Assistive Device (Bathing Goal 1, OT)  --  upper body bathing  -     [place] Level/Cues Needed (Bathing Goal 1, OT)  --  minimum assist (75% or more patient effort)  -     Time Frame (Bathing Goal 1, OT)  --  long term goal (LTG);by discharge  -     Progress/Outcomes (Bathing Goal 1, OT)  --  goal not met  -        Dressing Goal 1 (OT)    Activity/Assistive Device (Dressing Goal 1, OT)  --  upper body dressing  -     [place]/Cues Needed (Dressing Goal 1, OT)  --  minimum assist (75% or more patient effort)  -     Time Frame (Dressing Goal 1, OT)  --  long term goal (LTG);by discharge  -     Progress/Outcome (Dressing Goal 1, OT)  --  goal not met  -        Toileting Goal 1 (OT)    Activity/Device (Toileting Goal 1, OT)  --  toileting skills, all  -     [place] Level/Cues Needed (Toileting Goal 1, OT)  --  standby assist  -     Time Frame (Toileting Goal 1, OT)  --  long term goal (LTG);by discharge  -     Progress/Outcome (Toileting Goal 1, OT)  --  goal not met  -         Endurance Goal 1 (OT)    Activity Level (Endurance Goal 1, OT)  --  endurance 2 fair + 25 min functional task 3 or less rest breaks O2 90 or up  -     Progress/Outcome (Endurance Goal 1, OT)  --  goal not met  -        Functional Mobility Goal 1 (OT)    Activity/Assistive Device (Functional Mobility Goal 1, OT)  --  walker, rolling  -     [place] Level/Cues Needed (Functional Mobility Goal 1, OT)  --  contact guard assist  -     Distance Goal 1 (Functional Mobility, OT)  --  to bathroom and back to bed/chair   -     Time Frame (Functional Mobility Goal 1, OT)  --  long term goal (LTG);by discharge  -     Progress/Outcome (Functional Mobility Goal 1, OT)  --  goal not met  -       User Key  (r) = Recorded By, (t) = Taken By, (c) = Cosigned By    Initials Name Provider Type  Discipline    Maira Parmar COTA/L Occupational Therapy Assistant OT    Cheri Clark, PT Physical Therapist PT          Physical Therapy Education     Title: PT OT SLP Therapies (Not Started)     Topic: Physical Therapy (In Progress)     Point: Mobility training (In Progress)     Learning Progress Summary           Patient Acceptance, E, NR by FRANCIS at 2/11/2019  3:12 PM    Comment:  Energy conservation with ambulation. Pt. guided rest breaks with ambulation    Acceptance, TB,E, DU by LEE at 2/11/2019  2:39 AM    Acceptance, E,TB, VU by LN at 2/10/2019  2:12 PM    Acceptance, E,TB, VU by LN at 2/9/2019  4:49 PM    Acceptance, TB,E, DU by LEE at 2/9/2019  3:17 AM    Acceptance, E, VU,NR by BELLA at 2/8/2019  3:49 PM    Comment:  Role of PT, POC, goals of therapy.   Significant Other Acceptance, E,TB, VU by LN at 2/10/2019  2:12 PM                   Point: Body mechanics (Done)     Learning Progress Summary           Patient Acceptance, TB,E, DU by LEE at 2/11/2019  2:39 AM    Acceptance, E,TB, VU by LN at 2/10/2019  2:12 PM    Acceptance, E,TB, VU by KARINA at 2/9/2019  4:49 PM   Significant Other Acceptance, E,TB, VU by LN at 2/10/2019  2:12 PM                   Point: Precautions (Done)     Learning Progress Summary           Patient Acceptance, TB,E, DU by TJ at 2/11/2019  2:39 AM    Acceptance, E,TB, VU by KARINA at 2/10/2019  2:12 PM    Acceptance, E,TB, VU by KARINA at 2/9/2019  4:49 PM    Acceptance, TB,E, DU by LEE at 2/9/2019  3:17 AM    Acceptance, E, VU,NR by BELLA at 2/8/2019  3:49 PM    Comment:  Patient instructed to use call button when she needs assistance and not to attempt to get out of bed on her own. Bed alarm activated.   Significant Other Acceptance, E,TB, VU by LN at 2/10/2019  2:12 PM                               User Key     Initials Effective Dates Name Provider Type Discipline    CB 04/06/17 -  Tara Bella, PT Physical Therapist PT    LEE 10/17/16 -  Kamilah, Anthony G, RN Registered Nurse Nurse    LN  03/07/18 -  Elinor Kennedy PTA Physical Therapy Assistant PT    LF 07/23/18 -  Cheri Engel, JUAN Physical Therapist PT                PT Recommendation and Plan  Anticipated Discharge Disposition (PT): home with home health, home with assist  Outcome Summary/Treatment Plan (PT)  Daily Summary of Progress (PT): progress toward functional goals is good  Barriers to Overall Progress (PT): cardiac precautions  Plan for Continued Treatment (PT): Continue with PT POC  Anticipated Discharge Disposition (PT): home with home health, home with assist  Plan of Care Reviewed With: patient, spouse  Outcome Summary: PT treatment completed. Pt. was independent with supine<>sit bed mobility with the bed flat and no use of bed rails. Pt. met bed mobility goal. Pt. was CGA with ambulation 70 feet x 2 with RW. Energy conservation was reinforced by relying on pt. to report when she needed to return to room to sit down and rest between bouts of ambulation requiring min VC's. Pt. completed 10 reps of 3 BLE therapuetic exercises to facilitate BLE strengthening. Recommend return to home with assist from spouse and HH PT following acute care DC.   Outcome Measures     Row Name 02/11/19 1420 02/11/19 0815 02/10/19 1330       How much help from another person do you currently need...    Turning from your back to your side while in flat bed without using bedrails?  4  -LF  --  4  -LN    Moving from lying on back to sitting on the side of a flat bed without bedrails?  4  -LF  --  4  -LN    Moving to and from a bed to a chair (including a wheelchair)?  3  -LF  --  3  -LN    Standing up from a chair using your arms (e.g., wheelchair, bedside chair)?  4  -LF  --  3  -LN    Climbing 3-5 steps with a railing?  3  -LF  --  3  -LN    To walk in hospital room?  3  -LF  --  3  -LN    AM-PAC 6 Clicks Score  21  -LF  --  20  -LN       How much help from another is currently needed...    Putting on and taking off regular lower body clothing?  --  3  -JH  --     Bathing (including washing, rinsing, and drying)  --  3  -JH  --    Toileting (which includes using toilet bed pan or urinal)  --  3  -JH  --    Putting on and taking off regular upper body clothing  --  3  -JH  --    Taking care of personal grooming (such as brushing teeth)  --  3  -JH  --    Eating meals  --  4  -JH  --    Score  --  19  -JH  --       Functional Assessment    Outcome Measure Options  -Merged with [facility] 6 Clicks Basic Mobility (PT)  -LF  --  AM-Merged with [facility] 6 Clicks Basic Mobility (PT)  -LN    Row Name 02/10/19 0748 02/09/19 1440 02/09/19 1025       How much help from another person do you currently need...    Turning from your back to your side while in flat bed without using bedrails?  --  4  -LN  --    Moving from lying on back to sitting on the side of a flat bed without bedrails?  --  3  -LN  --    Moving to and from a bed to a chair (including a wheelchair)?  --  3  -LN  --    Standing up from a chair using your arms (e.g., wheelchair, bedside chair)?  --  3  -LN  --    Climbing 3-5 steps with a railing?  --  3  -LN  --    To walk in hospital room?  --  3  -LN  --    AM-Merged with [facility] 6 Clicks Score  --  19  -LN  --       How much help from another is currently needed...    Putting on and taking off regular lower body clothing?  1  -BB  --  1  -BB    Bathing (including washing, rinsing, and drying)  2  -BB  --  2  -BB    Toileting (which includes using toilet bed pan or urinal)  2  -BB  --  2  -BB    Putting on and taking off regular upper body clothing  3  -BB  --  3  -BB    Taking care of personal grooming (such as brushing teeth)  3  -BB  --  3  -BB    Eating meals  4  -BB  --  4  -BB    Score  15  -BB  --  15  -BB       Functional Assessment    Outcome Measure Options  --  AM-Merged with [facility] 6 Clicks Basic Mobility (PT)  -LN  --      User Key  (r) = Recorded By, (t) = Taken By, (c) = Cosigned By    Initials Name Provider Type    LN Elinor Kennedy, PTA Physical Therapy Assistant    BB Shahida Coto, RUFUS/JOSEMANUEL Occupational  Therapy Assistant    Maira Parmar COTA/L Occupational Therapy Assistant    LF Cheri Engel, PT Physical Therapist         Time Calculation:   PT Charges     Row Name 02/11/19 1420             Time Calculation    Start Time  1420  -LF      Stop Time  1452  -LF      Time Calculation (min)  32 min  -LF      PT Received On  02/11/19  -LF         Time Calculation- PT    Total Timed Code Minutes- PT  32 minute(s)  -LF         Timed Charges    63865 - PT Therapeutic Exercise Minutes  15  -LF      17252 - Gait Training Minutes   17  -LF        User Key  (r) = Recorded By, (t) = Taken By, (c) = Cosigned By    Initials Name Provider Type    LF Cheri Engel, PT Physical Therapist        Therapy Suggested Charges     Code   Minutes Charges    51704 (CPT®) Hc Pt Neuromusc Re Education Ea 15 Min      95984 (CPT®) Hc Pt Ther Proc Ea 15 Min 15 1    24987 (CPT®) Hc Gait Training Ea 15 Min 17 1    52290 (CPT®) Hc Pt Therapeutic Act Ea 15 Min      53233 (CPT®) Hc Pt Manual Therapy Ea 15 Min      96260 (CPT®) Hc Pt Iontophoresis Ea 15 Min      78839 (CPT®) Hc Pt Elec Stim Ea-Per 15 Min      64176 (CPT®) Hc Pt Ultrasound Ea 15 Min      12489 (CPT®) Hc Pt Self Care/Mgmt/Train Ea 15 Min      94762 (CPT®) Hc Pt Prosthetic (S) Train Initial Encounter, Each 15 Min      22780 (CPT®) Hc Pt Orthotic(S)/Prosthetic(S) Encounter, Each 15 Min      87104 (CPT®) Hc Orthotic(S) Mgmt/Train Initial Encounter, Each 15min      Total  32 2        Therapy Charges for Today     Code Description Service Date Service Provider Modifiers Qty    05894164391 HC PT THER PROC EA 15 MIN 2/11/2019 Cheri Engel, PT GP 1    80093438990 HC GAIT TRAINING EA 15 MIN 2/11/2019 Cheri Engel, PT GP 1          PT G-Codes  Outcome Measure Options: AM-PAC 6 Clicks Basic Mobility (PT)  AM-PAC 6 Clicks Score: 21  Score: 19    Cheri Engel PT  2/11/2019

## 2019-02-11 NOTE — PLAN OF CARE
Problem: Patient Care Overview  Goal: Plan of Care Review  Outcome: Ongoing (interventions implemented as appropriate)   02/11/19 0234 02/11/19 0815   Coping/Psychosocial   Plan of Care Reviewed With patient --    Plan of Care Review   Progress no change --    OTHER   Outcome Summary --  Pt agree to work on overall UB strengthening ex's, no goals met this treatment, home with HH OT/PT is advised

## 2019-02-11 NOTE — PROGRESS NOTES
"[facility]  [address] [place] [address]. [address]  T - [phone]  F - [phone]     Progress Note          PATIENT  DEMOGRAPHICS   PATIENT NAME: Nicolasa Rojas                      PHYSICIAN: Joon Dugan MD  : 1950  MRN: [id number]   LOS: 5 days    Patient Care Team:  Henry Muniz MD as PCP - General (Internal Medicine)  Subjective   SUBJECTIVE   Angina on minimal exertion and sometimes at rest       Objective   OBJECTIVE   Vital Signs  Temp:  [96.9 °F (36.1 °C)-98.3 °F (36.8 °C)] 96.9 °F (36.1 °C)  Heart Rate:  [58-86] 65  Resp:  [18] 18  BP: (130-159)/(52-74) 159/74    Flowsheet Rows      First Filed Value   Admission Height  160 cm (63\") Documented at 2019 1612   Admission Weight  102 kg (224 lb 12.8 oz) Documented at 2019 1612           I/O last 3 completed shifts:  In: 360 [P.O.:360]  Out: 2400 [Urine:2400]    PHYSICAL EXAM    Physical Exam   Constitutional: She is oriented to person, place, and time.   Neck: JVD present.   Cardiovascular: Normal rate. Exam reveals gallop and S3.   No murmur heard.  Pulmonary/Chest: Effort normal and breath sounds normal.   Abdominal: Soft. Bowel sounds are normal.   Musculoskeletal: She exhibits edema.   Neurological: She is alert and oriented to person, place, and time.       RESULTS   Results Review:    Results from last 7 days   Lab Units 19  0512 02/10/19  0708 19  0737   SODIUM mmol/L 134* 134* 129*   POTASSIUM mmol/L 3.7 4.1 4.2   CHLORIDE mmol/L 94* 91* 89*   CO2 mmol/L 33.0* 32.0* 32.0*   BUN mg/dL 80* 91* 84*   CREATININE mg/dL 1.50* 1.99* 1.98*   CALCIUM mg/dL 9.0 9.5 9.3   GLUCOSE mg/dL 62 91 127*       Estimated Creatinine Clearance: 39.8 mL/min (A) (by C-G formula based on SCr of 1.5 mg/dL (H)).                Results from last 7 days   Lab Units 19  0512 02/10/19  0708 19  0737 19  0705 19  0513   WBC 10*3/mm3 6.29 6.31 7.57 6.69 5.16   HEMOGLOBIN g/dL 8.2* 9.2* " 8.6* 8.4* 8.4*   PLATELETS 10*3/mm3 210 201 175 149* 149*               Imaging Results (last 24 hours)     ** No results found for the last 24 hours. **           MEDICATIONS      aspirin 81 mg Oral Daily   bumetanide 1 mg Oral Daily   carvedilol 3.125 mg Oral BID With Meals   clopidogrel 75 mg Oral Daily   epoetin kenyetta 6,000 Units Subcutaneous Once   ferrous sulfate 324 mg Oral BID With Meals   heparin (porcine) 5,000 Units Subcutaneous Q12H   hydrALAZINE 50 mg Oral BID   isosorbide mononitrate 60 mg Oral BID   pantoprazole 40 mg Oral Q AM   polyethylene glycol 17 g Oral Daily   ranolazine 500 mg Oral Q12H   rosuvastatin 20 mg Oral Daily   sertraline 50 mg Oral Daily   sodium chloride 10 mL Intravenous Q12H   spironolactone 25 mg Oral Daily       insulin        Assessment/Plan   ASSESSMENT / PLAN      NSTEMI (non-ST elevated myocardial infarction) (CMS/[facility])    Hyponatremia    Acute on chronic diastolic CHF (congestive heart failure) (CMS/[facility])    Chronic renal impairment, stage 3 (moderate) (CMS/[facility])    CAD (coronary artery disease)    Class 2 severe obesity due to excess calories with serious comorbidity and body mass index (BMI) of 39.0 to 39.9 in adult (CMS/[facility])    COPD (chronic obstructive pulmonary disease) (CMS/[facility])    CIERRA (obstructive sleep apnea)    Hyperlipidemia    Essential hypertension    Type 2 diabetes mellitus (CMS/[facility])    MONE on CKD   1. Pt's Cr was elevated above baseline at admission upto 1.89.   BUN to Cr ratio on admission was > 20 (38.6) suggesting prerenal azotemia from NSTEMI (cardio renal syndrome). Baseline cr 1.6-1.7 and recently came down to 1.4. Due to increasing alkalosis in the office we have stopped metolazone and kept on bumex 2mg daily along with aldactone in the office     Cr is 1.5 today. Keep aldactone and bumex.Hold metolazone for now. She has significant wt loss and then lowest at home was 217 lbs. Her dry weight at best is close to 220 lbs.      She will be a poor candidate  for invasive workup and likely end up with worsening renal failure (had hd in two different occasions after the heart cath). No plan for invasive workup for nstemi.      2- hyponatremia jvd is high likely hypervolemic after nstemi (high adh release). Keep aldactone. Urine osmolality is not markedly elevated., urine na is on low side. Na better with tolvaptan. No need of adh antagonist today     3- htn stable     4- anemia of ckd her fe b12 and folate are normal. Procrit today              This document has been electronically signed by Joon Dugan MD on February 11, 2019 10:51 AM

## 2019-02-11 NOTE — PLAN OF CARE
Problem: Patient Care Overview  Goal: Plan of Care Review  Outcome: Ongoing (interventions implemented as appropriate)    Goal: Individualization and Mutuality  Outcome: Ongoing (interventions implemented as appropriate)    Goal: Discharge Needs Assessment  Outcome: Ongoing (interventions implemented as appropriate)    Goal: Interprofessional Rounds/Family Conf  Outcome: Ongoing (interventions implemented as appropriate)      Problem: Kidney Disease, Chronic/End Stage Renal Disease (Adult)  Goal: Signs and Symptoms of Listed Potential Problems Will be Absent, Minimized or Managed (Kidney Disease, Chronic/End Stage Renal Disease)  Outcome: Ongoing (interventions implemented as appropriate)      Problem: Fluid Volume Excess (Adult)  Goal: Identify Related Risk Factors and Signs and Symptoms  Outcome: Ongoing (interventions implemented as appropriate)    Goal: Optimal Fluid Balance  Outcome: Ongoing (interventions implemented as appropriate)      Problem: Fall Risk (Adult)  Goal: Absence of Fall  Outcome: Ongoing (interventions implemented as appropriate)      Problem: Cardiac: ACS (Acute Coronary Syndrome) (Adult)  Goal: Signs and Symptoms of Listed Potential Problems Will be Absent, Minimized or Managed (Cardiac: ACS)  Outcome: Ongoing (interventions implemented as appropriate)      Problem: Pain, Chronic (Adult)  Goal: Identify Related Risk Factors and Signs and Symptoms  Outcome: Ongoing (interventions implemented as appropriate)    Goal: Acceptable Pain/Comfort Level and Functional Ability  Outcome: Ongoing (interventions implemented as appropriate)

## 2019-02-12 NOTE — PROGRESS NOTES
"[facility]  [address] [place] [address]. [address]  T - [phone]  F - [phone]     Progress Note          PATIENT  DEMOGRAPHICS   PATIENT NAME: Nicolasa Rojas                      PHYSICIAN: Joon Dugan MD  : 1950  MRN: [id number]   LOS: 6 days    Patient Care Team:  Henry Muniz MD as PCP - General (Internal Medicine)  Subjective   SUBJECTIVE   No chest pain on minimal exertion today       Objective   OBJECTIVE   Vital Signs  Temp:  [97 °F (36.1 °C)-98.3 °F (36.8 °C)] 97.8 °F (36.6 °C)  Heart Rate:  [56-80] 80  Resp:  [18] 18  BP: (111-174)/(51-69) 136/65    Flowsheet Rows      First Filed Value   Admission Height  160 cm (63\") Documented at 2019 1612   Admission Weight  102 kg (224 lb 12.8 oz) Documented at 2019 1612           I/O last 3 completed shifts:  In: 1230 [P.O.:1230]  Out: 2450 [Urine:2450]    PHYSICAL EXAM    Physical Exam   Constitutional: She is oriented to person, place, and time.   Neck: JVD present.   Cardiovascular: Normal rate. Exam reveals gallop and S3.   No murmur heard.  Pulmonary/Chest: Effort normal and breath sounds normal.   Abdominal: Soft. Bowel sounds are normal.   Musculoskeletal: She exhibits edema.   Neurological: She is alert and oriented to person, place, and time.       RESULTS   Results Review:    Results from last 7 days   Lab Units 19  0527 19  0512 02/10/19  0708   SODIUM mmol/L 137 134* 134*   POTASSIUM mmol/L 3.8 3.7 4.1   CHLORIDE mmol/L 96 94* 91*   CO2 mmol/L 32.0* 33.0* 32.0*   BUN mg/dL 74* 80* 91*   CREATININE mg/dL 1.61* 1.50* 1.99*   CALCIUM mg/dL 8.9 9.0 9.5   GLUCOSE mg/dL 68 62 91       Estimated Creatinine Clearance: 37.1 mL/min (A) (by C-G formula based on SCr of 1.61 mg/dL (H)).                Results from last 7 days   Lab Units 19  0527 19  0512 02/10/19  0708 19  0737 19  0705   WBC 10*3/mm3 5.24 6.29 6.31 7.57 6.69   HEMOGLOBIN g/dL 7.6* 8.2* 9.2* 8.6* 8.4* "   PLATELETS 10*3/mm3 170 210 201 175 149*               Imaging Results (last 24 hours)     ** No results found for the last 24 hours. **           MEDICATIONS      aspirin 81 mg Oral Daily   bumetanide 1 mg Oral Daily   carvedilol 3.125 mg Oral BID With Meals   clopidogrel 75 mg Oral Daily   docusate sodium 1 enema Rectal Daily   ferrous sulfate 324 mg Oral BID With Meals   heparin (porcine) 5,000 Units Subcutaneous Q12H   hydrALAZINE 50 mg Oral BID   isosorbide mononitrate 60 mg Oral BID   pantoprazole 40 mg Oral Q AM   polyethylene glycol 17 g Oral Daily   ranolazine 500 mg Oral Q12H   rosuvastatin 20 mg Oral Daily   sennosides-docusate sodium 2 tablet Oral Nightly   sertraline 50 mg Oral Daily   spironolactone 25 mg Oral Daily       insulin        Assessment/Plan   ASSESSMENT / PLAN      NSTEMI (non-ST elevated myocardial infarction) (CMS/[facility])    Hyponatremia    Acute on chronic diastolic CHF (congestive heart failure) (CMS/[facility])    Chronic renal impairment, stage 3 (moderate) (CMS/[facility])    CAD (coronary artery disease)    Class 2 severe obesity due to excess calories with serious comorbidity and body mass index (BMI) of 39.0 to 39.9 in adult (CMS/[facility])    COPD (chronic obstructive pulmonary disease) (CMS/[facility])    CIERRA (obstructive sleep apnea)    Hyperlipidemia    Essential hypertension    Type 2 diabetes mellitus (CMS/[facility])    MONE on CKD   1. Pt's Cr was elevated above baseline at admission upto 1.89.   BUN to Cr ratio on admission was > 20 (38.6) suggesting prerenal azotemia from NSTEMI (cardio renal syndrome). Baseline cr 1.6-1.7 and recently came down to 1.4. Due to increasing alkalosis in the office we have stopped metolazone and kept on bumex 2mg daily along with aldactone in the office     Cr is 1.5 today. Keep aldactone and bumex.Hold metolazone for now. She has significant wt loss and then lowest at home was 217 lbs. Her dry weight at best is close to 220 lbs. Wt is also stable     She will be a poor  candidate for invasive workup and likely end up with worsening renal failure (had hd in two different occasions after the heart cath). No plan for invasive workup for nstemi.      2- hyponatremia jvd is high likely hypervolemic after nstemi (high adh release). Keep aldactone. Urine osmolality is not markedly elevated., urine na is on low side. Na better with tolvaptan. No need of adh antagonist today     3- htn stable     4- anemia of ckd her fe b12 and folate are normal. S/p Procrit yesterday              This document has been electronically signed by Joon Dugan MD on February 12, 2019 12:56 PM

## 2019-02-12 NOTE — PLAN OF CARE
Problem: Patient Care Overview  Goal: Plan of Care Review  Outcome: Ongoing (interventions implemented as appropriate)   02/12/19 5119   Coping/Psychosocial   Plan of Care Reviewed With patient   OTHER   Outcome Summary Pt amb with RW, CGA and transport chair to follow, 44 ft, 50 ft, and 40 ft with rest breaks in between each. Seated B LE ther ex for 20 reps ea. Will cont PT per POC.

## 2019-02-12 NOTE — THERAPY TREATMENT NOTE
Acute Care - Occupational Therapy Treatment Note  [facility]     Patient Name: Nicolasa Rojas  : 1950  MRN: [id number]  Today's Date: 2019  Onset of Illness/Injury or Date of Surgery: 19  Date of Referral to OT: 19  Referring Physician: Dr. Jarrell    Admit Date: 2019       ICD-10-CM ICD-9-CM   1. Impaired physical mobility Z74.09 781.99   2. Impaired mobility and ADLs Z74.09 799.89     Patient Active Problem List   Diagnosis   • Hyponatremia   • Acute on chronic diastolic CHF (congestive heart failure) (CMS/[facility])   • Hypokalemia   • Acute on chronic heart failure (CMS/[facility])   • Dyspnea   • Chronic renal impairment, stage 3 (moderate) (CMS/[facility])   • CAD (coronary artery disease)   • Class 2 severe obesity due to excess calories with serious comorbidity and body mass index (BMI) of 39.0 to 39.9 in adult (CMS/[facility])   • NSTEMI (non-ST elevated myocardial infarction) (CMS/[facility])   • COPD (chronic obstructive pulmonary disease) (CMS/[facility])   • CIERRA (obstructive sleep apnea)   • Hyperlipidemia   • Essential hypertension   • Type 2 diabetes mellitus (CMS/[facility])     Past Medical History:   Diagnosis Date   • Acute bronchitis    • Acute congestive heart failure (CMS/[facility])     resolving   • Acute kidney failure, unspecified (CMS/[facility])    • Acute kidney failure, unspecified (CMS/[facility])    • Acute posthemorrhagic anemia    • Asthenia    • Chest pain    • Chronic gastritis    • Chronic pharyngitis    • Chronic renal impairment    • Congenital renal failure    • Congestive heart failure (CMS/[facility])    • Contact dermatitis due to poison ivy    • COPD (chronic obstructive pulmonary disease) (CMS/[facility])    • Coronary arteriosclerosis    • D-dimer, elevated     D-dimer above reference range    • Degenerative joint disease involving multiple joints     back   • Depressive disorder    • Dysphagia    • Dyspnea    • Edema of lower extremity    • Encounter for immunization    • Encounter for long-term (current) use of  medications    • Epigastric pain    • Esophagitis    • Essential hypertension    • Gastroesophageal reflux disease    • Gastroparesis     Gastroparesis syndrome    • Generalized abdominal pain    • H/O bone density study 01/09/2015   • H/O mammogram 01/15/2015   • H/O screening mammography 11/12/2013   • Headache    • History of transfusion    • Hyperlipidemia    • Hypokalemia    • Hypomagnesemia    • Injury of tendon of rotator cuff     Injury of tendon of the rotator cuff of shoulder      • Insomnia    • Insomnia, unspecified    • Knee pain    • Nausea and vomiting    • Neck pain    • Need for immunization against influenza    • Need for prophylactic vaccination and inoculation against influenza    • Need for prophylactic vaccination and inoculation against viral disease    • 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 Performed at [facility].   • ENDOSCOPY  02/09/2015    ESOPHAGEAL STRICTURE PRESENT, GASTRITIS FOUND IN THE STOMACH, NORMAL DUODENUM   • ENDOSCOPY W/ PEG TUBE PLACEMENT  12/19/2012    Esophagitis seen. Biopsy taken. Esophageal stricture present. Dilatation performed. Gastritis in stomach. Biopsy taken. Food was in stomach. Normal duodenum.   • HERNIA REPAIR     • HYSTERECTOMY      partial   • NECK SURGERY         Therapy Treatment    Rehabilitation Treatment Summary     Row Name 02/12/19 0948 02/12/19 0840          Treatment Time/Intention    Discipline  physical therapy assistant  -HUGH  occupational therapy assistant  -ADRIANA     Document Type  therapy note (daily note)  -HUGH  therapy note (daily note)   -     Subjective Information  complains of;fatigue  -  complains of constipation  -     Mode of Treatment  individual therapy  -  individual therapy;occupational therapy  -     Care Plan Review, Other Participant(s)  --  spouse  -     Therapy Frequency (OT Eval)  --  other (see comments) 5-7 days a wk  -     Patient Effort  good  -  adequate  -     Existing Precautions/Restrictions  fall;cardiac;oxygen therapy device and L/min  -  --     Recorded by [] Ray Stone, PTA 02/12/19 1227 [] Maira Murphy HOOPER/L 02/12/19 1338     Row Name 02/12/19 0948 02/12/19 0840          Vital Signs    Pre Systolic BP Rehab  133  -  --     Pre Treatment Diastolic BP  67  -JW  --     Post Systolic BP Rehab  163  -JW  --     Post Treatment Diastolic BP  74  -JW  --     Pretreatment Heart Rate (beats/min)  74  -  --     Posttreatment Heart Rate (beats/min)  78  -  --     Pre SpO2 (%)  98  -  96  -     O2 Delivery Pre Treatment  --  room air  -     Post SpO2 (%)  98  -JW  --     Recorded by [] Ray Stone, PTA 02/12/19 1227 [] Maira Murphy HOOPER/L 02/12/19 1338     Row Name 02/12/19 0948 02/12/19 0840          Cognitive Assessment/Intervention- PT/OT    Affect/Mental Status (Cognitive)  WFL  -  WNL  -     Orientation Status (Cognition)  oriented x 4  -  oriented x 4  -     Follows Commands (Cognition)  follows one step commands;over 90% accuracy  -  --     Recorded by [] Ray Stone, PTA 02/12/19 1227 [] Maira Murphy HOOPER/L 02/12/19 1338     Row Name 02/12/19 0948             Safety Issues, Functional Mobility    Impairments Affecting Function (Mobility)  balance;endurance/activity tolerance;postural/trunk control;shortness of breath;strength  -      Recorded by [] Ray Stone, PTA 02/12/19 1227      Row Name 02/12/19 0948 02/12/19 0840          Bed Mobility Assessment/Treatment    Bed Mobility Assessment/Treatment  supine-sit;sit-supine  -  bed  mobility (all) activities  -     [place] Level (Bed Mobility)  --  independent  -     Supine-Sit [place] (Bed Mobility)  independent  -  independent  -     Sit-Supine [place] (Bed Mobility)  independent  -  independent  -     Supine-Sit-Supine [place] (Bed Mobility)  --  independent  -     Recorded by [] Ray Stone, PTA 02/12/19 1227 [] Maira Murphy, HOOPER/L 02/12/19 1338     Row Name 02/12/19 0948 02/12/19 0840          Transfer Assessment/Treatment    Transfer Assessment/Treatment  sit-stand transfer;stand-sit transfer  -  sit-stand transfer;stand-sit transfer;toilet transfer  -     Recorded by [] Ray Stone, PTA 02/12/19 1227 [] Maira Murphy, HOOPER/L 02/12/19 1338     Row Name 02/12/19 0948 02/12/19 0840          Sit-Stand Transfer    Sit-Stand [place] (Transfers)  stand by assist  -JW  independent  -     Assistive Device (Sit-Stand Transfers)  walker, front-wheeled  -  walker, front-wheeled  -     Recorded by [] Ray Stone, PTA 02/12/19 1227 [] Maiar Murphy, HOOPER/L 02/12/19 1338     Row Name 02/12/19 0948 02/12/19 0840          Stand-Sit Transfer    Stand-Sit [place] (Transfers)  stand by assist  -JW  supervision  -     Assistive Device (Stand-Sit Transfers)  walker, front-wheeled  -  walker, front-wheeled  -     Recorded by [] Ray Stone, PTA 02/12/19 1227 [] Maira Murphy, HOOPER/L 02/12/19 1338     Row Name 02/12/19 0840             Toilet Transfer    Type (Toilet Transfer)  sit-stand;stand-sit  -      [place] Level (Toilet Transfer)  supervision  -      Assistive Device (Toilet Transfer)  grab bars/safety frame;commode  -      Recorded by [] Maira Murphy, HOOPER/L 02/12/19 1338      Row Name 02/12/19 0948             Gait/Stairs Assessment/Training    [place] Level (Gait)  contact guard;verbal cues  -JW      Assistive Device (Gait)  walker, front-wheeled  -JW      Distance in Feet  (Gait)  44 feet, 50 feet, 40 feet  -      Pattern (Gait)  step-through  -JW      Recorded by [] Ray Stone, PTA 02/12/19 1227      Row Name 02/12/19 0840             Bathing Assessment/Intervention    Comment (Bathing)  Pt decline full adl 2' not feeling and 2' her spouse will do it for her   -      Recorded by [] Maira Murphy HOOPER/L 02/12/19 1338      Row Name 02/12/19 0840             Grooming Assessment/Training    [place] Level (Grooming)  wash face, hands  -      Grooming Position  sink side  -      Recorded by [] Maira Murphy HOOPER/L 02/12/19 1338      Row Name 02/12/19 0840             Toileting Assessment/Training    [place] Level (Toileting)  adjust/manage clothing;toileting skills;contact guard assist  -      Assistive Devices (Toileting)  commode;grab bar/safety frame  (Significant)  Spouse automatically assists pt as they do at home   -      Toileting Position  unsupported sitting  -      Recorded by [] Maira Murphy HOOPER/L 02/12/19 1342      Row Name 02/12/19 0948             Therapeutic Exercise    Lower Extremity (Therapeutic Exercise)  LAQ (long arc quad), bilateral;marching while seated;other (see comments) Seated hip ab/ad  -      Recorded by [] Ray Stone, PTA 02/12/19 1227      Row Name 02/12/19 0948 02/12/19 0840          Positioning and Restraints    Pre-Treatment Position  in bed  -  in bed  -     Post Treatment Position  bed  -  bed  -     In Bed  sitting EOB;call light within reach;encouraged to call for assist  -  supine;side lying left  -     Recorded by [] Ray Stone, PTA 02/12/19 1227 [] Maira Murphy HOOPER/L 02/12/19 1338     Row Name 02/12/19 0948 02/12/19 0840          Pain Scale: Numbers Pre/Post-Treatment    Pain Scale: Numbers, Pretreatment  0/10 - no pain  -  0/10 - no pain  -     Pain Scale: Numbers, Post-Treatment  0/10 - no pain  -  0/10 - no pain  -JH     Recorded by [JW] Ray Stone,  PTA 02/12/19 1227 [] Maira Murphy, HOOPER/L 02/12/19 1338     Row Name 02/12/19 0840             Outcome Summary/Treatment Plan (OT)    Daily Summary of Progress (OT)  progress towards functional goals is fair  -      Plan for Continued Treatment (OT)  Continue per POC  -      Anticipated Discharge Disposition (OT)  home with home health  -      Recorded by [] Maira Murphy, HOOPER/L 02/12/19 1338      Row Name 02/12/19 0948             Outcome Summary/Treatment Plan (PT)    Daily Summary of Progress (PT)  progress toward functional goals is good  -      Anticipated Discharge Disposition (PT)  home with home health;home with assist  -      Recorded by [] Ray Stone, PTA 02/12/19 1227        User Key  (r) = Recorded By, (t) = Taken By, (c) = Cosigned By    Initials Name Effective Dates Discipline     Ray Stone, PTA 03/07/18 -  PT     Maira Murphy, HOOPER/L 03/07/18 -  OT           Rehab Goal Summary     Row Name 02/12/19 0948 02/12/19 0840          Physical Therapy Goals    Bed Mobility Goal Selection (PT)  bed mobility, PT goal 1  -  --     Transfer Goal Selection (PT)  transfer, PT goal 1  -  --     Gait Training Goal Selection (PT)  gait training, PT goal 1  -JW  --        Bed Mobility Goal 1 (PT)    Activity/Assistive Device (Bed Mobility Goal 1, PT)  sit to supine/supine to sit  -  --     [place] Level/Cues Needed (Bed Mobility Goal 1, PT)  independent  -  --     Time Frame (Bed Mobility Goal 1, PT)  short term goal (STG);2 days  -JW  --     Barriers (Bed Mobility Goal 1, PT)  HOB flat, no bed rails  -  --     Progress/Outcomes (Bed Mobility Goal 1, PT)  goal met  -  --        Transfer Goal 1 (PT)    Activity/Assistive Device (Transfer Goal 1, PT)  sit-to-stand/stand-to-sit;bed-to-chair/chair-to-bed  -  --     [place] Level/Cues Needed (Transfer Goal 1, PT)  independent  -  --     Time Frame (Transfer Goal 1, PT)  long term goal (LTG);by discharge  -   --     Barriers (Transfers Goal 1, PT)  fatigues easily  -JW  --     Progress/Outcome (Transfer Goal 1, PT)  goal not met;good progress toward goal  -JW  --        Gait Training Goal 1 (PT)    Activity/Assistive Device (Gait Training Goal 1, PT)  gait (walking locomotion)  -  --     [place] Level (Gait Training Goal 1, PT)  supervision required  -  --     Distance (Gait Goal 1, PT)  50'x1  -JW  --     Time Frame (Gait Training Goal 1, PT)  long term goal (LTG);by discharge  -  --     Barriers (Gait Training Goal 1, PT)  fatigues easily  -JW  --     Progress/Outcome (Gait Training Goal 1, PT)  goal not met;goal ongoing  -  --        Occupational Therapy Goals    Transfer Goal Selection (OT)  --  transfer, OT goal 1  -     Bathing Goal Selection (OT)  --  bathing, OT goal 1  -     Dressing Goal Selection (OT)  --  dressing, OT goal 1  -     Toileting Goal Selection (OT)  --  toileting, OT goal 1  -     Endurance Goal Selection (OT)  --  endurance, OT goal 1  -     Functional Mobility Goal Selection (OT)  --  functional mobility, OT goal 1  -        Transfer Goal 1 (OT)    Activity/Assistive Device (Transfer Goal 1, OT)  --  toilet  -     [place] Level/Cues Needed (Transfer Goal 1, OT)  --  standby assist  -     Time Frame (Transfer Goal 1, OT)  --  long term goal (LTG);by discharge  -     Progress/Outcome (Transfer Goal 1, OT)  --  goal met  -        Bathing Goal 1 (OT)    Activity/Assistive Device (Bathing Goal 1, OT)  --  upper body bathing  -     [place] Level/Cues Needed (Bathing Goal 1, OT)  --  minimum assist (75% or more patient effort)  -     Time Frame (Bathing Goal 1, OT)  --  long term goal (LTG);by discharge  -     Progress/Outcomes (Bathing Goal 1, OT)  --  goal not met  -        Dressing Goal 1 (OT)    Activity/Assistive Device (Dressing Goal 1, OT)  --  upper body dressing  -     [place]/Cues Needed (Dressing Goal 1, OT)  --  minimum assist (75%  or more patient effort)  -     Time Frame (Dressing Goal 1, OT)  --  long term goal (LTG);by discharge  -     Progress/Outcome (Dressing Goal 1, OT)  --  goal not met  -        Toileting Goal 1 (OT)    Activity/Device (Toileting Goal 1, OT)  --  toileting skills, all  -     [place] Level/Cues Needed (Toileting Goal 1, OT)  --  standby assist  -     Time Frame (Toileting Goal 1, OT)  --  long term goal (LTG);by discharge  -     Progress/Outcome (Toileting Goal 1, OT)  --  goal not met  -         Endurance Goal 1 (OT)    Activity Level (Endurance Goal 1, OT)  --  endurance 2 fair + 25 min functional task 3 or less rest breaks O2 90 or up  -     Progress/Outcome (Endurance Goal 1, OT)  --  goal not met  -        Functional Mobility Goal 1 (OT)    Activity/Assistive Device (Functional Mobility Goal 1, OT)  --  walker, rolling  -     [place] Level/Cues Needed (Functional Mobility Goal 1, OT)  --  contact guard assist  -     Distance Goal 1 (Functional Mobility, OT)  --  to bathroom and back to bed/chair   -     Time Frame (Functional Mobility Goal 1, OT)  --  long term goal (LTG);by discharge  -     Progress/Outcome (Functional Mobility Goal 1, OT)  --  goal not met  -       User Key  (r) = Recorded By, (t) = Taken By, (c) = Cosigned By    Initials Name Provider Type Discipline    Ray Duckworth, PTA Physical Therapy Assistant PT     Maira Murphy COTA/L Occupational Therapy Assistant OT        Occupational Therapy Education     Title: PT OT SLP Therapies (Not Started)     Topic: Occupational Therapy (Done)     Point: ADL training (Done)     Description: Instruct learner(s) on proper safety adaptation and remediation techniques during self care or transfers.   Instruct in proper use of assistive devices.    Learning Progress Summary           Patient LANIE Gallegos VU by  at 2/11/2019  1:38 PM    Comment:  Inclusion of home safety fall prevention with postural changes     Acceptance, E, NR by BB at 2/10/2019  1:15 PM    Acceptance, E, VU,NR by  at 2/8/2019  4:03 PM    Comment:  Educated about OT and POC. Educated to call for assist. Educated on safety throughout.   Family Acceptance, E, VU by  at 2/11/2019  1:38 PM    Comment:  Inclusion of home safety fall prevention with postural changes    Acceptance, E, VU,NR by  at 2/8/2019  4:03 PM    Comment:  Educated about OT and POC. Educated to call for assist. Educated on safety throughout.                   Point: Home exercise program (Done)     Description: Instruct learner(s) on appropriate technique for monitoring, assisting and/or progressing therapeutic exercises/activities.    Learning Progress Summary           Patient Acceptance, E, VU by  at 2/11/2019  1:38 PM    Comment:  Inclusion of home safety fall prevention with postural changes    Acceptance, E, NR by LOTTIE at 2/9/2019  3:11 PM   Family Acceptance, E, VU by  at 2/11/2019  1:38 PM    Comment:  Inclusion of home safety fall prevention with postural changes                   Point: Precautions (Done)     Description: Instruct learner(s) on prescribed precautions during self-care and functional transfers.    Learning Progress Summary           Patient Acceptance, E, VU by  at 2/11/2019  1:38 PM    Comment:  Inclusion of home safety fall prevention with postural changes    Acceptance, TB,E, DU by  at 2/11/2019  2:39 AM    Acceptance, E, VU,NR by  at 2/8/2019  4:03 PM    Comment:  Educated about OT and POC. Educated to call for assist. Educated on safety throughout.   Family Acceptance, E, VU by  at 2/11/2019  1:38 PM    Comment:  Inclusion of home safety fall prevention with postural changes    Acceptance, E, VU,NR by  at 2/8/2019  4:03 PM    Comment:  Educated about OT and POC. Educated to call for assist. Educated on safety throughout.                   Point: Body mechanics (Done)     Description: Instruct learner(s) on proper positioning and spine  alignment during self-care, functional mobility activities and/or exercises.    Learning Progress Summary           Patient Acceptance, E, VU by ADRIANA at 2/11/2019  1:38 PM    Comment:  Inclusion of home safety fall prevention with postural changes    Acceptance, E, NR by LOTTIE at 2/10/2019  1:15 PM   Family Acceptance, E, VU by  at 2/11/2019  1:38 PM    Comment:  Inclusion of home safety fall prevention with postural changes                               User Key     Initials Effective Dates Name Provider Type Discipline     06/08/18 -  Lucy Luna, OTR/L Occupational Therapist OT     10/17/16 -  Anthony Triana, RN Registered Nurse Nurse    LOTTIE 03/07/18 -  Shahida Coto HOOPER/L Occupational Therapy Assistant OT     03/07/18 -  Maira Murphy HOOPER/L Occupational Therapy Assistant OT                OT Recommendation and Plan  Outcome Summary/Treatment Plan (OT)  Daily Summary of Progress (OT): progress towards functional goals is fair  Plan for Continued Treatment (OT): Continue per POC  Anticipated Discharge Disposition (OT): home with home health  Therapy Frequency (OT Eval): other (see comments)(5-7 days a wk)  Daily Summary of Progress (OT): progress towards functional goals is fair  Outcome Summary: Pt requires sba to cga with fxnl transfers, sba with simple adls, pt's spouse assists pt with ALL needs, one OT goals met,  OT/PT at d/c   Outcome Measures     Row Name 02/12/19 1200 02/11/19 1420 02/11/19 0815       How much help from another person do you currently need...    Turning from your back to your side while in flat bed without using bedrails?  4  -JW  4  -LF  --    Moving from lying on back to sitting on the side of a flat bed without bedrails?  4  -JW  4  -LF  --    Moving to and from a bed to a chair (including a wheelchair)?  3  -JW  3  -LF  --    Standing up from a chair using your arms (e.g., wheelchair, bedside chair)?  4  -  4  -LF  --    Climbing 3-5 steps with a railing?  3  -JW   3  -LF  --    To walk in hospital room?  3  -JW  3  -LF  --    AM-[facility] 6 Clicks Score  21  -  21  -LF  --       How much help from another is currently needed...    Putting on and taking off regular lower body clothing?  --  --  3  -JH    Bathing (including washing, rinsing, and drying)  --  --  3  -JH    Toileting (which includes using toilet bed pan or urinal)  --  --  3  -JH    Putting on and taking off regular upper body clothing  --  --  3  -JH    Taking care of personal grooming (such as brushing teeth)  --  --  3  -JH    Eating meals  --  --  4  -JH    Score  --  --  19  -JH       Functional Assessment    Outcome Measure Options  [place] [id number] Clicks Basic Mobility (PT)  -[place] [id number] Clicks Basic Mobility (PT)  -  --    Row Name 02/10/19 1330 02/10/19 0748 02/09/19 1440       How much help from another person do you currently need...    Turning from your back to your side while in flat bed without using bedrails?  4  -LN  --  4  -LN    Moving from lying on back to sitting on the side of a flat bed without bedrails?  4  -LN  --  3  -LN    Moving to and from a bed to a chair (including a wheelchair)?  3  -LN  --  3  -LN    Standing up from a chair using your arms (e.g., wheelchair, bedside chair)?  3  -LN  --  3  -LN    Climbing 3-5 steps with a railing?  3  -LN  --  3  -LN    To walk in hospital room?  3  -LN  --  3  -LN    -[facility] 6 Clicks Score  20  -LN  --  19  -LN       How much help from another is currently needed...    Putting on and taking off regular lower body clothing?  --  1  -BB  --    Bathing (including washing, rinsing, and drying)  --  2  -BB  --    Toileting (which includes using toilet bed pan or urinal)  --  2  -BB  --    Putting on and taking off regular upper body clothing  --  3  -BB  --    Taking care of personal grooming (such as brushing teeth)  --  3  -BB  --    Eating meals  --  4  -BB  --    Score  --  15  -BB  --       Functional Assessment    Outcome Measure Options  AM-PAC 6  Clicks Basic Mobility (PT)  -LN  --  AM-PAC 6 Clicks Basic Mobility (PT)  -LN      User Key  (r) = Recorded By, (t) = Taken By, (c) = Cosigned By    Initials Name Provider Type    Ray Duckworth, PTA Physical Therapy Assistant    LN Elinor Kennedy, PTA Physical Therapy Assistant    BB Shahida Coto, HOOPER/L Occupational Therapy Assistant     Maira Murphy, HOOPER/L Occupational Therapy Assistant    Cheri Clark, PT Physical Therapist           Time Calculation:   Time Calculation- OT     Row Name 02/12/19 1345             Time Calculation- OT    OT Start Time  0840  -      OT Stop Time  0935  -      OT Time Calculation (min)  55 min  -      OT Received On  02/12/19  -        User Key  (r) = Recorded By, (t) = Taken By, (c) = Cosigned By    Initials Name Provider Type    Maira Parmar HOOPER/L Occupational Therapy Assistant           Therapy Suggested Charges     Code   Minutes Charges    None           Therapy Charges for Today     Code Description Service Date Service Provider Modifiers Qty    92822242520 HC OT THERAPEUTIC ACT EA 15 MIN 2/11/2019 Maira Murphy HOOPER/L GO 2    00007512609 HC OT SELF CARE/MGMT/TRAIN EA 15 MIN 2/11/2019 Maira Murphy HOOPER/L GO 1    55336975801 HC OT THER PROC EA 15 MIN 2/11/2019 Maira Murphy HOOPER/L GO 2    49595156865 HC OT SELF CARE/MGMT/TRAIN EA 15 MIN 2/12/2019 Maira Murphy HOOPER/L GO 3    79191397770 HC OT THERAPEUTIC ACT EA 15 MIN 2/12/2019 Maira Murphy HOOPER/L GO 1               RUFUS Paniagua/JOSEMANUEL  2/12/2019

## 2019-02-12 NOTE — PLAN OF CARE
Problem: Patient Care Overview  Goal: Plan of Care Review  Outcome: Ongoing (interventions implemented as appropriate)   02/12/19 0324 02/12/19 0840 02/12/19 0948   Coping/Psychosocial   Plan of Care Reviewed With --  --  patient   Plan of Care Review   Progress improving --  --    OTHER   Outcome Summary --  Pt requires sba to cga with fxnl transfers, sba with simple adls, pt's spouse assists pt with ALL needs, one OT goals met, HH OT/PT at d/c  --

## 2019-02-12 NOTE — PLAN OF CARE
Problem: Patient Care Overview  Goal: Plan of Care Review  Outcome: Ongoing (interventions implemented as appropriate)    Goal: Individualization and Mutuality  Outcome: Ongoing (interventions implemented as appropriate)    Goal: Discharge Needs Assessment  Outcome: Ongoing (interventions implemented as appropriate)    Goal: Interprofessional Rounds/Family Conf  Outcome: Ongoing (interventions implemented as appropriate)      Problem: Kidney Disease, Chronic/End Stage Renal Disease (Adult)  Goal: Signs and Symptoms of Listed Potential Problems Will be Absent, Minimized or Managed (Kidney Disease, Chronic/End Stage Renal Disease)  Outcome: Ongoing (interventions implemented as appropriate)      Problem: Fluid Volume Excess (Adult)  Goal: Optimal Fluid Balance  Outcome: Ongoing (interventions implemented as appropriate)      Problem: Fall Risk (Adult)  Goal: Absence of Fall  Outcome: Outcome(s) achieved Date Met: 02/12/19      Problem: Cardiac: ACS (Acute Coronary Syndrome) (Adult)  Goal: Signs and Symptoms of Listed Potential Problems Will be Absent, Minimized or Managed (Cardiac: ACS)  Outcome: Ongoing (interventions implemented as appropriate)      Problem: Pain, Chronic (Adult)  Goal: Acceptable Pain/Comfort Level and Functional Ability  Outcome: Ongoing (interventions implemented as appropriate)

## 2019-02-12 NOTE — PROGRESS NOTES
"DAILY PROGRESS NOTE  [facility]    Patient Identification:  Name: Nicolasa Rojas  Age: 68 y.o.  Sex: female  :  1950  MRN: [id number]         Primary Care Physician: Henry Muniz MD    Subjective:  Interval History:She has no complaints today.    Objective:    Scheduled Meds:    aspirin 81 mg Oral Daily   bumetanide 1 mg Oral Daily   carvedilol 3.125 mg Oral BID With Meals   clopidogrel 75 mg Oral Daily   docusate sodium 1 enema Rectal Daily   ferrous sulfate 324 mg Oral BID With Meals   heparin (porcine) 5,000 Units Subcutaneous Q12H   hydrALAZINE 50 mg Oral BID   isosorbide mononitrate 60 mg Oral BID   pantoprazole 40 mg Oral Q AM   polyethylene glycol 17 g Oral Daily   ranolazine 500 mg Oral Q12H   rosuvastatin 20 mg Oral Daily   sennosides-docusate sodium 2 tablet Oral Nightly   sertraline 50 mg Oral Daily   spironolactone 25 mg Oral Daily     Continuous Infusions:    insulin        Vital signs in last 24 hours:  Temp:  [97 °F (36.1 °C)-98.3 °F (36.8 °C)] 97.8 °F (36.6 °C)  Heart Rate:  [56-80] 80  Resp:  [18] 18  BP: (111-174)/(51-69) 136/65    Intake/Output:    Intake/Output Summary (Last 24 hours) at 2019 1216  Last data filed at 2019 0533  Gross per 24 hour   Intake 750 ml   Output 350 ml   Net 400 ml       Exam:  /65 (BP Location: Left arm, Patient Position: Lying)   Pulse 80   Temp 97.8 °F (36.6 °C) (Temporal)   Resp 18   Ht 160 cm (62.99\")   Wt 99.4 kg (219 lb 1 oz)   SpO2 96%   BMI 38.82 kg/m²     General Appearance:    Alert, cooperative, no distress   Head:    Normocephalic, without obvious abnormality, atraumatic   Eyes:       Throat:   Lips, tongue, gums normal   Neck:   Supple, symmetrical, trachea midline, no JVD   Lungs:     Clear to auscultation bilaterally, respirations unlabored   Chest Wall:    No tenderness or deformity    Heart:    Regular rate and rhythm, S1 and S2 normal, no murmur,no  Rub or gallop   Abdomen:     Soft, non-tender, " bowel sounds active, no masses, no organomegaly    Extremities:   Extremities normal, atraumatic, no cyanosis or edema   Pulses:      Skin:   Skin is warm and dry,  no rashes or palpable lesions   Neurologic:   no focal deficits noted      [unfilled]  Data Review:  Results from last 7 days   Lab Units 02/12/19 0527 02/11/19  0512 02/10/19  0708   SODIUM mmol/L 137 134* 134*   POTASSIUM mmol/L 3.8 3.7 4.1   CHLORIDE mmol/L 96 94* 91*   CO2 mmol/L 32.0* 33.0* 32.0*   BUN mg/dL 74* 80* 91*   CREATININE mg/dL 1.61* 1.50* 1.99*   GLUCOSE mg/dL 68 62 91   CALCIUM mg/dL 8.9 9.0 9.5     Results from last 7 days   Lab Units 02/12/19 0527 02/11/19  0512 02/10/19  0708   WBC 10*3/mm3 5.24 6.29 6.31   HEMOGLOBIN g/dL 7.6* 8.2* 9.2*   HEMATOCRIT % 22.5* 24.7* 26.8*   PLATELETS 10*3/mm3 170 210 201             No results found for: TROPONINT            Invalid input(s): PROT, LABALBU          Glucose   Date/Time Value Ref Range Status   02/12/2019 1104 233 (H) 70 - 130 mg/dL Final     Comment:     Sliding Scale AdminOperator: [id number] ADAM CELINEMeter ID: [id number]   02/11/2019 2020 169 (H) 70 - 130 mg/dL Final     Comment:     RN NotifiedOperator: [id number] ROSA ELENA SHAWNAMeter ID: [id number]   02/11/2019 1615 110 70 - 130 mg/dL Final     Comment:     RN NotifiedOperator: [id number] RUBINA KEASHAMeter ID: [id number]   02/11/2019 1103 228 (H) 70 - 130 mg/dL Final     Comment:     RN NotifiedOperator: [id number] RUBINA LAYTONASHAMeter ID: [id number]   02/11/2019 0628 63 (L) 70 - 130 mg/dL Final     Comment:     : [id number] ROSA ELENA SHAWNAMeter ID: [id number]   02/10/2019 2014 66 (L) 70 - 130 mg/dL Final     Comment:     : [id number] ROSA ELENA PEREZWNAMeter ID: [id number]   02/10/2019 1631 169 (H) 70 - 130 mg/dL Final     Comment:     RN NotifiedOperator: [id number] IDANIA BRANDYMeter ID: [id number]   02/10/2019 1025 270 (H) 70 - 130 mg/dL Final     Comment:     RN NotifiedOperator: [id number] GRACY  NOAHMeter ID: [id number]           Patient Active Problem List   Diagnosis Code   • Hyponatremia E87.1   • Acute on chronic diastolic CHF (congestive heart failure) (CMS/HCC) I50.33   • Hypokalemia E87.6   • Acute on chronic heart failure (CMS/HCC) I50.9   • Dyspnea R06.00   • Chronic renal impairment, stage 3 (moderate) (CMS/HCC) N18.3   • CAD (coronary artery disease) I25.10   • Class 2 severe obesity due to excess calories with serious comorbidity and body mass index (BMI) of 39.0 to 39.9 in adult (CMS/HCC) E66.01, Z68.39   • NSTEMI (non-ST elevated myocardial infarction) (CMS/HCC) I21.4   • COPD (chronic obstructive pulmonary disease) (CMS/HCC) J44.9   • CIERRA (obstructive sleep apnea) G47.33   • Hyperlipidemia E78.5   • Essential hypertension I10   • Type 2 diabetes mellitus (CMS/HCC) E11.9       Assessment:  Active Hospital Problems    Diagnosis Date Noted   • **NSTEMI (non-ST elevated myocardial infarction) (CMS/HCC) [I21.4] 02/06/2019   • COPD (chronic obstructive pulmonary disease) (CMS/HCC) [J44.9] 02/09/2019   • CIERRA (obstructive sleep apnea) [G47.33] 02/09/2019   • Hyperlipidemia [E78.5] 02/09/2019   • Essential hypertension [I10] 02/09/2019   • Type 2 diabetes mellitus (CMS/HCC) [E11.9] 02/09/2019   • Class 2 severe obesity due to excess calories with serious comorbidity and body mass index (BMI) of 39.0 to 39.9 in adult (CMS/HCC) [E66.01, Z68.39] 01/21/2019   • Chronic renal impairment, stage 3 (moderate) (CMS/HCC) [N18.3] 01/07/2019   • CAD (coronary artery disease) [I25.10] 01/07/2019   • Hyponatremia [E87.1] 01/04/2019   • Acute on chronic diastolic CHF (congestive heart failure) (CMS/HCC) [I50.33] 01/04/2019      Resolved Hospital Problems   No resolved problems to display.       Plan:  As per cardiology and nephrology. Continue medical RX. Molilize with PT and OT. Follow up lab.  Check troponin and EKG.  Not candidate for invasive treatment. Med RX. Home when OK with cardiology and renal.  Evangelista PATTERSON  MD Rosa  2/12/2019  12:16 PM

## 2019-02-12 NOTE — THERAPY TREATMENT NOTE
Acute Care - Physical Therapy Treatment Note  [facility]     Patient Name: Nicolasa Rojas  : 1950  MRN: [id number]  Today's Date: 2019  Onset of Illness/Injury or Date of Surgery: 19  Date of Referral to PT: 19  Referring Physician: Dr. Jarrell    Admit Date: 2019    Visit Dx:    ICD-10-CM ICD-9-CM   1. Impaired physical mobility Z74.09 781.99   2. Impaired mobility and ADLs Z74.09 799.89     Patient Active Problem List   Diagnosis   • Hyponatremia   • Acute on chronic diastolic CHF (congestive heart failure) (CMS/[facility])   • Hypokalemia   • Acute on chronic heart failure (CMS/[facility])   • Dyspnea   • Chronic renal impairment, stage 3 (moderate) (CMS/[facility])   • CAD (coronary artery disease)   • Class 2 severe obesity due to excess calories with serious comorbidity and body mass index (BMI) of 39.0 to 39.9 in adult (CMS/[facility])   • NSTEMI (non-ST elevated myocardial infarction) (CMS/[facility])   • COPD (chronic obstructive pulmonary disease) (CMS/[facility])   • CIERRA (obstructive sleep apnea)   • Hyperlipidemia   • Essential hypertension   • Type 2 diabetes mellitus (CMS/[facility])       Therapy Treatment    Rehabilitation Treatment Summary     Row Name 19 0948             Treatment Time/Intention    Discipline  physical therapy assistant  -      Document Type  therapy note (daily note)  -JW      Subjective Information  complains of;fatigue  -JW      Mode of Treatment  individual therapy  -JW      Patient Effort  good  -JW      Existing Precautions/Restrictions  fall;cardiac;oxygen therapy device and L/min  -JW      Recorded by [HUGH] Ray Stone PTA 19 1227      Row Name 19 0948             Vital Signs    Pre Systolic BP Rehab  133  -JW      Pre Treatment Diastolic BP  67  -JW      Post Systolic BP Rehab  163  -JW      Post Treatment Diastolic BP  74  -JW      Pretreatment Heart Rate (beats/min)  74  -JW      Posttreatment Heart Rate (beats/min)  78  -JW      Pre SpO2 (%)  98  -JW       Post SpO2 (%)  98  -JW      Recorded by [JW] Ray Stone, PTA 02/12/19 1227      Row Name 02/12/19 0948             Cognitive Assessment/Intervention- PT/OT    Affect/Mental Status (Cognitive)  WFL  -JW      Orientation Status (Cognition)  oriented x 4  -JW      Follows Commands (Cognition)  follows one step commands;over 90% accuracy  -JW      Recorded by [JW] Ray Stone, PTA 02/12/19 1227      Row Name 02/12/19 0948             Safety Issues, Functional Mobility    Impairments Affecting Function (Mobility)  balance;endurance/activity tolerance;postural/trunk control;shortness of breath;strength  -JW      Recorded by [JW] Ray Stone, PTA 02/12/19 1227      Row Name 02/12/19 0948             Bed Mobility Assessment/Treatment    Bed Mobility Assessment/Treatment  supine-sit;sit-supine  -JW      Supine-Sit [place] (Bed Mobility)  independent  -JW      Sit-Supine [place] (Bed Mobility)  independent  -JW      Recorded by [JW] Ray Stone, PTA 02/12/19 1227      Row Name 02/12/19 0948             Transfer Assessment/Treatment    Transfer Assessment/Treatment  sit-stand transfer;stand-sit transfer  -JW      Recorded by [JW] Ray Stone, PTA 02/12/19 1227      Row Name 02/12/19 0948             Sit-Stand Transfer    Sit-Stand [place] (Transfers)  stand by assist  -      Assistive Device (Sit-Stand Transfers)  walker, front-wheeled  -JW      Recorded by [JW] Ray Stone, PTA 02/12/19 1227      Row Name 02/12/19 0948             Stand-Sit Transfer    Stand-Sit [place] (Transfers)  stand by assist  -      Assistive Device (Stand-Sit Transfers)  walker, front-wheeled  -JW      Recorded by [JW] Ray Stone, PTA 02/12/19 1227      Row Name 02/12/19 0948             Gait/Stairs Assessment/Training    [place] Level (Gait)  contact guard;verbal cues  -      Assistive Device (Gait)  walker, front-wheeled  -      Distance in Feet (Gait)  44 feet, 50 feet, 40 feet   -JW      Pattern (Gait)  step-through  -JW      Recorded by [JW] Ray Stone, PTA 02/12/19 1227      Row Name 02/12/19 0948             Therapeutic Exercise    Lower Extremity (Therapeutic Exercise)  LAQ (long arc quad), bilateral;marching while seated;other (see comments) Seated hip ab/ad  -JW      Recorded by [JW] Ray Stone, PTA 02/12/19 1227      Row Name 02/12/19 0948             Positioning and Restraints    Pre-Treatment Position  in bed  -JW      Post Treatment Position  bed  -JW      In Bed  sitting EOB;call light within reach;encouraged to call for assist  -JW      Recorded by [JW] Ray Stone, PTA 02/12/19 1227      Row Name 02/12/19 0948             Pain Scale: Numbers Pre/Post-Treatment    Pain Scale: Numbers, Pretreatment  0/10 - no pain  -JW      Pain Scale: Numbers, Post-Treatment  0/10 - no pain  -JW      Recorded by [JW] Ray Stone, PTA 02/12/19 1227      Row Name 02/12/19 0948             Outcome Summary/Treatment Plan (PT)    Daily Summary of Progress (PT)  progress toward functional goals is good  -JW      Anticipated Discharge Disposition (PT)  home with home health;home with assist  -JW      Recorded by [HUGH] Ray Stone, [facility] 02/12/19 1227        User Key  (r) = Recorded By, (t) = Taken By, (c) = Cosigned By    Initials Name Effective Dates Discipline    JW Ray Stone, PTA 03/07/18 -  PT               Rehab Goal Summary     Row Name 02/12/19 0948             Physical Therapy Goals    Bed Mobility Goal Selection (PT)  bed mobility, PT goal 1  -JW      Transfer Goal Selection (PT)  transfer, PT goal 1  -      Gait Training Goal Selection (PT)  gait training, PT goal 1  -         Bed Mobility Goal 1 (PT)    Activity/Assistive Device (Bed Mobility Goal 1, PT)  sit to supine/supine to sit  -      [place] Level/Cues Needed (Bed Mobility Goal 1, PT)  independent  -JW      Time Frame (Bed Mobility Goal 1, PT)  short term goal (STG);2 days  -      Barriers  (Bed Mobility Goal 1, PT)  HOB flat, no bed rails  -JW      Progress/Outcomes (Bed Mobility Goal 1, PT)  goal met  -JW         Transfer Goal 1 (PT)    Activity/Assistive Device (Transfer Goal 1, PT)  sit-to-stand/stand-to-sit;bed-to-chair/chair-to-bed  -JW      [place] Level/Cues Needed (Transfer Goal 1, PT)  independent  -JW      Time Frame (Transfer Goal 1, PT)  long term goal (LTG);by discharge  -JW      Barriers (Transfers Goal 1, PT)  fatigues easily  -JW      Progress/Outcome (Transfer Goal 1, PT)  goal not met;good progress toward goal  -JW         Gait Training Goal 1 (PT)    Activity/Assistive Device (Gait Training Goal 1, PT)  gait (walking locomotion)  -JW      [place] Level (Gait Training Goal 1, PT)  supervision required  -JW      Distance (Gait Goal 1, PT)  50'x1  -JW      Time Frame (Gait Training Goal 1, PT)  long term goal (LTG);by discharge  -JW      Barriers (Gait Training Goal 1, PT)  fatigues easily  -JW      Progress/Outcome (Gait Training Goal 1, PT)  goal not met;goal ongoing  -JW        User Key  (r) = Recorded By, (t) = Taken By, (c) = Cosigned By    Initials Name Provider Type Discipline    Ray Duckworth, PTA Physical Therapy Assistant PT          Physical Therapy Education     Title: PT OT SLP Therapies (Not Started)     Topic: Physical Therapy (In Progress)     Point: Mobility training (In Progress)     Learning Progress Summary           Patient Acceptance, E, NR by FRANCIS at 2/11/2019  3:12 PM    Comment:  Energy conservation with ambulation. Pt. guided rest breaks with ambulation    Acceptance, TB,E, DU by LEE at 2/11/2019  2:39 AM    Acceptance, E,TB, VU by KARINA at 2/10/2019  2:12 PM    Acceptance, E,TB, VU by KARINA at 2/9/2019  4:49 PM    Acceptance, TB,E, DU by LEE at 2/9/2019  3:17 AM    Acceptance, E, VU,NR by BELLA at 2/8/2019  3:49 PM    Comment:  Role of PT, POC, goals of therapy.   Significant Other Acceptance, E,TB, VU by KARINA at 2/10/2019  2:12 PM                   Point:  Body mechanics (Done)     Learning Progress Summary           Patient Acceptance, TB,E, DU by LEE at 2/11/2019  2:39 AM    Acceptance, E,TB, VU by LN at 2/10/2019  2:12 PM    Acceptance, E,TB, VU by LN at 2/9/2019  4:49 PM   Significant Other Acceptance, E,TB, VU by LN at 2/10/2019  2:12 PM                   Point: Precautions (Done)     Learning Progress Summary           Patient Acceptance, TB,E, DU by LEE at 2/11/2019  2:39 AM    Acceptance, E,TB, VU by LN at 2/10/2019  2:12 PM    Acceptance, E,TB, VU by LN at 2/9/2019  4:49 PM    Acceptance, TB,E, DU by LEE at 2/9/2019  3:17 AM    Acceptance, E, VU,NR by BELLA at 2/8/2019  3:49 PM    Comment:  Patient instructed to use call button when she needs assistance and not to attempt to get out of bed on her own. Bed alarm activated.   Significant Other Acceptance, E,TB, VU by KARINA at 2/10/2019  2:12 PM                               User Key     Initials Effective Dates Name Provider Type Discipline     04/06/17 -  Tara Bella, PT Physical Therapist PT    TJ 10/17/16 -  Anthony Triana RN Registered Nurse Nurse    LN 03/07/18 -  Elinor Kennedy PTA Physical Therapy Assistant PT     07/23/18 -  Cheri Engel, JUAN Physical Therapist PT                PT Recommendation and Plan  Anticipated Discharge Disposition (PT): home with home health, home with assist  Outcome Summary/Treatment Plan (PT)  Daily Summary of Progress (PT): progress toward functional goals is good  Anticipated Discharge Disposition (PT): home with home health, home with assist  Plan of Care Reviewed With: patient  Outcome Summary: Pt amb with RW, CGA and transport chair to follow, 44 ft, 50 ft, and 40 ft with rest breaks in between each. Seated B LE ther ex for 20 reps ea. Will cont PT per POC.   Outcome Measures     Row Name 02/12/19 1200 02/11/19 1420 02/11/19 0815       How much help from another person do you currently need...    Turning from your back to your side while in flat bed without using  bedrails?  4  -  4  -LF  --    Moving from lying on back to sitting on the side of a flat bed without bedrails?  4  -JW  4  -LF  --    Moving to and from a bed to a chair (including a wheelchair)?  3  -JW  3  -LF  --    Standing up from a chair using your arms (e.g., wheelchair, bedside chair)?  4  -JW  4  -LF  --    Climbing 3-5 steps with a railing?  3  -JW  3  -LF  --    To walk in hospital room?  3  -  3  -LF  --    AM-[facility] 6 Clicks Score  21  -  21  -LF  --       How much help from another is currently needed...    Putting on and taking off regular lower body clothing?  --  --  3  -JH    Bathing (including washing, rinsing, and drying)  --  --  3  -JH    Toileting (which includes using toilet bed pan or urinal)  --  --  3  -JH    Putting on and taking off regular upper body clothing  --  --  3  -JH    Taking care of personal grooming (such as brushing teeth)  --  --  3  -JH    Eating meals  --  --  4  -JH    Score  --  --  19  -JH       Functional Assessment    Outcome Measure Options  [facility] 6 Clicks Basic Mobility (PT)  -[facility] 6 Clicks Basic Mobility (PT)  -  --    Row Name 02/10/19 1330 02/10/19 0748 02/09/19 1440       How much help from another person do you currently need...    Turning from your back to your side while in flat bed without using bedrails?  4  -LN  --  4  -LN    Moving from lying on back to sitting on the side of a flat bed without bedrails?  4  -LN  --  3  -LN    Moving to and from a bed to a chair (including a wheelchair)?  3  -LN  --  3  -LN    Standing up from a chair using your arms (e.g., wheelchair, bedside chair)?  3  -LN  --  3  -LN    Climbing 3-5 steps with a railing?  3  -LN  --  3  -LN    To walk in hospital room?  3  -LN  --  3  -LN    AM-[facility] 6 Clicks Score  20  -LN  --  19  -LN       How much help from another is currently needed...    Putting on and taking off regular lower body clothing?  --  1  -BB  --    Bathing (including washing, rinsing, and drying)  --  2   -BB  --    Toileting (which includes using toilet bed pan or urinal)  --  2  -BB  --    Putting on and taking off regular upper body clothing  --  3  -BB  --    Taking care of personal grooming (such as brushing teeth)  --  3  -BB  --    Eating meals  --  4  -BB  --    Score  --  15  -BB  --       Functional Assessment    Outcome Measure Options  AM-[facility] 6 Clicks Basic Mobility (PT)  -LN  --  AM-[facility] 6 Clicks Basic Mobility (PT)  -LN      User Key  (r) = Recorded By, (t) = Taken By, (c) = Cosigned By    Initials Name Provider Type    Ray Duckworth, PTA Physical Therapy Assistant    LN Elinor Kennedy, PTA Physical Therapy Assistant    BB Shahida Coto, HOOPER/L Occupational Therapy Assistant    Maira Parmar, HOOPER/L Occupational Therapy Assistant    Cheri Clark, PT Physical Therapist         Time Calculation:   PT Charges     Row Name 02/12/19 1232             Time Calculation    Start Time  0948  -      Stop Time  1018  -      Time Calculation (min)  30 min  -      PT Received On  02/12/19  -         Time Calculation- PT    Total Timed Code Minutes- PT  30 minute(s)  -        User Key  (r) = Recorded By, (t) = Taken By, (c) = Cosigned By    Initials Name Provider Type    Ray Duckworth, [facility] Physical Therapy Assistant        Therapy Suggested Charges     Code   Minutes Charges    16804 (CPT®) Hc Pt Neuromusc Re Education Ea 15 Min      18018 (CPT®) Hc Pt Ther Proc Ea 15 Min 15 1    88727 (CPT®) Hc Gait Training Ea 15 Min 17 1    62309 (CPT®) Hc Pt Therapeutic Act Ea 15 Min      39786 (CPT®) Hc Pt Manual Therapy Ea 15 Min      80629 (CPT®) Hc Pt Iontophoresis Ea 15 Min      52393 (CPT®) Hc Pt Elec Stim Ea-Per 15 Min      42487 (CPT®) Hc Pt Ultrasound Ea 15 Min      06314 (CPT®) Hc Pt Self Care/Mgmt/Train Ea 15 Min      35231 (CPT®) Hc Pt Prosthetic (S) Train Initial Encounter, Each 15 Min      50978 (CPT®) Hc Pt Orthotic(S)/Prosthetic(S) Encounter, Each 15 Min      19173 (CPT®)   Orthotic(S) Mgmt/Train Initial Encounter, Each 15min      Total  32 2        Therapy Charges for Today     Code Description Service Date Service Provider Modifiers Qty    86606700517 HC GAIT TRAINING EA 15 MIN 2/12/2019 Ray Stone, PTA GP 1    98160289078 HC PT THER PROC EA 15 MIN 2/12/2019 Ray Stone PTA GP 1          PT G-Codes  Outcome Measure Options: AM-P[facility] 6 Clicks Basic Mobility (PT)  AM-PAC 6 Clicks Score: 21  Score: 19    Ray Stone PTA  2/12/2019

## 2019-02-12 NOTE — PROGRESS NOTES
CARDIOLOGY PROGRESS NOTE      LOS: 6 days   Patient Care Team:  Henry Muniz MD as PCP - General (Internal Medicine)    Problems/Diagnoses:   Mrs. Roajs is a 68-year-old female with history of three-vessel CAD, type 2 diabetes, hypertension, COPD, obesity, CIERRA, peripheral arterial disease, pulmonary hypertension, diastolic heart failure, who presented to [facility] with generalized weakness/fatigue and chest pain.    The chest pain was described as a burning type of discomfort and associated with some degree of nausea and vomiting.  She also did report some degree of shortness of breath.    The pain did improve with sublingual nitroglycerin, but she continued to feel weak and other workup at [facility] ER showed a troponin of 2.84 and evidence of renal failure with elevated BUN and creatinine from her normal baseline.    She was hence transferred to [facility] for further management.  EKG showed sinus rhythm, IVCD, nonspecific ST-T changes and troponins have been elevated at 2.2.  The patient was pain-free at the time of my examination.  The patient sees Dr. Lobo on a regular basis and her history was reviewed in detail.  She has had all other interventions at [place] and records from [place] from November 2018 was reviewed.  She was admitted with chest pain and ruled in for a non-ST segment elevation myocardial infarction with a peak troponin of 37 at that time  Hospital Course at [place] as summarized below:   She was started on ACS protocol with aspirin, Plavix, and heparin. She was taken the Cath Lab with findings most pertinent for an ostial LAD stenosis. An intra-aortic balloon pump was placed for coronary perfusion and she was transferred to CCU afterwards without intervention. Cardiac surgery was involved and deemed her to not be a surgical revascularization candidate. She underwent high risk PCI with balloon pump support on 11/19/2018  The patient was  transferred to the CCU after her LHC with balloon pump placement on 11/17/18.     The LHC showed LM: Diffuse 20% disease. Pressure ventricularization with catheter engagement. LAD: Ostial 95% stenosis and mid 80-90% stenoses. Very small D2 has a proximal 100% . LCX: Proximal 80% stenosis and OM1 100% in-stent . RCA: Dominant, the proximal stents are widely patent.     Cardiac surgery was consulted for evaluation for surgical intervention who recommended proceeding with a high risk PCI due to morbid obesity, deconditioned state and extensive medical history.  She underwent the high risk PCI on 11/19 with drug-eluting stents placed in the proximal LCx, mid-LAD and left main extending into the proximal mid-LAD. She had the IABP removed on 11/20 without complications.   Additionally, patient's course has been complicated by anemia (hematocrit fredy 21); transfused 1 unit of pRBC this morning (haptoglobin 14, , appropriate retic count, iron studies (anemia of chronic disease)). No evidence of bleeding and stabilized to 27 at discharge. Endocrine was consulted and managed her insulin pump while inpatient.  Additionally, she has had worsening renal function (prior history of contrast-induced nephropathy with prior caths) and urine output reducing. She ultimately became essentially anuric, and required several episodes of HD,     The patient and the family are fully aware of the multiple issues at [place].  The patient has been noted to be hyponatremic, anemic in addition to worsening renal function.  Echocardiogram in January 2019 did show an ejection fraction of 57%.  Right ventricle was mildly dilated.  There is moderate mitral valve regurgitation, mild aortic valve insufficiency and moderate tricuspid valve regurgitation with evidence of severe pulmonary hypertension with RVSP more than 55 mmHg.     The patient denies any chest pain or shortness of breath.  She is complaining of constipation today.   Her  " reports that her appetite is good, however she is not \"happy\" with food choices here at the hospital.    Subjective         Review of Systems  Constitutional:  Denies recent weight loss, weight gain, fever or chills. C/o generalized weakness     HENT:  Denies any hearing loss, epistaxis, hoarseness, or difficulty speaking.     Eyes: Wears eyeglasses or contact lenses     Respiratory:  Denies dyspnea with exertion,no cough, wheezing, or hemoptysis.     Cardiovascular: Negative for palpitations, chest pain    Gastrointestinal:  Denies change in bowel habits, dyspepsia, ulcer disease, hematochezia, or melena.     Endocrine: Negative for cold intolerance, heat intolerance, polydipsia, polyphagia and polyuria.     Genitourinary: Negative.      Musculoskeletal: DJD    Objective     Vital Signs  Temp:  [97 °F (36.1 °C)-98.3 °F (36.8 °C)] 98.2 °F (36.8 °C)  Heart Rate:  [56-77] 59  Resp:  [18] 18  BP: (111-174)/(51-69) 155/69      Physical Exam   Vitals reviewed.    Constitutional: Cooperative, alert and oriented, in no acute distress.   HENT:   Head: Normocephalic, normal hair patterns, no masses or tenderness.  Ears, Nose, and Throat: No gross abnormalities. No pallor or cyanosis.  Eyes: EOMS intact, PERRL, conjunctivae and lids unremarkable. Fundoscopic exam and visual fields not performed.   Neck: No palpable masses or adenopathy, no thyromegaly, no JVD, carotid pulses are full and equal bilaterally and without  Bruits.     Cardiovascular: Regular rhythm, S1 and S2 normal, no S3 or S4. Apical impulse not displaced. No murmurs, gallops, or rubs detected.     Pulmonary/Chest: Chest: normal symmetry, no tenderness to palpation, normal respiratory excursion, no intercostal retraction, no use of accessory muscles.            Pulmonary: Normal breath sounds. No rales or rhonchi.    Abdominal: Abdomen soft, bowel sounds normoactive, no masses, no hepatosplenomegaly, non-tender, no bruits.     Musculoskeletal: No " deformities, clubbing, cyanosis, erythema, or edema observed      Results Review:    Lab Results (last 24 hours)     Procedure Component Value Units Date/Time    CBC & Differential [[id number]] Collected:  02/12/19 0527    Specimen:  Blood Updated:  02/12/19 0643    Narrative:       The following orders were created for panel order CBC & Differential.  Procedure                               Abnormality         Status                     ---------                               -----------         ------                     CBC Auto Differential[[id number]]        Abnormal            Final result                 Please view results for these tests on the individual orders.    CBC Auto Differential [[id number]]  (Abnormal) Collected:  02/12/19 0527    Specimen:  Blood Updated:  02/12/19 0643     WBC 5.24 10*3/mm3      RBC 2.35 10*6/mm3      Hemoglobin 7.6 g/dL      Hematocrit 22.5 %      MCV 95.7 fL      MCH 32.3 pg      MCHC 33.8 g/dL      RDW 13.2 %      RDW-SD 45.2 fl      MPV 9.9 fL      Platelets 170 10*3/mm3      Neutrophil % 70.5 %      Lymphocyte % 10.3 %      Monocyte % 13.9 %      Eosinophil % 3.6 %      Basophil % 0.6 %      Immature Grans % 1.1 %      Neutrophils, Absolute 3.69 10*3/mm3      Lymphocytes, Absolute 0.54 10*3/mm3      Monocytes, Absolute 0.73 10*3/mm3      Eosinophils, Absolute 0.19 10*3/mm3      Basophils, Absolute 0.03 10*3/mm3      Immature Grans, Absolute 0.06 10*3/mm3      nRBC 0.0 /100 WBC     Troponin [[id number]]  (Abnormal) Collected:  02/12/19 0527    Specimen:  Blood Updated:  02/12/19 0621     Troponin I 0.522 ng/mL     Basic Metabolic Panel [[id number]]  (Abnormal) Collected:  02/12/19 0527    Specimen:  Blood Updated:  02/12/19 0614     Glucose 68 mg/dL      BUN 74 mg/dL      Creatinine 1.61 mg/dL      Sodium 137 mmol/L      Potassium 3.8 mmol/L      Chloride 96 mmol/L      CO2 32.0 mmol/L      Calcium 8.9 mg/dL      eGFR Non African Amer 32 mL/min/1.73      BUN/Creatinine Ratio 46.0      Anion Gap 9.0 mmol/L     POC Glucose Once [[id number]]  (Abnormal) Collected:  02/11/19 2020    Specimen:  Blood Updated:  02/11/19 2056     Glucose 169 mg/dL      Comment: RN NotifiedOperator: [id number] ROSA ELENA Johnston ID: [id number]       POC Glucose Once [[id number]]  (Normal) Collected:  02/11/19 1615    Specimen:  Blood Updated:  02/11/19 1630     Glucose 110 mg/dL      Comment: RN NotifiedOperator: [id number] [facility] ID: [id number]       POC Glucose Once [[id number]]  (Abnormal) Collected:  02/11/19 1103    Specimen:  Blood Updated:  02/11/19 1116     Glucose 228 mg/dL      Comment: RN NotifiedOperator: [id number] RUBINA Cruzter ID: [id number]       Troponin [[id number]]  (Abnormal) Collected:  02/11/19 1009    Specimen:  Blood Updated:  02/11/19 1052     Troponin I 0.313 ng/mL         Medication Review:   Current Facility-Administered Medications   Medication Dose Route Frequency Provider Last Rate Last Dose   • aspirin chewable tablet 81 mg  81 mg Oral Daily Raphael Middleton MD   81 mg at 02/12/19 0821   • bumetanide (BUMEX) tablet 1 mg  1 mg Oral Daily Joon Dugan MD   1 mg at 02/12/19 0821   • carvedilol (COREG) tablet 3.125 mg  3.125 mg Oral BID With Meals Catracho Jarrell MD   3.125 mg at 02/12/19 0821   • clopidogrel (PLAVIX) tablet 75 mg  75 mg Oral Daily Raphael Middleton MD   75 mg at 02/12/19 0821   • ferrous sulfate EC tablet 324 mg  324 mg Oral BID With Meals Raphael Middleton MD   324 mg at 02/12/19 0821   • heparin (porcine) 5000 UNIT/ML injection 5,000 Units  5,000 Units Subcutaneous Q12H Raphael Middleton MD       • hydrALAZINE (APRESOLINE) tablet 50 mg  50 mg Oral BID Raphael Middleton MD   50 mg at 02/12/19 0821   • HYDROcodone-acetaminophen (NORCO)  MG per tablet 1 tablet  1 tablet Oral Q6H PRN Catracho Jarrell MD   1 tablet at 02/11/19 1947   • insulin patient supplied pump   Subcutaneous Continuous Raphael Middleton MD       • isosorbide  mononitrate (IMDUR) 24 hr tablet 60 mg  60 mg Oral BID Raphael Middleton MD   60 mg at 02/12/19 0821   • nitroglycerin (NITROSTAT) SL tablet 0.4 mg  0.4 mg Sublingual Q5 Min PRN Raphael Middleotn MD   0.4 mg at 02/07/19 2058   • pantoprazole (PROTONIX) EC tablet 40 mg  40 mg Oral Q AM Evangelista Lee MD   40 mg at 02/12/19 0535   • polyethylene glycol (MIRALAX) powder 17 g  17 g Oral Daily Joon Dugan MD   17 g at 02/12/19 0821   • promethazine (PHENERGAN) tablet 12.5 mg  12.5 mg Oral Q6H PRN Evangelista Lee MD       • ranolazine (RANEXA) 12 hr tablet 500 mg  500 mg Oral Q12H Edgar Montana MD   500 mg at 02/12/19 0821   • rosuvastatin (CRESTOR) tablet 20 mg  20 mg Oral Daily Raphael Middleton MD   20 mg at 02/12/19 0821   • sennosides-docusate sodium (SENOKOT-S) 8.6-50 MG tablet 2 tablet  2 tablet Oral Nightly Evangelista Lee MD   2 tablet at 02/11/19 2032   • sertraline (ZOLOFT) tablet 50 mg  50 mg Oral Daily Raphael Middleton MD   50 mg at 02/12/19 0821   • sodium chloride (OCEAN) nasal spray 2 spray  2 spray Each Nare PRN Evangelista Lee MD       • spironolactone (ALDACTONE) tablet 25 mg  25 mg Oral Daily Raphael Middleton MD   25 mg at 02/12/19 0821   • zolpidem (AMBIEN) tablet 10 mg  10 mg Oral Nightly PRN Raphael Middleton MD   10 mg at 02/11/19 2032     Assessment/Plan     Mrs. Rojas has multiple medical issues as discussed on the history of present illness and multiple comorbidities.  Not a candidate for invasive workup.  This has been discussed with both the family and Dr. Dugan who agrees.  Continue present maximal medical management.      NSTEMI (non-ST elevated myocardial infarction) (CMS/HCC)    Hyponatremia    Acute on chronic diastolic CHF (congestive heart failure) (CMS/[facility])    Chronic renal impairment, stage 3 (moderate) (CMS/[facility])    CAD (coronary artery disease)    Class 2 severe obesity due to excess calories with serious comorbidity and body mass index (BMI) of 39.0 to 39.9  in adult (CMS/[facility])    COPD (chronic obstructive pulmonary disease) (CMS/[facility])    CIERRA (obstructive sleep apnea)    Hyperlipidemia    Essential hypertension    Type 2 diabetes mellitus (CMS/[facility])          This document has been electronically signed by DOMINGA Henning on February 12, 2019 11:03 AM

## 2019-02-12 NOTE — DISCHARGE SUMMARY
PHYSICIAN DISCHARGE SUMMARY                                                                       [facility]    Patient Identification:  Name: Nicolasa Rojas  Age: 68 y.o.  Sex: female  :  1950  MRN: [id number]  Primary Care Physician: Henry Muniz MD    Admit date: 2019  Discharge date and time:2019  Discharged Condition: fair    Discharge Diagnoses:  Active Hospital Problems    Diagnosis Date Noted   • **NSTEMI (non-ST elevated myocardial infarction) (CMS/HCC) [I21.4] 2019   • COPD (chronic obstructive pulmonary disease) (CMS/HCC) [J44.9] 2019   • CIERRA (obstructive sleep apnea) [G47.33] 2019   • Hyperlipidemia [E78.5] 2019   • Essential hypertension [I10] 2019   • Type 2 diabetes mellitus (CMS/HCC) [E11.9] 2019   • Class 2 severe obesity due to excess calories with serious comorbidity and body mass index (BMI) of 39.0 to 39.9 in adult (CMS/HCC) [E66.01, Z68.39] 2019   • Chronic renal impairment, stage 3 (moderate) (CMS/[facility]) [N18.3] 2019   • CAD (coronary artery disease) [I25.10] 2019   • Hyponatremia [E87.1] 2019   • Acute on chronic diastolic CHF (congestive heart failure) (CMS/HCC) [I50.33] 2019      Resolved Hospital Problems   No resolved problems to display.      Patient Active Problem List   Diagnosis Code   • Hyponatremia E87.1   • Acute on chronic diastolic CHF (congestive heart failure) (CMS/[facility]) I50.33   • Hypokalemia E87.6   • Acute on chronic heart failure (CMS/[facility]) I50.9   • Dyspnea R06.00   • Chronic renal impairment, stage 3 (moderate) (CMS/[facility]) N18.3   • CAD (coronary artery disease) I25.10   • Class 2 severe obesity due to excess calories with serious comorbidity and body mass index (BMI) of 39.0 to 39.9 in adult (CMS/HCC) E66.01, Z68.39   • NSTEMI (non-ST elevated myocardial infarction) (CMS/HCC) I21.4   • COPD (chronic  obstructive pulmonary disease) (CMS/[facility]) J44.9   • CIERRA (obstructive sleep apnea) G47.33   • Hyperlipidemia E78.5   • Essential hypertension I10   • Type 2 diabetes mellitus (CMS/[facility]) E11.9       PMHX:   Past Medical History:   Diagnosis Date   • Acute bronchitis    • Acute congestive heart failure (CMS/[facility])     resolving   • Acute kidney failure, unspecified (CMS/[facility])    • Acute kidney failure, unspecified (CMS/[facility])    • Acute posthemorrhagic anemia    • Asthenia    • Chest pain    • Chronic gastritis    • Chronic pharyngitis    • Chronic renal impairment    • Congenital renal failure    • Congestive heart failure (CMS/[facility])    • Contact dermatitis due to poison ivy    • COPD (chronic obstructive pulmonary disease) (CMS/[facility])    • Coronary arteriosclerosis    • D-dimer, elevated     D-dimer above reference range    • Degenerative joint disease involving multiple joints     back   • Depressive disorder    • Dysphagia    • Dyspnea    • Edema of lower extremity    • Encounter for immunization    • Encounter for long-term (current) use of medications    • Epigastric pain    • Esophagitis    • Essential hypertension    • Gastroesophageal reflux disease    • Gastroparesis     Gastroparesis syndrome    • Generalized abdominal pain    • H/O bone density study 01/09/2015   • H/O mammogram 01/15/2015   • H/O screening mammography 11/12/2013   • Headache    • History of transfusion    • Hyperlipidemia    • Hypokalemia    • Hypomagnesemia    • Injury of tendon of rotator cuff     Injury of tendon of the rotator cuff of shoulder      • Insomnia    • Insomnia, unspecified    • Knee pain    • Nausea and vomiting    • Neck pain    • Need for immunization against influenza    • Need for prophylactic vaccination and inoculation against influenza    • Need for prophylactic vaccination and inoculation against viral disease    • 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 Performed at [facility].   • ENDOSCOPY  02/09/2015    ESOPHAGEAL STRICTURE PRESENT, GASTRITIS FOUND IN THE STOMACH, NORMAL DUODENUM   • ENDOSCOPY W/ PEG TUBE PLACEMENT  12/19/2012    Esophagitis seen. Biopsy taken. Esophageal stricture present. Dilatation performed. Gastritis in stomach. Biopsy taken. Food was in stomach. Normal duodenum.   • HERNIA REPAIR     • HYSTERECTOMY      partial   • NECK SURGERY         Hospital Course: Nicolasa Rojas  is a 68-year-old female with known coronary artery disease, congestive heart failure, and COPD who presented to the [facility] emergency department with complaints of generalized weakness and chest pain on the day before admission. She states on the day prior to admission she had a burning type chest pain in her midsternal area that radiated to her shoulder and was associated with significant nausea and vomiting. She states that she was somewhat short of breath. Her  states that he thinks that she may have been less short of breath than normal on the day prior to admission as she is always short of breath. She states that the chest pain went away with 3 nitroglycerin. And she stayed at home. She slipped with no further symptoms, but when she woke up this morning she had significant weakness and difficulty with balance and walking. She did not have any specific chest pain or shortness of breath symptoms on morning of admission. She presented to [facility] emergency department was found to have a markedly elevated troponin at 2.84 and a creatinine that is higher than her baseline. She was transferred to  our facility for further specialty workup.           The patient was admitted to the hospital and seen by neurology and cardiology.  The patient had adjustments of her medicine and was not felt to be a candidate for any invasive treatment due to her medical problems and kidney disease.  She had adjustments of her medicine and was feeling better after being in the hospital for a few days.  She will continue with medical treatment and follow-up with her primary care doctor in 1 week for ongoing care and also follow-up with nephrology in a few weeks and cardiology in a few weeks as well.  We will arrange for home health for some physical therapy and occupational therapy and nursing for cardiovascular assessment.    Consults:     Consults     Date and Time Order Name Status Description    2/6/2019 5560 Inpatient Cardiology Consult      2/6/2019 9977 Inpatient Nephrology Consult      1/8/2019 1049 Inpatient Nephrology Consult Completed     1/8/2019 0925 Inpatient Cardiology Consult Completed         Results from last 7 days   Lab Units 02/12/19  0527   WBC 10*3/mm3 5.24   HEMOGLOBIN g/dL 7.6*   HEMATOCRIT % 22.5*   PLATELETS 10*3/mm3 170     Results from last 7 days   Lab Units 02/12/19  0527   SODIUM mmol/L 137   POTASSIUM mmol/L 3.8   CHLORIDE mmol/L 96   CO2 mmol/L 32.0*   BUN mg/dL 74*   CREATININE mg/dL 1.61*   GLUCOSE mg/dL 68   CALCIUM mg/dL 8.9     Significant Diagnostic Studies:   Lab Results   Component Value Date    WBC 5.24 02/12/2019    HGB 7.6 (L) 02/12/2019    HCT 22.5 (L) 02/12/2019     02/12/2019     Lab Results   Component Value Date     02/12/2019    K 3.8 02/12/2019    CL 96 02/12/2019    CO2 32.0 (H) 02/12/2019    BUN 74 (H) 02/12/2019    CREATININE 1.61 (H) 02/12/2019    GLUCOSE 68 02/12/2019     Lab Results   Component Value Date    CALCIUM 8.9 02/12/2019     No results found for: AST, ALT, ALKPHOS  No results found for: APTT, INR  No results found for: COLORU, CLARITYU, SPECGRAV,  PHUR, PROTEINUR, GLUCOSEU, KETONESU, BLOODU, NITRITE, LEUKOCYTESUR, BILIRUBINUR, UROBILINOGEN, RBCUA, WBCUA, BACTERIA, UACOMMENT  Lab Results   Component Value Date    TROPONINI 0.522 (C) 02/12/2019     No components found for: HGBA1C;2  No components found for: TSH;2  Imaging Results (all)     None        Lab Results (last 7 days)     Procedure Component Value Units Date/Time    POC Glucose Once [[id number]]  (Abnormal) Collected:  02/12/19 1104    Specimen:  Blood Updated:  02/12/19 1119     Glucose 233 mg/dL      Comment: Sliding Scale AdminOperator: [id number] ADAM CELINEMeter ID: [id number]       CBC & Differential [[id number]] Collected:  02/12/19 0527    Specimen:  Blood Updated:  02/12/19 0643    Narrative:       The following orders were created for panel order CBC & Differential.  Procedure                               Abnormality         Status                     ---------                               -----------         ------                     CBC Auto Differential[[id number]]        Abnormal            Final result                 Please view results for these tests on the individual orders.    CBC Auto Differential [[id number]]  (Abnormal) Collected:  02/12/19 0527    Specimen:  Blood Updated:  02/12/19 0643     WBC 5.24 10*3/mm3      RBC 2.35 10*6/mm3      Hemoglobin 7.6 g/dL      Hematocrit 22.5 %      MCV 95.7 fL      MCH 32.3 pg      MCHC 33.8 g/dL      RDW 13.2 %      RDW-SD 45.2 fl      MPV 9.9 fL      Platelets 170 10*3/mm3      Neutrophil % 70.5 %      Lymphocyte % 10.3 %      Monocyte % 13.9 %      Eosinophil % 3.6 %      Basophil % 0.6 %      Immature Grans % 1.1 %      Neutrophils, Absolute 3.69 10*3/mm3      Lymphocytes, Absolute 0.54 10*3/mm3      Monocytes, Absolute 0.73 10*3/mm3      Eosinophils, Absolute 0.19 10*3/mm3      Basophils, Absolute 0.03 10*3/mm3      Immature Grans, Absolute 0.06 10*3/mm3      nRBC 0.0 /100 WBC     Troponin [[id number]]  (Abnormal) Collected:  02/12/19  0527    Specimen:  Blood Updated:  02/12/19 0621     Troponin I 0.522 ng/mL     Basic Metabolic Panel [[id number]]  (Abnormal) Collected:  02/12/19 0527    Specimen:  Blood Updated:  02/12/19 0614     Glucose 68 mg/dL      BUN 74 mg/dL      Creatinine 1.61 mg/dL      Sodium 137 mmol/L      Potassium 3.8 mmol/L      Chloride 96 mmol/L      CO2 32.0 mmol/L      Calcium 8.9 mg/dL      eGFR Non African Amer 32 mL/min/1.73      BUN/Creatinine Ratio 46.0     Anion Gap 9.0 mmol/L     POC Glucose Once [[id number]]  (Abnormal) Collected:  02/11/19 2020    Specimen:  Blood Updated:  02/11/19 2056     Glucose 169 mg/dL      Comment: RN NotifiedOperator: [id number] Rivalry ID: [id number]       POC Glucose Once [[id number]]  (Normal) Collected:  02/11/19 1615    Specimen:  Blood Updated:  02/11/19 1630     Glucose 110 mg/dL      Comment: RN NotifiedOperator: [id number] Conjectur ID: [id number]       POC Glucose Once [[id number]]  (Abnormal) Collected:  02/11/19 1103    Specimen:  Blood Updated:  02/11/19 1116     Glucose 228 mg/dL      Comment: RN NotifiedOperator: [id number] Conjectur ID: [id number]       Troponin [[id number]]  (Abnormal) Collected:  02/11/19 1009    Specimen:  Blood Updated:  02/11/19 1052     Troponin I 0.313 ng/mL     POC Glucose Once [[id number]]  (Abnormal) Collected:  02/11/19 0628    Specimen:  Blood Updated:  02/11/19 0702     Glucose 63 mg/dL      Comment: : [id number] Rivalry ID: [id number]       Basic Metabolic Panel [[id number]]  (Abnormal) Collected:  02/11/19 0512    Specimen:  Blood Updated:  02/11/19 0602     Glucose 62 mg/dL      BUN 80 mg/dL      Creatinine 1.50 mg/dL      Sodium 134 mmol/L      Potassium 3.7 mmol/L      Chloride 94 mmol/L      CO2 33.0 mmol/L      Calcium 9.0 mg/dL      eGFR Non African Amer 34 mL/min/1.73      BUN/Creatinine Ratio 53.3     Anion Gap 7.0 mmol/L     CBC & Differential [[id number]] Collected:  02/11/19 0512     Specimen:  Blood Updated:  02/11/19 0540    Narrative:       The following orders were created for panel order CBC & Differential.  Procedure                               Abnormality         Status                     ---------                               -----------         ------                     CBC Auto Differential[[id number]]        Abnormal            Final result                 Please view results for these tests on the individual orders.    CBC Auto Differential [[id number]]  (Abnormal) Collected:  02/11/19 0512    Specimen:  Blood Updated:  02/11/19 0540     WBC 6.29 10*3/mm3      RBC 2.58 10*6/mm3      Hemoglobin 8.2 g/dL      Hematocrit 24.7 %      MCV 95.7 fL      MCH 31.8 pg      MCHC 33.2 g/dL      RDW 14.0 %      RDW-SD 48.0 fl      MPV 8.8 fL      Platelets 210 10*3/mm3      Neutrophil % 73.3 %      Lymphocyte % 11.3 %      Monocyte % 9.7 %      Eosinophil % 4.6 %      Basophil % 0.3 %      Immature Grans % 0.8 %      Neutrophils, Absolute 4.61 10*3/mm3      Lymphocytes, Absolute 0.71 10*3/mm3      Monocytes, Absolute 0.61 10*3/mm3      Eosinophils, Absolute 0.29 10*3/mm3      Basophils, Absolute 0.02 10*3/mm3      Immature Grans, Absolute 0.05 10*3/mm3      nRBC 0.0 /100 WBC     POC Glucose Once [[id number]]  (Abnormal) Collected:  02/10/19 2014    Specimen:  Blood Updated:  02/10/19 2047     Glucose 66 mg/dL      Comment: : [id number] ROSA ELENA PEREZWNAMeter ID: [id number]       POC Glucose Once [[id number]]  (Abnormal) Collected:  02/10/19 1631    Specimen:  Blood Updated:  02/10/19 1701     Glucose 169 mg/dL      Comment: RN NotifiedOperator: [id number] IDANIA BRANDYMeter ID: [id number]       POC Glucose Once [[id number]]  (Abnormal) Collected:  02/10/19 1025    Specimen:  Blood Updated:  02/10/19 1108     Glucose 270 mg/dL      Comment: RN NotifiedOperator: [id number] GRACY NOAHMeter ID: [id number]       Basic Metabolic Panel [[id number]]  (Abnormal) Collected:  02/10/19 0708     Specimen:  Blood Updated:  02/10/19 0812     Glucose 91 mg/dL      BUN 91 mg/dL      Creatinine 1.99 mg/dL      Sodium 134 mmol/L      Potassium 4.1 mmol/L      Chloride 91 mmol/L      CO2 32.0 mmol/L      Calcium 9.5 mg/dL      eGFR Non African Amer 25 mL/min/1.73      BUN/Creatinine Ratio 45.7     Anion Gap 11.0 mmol/L     CBC & Differential [[id number]] Collected:  02/10/19 0708    Specimen:  Blood Updated:  02/10/19 0800    Narrative:       The following orders were created for panel order CBC & Differential.  Procedure                               Abnormality         Status                     ---------                               -----------         ------                     CBC Auto Differential[[id number]]        Abnormal            Final result                 Please view results for these tests on the individual orders.    CBC Auto Differential [[id number]]  (Abnormal) Collected:  02/10/19 0708    Specimen:  Blood Updated:  02/10/19 0800     WBC 6.31 10*3/mm3      RBC 2.84 10*6/mm3      Hemoglobin 9.2 g/dL      Hematocrit 26.8 %      MCV 94.4 fL      MCH 32.4 pg      MCHC 34.3 g/dL      RDW 13.5 %      RDW-SD 46.0 fl      MPV 9.5 fL      Platelets 201 10*3/mm3      Neutrophil % 78.1 %      Lymphocyte % 7.0 %      Monocyte % 9.8 %      Eosinophil % 4.1 %      Basophil % 0.2 %      Immature Grans % 0.8 %      Neutrophils, Absolute 4.93 10*3/mm3      Lymphocytes, Absolute 0.44 10*3/mm3      Monocytes, Absolute 0.62 10*3/mm3      Eosinophils, Absolute 0.26 10*3/mm3      Basophils, Absolute 0.01 10*3/mm3      Immature Grans, Absolute 0.05 10*3/mm3     POC Glucose Once [[id number]]  (Normal) Collected:  02/10/19 0610    Specimen:  Blood Updated:  02/10/19 0705     Glucose 100 mg/dL      Comment: : [id number] LEXII ELRITAMeter ID: [id number]       POC Glucose Once [[id number]]  (Abnormal) Collected:  02/09/19 2054    Specimen:  Blood Updated:  02/09/19 2141     Glucose 133 mg/dL      Comment: RN  NotifiedOperator: [id number] LEXII ELRITAMeter ID: [id number]       POC Glucose Once [[id number]]  (Normal) Collected:  02/09/19 1623    Specimen:  Blood Updated:  02/09/19 1735     Glucose 117 mg/dL      Comment: RN NotifiedOperator: [id number] GRACY NOAHMeter ID: [id number]       POC Glucose Once [[id number]]  (Abnormal) Collected:  02/09/19 1042    Specimen:  Blood Updated:  02/09/19 1111     Glucose 249 mg/dL      Comment: RN NotifiedOperator: [id number] GRACY NOAHMeter ID: [id number]       Basic Metabolic Panel [[id number]]  (Abnormal) Collected:  02/09/19 0737    Specimen:  Blood Updated:  02/09/19 0834     Glucose 127 mg/dL      BUN 84 mg/dL      Creatinine 1.98 mg/dL      Sodium 129 mmol/L      Potassium 4.2 mmol/L      Chloride 89 mmol/L      CO2 32.0 mmol/L      Calcium 9.3 mg/dL      eGFR Non African Amer 25 mL/min/1.73      BUN/Creatinine Ratio 42.4     Anion Gap 8.0 mmol/L     CBC & Differential [[id number]] Collected:  02/09/19 0737    Specimen:  Blood Updated:  02/09/19 0826    Narrative:       The following orders were created for panel order CBC & Differential.  Procedure                               Abnormality         Status                     ---------                               -----------         ------                     CBC Auto Differential[[id number]]        Abnormal            Final result                 Please view results for these tests on the individual orders.    CBC Auto Differential [[id number]]  (Abnormal) Collected:  02/09/19 0737    Specimen:  Blood Updated:  02/09/19 0826     WBC 7.57 10*3/mm3      RBC 2.57 10*6/mm3      Hemoglobin 8.6 g/dL      Hematocrit 23.7 %      MCV 92.2 fL      MCH 33.5 pg      MCHC 36.3 g/dL      RDW 13.4 %      RDW-SD 44.9 fl      MPV 9.9 fL      Platelets 175 10*3/mm3      Neutrophil % 82.3 %      Lymphocyte % 6.5 %      Monocyte % 8.5 %      Eosinophil % 1.6 %      Basophil % 0.3 %      Immature Grans % 0.8 %      Neutrophils, Absolute 6.24  10*3/mm3      Lymphocytes, Absolute 0.49 10*3/mm3      Monocytes, Absolute 0.64 10*3/mm3      Eosinophils, Absolute 0.12 10*3/mm3      Basophils, Absolute 0.02 10*3/mm3      Immature Grans, Absolute 0.06 10*3/mm3     POC Glucose Once [[id number]]  (Abnormal) Collected:  02/09/19 0540    Specimen:  Blood Updated:  02/09/19 0603     Glucose 149 mg/dL      Comment: RN NotifiedOperator: [id number] ADIEL MUELLERFERMeter ID: [id number]       POC Glucose Once [[id number]]  (Abnormal) Collected:  02/08/19 2045    Specimen:  Blood Updated:  02/08/19 2114     Glucose 170 mg/dL      Comment: RN NotifiedOperator: [id number] ADIEL MUELLERFERMeter ID: [id number]       POC Glucose Once [[id number]]  (Normal) Collected:  02/08/19 1113    Specimen:  Blood Updated:  02/08/19 1222     Glucose 117 mg/dL      Comment: Result Not ConfirmedOperator: [id number] JOSÉ ELODIAMeter ID: [id number]       Basic Metabolic Panel [[id number]]  (Abnormal) Collected:  02/08/19 0705    Specimen:  Blood Updated:  02/08/19 0741     Glucose 126 mg/dL      BUN 85 mg/dL      Creatinine 1.89 mg/dL      Sodium 125 mmol/L      Potassium 4.3 mmol/L      Chloride 84 mmol/L      CO2 30.0 mmol/L      Calcium 9.0 mg/dL      eGFR Non African Amer 26 mL/min/1.73      BUN/Creatinine Ratio 45.0     Anion Gap 11.0 mmol/L     CBC & Differential [[id number]] Collected:  02/08/19 0705    Specimen:  Blood Updated:  02/08/19 0720    Narrative:       The following orders were created for panel order CBC & Differential.  Procedure                               Abnormality         Status                     ---------                               -----------         ------                     CBC Auto Differential[[id number]]        Abnormal            Final result                 Please view results for these tests on the individual orders.    CBC Auto Differential [[id number]]  (Abnormal) Collected:  02/08/19 0705    Specimen:  Blood Updated:  02/08/19 0720     WBC 6.69 10*3/mm3       RBC 2.59 10*6/mm3      Hemoglobin 8.4 g/dL      Hematocrit 24.2 %      MCV 93.4 fL      MCH 32.4 pg      MCHC 34.7 g/dL      RDW 13.4 %      RDW-SD 45.4 fl      MPV 9.4 fL      Platelets 149 10*3/mm3      Neutrophil % 80.8 %      Lymphocyte % 8.5 %      Monocyte % 7.8 %      Eosinophil % 2.2 %      Basophil % 0.3 %      Immature Grans % 0.4 %      Neutrophils, Absolute 5.40 10*3/mm3      Lymphocytes, Absolute 0.57 10*3/mm3      Monocytes, Absolute 0.52 10*3/mm3      Eosinophils, Absolute 0.15 10*3/mm3      Basophils, Absolute 0.02 10*3/mm3      Immature Grans, Absolute 0.03 10*3/mm3     POC Glucose Once [[id number]]  (Normal) Collected:  02/08/19 0542    Specimen:  Blood Updated:  02/08/19 0558     Glucose 122 mg/dL      Comment: INS PUMPOperator: [id number] OperaxNEMeter ID: [id number]       POC Glucose Once [[id number]]  (Abnormal) Collected:  02/07/19 2023    Specimen:  Blood Updated:  02/07/19 2136     Glucose 174 mg/dL      Comment: INS PUMPOperator: [id number] OperaxNEMeter ID: [id number]       Sodium [[id number]]  (Abnormal) Collected:  02/07/19 1838    Specimen:  Blood Updated:  02/07/19 1905     Sodium 121 mmol/L     POC Glucose Once [[id number]]  (Normal) Collected:  02/07/19 1729    Specimen:  Blood Updated:  02/07/19 1741     Glucose 90 mg/dL      Comment: : [id number] [facility]: [id number]       Osmolality, Urine - Urine, Clean Catch [[id number]]  (Normal) Collected:  02/07/19 1256    Specimen:  Urine, Clean Catch Updated:  02/07/19 1539     Osmolality, Urine 270 mOsm/kg     Sodium, Urine, Random - Urine, Clean Catch [[id number]]  (Abnormal) Collected:  02/07/19 1256    Specimen:  Urine, Clean Catch Updated:  02/07/19 1511     Sodium, Urine <5 mmol/L     POC Glucose Once [[id number]]  (Normal) Collected:  02/07/19 1202    Specimen:  Blood Updated:  02/07/19 1222     Glucose 111 mg/dL      Comment: : [id number] SHANICE AUSTINMeter ID: [id number]        CBC & Differential [[id number]] Collected:  02/07/19 0513    Specimen:  Blood Updated:  02/07/19 0741    Narrative:       The following orders were created for panel order CBC & Differential.  Procedure                               Abnormality         Status                     ---------                               -----------         ------                     Manual Differential[[id number]]          Abnormal            Final result               Scan Slide[[id number]]                                                                  CBC Auto Differential[[id number]]        Abnormal            Final result                 Please view results for these tests on the individual orders.    Manual Differential [[id number]]  (Abnormal) Collected:  02/07/19 0513    Specimen:  Blood Updated:  02/07/19 0741     Neutrophil % 80.0 %      Lymphocyte % 11.0 %      Monocyte % 5.0 %      Eosinophil % 4.0 %      Neutrophils Absolute 4.13 10*3/mm3      Lymphocytes Absolute 0.57 10*3/mm3      Monocytes Absolute 0.26 10*3/mm3      Eosinophils Absolute 0.21 10*3/mm3      Anisocytosis Slight/1+     Comment: RARE POLYCHROMIC CELL        WBC Morphology Normal     Platelet Estimate Adequate    POC Glucose Once [[id number]]  (Normal) Collected:  02/07/19 0632    Specimen:  Blood Updated:  02/07/19 0656     Glucose 101 mg/dL      Comment: : [id number] [place] AMYMeter ID: [id number]       Troponin [[id number]]  (Abnormal) Collected:  02/07/19 0513    Specimen:  Blood Updated:  02/07/19 0607     Troponin I 2.230 ng/mL     CBC Auto Differential [[id number]]  (Abnormal) Collected:  02/07/19 0513    Specimen:  Blood Updated:  02/07/19 0553     WBC 5.16 10*3/mm3      RBC 2.50 10*6/mm3      Hemoglobin 8.4 g/dL      Hematocrit 22.3 %      MCV 89.2 fL      MCH 33.6 pg      MCHC 37.7 g/dL      RDW 12.9 %      RDW-SD 42.2 fl      MPV 9.7 fL      Platelets 149 10*3/mm3     Basic Metabolic Panel [[id number]]  (Abnormal) Collected:  02/07/19 0513     Specimen:  Blood Updated:  02/07/19 0549     Glucose 88 mg/dL      BUN 86 mg/dL      Creatinine 2.23 mg/dL      Sodium 122 mmol/L      Potassium 4.0 mmol/L      Chloride 81 mmol/L      CO2 28.0 mmol/L      Calcium 8.7 mg/dL      eGFR Non African Amer 22 mL/min/1.73      BUN/Creatinine Ratio 38.6     Anion Gap 13.0 mmol/L     POC Glucose Once [[id number]]  (Abnormal) Collected:  02/06/19 2118    Specimen:  Blood Updated:  02/07/19 0322     Glucose 170 mg/dL      Comment: INSULIN PUMPOperator: [id number] C[place] AMYMeter ID: [id number]       Troponin [[id number]]  (Abnormal) Collected:  02/06/19 2314    Specimen:  Blood Updated:  02/06/19 2355     Troponin I 1.690 ng/mL     Extra Tubes [[id number]] Collected:  02/06/19 1747    Specimen:  Blood, Venous Line Updated:  02/06/19 1901    Narrative:       The following orders were created for panel order Extra Tubes.  Procedure                               Abnormality         Status                     ---------                               -----------         ------                     Light Blue Top[[id number]]                                   Final result                 Please view results for these tests on the individual orders.    Light Blue Top [[id number]] Collected:  02/06/19 1747    Specimen:  Blood Updated:  02/06/19 1901     Extra Tube hold for add-on     Comment: Auto resulted       Troponin [[id number]]  (Abnormal) Collected:  02/06/19 1747    Specimen:  Blood Updated:  02/06/19 1858     Troponin I 2.200 ng/mL     Basic Metabolic Panel [[id number]]  (Abnormal) Collected:  02/06/19 1747    Specimen:  Blood Updated:  02/06/19 1847     Glucose 122 mg/dL      BUN 83 mg/dL      Creatinine 2.11 mg/dL      Sodium 121 mmol/L      Potassium 4.6 mmol/L      Chloride 77 mmol/L      CO2 29.0 mmol/L      Calcium 8.7 mg/dL      eGFR Non African Amer 23 mL/min/1.73      BUN/Creatinine Ratio 39.3     Anion Gap 15.0 mmol/L     CBC & Differential [[id number]] Collected:  02/06/19  "1747    Specimen:  Blood Updated:  02/06/19 1807    Narrative:       The following orders were created for panel order CBC & Differential.  Procedure                               Abnormality         Status                     ---------                               -----------         ------                     CBC Auto Differential[[id number]]        Abnormal            Final result                 Please view results for these tests on the individual orders.    CBC Auto Differential [[id number]]  (Abnormal) Collected:  02/06/19 1747    Specimen:  Blood Updated:  02/06/19 1807     WBC 5.38 10*3/mm3      RBC 2.69 10*6/mm3      Hemoglobin 8.7 g/dL      Hematocrit 24.6 %      MCV 91.4 fL      MCH 32.3 pg      MCHC 35.4 g/dL      RDW 13.2 %      RDW-SD 43.7 fl      MPV 9.6 fL      Platelets 161 10*3/mm3      Neutrophil % 78.9 %      Lymphocyte % 10.6 %      Monocyte % 8.0 %      Eosinophil % 1.5 %      Basophil % 0.4 %      Immature Grans % 0.6 %      Neutrophils, Absolute 4.25 10*3/mm3      Lymphocytes, Absolute 0.57 10*3/mm3      Monocytes, Absolute 0.43 10*3/mm3      Eosinophils, Absolute 0.08 10*3/mm3      Basophils, Absolute 0.02 10*3/mm3      Immature Grans, Absolute 0.03 10*3/mm3         /65 (BP Location: Left arm, Patient Position: Lying)   Pulse 94   Temp 97.8 °F (36.6 °C) (Temporal)   Resp 18   Ht 160 cm (62.99\")   Wt 99.4 kg (219 lb 1 oz)   SpO2 96%   BMI 38.82 kg/m²     Discharge Exam:  General Appearance:    Alert, cooperative, no distress                          Head:    Normocephalic, without obvious abnormality, atraumatic                          Eyes:                            Throat:   Lips, tongue, gums normal                          Neck:   Supple, symmetrical, trachea midline, no JVD                        Lungs:     Clear to auscultation bilaterally, respirations unlabored                Chest Wall:    No tenderness or deformity                        Heart:    Regular rate and " rhythm, S1 and S2 normal, no murmur,no  Rub or gallop                  Abdomen:     Soft, non-tender, bowel sounds active, no masses, no organomegaly                  Extremities:   Extremities normal, atraumatic, no cyanosis or edema                             Skin:   Skin is warm and dry,  no rashes or palpable lesions                  Neurologic:   no focal deficits noted     Disposition:  Home with home health    Patient Instructions:     Diet and activity as tolerated         Discharge Medications      New Medications      Instructions Start Date   ranolazine 500 MG 12 hr tablet  Commonly known as:  RANEXA   500 mg, Oral, Every 12 Hours Scheduled         Changes to Medications      Instructions Start Date   bumetanide 1 MG tablet  Commonly known as:  BUMEX  What changed:    · medication strength  · how much to take   1 mg, Oral, Daily      carvedilol 3.125 MG tablet  Commonly known as:  COREG  What changed:  how much to take   3.125 mg, Oral, 2 Times Daily With Meals      hydrALAZINE 50 MG tablet  Commonly known as:  APRESOLINE  What changed:    · how much to take  · when to take this   50 mg, Oral, 2 Times Daily      vitamin D 77307 units capsule capsule  Commonly known as:  ERGOCALCIFEROL  What changed:  additional instructions   50,000 Units, Oral, Weekly         Continue These Medications      Instructions Start Date   aspirin 81 MG chewable tablet   81 mg, Oral, Daily      clopidogrel 75 MG tablet  Commonly known as:  PLAVIX   75 mg, Oral, Daily      ferrous sulfate 324 (65 Fe) MG tablet delayed-release EC tablet   324 mg, Oral, 2 Times Daily With Meals      glucose blood test strip   Needs to see pcp for refills      HYDROcodone-acetaminophen  MG per tablet  Commonly known as:  NORCO   1 tablet, Oral, 4 Times Daily      Insulin Lispro 100 UNIT/ML solution cartridge   150 Units, Subcutaneous, Daily, DX: E11      HUMALOG 100 UNIT/ML injection  Generic drug:  insulin lispro   INJECT 150 UNITS SUB-Q  "DAILY PER INSULIN PUMP      Insulin Syringe-Needle U-100 30G X 5/16\" 0.3 ML misc  Commonly known as:  GLOBAL INSULIN SYRINGES   SQ injections as needed when insulin pump is non functioning      isosorbide mononitrate 60 MG 24 hr tablet  Commonly known as:  IMDUR   60 mg, Oral, 2 Times Daily      nitroglycerin 0.4 MG/SPRAY spray  Commonly known as:  NITROLINGUAL   1 spray, Sublingual, Every 5 Minutes PRN      raNITIdine 300 MG tablet  Commonly known as:  ZANTAC   TAKE 1 TABLET BY MOUTH EVERY NIGHT.      rosuvastatin 20 MG tablet  Commonly known as:  CRESTOR   20 mg, Oral, Daily      sertraline 50 MG tablet  Commonly known as:  ZOLOFT   50 mg, Oral, Daily      spironolactone 25 MG tablet  Commonly known as:  ALDACTONE   25 mg, Oral, Daily      zolpidem 10 MG tablet  Commonly known as:  AMBIEN   10 mg, Oral, Nightly PRN         Stop These Medications    lisinopril 10 MG tablet  Commonly known as:  PRINIVIL,ZESTRIL     potassium chloride 10 MEQ CR capsule  Commonly known as:  MICRO-K          Future Appointments   Date Time Provider Department Center   3/20/2019  3:30 PM Yaniv Lobo MD [facility] None     Additional Instructions for the Follow-ups that You Need to Schedule     Ambulatory Referral to Home Health   As directed      Face to Face Visit Date:  2/12/2019    Follow-up Provider for Plan of Care?:  I treated the patient in an acute care facility and will not continue treatment after discharge.    Follow-up Provider:  TUCKER CARCAMO [[id number]]    Reason/Clinical Findings:  weakness    Describe mobility limitations that make leaving home difficult:  weakness    Nursing/Therapeutic Services Requested:  Skilled Nursing Physical Therapy Occupational Therapy    Skilled nursing orders:  Cardiopulmonary assessments    PT orders:  Therapeutic exercise    Occupational orders:  Strengthening    Frequency:  1 Week 1           Follow-up Information     Joon Dugan MD Follow up in 3 week(s).    Specialty:  " Nephrology  Contact information:  [address] [facility] [id number] [phone]             Henry Muniz MD Follow up in 1 week(s).    Specialty:  Internal Medicine  Contact information:  [address] [facility] [address] [phone]             [facility] HOME CARE REFERRAL .    Specialty:  Home Health Services  Contact information:  [address]  [facility] [address]               Discharge Order (From admission, onward)    Start     Ordered    02/12/19 1525  Discharge patient  Once     Expected Discharge Date:  02/12/19    Discharge Disposition:  Home-Health Care [facility]    Physician of Record for Attribution - Please select from Treatment Team:  EVANGELISTA LEE [[id number]]    Review needed by CMO to determine Physician of Record:  No       Question Answer Comment   Physician of Record for Attribution - Please select from Treatment Team EVANGELISTA LEE    Review needed by CMO to determine Physician of Record No        02/12/19 1529          Total time spent discharging patient including evaluation,post hospitalization follow up,  medication and post hospitalization instructions and education total time exceeds 30 minutes.    Signed:  Evangelista Lee MD  2/12/2019  3:30 PM

## 2019-02-13 NOTE — OUTREACH NOTE
Prep Survey      Responses   Facility patient discharged from?  [place]   Is patient eligible?  Yes   Discharge diagnosis  NSTEMI, COPD, CIERRA, HLD, Essential HTN, DM II, Class 2 severe obesity, chronic renal impairment stage 3, CAD, Hyponatremia, Acute on chronic diastolic CHF   Does the patient have one of the following disease processes/diagnoses(primary or secondary)?  Acute MI (STEMI,NSTEMI)   Does the patient have Home health ordered?  Yes   What is the Home health agency?   [facility]   Is there a DME ordered?  No   Comments regarding appointments  See AVS   Prep survey completed?  Yes          Myla Manley RN

## 2019-02-13 NOTE — THERAPY DISCHARGE NOTE
Acute Care - Physical Therapy Discharge Summary  [facility]       Patient Name: Nicolasa Rojas  : 1950  MRN: [id number]    Today's Date: 2019  Onset of Illness/Injury or Date of Surgery: 19    Date of Referral to PT: 19  Referring Physician: Dr. Jarrell      Admit Date: 2019      PT Recommendation and Plan    Visit Dx:    ICD-10-CM ICD-9-CM   1. Hyponatremia E87.1 276.1   2. Impaired physical mobility Z74.09 781.99   3. Impaired mobility and ADLs Z74.09 799.89   4. Acute on chronic diastolic CHF (congestive heart failure) (CMS/[facility]) I50.33 428.33     428.0   5. Shortness of breath R06.02 786.05       Outcome Measures     Row Name 19 1200 19 1420 19 0815       How much help from another person do you currently need...    Turning from your back to your side while in flat bed without using bedrails?  4  -  4  -LF  --    Moving from lying on back to sitting on the side of a flat bed without bedrails?  4  -JW  4  -LF  --    Moving to and from a bed to a chair (including a wheelchair)?  3  -JW  3  -LF  --    Standing up from a chair using your arms (e.g., wheelchair, bedside chair)?  4  -  4  -LF  --    Climbing 3-5 steps with a railing?  3  -JW  3  -LF  --    To walk in hospital room?  3  -JW  3  -LF  --    AM-[facility] 6 Clicks Score  21  -JW  21  -LF  --       How much help from another is currently needed...    Putting on and taking off regular lower body clothing?  --  --  3  -JH    Bathing (including washing, rinsing, and drying)  --  --  3  -JH    Toileting (which includes using toilet bed pan or urinal)  --  --  3  -JH    Putting on and taking off regular upper body clothing  --  --  3  -JH    Taking care of personal grooming (such as brushing teeth)  --  --  3  -JH    Eating meals  --  --  4  -JH    Score  --  --  19  -JH       Functional Assessment    Outcome Measure Options  AM-PAC 6 Clicks Basic Mobility (PT)  -JW  AM-PAC 6 Clicks Basic Mobility (PT)  -LF   --      User Key  (r) = Recorded By, (t) = Taken By, (c) = Cosigned By    Initials Name Provider Type    Ray Duckworth, PTA Physical Therapy Assistant    Maira Parmar, RUFUS/L Occupational Therapy Assistant    Cheri Clark, PT Physical Therapist            Therapy Suggested Charges     Code   Minutes Charges    15631 (CPT®) Hc Pt Neuromusc Re Education Ea 15 Min      74823 (CPT®) Hc Pt Ther Proc Ea 15 Min 15 1    16398 (CPT®) Hc Gait Training Ea 15 Min 17 1    92057 (CPT®) Hc Pt Therapeutic Act Ea 15 Min      96682 (CPT®) Hc Pt Manual Therapy Ea 15 Min      16803 (CPT®) Hc Pt Iontophoresis Ea 15 Min      09026 (CPT®) Hc Pt Elec Stim Ea-Per 15 Min      25547 (CPT®) Hc Pt Ultrasound Ea 15 Min      48763 (CPT®) Hc Pt Self Care/Mgmt/Train Ea 15 Min      58825 (CPT®) Hc Pt Prosthetic (S) Train Initial Encounter, Each 15 Min      52281 (CPT®) Hc Pt Orthotic(S)/Prosthetic(S) Encounter, Each 15 Min      70633 (CPT®) Hc Orthotic(S) Mgmt/Train Initial Encounter, Each 15min      Total  32 2          Rehab Goal Summary     Row Name 02/13/19 1447 02/13/19 0732          Physical Therapy Goals    Bed Mobility Goal Selection (PT)  bed mobility, PT goal 1  -LF  --     Transfer Goal Selection (PT)  transfer, PT goal 1  -LF  --     Gait Training Goal Selection (PT)  gait training, PT goal 1  -LF  --        Bed Mobility Goal 1 (PT)    Activity/Assistive Device (Bed Mobility Goal 1, PT)  sit to supine/supine to sit  -LF  --     [place] Level/Cues Needed (Bed Mobility Goal 1, PT)  independent  -LF  --     Time Frame (Bed Mobility Goal 1, PT)  short term goal (STG);2 days  -LF  --     Barriers (Bed Mobility Goal 1, PT)  HOB flat, no bed rails  -LF  --     Progress/Outcomes (Bed Mobility Goal 1, PT)  goal met  -LF  --        Transfer Goal 1 (PT)    Activity/Assistive Device (Transfer Goal 1, PT)  sit-to-stand/stand-to-sit;bed-to-chair/chair-to-bed  -LF  --     [place] Level/Cues Needed (Transfer Goal 1, PT)   independent  -LF  --     Time Frame (Transfer Goal 1, PT)  long term goal (LTG);by discharge  -LF  --     Barriers (Transfers Goal 1, PT)  fatigues easily  -LF  --     Progress/Outcome (Transfer Goal 1, PT)  goal not met;good progress toward goal  -LF  --        Gait Training Goal 1 (PT)    Activity/Assistive Device (Gait Training Goal 1, PT)  gait (walking locomotion)  -LF  --     [place] Level (Gait Training Goal 1, PT)  supervision required  -LF  --     Distance (Gait Goal 1, PT)  50'x1  -LF  --     Time Frame (Gait Training Goal 1, PT)  long term goal (LTG);by discharge  -LF  --     Barriers (Gait Training Goal 1, PT)  fatigues easily  -LF  --     Progress/Outcome (Gait Training Goal 1, PT)  goal not met;goal ongoing  -LF  --        Occupational Therapy Goals    Transfer Goal Selection (OT)  --  transfer, OT goal 1  -     Bathing Goal Selection (OT)  --  bathing, OT goal 1  -     Dressing Goal Selection (OT)  --  dressing, OT goal 1  -     Toileting Goal Selection (OT)  --  toileting, OT goal 1  -     Endurance Goal Selection (OT)  --  endurance, OT goal 1  -     Functional Mobility Goal Selection (OT)  --  functional mobility, OT goal 1  -        Transfer Goal 1 (OT)    Activity/Assistive Device (Transfer Goal 1, OT)  --  toilet  -     [place] Level/Cues Needed (Transfer Goal 1, OT)  --  standby assist  -     Time Frame (Transfer Goal 1, OT)  --  long term goal (LTG);by discharge  -     Progress/Outcome (Transfer Goal 1, OT)  --  goal met  -        Bathing Goal 1 (OT)    Activity/Assistive Device (Bathing Goal 1, OT)  --  upper body bathing  -     [place] Level/Cues Needed (Bathing Goal 1, OT)  --  minimum assist (75% or more patient effort)  -     Time Frame (Bathing Goal 1, OT)  --  long term goal (LTG);by discharge  -     Progress/Outcomes (Bathing Goal 1, OT)  --  goal not met  -        Dressing Goal 1 (OT)    Activity/Assistive Device (Dressing Goal 1, OT)  --   upper body dressing  -     [place]/Cues Needed (Dressing Goal 1, OT)  --  minimum assist (75% or more patient effort)  -     Time Frame (Dressing Goal 1, OT)  --  long term goal (LTG);by discharge  -     Progress/Outcome (Dressing Goal 1, OT)  --  goal not met  -        Toileting Goal 1 (OT)    Activity/Device (Toileting Goal 1, OT)  --  toileting skills, all  -     [place] Level/Cues Needed (Toileting Goal 1, OT)  --  standby assist  -     Time Frame (Toileting Goal 1, OT)  --  long term goal (LTG);by discharge  -     Progress/Outcome (Toileting Goal 1, OT)  --  goal not met  -         Endurance Goal 1 (OT)    Activity Level (Endurance Goal 1, OT)  --  endurance 2 fair + 25 min functional task 3 or less rest breaks O2 90 or up  -     Time Frame (Endurance Goal 1, OT)  --  long term goal (LTG);by discharge  -     Progress/Outcome (Endurance Goal 1, OT)  --  goal not met  -        Functional Mobility Goal 1 (OT)    Activity/Assistive Device (Functional Mobility Goal 1, OT)  --  walker, rolling  -     [place] Level/Cues Needed (Functional Mobility Goal 1, OT)  --  contact guard assist  -     Distance Goal 1 (Functional Mobility, OT)  --  to bathroom and back to bed/chair   -     Time Frame (Functional Mobility Goal 1, OT)  --  long term goal (LTG);by discharge  -     Progress/Outcome (Functional Mobility Goal 1, OT)  --  goal not met  -       User Key  (r) = Recorded By, (t) = Taken By, (c) = Cosigned By    Initials Name Provider Type Discipline     Lucy Luna, OTR/L Occupational Therapist OT    Cheri Clark, PT Physical Therapist PT              PT Discharge Summary  Anticipated Discharge Disposition (PT): home with home health, home with assist  Reason for Discharge: Discharge from facility, Per MD order  Outcomes Achieved: Patient able to partially acheive established goals, Refer to plan of care for updates on goals achieved  Discharge Destination: Home with  home health      Cheri Engel, PT   2/13/2019

## 2019-02-13 NOTE — THERAPY DISCHARGE NOTE
Acute Care - Occupational Therapy Discharge Summary  [facility]     Patient Name: Nicolasa Rojas  : 1950  MRN: [id number]    Today's Date: 2019  Onset of Illness/Injury or Date of Surgery: 19    Date of Referral to OT: 19  Referring Physician: Dr. Jarrell      Admit Date: 2019        OT Recommendation and Plan    Visit Dx:    ICD-10-CM ICD-9-CM   1. Hyponatremia E87.1 276.1   2. Impaired physical mobility Z74.09 781.99   3. Impaired mobility and ADLs Z74.09 799.89   4. Acute on chronic diastolic CHF (congestive heart failure) (CMS/[facility]) I50.33 428.33     428.0   5. Shortness of breath R06.02 786.05               Rehab Goal Summary     Row Name 19 0732             Occupational Therapy Goals    Transfer Goal Selection (OT)  transfer, OT goal 1  -      Bathing Goal Selection (OT)  bathing, OT goal 1  -      Dressing Goal Selection (OT)  dressing, OT goal 1  -      Toileting Goal Selection (OT)  toileting, OT goal 1  -      Endurance Goal Selection (OT)  endurance, OT goal 1  -      Functional Mobility Goal Selection (OT)  functional mobility, OT goal 1  -         Transfer Goal 1 (OT)    Activity/Assistive Device (Transfer Goal 1, OT)  toilet  -      [place] Level/Cues Needed (Transfer Goal 1, OT)  standby assist  -      Time Frame (Transfer Goal 1, OT)  long term goal (LTG);by discharge  -      Progress/Outcome (Transfer Goal 1, OT)  goal met  -         Bathing Goal 1 (OT)    Activity/Assistive Device (Bathing Goal 1, OT)  upper body bathing  -      [place] Level/Cues Needed (Bathing Goal 1, OT)  minimum assist (75% or more patient effort)  -      Time Frame (Bathing Goal 1, OT)  long term goal (LTG);by discharge  -      Progress/Outcomes (Bathing Goal 1, OT)  goal not met  -         Dressing Goal 1 (OT)    Activity/Assistive Device (Dressing Goal 1, OT)  upper body dressing  -      [place]/Cues Needed (Dressing Goal 1, OT)  minimum  assist (75% or more patient effort)  -      Time Frame (Dressing Goal 1, OT)  long term goal (LTG);by discharge  -      Progress/Outcome (Dressing Goal 1, OT)  goal not met  -         Toileting Goal 1 (OT)    Activity/Device (Toileting Goal 1, OT)  toileting skills, all  -      [place] Level/Cues Needed (Toileting Goal 1, OT)  standby assist  -      Time Frame (Toileting Goal 1, OT)  long term goal (LTG);by discharge  -      Progress/Outcome (Toileting Goal 1, OT)  goal not met  -          Endurance Goal 1 (OT)    Activity Level (Endurance Goal 1, OT)  endurance 2 fair + 25 min functional task 3 or less rest breaks O2 90 or up  -      Time Frame (Endurance Goal 1, OT)  long term goal (LTG);by discharge  -      Progress/Outcome (Endurance Goal 1, OT)  goal not met  -         Functional Mobility Goal 1 (OT)    Activity/Assistive Device (Functional Mobility Goal 1, OT)  walker, rolling  -      [place] Level/Cues Needed (Functional Mobility Goal 1, OT)  contact guard assist  -      Distance Goal 1 (Functional Mobility, OT)  to bathroom and back to bed/chair   -      Time Frame (Functional Mobility Goal 1, OT)  long term goal (LTG);by discharge  -      Progress/Outcome (Functional Mobility Goal 1, OT)  goal not met  -        User Key  (r) = Recorded By, (t) = Taken By, (c) = Cosigned By    Initials Name Provider Type Discipline     Lucy Luna, OTR/L Occupational Therapist OT          Outcome Measures     Row Name 02/12/19 1200 02/11/19 1420 02/11/19 0815       How much help from another person do you currently need...    Turning from your back to your side while in flat bed without using bedrails?  4  -JW  4  -LF  --    Moving from lying on back to sitting on the side of a flat bed without bedrails?  4  -JW  4  -LF  --    Moving to and from a bed to a chair (including a wheelchair)?  3  -JW  3  -LF  --    Standing up from a chair using your arms (e.g., wheelchair, bedside  chair)?  4  -JW  4  -LF  --    Climbing 3-5 steps with a railing?  3  -JW  3  -LF  --    To walk in hospital room?  3  -JW  3  -LF  --    -[facility] 6 Clicks Score  21  -JW  21  -LF  --       How much help from another is currently needed...    Putting on and taking off regular lower body clothing?  --  --  3  -JH    Bathing (including washing, rinsing, and drying)  --  --  3  -JH    Toileting (which includes using toilet bed pan or urinal)  --  --  3  -JH    Putting on and taking off regular upper body clothing  --  --  3  -JH    Taking care of personal grooming (such as brushing teeth)  --  --  3  -JH    Eating meals  --  --  4  -JH    Score  --  --  19  -JH       Functional Assessment    Outcome Measure Options  [facility] 6 Clicks Basic Mobility (PT)  -[place] [address] Clicks Basic Mobility (PT)  -  --    Row Name 02/10/19 1330 02/10/19 0748          How much help from another person do you currently need...    Turning from your back to your side while in flat bed without using bedrails?  4  -LN  --     Moving from lying on back to sitting on the side of a flat bed without bedrails?  4  -LN  --     Moving to and from a bed to a chair (including a wheelchair)?  3  -LN  --     Standing up from a chair using your arms (e.g., wheelchair, bedside chair)?  3  -LN  --     Climbing 3-5 steps with a railing?  3  -LN  --     To walk in hospital room?  3  -LN  --     AM-[facility] 6 Clicks Score  20  -LN  --        How much help from another is currently needed...    Putting on and taking off regular lower body clothing?  --  1  -BB     Bathing (including washing, rinsing, and drying)  --  2  -BB     Toileting (which includes using toilet bed pan or urinal)  --  2  -BB     Putting on and taking off regular upper body clothing  --  3  -BB     Taking care of personal grooming (such as brushing teeth)  --  3  -BB     Eating meals  --  4  -BB     Score  --  15  -BB        Functional Assessment    Outcome Measure Options  AM-PAC 6 Clicks Basic  Mobility (PT)  -LN  --       User Key  (r) = Recorded By, (t) = Taken By, (c) = Cosigned By    Initials Name Provider Type    Ray Duckworth, PTA Physical Therapy Assistant    LN Elinor Kennedy, PTA Physical Therapy Assistant    Shahida [place]illela, HOOPER/L Occupational Therapy Assistant    Maira Parmar, HOOPER/L Occupational Therapy Assistant    Cheri Clark, JUAN Physical Therapist          Therapy Suggested Charges     Code   Minutes Charges    None                 OT Discharge Summary  Anticipated Discharge Disposition (OT): home with home health  Reason for Discharge: Discharge from facility, Per MD order  Outcomes Achieved: Refer to plan of care for updates on goals achieved, Patient able to partially acheive established goals  Discharge Destination: Home with home health      Lucy Luna, OTR/L  2/13/2019

## 2019-02-14 NOTE — OUTREACH NOTE
AMI Week 1 Survey      Responses   Facility patient discharged from?  [place]   Does the patient have one of the following disease processes/diagnoses(primary or secondary)?  Acute MI (STEMI,NSTEMI)   Is there a successful TCM telephone encounter documented?  No   Week 1 attempt successful?  No   Unsuccessful attempts  Attempt 1          Elis Sequeira RN

## 2019-02-16 NOTE — OUTREACH NOTE
AMI Week 1 Survey      Responses   Facility patient discharged from?  [place]   Does the patient have one of the following disease processes/diagnoses(primary or secondary)?  Acute MI (STEMI,NSTEMI)   Is there a successful TCM telephone encounter documented?  No   Week 1 attempt successful?  Yes   Call start time  1827   Meds reviewed with patient/caregiver?  Yes   Is the patient having any side effects they believe may be caused by any medication additions or changes?  No   Does the patient have all prescriptions related to this admission filled (includes statins,anticoagulants,HTN meds,anti-arrhythmia meds)  Yes   Is the patient taking all medications as directed (includes completed medication regime)?  Yes   Does the patient have a primary care provider?   Yes   Does the patient have an appointment with their PCP,cardiologist,or clinic within 7 days of discharge?  Yes   Has the patient kept scheduled appointments due by today?  N/A   What is the Home health agency?   [facility]   Has home health visited the patient within 72 hours of discharge?  Yes   Did the patient receive a copy of their discharge instructions?  Yes   What is the patient's perception of their health status since discharge?  Improving   Nursing interventions  Nurse provided patient education   Nursing interventions  Provided education on importance of cardiac rehab   Week 1 call completed?  Yes          Ana Vinson RN

## 2019-02-22 NOTE — OUTREACH NOTE
"AMI Week 2 Survey      Responses   Facility patient discharged from?  [place]   Does the patient have one of the following disease processes/diagnoses(primary or secondary)?  Acute MI (STEMI,NSTEMI)   Week 2 attempt successful?  Yes   Call start time  1607   Call end time  1612   Discharge diagnosis  NSTEMI, COPD, CIERRA, HLD, Essential HTN, DM II, Class 2 severe obesity, chronic renal impairment stage 3, CAD, Hyponatremia, Acute on chronic diastolic CHF   Meds reviewed with patient/caregiver?  Yes   Is the patient taking all medications as directed (includes completed medication regime)?  Yes   Has the patient kept scheduled appointments due by today?  Yes   What is the Home health agency?   [facility]   Has home health visited the patient within 72 hours of discharge?  Yes   What is the patient's perception of their health status since discharge?  Same   Is the patient/caregiver able to teach back signs and symptoms of when to call for help immediately:  Sudden chest discomfort, Sudden discomfort in arms, back, neck or jaw, Shortness of breath at any time, Sudden sweating or clammy skin, Nausea or vomiting   Is the patient/caregiver able to teach back lifestyle changes to help prevent MIs  Managing diabetes   Is the patient/caregiver able to teach back ways to prevent a second heart attack:  Take medications, Follow up with MD   Additional teach back comments  Pt says she is \"no better\". \"same as when in hospital\". Has weakness. No energy. \"when I walk I fell like I am goint to hit the ground\". BS have been low and insulin dose was changed yesterday by PCP.  is aware of pt's symptoms   Week 2 call completed?  Yes          Saira Matias RN  "

## 2019-03-01 NOTE — OUTREACH NOTE
AMI Week 3 Survey      Responses   Facility patient discharged from?  [place]   Does the patient have one of the following disease processes/diagnoses(primary or secondary)?  Acute MI (STEMI,NSTEMI)   Week 3 attempt successful?  Yes [PT states she is not feeling well and does not want to talk at this time.]   Call start time  1748   Call end time  1749   Discharge diagnosis  NSTEMI, COPD, CIERRA, HLD, Essential HTN, DM II, Class 2 severe obesity, chronic renal impairment stage 3, CAD, Hyponatremia, Acute on chronic diastolic CHF   Is the patient taking all medications as directed (includes completed medication regime)?  Yes   Week 3 call completed?  Yes          Bella Ireland RN

## 2019-03-10 NOTE — ED PROVIDER NOTES
Subjective   68-year-old female in the emergency department today complaining of generalized weakness, nausea, vomiting.            Review of Systems   Constitutional: Positive for activity change and fatigue. Negative for fever.   HENT: Negative for congestion and rhinorrhea.    Respiratory: Positive for shortness of breath (lópez ).    Cardiovascular: Negative for chest pain and palpitations.   Gastrointestinal: Positive for abdominal pain, nausea and vomiting. Negative for diarrhea.   Genitourinary: Negative for flank pain.   Musculoskeletal: Negative for arthralgias.   Skin: Negative for rash.   Neurological: Positive for dizziness and weakness.   Psychiatric/Behavioral: Negative for confusion.   All other systems reviewed and are negative.      Past Medical History:   Diagnosis Date   • Acute bronchitis    • Acute congestive heart failure (CMS/HCC)     resolving   • Acute kidney failure, unspecified (CMS/HCC)    • Acute kidney failure, unspecified (CMS/HCC)    • Acute posthemorrhagic anemia    • Asthenia    • Chest pain    • Chronic gastritis    • Chronic pharyngitis    • Chronic renal impairment    • Congenital renal failure    • Congestive heart failure (CMS/HCC)    • Contact dermatitis due to poison ivy    • COPD (chronic obstructive pulmonary disease) (CMS/HCC)    • Coronary arteriosclerosis    • D-dimer, elevated     D-dimer above reference range    • Degenerative joint disease involving multiple joints     back   • Depressive disorder    • Dysphagia    • Dyspnea    • Edema of lower extremity    • Encounter for immunization    • Encounter for long-term (current) use of medications    • Epigastric pain    • Esophagitis    • Essential hypertension    • Gastroesophageal reflux disease    • Gastroparesis     Gastroparesis syndrome    • Generalized abdominal pain    • H/O bone density study 01/09/2015   • H/O mammogram 01/15/2015   • H/O screening mammography 11/12/2013   • Headache    • History of transfusion   "  • Hyperlipidemia    • Hypokalemia    • Hypomagnesemia    • Injury of tendon of rotator cuff     Injury of tendon of the rotator cuff of shoulder      • Insomnia    • Insomnia, unspecified    • Knee pain    • Nausea and vomiting    • Neck pain    • Need for immunization against influenza    • Need for prophylactic vaccination and inoculation against influenza    • Need for prophylactic vaccination and inoculation against viral disease    • 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   • Orthopnea    • Osteoarthritis    • Pain of breast     right   • Shoulder pain    • Sleep disorder    • Stricture of esophagus    • Symptomatic menopausal or female climacteric states    • Type 2 diabetes mellitus (CMS/HCC)        Allergies   Allergen Reactions   • Iodides Anaphylaxis     Iodine and iodide containing products  \"knocks my kidneys out\"    • Iodine      Iodine and iodide containing products   • Other Unknown (See Comments)     Steroids \"makes my tongue feel like it swells\"   • Stadol [Butorphanol] Unknown (See Comments)     \"rises my BP\"       Past Surgical History:   Procedure Laterality Date   • BREAST BIOPSY Bilateral    • CHOLECYSTECTOMY     • COLONOSCOPY  03/29/2012    Diverticulosis. Performed at [facility].   • ENDOSCOPY  02/09/2015    ESOPHAGEAL STRICTURE PRESENT, GASTRITIS FOUND IN THE STOMACH, NORMAL DUODENUM   • ENDOSCOPY W/ PEG TUBE PLACEMENT  12/19/2012    Esophagitis seen. Biopsy taken. Esophageal stricture present. Dilatation performed. Gastritis in stomach. Biopsy taken. Food was in stomach. Normal duodenum.   • HERNIA REPAIR     • HYSTERECTOMY      partial   • NECK SURGERY         Family History   Problem Relation Age of Onset   • Cancer Mother    • Diabetes Mother    • Stroke Father    • Heart disease Sister    • Colon cancer Other    • Hypertension Other    • Breast cancer Maternal Aunt        Social History     Socioeconomic History   • Marital status:      Spouse " name: Not on file   • Number of children: Not on file   • Years of education: Not on file   • Highest education level: Not on file   Tobacco Use   • Smoking status: Former Smoker   • Smokeless tobacco: Never Used   • Tobacco comment: quit 30 years ago    Substance and Sexual Activity   • Alcohol use: No   • Drug use: No   • Sexual activity: Defer   Social History Narrative    Pt denies ever having a feeding tube, denies PEG tube placement.             Objective   Physical Exam   Constitutional: She is oriented to person, place, and time. She appears well-developed and well-nourished.   HENT:   Head: Normocephalic.   Nose: Nose normal.   Mouth/Throat: Oropharynx is clear and moist.   Eyes: Pupils are equal, round, and reactive to light.   Neck: Normal range of motion.   Cardiovascular: Normal rate, regular rhythm and normal heart sounds.   Pulmonary/Chest: Effort normal and breath sounds normal.   Abdominal: Soft. She exhibits distension. There is tenderness (Generalized ).   Musculoskeletal: Normal range of motion.   Neurological: She is alert and oriented to person, place, and time.   Skin: Skin is warm and dry. Capillary refill takes less than 2 seconds.   Psychiatric: She has a normal mood and affect.   Nursing note and vitals reviewed.      ECG 12 Lead    Date/Time: 3/10/2019 9:37 AM  Performed by: Marvin Blevins APRN  Authorized by: Marvin Blevins APRN   Interpreted by physician  Comparison: compared with previous ECG   Rhythm: sinus rhythm  Rate: normal  Conduction: left bundle branch block  ST Segments: ST segments normal  T Waves: T waves normal                   ED Course    ..  XR Chest 1 View   Final Result   CONCLUSION:   Stable cardiomegaly.   Perhaps very small right pleural effusion with minimal fluid in   the minor fissure.      [id number]      Electronically signed by:  Karl Harris MD  3/10/2019 11:18 AM   CDT Workstation: [id number]      CT Abdomen Pelvis Without Contrast    (Results  Pending)     ..  Results for orders placed or performed during the hospital encounter of 03/10/19   Comprehensive Metabolic Panel   Result Value Ref Range    Glucose 89 60 - 100 mg/dL    BUN 41 (H) 7 - 21 mg/dL    Creatinine 1.44 (H) 0.50 - 1.00 mg/dL    Sodium 116 (C) 137 - 145 mmol/L    Potassium 3.0 (L) 3.5 - 5.1 mmol/L    Chloride 76 (L) 95 - 110 mmol/L    CO2 28.0 22.0 - 31.0 mmol/L    Calcium 8.5 8.4 - 10.2 mg/dL    Total Protein 6.9 6.3 - 8.6 g/dL    Albumin 3.60 3.40 - 4.80 g/dL    ALT (SGPT) 16 9 - 52 U/L    AST (SGOT) 70 (H) 14 - 36 U/L    Alkaline Phosphatase 87 38 - 126 U/L    Total Bilirubin 1.4 (H) 0.2 - 1.3 mg/dL    eGFR Non  Amer 36 (L) 45 - 104 mL/min/1.73    Globulin 3.3 2.3 - 3.5 gm/dL    A/G Ratio 1.1 1.1 - 1.8 g/dL    BUN/Creatinine Ratio 28.5 (H) 7.0 - 25.0    Anion Gap 12.0 5.0 - 15.0 mmol/L   Troponin   Result Value Ref Range    Troponin I 6.560 (C) <=0.034 ng/mL   Magnesium   Result Value Ref Range    Magnesium 1.6 1.6 - 2.3 mg/dL   Urinalysis With Microscopic If Indicated (No Culture) - Urine, Clean Catch   Result Value Ref Range    Color, UA Dark Yellow Yellow, Straw, Dark Yellow, Martha    Appearance, UA Clear Clear    pH, UA <=5.0 5.0 - 9.0    Specific Gravity, UA 1.020 1.003 - 1.030    Glucose, UA Negative Negative    Ketones, UA Negative Negative    Bilirubin, UA Small (1+) (A) Negative    Blood, UA Moderate (2+) (A) Negative    Protein, UA >=300 mg/dL (3+) (A) Negative    Leuk Esterase, UA Negative Negative    Nitrite, UA Negative Negative    Urobilinogen, UA 1.0 E.U./dL 0.2 - 1.0 E.U./dL   CBC Auto Differential   Result Value Ref Range    WBC 9.16 3.40 - 10.80 10*3/mm3    RBC 3.15 (L) 3.77 - 5.28 10*6/mm3    Hemoglobin 10.1 (L) 12.0 - 15.9 g/dL    Hematocrit 28.5 (L) 34.0 - 46.6 %    MCV 90.5 79.0 - 97.0 fL    MCH 32.1 26.6 - 33.0 pg    MCHC 35.4 31.5 - 35.7 g/dL    RDW 15.6 (H) 12.3 - 15.4 %    RDW-SD 51.2 37.0 - 54.0 fl    MPV 10.3 6.0 - 12.0 fL    Platelets 157 140 - 450  10*3/mm3    Neutrophil % 86.9 (H) 42.7 - 76.0 %    Lymphocyte % 4.6 (L) 19.6 - 45.3 %    Monocyte % 6.8 5.0 - 12.0 %    Eosinophil % 0.2 (L) 0.3 - 6.2 %    Basophil % 0.4 0.0 - 1.5 %    Immature Grans % 1.1 (H) 0.0 - 0.5 %    Neutrophils, Absolute 7.96 (H) 1.40 - 7.00 10*3/mm3    Lymphocytes, Absolute 0.42 (L) 0.70 - 3.10 10*3/mm3    Monocytes, Absolute 0.62 0.10 - 0.90 10*3/mm3    Eosinophils, Absolute 0.02 0.00 - 0.40 10*3/mm3    Basophils, Absolute 0.04 0.00 - 0.20 10*3/mm3    Immature Grans, Absolute 0.10 (H) 0.00 - 0.05 10*3/mm3    nRBC 0.0 0.0 - 0.0 /100 WBC   Urinalysis, Microscopic Only - Urine, Clean Catch   Result Value Ref Range    RBC, UA 6-12 (A) None Seen /HPF    WBC, UA 3-5 None Seen, 0-2, 3-5 /HPF    Bacteria, UA Trace (A) None Seen /HPF    Squamous Epithelial Cells, UA 0-2 None Seen, 0-2 /HPF    Hyaline Casts, UA 3-6 None Seen /LPF    Mucus, UA Trace None Seen, Trace /HPF    Methodology Manual Light Microscopy    Light Blue Top   Result Value Ref Range    Extra Tube hold for add-on    Green Top (Gel)   Result Value Ref Range    Extra Tube Hold for add-ons.    Lavender Top   Result Value Ref Range    Extra Tube hold for add-on    Gold Top - SST   Result Value Ref Range    Extra Tube Hold for add-ons.                  MDM  Number of Diagnoses or Management Options  Hyponatremia:   NSTEMI (non-ST elevated myocardial infarction) (CMS/[facility]):      Amount and/or Complexity of Data Reviewed  Clinical lab tests: reviewed and ordered  Tests in the radiology section of CPT®: reviewed  Tests in the medicine section of CPT®: reviewed and ordered  Decide to obtain previous medical records or to obtain history from someone other than the patient: yes  Obtain history from someone other than the patient: yes  Discuss the patient with other providers: yes  Independent visualization of images, tracings, or specimens: yes    Risk of Complications, Morbidity, and/or Mortality  Presenting problems: moderate  Diagnostic  procedures: high  Management options: high    Patient Progress  Patient progress: stable        Final diagnoses:   NSTEMI (non-ST elevated myocardial infarction) (CMS/[facility])   Hyponatremia            Marvin Blevins, APRN  03/10/19 1210

## 2019-03-10 NOTE — H&P
[facility] [place] [facility] Admission      Date of Admission: 3/10/2019      Primary Care Physician: Henry Muniz MD      Chief Complaint: nausea, vomiting, epigastric pain and SOB.    HPI:  68 year old female patient who came to the ED due to nausea, vomiting, epigastric and SOB. As per patient she started having nausea and vomiting for last 3 days, vomiting described as non bilious non bloody, she had been able to hold fluids but not meals. She claimed to have chronic epigastric pain. Beside this she also complains of SOB and had an episode of chest pain, described as pressure on the left side,5/10 in severity, radiated to the left arm, minimally improved after one dose of sublingual nitroglycerin, but completely resolved after a second nitroglycerin.   Labs at the ED reported hyponatremia, hypokalemia, elevated troponin, elevated proBNP, elevated creatinine,anemia .       Concurrent Medical History:   Past Medical History:   Diagnosis Date   • Acute bronchitis    • Acute congestive heart failure (CMS/HCC)     resolving   • Acute kidney failure, unspecified (CMS/HCC)    • Acute kidney failure, unspecified (CMS/HCC)    • Acute posthemorrhagic anemia    • Asthenia    • Chest pain    • Chronic gastritis    • Chronic pharyngitis    • Chronic renal impairment    • Congenital renal failure    • Congestive heart failure (CMS/HCC)    • Contact dermatitis due to poison ivy    • COPD (chronic obstructive pulmonary disease) (CMS/HCC)    • Coronary arteriosclerosis    • D-dimer, elevated     D-dimer above reference range    • Degenerative joint disease involving multiple joints     back   • Depressive disorder    • Dysphagia    • Dyspnea    • Edema of lower extremity    • Encounter for immunization    • Encounter for long-term (current) use of medications    • Epigastric pain    • Esophagitis    • Essential hypertension    • Gastroesophageal reflux disease    • Gastroparesis      Gastroparesis syndrome    • Generalized abdominal pain    • H/O bone density study 01/09/2015   • H/O mammogram 01/15/2015   • H/O screening mammography 11/12/2013   • Headache    • History of transfusion    • Hyperlipidemia    • Hypokalemia    • Hypomagnesemia    • Injury of tendon of rotator cuff     Injury of tendon of the rotator cuff of shoulder      • Insomnia    • Insomnia, unspecified    • Knee pain    • Nausea and vomiting    • Neck pain    • Need for immunization against influenza    • Need for prophylactic vaccination and inoculation against influenza    • Need for prophylactic vaccination and inoculation against viral disease    • 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 Performed at [facility].   • ENDOSCOPY  02/09/2015    ESOPHAGEAL STRICTURE PRESENT, GASTRITIS FOUND IN THE STOMACH, NORMAL DUODENUM   • ENDOSCOPY W/ PEG TUBE PLACEMENT  12/19/2012    Esophagitis seen. Biopsy taken. Esophageal stricture present. Dilatation performed. Gastritis in stomach. Biopsy taken. Food was in stomach. Normal duodenum.   • HERNIA REPAIR     • HYSTERECTOMY      partial   • NECK SURGERY         Family History: family history includes Breast cancer in her maternal aunt; Cancer in her mother; Colon cancer in her other; Diabetes in her mother; Heart disease in her sister; Hypertension in her other; Stroke in her father.     Social History:  reports that she has quit smoking. she has never used smokeless tobacco. She reports that she does not drink alcohol or use drugs.    Allergies:  "  Allergies   Allergen Reactions   • Iodides Anaphylaxis     Iodine and iodide containing products  \"knocks my kidneys out\"    • Iodine      Iodine and iodide containing products   • Other Unknown (See Comments)     Steroids \"makes my tongue feel like it swells\"   • Stadol [Butorphanol] Unknown (See Comments)     \"rises my BP\"       Medications:   No current facility-administered medications on file prior to encounter.      Current Outpatient Medications on File Prior to Encounter   Medication Sig Dispense Refill   • aspirin 81 MG chewable tablet Chew 81 mg Daily.     • bumetanide (BUMEX) 1 MG tablet Take 1 tablet by mouth Daily. 30 tablet 0   • carvedilol (COREG) 3.125 MG tablet Take 1 tablet by mouth 2 (Two) Times a Day With Meals. 60 tablet 0   • clopidogrel (PLAVIX) 75 MG tablet Take 1 tablet by mouth Daily. 90 tablet 1   • ferrous sulfate 324 (65 Fe) MG tablet delayed-release EC tablet Take 1 tablet by mouth 2 (Two) Times a Day With Meals. 30 tablet 1   • glucose blood test strip Needs to see pcp for refills 900 each 0   • HUMALOG 100 UNIT/ML injection INJECT 150 UNITS SUB-Q DAILY PER INSULIN PUMP 40 mL 11   • hydrALAZINE (APRESOLINE) 50 MG tablet Take 1 tablet by mouth 2 (Two) Times a Day. (Patient taking differently: Take 75 mg by mouth 3 (Three) Times a Day.) 180 tablet 1   • HYDROcodone-acetaminophen (NORCO)  MG per tablet Take 1 tablet by mouth 4 (Four) Times a Day.     • Insulin Lispro 100 UNIT/ML solution cartridge Inject 150 Units under the skin Daily. DX: E11 5 cartridge 11   • Insulin Syringe-Needle U-100 (GLOBAL INSULIN SYRINGES) 30G X 5/16\" 0.3 ML misc SQ injections as needed when insulin pump is non functioning 50 each 1   • isosorbide mononitrate (IMDUR) 60 MG 24 hr tablet Take 1 tablet by mouth 2 (Two) Times a Day. 30 tablet 1   • nitroglycerin (NITROLINGUAL) 0.4 MG/SPRAY spray Place 1 spray under the tongue Every 5 (Five) Minutes As Needed for Chest Pain. 4.9 g 5   • raNITIdine (ZANTAC) " 300 MG tablet TAKE 1 TABLET BY MOUTH EVERY NIGHT. 90 tablet 1   • ranolazine (RANEXA) 500 MG 12 hr tablet Take 1 tablet by mouth Every 12 (Twelve) Hours. 60 tablet 0   • rosuvastatin (CRESTOR) 20 MG tablet Take 1 tablet by mouth Daily. 90 tablet 3   • sertraline (ZOLOFT) 50 MG tablet Take 1 tablet by mouth Daily. 90 tablet 1   • spironolactone (ALDACTONE) 25 MG tablet Take 1 tablet by mouth Daily. 30 tablet 1   • vitamin D (ERGOCALCIFEROL) 64062 UNITS capsule capsule Take 1 capsule by mouth 1 (One) Time Per Week. (Patient taking differently: Take 50,000 Units by mouth 1 (One) Time Per Week. Friday) 4 capsule 6   • zolpidem (AMBIEN) 10 MG tablet Take 1 tablet by mouth At Night As Needed for Sleep. 30 tablet 5         Review of Systems:  Review of Systems all 12 point were reviewed and they were negative, except for nausea, vomiting, epigastric pain and SOB.      Physical Exam:   Temp:  [97.5 °F (36.4 °C)] 97.5 °F (36.4 °C)  Heart Rate:  [66-75] 70  Resp:  [18] 18  BP: (158-187)/(72-80) 168/80  Physical Exam  GENERAL APPEARANCE: Well developed, well nourished, alert and cooperative, , not acute distress.  HEENT: normocephalic. PERRL, EOMI, vision is grossly intact.: External auditory canals and tympanic membranes clear, hearing grossly intact. No nasal discharge. Oral cavity and pharynx normal. No inflammation, swelling, exudate, or lesions. Teeth and gingiva in good general condition.  NECK: Neck supple, non-tender without lymphadenopathy, masses or thyromegaly.  CARDIAC: Normal S1 and S2. RRR, not M/R/G.   LUNGS: Clear to auscultation and percussion without rales, rhonchi, wheezing or diminished breath sounds.  ABDOMEN: Positive bowel sounds. Soft, nondistended, tender in the epigastric area. No guarding or rebound. No masses.  MUSKULOSKELETAL: ROM intact spine and extremities. No joint erythema or tenderness. Normal muscular development. Normal gait. Trace edema  BACK: Examination of the spine reveals normal gait  and posture, no spinal deformity, symmetry of spinal muscles, without tenderness, decreased range of motion or muscular spasm.  NEUROLOGICAL: CN II-XII intact. Strength and sensation symmetric and intact throughout. Reflexes 2+ throughout. Cerebellar testing normal.  SKIN: Skin normal color, texture and turgor with no lesions or eruptions.  PSYCHIATRIC: oriented x 3 .  good judgement and reason, without hallucinations, abnormal affect or abnormal behaviors during the examination.  not suicidal.      Results Reviewed:  I have personally reviewed current lab, radiology, and data and agree with results.  Lab Results (last 24 hours)     Procedure Component Value Units Date/Time    Sodium, Urine, Random - Urine, Clean Catch [[id number]]  (Abnormal) Collected:  03/10/19 1112    Specimen:  Urine, Clean Catch Updated:  03/10/19 1255     Sodium, Urine <5 mmol/L     Osmolality, Urine - Urine, Clean Catch [[id number]]  (Normal) Collected:  03/10/19 1112    Specimen:  Urine, Clean Catch Updated:  03/10/19 1222     Osmolality, Urine 320 mOsm/kg     BNP [[id number]]  (Abnormal) Collected:  03/10/19 1040    Specimen:  Blood Updated:  03/10/19 1222     proBNP 40,500.0 pg/mL     Urinalysis, Microscopic Only - Urine, Clean Catch [[id number]]  (Abnormal) Collected:  03/10/19 1112    Specimen:  Urine, Clean Catch Updated:  03/10/19 1151     RBC, UA 6-12 /HPF      WBC, UA 3-5 /HPF      Bacteria, UA Trace /HPF      Squamous Epithelial Cells, UA 0-2 /HPF      Hyaline Casts, UA 3-6 /LPF      Mucus, UA Trace /HPF      Methodology Manual Light Microscopy    [place] Draw [[id number]] Collected:  03/10/19 1039    Specimen:  Blood Updated:  03/10/19 1145    Narrative:       The following orders were created for panel order [place] Draw.  Procedure                               Abnormality         Status                     ---------                               -----------         ------                     Light Blue Top[[id number]]                                    Final result               Green Top (Gel)[[id number]]                                  Final result               Lavender Top[[id number]]                                     Final result               Gold Top - SST[[id number]]                                   Final result                 Please view results for these tests on the individual orders.    Light Blue Top [[id number]] Collected:  03/10/19 1039    Specimen:  Blood Updated:  03/10/19 1145     Extra Tube hold for add-on     Comment: Auto resulted       Green Top (Gel) [[id number]] Collected:  03/10/19 1040    Specimen:  Blood Updated:  03/10/19 1145     Extra Tube Hold for add-ons.     Comment: Auto resulted.       Lavender Top [[id number]] Collected:  03/10/19 1040    Specimen:  Blood Updated:  03/10/19 1145     Extra Tube hold for add-on     Comment: Auto resulted       Gold Top - SST [[id number]] Collected:  03/10/19 1040    Specimen:  Blood Updated:  03/10/19 1145     Extra Tube Hold for add-ons.     Comment: Auto resulted.       Urinalysis With Microscopic If Indicated (No Culture) - Urine, Clean Catch [[id number]]  (Abnormal) Collected:  03/10/19 1112    Specimen:  Urine, Clean Catch Updated:  03/10/19 1125     Color, UA Dark Yellow     Appearance, UA Clear     pH, UA <=5.0     Specific Gravity, UA 1.020     Glucose, UA Negative     Ketones, UA Negative     Bilirubin, UA Small (1+)     Blood, UA Moderate (2+)     Protein, UA >=300 mg/dL (3+)     Leuk Esterase, UA Negative     Nitrite, UA Negative     Urobilinogen, UA 1.0 E.U./dL    Troponin [[id number]]  (Abnormal) Collected:  03/10/19 1040    Specimen:  Blood Updated:  03/10/19 1118     Troponin I 6.560 ng/mL     Comprehensive Metabolic Panel [[id number]]  (Abnormal) Collected:  03/10/19 1040    Specimen:  Blood Updated:  03/10/19 1114     Glucose 89 mg/dL      BUN 41 mg/dL      Creatinine 1.44 mg/dL      Sodium 116 mmol/L      Potassium 3.0 mmol/L      Chloride 76 mmol/L      CO2 28.0  mmol/L      Calcium 8.5 mg/dL      Total Protein 6.9 g/dL      Albumin 3.60 g/dL      ALT (SGPT) 16 U/L      AST (SGOT) 70 U/L      Alkaline Phosphatase 87 U/L      Total Bilirubin 1.4 mg/dL      eGFR Non African Amer 36 mL/min/1.73      Globulin 3.3 gm/dL      A/G Ratio 1.1 g/dL      BUN/Creatinine Ratio 28.5     Anion Gap 12.0 mmol/L     Magnesium [[id number]]  (Normal) Collected:  03/10/19 1040    Specimen:  Blood Updated:  03/10/19 1105     Magnesium 1.6 mg/dL     CBC & Differential [[id number]] Collected:  03/10/19 1040    Specimen:  Blood Updated:  03/10/19 1059    Narrative:       The following orders were created for panel order CBC & Differential.  Procedure                               Abnormality         Status                     ---------                               -----------         ------                     CBC Auto Differential[[id number]]        Abnormal            Final result                 Please view results for these tests on the individual orders.    CBC Auto Differential [[id number]]  (Abnormal) Collected:  03/10/19 1040    Specimen:  Blood Updated:  03/10/19 1059     WBC 9.16 10*3/mm3      RBC 3.15 10*6/mm3      Hemoglobin 10.1 g/dL      Hematocrit 28.5 %      MCV 90.5 fL      MCH 32.1 pg      MCHC 35.4 g/dL      RDW 15.6 %      RDW-SD 51.2 fl      MPV 10.3 fL      Platelets 157 10*3/mm3      Neutrophil % 86.9 %      Lymphocyte % 4.6 %      Monocyte % 6.8 %      Eosinophil % 0.2 %      Basophil % 0.4 %      Immature Grans % 1.1 %      Neutrophils, Absolute 7.96 10*3/mm3      Lymphocytes, Absolute 0.42 10*3/mm3      Monocytes, Absolute 0.62 10*3/mm3      Eosinophils, Absolute 0.02 10*3/mm3      Basophils, Absolute 0.04 10*3/mm3      Immature Grans, Absolute 0.10 10*3/mm3      nRBC 0.0 /100 WBC         Imaging Results (last 24 hours)     Procedure Component Value Units Date/Time    CT Abdomen Pelvis Without Contrast [[id number]] Collected:  03/10/19 1149     Updated:  03/10/19 123     Narrative:         CT Abdomen and Pelvis Without Contrast    History: Nausea and vomiting    Axials spiral scans of the abdomen and pelvis were obtained  without contrast.  Coronal and sagital reconstructions were  preformed.      This exam was performed according to our departmental  dose-optimization program, which includes automated exposure  control, adjustment of the mA and/or kV according to patient size  and/or use of iterative reconstruction technique.    DLP: 951.90    Comparison: December 24, 2010    Scans of the lung bases demonstrate bilateral linear atelectasis.  Cardiomegaly and coronary artery calcifications.    No acute osseous abnormality.  Degenerative changes and degenerative disc disease in the lower  thoracic spine.    The liver is unremarkable.  The gallbladder is unremarkable.  The spleen is unremarkable.  The pancreas is unremarkable.  The adrenal glands are unremarkable.    No renal or ureteral calculi.  No renal mass.  No hydronephrosis.    Unremarkable bladder.  No pelvic mass.  Hysterectomy.    No abdominal aortic aneurysm.  Extensive atherosclerotic calcifications which may reflect  underlying diabetes, hypertension or renal disease  No adenopathy.    Wall of the distal esophagus appears somewhat thickened, possible  esophagitis.  No bowel obstruction.  Diverticulosis of the colon.  Appendix not identified.  No pericecal inflammatory changes.  No free air.  No free fluid.    No hernia.  Edema subcutaneous fat lower anterior abdominal wall with  overlying skin thickening, possible panniculitis.      Impression:       Conclusion:  Edema subcutaneous fat lower anterior abdominal wall with  overlying skin thickening, possible panniculitis.  Extensive atherosclerotic calcifications which may reflect  underlying diabetes, hypertension or renal disease.  Wall of the distal esophagus appears somewhat thickened, possible  esophagitis.  Diverticulosis of the colon.  Hysterectomy.  Cardiomegaly and  coronary artery calcifications.    [id number]    Electronically signed by:  Karl Harris MD  3/10/2019 12:38 PM  CDT Workstation: [email or web address]    XR Chest 1 View [[id number]] Collected:  03/10/19 1046     Updated:  03/10/19 1119    Narrative:         PORTABLE CHEST    HISTORY: Weakness. Dizziness. Altered mental status.    Portable AP upright film of the chest was obtained at 10:37 AM.  COMPARISON: January 7, 2019    EKG leads.  The lungs are clear of an acute process.  Stable cardiomegaly.  The pulmonary vasculature is not increased.  Perhaps very small right pleural effusion with minimal fluid in  the minor fissure.  No pneumothorax.  No acute osseous abnormality.      Impression:       CONCLUSION:  Stable cardiomegaly.  Perhaps very small right pleural effusion with minimal fluid in  the minor fissure.    [id number]    Electronically signed by:  Karl Harris MD  3/10/2019 11:18 AM  CDT Workstation: [email or web address]            Assessment/plan :    NSTEMI   Hyponatremia   CKD stage 3  Hypokalemia   Epigastric pain, likely gastroparesis   intractable nausea and vomiting       - start heparin drip, continue coreg, plavix, aspirin, imdur, ranexa , nitro SL  - start IV fluids, sodium level every 8 hours  ,   - order TSH, cortisol level, urine lytes, nephrology consulted   - replace potassium with IV potassium   - monitor renal function , continue lasix   Cardiology consulted , patient refusing cardiac cath   - prn IV or PO Zofran and Reglan every 8 hours   - considering gastric scan when less symptomatic  - NPO for now, till cardiology sees patient and decided about procedures  - when resuming diet, will start clear liquid , then  advance as tolerated    - start gabapentin for possible gastroparesis   - order gastric scan to rule out gastroparesis   - patient refusing cardiac cath, due to history of CKD after she received contrast in the past                   I discussed the patient's findings and my recommendations with:  patient,  and daughter       Code status and advance care of plan was discussed with patient and want to be full code     Herminia Rocha MD

## 2019-03-10 NOTE — CONSULTS
[facility]  [address] [place] [address]. [id number]  T - [phone]    [phone]     Consultation         PATIENT  DEMOGRAPHICS   PATIENT NAME: Nicolasa Rojas                      PHYSICIAN: DOMINGA Padilla  : 1950  MRN: [id number]    Subjective   SUBJECTIVE   Referring Provider: DOMINGA Dubois  Reason for Consultation: Hyponatremia/CKD  History of present illness:   This is a 68 year old female with a PMH significant for CKD3, MONE, AMI, CAD, COPD, and DM2 who presented to the ER today with complaints of nausea and vomiting since Thursday. In addition she reports severe chest pain yesterday which was relieved by nitro SL. On workup here she was found to have an elevated troponin and marked hyponatremia. She is to be evaluated by cardiology for NSTEMI and nephrology has been consulted to help manage hyponatremia and CKD in the setting of imminent dye exposure.    Past Medical History:   Diagnosis Date   • Acute bronchitis    • Acute congestive heart failure (CMS/HCC)     resolving   • Acute kidney failure, unspecified (CMS/HCC)    • Acute kidney failure, unspecified (CMS/HCC)    • Acute posthemorrhagic anemia    • Asthenia    • Chest pain    • Chronic gastritis    • Chronic pharyngitis    • Chronic renal impairment    • Congenital renal failure    • Congestive heart failure (CMS/HCC)    • Contact dermatitis due to poison ivy    • COPD (chronic obstructive pulmonary disease) (CMS/HCC)    • Coronary arteriosclerosis    • D-dimer, elevated     D-dimer above reference range    • Degenerative joint disease involving multiple joints     back   • Depressive disorder    • Dysphagia    • Dyspnea    • Edema of lower extremity    • Encounter for immunization    • Encounter for long-term (current) use of medications    • Epigastric pain    • Esophagitis    • Essential hypertension    • Gastroesophageal reflux disease    • Gastroparesis     Gastroparesis syndrome    •  Generalized abdominal pain    • H/O bone density study 01/09/2015   • H/O mammogram 01/15/2015   • H/O screening mammography 11/12/2013   • Headache    • History of transfusion    • Hyperlipidemia    • Hypokalemia    • Hypomagnesemia    • Injury of tendon of rotator cuff     Injury of tendon of the rotator cuff of shoulder      • Insomnia    • Insomnia, unspecified    • Knee pain    • Nausea and vomiting    • Neck pain    • Need for immunization against influenza    • Need for prophylactic vaccination and inoculation against influenza    • Need for prophylactic vaccination and inoculation against viral disease    • 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 Performed at [facility].   • ENDOSCOPY  02/09/2015    ESOPHAGEAL STRICTURE PRESENT, GASTRITIS FOUND IN THE STOMACH, NORMAL DUODENUM   • ENDOSCOPY W/ PEG TUBE PLACEMENT  12/19/2012    Esophagitis seen. Biopsy taken. Esophageal stricture present. Dilatation performed. Gastritis in stomach. Biopsy taken. Food was in stomach. Normal duodenum.   • HERNIA REPAIR     • HYSTERECTOMY      partial   • NECK SURGERY       Family History   Problem Relation Age of Onset   • Cancer Mother    • Diabetes Mother    • Stroke Father    • Heart disease Sister    • Colon cancer Other    • Hypertension Other    • Breast cancer Maternal Aunt      Social History     Tobacco Use   • Smoking status: Former Smoker   • Smokeless tobacco: Never Used   • Tobacco comment: quit 30 years ago    Substance Use Topics   • Alcohol use: No   • Drug use: No     Allergies:  Iodides; Iodine; Other;  "and Stadol [butorphanol]     REVIEW OF SYSTEMS    Review of Systems   Respiratory: Positive for cough, chest tightness and shortness of breath.    All other systems reviewed and are negative.      Objective   OBJECTIVE   Vital Signs  Temp:  [97.5 °F (36.4 °C)] 97.5 °F (36.4 °C)  Heart Rate:  [66-80] 80  Resp:  [18] 18  BP: (158-187)/(72-88) 161/80    Flowsheet Rows      First Filed Value   Admission Height  165.1 cm (65\") Documented at 03/10/2019 1026   Admission Weight  101 kg (223 lb) Documented at 03/10/2019 1026           No intake/output data recorded.    PHYSICAL EXAM    Physical Exam   Constitutional: She is oriented to person, place, and time. She appears well-developed and well-nourished.   HENT:   Head: Normocephalic and atraumatic.   Eyes: Conjunctivae and EOM are normal. Pupils are equal, round, and reactive to light.   Cardiovascular: Normal rate and regular rhythm.   Pulmonary/Chest: Effort normal and breath sounds normal.   Abdominal: Soft.   Musculoskeletal: She exhibits edema (trace BLE).   Neurological: She is alert and oriented to person, place, and time.   Skin: Skin is warm and dry.       RESULTS   Results Review:    Results from last 7 days   Lab Units 03/10/19  1040 03/04/19  1150   SODIUM mmol/L 116* 129*  129*   POTASSIUM mmol/L 3.0* 3.9  3.9   CHLORIDE mmol/L 76* 88*  88*   CO2 mmol/L 28.0 33.0*  33.0*   BUN mg/dL 41* 33*  33*   CREATININE mg/dL 1.44* 1.43*  1.43*   CALCIUM mg/dL 8.5 8.7  8.7   BILIRUBIN mg/dL 1.4* 0.8   ALK PHOS U/L 87 87   ALT (SGPT) U/L 16 19   AST (SGOT) U/L 70* 31   GLUCOSE mg/dL 89 106*  106*       Estimated Creatinine Clearance: 43.4 mL/min (A) (by C-G formula based on SCr of 1.44 mg/dL (H)).    Results from last 7 days   Lab Units 03/10/19  1040 03/04/19  1150   MAGNESIUM mg/dL 1.6  --    PHOSPHORUS mg/dL  --  4.1             Results from last 7 days   Lab Units 03/10/19  1040 03/04/19  1150   WBC 10*3/mm3 9.16  --    HEMOGLOBIN g/dL 10.1* 9.7*   PLATELETS " 10*3/mm3 157  --               MEDICATIONS         sodium chloride 75 mL/hr       (Not in a hospital admission)  Assessment/Plan   ASSESSMENT / PLAN      NSTEMI (non-ST elevated myocardial infarction) (CMS/[facility])    1. Hyponatremia- Likely hypovolemic r/t vomiting and diuretics, urine sodium <5. Will start NS @ 75 ml/hr, check Na q4h. She was not supposed to be on metolazone, but has taken it 4-5 times in the past week.    2. CKD3- Cr at BL of 1.4    3. NSTEMI- troponin >6, cardiology evaluation pending. She will start a heparin gtt.    4. Hypokalemia- replace      Thank you for the consult, we will continue to follow this patient.         I discussed the patients findings and my recommendations with the patient and her family.    This document has been electronically signed by DOMINGA Padilla on March 10, 2019 2:15 PM

## 2019-03-10 NOTE — PLAN OF CARE
Problem: Patient Care Overview  Goal: Plan of Care Review  Outcome: Ongoing (interventions implemented as appropriate)   03/10/19 1823   Coping/Psychosocial   Plan of Care Reviewed With patient;spouse   Plan of Care Review   Progress no change   OTHER   Outcome Summary Admitted for NSTEMI. Has no c/o chest pain or soa.        Problem: Fall Risk (Adult)  Goal: Identify Related Risk Factors and Signs and Symptoms  Outcome: Ongoing (interventions implemented as appropriate)   03/10/19 1823   Fall Risk (Adult)   Related Risk Factors (Fall Risk) age-related changes;culprit medication(s);gait/mobility problems;history of falls;polypharmacy   Signs and Symptoms (Fall Risk) presence of risk factors     Goal: Absence of Fall  Outcome: Ongoing (interventions implemented as appropriate)   03/10/19 1823   Fall Risk (Adult)   Absence of Fall making progress toward outcome       Problem: Skin Injury Risk (Adult)  Goal: Identify Related Risk Factors and Signs and Symptoms   03/10/19 1823   Skin Injury Risk (Adult)   Related Risk Factors (Skin Injury Risk) body weight extremes;critical care admission;mechanical forces;mobility impaired     Goal: Skin Health and Integrity  Outcome: Ongoing (interventions implemented as appropriate)   03/10/19 1823   Skin Injury Risk (Adult)   Skin Health and Integrity making progress toward outcome       Problem: Diabetes, Type 1 (Adult)  Goal: Signs and Symptoms of Listed Potential Problems Will be Absent, Minimized or Managed (Diabetes, Type 1)  Outcome: Ongoing (interventions implemented as appropriate)   03/10/19 1823   Goal/Outcome Evaluation   Problems Assessed (Type 1 Diabetes) all   Problems Present (Type 1 Diabetes) situational response

## 2019-03-11 NOTE — PROGRESS NOTES
"[facility] Note   LOS: 1 day   Patient Care Team:  Henry Muniz MD as PCP - General (Internal Medicine)    Chief Complaint:    Chief Complaint   Patient presents with   • Weakness - Generalized   • Nausea   • Vomiting        Subjective     Interval History:   Patient Denies: chest pain, edema, dizziness, dyspnea at rest  Patient complaints: none  History taken from chart and patient.  and daughter at bedside. I asked about the Palliative Care consultation I had recommended last visit- \"I guess we waited too late\". Ms. Rojas made the comment \"I'm tired of having heart attacks\".   Reinforced the poor condition of her heart and poor prognosis.     Hospital Course at [place] as summarized below:   She was started on ACS protocol with aspirin, Plavix, and heparin. She was taken the Cath Lab with findings most pertinent for an ostial LAD stenosis. An intra-aortic balloon pump was placed for coronary perfusion and she was transferred to CCU afterwards without intervention. Cardiac surgery was involved and deemed her to not be a surgical revascularization candidate. She underwent high risk PCI with balloon pump support on 11/19/2018  The patient was transferred to the CCU after her LHC with balloon pump placement on 11/17/18.     The LHC showed LM: Diffuse 20% disease. Pressure ventricularization with catheter engagement. LAD: Ostial 95% stenosis and mid 80-90% stenoses. Very small D2 has a proximal 100% . LCX: Proximal 80% stenosis and OM1 100% in-stent . RCA: Dominant, the proximal stents are widely patent.     Cardiac surgery was consulted for evaluation for surgical intervention who recommended proceeding with a high risk PCI due to morbid obesity, deconditioned state and extensive medical history.  She underwent the high risk PCI on 11/19 with drug-eluting stents placed in the proximal LCx, mid-LAD and left main extending into the proximal mid-LAD. She had the IABP removed on 11/20 " "without complications.   Additionally, patient's course has been complicated by anemia (hematocrit fredy 21); transfused 1 unit of pRBC this morning (haptoglobin 14, , appropriate retic count, iron studies (anemia of chronic disease)). No evidence of bleeding and stabilized to 27 at discharge. Endocrine was consulted and managed her insulin pump while inpatient.  Additionally, she has had worsening renal function (prior history of contrast-induced nephropathy with prior caths) and urine output reducing. She ultimately became essentially anuric, and required several episodes of HD,     The patient and the family are fully aware of the multiple issues at [facility].  The patient has been noted to be hyponatremic, anemic in addition to worsening renal function.      Objective     Vital Sign Min/Max for last 24 hours  Temp  Min: 97.2 °F (36.2 °C)  Max: 98.1 °F (36.7 °C)   BP  Min: 113/49  Max: 219/101   Pulse  Min: 54  Max: 83   Resp  Min: 16  Max: 18   SpO2  Min: 95 %  Max: 100 %   Flow (L/min)  Min: 3  Max: 4   Weight  Min: 101 kg (223 lb)  Max: 107 kg (234 lb 12.6 oz)     Flowsheet Rows      First Filed Value   Admission Height  165.1 cm (65\") Documented at 03/10/2019 1026   Admission Weight  101 kg (223 lb) Documented at 03/10/2019 1026            03/10/19  1026 03/10/19  1510 03/11/19  0500   Weight: 101 kg (223 lb) 107 kg (234 lb 12.6 oz) 106 kg (234 lb 2.1 oz)       Physical Exam:  Physical Exam   Constitutional: She is oriented to person, place, and time. She appears well-developed and well-nourished. No distress.   HENT:   Head: Normocephalic.   Eyes: Conjunctivae are normal.   Neck: No JVD present.   Cardiovascular: Normal rate, regular rhythm, S1 normal, S2 normal, normal heart sounds and intact distal pulses. Exam reveals no gallop and no friction rub.   No murmur heard.  Pulmonary/Chest: Effort normal and breath sounds normal. No respiratory distress. She has no wheezes. She has no rales. "   Abdominal: Soft. Bowel sounds are normal. She exhibits no distension.   Musculoskeletal: Normal range of motion. She exhibits no edema.   Neurological: She is alert and oriented to person, place, and time.   Skin: Skin is warm and dry. She is not diaphoretic. No erythema.   Psychiatric: She has a normal mood and affect. Her behavior is normal. Judgment and thought content normal.   Nursing note and vitals reviewed.       Results Review:     Results from last 7 days   Lab Units 03/11/19  0421 03/11/19  0108 03/10/19  1944  03/10/19  1040 03/04/19  1150   SODIUM mmol/L 119* 117* 116*   < > 116* 129*  129*   POTASSIUM mmol/L  --   --   --   --  3.0* 3.9  3.9   CHLORIDE mmol/L  --   --   --   --  76* 88*  88*   CO2 mmol/L  --   --   --   --  28.0 33.0*  33.0*   BUN mg/dL  --   --   --   --  41* 33*  33*   CREATININE mg/dL  --   --   --   --  1.44* 1.43*  1.43*   CALCIUM mg/dL  --   --   --   --  8.5 8.7  8.7   BILIRUBIN mg/dL  --   --   --   --  1.4* 0.8   ALK PHOS U/L  --   --   --   --  87 87   ALT (SGPT) U/L  --   --   --   --  16 19   AST (SGOT) U/L  --   --   --   --  70* 31   GLUCOSE mg/dL  --   --   --   --  89 106*  106*    < > = values in this interval not displayed.       Estimated Creatinine Clearance: 44.6 mL/min (A) (by C-G formula based on SCr of 1.44 mg/dL (H)).    Results from last 7 days   Lab Units 03/10/19  1040 03/04/19  1150   MAGNESIUM mg/dL 1.6  --    PHOSPHORUS mg/dL  --  4.1       Results from last 7 days   Lab Units 03/11/19  0421   URIC ACID mg/dL 10.7*       Results from last 7 days   Lab Units 03/11/19  0108 03/10/19  1040 03/04/19  1150   WBC 10*3/mm3 6.30 9.16  --    HEMOGLOBIN g/dL 10.0* 10.1* 9.7*   PLATELETS 10*3/mm3 166 157  --        Results from last 7 days   Lab Units 03/10/19  1039   INR  1.12     Lab Results   Component Value Date    PROBNP 40,500.0 (H) 03/10/2019       I/O last 3 completed shifts:  In: 1068.8 [I.V.:1068.8]  Out: 475  [Urine:475]    Cardiographics:    Results for orders placed during the hospital encounter of 02/06/19   Adult Transthoracic Echo Complete W/ Cont if Necessary Per Protocol    Narrative · Left ventricular wall thickness is consistent with mild-to-moderate   concentric hypertrophy.  · Estimated EF = 57%.  · Left ventricular systolic function is normal.  · Left ventricular diastolic dysfunction (grade I) consistent with   impaired relaxation.  · Right ventricular cavity is mildly dilated.  · Left atrial cavity size is moderately dilated.  · Mild aortic valve regurgitation is present.  · Mild mitral valve regurgitation is present  · Moderate tricuspid valve regurgitation is present.  · Mild pulmonic valve regurgitation is present.  · The tricuspid valve is grossly normal. Moderate tricuspid valve   regurgitation is present. Estimated right ventricular systolic pressure   from tricuspid regurgitation is markedly elevated (>55 mmHg).          Ct Abdomen Pelvis Without Contrast    Result Date: 3/10/2019  Conclusion: Edema subcutaneous fat lower anterior abdominal wall with overlying skin thickening, possible panniculitis. Extensive atherosclerotic calcifications which may reflect underlying diabetes, hypertension or renal disease. Wall of the distal esophagus appears somewhat thickened, possible esophagitis. Diverticulosis of the colon. Hysterectomy. Cardiomegaly and coronary artery calcifications. [id number] Electronically signed by:  Karl Harris MD  3/10/2019 12:38 PM CDT Workstation: [facility]    Xr Chest 1 View    Result Date: 3/10/2019  CONCLUSION: Stable cardiomegaly. Perhaps very small right pleural effusion with minimal fluid in the minor fissure. [id number] Electronically signed by:  Karl Harris MD  3/10/2019 11:18 AM CDT Workstation: [facility]      Medication Review:     Current Facility-Administered Medications:   •  aspirin chewable tablet 324 mg, 324 mg, Oral, Once **AND** aspirin EC tablet 81 mg, 81 mg,  Oral, Daily, Herminia Rocha MD, 81 mg at 03/11/19 0901  •  bumetanide (BUMEX) tablet 1 mg, 1 mg, Oral, Daily, Herminia Rocha MD, 1 mg at 03/10/19 1703  •  calcium carbonate (TUMS) chewable tablet 500 mg (200 mg elemental), 2 tablet, Oral, BID PRN, Herminia Rocha MD  •  carvedilol (COREG) tablet 6.25 mg, 6.25 mg, Oral, BID With Meals, Herminia Rocha MD, 6.25 mg at 03/11/19 0901  •  clopidogrel (PLAVIX) tablet 75 mg, 75 mg, Oral, Daily, Herminia Rocha MD, 75 mg at 03/11/19 0900  •  dextrose (D50W) 25 g/ 50mL Intravenous Solution 25 g, 25 g, Intravenous, Q15 Min PRN, Catracho Jarrell MD  •  dextrose (GLUTOSE) oral gel 15 g, 15 g, Oral, Q15 Min PRN, Catracho Jarrell MD  •  famotidine (PEPCID) injection 20 mg, 20 mg, Intravenous, Daily, Herminia Rcoha MD, 20 mg at 03/11/19 0903  •  glucagon (human recombinant) (GLUCAGEN DIAGNOSTIC) injection 1 mg, 1 mg, Subcutaneous, PRN, Catracho Jarrell MD  •  heparin 56346 units/250 mL (100 units/mL) in 0.45 % NaCl infusion, 12 Units/kg/hr, Intravenous, Titrated, Stopped at 03/10/19 1655 **AND** heparin (porcine) 5000 UNIT/ML injection 8,100 Units, 80 Units/kg, Intravenous, PRN, 8,100 Units at 03/10/19 1617 **AND** heparin (porcine) 5000 UNIT/ML injection 4,000 Units, 40 Units/kg, Intravenous, PRN, Herminia Rocha MD  •  hydrALAZINE (APRESOLINE) tablet 50 mg, 50 mg, Oral, BID, Herminia Rocha MD, 50 mg at 03/11/19 0901  •  HYDROcodone-acetaminophen (NORCO)  MG per tablet 1 tablet, 1 tablet, Oral, 4x Daily, Herminia Rocha MD, 1 tablet at 03/11/19 0902  •  insulin patient supplied pump, , Subcutaneous, Continuous, Herminia Rocha MD  •  isosorbide mononitrate (IMDUR) 24 hr tablet 60 mg, 60 mg, Oral, BID, Herminia Rocha MD, 60 mg at 03/10/19 2052  •  metoclopramide (REGLAN) injection 5 mg, 5 mg, Intravenous, 4x Daily AC & at Bedtime, Herminia Rocha MD, 5 mg  at 03/11/19 0900  •  ondansetron (ZOFRAN) tablet 4 mg, 4 mg, Oral, Q6H PRN **OR** ondansetron ODT (ZOFRAN-ODT) disintegrating tablet 4 mg, 4 mg, Oral, Q6H PRN, 4 mg at 03/11/19 0101 **OR** ondansetron (ZOFRAN) injection 4 mg, 4 mg, Intravenous, Q6H PRN, Herminia Rocha MD  •  ranolazine (RANEXA) 12 hr tablet 500 mg, 500 mg, Oral, Q12H, Herminia Rocha MD, 500 mg at 03/10/19 2051  •  rosuvastatin (CRESTOR) tablet 20 mg, 20 mg, Oral, Daily, Herminia Rocha MD, 20 mg at 03/10/19 1703  •  sennosides-docusate sodium (SENOKOT-S) 8.6-50 MG tablet 2 tablet, 2 tablet, Oral, BID PRN, Herminia Rocha MD  •  sertraline (ZOLOFT) tablet 50 mg, 50 mg, Oral, Daily, Herminia Rocha MD, 50 mg at 03/11/19 0901  •  sodium chloride 0.9 % flush 10 mL, 10 mL, Intravenous, PRN, Catracho Jarrell MD  •  sodium chloride 0.9 % flush 3 mL, 3 mL, Intravenous, Q12H, Herminia Rocha MD, 3 mL at 03/10/19 2102  •  sodium chloride 0.9 % flush 3-10 mL, 3-10 mL, Intravenous, PRN, Herminia Rocha MD  •  sodium chloride 0.9 % infusion, 100 mL/hr, Intravenous, Continuous, Herminia Rocha MD  •  sodium chloride 0.9 % infusion, 75 mL/hr, Intravenous, Continuous, Óscar Nagy APRN, Last Rate: 75 mL/hr at 03/10/19 1445  •  spironolactone (ALDACTONE) tablet 25 mg, 25 mg, Oral, Daily, Herminia Rocha MD, 25 mg at 03/11/19 0901  •  zolpidem (AMBIEN) tablet 10 mg, 10 mg, Oral, Nightly PRN, Herminia Rocha MD, 10 mg at 03/10/19 2052    Assessment/Plan   HFpEF, acute on chronic  - Coreg 6.25mg BID  - Bumex 1mg daily  - Hydralazine 50mg BID  - Aldactone 25mg daily  - no ACE/ARB secondary to CKD IV      NSTEMI (non-ST elevated myocardial infarction) (CMS/HCC)  - chest pain free.  - due to prior extensive cardiac history, history of hemodialysis following PCIs, CKD IV, hyponatremia, and CHF- Ms. Rojas will not be subject to invasive coronary angiogram.   - Last visit it  was recommended that she be sent home with a palliative care plan.   - Grossly elevated BNP is poor mortality indicator with continued elevated troponin  - Peak troponin 6.5. Likely demand ischemia secondary to poor heart function versus ischemic event. EKG is without acute changes.     - Plavix 75mg  - Imdur 60mg  - Ranexa 500mg BID  - ASA 81mg  - Crestor 20mg nightly    -Stop heparin due to nosebleeds    Hyponatremia-   - Nephrology following:  - Likely hypovolemic r/t vomiting and diuretics, urine sodium <5. NS @ 75 ml/hr, check Na q4h. She was not supposed to be on metolazone, but has taken it 4-5 times in the past week.        Plan for disposition:Where: per attending. Recommendation stands that she should be palliative or hospice care due to poor prognosis with CHF and CAD.              This document has been electronically signed by DOMINGA Carney on March 11, 2019 9:11 AM

## 2019-03-11 NOTE — PROGRESS NOTES
[facility] Medicine Services  INPATIENT PROGRESS NOTE    Length of Stay: 1  Date of Admission: 3/10/2019  Primary Care Physician: Henry Muniz MD    Subjective   Chief Complaint: Nausea, vomiting epigastric pain and shortness of breath.    HPI: Patient admitted for epigastric pain and shortness of breath.  Currently being managed for N STEMI with hyponatremia and chronic kidney disease.    Review of Systems   Constitutional: Positive for activity change and appetite change.   HENT: Negative for mouth sores and sore throat.    Respiratory: Positive for shortness of breath.    Cardiovascular: Negative for chest pain and palpitations.   Gastrointestinal: Positive for abdominal pain and constipation. Negative for diarrhea.   Genitourinary: Negative for dysuria and hematuria.   Neurological: Positive for light-headedness. Negative for headaches.   Psychiatric/Behavioral: Negative for agitation. The patient is not nervous/anxious.        Objective    Temp:  [97.2 °F (36.2 °C)-98.7 °F (37.1 °C)] 98.3 °F (36.8 °C)  Heart Rate:  [54-80] 60  Resp:  [16-23] 23  BP: (113-189)/() 142/64    Physical Exam   Constitutional: She is oriented to person, place, and time. She appears well-developed and well-nourished. No distress.   HENT:   Head: Normocephalic and atraumatic.   Mouth/Throat: Oropharynx is clear and moist.   Eyes: Conjunctivae and EOM are normal. Pupils are equal, round, and reactive to light.   Neck: Normal range of motion. Neck supple. No JVD present. No tracheal deviation present. No thyromegaly present.   Cardiovascular: Normal rate, regular rhythm, normal heart sounds and intact distal pulses. Exam reveals no gallop and no friction rub.   No murmur heard.  Pulmonary/Chest: No stridor. No respiratory distress. She has no wheezes. She has no rales. She exhibits no tenderness.   Coarse crepitations in lung bases bilaterally and reduced breath sounds.   Abdominal: Soft.  Bowel sounds are normal. She exhibits no distension and no mass. There is no tenderness. There is no rebound and no guarding. No hernia.   Musculoskeletal: Normal range of motion. She exhibits edema. She exhibits no tenderness or deformity.   Lymphadenopathy:     She has no cervical adenopathy.   Neurological: She is alert and oriented to person, place, and time. No cranial nerve deficit or sensory deficit. She exhibits normal muscle tone. Coordination normal.   Skin: Skin is warm and dry. No rash noted. She is not diaphoretic. No erythema. No pallor.   Psychiatric: She has a normal mood and affect. Her behavior is normal. Judgment and thought content normal.         Medication Review:    Current Facility-Administered Medications:   •  aspirin chewable tablet 324 mg, 324 mg, Oral, Once **AND** aspirin EC tablet 81 mg, 81 mg, Oral, Daily, Herminia Rocha MD, 81 mg at 03/11/19 0901  •  bumetanide (BUMEX) injection 2 mg, 2 mg, Intravenous, BID, Joon Dugan MD  •  calcium carbonate (TUMS) chewable tablet 500 mg (200 mg elemental), 2 tablet, Oral, BID PRN, Herminia Rocha MD  •  carvedilol (COREG) tablet 6.25 mg, 6.25 mg, Oral, BID With Meals, Herminia Rocha MD, 6.25 mg at 03/11/19 0901  •  clopidogrel (PLAVIX) tablet 75 mg, 75 mg, Oral, Daily, Herminia Rocha MD, 75 mg at 03/11/19 0900  •  dextrose (D50W) 25 g/ 50mL Intravenous Solution 25 g, 25 g, Intravenous, Q15 Min PRN, Catracho Jarrell MD  •  dextrose (GLUTOSE) oral gel 15 g, 15 g, Oral, Q15 Min PRN, Catracho Jarrell MD  •  famotidine (PEPCID) injection 20 mg, 20 mg, Intravenous, Daily, Herminia Rocha MD, 20 mg at 03/11/19 0903  •  glucagon (human recombinant) (GLUCAGEN DIAGNOSTIC) injection 1 mg, 1 mg, Subcutaneous, PRN, Catracho Jarrell MD  •  hydrALAZINE (APRESOLINE) tablet 50 mg, 50 mg, Oral, TID, Yaniv Lobo MD  •  HYDROcodone-acetaminophen (NORCO)  MG per tablet 1 tablet, 1 tablet,  Oral, 4x Daily, Herminia Rocha MD, 1 tablet at 03/11/19 1212  •  insulin patient supplied pump, , Subcutaneous, Continuous, Herminia Rocha MD  •  isosorbide mononitrate (IMDUR) 24 hr tablet 60 mg, 60 mg, Oral, BID, Herminia Rocha MD, 60 mg at 03/11/19 0918  •  metoclopramide (REGLAN) injection 5 mg, 5 mg, Intravenous, 4x Daily AC & at Bedtime, Herminia Rocha MD, 5 mg at 03/11/19 1204  •  ondansetron (ZOFRAN) tablet 4 mg, 4 mg, Oral, Q6H PRN **OR** ondansetron ODT (ZOFRAN-ODT) disintegrating tablet 4 mg, 4 mg, Oral, Q6H PRN, 4 mg at 03/11/19 0101 **OR** ondansetron (ZOFRAN) injection 4 mg, 4 mg, Intravenous, Q6H PRN, Herminia Rocha MD  •  ranolazine (RANEXA) 12 hr tablet 500 mg, 500 mg, Oral, Q12H, Herminia Rocha MD, 500 mg at 03/11/19 0919  •  rosuvastatin (CRESTOR) tablet 20 mg, 20 mg, Oral, Daily, Herminia Rocha MD, 20 mg at 03/11/19 0919  •  sennosides-docusate sodium (SENOKOT-S) 8.6-50 MG tablet 2 tablet, 2 tablet, Oral, BID PRN, Herminia Rocha MD  •  sertraline (ZOLOFT) tablet 50 mg, 50 mg, Oral, Daily, Herminia Rocha MD, 50 mg at 03/11/19 0901  •  sodium chloride (OCEAN) nasal spray 1 spray, 1 spray, Each Nare, PRN, Zach Szymanski MD  •  sodium chloride 0.9 % flush 10 mL, 10 mL, Intravenous, PRN, Catracho Jarrell MD  •  sodium chloride 0.9 % flush 3 mL, 3 mL, Intravenous, Q12H, Herminia Rocha MD, 3 mL at 03/11/19 0919  •  sodium chloride 0.9 % flush 3-10 mL, 3-10 mL, Intravenous, PRN, Herminia Rocha MD  •  spironolactone (ALDACTONE) tablet 25 mg, 25 mg, Oral, Daily, Herminia Rocha MD, 25 mg at 03/11/19 0901  •  zolpidem (AMBIEN) tablet 10 mg, 10 mg, Oral, Nightly PRN, Herminia Rocha MD, 10 mg at 03/10/19 2052    Results Review:  I have reviewed the labs, radiology results, and diagnostic studies.    Laboratory Data:   Results from last 7 days   Lab Units 03/11/19 0913  03/11/19  0421 03/11/19  0108  03/10/19  1040   SODIUM mmol/L 118* 119* 117*   < > 116*   POTASSIUM mmol/L 3.7  --   --   --  3.0*   CHLORIDE mmol/L 78*  --   --   --  76*   CO2 mmol/L 30.0  --   --   --  28.0   BUN mg/dL 46*  --   --   --  41*   CREATININE mg/dL 1.76*  --   --   --  1.44*   GLUCOSE mg/dL 77  --   --   --  89   CALCIUM mg/dL 8.6  --   --   --  8.5   BILIRUBIN mg/dL 1.5*  --   --   --  1.4*   ALK PHOS U/L 81  --   --   --  87   ALT (SGPT) U/L 42  --   --   --  16   AST (SGOT) U/L 81*  --   --   --  70*   ANION GAP mmol/L 10.0  --   --   --  12.0    < > = values in this interval not displayed.     Estimated Creatinine Clearance: 36.1 mL/min (A) (by C-G formula based on SCr of 1.76 mg/dL (H)).  Results from last 7 days   Lab Units 03/10/19  1040   MAGNESIUM mg/dL 1.6     Results from last 7 days   Lab Units 03/11/19  0421   URIC ACID mg/dL 10.7*     Results from last 7 days   Lab Units 03/11/19  0108 03/10/19  1040   WBC 10*3/mm3 6.30 9.16   HEMOGLOBIN g/dL 10.0* 10.1*   HEMATOCRIT % 27.9* 28.5*   PLATELETS 10*3/mm3 166 157     Results from last 7 days   Lab Units 03/10/19  1039   INR  1.12       Culture Data:   No results found for: BLOODCX  No results found for: URINECX  No results found for: RESPCX  No results found for: WOUNDCX  No results found for: STOOLCX  No components found for: BODYFLD    Radiology Data:   Imaging Results (last 24 hours)     ** No results found for the last 24 hours. **          I have reviewed the patient's current medications.     Assessment/Plan     Hospital Problem List:  Active Problems:    NSTEMI (non-ST elevated myocardial infarction) (CMS/HCC)  Hyponatremia   CKD stage 3  Hypokalemia   Epigastric pain, likely gastroparesis   intractable nausea and vomiting      Plan  1.  N STEMI  Patient is free of chest pain.  No plans by cardiology for invasive coronary angiogram due to multiple comorbidities and CKD stage IV.  Continue Plavix, Imdur Ranexa and aspirin.  Discontinue  heparin due to nosebleeds.    2.  Hyponatremia  -Nephrology input appreciated.  Hyponatremia is mixed with hypovolemic and hypervolemic component.  Discontinue IV fluids and metolazone. Samsca    3.  Acute on chronic heart failure with preserved ejection fraction  -Continue Coreg, Bumex, Aldactone and hydralazine.  No ACE or ARB secondary to CKD stage IV.    Replete potassium and electrolytes.  Zofran for nausea and vomiting.    DVT prophylaxis with SCDs due to nosebleed.  CODE STATUS is full code    Discharge Planning: In progress    Zach Szymanski MD   03/11/19   4:28 PM

## 2019-03-11 NOTE — OUTREACH NOTE
AMI Week 4 Survey      Responses   Facility patient discharged from?  [facility] [place]   Does the patient have one of the following disease processes/diagnoses(primary or secondary)?  Acute MI (STEMI,NSTEMI)   Week 4 attempt successful?  No   Revoke  Readmitted          Fatmata Singh RN

## 2019-03-11 NOTE — PLAN OF CARE
Problem: Diabetes, Type 1 (Adult)  Goal: Signs and Symptoms of Listed Potential Problems Will be Absent, Minimized or Managed (Diabetes, Type 1)  Outcome: Ongoing (interventions implemented as appropriate)

## 2019-03-11 NOTE — PROGRESS NOTES
"[facility]  [address] [place] [address]. [id number]  T - [phone]  F - [phone]     Progress Note          PATIENT  DEMOGRAPHICS   PATIENT NAME: Nicolasa Rojas                      PHYSICIAN: Joon Dugan MD  : 1950  MRN: [id number]   LOS: 1 day    Patient Care Team:  Henry Muniz MD as PCP - General (Internal Medicine)  Subjective   SUBJECTIVE   Still soa and left LE pain         Objective   OBJECTIVE   Vital Signs  Temp:  [97.2 °F (36.2 °C)-98.1 °F (36.7 °C)] 98.1 °F (36.7 °C)  Heart Rate:  [54-83] 59  Resp:  [16-18] 18  BP: (113-219)/() 146/60    Flowsheet Rows      First Filed Value   Admission Height  165.1 cm (65\") Documented at 03/10/2019 1026   Admission Weight  101 kg (223 lb) Documented at 03/10/2019 1026           I/O last 3 completed shifts:  In: 1068.8 [I.V.:1068.8]  Out: 475 [Urine:475]    PHYSICAL EXAM    Physical Exam   Constitutional: She is oriented to person, place, and time. She appears well-developed.   HENT:   Head: Normocephalic.   Eyes: Pupils are equal, round, and reactive to light.   Cardiovascular: Normal rate, regular rhythm and normal heart sounds.   Pulmonary/Chest: Breath sounds normal. She is in respiratory distress.   Abdominal: Soft. Bowel sounds are normal.   Musculoskeletal: She exhibits no edema.   Neurological: She is alert and oriented to person, place, and time.       RESULTS   Results Review:    Results from last 7 days   Lab Units 19  0913 19  0421 19  0108  03/10/19  1040 19  1150   SODIUM mmol/L 118* 119* 117*   < > 116* 129*  129*   POTASSIUM mmol/L 3.7  --   --   --  3.0* 3.9  3.9   CHLORIDE mmol/L 78*  --   --   --  76* 88*  88*   CO2 mmol/L 30.0  --   --   --  28.0 33.0*  33.0*   BUN mg/dL 46*  --   --   --  41* 33*  33*   CREATININE mg/dL 1.76*  --   --   --  1.44* 1.43*  1.43*   CALCIUM mg/dL 8.6  --   --   --  8.5 8.7  8.7   BILIRUBIN mg/dL 1.5*  --   --   --  1.4* 0.8   ALK PHOS " U/L 81  --   --   --  87 87   ALT (SGPT) U/L 42  --   --   --  16 19   AST (SGOT) U/L 81*  --   --   --  70* 31   GLUCOSE mg/dL 77  --   --   --  89 106*  106*    < > = values in this interval not displayed.       Estimated Creatinine Clearance: 36.5 mL/min (A) (by C-G formula based on SCr of 1.76 mg/dL (H)).    Results from last 7 days   Lab Units 03/10/19  1040 03/04/19  1150   MAGNESIUM mg/dL 1.6  --    PHOSPHORUS mg/dL  --  4.1       Results from last 7 days   Lab Units 03/11/19  0421   URIC ACID mg/dL 10.7*       Results from last 7 days   Lab Units 03/11/19  0108 03/10/19  1040 03/04/19  1150   WBC 10*3/mm3 6.30 9.16  --    HEMOGLOBIN g/dL 10.0* 10.1* 9.7*   PLATELETS 10*3/mm3 166 157  --        Results from last 7 days   Lab Units 03/10/19  1039   INR  1.12         Imaging Results (last 24 hours)     Procedure Component Value Units Date/Time    CT Abdomen Pelvis Without Contrast [[id number]] Collected:  03/10/19 1149     Updated:  03/10/19 1239    Narrative:         CT Abdomen and Pelvis Without Contrast    History: Nausea and vomiting    Axials spiral scans of the abdomen and pelvis were obtained  without contrast.  Coronal and sagital reconstructions were  preformed.      This exam was performed according to our departmental  dose-optimization program, which includes automated exposure  control, adjustment of the mA and/or kV according to patient size  and/or use of iterative reconstruction technique.    DLP: 951.90    Comparison: December 24, 2010    Scans of the lung bases demonstrate bilateral linear atelectasis.  Cardiomegaly and coronary artery calcifications.    No acute osseous abnormality.  Degenerative changes and degenerative disc disease in the lower  thoracic spine.    The liver is unremarkable.  The gallbladder is unremarkable.  The spleen is unremarkable.  The pancreas is unremarkable.  The adrenal glands are unremarkable.    No renal or ureteral calculi.  No renal mass.  No  hydronephrosis.    Unremarkable bladder.  No pelvic mass.  Hysterectomy.    No abdominal aortic aneurysm.  Extensive atherosclerotic calcifications which may reflect  underlying diabetes, hypertension or renal disease  No adenopathy.    Wall of the distal esophagus appears somewhat thickened, possible  esophagitis.  No bowel obstruction.  Diverticulosis of the colon.  Appendix not identified.  No pericecal inflammatory changes.  No free air.  No free fluid.    No hernia.  Edema subcutaneous fat lower anterior abdominal wall with  overlying skin thickening, possible panniculitis.      Impression:       Conclusion:  Edema subcutaneous fat lower anterior abdominal wall with  overlying skin thickening, possible panniculitis.  Extensive atherosclerotic calcifications which may reflect  underlying diabetes, hypertension or renal disease.  Wall of the distal esophagus appears somewhat thickened, possible  esophagitis.  Diverticulosis of the colon.  Hysterectomy.  Cardiomegaly and coronary artery calcifications.    [id number]    Electronically signed by:  Karl Harris MD  3/10/2019 12:38 PM  CDT Workstation: [email or web address]    XR Chest 1 View [[id number]] Collected:  03/10/19 1046     Updated:  03/10/19 1119    Narrative:         PORTABLE CHEST    HISTORY: Weakness. Dizziness. Altered mental status.    Portable AP upright film of the chest was obtained at 10:37 AM.  COMPARISON: January 7, 2019    EKG leads.  The lungs are clear of an acute process.  Stable cardiomegaly.  The pulmonary vasculature is not increased.  Perhaps very small right pleural effusion with minimal fluid in  the minor fissure.  No pneumothorax.  No acute osseous abnormality.      Impression:       CONCLUSION:  Stable cardiomegaly.  Perhaps very small right pleural effusion with minimal fluid in  the minor fissure.    [id number]    Electronically signed by:  Karl Harris MD  3/10/2019 11:18 AM  CDT Workstation: [email or web address]           MEDICATIONS      aspirin 324  mg Oral Once   And      aspirin 81 mg Oral Daily   bumetanide 1 mg Oral Daily   carvedilol 6.25 mg Oral BID With Meals   clopidogrel 75 mg Oral Daily   famotidine 20 mg Intravenous Daily   hydrALAZINE 50 mg Oral BID   HYDROcodone-acetaminophen 1 tablet Oral 4x Daily   isosorbide mononitrate 60 mg Oral BID   metoclopramide 5 mg Intravenous 4x Daily AC & at Bedtime   ranolazine 500 mg Oral Q12H   rosuvastatin 20 mg Oral Daily   sertraline 50 mg Oral Daily   sodium chloride 3 mL Intravenous Q12H   spironolactone 25 mg Oral Daily       heparin (porcine) 12 Units/kg/hr Last Rate: Stopped (03/10/19 1655)   insulin     sodium chloride 75 mL/hr Last Rate: 75 mL/hr (03/10/19 1445)       Assessment/Plan   ASSESSMENT / PLAN      NSTEMI (non-ST elevated myocardial infarction) (CMS/[facility])    1. Hyponatremia- Likely hypovolemic r/t vomiting and diuretics, urine sodium <5. She has a mix of hypovolemic / hypervolemic type low na. NS didn't improve sodium. Add samsca cant give more NS due to h/o advance cardiomyopathy. Check na at 7 pm. Dc ivf. Off metolazone     2. CKD3- Cr at BL of 1.6-1.7. On bumex and aldactone at home. Wt dry is close to 220lbs. Will give bumex 2 mg iv bid along with aldactone     3. NSTEMI- troponin >6, no plan for invasive workup     4. Hypokalemia- stable                   This document has been electronically signed by Joon Dugan MD on March 11, 2019 11:10 AM

## 2019-03-11 NOTE — PLAN OF CARE
Problem: Patient Care Overview  Goal: Plan of Care Review  Outcome: Ongoing (interventions implemented as appropriate)   03/11/19 0546   Coping/Psychosocial   Plan of Care Reviewed With patient   Plan of Care Review   Progress no change   OTHER   Outcome Summary Patient has rested comfortably over night with no issues. No complaints of chest pain, vital signs stable. Patient remains hyponatremic. Will continue to monitor.        Problem: Fall Risk (Adult)  Goal: Identify Related Risk Factors and Signs and Symptoms  Outcome: Ongoing (interventions implemented as appropriate)   03/11/19 0546   Fall Risk (Adult)   Related Risk Factors (Fall Risk) age-related changes;culprit medication(s);fatigue/slow reaction;gait/mobility problems;environment unfamiliar   Signs and Symptoms (Fall Risk) presence of risk factors     Goal: Absence of Fall  Outcome: Ongoing (interventions implemented as appropriate)   03/11/19 0546   Fall Risk (Adult)   Absence of Fall making progress toward outcome       Problem: Skin Injury Risk (Adult)  Goal: Identify Related Risk Factors and Signs and Symptoms  Outcome: Ongoing (interventions implemented as appropriate)   03/11/19 0546   Skin Injury Risk (Adult)   Related Risk Factors (Skin Injury Risk) body weight extremes     Goal: Skin Health and Integrity  Outcome: Ongoing (interventions implemented as appropriate)   03/11/19 0546   Skin Injury Risk (Adult)   Skin Health and Integrity making progress toward outcome       Problem: Diabetes, Type 1 (Adult)  Goal: Signs and Symptoms of Listed Potential Problems Will be Absent, Minimized or Managed (Diabetes, Type 1)  Outcome: Ongoing (interventions implemented as appropriate)   03/11/19 0546   Goal/Outcome Evaluation   Problems Assessed (Type 1 Diabetes) all   Problems Present (Type 1 Diabetes) situational response       Problem: Cardiac: ACS (Acute Coronary Syndrome) (Adult)  Goal: Signs and Symptoms of Listed Potential Problems Will be Absent,  Minimized or Managed (Cardiac: ACS)   03/11/19 0546   Goal/Outcome Evaluation   Problems Assessed (Acute Coronary Syndrome) all   Problems Present (Acute Coronary Syn) situational response

## 2019-03-11 NOTE — PLAN OF CARE
Problem: Patient Care Overview  Goal: Plan of Care Review  Outcome: Ongoing (interventions implemented as appropriate)   03/11/19 2720   Coping/Psychosocial   Plan of Care Reviewed With patient;spouse;daughter   Plan of Care Review   Progress improving   OTHER   Outcome Summary Patient has had no complaints of chest pain today. Patient has been OOB twice to the bedside commode and tolerated that well. Patient has had a fair amount of back pain which is chronic in nature and appears to be controlled with scheduled Norco. Patient remains on nasal O2 at 3L/min which is her home settings and has maintained her SPO2 well. Viatal signs have remained stable. Patient remains hyponatremic, IVFs were discontinued and the patient was started on Samsco to raise her sodium levels. Hydralazine was incresed from BID to TID in order to acheive better blood pressure control. Patients lungs have remained clear to auscultation. Patients FSBS has remained WNL and the patient continues to use her home insulin pump and is approrpriately dosing for carb intake. Patient was placed on 1500cc fluid limit and has remained very compliant with that. Patient still has some slight edema. She received Bumex IV today. Nasal bleeding has decreased and the patient now has saline nasal spray at the bedside which has helped with this. Humidification was also applied to her nasal O2. Patients  has brought her C-PAP in from home so the patient will utilize that tonight.      Goal: Individualization and Mutuality  Outcome: Ongoing (interventions implemented as appropriate)    Goal: Discharge Needs Assessment  Outcome: Ongoing (interventions implemented as appropriate)      Problem: Fall Risk (Adult)  Goal: Identify Related Risk Factors and Signs and Symptoms  Outcome: Outcome(s) achieved Date Met: 03/11/19    Goal: Absence of Fall  Outcome: Ongoing (interventions implemented as appropriate)      Problem: Skin Injury Risk (Adult)  Goal: Identify  Related Risk Factors and Signs and Symptoms  Outcome: Outcome(s) achieved Date Met: 03/11/19    Goal: Skin Health and Integrity  Outcome: Ongoing (interventions implemented as appropriate)      Problem: Diabetes, Type 1 (Adult)  Goal: Signs and Symptoms of Listed Potential Problems Will be Absent, Minimized or Managed (Diabetes, Type 1)  Outcome: Outcome(s) achieved Date Met: 03/11/19      Problem: Cardiac: ACS (Acute Coronary Syndrome) (Adult)  Goal: Signs and Symptoms of Listed Potential Problems Will be Absent, Minimized or Managed (Cardiac: ACS)  Outcome: Ongoing (interventions implemented as appropriate)      Problem: Pain, Acute (Adult)  Goal: Identify Related Risk Factors and Signs and Symptoms  Outcome: Outcome(s) achieved Date Met: 03/11/19    Goal: Acceptable Pain Control/Comfort Level  Outcome: Outcome(s) achieved Date Met: 03/11/19      Problem: Pain, Chronic (Adult)  Goal: Identify Related Risk Factors and Signs and Symptoms  Outcome: Ongoing (interventions implemented as appropriate)    Goal: Acceptable Pain/Comfort Level and Functional Ability  Outcome: Ongoing (interventions implemented as appropriate)

## 2019-03-11 NOTE — PROGRESS NOTES
Discharge Planning Assessment  [facility]     Patient Name: Nicolasa Rojas  MRN: [id number]  Today's Date: 3/11/2019    Admit Date: 3/10/2019    Discharge Needs Assessment     Row Name 03/11/19 1112       Living Environment    Lives With  spouse    Current Living Arrangements  home/apartment/condo    Primary Care Provided by  spouse/significant other    Provides Primary Care For  no one    Family Caregiver if Needed  spouse    Quality of Family Relationships  helpful;involved;supportive    Able to Return to Prior Arrangements  yes    Living Arrangement Comments  Pt resides at home with spouse. Appears to have good support system.        Resource/Environmental Concerns    Resource/Environmental Concerns  none    Transportation Concerns  car, none       Transition Planning    Patient/Family Anticipates Transition to  home    Patient/Family Anticipated Services at Transition  home health care Active with [facility].     Transportation Anticipated  family or friend will provide       Discharge Needs Assessment    Concerns to be Addressed  adjustment to diagnosis/illness    Equipment Currently Used at Home  glucometer;other (see comments);oxygen;walker, rolling;wheelchair, motorized Insulin Pump. Pt uses [employer] for DME needs.     Equipment Needed After Discharge  -- Spouse did not anticipate any additional DME needs at this time.     Discharge Facility/Level of Care Needs  home with home health    Offered/Gave Vendor List  yes    Patient's Choice of Community Agency(s)  [facility]    Current Discharge Risk  chronically ill        Discharge Plan     Row Name 03/11/19 1116       Plan    Plan Comments  LSW assessment complete. pt resides at home with spouse. Pt appears to have good support system. Spouse and daughter assist with ADL's and IADL's. pt was active with [facility] prior to hospitalization. Pt uses home o2 at [facility]. Spouse goal is for pt to return home at d/c and resume home health  follow up. LSW awaiting additional recomendations from MD and therapy. LSW/case mgt will follow up as consulted and complete arrangements as ordered.         Destination      No service coordination in this encounter.      Durable Medical Equipment      No service coordination in this encounter.      Dialysis/Infusion      No service coordination in this encounter.      Home Medical Care      No service coordination in this encounter.      Community Resources      No service coordination in this encounter.          Demographic Summary     Row Name 03/11/19 1109       General Information    Admission Type  inpatient    Referral Source  high risk screening    Reason for Consult  discharge planning    Preferred Language  English     Used During This Interaction  yes Spouse       Contact Information    Contact Information Obtained for          Functional Status     Row Name 03/11/19 1110       Functional Status    Usual Activity Tolerance  fair    Current Activity Tolerance  poor    Functional Status Comments  Uses walker and wheelchair to assist with mobility.        Functional Status, IADL    Medications  assistive person    Meal Preparation  assistive person    Housekeeping  completely dependent    Laundry  completely dependent    Shopping  completely dependent    IADL Comments  Spouse and daughter assist with ADL's and IADL's.        Mental Status Summary    Recent Changes in Mental Status/Cognitive Functioning  no changes        Psychosocial    No documentation.       Abuse/Neglect    No documentation.       Legal    No documentation.       Substance Abuse    No documentation.       Patient Forms    No documentation.           RUBIO Rosario

## 2019-03-11 NOTE — CONSULTS
Cardiology Consultation Note.        Patient Name: Nicolasa Rojas  Age/Sex: 68 y.o. female  : 1950  MRN: [id number]    Date of consultation: 3/10/2019  Consulting Physician: Alex Gonzalez MD  Primary care Physician: Henry Muniz MD  Requesting Physician:   Marvin Blevins APRN Reason for consultation: Elevated troponin history of atherosclerotic coronary artery disease    Subjective:       Chief Complaint: Nausea vomiting epigastric discomfort and shortness of breath      History of Present Illness:  Nicolasa Rojas is a 68 y.o. female     Body mass index is 39.07 kg/m².  With a past medical history significant for atherosclerotic coronary artery disease, coronary angiogram done in 2018 which had revealed ostial left anterior descending artery with 95% stenosis in the mid left anterior descending artery with 80-90% stenosis and a proximal circumflex artery with 80% stenosis and 100% occlusion of the first obtuse marginal branch and a dominant right coronary artery with patent stent with subsequent recommendation for direct revascularization which was considered high risk and patient underwent percutaneous intervention to the proximal circumflex artery left main and the mid left anterior descending artery with intra-aortic balloon pump support with deployment of a 2.25 mm x 15 mm stent in the left anterior descending artery and a 2.5 mm x 26 mm drug-eluting stent extending into the left main across the circumflex artery and a 2.5 mm x 22 mm drug-eluting stent in the circumflex artery, arterial hypertension, hypertensive heart disease, anxiety and depression, renal insufficiency, gastroesophageal reflux disease with gastroparesis and diabetes.    Patient was hospitalized in 2019 with symptoms of chest pain with non-Q myocardial infarction.  Patient during that hospitalization was treated medically.    Patient on last Thursday had symptoms of substernal chest discomfort and was  evaluated by his nephrologist.  Patient had complained of having some symptoms of nausea vomiting and chest pain.  Patient had not come to the hospital at that point and had recurrent symptoms of nausea vomiting without chest pain and presented to the hospital on the nephrologist request.    In the emergency room patient had a left bundle branch configuration and the first blood test evaluation which had revealed a troponin of 6.  Patient did not have any symptoms of chest pain.  Patient had a low sodium.  Patient repeat troponin was trending down.  Patient on further questioning denies any symptoms of chest pain or palpitation patient denies any symptoms of neck pain jaw pain.    Patient was subsequently hospitalized in the intensive care unit.  Patient was not having any symptoms of chest pain.  Patient on further questioning describes the discomfort which she had been experiencing the last few days of similar in quality as the one which she had prior to her last stent placement.    Patient on my evaluation denies any symptoms of chest pain.  Patient denies any fever chills patient denies any hemoptysis hematuria or bright red blood per rectum.    Patient 10 point review of system except for what stated in the history of present illness negative        Past Medical History:  Past Medical History:   Diagnosis Date   • Acute bronchitis    • Acute congestive heart failure (CMS/HCC)     resolving   • Acute kidney failure, unspecified (CMS/HCC)    • Acute kidney failure, unspecified (CMS/HCC)    • Acute posthemorrhagic anemia    • Asthenia    • Chest pain    • Chronic gastritis    • Chronic pharyngitis    • Chronic renal impairment    • Congenital renal failure    • Congestive heart failure (CMS/HCC)    • Contact dermatitis due to poison ivy    • COPD (chronic obstructive pulmonary disease) (CMS/HCC)    • Coronary arteriosclerosis    • D-dimer, elevated     D-dimer above reference range    • Degenerative joint disease  involving multiple joints     back   • Depressive disorder    • Dysphagia    • Dyspnea    • Edema of lower extremity    • Encounter for immunization    • Encounter for long-term (current) use of medications    • Epigastric pain    • Esophagitis    • Essential hypertension    • Gastroesophageal reflux disease    • Gastroparesis     Gastroparesis syndrome    • Generalized abdominal pain    • H/O bone density study 01/09/2015   • H/O mammogram 01/15/2015   • H/O screening mammography 11/12/2013   • Headache    • History of transfusion    • Hyperlipidemia    • Hypokalemia    • Hypomagnesemia    • Injury of tendon of rotator cuff     Injury of tendon of the rotator cuff of shoulder      • Insomnia    • Insomnia, unspecified    • Knee pain    • Nausea and vomiting    • Neck pain    • Need for immunization against influenza    • Need for prophylactic vaccination and inoculation against influenza    • Need for prophylactic vaccination and inoculation against viral disease    • 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 Performed at [facility].   • ENDOSCOPY  02/09/2015    ESOPHAGEAL STRICTURE PRESENT, GASTRITIS FOUND IN THE STOMACH, NORMAL DUODENUM   • ENDOSCOPY W/ PEG TUBE PLACEMENT  12/19/2012    Esophagitis seen. Biopsy taken. Esophageal stricture present. Dilatation performed. Gastritis in stomach. Biopsy taken. Food was in stomach. Normal duodenum.   • HERNIA REPAIR     • HYSTERECTOMY      partial   • NECK SURGERY         Family History:  Family History   Problem Relation Age of Onset   •  "Cancer Mother    • Diabetes Mother    • Stroke Father    • Heart disease Sister    • Colon cancer Other    • Hypertension Other    • Breast cancer Maternal Aunt        Social History:  Social History     Socioeconomic History   • Marital status:      Spouse name: Not on file   • Number of children: Not on file   • Years of education: Not on file   • Highest education level: Not on file   Social Needs   • Financial resource strain: Not on file   • Food insecurity - worry: Not on file   • Food insecurity - inability: Not on file   • Transportation needs - medical: Not on file   • Transportation needs - non-medical: Not on file   Occupational History   • Not on file   Tobacco Use   • Smoking status: Former Smoker   • Smokeless tobacco: Never Used   • Tobacco comment: quit 30 years ago    Substance and Sexual Activity   • Alcohol use: No   • Drug use: No   • Sexual activity: Defer   Other Topics Concern   • Not on file   Social History Narrative    Pt denies ever having a feeding tube, denies PEG tube placement.                Allergies:  Allergies   Allergen Reactions   • Iodides Anaphylaxis     Iodine and iodide containing products  \"knocks my kidneys out\"    • Iodine      Iodine and iodide containing products   • Other Unknown (See Comments)     Steroids \"makes my tongue feel like it swells\"   • Stadol [Butorphanol] Unknown (See Comments)     \"rises my BP\"       Medication:  Medications Prior to Admission   Medication Sig Dispense Refill Last Dose   • aspirin 81 MG chewable tablet Chew 81 mg Daily.   3/9/2019 at Unknown time   • bumetanide (BUMEX) 1 MG tablet Take 1 tablet by mouth Daily. 30 tablet 0 3/9/2019 at Unknown time   • carvedilol (COREG) 3.125 MG tablet Take 1 tablet by mouth 2 (Two) Times a Day With Meals. 60 tablet 0 3/9/2019 at Unknown time   • clopidogrel (PLAVIX) 75 MG tablet Take 1 tablet by mouth Daily. 90 tablet 1 3/9/2019 at Unknown time   • ferrous sulfate 324 (65 Fe) MG tablet " "delayed-release EC tablet Take 1 tablet by mouth 2 (Two) Times a Day With Meals. 30 tablet 1 3/9/2019 at Unknown time   • glucose blood test strip Needs to see pcp for refills 900 each 0 Taking   • HUMALOG 100 UNIT/ML injection INJECT 150 UNITS SUB-Q DAILY PER INSULIN PUMP 40 mL 11 Taking   • hydrALAZINE (APRESOLINE) 50 MG tablet Take 50 mg by mouth 3 (Three) Times a Day.   3/9/2019 at Unknown time   • HYDROcodone-acetaminophen (NORCO)  MG per tablet Take 1 tablet by mouth 4 (Four) Times a Day.   3/9/2019 at Unknown time   • Insulin Lispro 100 UNIT/ML solution cartridge Inject 150 Units under the skin Daily. DX: E11 5 cartridge 11 Taking   • Insulin Syringe-Needle U-100 (GLOBAL INSULIN SYRINGES) 30G X 5/16\" 0.3 ML misc SQ injections as needed when insulin pump is non functioning 50 each 1 Taking   • isosorbide mononitrate (IMDUR) 60 MG 24 hr tablet Take 1 tablet by mouth 2 (Two) Times a Day. 30 tablet 1 3/9/2019 at Unknown time   • raNITIdine (ZANTAC) 300 MG tablet TAKE 1 TABLET BY MOUTH EVERY NIGHT. 90 tablet 1 3/9/2019 at Unknown time   • ranolazine (RANEXA) 500 MG 12 hr tablet Take 1 tablet by mouth Every 12 (Twelve) Hours. 60 tablet 0 3/9/2019 at Unknown time   • rosuvastatin (CRESTOR) 20 MG tablet Take 1 tablet by mouth Daily. 90 tablet 3 3/9/2019 at Unknown time   • sertraline (ZOLOFT) 50 MG tablet Take 1 tablet by mouth Daily. 90 tablet 1 3/9/2019 at Unknown time   • spironolactone (ALDACTONE) 25 MG tablet Take 1 tablet by mouth Daily. 30 tablet 1 3/9/2019 at Unknown time   • vitamin D (ERGOCALCIFEROL) 75650 UNITS capsule capsule Take 1 capsule by mouth 1 (One) Time Per Week. (Patient taking differently: Take 50,000 Units by mouth 1 (One) Time Per Week. Friday) 4 capsule 6 3/8/2019 at Unknown time   • zolpidem (AMBIEN) 10 MG tablet Take 1 tablet by mouth At Night As Needed for Sleep. 30 tablet 5 3/9/2019 at Unknown time   • metOLazone (ZAROXOLYN) 2.5 MG tablet Take 2.5 mg by mouth As Needed (excess " fluid per patient).   Unknown at Unknown time   • nitroglycerin (NITROLINGUAL) 0.4 MG/SPRAY spray Place 1 spray under the tongue Every 5 (Five) Minutes As Needed for Chest Pain. 4.9 g 5 Unknown at Unknown time           Review of Systems:       Constitutional:  Denies recent weight loss, weight gain, fever or chills, no change in exercise tolerance.     HENT:  Denies any hearing loss, epistaxis, hoarseness, or difficulty speaking.     Eyes: Wears eyeglasses or contact lenses     Respiratory:  Denies dyspnea with exertion,no cough, wheezing, or hemoptysis.     Cardiovascular: Negative for palpitations, chest pain, orthopnea, PND, peripheral edema, syncope, or claudication.     Gastrointestinal:  Denies change in bowel habits, dyspepsia, ulcer disease, hematochezia, or melena.  No nausea, no vomiting, no hematemesis, no diarrhea or constipation.    Endocrine: Negative for cold intolerance, heat intolerance, polydipsia, polyphagia or polyuria. Denies any history of weight change or unintended weight loss.    Genitourinary: Negative for hematuria.  No frequent urination or nocturia.      Musculoskeletal: Denies any history of arthritic symptoms or back problems .  No joint pain, joint stiffness, joint swelling, muscle pain, muscle weakness or neck pain.    Skin:  Denies any change in hair or nails, rashes, or skin lesions.     Allergic/Immunologic: Negative.  Negative for environmental allergies, food allergies or immunocompromised state.     Neurological:  Denies any history of recurrent headaches, strokes, TIA, or seizure disorder.     Hematological: Denies excessive bleeding, easy bruising, fatigue, lymphadenopathy or petechiae or any bleeding disorders.     Psychiatric/Behavioral: Denies any history of depression, substance abuse, or change in cognitive function. Denies any psychomotor reaction or tangential thought.  No depression, homicidal ideations or suicidal ideations.          Objective:     Objective:  Temp:   [97.5 °F (36.4 °C)-97.6 °F (36.4 °C)] 97.6 °F (36.4 °C)  Heart Rate:  [66-83] 78  Resp:  [16-18] 17  BP: (147-219)/() 156/66      Body mass index is 39.07 kg/m².           Physical Exam:   General Appearance:    Alert, oriented, cooperative, in no acute distress.   Head:    Normocephalic, atraumatic, without obvious abnormality.   Eyes:           KENYATTA.  Lids and lashes normal, conjunctivae and sclerae normal, no icterus, no pallor.   Ears:    Ears appear intact with no abnormalities noted.   Throat:   Mucous membranes pink and moist.   Neck:   Supple, trachea midline, no carotid bruit, no organomegaly or JVD.   Lungs:     Clear to auscultation and percussion, respirations regular, even and unlabored. No wheezes, rales or rhonchi.    Heart:    Regular rhythm and normal rate, normal S1 and S2, no            murmur, no gallop, no rub, no click.   Abdomen:     Soft, non-tender, non-distended, no guarding, no rebound tenderness, normal bowel sounds in all four quadrants, no masses, liver and spleen nonpalpable.   Genitalia:    Deferred.   Extremities:   Moves all extremities well, no edema, no cyanosis, no              redness, no clubbing.   Pulses:   Pulses palpable and equal bilaterally.   Skin:   Moist and warm. No bleeding, bruising or rash.   Neurologic/Psychiatric:   Alert and oriented to person, place, and time.  Motor, power and tone in upper and lower extremities are grossly intact. No focal neurological deficits. Normal cognitive function. No psychomotor reaction or tangential thought. No depression, homicidal ideations and suicidal ideations.       Medication Review:   Current Facility-Administered Medications   Medication Dose Route Frequency Provider Last Rate Last Dose   • aspirin chewable tablet 324 mg  324 mg Oral Once Herminia Rocha MD        And   • [START ON 3/11/2019] aspirin EC tablet 81 mg  81 mg Oral Daily Herminia Rocha MD       • bumetanide (BUMEX) tablet 1 mg  1 mg  Oral Daily Herminia Rocha MD   1 mg at 03/10/19 1703   • calcium carbonate (TUMS) chewable tablet 500 mg (200 mg elemental)  2 tablet Oral BID PRN Herminia Rocha MD       • carvedilol (COREG) tablet 6.25 mg  6.25 mg Oral BID With Meals Herminia Rocha MD   6.25 mg at 03/10/19 1756   • clopidogrel (PLAVIX) tablet 75 mg  75 mg Oral Daily Herminia Rocha MD   75 mg at 03/10/19 1634   • famotidine (PEPCID) injection 20 mg  20 mg Intravenous Daily Herminia Rocha MD   20 mg at 03/10/19 1635   • heparin 77950 units/250 mL (100 units/mL) in 0.45 % NaCl infusion  12 Units/kg/hr Intravenous Titrated Herminia Rocha MD   Stopped at 03/10/19 1655    And   • heparin (porcine) 5000 UNIT/ML injection 8,100 Units  80 Units/kg Intravenous PRN Herminia Rocha MD   8,100 Units at 03/10/19 1617    And   • heparin (porcine) 5000 UNIT/ML injection 4,000 Units  40 Units/kg Intravenous PRN Herminia Rocha MD       • hydrALAZINE (APRESOLINE) tablet 50 mg  50 mg Oral BID Herminia Rocha MD       • HYDROcodone-acetaminophen (NORCO)  MG per tablet 1 tablet  1 tablet Oral 4x Daily Herminia Rocha MD   1 tablet at 03/10/19 1703   • insulin patient supplied pump   Subcutaneous Continuous Herminia Rocha MD       • isosorbide mononitrate (IMDUR) 24 hr tablet 60 mg  60 mg Oral BID Herminia Rocha MD       • metoclopramide (REGLAN) injection 5 mg  5 mg Intravenous 4x Daily AC & at Bedtime Herminia Rocha MD   5 mg at 03/10/19 1635   • ondansetron (ZOFRAN) tablet 4 mg  4 mg Oral Q6H PRN Herminia Rocha MD        Or   • ondansetron ODT (ZOFRAN-ODT) disintegrating tablet 4 mg  4 mg Oral Q6H PRN Herminia Rocha MD   4 mg at 03/10/19 1545    Or   • ondansetron (ZOFRAN) injection 4 mg  4 mg Intravenous Q6H PRN Herminia Rocha MD       • ranolazine (RANEXA) 12 hr tablet 500 mg  500 mg Oral Q12H  Herminia Rocha MD       • rosuvastatin (CRESTOR) tablet 20 mg  20 mg Oral Daily Herminia Rocha MD   20 mg at 03/10/19 1703   • sennosides-docusate sodium (SENOKOT-S) 8.6-50 MG tablet 2 tablet  2 tablet Oral BID PRN Herminia Rocha MD       • sertraline (ZOLOFT) tablet 50 mg  50 mg Oral Daily Herminia Rocha MD   50 mg at 03/10/19 1634   • sodium chloride 0.9 % flush 10 mL  10 mL Intravenous PRN Catracho Jarrell MD       • sodium chloride 0.9 % flush 3 mL  3 mL Intravenous Q12H Herminia Rocha MD       • sodium chloride 0.9 % flush 3-10 mL  3-10 mL Intravenous PRN Herminia Rocha MD       • sodium chloride 0.9 % infusion  100 mL/hr Intravenous Continuous Herminia Rocha MD       • sodium chloride 0.9 % infusion  75 mL/hr Intravenous Continuous Óscar Nagy APRN 75 mL/hr at 03/10/19 1445     • spironolactone (ALDACTONE) tablet 25 mg  25 mg Oral Daily Herminia Rocha MD   25 mg at 03/10/19 1703   • zolpidem (AMBIEN) tablet 10 mg  10 mg Oral Nightly PRN Herminia Rocha MD           Lab Review:     Results from last 7 days   Lab Units 03/10/19  1602 03/10/19  1040   SODIUM mmol/L 117* 116*   POTASSIUM mmol/L  --  3.0*   CHLORIDE mmol/L  --  76*   CO2 mmol/L  --  28.0   BUN mg/dL  --  41*   CREATININE mg/dL  --  1.44*   CALCIUM mg/dL  --  8.5   BILIRUBIN mg/dL  --  1.4*   ALK PHOS U/L  --  87   ALT (SGPT) U/L  --  16   AST (SGOT) U/L  --  70*   GLUCOSE mg/dL  --  89     Results from last 7 days   Lab Units 03/10/19  1602 03/10/19  1040   TROPONIN I ng/mL 3.260* 6.560*         Results from last 7 days   Lab Units 03/10/19  1040   WBC 10*3/mm3 9.16   HEMOGLOBIN g/dL 10.1*   HEMATOCRIT % 28.5*   PLATELETS 10*3/mm3 157     Results from last 7 days   Lab Units 03/10/19  1039   INR  1.12   APTT seconds 31.1     Results from last 7 days   Lab Units 03/10/19  1040   MAGNESIUM mg/dL 1.6         Results from last 7 days   Lab Units  03/10/19  1040   TSH mIU/mL 6.690*           EKG:   ECG/EMG Results (last 24 hours)     Procedure Component Value Units Date/Time    ECG 12 Lead [[id number]] Collected:  03/10/19 0937     Updated:  03/10/19 1212          ECHO:  Results for orders placed during the hospital encounter of 02/06/19   Adult Transthoracic Echo Complete W/ Cont if Necessary Per Protocol    Narrative · Left ventricular wall thickness is consistent with mild-to-moderate   concentric hypertrophy.  · Estimated EF = 57%.  · Left ventricular systolic function is normal.  · Left ventricular diastolic dysfunction (grade I) consistent with   impaired relaxation.  · Right ventricular cavity is mildly dilated.  · Left atrial cavity size is moderately dilated.  · Mild aortic valve regurgitation is present.  · Mild mitral valve regurgitation is present  · Moderate tricuspid valve regurgitation is present.  · Mild pulmonic valve regurgitation is present.  · The tricuspid valve is grossly normal. Moderate tricuspid valve   regurgitation is present. Estimated right ventricular systolic pressure   from tricuspid regurgitation is markedly elevated (>55 mmHg).           Imaging:  Imaging Results (last 24 hours)     Procedure Component Value Units Date/Time    CT Abdomen Pelvis Without Contrast [[id number]] Collected:  03/10/19 1149     Updated:  03/10/19 1239    Narrative:         CT Abdomen and Pelvis Without Contrast    History: Nausea and vomiting    Axials spiral scans of the abdomen and pelvis were obtained  without contrast.  Coronal and sagital reconstructions were  preformed.      This exam was performed according to our departmental  dose-optimization program, which includes automated exposure  control, adjustment of the mA and/or kV according to patient size  and/or use of iterative reconstruction technique.    DLP: 951.90    Comparison: December 24, 2010    Scans of the lung bases demonstrate bilateral linear atelectasis.  Cardiomegaly and coronary  artery calcifications.    No acute osseous abnormality.  Degenerative changes and degenerative disc disease in the lower  thoracic spine.    The liver is unremarkable.  The gallbladder is unremarkable.  The spleen is unremarkable.  The pancreas is unremarkable.  The adrenal glands are unremarkable.    No renal or ureteral calculi.  No renal mass.  No hydronephrosis.    Unremarkable bladder.  No pelvic mass.  Hysterectomy.    No abdominal aortic aneurysm.  Extensive atherosclerotic calcifications which may reflect  underlying diabetes, hypertension or renal disease  No adenopathy.    Wall of the distal esophagus appears somewhat thickened, possible  esophagitis.  No bowel obstruction.  Diverticulosis of the colon.  Appendix not identified.  No pericecal inflammatory changes.  No free air.  No free fluid.    No hernia.  Edema subcutaneous fat lower anterior abdominal wall with  overlying skin thickening, possible panniculitis.      Impression:       Conclusion:  Edema subcutaneous fat lower anterior abdominal wall with  overlying skin thickening, possible panniculitis.  Extensive atherosclerotic calcifications which may reflect  underlying diabetes, hypertension or renal disease.  Wall of the distal esophagus appears somewhat thickened, possible  esophagitis.  Diverticulosis of the colon.  Hysterectomy.  Cardiomegaly and coronary artery calcifications.    [id number]    Electronically signed by:  Karl Harris MD  3/10/2019 12:38 PM  CDT Workstation: [id number] Chest 1 View [[id number]] Collected:  03/10/19 1046     Updated:  03/10/19 1119    Narrative:         PORTABLE CHEST    HISTORY: Weakness. Dizziness. Altered mental status.    Portable AP upright film of the chest was obtained at 10:37 AM.  COMPARISON: January 7, 2019    EKG leads.  The lungs are clear of an acute process.  Stable cardiomegaly.  The pulmonary vasculature is not increased.  Perhaps very small right pleural effusion with minimal fluid in  the  minor fissure.  No pneumothorax.  No acute osseous abnormality.      Impression:       CONCLUSION:  Stable cardiomegaly.  Perhaps very small right pleural effusion with minimal fluid in  the minor fissure.    [id number]    Electronically signed by:  Karl Harris MD  3/10/2019 11:18 AM  CDT Workstation: [id number]ALL personally viewed and interpreted the patient's EKG/Telemetry data.    Assessment:   1.  Elevated troponin suggestive of non-Q myocardial infarction.  2.  Atherosclerotic coronary artery disease with previous PTCA and stenting of the left main left anterior descending artery and circumflex artery done in November 2018.  3.  Previous PTCA and stenting of the right coronary artery.  4.  Chronic kidney disease.  5.  Diabetes.  6.  Hyponatremia          Plan:   1.  Elevated troponin suggestive of non-Q myocardial infarction with history of documented atherosclerotic coronary artery disease with last coronary intervention done in November 2018.  Patient is currently not having any symptoms of chest pain.  Patient tripped repeat troponin was trending down.  Patient at the present time would be continued on supportive care.  Patient would need a coronary angiogram.  Patient is currently on heparin and has not had any further recurrent symptoms of chest pain.  Would continue with the Ranexa.  Patient would be evaluated by Dr. Purvsi in the morning.  Patient would be subjected to an emergent coronary angiogram if she starts having chest pain or has rise in the troponin.  2.  Hyponatremia.  Patient was evaluated by nephrology.  3.  Chronic kidney disease.  Patient has been followed by nephrology.    Thank you for the consultation.    Dr. Purvis to resume the cardiac care in the morning.      Time: time spent in face-to-face evaluation of greater than 55 minutes and interacting and formulating examining and discussing the plan with the patient with 50% of greater time spent in face-to-face  interaction.    Alex Gonzalez MD  03/10/19  8:18 PM  Dictated utilizing Dragon dictation.

## 2019-03-11 NOTE — NURSING NOTE
Patient complained of not being able to void this morning and had only voided about 150 overnight. RN bladder scanned pt and showed 378 ml. In and out straight cath resulted in 375 ml urine drained. Urine was dark and concentrated. Patient states that she has had issues voiding for a few months prior to this hospitalization.

## 2019-03-12 NOTE — PROGRESS NOTES
[facility] Medicine Services  INPATIENT PROGRESS NOTE    Length of Stay: 2  Date of Admission: 3/10/2019  Primary Care Physician: Henry Muniz MD    Subjective   Chief Complaint: Nausea, vomiting epigastric pain and shortness of breath.    HPI: Patient admitted for epigastric pain and shortness of breath.  Currently being managed for NSTEMI with hyponatremia and chronic kidney disease.    This morning, she states that she has generalized weakness.  She also has some epigastric discomfort and shortness of breath.  She was reviewed by cardiologist Dr. Purvis who recommended conservative management as patient is a high risk for complications from cardiac catheterization.  However patient requested transfer to [place] for evaluation by her cardiologist there.    Review of Systems   Constitutional: Positive for activity change and appetite change.   HENT: Negative for mouth sores and sore throat.    Respiratory: Positive for shortness of breath.    Cardiovascular: Negative for chest pain and palpitations.   Gastrointestinal: Positive for abdominal pain. Negative for diarrhea.   Genitourinary: Negative for dysuria and hematuria.   Neurological: Negative for light-headedness and headaches.   Psychiatric/Behavioral: Negative for agitation. The patient is not nervous/anxious.        Objective    Temp:  [97.6 °F (36.4 °C)-98.5 °F (36.9 °C)] 98.4 °F (36.9 °C)  Heart Rate:  [50-70] 64  Resp:  [15-21] 16  BP: (108-186)/() 136/66    Physical Exam   Constitutional: She is oriented to person, place, and time. She appears well-developed and well-nourished. No distress.   HENT:   Head: Normocephalic and atraumatic.   Mouth/Throat: Oropharynx is clear and moist.   Eyes: Conjunctivae and EOM are normal. Pupils are equal, round, and reactive to light.   Neck: Normal range of motion. Neck supple. No JVD present. No tracheal deviation present. No thyromegaly present.   Cardiovascular:  Normal rate, regular rhythm, normal heart sounds and intact distal pulses. Exam reveals no gallop and no friction rub.   No murmur heard.  Pulmonary/Chest: No stridor. No respiratory distress. She has no wheezes. She has no rales. She exhibits no tenderness.   Coarse crepitations in lung bases bilaterally and reduced breath sounds.   Abdominal: Soft. Bowel sounds are normal. She exhibits no distension and no mass. There is no tenderness. There is no rebound and no guarding. No hernia.   Musculoskeletal: Normal range of motion. She exhibits edema. She exhibits no tenderness or deformity.   Lymphadenopathy:     She has no cervical adenopathy.   Neurological: She is alert and oriented to person, place, and time. No cranial nerve deficit or sensory deficit. She exhibits normal muscle tone. Coordination normal.   Skin: Skin is warm and dry. No rash noted. She is not diaphoretic. No erythema. No pallor.   Psychiatric: She has a normal mood and affect. Her behavior is normal. Judgment and thought content normal.       Medication Review:    Current Facility-Administered Medications:   •  aspirin chewable tablet 324 mg, 324 mg, Oral, Once **AND** aspirin EC tablet 81 mg, 81 mg, Oral, Daily, Herminia Rocha MD, 81 mg at 03/12/19 0825  •  bumetanide (BUMEX) injection 2 mg, 2 mg, Intravenous, BID, Joon Dugan MD, 2 mg at 03/12/19 1550  •  calcium carbonate (TUMS) chewable tablet 500 mg (200 mg elemental), 2 tablet, Oral, BID PRN, Herminia Rocha MD  •  carvedilol (COREG) tablet 6.25 mg, 6.25 mg, Oral, BID With Meals, Herminia Rocha MD, 6.25 mg at 03/12/19 0825  •  clopidogrel (PLAVIX) tablet 75 mg, 75 mg, Oral, Daily, Herminia Rocha MD, 75 mg at 03/12/19 0825  •  dextrose (D50W) 25 g/ 50mL Intravenous Solution 25 g, 25 g, Intravenous, Q15 Min PRN, Catracho Jarrell MD  •  dextrose (GLUTOSE) oral gel 15 g, 15 g, Oral, Q15 Min PRN, Catracho Jarrell MD  •  [START ON 3/13/2019]  famotidine (PEPCID) tablet 20 mg, 20 mg, Oral, Daily, Zach Szymanski MD  •  glucagon (human recombinant) (GLUCAGEN DIAGNOSTIC) injection 1 mg, 1 mg, Subcutaneous, PRN, Catracho Jarrell MD  •  hydrALAZINE (APRESOLINE) tablet 50 mg, 50 mg, Oral, TID, Yaniv Lobo MD, 50 mg at 03/12/19 1550  •  HYDROcodone-acetaminophen (NORCO)  MG per tablet 1 tablet, 1 tablet, Oral, 4x Daily, Herminia Rocha MD, 1 tablet at 03/12/19 1151  •  insulin patient supplied pump, , Subcutaneous, Continuous, Herminia Rocha MD  •  isosorbide mononitrate (IMDUR) 24 hr tablet 60 mg, 60 mg, Oral, BID, Herminia Rocha MD, 60 mg at 03/12/19 0824  •  metoclopramide (REGLAN) injection 5 mg, 5 mg, Intravenous, 4x Daily AC & at Bedtime, Herminia Rocha MD, 5 mg at 03/12/19 1151  •  ondansetron (ZOFRAN) tablet 4 mg, 4 mg, Oral, Q6H PRN **OR** ondansetron ODT (ZOFRAN-ODT) disintegrating tablet 4 mg, 4 mg, Oral, Q6H PRN, 4 mg at 03/11/19 0101 **OR** ondansetron (ZOFRAN) injection 4 mg, 4 mg, Intravenous, Q6H PRN, Herminia Rocha MD  •  polyethylene glycol 3350 powder (packet), 17 g, Oral, Daily, Zach Szymanski MD, 17 g at 03/12/19 0823  •  ranolazine (RANEXA) 12 hr tablet 500 mg, 500 mg, Oral, Q12H, Herminia Rocha MD, 500 mg at 03/12/19 0825  •  rosuvastatin (CRESTOR) tablet 20 mg, 20 mg, Oral, Daily, Herminia Rocha MD, 20 mg at 03/12/19 0825  •  sennosides-docusate sodium (SENOKOT-S) 8.6-50 MG tablet 2 tablet, 2 tablet, Oral, BID PRN, Herminia Rocha MD  •  sertraline (ZOLOFT) tablet 50 mg, 50 mg, Oral, Daily, Herminia Rocha MD, 50 mg at 03/12/19 0824  •  sodium chloride (OCEAN) nasal spray 1 spray, 1 spray, Each Nare, PRN, Zach Szymanski MD  •  sodium chloride 0.9 % flush 10 mL, 10 mL, Intravenous, PRN, Catracho Jarrell MD  •  sodium chloride 0.9 % flush 3 mL, 3 mL, Intravenous, Q12H, Herminia Rocha MD, 3 mL at 03/12/19  0826  •  sodium chloride 0.9 % flush 3-10 mL, 3-10 mL, Intravenous, PRN, Herminia Rocha MD  •  zolpidem (AMBIEN) tablet 10 mg, 10 mg, Oral, Nightly PRN, Herminia Rocha MD, 10 mg at 03/11/19 2139    Results Review:  I have reviewed the labs, radiology results, and diagnostic studies.    Laboratory Data:   Results from last 7 days   Lab Units 03/12/19  0948 03/11/19  1822 03/11/19  0913  03/10/19  1040   SODIUM mmol/L 119* 117* 118*   < > 116*   POTASSIUM mmol/L 3.2*  --  3.7  --  3.0*   CHLORIDE mmol/L 81*  --  78*  --  76*   CO2 mmol/L 29.0  --  30.0  --  28.0   BUN mg/dL 57*  --  46*  --  41*   CREATININE mg/dL 2.05*  --  1.76*  --  1.44*   GLUCOSE mg/dL 104*  --  77  --  89   CALCIUM mg/dL 8.6  --  8.6  --  8.5   BILIRUBIN mg/dL  --   --  1.5*  --  1.4*   ALK PHOS U/L  --   --  81  --  87   ALT (SGPT) U/L  --   --  42  --  16   AST (SGOT) U/L  --   --  81*  --  70*   ANION GAP mmol/L 9.0  --  10.0  --  12.0    < > = values in this interval not displayed.     Estimated Creatinine Clearance: 31.2 mL/min (A) (by C-G formula based on SCr of 2.05 mg/dL (H)).  Results from last 7 days   Lab Units 03/10/19  1040   MAGNESIUM mg/dL 1.6     Results from last 7 days   Lab Units 03/11/19  0421   URIC ACID mg/dL 10.7*     Results from last 7 days   Lab Units 03/12/19  0949 03/11/19  0108 03/10/19  1040   WBC 10*3/mm3 7.04 6.30 9.16   HEMOGLOBIN g/dL 10.3* 10.0* 10.1*   HEMATOCRIT % 30.5* 27.9* 28.5*   PLATELETS 10*3/mm3 146 166 157     Results from last 7 days   Lab Units 03/10/19  1039   INR  1.12       Culture Data:   No results found for: BLOODCX  No results found for: URINECX  No results found for: RESPCX  No results found for: WOUNDCX  No results found for: STOOLCX  No components found for: BODYFLD    Radiology Data:   Imaging Results (last 24 hours)     ** No results found for the last 24 hours. **          I have reviewed the patient's current medications.     Assessment/Plan     Hospital Problem  List:  Active Problems:    NSTEMI (non-ST elevated myocardial infarction) (CMS/HCC)  Hyponatremia   CKD stage 3  Hypokalemia   Epigastric pain, likely gastroparesis   intractable nausea and vomiting      Plan  1.  NSTEMI  Patient is free of chest pain.  No plans by cardiology for invasive coronary angiogram due to multiple comorbidities and CKD stage IV.  She also had including renal failure and hematoma complications during her last cardiac catheterization.  However patient is requesting transfer to [place] to be reviewed by her primary cardiologist.  Transplant is being coordinated by case management.  Continue Plavix, Imdur Ranexa and aspirin.  Discontinue heparin due to nosebleeds.  DVT prophylaxis with SCDs    2.  Hyponatremia  -Nephrology input appreciated.  Hyponatremia is mixed with hypovolemic and hypervolemic component.  Patient's sodium is slightly improved this morning to 119.  IV fluids and metolazone have been discontinued.  Continue Samsca    3.  Acute on chronic heart failure with preserved ejection fraction  -Continue Coreg, Bumex, and hydralazine.  No ACE or ARB secondary to CKD stage IV.    4.  CKD stage III  -Continue Bumex 2 mg twice daily    Replete potassium and electrolytes.  Zofran for nausea and vomiting.    DVT prophylaxis with SCDs due to nosebleed.  CODE STATUS is full code    Discharge Planning: In progress    Zach Szymanski MD   03/12/19   4:37 PM

## 2019-03-12 NOTE — PROGRESS NOTES
[facility] Note   LOS: 2 days   Patient Care Team:  Henry Muniz MD as PCP - General (Internal Medicine)    Chief Complaint   Patient presents with   • Weakness - Generalized   • Nausea   • Vomiting      Subjective     Interval History:   Patient Denies: chest pain, edema, dizziness, dyspnea at rest  Patient complaints: fatigue  History taken from chart and patient  Reinforced the poor condition of her heart and poor prognosis.     Hospital Course at [place] as summarized below:  She was started on ACS protocol with aspirin, Plavix, and heparin. She was taken the Cath Lab with findings most pertinent for an ostial LAD stenosis. An intra-aortic balloon pump was placed for coronary perfusion and she was transferred to CCU afterwards without intervention. Cardiac surgery was involved and deemed her to not be a surgical revascularization candidate. She underwent high risk PCI with balloon pump support on 11/19/2018  The patient was transferred to the CCU after her LHC with balloon pump placement on 11/17/18.     The LHC showed LM: Diffuse 20% disease. Pressure ventricularization with catheter engagement. LAD: Ostial 95% stenosis and mid 80-90% stenoses. Very small D2 has a proximal 100% . LCX: Proximal 80% stenosis and OM1 100% in-stent . RCA: Dominant, the proximal stents are widely patent.     Cardiac surgery was consulted for evaluation for surgical intervention who recommended proceeding with a high risk PCI due to morbid obesity, deconditioned state and extensive medical history.  She underwent the high risk PCI on 11/19 with drug-eluting stents placed in the proximal LCx, mid-LAD and left main extending into the proximal mid-LAD. She had the IABP removed on 11/20 without complications.   Additionally, patient's course has been complicated by anemia (hematocrit fredy 21); transfused 1 unit of pRBC this morning (haptoglobin 14, , appropriate retic count, iron studies (anemia of  "chronic disease)). No evidence of bleeding and stabilized to 27 at discharge. Endocrine was consulted and managed her insulin pump while inpatient.  Additionally, she has had worsening renal function (prior history of contrast-induced nephropathy with prior caths) and urine output reducing. She ultimately became essentially anuric, and required several episodes of HD,     The patient and the family are fully aware of the multiple issues at [place].  The patient has been noted to be hyponatremic, anemic in addition to worsening renal function.    Objective     Vital Sign Min/Max for last 24 hours  Temp  Min: 98 °F (36.7 °C)  Max: 98.5 °F (36.9 °C)   BP  Min: 128/60  Max: 186/77   Pulse  Min: 50  Max: 74   Resp  Min: 16  Max: 23   SpO2  Min: 91 %  Max: 100 %   Flow (L/min)  Min: 3  Max: 3   Weight  Min: 105 kg (231 lb 11.3 oz)  Max: 105 kg (231 lb 11.3 oz)     Flowsheet Rows      First Filed Value   Admission Height  165.1 cm (65\") Documented at 03/10/2019 1026   Admission Weight  101 kg (223 lb) Documented at 03/10/2019 1026            03/11/19  0500 03/11/19  0900 03/12/19  0704   Weight: 106 kg (234 lb 2.1 oz) 104 kg (229 lb 11.5 oz) 105 kg (231 lb 11.3 oz)       Physical Exam:  Physical Exam   Constitutional: She is oriented to person, place, and time. She appears well-developed and well-nourished. No distress.   HENT:   Head: Normocephalic.   Eyes: Conjunctivae are normal.   Neck: No JVD present.   Cardiovascular: Normal rate, regular rhythm, S1 normal, S2 normal, normal heart sounds and intact distal pulses. Exam reveals no gallop and no friction rub.   No murmur heard.  Pulmonary/Chest: Effort normal and breath sounds normal. No respiratory distress. She has no wheezes. She has no rales.   Abdominal: Soft. Bowel sounds are normal. She exhibits no distension.   Musculoskeletal: Normal range of motion. She exhibits no edema.   Neurological: She is alert and oriented to person, place, and time.   Skin: Skin is " warm and dry. She is not diaphoretic. No erythema.   Psychiatric: She has a normal mood and affect. Her behavior is normal. Judgment and thought content normal.   Nursing note and vitals reviewed.       Results Review:     Results from last 7 days   Lab Units 03/11/19  1822 03/11/19  0913 03/11/19  0421  03/10/19  1040   SODIUM mmol/L 117* 118* 119*   < > 116*   POTASSIUM mmol/L  --  3.7  --   --  3.0*   CHLORIDE mmol/L  --  78*  --   --  76*   CO2 mmol/L  --  30.0  --   --  28.0   BUN mg/dL  --  46*  --   --  41*   CREATININE mg/dL  --  1.76*  --   --  1.44*   CALCIUM mg/dL  --  8.6  --   --  8.5   BILIRUBIN mg/dL  --  1.5*  --   --  1.4*   ALK PHOS U/L  --  81  --   --  87   ALT (SGPT) U/L  --  42  --   --  16   AST (SGOT) U/L  --  81*  --   --  70*   GLUCOSE mg/dL  --  77  --   --  89    < > = values in this interval not displayed.     Estimated Creatinine Clearance: 36.3 mL/min (A) (by C-G formula based on SCr of 1.76 mg/dL (H)).    Results from last 7 days   Lab Units 03/10/19  1040   MAGNESIUM mg/dL 1.6       Results from last 7 days   Lab Units 03/11/19  0421   URIC ACID mg/dL 10.7*       Results from last 7 days   Lab Units 03/11/19  0108 03/10/19  1040   WBC 10*3/mm3 6.30 9.16   HEMOGLOBIN g/dL 10.0* 10.1*   PLATELETS 10*3/mm3 166 157       Results from last 7 days   Lab Units 03/10/19  1039   INR  1.12     Lab Results   Component Value Date    PROBNP 40,500.0 (H) 03/10/2019       I/O last 3 completed shifts:  In: 1476.8 [I.V.:1476.8]  Out: 775 [Urine:775]    Cardiographics:    Results for orders placed during the hospital encounter of 02/06/19   Adult Transthoracic Echo Complete W/ Cont if Necessary Per Protocol    Narrative · Left ventricular wall thickness is consistent with mild-to-moderate   concentric hypertrophy.  · Estimated EF = 57%.  · Left ventricular systolic function is normal.  · Left ventricular diastolic dysfunction (grade I) consistent with   impaired relaxation.  · Right ventricular cavity  is mildly dilated.  · Left atrial cavity size is moderately dilated.  · Mild aortic valve regurgitation is present.  · Mild mitral valve regurgitation is present  · Moderate tricuspid valve regurgitation is present.  · Mild pulmonic valve regurgitation is present.  · The tricuspid valve is grossly normal. Moderate tricuspid valve   regurgitation is present. Estimated right ventricular systolic pressure   from tricuspid regurgitation is markedly elevated (>55 mmHg).          Ct Abdomen Pelvis Without Contrast    Result Date: 3/10/2019  Conclusion: Edema subcutaneous fat lower anterior abdominal wall with overlying skin thickening, possible panniculitis. Extensive atherosclerotic calcifications which may reflect underlying diabetes, hypertension or renal disease. Wall of the distal esophagus appears somewhat thickened, possible esophagitis. Diverticulosis of the colon. Hysterectomy. Cardiomegaly and coronary artery calcifications. [id number] Electronically signed by:  Karl Harris MD  3/10/2019 12:38 PM CDT Workstation: [employer]    Xr Chest 1 View    Result Date: 3/10/2019  CONCLUSION: Stable cardiomegaly. Perhaps very small right pleural effusion with minimal fluid in the minor fissure. [id number] Electronically signed by:  Karl Harris MD  3/10/2019 11:18 AM CDT Workstation: [employer]      Medication Review:     Current Facility-Administered Medications:   •  aspirin chewable tablet 324 mg, 324 mg, Oral, Once **AND** aspirin EC tablet 81 mg, 81 mg, Oral, Daily, Herminia Rocha MD, 81 mg at 03/12/19 0825  •  bumetanide (BUMEX) injection 2 mg, 2 mg, Intravenous, BID, Joon Dugan MD, 2 mg at 03/12/19 0649  •  calcium carbonate (TUMS) chewable tablet 500 mg (200 mg elemental), 2 tablet, Oral, BID PRN, Herminia Rocha MD  •  carvedilol (COREG) tablet 6.25 mg, 6.25 mg, Oral, BID With Meals, Herminia Rocha MD, 6.25 mg at 03/12/19 0825  •  clopidogrel (PLAVIX) tablet 75 mg, 75 mg, Oral,  Daily, Herminia Rocha MD, 75 mg at 03/12/19 0825  •  dextrose (D50W) 25 g/ 50mL Intravenous Solution 25 g, 25 g, Intravenous, Q15 Min PRN, Catracho Jarrell MD  •  dextrose (GLUTOSE) oral gel 15 g, 15 g, Oral, Q15 Min PRN, Catracho Jarrell MD  •  famotidine (PEPCID) injection 20 mg, 20 mg, Intravenous, Daily, Herminia Rocha MD, 20 mg at 03/12/19 0823  •  glucagon (human recombinant) (GLUCAGEN DIAGNOSTIC) injection 1 mg, 1 mg, Subcutaneous, PRN, Catracho Jarrell MD  •  hydrALAZINE (APRESOLINE) tablet 50 mg, 50 mg, Oral, TID, Yaniv Lobo MD, 50 mg at 03/12/19 0825  •  HYDROcodone-acetaminophen (NORCO)  MG per tablet 1 tablet, 1 tablet, Oral, 4x Daily, Herminia Rocha MD, 1 tablet at 03/12/19 0825  •  insulin patient supplied pump, , Subcutaneous, Continuous, Herminia Rocha MD  •  isosorbide mononitrate (IMDUR) 24 hr tablet 60 mg, 60 mg, Oral, BID, Herminia Rocha MD, 60 mg at 03/12/19 0824  •  metoclopramide (REGLAN) injection 5 mg, 5 mg, Intravenous, 4x Daily AC & at Bedtime, Herminia Rocha MD, 5 mg at 03/12/19 0649  •  ondansetron (ZOFRAN) tablet 4 mg, 4 mg, Oral, Q6H PRN **OR** ondansetron ODT (ZOFRAN-ODT) disintegrating tablet 4 mg, 4 mg, Oral, Q6H PRN, 4 mg at 03/11/19 0101 **OR** ondansetron (ZOFRAN) injection 4 mg, 4 mg, Intravenous, Q6H PRN, Herminia Rocha MD  •  polyethylene glycol 3350 powder (packet), 17 g, Oral, Daily, Zach Szymanski MD, 17 g at 03/12/19 0823  •  ranolazine (RANEXA) 12 hr tablet 500 mg, 500 mg, Oral, Q12H, Herminia Rocha MD, 500 mg at 03/12/19 0825  •  rosuvastatin (CRESTOR) tablet 20 mg, 20 mg, Oral, Daily, Herminia Rocha MD, 20 mg at 03/12/19 0825  •  sennosides-docusate sodium (SENOKOT-S) 8.6-50 MG tablet 2 tablet, 2 tablet, Oral, BID PRN, Herminia Rocha MD  •  sertraline (ZOLOFT) tablet 50 mg, 50 mg, Oral, Daily, Herminia Rocha MD, 50 mg at 03/12/19  0824  •  sodium chloride (OCEAN) nasal spray 1 spray, 1 spray, Each Nare, PRN, Zach Szymanski MD  •  sodium chloride 0.9 % flush 10 mL, 10 mL, Intravenous, PRN, Catracho Jarrell MD  •  sodium chloride 0.9 % flush 3 mL, 3 mL, Intravenous, Q12H, Herminia Rocha MD, 3 mL at 03/12/19 0826  •  sodium chloride 0.9 % flush 3-10 mL, 3-10 mL, Intravenous, PRN, Herminia Rocha MD  •  spironolactone (ALDACTONE) tablet 25 mg, 25 mg, Oral, Daily, Herminia Rocha MD, 25 mg at 03/12/19 0824  •  zolpidem (AMBIEN) tablet 10 mg, 10 mg, Oral, Nightly PRN, Herminia Rocha MD, 10 mg at 03/11/19 2139    Assessment/Plan   HFpEF, acute on chronic  Appears euvolemic today with stable hemodynamic status  - Coreg 6.25mg BID (Increased from 3.125 mg BID on 03/11/2019 per Dr Purvis)  - Bumex 2 mg IV twice daily (Dr Dugan on 03/11/2019)  - Hydralazine 50mg TID (Increased from BID to TID on 03/11/2019 per Dr Purvis)  - Aldactone 25mg daily  - no ACE/ARB secondary to CKD IV      NSTEMI (non-ST elevated myocardial infarction) (CMS/[facility])  - chest pain free.  - due to prior extensive cardiac history, history of hemodialysis following PCIs, CKD IV, hyponatremia, anemia requiring transfusion and decompensated heart failure.   Ms. Rojas will not be subject to invasive coronary angiogram.   - Last visit it was recommended that she be sent home with a palliative care plan.   - Grossly elevated BNP is poor mortality indicator with continued elevated troponin  - Peak troponin 6.5. Likely demand ischemia secondary to poor heart function versus ischemic event.   EKG is without acute changes.     - Plavix 75mg  - Imdur 60mg  - Ranexa 500mg BID  - ASA 81mg  - Crestor 20mg nightly    Hyponatremia  - Nephrology following  - Consideration combination hypovolemic/hypervolemic upon admission; IV normal saline did not impact, therefore was discontinued (03/11/2019) with initiation of Samsca    Addendum:  Following  "evaluation with Dr Purvis today (03/12/2019), the patient will be transferred to the care of Dr Amador @ [place] for evaluation and possible high risk procedure.   This action occurs following conversation with the patient and her  at which time the patient and her  indicate a desire to receive evaluation with \"her regular cardiologist\".     The patient has history of high risk PCI with balloon pump support on 11/19/2018  The patient was transferred to the CCU after her LHC with balloon pump placement on 11/17/18.   Cardiac surgery was consulted for evaluation for surgical intervention who recommended proceeding with a high risk PCI due to morbid obesity, deconditioned state and extensive medical history.  She underwent the high risk PCI on 11/19 with drug-eluting stents placed in the proximal LCx, mid-LAD and left main extending into the proximal mid-LAD. She had the IABP removed on 11/20 without complications.   Additionally, patient's course has been complicated by anemia (hematocrit fredy 21); transfused 1 unit of pRBC this morning (haptoglobin 14, , appropriate retic count, iron studies (anemia of chronic disease).Additionally, she has had worsening renal function (prior history of contrast-induced nephropathy with prior caths) and urine output reducing. She ultimately became essentially anuric, and required several episodes of HD.    Case management is coordinating the transfer to [place].  [facility]  was called and they are making arrangements         This document has been electronically signed by DOMINGA Henning on March 12, 2019 9:05 AM          "

## 2019-03-12 NOTE — MEDICAL STUDENT
"[facility]  [address] [place] [address]. [address]  T - [phone]  F - [phone]     Progress Note          PATIENT  DEMOGRAPHICS   PATIENT NAME: Nicolasa Rojas                      PHYSICIAN: Ata Rush  : 1950  MRN: [id number]   LOS: 2 days    Patient Care Team:  Henry Muniz MD as PCP - General (Internal Medicine)  Subjective   SUBJECTIVE   Patient has been moving to chair with assistance. SOA mildly improved. Patient states she feels the same         Objective   OBJECTIVE   Vital Signs  Temp:  [98 °F (36.7 °C)-98.5 °F (36.9 °C)] 98 °F (36.7 °C)  Heart Rate:  [50-69] 67  Resp:  [16-23] 18  BP: (128-186)/() 143/66    Flowsheet Rows      First Filed Value   Admission Height  165.1 cm (65\") Documented at 03/10/2019 1026   Admission Weight  101 kg (223 lb) Documented at 03/10/2019 1026           I/O last 3 completed shifts:  In: 1476.8 [I.V.:1476.8]  Out: 775 [Urine:775]    PHYSICAL EXAM    Physical Exam   Cardiovascular: Normal rate, regular rhythm and normal heart sounds.   Pulmonary/Chest: Effort normal. No respiratory distress. She has no wheezes. She has no rales.   Musculoskeletal: She exhibits edema.   Vitals reviewed.      RESULTS   Results Review:    Results from last 7 days   Lab Units 19  1822 19  0913 19  0421  03/10/19  1040   SODIUM mmol/L 117* 118* 119*   < > 116*   POTASSIUM mmol/L  --  3.7  --   --  3.0*   CHLORIDE mmol/L  --  78*  --   --  76*   CO2 mmol/L  --  30.0  --   --  28.0   BUN mg/dL  --  46*  --   --  41*   CREATININE mg/dL  --  1.76*  --   --  1.44*   CALCIUM mg/dL  --  8.6  --   --  8.5   BILIRUBIN mg/dL  --  1.5*  --   --  1.4*   ALK PHOS U/L  --  81  --   --  87   ALT (SGPT) U/L  --  42  --   --  16   AST (SGOT) U/L  --  81*  --   --  70*   GLUCOSE mg/dL  --  77  --   --  89    < > = values in this interval not displayed.       Estimated Creatinine Clearance: 36.3 mL/min (A) (by C-G formula based on SCr of 1.76 " mg/dL (H)).    Results from last 7 days   Lab Units 03/10/19  1040   MAGNESIUM mg/dL 1.6       Results from last 7 days   Lab Units 03/11/19  0421   URIC ACID mg/dL 10.7*       Results from last 7 days   Lab Units 03/11/19  0108 03/10/19  1040   WBC 10*3/mm3 6.30 9.16   HEMOGLOBIN g/dL 10.0* 10.1*   PLATELETS 10*3/mm3 166 157       Results from last 7 days   Lab Units 03/10/19  1039   INR  1.12         Imaging Results (last 24 hours)     ** No results found for the last 24 hours. **           MEDICATIONS      aspirin 324 mg Oral Once   And      aspirin 81 mg Oral Daily   bumetanide 2 mg Intravenous BID   carvedilol 6.25 mg Oral BID With Meals   clopidogrel 75 mg Oral Daily   famotidine 20 mg Intravenous Daily   hydrALAZINE 50 mg Oral TID   HYDROcodone-acetaminophen 1 tablet Oral 4x Daily   isosorbide mononitrate 60 mg Oral BID   metoclopramide 5 mg Intravenous 4x Daily AC & at Bedtime   polyethylene glycol 17 g Oral Daily   ranolazine 500 mg Oral Q12H   rosuvastatin 20 mg Oral Daily   sertraline 50 mg Oral Daily   sodium chloride 3 mL Intravenous Q12H   spironolactone 25 mg Oral Daily       insulin        Assessment/Plan   ASSESSMENT / PLAN      NSTEMI (non-ST elevated myocardial infarction) (CMS/[facility])       1. Hyponatremia- urine sodium <5, uric acid is high. Initially on ivf and no change in na with it and due to concern of fluid overload this is stopped. She has a mix of hypovolemic / hypervolemic type low na. Samsca 15mg added yesterday and try again today. Off metolazone check na later today and add another 15mg samsca if needed. ast 81, ALT normal. Dc aldactone due to persistent low na     2. CKD3- Cr at BL of 1.6-1.7. On bumex and aldactone at home. Dry wt is close to 220lbs. Receiving bumex 2 mg iv bid , may need lasix drip but she is in the process of transfer to [place]. H/o HD last year post heart cath     3. NSTEMI- troponin >6, no plan for invasive workup     4. Hypokalemia- stable now              This document has been electronically signed by Ata Rush on March 12, 2019 9:38 AM      As the teaching physician, I have personally re-performed the physical exam and medical decision making activities included in the student note.    Joon Dugan MD  3/12/2019  11:32 AM

## 2019-03-12 NOTE — PLAN OF CARE
Problem: Patient Care Overview  Goal: Plan of Care Review  Outcome: Ongoing (interventions implemented as appropriate)   03/12/19 0645   Coping/Psychosocial   Plan of Care Reviewed With patient   Plan of Care Review   Progress improving   OTHER   Outcome Summary Patient had no complications throughout the night. She remained stable.     Goal: Individualization and Mutuality  Outcome: Ongoing (interventions implemented as appropriate)    Goal: Discharge Needs Assessment  Outcome: Ongoing (interventions implemented as appropriate)    Goal: Interprofessional Rounds/Family Conf  Outcome: Ongoing (interventions implemented as appropriate)      Problem: Fall Risk (Adult)  Goal: Absence of Fall  Outcome: Ongoing (interventions implemented as appropriate)      Problem: Skin Injury Risk (Adult)  Goal: Skin Health and Integrity  Outcome: Ongoing (interventions implemented as appropriate)      Problem: Diabetes, Type 1 (Adult)  Goal: Signs and Symptoms of Listed Potential Problems Will be Absent, Minimized or Managed (Diabetes, Type 1)  Outcome: Ongoing (interventions implemented as appropriate)      Problem: Cardiac: ACS (Acute Coronary Syndrome) (Adult)  Goal: Signs and Symptoms of Listed Potential Problems Will be Absent, Minimized or Managed (Cardiac: ACS)  Outcome: Ongoing (interventions implemented as appropriate)      Problem: Pain, Chronic (Adult)  Goal: Identify Related Risk Factors and Signs and Symptoms  Outcome: Ongoing (interventions implemented as appropriate)    Goal: Acceptable Pain/Comfort Level and Functional Ability  Outcome: Ongoing (interventions implemented as appropriate)

## 2019-03-12 NOTE — DISCHARGE SUMMARY
[facility]  DISCHARGE SUMMARY       Date of Admission: 3/10/2019  Date of Discharge:  3/12/2019  Primary Care Physician: Henry Muniz MD    Presenting Problem/History of Present Illness:  Hyponatremia [E87.1]  NSTEMI (non-ST elevated myocardial infarction) (CMS/HCC) [I21.4]       Final Discharge Diagnoses:  Active Hospital Problems    Diagnosis   • NSTEMI (non-ST elevated myocardial infarction) (CMS/[facility])       Consults:   Consults     Date and Time Order Name Status Description    3/11/2019 0824 Inpatient Cardiology Consult      3/10/2019 1203 Hospitalist (on-call MD unless specified)      3/10/2019 1203 Nephrology - PNA (on-call MD from [facility] unless specified) Completed     3/10/2019 1203 Cardiology - Primary (on-call MD unless specified) Completed           Procedures Performed: None                Pertinent Test Results:   03/10/2019 1040  03/10/2019 1114  Comprehensive Metabolic Panel [[id number]]   (Abnormal)   Blood     Final result  Glucose 89 mg/dL   BUN 41 Abnormally high  mg/dL   Creatinine 1.44 Abnormally high  mg/dL   Sodium 116 Critically low  mmol/L   Potassium 3.0 Abnormally low  mmol/L   Chloride 76 Abnormally low  mmol/L   CO2 28.0 mmol/L   Calcium 8.5 mg/dL   Total Protein 6.9 g/dL   Albumin 3.60 g/dL   ALT (SGPT) 16 U/L   AST (SGOT) 70 Abnormally high  U/L   Alkaline Phosphatase 87 U/L   Total Bilirubin 1.4 Abnormally high  mg/dL   eGFR Non  Am 36 Abnormally low  mL/min/1.73   Globulin 3.3 gm/dL   A/G Ratio 1.1 g/dL   BUN/Creatinine Ratio 28.5 Abnormally high     Anion Gap 12.0 mmol/L          03/10/2019 1040  03/10/2019 1118  Troponin [[id number]]   (Abnormal)   Blood     Final result  Troponin I 6.560 Critically high  ng/mL          03/10/2019 1040  03/10/2019 1105  Magnesium [[id number]]   Blood     Final result  Magnesium 1.6 mg/dL          03/10/2019 1040  03/10/2019 1059  CBC Auto Differential [[id number]]   (Abnormal)   Blood      Final result  WBC 9.16 10*3/mm3   RBC 3.15 Abnormally low  10*6/mm3   Hemoglobin 10.1 Abnormally low  g/dL   Hematocrit 28.5 Abnormally low  %   MCV 90.5 fL   MCH 32.1 pg   MCHC 35.4 g/dL   RDW 15.6 Abnormally high  %   RDW-SD 51.2 fl   MPV 10.3 fL   Platelets 157 10*3/mm3   Neutrophil Rel % 86.9 Abnormally high  %   Lymphocyte Rel % 4.6 Abnormally low  %   Monocyte Rel % 6.8 %   Eosinophil Rel % 0.2 Abnormally low  %   Basophil Rel % 0.4 %   Immature Granulocyte Rel % 1.1 Abnormally high  %   Neutrophils Absolute 7.96 Abnormally high  10*3/mm3   Lymphocytes Absolute 0.42 Abnormally low  10*3/mm3   Monocytes Absolute 0.62 10*3/mm3   Eosinophils Absolute 0.02 10*3/mm3   Basophils Absolute 0.04 10*3/mm3   Immature Grans, Absolute 0.10 Abnormally high  10*3/mm3   nRBC 0.0 /100 WBC          03/10/2019 1040  03/10/2019 1222  BNP [[id number]]   (Abnormal)   Blood     Final result  proBNP 40,500.0 Abnormally high  pg/mL          03/10/2019 1040  03/10/2019 1600  Cortisol [[id number]]   (Abnormal)   Blood     Final result  Cortisol 50.90 Abnormally high  mcg/dL          03/10/2019 1040  03/10/2019 1600  TSH [[id number]]   (Abnormal)   Blood     Final result  TSH Baseline 6.690 Abnormally high  mIU/mL            03/10/2019 1112  03/10/2019 1125  Urinalysis With Microscopic If Indicated (No Culture) - Urine, Clean Catch [[id number]]   (Abnormal)   Urine, Clean Catch     Final result  Color, UA Dark Yellow   Appearance, UA Clear   pH, UA <=5.0   Specific Gravity, UA 1.020   Glucose, UA Negative   Ketones, UA Negative   Bilirubin, UA Small (1+) Abnormal    Blood, UA Moderate (2+) Abnormal    Protein, UA >=300 mg/dL (3+) Abnormal    Leukocytes, UA Negative   Nitrite, UA Negative   Urobilinogen, UA 1.0 E.U./dL          03/10/2019 1112  03/10/2019 1151  Urinalysis, Microscopic Only - Urine, Clean Catch [[id number]]   (Abnormal)   Urine, Clean Catch     Final result  RBC, UA 6-12 Abnormal  /HPF   WBC, UA 3-5 /HPF   Bacteria, UA  Trace Abnormal  /HPF   Squamous Epithelial Cells, UA 0-2 /HPF   Hyaline Casts, UA 3-6 /LPF   Mucus, UA Trace /HPF   Methodology: Manual Light Microscopy           03/10/2019 1112  03/10/2019 1222  Osmolality, Urine - Urine, Clean Catch [[id number]]   Urine, Clean Catch     Final result  Osmolality, Urine 320 mOsm/kg          03/10/2019 1112  03/10/2019 1255  Sodium, Urine, Random - Urine, Clean Catch [[id number]]   (Abnormal)   Urine, Clean Catch     Final result  Sodium, Urine <5 Abnormally low  mmol/L          03/10/2019 1112  03/10/2019 1815  Protein, Urine, Random - Urine, Clean Catch [[id number]]   Urine, Clean Catch     Final result  Total Protein, Urine 757.8 mg/dL            Chief Complaint on Day of Discharge: None    Hospital Course:  The patient is a 68 y.o. female who presented to [facility] with  nausea, vomiting, epigastric and SOB. As per patient she started having nausea and vomiting for last 3 days, vomiting described as non bilious non bloody, she had been able to hold fluids but not meals. She complained of chronic epigastric pain. Beside this she also complains of SOB and had an episode of chest pain, described as pressure on the left side,5/10 in severity, radiated to the left arm, minimally improved after one dose of sublingual nitroglycerin, but completely resolved after a second nitroglycerin.   Labs at the ED reported hyponatremia, hypokalemia, elevated troponin of 6.5 ng/ML, elevated proBNP of 40,500 pg/mL, elevated creatinine, and anemia.  She was started on IV heparin drip for NSTEMI and reviewed by cardiologist Dr. Purvis who recommended conservative management as patient is a high risk for complications from cardiac catheterization.  Of note patient had developed renal failure requiring dialysis at her last cardiac catheterization and also developed a large hematoma at catheterization site. However patient requested transfer to [facility] for evaluation by her cardiologist  "there.  She also had hyponatremia and was reviewed by nephrologist.  Her home medication of metolazone was discontinued with her Aldactone due to persistent hyponatremia.  She was initially started on IV normal saline with no improvement in she was then started on her sodium.  She was then started on Samsca and her sodium improved slightly from 116 on admission to 119 prior to transfer.  She was continued on Bumex 2 mg IV twice daily for CKD stage III.    Patient was transferred to [facility] on the cardiologist Dr. Amador for further care and possible cardiac catheterization.    Condition on Discharge: Stable    Physical Exam on Discharge:  /66 (BP Location: Right arm, Patient Position: Lying)   Pulse 64   Temp 98.4 °F (36.9 °C) (Temporal)   Resp 16   Ht 165.1 cm (65\")   Wt 105 kg (231 lb 11.3 oz)   SpO2 94%   BMI 38.56 kg/m²   Physical Exam  Constitutional: She is oriented to person, place, and time. She appears well-developed and well-nourished. No distress.   HENT:   Head: Normocephalic and atraumatic.   Mouth/Throat: Oropharynx is clear and moist.   Eyes: Conjunctivae and EOM are normal. Pupils are equal, round, and reactive to light.   Neck: Normal range of motion. Neck supple. No JVD present. No tracheal deviation present. No thyromegaly present.   Cardiovascular: Normal rate, regular rhythm, normal heart sounds and intact distal pulses. Exam reveals no gallop and no friction rub.   No murmur heard.  Pulmonary/Chest: No stridor. No respiratory distress. She has no wheezes. She has no rales. She exhibits no tenderness.   Coarse crepitations in lung bases bilaterally and reduced breath sounds.   Abdominal: Soft. Bowel sounds are normal. She exhibits no distension and no mass. There is no tenderness. There is no rebound and no guarding. No hernia.   Musculoskeletal: Normal range of motion. She exhibits edema. She exhibits no tenderness or deformity.   Lymphadenopathy:     She has " no cervical adenopathy.   Neurological: She is alert and oriented to person, place, and time. No cranial nerve deficit or sensory deficit. She exhibits normal muscle tone. Coordination normal.   Skin: Skin is warm and dry. No rash noted. She is not diaphoretic. No erythema. No pallor.   Psychiatric: She has a normal mood and affect. Her behavior is normal. Judgment and thought content normal.        Discharge Disposition: [facility]      Discharge Medications:     Discharge Medications      New Medications      Instructions Start Date   bumetanide 0.25 MG/ML injection  Commonly known as:  BUMEX   2 mg, Intravenous, 2 Times Daily      calcium carbonate 500 MG chewable tablet  Commonly known as:  TUMS   2 tablets, Oral, 2 Times Daily PRN      sodium chloride 0.65 % nasal spray  Commonly known as:  OCEAN   1 spray, Nasal, As Needed         Changes to Medications      Instructions Start Date   aspirin 81 MG chewable tablet  What changed:  Another medication with the same name was removed. Continue taking this medication, and follow the directions you see here.   81 mg, Oral, Daily      carvedilol 6.25 MG tablet  Commonly known as:  COREG  What changed:    · medication strength  · how much to take   6.25 mg, Oral, 2 Times Daily With Meals      vitamin D 02600 units capsule capsule  Commonly known as:  ERGOCALCIFEROL  What changed:  additional instructions   50,000 Units, Oral, Weekly         Continue These Medications      Instructions Start Date   clopidogrel 75 MG tablet  Commonly known as:  PLAVIX   75 mg, Oral, Daily      glucose blood test strip   Needs to see pcp for refills      HUMALOG 100 UNIT/ML injection  Generic drug:  insulin lispro   INJECT 150 UNITS SUB-Q DAILY PER INSULIN PUMP      hydrALAZINE 50 MG tablet  Commonly known as:  APRESOLINE   50 mg, Oral, 3 Times Daily      HYDROcodone-acetaminophen  MG per tablet  Commonly known as:  NORCO   1 tablet, Oral, 4 Times Daily     "  Insulin Syringe-Needle U-100 30G X 5/16\" 0.3 ML misc  Commonly known as:  GLOBAL INSULIN SYRINGES   SQ injections as needed when insulin pump is non functioning      isosorbide mononitrate 60 MG 24 hr tablet  Commonly known as:  IMDUR   60 mg, Oral, 2 Times Daily      nitroglycerin 0.4 MG/SPRAY spray  Commonly known as:  NITROLINGUAL   1 spray, Sublingual, Every 5 Minutes PRN      raNITIdine 300 MG tablet  Commonly known as:  ZANTAC   TAKE 1 TABLET BY MOUTH EVERY NIGHT.      ranolazine 500 MG 12 hr tablet  Commonly known as:  RANEXA   500 mg, Oral, Every 12 Hours Scheduled      rosuvastatin 20 MG tablet  Commonly known as:  CRESTOR   20 mg, Oral, Daily      sertraline 50 MG tablet  Commonly known as:  ZOLOFT   50 mg, Oral, Daily      zolpidem 10 MG tablet  Commonly known as:  AMBIEN   10 mg, Oral, Nightly PRN         Stop These Medications    FEROSUL 325 (65 FE) MG tablet  Generic drug:  ferrous sulfate     ferrous sulfate 324 (65 Fe) MG tablet delayed-release EC tablet     lisinopril 10 MG tablet  Commonly known as:  PRINIVIL,ZESTRIL     metOLazone 2.5 MG tablet  Commonly known as:  ZAROXOLYN     omeprazole 40 MG capsule  Commonly known as:  priLOSEC     spironolactone 25 MG tablet  Commonly known as:  ALDACTONE            Discharge Diet: Diabetic and cardiac diet    Activity at Discharge: Self limited activity    Discharge Care Plan/Instructions:     Follow-up with your primary care provider and your cardiologist Dr. Amador on discharge.    Follow-up Appointments:   Future Appointments   Date Time Provider Department Center   3/20/2019  3:30 PM Yaniv Lobo MD [facility] None       Test Results Pending at Discharge: None    Zach Szymanski MD  03/12/19  5:15 PM    Time: 35 minutes of time was spent evaluating patient and planning discharge and transfer              "

## 2020-03-04 ENCOUNTER — CLINICAL SUPPORT NO REQUIREMENTS (OUTPATIENT)
Dept: ONCOLOGY | Facility: CLINIC | Age: 70
End: 2020-03-04

## 2020-03-04 DIAGNOSIS — N18.30 CHRONIC RENAL IMPAIRMENT, STAGE 3 (MODERATE) (HCC): Primary | Chronic | ICD-10-CM
